# Patient Record
Sex: FEMALE | Race: WHITE | Employment: OTHER | URBAN - METROPOLITAN AREA
[De-identification: names, ages, dates, MRNs, and addresses within clinical notes are randomized per-mention and may not be internally consistent; named-entity substitution may affect disease eponyms.]

---

## 2017-09-25 PROBLEM — Z79.01 CHRONIC ANTICOAGULATION: Status: ACTIVE | Noted: 2017-09-25

## 2017-09-25 PROBLEM — I10 ESSENTIAL HYPERTENSION: Status: ACTIVE | Noted: 2017-09-25

## 2017-09-25 PROBLEM — I50.41 ACUTE COMBINED SYSTOLIC (CONGESTIVE) AND DIASTOLIC (CONGESTIVE) HEART FAILURE (HCC): Status: ACTIVE | Noted: 2017-09-25

## 2017-10-15 PROBLEM — R06.89 DYSPNEA AND RESPIRATORY ABNORMALITIES: Status: ACTIVE | Noted: 2017-10-15

## 2017-10-15 PROBLEM — R06.00 DYSPNEA AND RESPIRATORY ABNORMALITIES: Status: ACTIVE | Noted: 2017-10-15

## 2017-10-15 PROBLEM — J44.9 COPD (CHRONIC OBSTRUCTIVE PULMONARY DISEASE) (HCC): Status: ACTIVE | Noted: 2017-10-15

## 2017-10-28 PROBLEM — Z86.79 HISTORY OF ATRIAL FIBRILLATION: Status: ACTIVE | Noted: 2017-10-28

## 2017-10-30 PROBLEM — I07.1 MODERATE TRICUSPID REGURGITATION: Chronic | Status: ACTIVE | Noted: 2017-10-27

## 2017-10-30 PROBLEM — I42.0 DILATED IDIOPATHIC CARDIOMYOPATHY (HCC): Status: ACTIVE | Noted: 2017-10-27

## 2017-10-30 PROBLEM — R00.1 SEVERE SINUS BRADYCARDIA: Status: ACTIVE | Noted: 2017-10-27

## 2020-01-24 LAB — MAMMOGRAPHY, EXTERNAL: NORMAL

## 2020-07-28 PROBLEM — R09.02 HYPOXIA: Status: ACTIVE | Noted: 2020-07-28

## 2021-04-15 RX ORDER — PREDNISONE 20 MG/1
20 TABLET ORAL DAILY
Status: ON HOLD | COMMUNITY
End: 2021-04-20 | Stop reason: ALTCHOICE

## 2021-04-15 NOTE — PROGRESS NOTES
Have you been tested for COVID  No    Rapid covid test 4/16/21  Have you been told you were positive for COVID No  Have you had any known exposure to someone that is positive for COVID No  Do you have a cough                   No              Do you have shortness of breath No                 Do you have a sore throat            No                Are you having chills                    No                Are you having muscle aches. No                    Please come to the hospital wearing a mask and have your significant other wear a mask as well. Both of you should check your temperature before leaving to come here,  if it is 100 or higher please call [phone] for instruction.

## 2021-04-15 NOTE — PROGRESS NOTES
Dr. Yonatan Peace is pts cardiologist, her last visit she states was 6/2020. She states she was told everything was good. Pt. States she has had not issues with her heart.

## 2021-04-15 NOTE — PROGRESS NOTES
Recalled Dr. Sara Fournier office for cardiac information on patient for her bronchoscopy tmro. . They will forward cardiac notes etc.

## 2021-04-15 NOTE — PROGRESS NOTES
[facility] PRE-ADMISSION TESTING INSTRUCTIONS    The Preadmission Testing patient is instructed accordingly using the following criteria (check applicable):    ARRIVAL INSTRUCTIONS:  [x] Parking the day of Surgery is located in the Main Entrance lot. Upon entering the door, make an immediate right to the surgery reception desk    [x] Bring photo ID and insurance card    [x] Bring in a copy of Living will or Durable Power of  papers. [x] Please be sure to arrange for responsible adult to provide transportation to and from the hospital    [x] Please arrange for responsible adult to be with you for the 24 hour period post procedure due to having anesthesia      GENERAL INSTRUCTIONS:    [x] Nothing by mouth after midnight, including gum, candy, mints or water    [] You may brush your teeth, but do not swallow any water    [x] Take medications as instructed with 1-2 oz of water    [x] Stop herbal supplements and vitamins 5 days prior to procedure    [x] Follow preop dosing of blood thinners per physician instructions    [] Take 1/2 dose of evening insulin, but no insulin after midnight    [] No oral diabetic medications after midnight    [] If diabetic and have low blood sugar or feel symptomatic, take 1-2oz apple juice only    [x] Bring inhalers day of surgery    [] Bring C-PAP/ Bi-Pap day of surgery    [] Bring urine specimen day of surgery    [x] , no lotion, powders or creams     [] Follow bowel prep as instructed per surgeon    [x] No tobacco products within 24 hours of surgery     [x] No alcohol or illegal drug use within 24 hours of surgery.     [x] Jewelry, body piercing's, eyeglasses, contact lenses and dentures are not permitted into surgery (bring cases)      [x] Please do not wear any nail polish, make up or hair products on the day of surgery    [x] You can expect a call the business day prior to procedure to notify you if your arrival time changes    [x] If you receive a survey after surgery we would greatly appreciate your comments    [] Parent/guardian of a minor must accompany their child and remain on the premises  the entire time they are under our care     [] Pediatric patients may bring favorite toy, blanket or comfort item with them    [] A caregiver or family member must remain with the patient during their stay if they are mentally handicapped, have dementia, disoriented or unable to use a call light or would be a safety concern if left unattended    [x] Please notify surgeon if you develop any illness between now and time of surgery (cold, cough, sore throat, fever, nausea, vomiting) or any signs of infections  including skin, wounds, and dental.    [x]  The Outpatient Pharmacy is available to fill your prescription here on your day of surgery, ask your preop nurse for details    [x] Other instructions: wear loose, comfortable clothing    EDUCATIONAL MATERIALS PROVIDED:    [] PAT Preoperative Education Packet/Booklet     [] Medication List    [] Transfusion bracelet applied with instructions    [] Shower with soap, lather and rinse well, and use CHG wipes provided the evening before surgery as instructed    [] Incentive spirometer with instructions

## 2021-04-16 ENCOUNTER — HOSPITAL ENCOUNTER (OUTPATIENT)
Age: 81
Discharge: HOME | End: 2021-04-18
Payer: MEDICARE

## 2021-04-16 DIAGNOSIS — Z01.818 PREOP TESTING: ICD-10-CM

## 2021-04-16 PROCEDURE — U0003 INFECTIOUS AGENT DETECTION BY NUCLEIC ACID (DNA OR RNA); SEVERE ACUTE RESPIRATORY SYNDROME CORONAVIRUS 2 (SARS-COV-2) (CORONAVIRUS DISEASE [COVID-19]), AMPLIFIED PROBE TECHNIQUE, MAKING USE OF HIGH THROUGHPUT TECHNOLOGIES AS DESCRIBED BY CMS-2020-01-R: HCPCS

## 2021-04-16 PROCEDURE — U0005 INFEC AGEN DETEC AMPLI PROBE: HCPCS

## 2021-04-16 NOTE — PROGRESS NOTES
a survey after surgery we would greatly appreciate your comments    [] Parent/guardian of a minor must accompany their child and remain on the premises  the entire time they are under our care     [] Pediatric patients may bring favorite toy, blanket or comfort item with them    [] A caregiver or family member must remain with the patient during their stay if they are mentally handicapped, have dementia, disoriented or unable to use a call light or would be a safety concern if left unattended    [x] Please notify surgeon if you develop any illness between now and time of surgery (cold, cough, sore throat, fever, nausea, vomiting) or any signs of infections  including skin, wounds, and dental.    [x]  The Outpatient Pharmacy is available to fill your prescription here on your day of surgery, ask your preop nurse for details    [x] Other instructions : wear loose, comfortable clothing    EDUCATIONAL MATERIALS PROVIDED:    [] PAT Preoperative Education Packet/Booklet     [] Medication List    [] Transfusion bracelet applied with instructions    [] Shower with soap, lather and rinse well, and use CHG wipes provided the evening before surgery as instructed    [] Incentive spirometer with instructions

## 2021-04-17 LAB
SARS-COV-2: NOT DETECTED
SOURCE: NORMAL

## 2021-04-20 ENCOUNTER — APPOINTMENT (OUTPATIENT)
Dept: GENERAL RADIOLOGY | Age: 81
End: 2021-04-20
Attending: INTERNAL MEDICINE
Payer: MEDICARE

## 2021-04-20 ENCOUNTER — HOSPITAL ENCOUNTER (OUTPATIENT)
Age: 81
Setting detail: OUTPATIENT SURGERY
Discharge: HOME | End: 2021-04-20
Attending: INTERNAL MEDICINE | Admitting: INTERNAL MEDICINE
Payer: MEDICARE

## 2021-04-20 ENCOUNTER — HOSPITAL ENCOUNTER (OUTPATIENT)
Dept: GENERAL RADIOLOGY | Age: 81
Setting detail: OUTPATIENT SURGERY
Discharge: HOME | End: 2021-04-22
Attending: INTERNAL MEDICINE
Payer: MEDICARE

## 2021-04-20 ENCOUNTER — ANESTHESIA EVENT (OUTPATIENT)
Dept: ENDOSCOPY | Age: 81
End: 2021-04-20
Payer: MEDICARE

## 2021-04-20 ENCOUNTER — ANESTHESIA (OUTPATIENT)
Dept: ENDOSCOPY | Age: 81
End: 2021-04-20
Payer: MEDICARE

## 2021-04-20 VITALS
TEMPERATURE: 97.5 F | WEIGHT: 186 LBS | HEART RATE: 61 BPM | RESPIRATION RATE: 16 BRPM | OXYGEN SATURATION: 100 % | SYSTOLIC BLOOD PRESSURE: 145 MMHG | HEIGHT: 69 IN | DIASTOLIC BLOOD PRESSURE: 67 MMHG | BODY MASS INDEX: 27.55 KG/M2

## 2021-04-20 VITALS
OXYGEN SATURATION: 94 % | RESPIRATION RATE: 20 BRPM | SYSTOLIC BLOOD PRESSURE: 105 MMHG | DIASTOLIC BLOOD PRESSURE: 51 MMHG

## 2021-04-20 DIAGNOSIS — R52 PAIN: ICD-10-CM

## 2021-04-20 DIAGNOSIS — Z01.818 PREOP TESTING: Primary | ICD-10-CM

## 2021-04-20 LAB
INR BLD: 1.1
PROTHROMBIN TIME: 12.3 SEC (ref 9.3–12.4)

## 2021-04-20 PROCEDURE — 71045 X-RAY EXAM CHEST 1 VIEW: CPT

## 2021-04-20 PROCEDURE — 7100000011 HC PHASE II RECOVERY - ADDTL 15 MIN: Performed by: INTERNAL MEDICINE

## 2021-04-20 PROCEDURE — 3700000000 HC ANESTHESIA ATTENDED CARE: Performed by: INTERNAL MEDICINE

## 2021-04-20 PROCEDURE — 3609011900 HC BRONCHOSCOPY NEEDLE BX TRACHEA MAIN STEM&/BRON: Performed by: INTERNAL MEDICINE

## 2021-04-20 PROCEDURE — 3700000001 HC ADD 15 MINUTES (ANESTHESIA): Performed by: INTERNAL MEDICINE

## 2021-04-20 PROCEDURE — 87070 CULTURE OTHR SPECIMN AEROBIC: CPT

## 2021-04-20 PROCEDURE — 94664 DEMO&/EVAL PT USE INHALER: CPT

## 2021-04-20 PROCEDURE — 88112 CYTOPATH CELL ENHANCE TECH: CPT

## 2021-04-20 PROCEDURE — 7100000010 HC PHASE II RECOVERY - FIRST 15 MIN: Performed by: INTERNAL MEDICINE

## 2021-04-20 PROCEDURE — 87205 SMEAR GRAM STAIN: CPT

## 2021-04-20 PROCEDURE — 2709999900 HC NON-CHARGEABLE SUPPLY: Performed by: INTERNAL MEDICINE

## 2021-04-20 PROCEDURE — 36415 COLL VENOUS BLD VENIPUNCTURE: CPT

## 2021-04-20 PROCEDURE — 6360000002 HC RX W HCPCS: Performed by: NURSE ANESTHETIST, CERTIFIED REGISTERED

## 2021-04-20 PROCEDURE — 87102 FUNGUS ISOLATION CULTURE: CPT

## 2021-04-20 PROCEDURE — 88173 CYTOPATH EVAL FNA REPORT: CPT

## 2021-04-20 PROCEDURE — 7100000000 HC PACU RECOVERY - FIRST 15 MIN: Performed by: INTERNAL MEDICINE

## 2021-04-20 PROCEDURE — 2580000003 HC RX 258: Performed by: NURSE ANESTHETIST, CERTIFIED REGISTERED

## 2021-04-20 PROCEDURE — 88305 TISSUE EXAM BY PATHOLOGIST: CPT

## 2021-04-20 PROCEDURE — 85610 PROTHROMBIN TIME: CPT

## 2021-04-20 PROCEDURE — 7100000001 HC PACU RECOVERY - ADDTL 15 MIN: Performed by: INTERNAL MEDICINE

## 2021-04-20 PROCEDURE — 6360000002 HC RX W HCPCS: Performed by: INTERNAL MEDICINE

## 2021-04-20 PROCEDURE — 87206 SMEAR FLUORESCENT/ACID STAI: CPT

## 2021-04-20 RX ORDER — SODIUM CHLORIDE 0.9 % (FLUSH) 0.9 %
5-40 SYRINGE (ML) INJECTION PRN
Status: CANCELLED | OUTPATIENT
Start: 2021-04-20

## 2021-04-20 RX ORDER — SODIUM CHLORIDE 0.9 % (FLUSH) 0.9 %
5-40 SYRINGE (ML) INJECTION EVERY 12 HOURS SCHEDULED
Status: CANCELLED | OUTPATIENT
Start: 2021-04-20

## 2021-04-20 RX ORDER — SODIUM CHLORIDE 9 MG/ML
INJECTION, SOLUTION INTRAVENOUS CONTINUOUS PRN
Status: DISCONTINUED | OUTPATIENT
Start: 2021-04-20 | End: 2021-04-20 | Stop reason: SDUPTHER

## 2021-04-20 RX ORDER — PROPOFOL 10 MG/ML
INJECTION, EMULSION INTRAVENOUS PRN
Status: DISCONTINUED | OUTPATIENT
Start: 2021-04-20 | End: 2021-04-20 | Stop reason: SDUPTHER

## 2021-04-20 RX ORDER — ALBUTEROL SULFATE 2.5 MG/3ML
2.5 SOLUTION RESPIRATORY (INHALATION) ONCE
Status: COMPLETED | OUTPATIENT
Start: 2021-04-20 | End: 2021-04-20

## 2021-04-20 RX ORDER — LIDOCAINE HYDROCHLORIDE 20 MG/ML
INJECTION, SOLUTION INTRAVENOUS PRN
Status: DISCONTINUED | OUTPATIENT
Start: 2021-04-20 | End: 2021-04-20 | Stop reason: SDUPTHER

## 2021-04-20 RX ORDER — SODIUM CHLORIDE 9 MG/ML
25 INJECTION, SOLUTION INTRAVENOUS PRN
Status: CANCELLED | OUTPATIENT
Start: 2021-04-20

## 2021-04-20 RX ADMIN — SODIUM CHLORIDE: 9 INJECTION, SOLUTION INTRAVENOUS at 13:28

## 2021-04-20 RX ADMIN — PROPOFOL 50 MG: 10 INJECTION, EMULSION INTRAVENOUS at 13:45

## 2021-04-20 RX ADMIN — PROPOFOL 100 MG: 10 INJECTION, EMULSION INTRAVENOUS at 13:37

## 2021-04-20 RX ADMIN — ALBUTEROL SULFATE 2.5 MG: 2.5 SOLUTION RESPIRATORY (INHALATION) at 14:32

## 2021-04-20 RX ADMIN — LIDOCAINE HYDROCHLORIDE 40 MG: 20 INJECTION, SOLUTION INTRAVENOUS at 13:37

## 2021-04-20 RX ADMIN — PROPOFOL 40 MG: 10 INJECTION, EMULSION INTRAVENOUS at 13:50

## 2021-04-20 RX ADMIN — PROPOFOL 20 MG: 10 INJECTION, EMULSION INTRAVENOUS at 13:54

## 2021-04-20 RX ADMIN — PROPOFOL 50 MG: 10 INJECTION, EMULSION INTRAVENOUS at 13:41

## 2021-04-20 RX ADMIN — LIDOCAINE HYDROCHLORIDE 40 MG: 20 INJECTION, SOLUTION INTRAVENOUS at 13:41

## 2021-04-20 ASSESSMENT — LIFESTYLE VARIABLES: SMOKING_STATUS: 0

## 2021-04-20 NOTE — ANESTHESIA POSTPROCEDURE EVALUATION
Department of Anesthesiology  Postprocedure Note    Patient: Nasrin aMi  MRN: [id number]  YOB: 1940  Date of evaluation: 4/20/2021  Time:  2:41 PM     Procedure Summary     Date: 04/20/21 Room / Location: [facility] 03 / [address] [facility]    Anesthesia Start: 1340 Anesthesia Stop: 2484    Procedures:       BRONCHOSCOPY ALVEOLAR LAVAGE (N/A )      BRONCHOSCOPY/TRANSBRONCHIAL NEEDLE BIOPSY      BRONCHOSCOPY BIOPSY BRONCHUS Diagnosis: (RIGHT MIDDLE LOBE COLLAPSE)    Surgeons: Cira Grullon MD Responsible Provider: Kelsi Calhoun MD    Anesthesia Type: MAC ASA Status: 4          Anesthesia Type: MAC    Diamond Phase I: Diamond Score: 9    Diamond Phase II:      Last vitals: Reviewed and per EMR flowsheets.        Anesthesia Post Evaluation    Patient location during evaluation: PACU  Patient participation: complete - patient participated  Level of consciousness: awake and alert  Pain score: 0  Airway patency: patent  Nausea & Vomiting: no vomiting and no nausea  Complications: no  Cardiovascular status: hemodynamically stable  Respiratory status: spontaneous ventilation  Hydration status: stable

## 2021-04-20 NOTE — OP NOTE
Operative Note      Patient: Camila Schmid  YOB: 1940  MRN: [id number]    Date of Procedure: 4/20/2021    Pre-Op Diagnosis: RIGHT MIDDLE LOBE COLLAPSE    Post-Op Diagnosis: right middle lobe obstruction causing atelectasis       Procedure(s):  BRONCHOSCOPY ALVEOLAR LAVAGE  BRONCHOSCOPY/TRANSBRONCHIAL NEEDLE BIOPSY  BRONCHOSCOPY BIOPSY BRONCHUS    Surgeon(s):  Pal Fernández MD    Assistant:   * No surgical staff found *    Anesthesia: Monitor Anesthesia Care    Estimated Blood Loss (mL): Minimal    Complications: None    Specimens:   ID Type Source Tests Collected by Time Destination   1 : RT MIDDLE LOBE BAL Respiratory BAL- Bronch. Lavage CULTURE, FUNGUS, GRAM STAIN, CULTURE, RESPIRATORY Pal Fernández MD 4/20/2021 1356    A : RT MIDDLE LOBE BAL Respiratory BAL- Bronch. Lavage CYTOLOGY, NON-GYN Pal Fernández MD 4/20/2021 1355    B : RT  MIDDLE LOBE Tissue Bronchial Biopsy SURGICAL PATHOLOGY Pal Fernández MD 4/20/2021 1357    C : RT  MIDDLE LOBE NEEDLE BIOPSY Tissue Bronchial Biopsy CYTOLOGY, NON-GYN Pal Fernández MD 4/20/2021 1358        Implants:  * No implants in log *      Drains: * No LDAs found *    Findings: obstruction of (?) flap or tissue at right middle lobe lateral bronchi    Detailed Description of Procedure:   Bronchoscope was passed through the oral cavity. Vocal cords were visualized and moving symmetrically. Scope was passed through the cords and all airways were visualized. Minimal whitish secretions were seen in all airways and suctioned. An obstruction was noted in the right middle lobe lateral bronchi. TBNA and TBB was done on the obstruction though some difficulty due to location and distance from the edge of the scope. BAL was done on the right middle lobe also. Minimal to no bleeding was noted prior to removal of bronchoscope. Pt will be sent to recovery and CXR to be done to r/o pneumothorax.     Electronically signed by Mimi Berg MD on 4/20/2021 at 1:58 PM

## 2021-04-20 NOTE — ANESTHESIA PRE PROCEDURE
mouth Daily TSH to be done at Dr. Breann Bronson office in 3 weeks 17  Yes Hebert Ramírez MD   pantoprazole (PROTONIX) 40 MG tablet Take 1 tablet by mouth every morning (before breakfast) 17  Yes Hebert Ramírez MD       Current medications:    No current facility-administered medications for this encounter. Allergies:     Allergies   Allergen Reactions    Erythromycin Other (See Comments)     Stomach pain      Cat Hair Extract Itching    Pcn [Penicillins] Itching    Seasonal Itching    Eggs Or Egg-Derived Products Nausea Only       Problem List:    Patient Active Problem List   Diagnosis Code    Atrial fibrillation with RVR ([facility]- Recurrent I48.91    Acute on chronic diastolic congestive heart failure (HCC) I50.33    Cardiomyopathy as manifestation of underlying disease ([facility] I43    Non-rheumatic mitral regurgitation I34.0    Acute combined systolic and diastolic congestive heart failure (HCC) I50.41    Chronic anticoagulation Z79.01    Essential hypertension I10    COPD (chronic obstructive pulmonary disease) (HCC) J44.9    History of atrial fibrillation Z86.79    Dilated idiopathic cardiomyopathy (HCC) I42.0    Severe sinus bradycardia R00.1    Moderate tricuspid regurgitation I07.1    Hypoxia R09.02       Past Medical History:        Diagnosis Date    Atrial fibrillation (HCC)     Cancer ([facility]     skin cancer left ear    [facility]est[facility]) [facility]     Emphysema, unspecified ([address]     Hyperlipidemia     Hypertension     Thyroid disease     Hypothyroid       Past Surgical History:        Procedure Laterality Date    BREAST BIOPSY Right     benign    TUBAL LIGATION         Social History:    Social History     Tobacco Use    Smoking status: Former Smoker     Packs/day: 2.00     Years: 45.00     Pack years: 90.00     Types: Cigarettes     Start date: 1952     Quit date: 1998     Years since quittin.7    Smokeless tobacco: Never Used   Substance Use Topics    Alcohol use: Yes     Alcohol/week: 1.0 standard drinks     Types: 1 Glasses of wine per week     Comment: nightly                                Counseling given: Not Answered      Vital Signs (Current):   Vitals:    04/15/21 0849 04/16/21 1434 04/20/21 1128   BP:   (!) 143/66   Pulse:   62   Resp:   20   SpO2:   100%   Weight: 186 lb (84.4 kg) 186 lb (84.4 kg)    Height: 5' 8.5\" (1.74 m) 5' 8.5\" (1.74 m)                                               BP Readings from Last 3 Encounters:   04/20/21 (!) 143/66   04/08/21 138/70   01/07/21 123/70       NPO Status: Time of last liquid consumption: 2300                        Time of last solid consumption: 2300                        Date of last liquid consumption: 04/19/21                        Date of last solid food consumption: 04/19/21    BMI:   Wt Readings from Last 3 Encounters:   04/16/21 186 lb (84.4 kg)   04/08/21 186 lb (84.4 kg)   01/07/21 184 lb (83.5 kg)     Body mass index is 27.87 kg/m². CBC:   Lab Results   Component Value Date    WBC 5.7 10/26/2017    RBC 4.47 10/26/2017    HGB 14.0 10/26/2017    HCT 42.3 10/26/2017    MCV 94.6 10/26/2017    RDW 13.0 10/26/2017     10/26/2017       CMP:   Lab Results   Component Value Date     10/26/2017    K 4.7 10/26/2017    CL 97 10/26/2017    CO2 34 10/26/2017    BUN 23 10/26/2017    CREATININE 1.2 10/26/2017    GFRAA 52 10/26/2017    LABGLOM 43 10/26/2017    GLUCOSE 105 10/26/2017    GLUCOSE 87 05/04/2012    PROT 7.4 10/15/2017    CALCIUM 9.5 10/26/2017    BILITOT 0.9 10/15/2017    ALKPHOS 79 10/15/2017    AST 40 10/15/2017    ALT 20 10/15/2017       POC Tests: No results for input(s): POCGLU, POCNA, POCK, POCCL, POCBUN, POCHEMO, POCHCT in the last 72 hours.     Coags:   Lab Results   Component Value Date    PROTIME 16.2 11/06/2017    INR 1.5 11/06/2017    APTT 32.6 10/15/2017       HCG (If Applicable): No results found for: PREGTESTUR, PREGSERUM, HCG, HCGQUANT     ABGs: No results found for: PHART, PO2ART, DUD6LPP, UED0NWX, BEART, O9RMWXEN     Type & Screen (If Applicable):  No results found for: LABABO, LABRH    Drug/Infectious Status (If Applicable):  No results found for: HIV, HEPCAB    COVID-19 Screening (If Applicable):   Lab Results   Component Value Date    COVID19 Not Detected 04/16/2021           Anesthesia Evaluation  Patient summary reviewed and Nursing notes reviewed no history of anesthetic complications:   Airway: Mallampati: II  TM distance: >3 FB   Neck ROM: full  Mouth opening: > = 3 FB Dental:    (+) lower dentures and upper dentures      Pulmonary: breath sounds clear to auscultation  (+) COPD:      (-) not a current smoker (former smoker)                          ROS comment: On 5L O2 continuously   Cardiovascular:    (+) hypertension:, valvular problems/murmurs: MR, dysrhythmias: atrial fibrillation, CHF: systolic and diastolic, pulmonary hypertension: mild,       ECG reviewed  Rhythm: regular  Rate: normal                 ROS comment: Dr Alina Sanchez is her cardiologist    Dilated idiopathic cardiomyopathy    Echo: 9/20/2017  Summary   Left ventricle is normal in size .   Borderline concentric left ventricular hypertrophy   No regional wall motion abnormalities seen.   Overall ejection fraction moderate-to-severely decreased . Ejection fraction is visually estimated at 30-35%.  Arriaza Feil left atrium is moderately dilated.   Right ventricle global systolic function is reduced .   Moderately enlarged right atrium size.   Moderate mitral regurgitation is present.   The aortic valve appears mildly sclerotic.   Mild aortic regurgitation is noted.   Moderate to severe tricuspid regurgitation.   Pulmonary hypertension is mild .   There is a trivial circumferential pericardial effusion noted.      Neuro/Psych:   Negative Neuro/Psych ROS              GI/Hepatic/Renal: Neg GI/Hepatic/Renal ROS            Endo/Other: Negative Endo/Other ROS                    Abdominal:           Vascular:

## 2021-04-20 NOTE — PROGRESS NOTES
Chest x-ray resulted. Notified Dr. Jordan Loera. OK for discharge, patient and daughter given discharge instructions and verbalizes understanding.

## 2021-04-20 NOTE — H&P
[facility]  [address], [address]  [address]    Outpatient Procedure H&P      Procedure:  Bronchoscopy with flouroscopy    Indication: atelectasis    Referring  Physician:  Rom Dorman     Brief history: COPD, oxygen dependent, with atelectasis of right middle lobe. No change with repeat radiographs. Dyspnea with minimal exertion, mod cough and not much sputum production, on 5 li NC and tolerating medications.     AllergiesErythromycin, Cat hair extract, Pcn [penicillins], Seasonal, and Eggs or egg-derived products     Allergies noted: Yes     Vital signs reviewed:Yes    HEENT:normal  Cardiac:normal  Chest:decreased breath sound and occ ronchi with cough  Abdomen:normal  Exts: normal  Neuro:normal    ASA:ASA 3 - Patient with moderate systemic disease with functional limitations    Sedation planned:conscious sedation    Patient in acceptable condition for procedure:Yes        Wendy Ortiz

## 2021-04-21 LAB — GRAM STAIN ORDERABLE: NORMAL

## 2021-04-22 LAB
CULTURE, RESPIRATORY: NORMAL
SMEAR, RESPIRATORY: NORMAL

## 2021-05-23 LAB
FUNGUS (MYCOLOGY) CULTURE: ABNORMAL
FUNGUS STAIN: ABNORMAL
ORGANISM: ABNORMAL

## 2021-10-12 ENCOUNTER — APPOINTMENT (OUTPATIENT)
Dept: GENERAL RADIOLOGY | Age: 81
End: 2021-10-12
Payer: MEDICARE

## 2021-10-12 ENCOUNTER — HOSPITAL ENCOUNTER (EMERGENCY)
Age: 81
Discharge: HOME | End: 2021-10-12
Payer: MEDICARE

## 2021-10-12 VITALS
DIASTOLIC BLOOD PRESSURE: 80 MMHG | WEIGHT: 180 LBS | OXYGEN SATURATION: 98 % | HEART RATE: 72 BPM | HEIGHT: 69 IN | BODY MASS INDEX: 26.66 KG/M2 | RESPIRATION RATE: 22 BRPM | SYSTOLIC BLOOD PRESSURE: 186 MMHG

## 2021-10-12 DIAGNOSIS — J44.1 COPD EXACERBATION (HCC): Primary | ICD-10-CM

## 2021-10-12 DIAGNOSIS — R06.02 SHORTNESS OF BREATH: ICD-10-CM

## 2021-10-12 LAB
ALBUMIN SERPL-MCNC: 4.1 G/DL (ref 3.5–5.2)
ALP BLD-CCNC: 80 U/L (ref 35–104)
ALT SERPL-CCNC: 13 U/L (ref 0–32)
ANION GAP SERPL CALCULATED.3IONS-SCNC: 11 MMOL/L (ref 7–16)
AST SERPL-CCNC: 24 U/L (ref 0–31)
BASOPHILS ABSOLUTE: 0.02 E9/L (ref 0–0.2)
BASOPHILS RELATIVE PERCENT: 0.4 % (ref 0–2)
BILIRUB SERPL-MCNC: 0.8 MG/DL (ref 0–1.2)
BUN BLDV-MCNC: 22 MG/DL (ref 6–23)
CALCIUM SERPL-MCNC: 9.6 MG/DL (ref 8.6–10.2)
CHLORIDE BLD-SCNC: 102 MMOL/L (ref 98–107)
CO2: 29 MMOL/L (ref 22–29)
CREAT SERPL-MCNC: 0.9 MG/DL (ref 0.5–1)
EKG ATRIAL RATE: 42 BPM
EKG Q-T INTERVAL: 392 MS
EKG QRS DURATION: 62 MS
EKG QTC CALCULATION (BAZETT): 437 MS
EKG R AXIS: 54 DEGREES
EKG T AXIS: 78 DEGREES
EKG VENTRICULAR RATE: 75 BPM
EOSINOPHILS ABSOLUTE: 0.17 E9/L (ref 0.05–0.5)
EOSINOPHILS RELATIVE PERCENT: 3.4 % (ref 0–6)
GFR AFRICAN AMERICAN: >60
GFR NON-AFRICAN AMERICAN: 60 ML/MIN/1.73
GLUCOSE BLD-MCNC: 110 MG/DL (ref 74–99)
HCT VFR BLD CALC: 33.1 % (ref 34–48)
HEMOGLOBIN: 10.3 G/DL (ref 11.5–15.5)
IMMATURE GRANULOCYTES #: 0.02 E9/L
IMMATURE GRANULOCYTES %: 0.4 % (ref 0–5)
INR BLD: 1.4
LACTIC ACID: 0.6 MMOL/L (ref 0.5–2.2)
LYMPHOCYTES ABSOLUTE: 1.04 E9/L (ref 1.5–4)
LYMPHOCYTES RELATIVE PERCENT: 20.8 % (ref 20–42)
MCH RBC QN AUTO: 30.3 PG (ref 26–35)
MCHC RBC AUTO-ENTMCNC: 31.1 % (ref 32–34.5)
MCV RBC AUTO: 97.4 FL (ref 80–99.9)
MONOCYTES ABSOLUTE: 0.4 E9/L (ref 0.1–0.95)
MONOCYTES RELATIVE PERCENT: 8 % (ref 2–12)
NEUTROPHILS ABSOLUTE: 3.34 E9/L (ref 1.8–7.3)
NEUTROPHILS RELATIVE PERCENT: 67 % (ref 43–80)
PDW BLD-RTO: 14.4 FL (ref 11.5–15)
PLATELET # BLD: 153 E9/L (ref 130–450)
PMV BLD AUTO: 10.3 FL (ref 7–12)
POTASSIUM REFLEX MAGNESIUM: 4 MMOL/L (ref 3.5–5)
PRO-BNP: 648 PG/ML (ref 0–450)
PROTHROMBIN TIME: 15.5 SEC (ref 9.3–12.4)
RBC # BLD: 3.4 E12/L (ref 3.5–5.5)
SODIUM BLD-SCNC: 142 MMOL/L (ref 132–146)
TOTAL PROTEIN: 6.8 G/DL (ref 6.4–8.3)
TROPONIN, HIGH SENSITIVITY: 14 NG/L (ref 0–9)
WBC # BLD: 5 E9/L (ref 4.5–11.5)

## 2021-10-12 PROCEDURE — 99283 EMERGENCY DEPT VISIT LOW MDM: CPT

## 2021-10-12 PROCEDURE — 94640 AIRWAY INHALATION TREATMENT: CPT

## 2021-10-12 PROCEDURE — 83605 ASSAY OF LACTIC ACID: CPT

## 2021-10-12 PROCEDURE — 85025 COMPLETE CBC W/AUTO DIFF WBC: CPT

## 2021-10-12 PROCEDURE — 6370000000 HC RX 637 (ALT 250 FOR IP): Performed by: PHYSICIAN ASSISTANT

## 2021-10-12 PROCEDURE — 85610 PROTHROMBIN TIME: CPT

## 2021-10-12 PROCEDURE — 93005 ELECTROCARDIOGRAM TRACING: CPT | Performed by: PHYSICIAN ASSISTANT

## 2021-10-12 PROCEDURE — 80053 COMPREHEN METABOLIC PANEL: CPT

## 2021-10-12 PROCEDURE — 83880 ASSAY OF NATRIURETIC PEPTIDE: CPT

## 2021-10-12 PROCEDURE — 84484 ASSAY OF TROPONIN QUANT: CPT

## 2021-10-12 PROCEDURE — 94664 DEMO&/EVAL PT USE INHALER: CPT

## 2021-10-12 PROCEDURE — 71046 X-RAY EXAM CHEST 2 VIEWS: CPT

## 2021-10-12 RX ORDER — PREDNISONE 10 MG/1
TABLET ORAL
Qty: 20 TABLET | Refills: 0 | Status: SHIPPED | OUTPATIENT
Start: 2021-10-12 | End: 2021-10-22

## 2021-10-12 RX ORDER — IPRATROPIUM BROMIDE AND ALBUTEROL SULFATE 2.5; .5 MG/3ML; MG/3ML
3 SOLUTION RESPIRATORY (INHALATION) ONCE
Status: COMPLETED | OUTPATIENT
Start: 2021-10-12 | End: 2021-10-12

## 2021-10-12 RX ORDER — PREDNISONE 20 MG/1
60 TABLET ORAL ONCE
Status: COMPLETED | OUTPATIENT
Start: 2021-10-12 | End: 2021-10-12

## 2021-10-12 RX ADMIN — IPRATROPIUM BROMIDE AND ALBUTEROL SULFATE 3 AMPULE: .5; 3 SOLUTION RESPIRATORY (INHALATION) at 18:40

## 2021-10-12 RX ADMIN — PREDNISONE 60 MG: 20 TABLET ORAL at 18:27

## 2021-10-12 NOTE — ED NOTES
FIRST PROVIDER CONTACT ASSESSMENT NOTE       Department of Emergency Medicine   10/12/21  12:06 PM EDT    Chief Complaint: Shortness of Breath (hx lung CA, on 5L NC at all times ) and Cough (productive, green x1 month )    History of Present Illness: Rodolfo Diane is a 78 y.o. female who presents to the ED for shortness of breath. Patient wears 5 L oxygen at home. She does report a history of lung cancer. She reports cough with productive green sputum for the past month. Focused Physical Exam:  VS:  BP (!) 186/80   Pulse 72   Resp 22   Ht 5' 8.5\" (1.74 m)   Wt 180 lb (81.6 kg)   SpO2 98%   BMI 26.97 kg/m²      General: Alert and in no apparent distress     Medical History:  has a past medical history of Atrial fibrillation ([address] Cancer ([address] CHF (congestive heart failure) ([address] Emphysema, unspecified ([address] Hyperlipidemia, Hypertension, and Thyroid disease. Surgical History:  has a past surgical history that includes Breast biopsy (Right); Tubal ligation; and bronchoscopy (N/A, 4/20/2021). Social History:  reports that she quit smoking about 23 years ago. Her smoking use included cigarettes. She started smoking about 69 years ago. She has a 90.00 pack-year smoking history. She has never used smokeless tobacco. She reports current alcohol use of about 1.0 standard drinks of alcohol per week. She reports that she does not use drugs. Family History: family history includes Cancer in her mother; Emphysema in her father.     *ALLERGIES*     Erythromycin, Cat hair extract, Pcn [penicillins], Seasonal, and Eggs or egg-derived products     Initial Plan of Care:  Initiate Treatment-Testing, Proceed toTreatment Area When Bed Available for ED Attending/MLP to Continue Care    -----------------END OF FIRST PROVIDER CONTACT ASSESSMENT NOTE--------------  Electronically signed by Meredith Lester PA-C   DD: 10/12/21         Meredith Lester PA-C  10/12/21 4670

## 2021-10-12 NOTE — ED PROVIDER NOTES
ED Attending shared visit  CC: No                                                                                                                                      Department of Emergency Medicine   ED  Provider Note  Admit Date/RoomTime: 10/12/2021  6:09 PM  ED Room: 32/32        HPI:  10/12/21,   Time: 6:21 PM EDT         Reynolds Media is a 78 y.o. female presenting to the ED for cough and SOB, beginning few days ago. The complaint has been persistent, moderate in severity, and worsened by nothing. The patient has known hx of COPD, lung CA and A. Fib. States she is on Oxygen 5 L chronically and take Coumadin as well. Her son had Covid 3 weeks ago. Pt states she has been battling cough and cold symptoms. Had negative Covid test last week. Pt reports her PCP has her on several antibiotics and she cut here Coumadin dose in half because of this. Comes in today because she didn't sleep well last night. Has had lots of coughing and SOB. Was concerned her A. Fib was out of control. The patient is on inhalers and nebs at home . Denies CP, fever or LE edema. Has had work-up in ED and been waiting all day to be seen.            ROS:     Constitutional: See HPI  HENT: See HPI  Eyes: Negative for pain, discharge and redness  Respiratory: See HPi  Cardiovascular: Negative for CP, edema or palpitations  Gastrointestinal: Negative for nausea, vomiting, diarrhea and abdominal distention  Genitourinary: Negative for dysuria and frequency  Musculoskeletal: Negative for back pain and arthralgia  Skin: Negative for rash and wound  Neurological: Negative for weakness and headaches  Hematological: Negative for adenopathy    All other systems reviewed and are negative      -------------------------------- PAST HISTORY ----------------------------------  Past Medical History:  has a past medical history of Atrial fibrillation ([facility] [address] [facility] CHF (congestive heart failure) [facility] Emphysema, unspecified ([facility] [address] Hyperlipidemia, Hypertension, and Thyroid disease. Past Surgical History:  has a past surgical history that includes Breast biopsy (Right); Tubal ligation; and bronchoscopy (N/A, 4/20/2021). Social History:  reports that she quit smoking about 23 years ago. Her smoking use included cigarettes. She started smoking about 69 years ago. She has a 90.00 pack-year smoking history. She has never used smokeless tobacco. She reports current alcohol use of about 1.0 standard drinks of alcohol per week. She reports that she does not use drugs. Family History: family history includes Cancer in her mother; Emphysema in her father. The patients home medications have been reviewed.     Allergies: Erythromycin, Cat hair extract, Pcn [penicillins], Seasonal, and Eggs or egg-derived products    --------------------------------- RESULTS ------------------------------------------  All laboratory and radiology results have been personally reviewed by myself   LABS:  Results for orders placed or performed during the hospital encounter of 10/12/21   CBC Auto Differential   Result Value Ref Range    WBC 5.0 4.5 - 11.5 E9/L    RBC 3.40 (L) 3.50 - 5.50 E12/L    Hemoglobin 10.3 (L) 11.5 - 15.5 g/dL    Hematocrit 33.1 (L) 34.0 - 48.0 %    MCV 97.4 80.0 - 99.9 fL    MCH 30.3 26.0 - 35.0 pg    MCHC 31.1 (L) 32.0 - 34.5 %    RDW 14.4 11.5 - 15.0 fL    Platelets 852 843 - 451 E9/L    MPV 10.3 7.0 - 12.0 fL    Neutrophils % 67.0 43.0 - 80.0 %    Immature Granulocytes % 0.4 0.0 - 5.0 %    Lymphocytes % 20.8 20.0 - 42.0 %    Monocytes % 8.0 2.0 - 12.0 %    Eosinophils % 3.4 0.0 - 6.0 %    Basophils % 0.4 0.0 - 2.0 %    Neutrophils Absolute 3.34 1.80 - 7.30 E9/L    Immature Granulocytes # 0.02 E9/L    Lymphocytes Absolute 1.04 (L) 1.50 - 4.00 E9/L    Monocytes Absolute 0.40 0.10 - 0.95 E9/L    Eosinophils Absolute 0.17 0.05 - 0.50 E9/L    Basophils Absolute 0.02 0.00 - 0.20 E9/L   Comprehensive Metabolic Panel w/ Reflex to MG Result Value Ref Range    Sodium 142 132 - 146 mmol/L    Potassium reflex Magnesium 4.0 3.5 - 5.0 mmol/L    Chloride 102 98 - 107 mmol/L    CO2 29 22 - 29 mmol/L    Anion Gap 11 7 - 16 mmol/L    Glucose 110 (H) 74 - 99 mg/dL    BUN 22 6 - 23 mg/dL    CREATININE 0.9 0.5 - 1.0 mg/dL    GFR Non-African American 60 >=60 mL/min/1.73    GFR African American >60     Calcium 9.6 8.6 - 10.2 mg/dL    Total Protein 6.8 6.4 - 8.3 g/dL    Albumin 4.1 3.5 - 5.2 g/dL    Total Bilirubin 0.8 0.0 - 1.2 mg/dL    Alkaline Phosphatase 80 35 - 104 U/L    ALT 13 0 - 32 U/L    AST 24 0 - 31 U/L   Troponin   Result Value Ref Range    Troponin, High Sensitivity 14 (H) 0 - 9 ng/L   Protime-INR   Result Value Ref Range    Protime 15.5 (H) 9.3 - 12.4 sec    INR 1.4    Brain Natriuretic Peptide   Result Value Ref Range    Pro- (H) 0 - 450 pg/mL   Lactic Acid, Plasma   Result Value Ref Range    Lactic Acid 0.6 0.5 - 2.2 mmol/L   EKG 12 Lead   Result Value Ref Range    Ventricular Rate 75 BPM    Atrial Rate 42 BPM    QRS Duration 62 ms    Q-T Interval 392 ms    QTc Calculation (Bazett) 437 ms    R Axis 54 degrees    T Axis 78 degrees       RADIOLOGY:  Interpreted by Radiologist.  XR CHEST (2 VW)   Final Result   Bilateral lung scarring, unchanged             ----------------- NURSING NOTES AND VITALS REVIEWED ---------------   The nursing notes within the ED encounter and vital signs as below have been reviewed. BP (!) 186/80   Pulse 72   Resp 22   Ht 5' 8.5\" (1.74 m)   Wt 180 lb (81.6 kg)   SpO2 98%   BMI 26.97 kg/m²   Oxygen Saturation Interpretation: Normal      --------------------------------PHYSICAL EXAM------------------------------------      Constitutional/General: Alert and oriented x3, well appearing, non toxic in NAD.   Pt is comfortable on O2 via NC.   NAD  Head: NC/AT  Eyes: PERRL, EOMI  Mouth: Oropharynx clear, handling secretions, no trismus  Neck: Supple, full ROM, no meningeal signs  Pulmonary: Lungs decreased with bilateral expiratory wheezing noted. Clear to auscultation bilaterally, no wheezes, rales, or rhonchi. Not in respiratory distress  Cardiovascular:  Regular rate and rhythm, no murmurs, gallops, or rubs. 2+ distal pulses  Abdomen: Soft, + BS. No distension. Nontender. No palpable rigidity, rebound or guarding  Extremities: Moves all extremities x 4. Warm and well perfused  Skin: warm and dry without rash  Neurologic: GCS 15,  Intact. No focal deficits  Psych: Normal Affect      ------------------------ ED COURSE/MEDICAL DECISION MAKING----------------------  Medications   ipratropium-albuterol (DUONEB) nebulizer solution 3 ampule (has no administration in time range)   predniSONE (DELTASONE) tablet 60 mg (60 mg Oral Given 10/12/21 1827)         Medical Decision Making:    Labs look ok. CXR with just scarring seen. Pt given Prednisone here. Needs to get back on her regular Coumadin dose. INR is too low. Pt is quite comfortable. She is not in any distress. States her symtoms are better. Given Duonebs here as well. Plan discharge home with steroids x 5 days. Continue with regular inhalers and F/U PCP. Advised to call PCP and discuss INR and Coumadin dosing. Will need repeat labs soon. Return if worse or no better. Counseling: The emergency provider has spoken with the patient and discussed todays results, in addition to providing specific details for the plan of care and counseling regarding the diagnosis and prognosis. Questions are answered at this time and they are agreeable with the plan.      ------------------------ IMPRESSION AND DISPOSITION -------------------------------    IMPRESSION  1. COPD exacerbation ([address]    2.  Shortness of breath        DISPOSITION  Disposition: Discharge to home  Patient condition is stable                   Mayte Mackay PA-C  10/12/21 1828

## 2021-11-22 LAB
ALBUMIN SERPL-MCNC: NORMAL G/DL
ALP BLD-CCNC: NORMAL U/L
ALT SERPL-CCNC: NORMAL U/L
ANION GAP SERPL CALCULATED.3IONS-SCNC: NORMAL MMOL/L
AST SERPL-CCNC: NORMAL U/L
BILIRUB SERPL-MCNC: NORMAL MG/DL
BUN BLDV-MCNC: NORMAL MG/DL
CALCIUM SERPL-MCNC: NORMAL MG/DL
CHLORIDE BLD-SCNC: NORMAL MMOL/L
CHOLESTEROL, TOTAL: NORMAL
CHOLESTEROL/HDL RATIO: NORMAL
CO2: NORMAL
CREAT SERPL-MCNC: NORMAL MG/DL
GFR CALCULATED: NORMAL
GLUCOSE BLD-MCNC: NORMAL MG/DL
HDLC SERPL-MCNC: NORMAL MG/DL
INR BLD: NORMAL
LDL CHOLESTEROL CALCULATED: NORMAL
NONHDLC SERPL-MCNC: NORMAL MG/DL
POTASSIUM SERPL-SCNC: NORMAL MMOL/L
PROTIME: NORMAL
SODIUM BLD-SCNC: NORMAL MMOL/L
TOTAL PROTEIN: NORMAL
TRIGL SERPL-MCNC: NORMAL MG/DL
VLDLC SERPL CALC-MCNC: NORMAL MG/DL

## 2021-12-14 NOTE — PROGRESS NOTES
Have you been tested for COVID  No           Have you been told you were positive for COVID No  Have you had any known exposure to someone that is positive for COVID No  Do you have a cough                   No              Do you have shortness of breath Nio                Do you have a sore throat            No                Are you having chills                    No                Are you having muscle aches. No                    Please come to the hospital wearing a mask and have your significant other wear a mask as well. Both of you should check your temperature before leaving to come here,  if it is 100 or higher please call [phone] for instruction. [place] PRE-ADMISSION TESTING INSTRUCTIONS      ARRIVAL INSTRUCTIONS:  [x] Parking the day of Surgery is located in the Main Entrance lot. Upon entering the main door make an immediate right to the surgery reception desk. [x] Bring photo ID and insurance card    [x] Bring in a copy of Living will or Durable Power of  papers. [x] Please be sure to arrange for responsible adult to provide transportation to and from the hospital    [x] Please arrange for responsible adult to be with you for the 24 hour period post procedure due to having anesthesia      GENERAL INSTRUCTIONS:    [x] Nothing by mouth after midnight, including gum, candy, mints or water    [x] You may brush your teeth, but do not swallow any water    [x] Take medications as instructed with 1-2 oz of water    [x] Follow preop dosing of blood thinners per physician instructions    [x] Bring inhalers day of surgery    [x] Shower or bath with soap, lather and rinse well, AM of Surgery, no lotion, powders or creams to surgical site    [x] No alcohol or illegal drug use within 24 hours of surgery.     [x] Jewelry, body piercing's, eyeglasses, contact lenses and dentures are not permitted into surgery (bring cases)      [x] Please do not wear any nail polish, make up or hair products on the day of surgery    [x] You can expect a call the business day prior to procedure to notify you if your arrival time changes    [x] If you receive a survey after surgery we would greatly appreciate your comments    [x] Please notify surgeon if you develop any illness between now and time of surgery (cold, cough, sore throat, fever, nausea, vomiting) or any signs of infections  including skin, wounds, and dental.

## 2021-12-16 ENCOUNTER — ANESTHESIA EVENT (OUTPATIENT)
Dept: OPERATING ROOM | Age: 81
End: 2021-12-16
Payer: MEDICARE

## 2021-12-16 ENCOUNTER — ANESTHESIA (OUTPATIENT)
Dept: OPERATING ROOM | Age: 81
End: 2021-12-16
Payer: MEDICARE

## 2021-12-16 ENCOUNTER — HOSPITAL ENCOUNTER (OUTPATIENT)
Age: 81
Setting detail: OUTPATIENT SURGERY
Discharge: HOME | End: 2021-12-16
Attending: OPHTHALMOLOGY | Admitting: OPHTHALMOLOGY
Payer: MEDICARE

## 2021-12-16 VITALS
SYSTOLIC BLOOD PRESSURE: 151 MMHG | WEIGHT: 175 LBS | BODY MASS INDEX: 27.47 KG/M2 | TEMPERATURE: 98.2 F | RESPIRATION RATE: 16 BRPM | HEART RATE: 65 BPM | DIASTOLIC BLOOD PRESSURE: 68 MMHG | OXYGEN SATURATION: 96 % | HEIGHT: 67 IN

## 2021-12-16 VITALS — DIASTOLIC BLOOD PRESSURE: 75 MMHG | OXYGEN SATURATION: 100 % | SYSTOLIC BLOOD PRESSURE: 153 MMHG

## 2021-12-16 PROBLEM — H25.813 COMBINED FORMS OF AGE-RELATED CATARACT OF BOTH EYES: Status: ACTIVE | Noted: 2021-12-16

## 2021-12-16 PROCEDURE — 7100000010 HC PHASE II RECOVERY - FIRST 15 MIN: Performed by: OPHTHALMOLOGY

## 2021-12-16 PROCEDURE — 3700000000 HC ANESTHESIA ATTENDED CARE: Performed by: OPHTHALMOLOGY

## 2021-12-16 PROCEDURE — 6360000002 HC RX W HCPCS: Performed by: NURSE ANESTHETIST, CERTIFIED REGISTERED

## 2021-12-16 PROCEDURE — 6360000002 HC RX W HCPCS: Performed by: OPHTHALMOLOGY

## 2021-12-16 PROCEDURE — 3700000001 HC ADD 15 MINUTES (ANESTHESIA): Performed by: OPHTHALMOLOGY

## 2021-12-16 PROCEDURE — 2709999900 HC NON-CHARGEABLE SUPPLY: Performed by: OPHTHALMOLOGY

## 2021-12-16 PROCEDURE — 7100000011 HC PHASE II RECOVERY - ADDTL 15 MIN: Performed by: OPHTHALMOLOGY

## 2021-12-16 PROCEDURE — V2632 POST CHMBR INTRAOCULAR LENS: HCPCS | Performed by: OPHTHALMOLOGY

## 2021-12-16 PROCEDURE — 3600000002 HC SURGERY LEVEL 2 BASE: Performed by: OPHTHALMOLOGY

## 2021-12-16 PROCEDURE — 6370000000 HC RX 637 (ALT 250 FOR IP)

## 2021-12-16 PROCEDURE — 6370000000 HC RX 637 (ALT 250 FOR IP): Performed by: OPHTHALMOLOGY

## 2021-12-16 PROCEDURE — 3600000012 HC SURGERY LEVEL 2 ADDTL 15MIN: Performed by: OPHTHALMOLOGY

## 2021-12-16 PROCEDURE — 2580000003 HC RX 258: Performed by: OPHTHALMOLOGY

## 2021-12-16 PROCEDURE — 2500000003 HC RX 250 WO HCPCS: Performed by: OPHTHALMOLOGY

## 2021-12-16 DEVICE — ACRYSOF(R) IQ ASPHERIC NATURAL IOL, SINGLE-PIECE ACRYLIC FOLDABLE PCL, UV WITH BLUE LIGHTFILTER, 13.0MM LENGTH, 6.0MM ANTERIORASYMMETRIC BICONVEX OPTIC, PLANAR HAPTICS.
Type: IMPLANTABLE DEVICE | Site: EYE | Status: FUNCTIONAL
Brand: ACRYSOF®

## 2021-12-16 RX ORDER — PREDNISOLONE ACETATE 10 MG/ML
SUSPENSION/ DROPS OPHTHALMIC PRN
Status: DISCONTINUED | OUTPATIENT
Start: 2021-12-16 | End: 2021-12-16 | Stop reason: ALTCHOICE

## 2021-12-16 RX ORDER — CIPROFLOXACIN HYDROCHLORIDE 3.5 MG/ML
1 SOLUTION/ DROPS TOPICAL
Status: COMPLETED | OUTPATIENT
Start: 2021-12-16 | End: 2021-12-16

## 2021-12-16 RX ORDER — CYCLOPENTOLATE HYDROCHLORIDE 10 MG/ML
1 SOLUTION/ DROPS OPHTHALMIC
Status: COMPLETED | OUTPATIENT
Start: 2021-12-16 | End: 2021-12-16

## 2021-12-16 RX ORDER — ONDANSETRON 2 MG/ML
4 INJECTION INTRAMUSCULAR; INTRAVENOUS EVERY 6 HOURS PRN
Status: DISCONTINUED | OUTPATIENT
Start: 2021-12-16 | End: 2021-12-16 | Stop reason: HOSPADM

## 2021-12-16 RX ORDER — KETOROLAC TROMETHAMINE 5 MG/ML
1 SOLUTION OPHTHALMIC
Status: COMPLETED | OUTPATIENT
Start: 2021-12-16 | End: 2021-12-16

## 2021-12-16 RX ORDER — PHENYLEPHRINE HCL 2.5 %
1 DROPS OPHTHALMIC (EYE)
Status: COMPLETED | OUTPATIENT
Start: 2021-12-16 | End: 2021-12-16

## 2021-12-16 RX ORDER — TROPICAMIDE 10 MG/ML
1 SOLUTION/ DROPS OPHTHALMIC
Status: COMPLETED | OUTPATIENT
Start: 2021-12-16 | End: 2021-12-16

## 2021-12-16 RX ORDER — ONDANSETRON 4 MG/1
4 TABLET, ORALLY DISINTEGRATING ORAL EVERY 8 HOURS PRN
Status: DISCONTINUED | OUTPATIENT
Start: 2021-12-16 | End: 2021-12-16 | Stop reason: HOSPADM

## 2021-12-16 RX ORDER — CIPROFLOXACIN HYDROCHLORIDE 3.5 MG/ML
SOLUTION/ DROPS TOPICAL PRN
Status: DISCONTINUED | OUTPATIENT
Start: 2021-12-16 | End: 2021-12-16 | Stop reason: ALTCHOICE

## 2021-12-16 RX ORDER — SODIUM CHLORIDE 9 MG/ML
INJECTION, SOLUTION INTRAVENOUS CONTINUOUS
Status: DISCONTINUED | OUTPATIENT
Start: 2021-12-16 | End: 2021-12-16 | Stop reason: HOSPADM

## 2021-12-16 RX ORDER — PROPOFOL 10 MG/ML
INJECTION, EMULSION INTRAVENOUS PRN
Status: DISCONTINUED | OUTPATIENT
Start: 2021-12-16 | End: 2021-12-16 | Stop reason: SDUPTHER

## 2021-12-16 RX ADMIN — SODIUM CHLORIDE: 9 INJECTION, SOLUTION INTRAVENOUS at 08:15

## 2021-12-16 RX ADMIN — PHENYLEPHRINE HYDROCHLORIDE 1 DROP: 25 SOLUTION/ DROPS OPHTHALMIC at 07:45

## 2021-12-16 RX ADMIN — TROPICAMIDE 1 DROP: 10 SOLUTION/ DROPS OPHTHALMIC at 08:00

## 2021-12-16 RX ADMIN — CIPROFLOXACIN 1 DROP: 3 SOLUTION OPHTHALMIC at 07:45

## 2021-12-16 RX ADMIN — Medication 1 DROP: at 08:09

## 2021-12-16 RX ADMIN — KETOROLAC TROMETHAMINE 1 DROP: 5 SOLUTION OPHTHALMIC at 08:15

## 2021-12-16 RX ADMIN — Medication 1 DROP: at 08:15

## 2021-12-16 RX ADMIN — KETOROLAC TROMETHAMINE 1 DROP: 5 SOLUTION OPHTHALMIC at 07:45

## 2021-12-16 RX ADMIN — Medication 1 DROP: at 07:45

## 2021-12-16 RX ADMIN — Medication 1 DROP: at 08:00

## 2021-12-16 RX ADMIN — PHENYLEPHRINE HYDROCHLORIDE 1 DROP: 25 SOLUTION/ DROPS OPHTHALMIC at 08:15

## 2021-12-16 RX ADMIN — CIPROFLOXACIN 1 DROP: 3 SOLUTION OPHTHALMIC at 08:00

## 2021-12-16 RX ADMIN — TROPICAMIDE 1 DROP: 10 SOLUTION/ DROPS OPHTHALMIC at 07:45

## 2021-12-16 RX ADMIN — PHENYLEPHRINE HYDROCHLORIDE 1 DROP: 25 SOLUTION/ DROPS OPHTHALMIC at 08:09

## 2021-12-16 RX ADMIN — PHENYLEPHRINE HYDROCHLORIDE 1 DROP: 25 SOLUTION/ DROPS OPHTHALMIC at 08:01

## 2021-12-16 RX ADMIN — CIPROFLOXACIN 1 DROP: 3 SOLUTION OPHTHALMIC at 08:09

## 2021-12-16 RX ADMIN — KETOROLAC TROMETHAMINE 1 DROP: 5 SOLUTION OPHTHALMIC at 08:30

## 2021-12-16 RX ADMIN — PROPOFOL 40 MG: 10 INJECTION, EMULSION INTRAVENOUS at 09:04

## 2021-12-16 RX ADMIN — KETOROLAC TROMETHAMINE 1 DROP: 5 SOLUTION OPHTHALMIC at 08:00

## 2021-12-16 RX ADMIN — CIPROFLOXACIN 1 DROP: 3 SOLUTION OPHTHALMIC at 08:15

## 2021-12-16 RX ADMIN — TROPICAMIDE 1 DROP: 10 SOLUTION/ DROPS OPHTHALMIC at 08:15

## 2021-12-16 RX ADMIN — TROPICAMIDE 1 DROP: 10 SOLUTION/ DROPS OPHTHALMIC at 08:09

## 2021-12-16 ASSESSMENT — PULMONARY FUNCTION TESTS
PIF_VALUE: 0

## 2021-12-16 ASSESSMENT — COPD QUESTIONNAIRES: CAT_SEVERITY: SEVERE

## 2021-12-16 ASSESSMENT — LIFESTYLE VARIABLES: SMOKING_STATUS: 0

## 2021-12-16 ASSESSMENT — PAIN SCALES - GENERAL
PAINLEVEL_OUTOF10: 0
PAINLEVEL_OUTOF10: 0

## 2021-12-16 NOTE — ANESTHESIA PRE PROCEDURE
medications:    No current facility-administered medications for this visit. No current outpatient medications on file. Facility-Administered Medications Ordered in Other Visits   Medication Dose Route Frequency Provider Last Rate Last Admin    tropicamide (MYDRIACYL) 1 % ophthalmic solution 1 drop  1 drop Ophthalmic Q15 Min PRN Imani Collado MD   1 drop at 12/16/21 0800    phenylephrine (MYDFRIN) 2.5 % ophthalmic solution 1 drop  1 drop Ophthalmic Q15 Min PRN Imani Collado MD   1 drop at 12/16/21 0801    cyclopentolate (CYCLOGYL) 1 % ophthalmic solution 1 drop  1 drop Ophthalmic Q15 Min PRN Imani Collado MD   1 drop at 12/16/21 0800    ciprofloxacin (CILOXAN) 0.3 % ophthalmic solution 1 drop  1 drop Ophthalmic Q15 Min PRHAYDEE Collado MD   1 drop at 12/16/21 0800    ketorolac (ACULAR) 0.5 % ophthalmic solution 1 drop  1 drop Ophthalmic Q15 Min PRN Imani Collado MD   1 drop at 12/16/21 0645    0.9 % sodium chloride infusion   IntraVENous Continuous Imani Collado MD           Allergies:     Allergies   Allergen Reactions    Cat Hair Extract Itching    Erythromycin Other (See Comments)     Stomach pain      Pcn [Penicillins] Itching    Seasonal Itching    Eggs Or Egg-Derived Products Nausea Only       Problem List:    Patient Active Problem List   Diagnosis Code    Atrial fibrillation with RVR ([facility] [address]- Recurrent I48.91    Acute on chronic diastolic congestive heart failure ([facility]) I50.33    Cardiomyopathy as manifestation of underlying disease ([facility] [address] I43    Non-rheumatic mitral regurgitation I34.0    Acute combined systolic and diastolic congestive heart failure ([facility]) I50.41    Chronic anticoagulation Z79.01    Essential hypertension I10    COPD (chronic obstructive pulmonary disease) ([facility]) J44.9    History of atrial fibrillation Z86.79    Dilated idiopathic cardiomyopathy ([facility]) I42.0    Severe sinus bradycardia R00.1    Moderate LABGLOM 60 10/12/2021    GLUCOSE 110 10/12/2021    GLUCOSE 87 05/04/2012    PROT 6.8 10/12/2021    CALCIUM 9.6 10/12/2021    BILITOT 0.8 10/12/2021    ALKPHOS 80 10/12/2021    AST 24 10/12/2021    ALT 13 10/12/2021       POC Tests: No results for input(s): POCGLU, POCNA, POCK, POCCL, POCBUN, POCHEMO, POCHCT in the last 72 hours. Coags:   Lab Results   Component Value Date    PROTIME 15.5 10/12/2021    INR 1.4 10/12/2021    APTT 32.6 10/15/2017       HCG (If Applicable): No results found for: PREGTESTUR, PREGSERUM, HCG, HCGQUANT     ABGs: No results found for: PHART, PO2ART, PND8ATK, WXC9RNC, BEART, I7VDPLNU     Type & Screen (If Applicable):  No results found for: LABABO, LABRH    Drug/Infectious Status (If Applicable):  No results found for: HIV, HEPCAB    COVID-19 Screening (If Applicable):   Lab Results   Component Value Date    COVID19 Not Detected 04/16/2021           Anesthesia Evaluation  Patient summary reviewed and Nursing notes reviewed no history of anesthetic complications:   Airway: Mallampati: II  TM distance: >3 FB   Neck ROM: full  Mouth opening: > = 3 FB Dental:    (+) lower dentures and upper dentures      Pulmonary: breath sounds clear to auscultation  (+) COPD: severe,      (-) not a current smoker (former smoker)                          ROS comment: On 5L O2 continuously   Cardiovascular:    (+) hypertension:, valvular problems/murmurs: MR, dysrhythmias: atrial fibrillation, CHF: systolic and diastolic, pulmonary hypertension: mild,       ECG reviewed  Rhythm: regular  Rate: normal                 ROS comment: Dr Aaron Chauhan is her cardiologist    Dilated idiopathic cardiomyopathy    Echo: 9/20/2017  Summary   Left ventricle is normal in size .   Borderline concentric left ventricular hypertrophy   No regional wall motion abnormalities seen.   Overall ejection fraction moderate-to-severely decreased .    Ejection fraction is visually estimated at 30-35%.   The left atrium is moderately dilated.   Right ventricle global systolic function is reduced .   Moderately enlarged right atrium size.   Moderate mitral regurgitation is present.   The aortic valve appears mildly sclerotic.   Mild aortic regurgitation is noted.   Moderate to severe tricuspid regurgitation.   Pulmonary hypertension is mild .   There is a trivial circumferential pericardial effusion noted. Neuro/Psych:   Negative Neuro/Psych ROS              GI/Hepatic/Renal: Neg GI/Hepatic/Renal ROS            Endo/Other:    (+) malignancy/cancer (Lung cancer). (-) diabetes mellitus               Abdominal:             Vascular: negative vascular ROS. Other Findings:               Anesthesia Plan      MAC     ASA 4       Induction: intravenous. MIPS: Prophylactic antiemetics administered. Anesthetic plan and risks discussed with patient. Plan discussed with CRNA.                   Daisy Motta MD   12/16/2021

## 2021-12-16 NOTE — ANESTHESIA POSTPROCEDURE EVALUATION
Department of Anesthesiology  Postprocedure Note    Patient: Rosemary Marmolejo  MRN: [id number]  YOB: 1940  Date of evaluation: 12/16/2021  Time:  1:23 PM     Procedure Summary     Date: 12/16/21 Room / Location: [facility] OR 03 / [facility]    Anesthesia Start: 2533 Anesthesia Stop: 7009    Procedure: RIGHT EYE CATARACT EXTRACTION IOL IMPLANT (Right Eye) Diagnosis: (RIGHT EYE CATARACT)    Surgeons: Hunter Victoria MD Responsible Provider: Ninfa Schwartz MD    Anesthesia Type: MAC ASA Status: 4          Anesthesia Type: MAC    Diamond Phase I: Diamond Score: 10    Diamond Phase II: Diamond Score: 9    Last vitals: Reviewed and per EMR flowsheets.        Anesthesia Post Evaluation    Patient location during evaluation: PACU  Patient participation: complete - patient participated  Level of consciousness: awake and alert  Airway patency: patent  Nausea & Vomiting: no vomiting and no nausea  Complications: no  Cardiovascular status: hemodynamically stable  Respiratory status: acceptable  Hydration status: stable  Multimodal analgesia pain management approach

## 2021-12-16 NOTE — OP NOTE
Operative Note      Patient: Asa Mixer  YOB: 1940  MRN: [id number]    Date of Procedure: 12/16/2021    Pre-Op Diagnosis: RIGHT EYE CATARACT    Post-Op Diagnosis: Same       Procedure(s):  RIGHT EYE CATARACT EXTRACTION IOL IMPLANT    Surgeon(s):  Shanel Rowland MD    Assistant:   * No surgical staff found *    Anesthesia: Monitor Anesthesia Care    Estimated Blood Loss (mL): Minimal    Complications: None    Specimens:   * No specimens in log *    Implants:  Implant Name Type Inv. Item Serial No.  Lot No. LRB No. Used Action   LENS INTOCU 6.0 TO 30.0 DIOPT 118.7 A CONSTANT 0DEG ANG - [id number]  LENS INTOCU 6.0 TO 30.0 DIOPT 118.7 A CONSTANT 0DEG ANG [id number] [employer]-  Right 1 Implanted         Drains: * No LDAs found *    Findings: same    Detailed Description of Procedure: The eye was anesthetized with a mixture of 2% Lidocaine with Vitrase using a standard peribulbar injection. The globe was noted to be intact. A weight was then placed onto the eye for approximately 5 minutes. After an adequate level of anesthesia was obtained, the patient was prepped and draped in the usual sterile fashion. A lid speculum was then placed into the eye. Parish scissors were then used to create a fornix-based conjunctival flap approximately 3 mm long. Residual Tenons were then cleared. A wet-field  cautery was then used to obtain hemostasis. A 64 degree [place] blade was then used to create a limbal groove of approximately 1.5 mm posterior to the surgical limbus and approximately 2.5 mm in length. A crescent blade was then used to dissect a scleral  flap into clear cornea. The chamber was then entered through the tunnel incision created by the crescent knife using a 2.4-mm keratome blade. Viscoat was then used to reconstitute the chamber. A side port incision was then created for a 2nd instrument.   A bent 27-gauge needle was then used to create

## 2021-12-20 LAB
INR BLD: NORMAL
PROTIME: NORMAL

## 2022-01-22 LAB
INR BLD: NORMAL
PROTIME: NORMAL

## 2022-02-21 LAB
INR BLD: NORMAL
PROTIME: NORMAL

## 2022-02-22 NOTE — PROGRESS NOTES
Have you been tested for COVID  No           Have you been told you were positive for COVID No  Have you had any known exposure to someone that is positive for COVID No  Do you have a cough                   No              Do you have shortness of breath No                 Do you have a sore throat            No                Are you having chills                    No                Are you having muscle aches. No                    Please come to the hospital wearing a mask and have your significant other wear a mask as well. Both of you should check your temperature before leaving to come here,  if it is 100 or higher please call [phone] for instruction. [place] PRE-ADMISSION TESTING INSTRUCTIONS      ARRIVAL INSTRUCTIONS:  [x] Parking the day of Surgery is located in the Main Entrance lot. Upon entering the main door make an immediate right to the surgery reception desk. [x] Bring photo ID and insurance card    [x] Please be sure to arrange for responsible adult to provide transportation to and from the hospital    [x] Please arrange for responsible adult to be with you for the 24 hour period post procedure due to having anesthesia      GENERAL INSTRUCTIONS:    [x] Nothing by mouth after midnight, including gum, candy, mints or water    [x] You may brush your teeth, but do not swallow any water    [x] Take medications as instructed with 1-2 oz of water    [x] Stop herbal supplements and vitamins 5 days prior to procedure    [x] Follow preop dosing of blood thinners per physician instructions    [x] Shower or bath with soap, lather and rinse well, AM of Surgery, no lotion, powders or creams to surgical site    [x] No alcohol or illegal drug use within 24 hours of surgery.     [x] Jewelry, body piercing's, eyeglasses, contact lenses and dentures are not permitted into surgery (bring cases)      [x] Please do not wear any nail polish, make up or hair products on the day of surgery    [x] You can expect a call the business day prior to procedure to notify you if your arrival time changes    [x] If you receive a survey after surgery we would greatly appreciate your comments    [x] Please notify surgeon if you develop any illness between now and time of surgery (cold, cough, sore throat, fever, nausea, vomiting) or any signs of infections  including skin, wounds, and dental.

## 2022-02-23 ENCOUNTER — ANESTHESIA EVENT (OUTPATIENT)
Dept: OPERATING ROOM | Age: 82
End: 2022-02-23
Payer: MEDICARE

## 2022-02-24 ENCOUNTER — ANESTHESIA (OUTPATIENT)
Dept: OPERATING ROOM | Age: 82
End: 2022-02-24
Payer: MEDICARE

## 2022-02-24 ENCOUNTER — HOSPITAL ENCOUNTER (OUTPATIENT)
Age: 82
Setting detail: OUTPATIENT SURGERY
Discharge: HOME | End: 2022-02-24
Attending: OPHTHALMOLOGY | Admitting: OPHTHALMOLOGY
Payer: MEDICARE

## 2022-02-24 VITALS
DIASTOLIC BLOOD PRESSURE: 65 MMHG | OXYGEN SATURATION: 100 % | RESPIRATION RATE: 22 BRPM | SYSTOLIC BLOOD PRESSURE: 145 MMHG

## 2022-02-24 VITALS
BODY MASS INDEX: 26.52 KG/M2 | DIASTOLIC BLOOD PRESSURE: 63 MMHG | HEIGHT: 68 IN | RESPIRATION RATE: 16 BRPM | HEART RATE: 72 BPM | SYSTOLIC BLOOD PRESSURE: 138 MMHG | TEMPERATURE: 97.8 F | WEIGHT: 175 LBS | OXYGEN SATURATION: 94 %

## 2022-02-24 PROCEDURE — 2580000003 HC RX 258: Performed by: NURSE ANESTHETIST, CERTIFIED REGISTERED

## 2022-02-24 PROCEDURE — 2500000003 HC RX 250 WO HCPCS: Performed by: OPHTHALMOLOGY

## 2022-02-24 PROCEDURE — 7100000011 HC PHASE II RECOVERY - ADDTL 15 MIN: Performed by: OPHTHALMOLOGY

## 2022-02-24 PROCEDURE — 2580000003 HC RX 258: Performed by: OPHTHALMOLOGY

## 2022-02-24 PROCEDURE — 7100000010 HC PHASE II RECOVERY - FIRST 15 MIN: Performed by: OPHTHALMOLOGY

## 2022-02-24 PROCEDURE — 6370000000 HC RX 637 (ALT 250 FOR IP): Performed by: OPHTHALMOLOGY

## 2022-02-24 PROCEDURE — 2709999900 HC NON-CHARGEABLE SUPPLY: Performed by: OPHTHALMOLOGY

## 2022-02-24 PROCEDURE — 3600000002 HC SURGERY LEVEL 2 BASE: Performed by: OPHTHALMOLOGY

## 2022-02-24 PROCEDURE — 3700000001 HC ADD 15 MINUTES (ANESTHESIA): Performed by: OPHTHALMOLOGY

## 2022-02-24 PROCEDURE — 3700000000 HC ANESTHESIA ATTENDED CARE: Performed by: OPHTHALMOLOGY

## 2022-02-24 PROCEDURE — V2632 POST CHMBR INTRAOCULAR LENS: HCPCS | Performed by: OPHTHALMOLOGY

## 2022-02-24 PROCEDURE — 3600000012 HC SURGERY LEVEL 2 ADDTL 15MIN: Performed by: OPHTHALMOLOGY

## 2022-02-24 PROCEDURE — 2500000003 HC RX 250 WO HCPCS: Performed by: NURSE ANESTHETIST, CERTIFIED REGISTERED

## 2022-02-24 PROCEDURE — 6360000002 HC RX W HCPCS: Performed by: NURSE ANESTHETIST, CERTIFIED REGISTERED

## 2022-02-24 PROCEDURE — 6360000002 HC RX W HCPCS: Performed by: OPHTHALMOLOGY

## 2022-02-24 PROCEDURE — 6370000000 HC RX 637 (ALT 250 FOR IP)

## 2022-02-24 DEVICE — ACRYSOF(R) IQ ASPHERIC NATURAL IOL, SINGLE-PIECE ACRYLIC FOLDABLE PCL, UV WITH BLUE LIGHTFILTER, 13.0MM LENGTH, 6.0MM ANTERIORASYMMETRIC BICONVEX OPTIC, PLANAR HAPTICS.
Type: IMPLANTABLE DEVICE | Site: EYE | Status: FUNCTIONAL
Brand: ACRYSOF®

## 2022-02-24 RX ORDER — KETOROLAC TROMETHAMINE 5 MG/ML
1 SOLUTION OPHTHALMIC
Status: COMPLETED | OUTPATIENT
Start: 2022-02-24 | End: 2022-02-24

## 2022-02-24 RX ORDER — SODIUM CHLORIDE 9 MG/ML
INJECTION, SOLUTION INTRAVENOUS CONTINUOUS PRN
Status: DISCONTINUED | OUTPATIENT
Start: 2022-02-24 | End: 2022-02-24 | Stop reason: SDUPTHER

## 2022-02-24 RX ORDER — CYCLOPENTOLATE HYDROCHLORIDE 10 MG/ML
1 SOLUTION/ DROPS OPHTHALMIC
Status: COMPLETED | OUTPATIENT
Start: 2022-02-24 | End: 2022-02-24

## 2022-02-24 RX ORDER — TROPICAMIDE 10 MG/ML
1 SOLUTION/ DROPS OPHTHALMIC
Status: COMPLETED | OUTPATIENT
Start: 2022-02-24 | End: 2022-02-24

## 2022-02-24 RX ORDER — CIPROFLOXACIN HYDROCHLORIDE 3.5 MG/ML
SOLUTION/ DROPS TOPICAL PRN
Status: DISCONTINUED | OUTPATIENT
Start: 2022-02-24 | End: 2022-02-24 | Stop reason: ALTCHOICE

## 2022-02-24 RX ORDER — SODIUM CHLORIDE 9 MG/ML
INJECTION, SOLUTION INTRAVENOUS CONTINUOUS
Status: DISCONTINUED | OUTPATIENT
Start: 2022-02-24 | End: 2022-02-24 | Stop reason: HOSPADM

## 2022-02-24 RX ORDER — PROPOFOL 10 MG/ML
INJECTION, EMULSION INTRAVENOUS PRN
Status: DISCONTINUED | OUTPATIENT
Start: 2022-02-24 | End: 2022-02-24 | Stop reason: SDUPTHER

## 2022-02-24 RX ORDER — CIPROFLOXACIN HYDROCHLORIDE 3.5 MG/ML
1 SOLUTION/ DROPS TOPICAL
Status: COMPLETED | OUTPATIENT
Start: 2022-02-24 | End: 2022-02-24

## 2022-02-24 RX ORDER — ONDANSETRON 2 MG/ML
4 INJECTION INTRAMUSCULAR; INTRAVENOUS EVERY 6 HOURS PRN
Status: DISCONTINUED | OUTPATIENT
Start: 2022-02-24 | End: 2022-02-24 | Stop reason: HOSPADM

## 2022-02-24 RX ORDER — PHENYLEPHRINE HCL 2.5 %
1 DROPS OPHTHALMIC (EYE)
Status: COMPLETED | OUTPATIENT
Start: 2022-02-24 | End: 2022-02-24

## 2022-02-24 RX ORDER — PREDNISOLONE ACETATE 10 MG/ML
SUSPENSION/ DROPS OPHTHALMIC PRN
Status: DISCONTINUED | OUTPATIENT
Start: 2022-02-24 | End: 2022-02-24 | Stop reason: ALTCHOICE

## 2022-02-24 RX ORDER — LIDOCAINE HYDROCHLORIDE 10 MG/ML
INJECTION, SOLUTION EPIDURAL; INFILTRATION; INTRACAUDAL; PERINEURAL PRN
Status: DISCONTINUED | OUTPATIENT
Start: 2022-02-24 | End: 2022-02-24 | Stop reason: SDUPTHER

## 2022-02-24 RX ORDER — ONDANSETRON 4 MG/1
4 TABLET, ORALLY DISINTEGRATING ORAL EVERY 8 HOURS PRN
Status: DISCONTINUED | OUTPATIENT
Start: 2022-02-24 | End: 2022-02-24 | Stop reason: HOSPADM

## 2022-02-24 RX ADMIN — CIPROFLOXACIN 1 DROP: 3 SOLUTION OPHTHALMIC at 07:40

## 2022-02-24 RX ADMIN — Medication 1 DROP: at 07:55

## 2022-02-24 RX ADMIN — PHENYLEPHRINE HYDROCHLORIDE 1 DROP: 25 SOLUTION/ DROPS OPHTHALMIC at 08:10

## 2022-02-24 RX ADMIN — PROPOFOL 40 MG: 10 INJECTION, EMULSION INTRAVENOUS at 09:01

## 2022-02-24 RX ADMIN — Medication 1 DROP: at 08:25

## 2022-02-24 RX ADMIN — KETOROLAC TROMETHAMINE 1 DROP: 5 SOLUTION OPHTHALMIC at 08:10

## 2022-02-24 RX ADMIN — KETOROLAC TROMETHAMINE 1 DROP: 5 SOLUTION OPHTHALMIC at 07:55

## 2022-02-24 RX ADMIN — CIPROFLOXACIN 1 DROP: 3 SOLUTION OPHTHALMIC at 08:25

## 2022-02-24 RX ADMIN — LIDOCAINE HYDROCHLORIDE 20 MG: 10 INJECTION, SOLUTION EPIDURAL; INFILTRATION; INTRACAUDAL; PERINEURAL at 09:01

## 2022-02-24 RX ADMIN — Medication 1 DROP: at 07:40

## 2022-02-24 RX ADMIN — PHENYLEPHRINE HYDROCHLORIDE 1 DROP: 25 SOLUTION/ DROPS OPHTHALMIC at 07:55

## 2022-02-24 RX ADMIN — CIPROFLOXACIN 1 DROP: 3 SOLUTION OPHTHALMIC at 07:55

## 2022-02-24 RX ADMIN — SODIUM CHLORIDE: 9 INJECTION, SOLUTION INTRAVENOUS at 07:56

## 2022-02-24 RX ADMIN — KETOROLAC TROMETHAMINE 1 DROP: 5 SOLUTION OPHTHALMIC at 08:25

## 2022-02-24 RX ADMIN — Medication 1 DROP: at 08:10

## 2022-02-24 RX ADMIN — KETOROLAC TROMETHAMINE 1 DROP: 5 SOLUTION OPHTHALMIC at 07:40

## 2022-02-24 RX ADMIN — PHENYLEPHRINE HYDROCHLORIDE 1 DROP: 25 SOLUTION/ DROPS OPHTHALMIC at 08:25

## 2022-02-24 RX ADMIN — PHENYLEPHRINE HYDROCHLORIDE 1 DROP: 25 SOLUTION/ DROPS OPHTHALMIC at 07:40

## 2022-02-24 RX ADMIN — CIPROFLOXACIN 1 DROP: 3 SOLUTION OPHTHALMIC at 08:10

## 2022-02-24 RX ADMIN — SODIUM CHLORIDE: 9 INJECTION, SOLUTION INTRAVENOUS at 08:55

## 2022-02-24 ASSESSMENT — PAIN DESCRIPTION - PAIN TYPE
TYPE: SURGICAL PAIN
TYPE: SURGICAL PAIN

## 2022-02-24 ASSESSMENT — PAIN - FUNCTIONAL ASSESSMENT: PAIN_FUNCTIONAL_ASSESSMENT: 0-10

## 2022-02-24 ASSESSMENT — PAIN SCALES - GENERAL
PAINLEVEL_OUTOF10: 0
PAINLEVEL_OUTOF10: 0

## 2022-02-24 ASSESSMENT — LIFESTYLE VARIABLES: SMOKING_STATUS: 0

## 2022-02-24 ASSESSMENT — COPD QUESTIONNAIRES: CAT_SEVERITY: SEVERE

## 2022-02-24 NOTE — ANESTHESIA PRE PROCEDURE
Department of Anesthesiology  Preprocedure Note       Name:  Oscar Bustamante   Age:  78 y.o.  :  1940                                          MRN:  [id number]         Date:  2022      Surgeon: Sara Morataya):  Johny You MD    Procedure: Procedure(s):  LEFT EYE CATARACT EXTRACTION IOL IMPLANT    Medications prior to admission:   Prior to Admission medications    Medication Sig Start Date End Date Taking?  Authorizing Provider   OXYGEN Inhale 6 L/min into the lungs continuous    Historical Provider, MD   Apoaequorin (PREVAGEN EXTRA STRENGTH) 20 MG CAPS Take 1 tablet by mouth    Historical Provider, MD   rosuvastatin (CRESTOR) 20 MG tablet Take 20 mg by mouth daily    Historical Provider, MD   albuterol sulfate HFA (PROAIR HFA) 108 (90 Base) MCG/ACT inhaler Inhale 2 puffs into the lungs every 6 hours as needed for Wheezing    Historical Provider, MD   fluticasone-umeclidin-vilant (TRELEGY ELLIPTA) 100-62.5-25 MCG/INH AEPB Inhale 1 puff into the lungs daily    Historical Provider, MD   amiodarone (CORDARONE) 200 MG tablet Take 0.5 tablets by mouth every other day 10/30/17   Francesca Proctor, DO   carvedilol (COREG) 3.125 MG tablet Take 1 tablet by mouth 2 times daily (with meals) 10/31/17   Francesca Proctor, DO   warfarin (COUMADIN) 2.5 MG tablet One each evening 10/18/17   Francesca Proctor, DO   spironolactone (ALDACTONE) 25 MG tablet Take 0.5 tablets by mouth three times a week -- only 10/18/17   Francesca Proctor,    furosemide (LASIX) 20 MG tablet Take 1 tablet by mouth daily 17   Haider Reza MD   levothyroxine (SYNTHROID) 25 MCG tablet Take 1 tablet by mouth Daily TSH to be done at Dr. Maria Ines Craft office in 3 weeks  Patient taking differently: Take 75 mcg by mouth Daily TSH to be done at Dr. Maria Ines Craft office in 3 weeks 17   Haider Reza MD   pantoprazole (PROTONIX) 40 MG tablet Take 1 tablet by mouth every morning (before breakfast) 17   Haider Reza MD       Current medications:    No current facility-administered medications for this visit. No current outpatient medications on file. Facility-Administered Medications Ordered in Other Visits   Medication Dose Route Frequency Provider Last Rate Last Admin    tropicamide (MYDRIACYL) 1 % ophthalmic solution 1 drop  1 drop Ophthalmic Q15 Min PRN Marilu Eason MD        phenylephrine (MYDFRIN) 2.5 % ophthalmic solution 1 drop  1 drop Ophthalmic Q15 Min PRN Marilu Eason MD        cyclopentolate (CYCLOGYL) 1 % ophthalmic solution 1 drop  1 drop Ophthalmic Q15 Min PRN Marilu Eason MD        ciprofloxacin (CILOXAN) 0.3 % ophthalmic solution 1 drop  1 drop Ophthalmic Q15 Min Marilu Eason MD        ketorolac (ACULAR) 0.5 % ophthalmic solution 1 drop  1 drop Ophthalmic Q15 Min PRN Marilu Eason MD        0.9 % sodium chloride infusion   IntraVENous Continuous Marilu Eason MD           Allergies:     Allergies   Allergen Reactions    Cat Hair Extract Itching    Erythromycin Other (See Comments)     Stomach pain      Pcn [Penicillins] Itching    Seasonal Itching    Eggs Or Egg-Derived Products Nausea Only       Problem List:    Patient Active Problem List   Diagnosis Code    Atrial fibrillation with RVR ([facility] [facility]- Recurrent I48.91    Acute on chronic diastolic congestive heart failure ([facility]) I50.33    Cardiomyopathy as manifestation of underlying disease ([facility] [facility] I43    Non-rheumatic mitral regurgitation I34.0    Acute combined systolic and diastolic congestive heart failure ([facility]) I50.41    Chronic anticoagulation Z79.01    Essential hypertension I10    COPD (chronic obstructive pulmonary disease) ([facility]) J44.9    History of atrial fibrillation Z86.79    Dilated idiopathic cardiomyopathy ([facility]) I42.0    Severe sinus bradycardia R00.1    Moderate tricuspid regurgitation I07.1    Hypoxia R09.02    Combined forms of age-related cataract of both eyes H25.813 Past Medical History:        Diagnosis Date    Atrial fibrillation ([facility]     CHF (congestive heart failure) (HCC)     Emphysema, unspecified (HCC)     Hyperlipidemia     Hypertension     Lung cancer ([facility] [facility]     Oxygen dependent     Prolonged emergence from general anesthesia     post general anesthesia    Skin cancer     Thyroid disease        Past Surgical History:        Procedure Laterality Date    BREAST BIOPSY Right     benign    BRONCHOSCOPY N/A 2021    BRONCHOSCOPY/TRANSBRONCHIAL NEEDLE [facility]SY [facility]    RIGHT EYE CATARACT EXTRACTION IOL IMPLANT performed by Africa Wilcox MD at [address] [place] [address]         Social History:    Social History     Tobacco Use    Smoking status: Former Smoker     Packs/day: 2.00     Years: 45.00     Pack years: 90.00     Types: Cigarettes     Start date: 1952     Quit date: 1998     Years since quittin.5    Smokeless tobacco: Never Used   Substance Use Topics    Alcohol use: Yes     Alcohol/week: 1.0 standard drink     Types: 1 Glasses of wine per week     Comment: nightly                                Counseling given: Not Answered      Vital Signs (Current): There were no vitals filed for this visit.                                            BP Readings from Last 3 Encounters:   22 (!) 179/78   21 (!) 151/68   21 (!) 153/75       NPO Status:                                                                                 BMI:   Wt Readings from Last 3 Encounters:   22 175 lb (79.4 kg)   21 175 lb (79.4 kg)   10/12/21 180 lb (81.6 kg)     There is no height or weight on file to calculate BMI.    CBC:   Lab Results   Component Value Date    WBC 5.0 10/12/2021    RBC 3.40 10/12/2021    HGB 10.3 10/12/2021    HCT 33.1 10/12/2021    MCV 97.4 10/12/2021    RDW 14.4 10/12/2021     10/12/2021 CMP:   Lab Results   Component Value Date     10/12/2021    K 4.0 10/12/2021     10/12/2021    CO2 29 10/12/2021    BUN 22 10/12/2021    CREATININE 0.9 10/12/2021    GFRAA >60 10/12/2021    LABGLOM 60 10/12/2021    GLUCOSE 110 10/12/2021    GLUCOSE 87 05/04/2012    PROT 6.8 10/12/2021    CALCIUM 9.6 10/12/2021    BILITOT 0.8 10/12/2021    ALKPHOS 80 10/12/2021    AST 24 10/12/2021    ALT 13 10/12/2021       POC Tests: No results for input(s): POCGLU, POCNA, POCK, POCCL, POCBUN, POCHEMO, POCHCT in the last 72 hours. Coags:   Lab Results   Component Value Date    PROTIME 15.5 10/12/2021    INR 1.4 10/12/2021    APTT 32.6 10/15/2017       HCG (If Applicable): No results found for: PREGTESTUR, PREGSERUM, HCG, HCGQUANT     ABGs: No results found for: PHART, PO2ART, UCN4JAS, DEM9RKZ, BEART, O0FDNJPM     Type & Screen (If Applicable):  No results found for: LABABO, LABRH    Drug/Infectious Status (If Applicable):  No results found for: HIV, HEPCAB    COVID-19 Screening (If Applicable):   Lab Results   Component Value Date    COVID19 Not Detected 04/16/2021           Anesthesia Evaluation  Patient summary reviewed and Nursing notes reviewed no history of anesthetic complications:   Airway: Mallampati: II  TM distance: >3 FB   Neck ROM: full  Mouth opening: > = 3 FB Dental:    (+) lower dentures and upper dentures      Pulmonary: breath sounds clear to auscultation  (+) COPD: severe,      (-) not a current smoker (former smoker)                          ROS comment:  On 5L O2 continuously  Chronic hypoxic resp failure  Former smoker   Cardiovascular:    (+) hypertension:, valvular problems/murmurs: MR, dysrhythmias: atrial fibrillation, CHF (Ejection fraction is visually estimated at 24-78%.): systolic and diastolic, pulmonary hypertension: mild, hyperlipidemia      ECG reviewed  Rhythm: regular  Rate: normal           Beta Blocker:  Dose within 24 Hrs      ROS comment: Dr Herman Gar is her

## 2022-02-24 NOTE — OP NOTE
Operative Note      Patient: Guido Cox  YOB: 1940  MRN: [id number]    Date of Procedure: 2/24/2022    Pre-Op Diagnosis: LEFT EYE CATARACT    Post-Op Diagnosis: Same       Procedure(s):  LEFT EYE CATARACT EXTRACTION IOL IMPLANT    Surgeon(s):  Danya Oquendo MD    Assistant:   * No surgical staff found *    Anesthesia: Monitor Anesthesia Care    Estimated Blood Loss (mL): Minimal    Complications: None    Specimens:   * No specimens in log *    Implants:  Implant Name Type Inv. Item Serial No.  Lot No. LRB No. Used Action   LENS INTOCU 6.0 TO 30.0 DIOPT 118.7 A CONSTANT 0DEG ANG - [id number]  LENS INTOCU 6.0 TO 30.0 DIOPT 118.7 A CONSTANT 0DEG ANG [id number] [employer]-  Left 1 Implanted         Drains: * No LDAs found *    Findings: same    Detailed Description of Procedure: The eye was anesthetized with a mixture of 2% Lidocaine with Vitrase using a standard peribulbar injection. The globe was noted to be intact. A weight was then placed onto the eye for approximately 5 minutes. After an adequate level of anesthesia was obtained, the patient was prepped and draped in the usual sterile fashion. A lid speculum was then placed into the eye. Parish scissors were then used to create a fornix-based conjunctival flap approximately 3 mm long. Residual Tenons were then cleared. A wet-field  cautery was then used to obtain hemostasis. A 64 degree [place] blade was then used to create a limbal groove of approximately 1.5 mm posterior to the surgical limbus and approximately 2.5 mm in length. A crescent blade was then used to dissect a scleral  flap into clear cornea. The chamber was then entered through the tunnel incision created by the crescent knife using a 2.4-mm keratome blade. Viscoat was then used to reconstitute the chamber. A side port incision was then created for a 2nd instrument.   A bent 27-gauge needle was then used to create the capsulorhexis. Hydrodissection with hydrodelineation were the performed in the four quadrants with sterile balanced salt solution. The nucleus and epinucleus were then removed with phacoemulsification. The residual cortex was then removed using an aspiration/irrigation unit. The capsular bag was then reconstituted with Provisc. A posterior chamber intraocular lens was then injected into the capsular bag. An SN 60 WF 24.0 D lens was used. The aspiration-irrigation united was then used to remove the residual Provisc. The pupil was then brought down with Miostat. The wound was then tested and found to be watertight. One suture was then placed through the flap, then closed and tied. The conjunctiva was then carefully draped over the surgical wound and then cauterized. The lid speculum  was removed. Pred Forte 1% solution,  Vigamox drops and TobraDex ointment  were then instilled into the eye. The eye was noted to be in good condition. A patch and shield were then placed over the operated eye. The patient was transferred to the recovery room in good condition. Abram Pimentel M.D.       Electronically signed by Luis Paredes MD on 2/24/2022 at 11:54 AM

## 2022-02-24 NOTE — ANESTHESIA POSTPROCEDURE EVALUATION
Department of Anesthesiology  Postprocedure Note    Patient: Kuldeep Peña  MRN: [id number]  YOB: 1940  Date of evaluation: 2/24/2022  Time:  10:08 AM     Procedure Summary     Date: 02/24/22 Room / Location: [facility] / [address] [place] [address]    Anesthesia Start: 3295 Anesthesia Stop: 3253    Procedure: LEFT EYE CATARACT EXTRACTION IOL IMPLANT (Left Eye) Diagnosis: (LEFT EYE CATARACT)    Surgeons: Anthony Jerome MD Responsible Provider: Bonita Gudino MD    Anesthesia Type: MAC ASA Status: 4          Anesthesia Type: MAC    Diamond Phase I: Diamond Score: 9    Diamond Phase II:      Last vitals: Reviewed and per EMR flowsheets.        Anesthesia Post Evaluation    Patient location during evaluation: bedside  Patient participation: complete - patient participated  Level of consciousness: awake and alert  Airway patency: patent  Nausea & Vomiting: no nausea and no vomiting  Complications: no  Cardiovascular status: blood pressure returned to baseline  Respiratory status: acceptable  Hydration status: euvolemic

## 2022-04-13 VITALS
HEIGHT: 68 IN | WEIGHT: 174 LBS | TEMPERATURE: 98.1 F | SYSTOLIC BLOOD PRESSURE: 164 MMHG | OXYGEN SATURATION: 99 % | HEART RATE: 68 BPM | RESPIRATION RATE: 14 BRPM | DIASTOLIC BLOOD PRESSURE: 66 MMHG | BODY MASS INDEX: 26.37 KG/M2

## 2022-04-13 PROCEDURE — 4500000002 HC ER NO CHARGE

## 2022-04-14 ENCOUNTER — HOSPITAL ENCOUNTER (EMERGENCY)
Age: 82
Discharge: LEFT BEFORE DISCHARGE | End: 2022-04-14

## 2022-04-14 DIAGNOSIS — Z53.21 ELOPED FROM EMERGENCY DEPARTMENT: Primary | ICD-10-CM

## 2022-04-14 NOTE — ED NOTES
Department of Emergency Medicine  FIRST PROVIDER ELOPEMENT NOTE                 [facility]          4/14/22  1:59 AM EDT    MRN: [id number]    HPI: Adri Willoughby is a 78 y.o. female who presents to the ED with the following complaint: Shortness of Breath (sob on 6l nasal cannula hx of lung CA and emyphemsema, copd)      (Please refer to [address] [place] [address] for pertinent ROS and PE documentation)  Labs:  No results found for this visit on 04/14/22. Imaging: All Radiology results interpreted by Radiologist unless otherwise noted. No orders to display     ED Course    Medications - No data to display     -------------------------  Disposition    Patient ELOPED from the department prior to completion of evaluation after triage. Results of triage orders that were completed at time of elopement as indicated above were reviewed.     Diagnosis at Time of Elopement: (Based on presenting complaint/available test results):     Electronically signed by Edvin Holguin PA-C   DD: 4/14/22       Edvin Holguin PA-C  04/14/22 0159

## 2022-04-28 ENCOUNTER — APPOINTMENT (OUTPATIENT)
Dept: GENERAL RADIOLOGY | Age: 82
End: 2022-04-28
Payer: MEDICARE

## 2022-04-28 ENCOUNTER — APPOINTMENT (OUTPATIENT)
Dept: CT IMAGING | Age: 82
End: 2022-04-28
Payer: MEDICARE

## 2022-04-28 ENCOUNTER — HOSPITAL ENCOUNTER (EMERGENCY)
Age: 82
Discharge: HOME | End: 2022-04-28
Attending: EMERGENCY MEDICINE
Payer: MEDICARE

## 2022-04-28 VITALS
SYSTOLIC BLOOD PRESSURE: 142 MMHG | BODY MASS INDEX: 26.37 KG/M2 | HEART RATE: 58 BPM | TEMPERATURE: 98.2 F | RESPIRATION RATE: 16 BRPM | DIASTOLIC BLOOD PRESSURE: 68 MMHG | WEIGHT: 174 LBS | OXYGEN SATURATION: 94 % | HEIGHT: 68 IN

## 2022-04-28 DIAGNOSIS — R79.1 SUBTHERAPEUTIC INTERNATIONAL NORMALIZED RATIO (INR): ICD-10-CM

## 2022-04-28 DIAGNOSIS — R93.89 ABNORMAL CT OF THE CHEST: ICD-10-CM

## 2022-04-28 DIAGNOSIS — R06.02 SHORTNESS OF BREATH: Primary | ICD-10-CM

## 2022-04-28 LAB
ANION GAP SERPL CALCULATED.3IONS-SCNC: 8 MMOL/L (ref 7–16)
BASOPHILS ABSOLUTE: 0.01 E9/L (ref 0–0.2)
BASOPHILS RELATIVE PERCENT: 0.2 % (ref 0–2)
BUN BLDV-MCNC: 29 MG/DL (ref 6–23)
CALCIUM SERPL-MCNC: 9.7 MG/DL (ref 8.6–10.2)
CHLORIDE BLD-SCNC: 99 MMOL/L (ref 98–107)
CO2: 32 MMOL/L (ref 22–29)
CREAT SERPL-MCNC: 1.3 MG/DL (ref 0.5–1)
EOSINOPHILS ABSOLUTE: 0.09 E9/L (ref 0.05–0.5)
EOSINOPHILS RELATIVE PERCENT: 2 % (ref 0–6)
GFR AFRICAN AMERICAN: 47
GFR NON-AFRICAN AMERICAN: 39 ML/MIN/1.73
GLUCOSE BLD-MCNC: 111 MG/DL (ref 74–99)
HCT VFR BLD CALC: 34.4 % (ref 34–48)
HEMOGLOBIN: 10.8 G/DL (ref 11.5–15.5)
IMMATURE GRANULOCYTES #: 0.01 E9/L
IMMATURE GRANULOCYTES %: 0.2 % (ref 0–5)
INR BLD: 1.1
LYMPHOCYTES ABSOLUTE: 1.19 E9/L (ref 1.5–4)
LYMPHOCYTES RELATIVE PERCENT: 26.4 % (ref 20–42)
MCH RBC QN AUTO: 31.3 PG (ref 26–35)
MCHC RBC AUTO-ENTMCNC: 31.4 % (ref 32–34.5)
MCV RBC AUTO: 99.7 FL (ref 80–99.9)
MONOCYTES ABSOLUTE: 0.34 E9/L (ref 0.1–0.95)
MONOCYTES RELATIVE PERCENT: 7.6 % (ref 2–12)
NEUTROPHILS ABSOLUTE: 2.86 E9/L (ref 1.8–7.3)
NEUTROPHILS RELATIVE PERCENT: 63.6 % (ref 43–80)
PDW BLD-RTO: 13.2 FL (ref 11.5–15)
PLATELET # BLD: 133 E9/L (ref 130–450)
PMV BLD AUTO: 10.8 FL (ref 7–12)
POTASSIUM SERPL-SCNC: 3.9 MMOL/L (ref 3.5–5)
PRO-BNP: 1641 PG/ML (ref 0–450)
PROTHROMBIN TIME: 12.1 SEC (ref 9.3–12.4)
RBC # BLD: 3.45 E12/L (ref 3.5–5.5)
SODIUM BLD-SCNC: 139 MMOL/L (ref 132–146)
TROPONIN, HIGH SENSITIVITY: 20 NG/L (ref 0–9)
TROPONIN, HIGH SENSITIVITY: 21 NG/L (ref 0–9)
WBC # BLD: 4.5 E9/L (ref 4.5–11.5)

## 2022-04-28 PROCEDURE — 94640 AIRWAY INHALATION TREATMENT: CPT

## 2022-04-28 PROCEDURE — 84484 ASSAY OF TROPONIN QUANT: CPT

## 2022-04-28 PROCEDURE — 99285 EMERGENCY DEPT VISIT HI MDM: CPT

## 2022-04-28 PROCEDURE — 85610 PROTHROMBIN TIME: CPT

## 2022-04-28 PROCEDURE — 71045 X-RAY EXAM CHEST 1 VIEW: CPT

## 2022-04-28 PROCEDURE — 83880 ASSAY OF NATRIURETIC PEPTIDE: CPT

## 2022-04-28 PROCEDURE — 6360000004 HC RX CONTRAST MEDICATION: Performed by: RADIOLOGY

## 2022-04-28 PROCEDURE — 80048 BASIC METABOLIC PNL TOTAL CA: CPT

## 2022-04-28 PROCEDURE — 85025 COMPLETE CBC W/AUTO DIFF WBC: CPT

## 2022-04-28 PROCEDURE — 6370000000 HC RX 637 (ALT 250 FOR IP): Performed by: EMERGENCY MEDICINE

## 2022-04-28 PROCEDURE — 71275 CT ANGIOGRAPHY CHEST: CPT

## 2022-04-28 PROCEDURE — 94664 DEMO&/EVAL PT USE INHALER: CPT

## 2022-04-28 RX ORDER — WARFARIN SODIUM 2 MG/1
1 TABLET ORAL NIGHTLY
Status: ON HOLD | COMMUNITY
End: 2022-05-06 | Stop reason: HOSPADM

## 2022-04-28 RX ORDER — IPRATROPIUM BROMIDE AND ALBUTEROL SULFATE 2.5; .5 MG/3ML; MG/3ML
3 SOLUTION RESPIRATORY (INHALATION) ONCE
Status: COMPLETED | OUTPATIENT
Start: 2022-04-28 | End: 2022-04-28

## 2022-04-28 RX ORDER — DOXYCYCLINE HYCLATE 100 MG/1
100 CAPSULE ORAL 2 TIMES DAILY
Status: ON HOLD | COMMUNITY
Start: 2022-04-27 | End: 2022-05-06 | Stop reason: HOSPADM

## 2022-04-28 RX ORDER — METHYLPREDNISOLONE SODIUM SUCCINATE 125 MG/2ML
60 INJECTION, POWDER, LYOPHILIZED, FOR SOLUTION INTRAMUSCULAR; INTRAVENOUS ONCE
Status: DISCONTINUED | OUTPATIENT
Start: 2022-04-28 | End: 2022-04-28 | Stop reason: HOSPADM

## 2022-04-28 RX ORDER — PANTOPRAZOLE SODIUM 40 MG/1
40 TABLET, DELAYED RELEASE ORAL EVERY MORNING
COMMUNITY
End: 2022-09-12 | Stop reason: SDUPTHER

## 2022-04-28 RX ORDER — LEVOTHYROXINE SODIUM 0.07 MG/1
75 TABLET ORAL
Status: ON HOLD | COMMUNITY
End: 2022-05-06 | Stop reason: HOSPADM

## 2022-04-28 RX ORDER — WARFARIN SODIUM 2 MG/1
2 TABLET ORAL NIGHTLY
Status: ON HOLD | COMMUNITY
End: 2022-05-06 | Stop reason: HOSPADM

## 2022-04-28 RX ORDER — FUROSEMIDE 20 MG/1
20 TABLET ORAL EVERY MORNING
Status: ON HOLD | COMMUNITY
End: 2022-07-15 | Stop reason: HOSPADM

## 2022-04-28 RX ADMIN — IPRATROPIUM BROMIDE AND ALBUTEROL SULFATE 3 AMPULE: .5; 3 SOLUTION RESPIRATORY (INHALATION) at 08:45

## 2022-04-28 RX ADMIN — IOPAMIDOL 75 ML: 755 INJECTION, SOLUTION INTRAVENOUS at 12:14

## 2022-04-28 ASSESSMENT — ENCOUNTER SYMPTOMS
NAUSEA: 0
SHORTNESS OF BREATH: 1
SORE THROAT: 0
EYE REDNESS: 0
ABDOMINAL DISTENTION: 0
DIARRHEA: 0
BACK PAIN: 0
EYE PAIN: 0
EYE DISCHARGE: 0
SPUTUM PRODUCTION: 0
COUGH: 0
WHEEZING: 0
ABDOMINAL PAIN: 0
SINUS PRESSURE: 0
VOMITING: 0

## 2022-04-28 ASSESSMENT — PAIN - FUNCTIONAL ASSESSMENT
PAIN_FUNCTIONAL_ASSESSMENT: NONE - DENIES PAIN
PAIN_FUNCTIONAL_ASSESSMENT: NONE - DENIES PAIN

## 2022-04-28 NOTE — ED PROVIDER NOTES
57-year-old female history of COPD as well as A. fib on Coumadin presents to the emergency department with shortness of breath. The patient states the symptoms started when she woke up this morning she states she had trouble catching her breath. She states mild wheezing no chest pain no nausea vomiting dye abdominal pain urinary symptoms or leg swelling. She states no specific chest pain. She states no headache or vision changes. She has no other complaints at this time. The history is provided by the patient. Shortness of Breath  Severity:  Mild  Onset quality:  Gradual  Duration:  3 hours  Timing:  Intermittent  Progression:  Waxing and waning  Chronicity:  New  Relieved by:  Nothing  Worsened by:  Nothing  Ineffective treatments:  None tried  Associated symptoms: no abdominal pain, no chest pain, no cough, no diaphoresis, no ear pain, no fever, no headaches, no PND, no rash, no sore throat, no sputum production, no vomiting and no wheezing         Review of Systems   Constitutional: Negative for chills, diaphoresis and fever. HENT: Negative for ear pain, sinus pressure and sore throat. Eyes: Negative for pain, discharge and redness. Respiratory: Positive for shortness of breath. Negative for cough, sputum production and wheezing. Cardiovascular: Negative for chest pain and PND. Gastrointestinal: Negative for abdominal distention, abdominal pain, diarrhea, nausea and vomiting. Genitourinary: Negative for dysuria and frequency. Musculoskeletal: Negative for arthralgias and back pain. Skin: Negative for rash and wound. Neurological: Negative for weakness and headaches. Hematological: Negative for adenopathy. All other systems reviewed and are negative. Physical Exam  Constitutional:       Appearance: She is well-developed. She is obese. HENT:      Head: Normocephalic and atraumatic.       Mouth/Throat:      Mouth: Mucous membranes are moist.   Cardiovascular:      Rate and Rhythm: Normal rate and regular rhythm. Pulmonary:      Effort: Pulmonary effort is normal.      Breath sounds: Normal breath sounds. Musculoskeletal:         General: Normal range of motion. Cervical back: Normal range of motion and neck supple. Right lower leg: No tenderness. No edema. Left lower leg: No tenderness. No edema. Skin:     General: Skin is warm. Capillary Refill: Capillary refill takes less than 2 seconds. Neurological:      General: No focal deficit present. Mental Status: She is alert and oriented to person, place, and time. Procedures     MDM  Number of Diagnoses or Management Options  Abnormal CT of the chest  Shortness of breath  Subtherapeutic international normalized ratio (INR)  Diagnosis management comments: Patient seen and examined. Labs and imaging were ordered. Patient apparently is been taking half doses of her Coumadin due to recent antibiotic use. Labs and imaging were ordered. CT scan ultimately required ordering due to a subtherapeutic INR. CT scan revealed no PE there was some abnormal findings in the right middle lobe of patient's lung review of previous CT scans [facility] shows similar findings. Patient is afebrile with no evidence of sepsis so no antibiotics will be started at this time I suspect this is a chronic finding. Patient resting comfortably without any use of additional oxygen other than her 6 L home.   Patient was felt to be safe for discharge and recommendation to follow-up with her primary care physician and pulmonologist.             --------------------------------------------- PAST HISTORY ---------------------------------------------  Past Medical History:  has a past medical history of Atrial fibrillation ([facility] [address] CHF (congestive heart failure) ([facility] [address] Emphysema, unspecified ([facility] [address] Hyperlipidemia, Hypertension, Lung cancer ([facility] [address] Oxygen dependent, Prolonged emergence from general anesthesia, Skin cancer, Value Ref Range    Sodium 139 132 - 146 mmol/L    Potassium 3.9 3.5 - 5.0 mmol/L    Chloride 99 98 - 107 mmol/L    CO2 32 (H) 22 - 29 mmol/L    Anion Gap 8 7 - 16 mmol/L    Glucose 111 (H) 74 - 99 mg/dL    BUN 29 (H) 6 - 23 mg/dL    CREATININE 1.3 (H) 0.5 - 1.0 mg/dL    GFR Non-African American 39 >=60 mL/min/1.73    GFR African American 47     Calcium 9.7 8.6 - 10.2 mg/dL   Troponin   Result Value Ref Range    Troponin, High Sensitivity 21 (H) 0 - 9 ng/L   Brain Natriuretic Peptide   Result Value Ref Range    Pro-BNP 1,641 (H) 0 - 450 pg/mL   Protime-INR   Result Value Ref Range    Protime 12.1 9.3 - 12.4 sec    INR 1.1    Troponin   Result Value Ref Range    Troponin, High Sensitivity 20 (H) 0 - 9 ng/L       Radiology:  CTA PULMONARY W CONTRAST   Final Result   1. There is no pulmonary embolus   2. Complete collapse of the right middle lobe. There is no appreciable   endobronchial lesion or mucous plugging of the right middle lobe bronchi. Please note I reviewed the patient's previous chest x-rays dating back to   04/20/2021 and the appearance of the chest x-ray is unchanged and the   collapsed right middle lobe is not well-defined or well appreciated on the   chest x-ray. 3. 6.5 mm irregular nodule within the right upper lobe on axial image number   41 and sagittal number 44 dedicated follow-up CT scan is recommended in 3   months. 4. Advanced emphysematous changes with interstitial fibrosis   5. Chronic pleuroparenchymal scarring seen throughout the left lung. 6. Cardiomegaly   7. Large hiatal hernia      RECOMMENDATIONS:   Follow-up CT scan is recommended in 3 months. XR CHEST PORTABLE   Final Result   1. Chronic significant pleuroparenchymal scarring seen within the left lung. The findings are stable   2. Emphysematous changes.    3. There are no gross findings of pneumonia within the right lung.             ------------------------- NURSING NOTES AND VITALS REVIEWED ---------------------------  Date / Time Roomed:  4/28/2022  7:25 AM  ED Bed Assignment:  16/16    The nursing notes within the ED encounter and vital signs as below have been reviewed. BP (!) 154/70   Pulse 58   Temp 97 °F (36.1 °C) (Temporal)   Resp 18   Ht 5' 8\" (1.727 m)   Wt 174 lb (78.9 kg)   SpO2 100%   BMI 26.46 kg/m²   Oxygen Saturation Interpretation: Normal      ------------------------------------------ PROGRESS NOTES ------------------------------------------  I have spoken with the patient and discussed todays results, in addition to providing specific details for the plan of care and counseling regarding the diagnosis and prognosis. Their questions are answered at this time and they are agreeable with the plan. I discussed at length with them reasons for immediate return here for re evaluation. They will followup with their primary care physician by calling their office on Monday.      --------------------------------- ADDITIONAL PROVIDER NOTES ---------------------------------  At this time the patient is without objective evidence of an acute process requiring hospitalization or inpatient management. They have remained hemodynamically stable throughout their entire ED visit and are stable for discharge with outpatient follow-up. The plan has been discussed in detail and they are aware of the specific conditions for emergent return, as well as the importance of follow-up. New Prescriptions    No medications on file       Diagnosis:  1. Shortness of breath    2. Abnormal CT of the chest    3. Subtherapeutic international normalized ratio (INR)        Disposition:  Patient's disposition: Discharge to home  Patient's condition is stable.        Johny Gomez DO  04/28/22 5491

## 2022-05-03 ENCOUNTER — HOSPITAL ENCOUNTER (INPATIENT)
Age: 82
LOS: 3 days | Discharge: HOME | DRG: 191 | End: 2022-05-06
Attending: STUDENT IN AN ORGANIZED HEALTH CARE EDUCATION/TRAINING PROGRAM | Admitting: INTERNAL MEDICINE
Payer: MEDICARE

## 2022-05-03 ENCOUNTER — APPOINTMENT (OUTPATIENT)
Dept: GENERAL RADIOLOGY | Age: 82
DRG: 191 | End: 2022-05-03
Payer: MEDICARE

## 2022-05-03 DIAGNOSIS — J44.1 COPD EXACERBATION (HCC): Primary | ICD-10-CM

## 2022-05-03 DIAGNOSIS — R06.02 SHORTNESS OF BREATH: ICD-10-CM

## 2022-05-03 DIAGNOSIS — I43 CARDIOMYOPATHY AS MANIFESTATION OF UNDERLYING DISEASE (HCC): ICD-10-CM

## 2022-05-03 DIAGNOSIS — I50.33 ACUTE ON CHRONIC DIASTOLIC CONGESTIVE HEART FAILURE (HCC): Chronic | ICD-10-CM

## 2022-05-03 DIAGNOSIS — R00.1 BRADYCARDIA: ICD-10-CM

## 2022-05-03 PROBLEM — R06.09 DYSPNEA ON EXERTION: Status: ACTIVE | Noted: 2022-05-03

## 2022-05-03 LAB
ALBUMIN SERPL-MCNC: 4.1 G/DL (ref 3.5–5.2)
ALP BLD-CCNC: 55 U/L (ref 35–104)
ALT SERPL-CCNC: 10 U/L (ref 0–32)
ANION GAP SERPL CALCULATED.3IONS-SCNC: 9 MMOL/L (ref 7–16)
AST SERPL-CCNC: 22 U/L (ref 0–31)
BASOPHILS ABSOLUTE: 0.02 E9/L (ref 0–0.2)
BASOPHILS RELATIVE PERCENT: 0.4 % (ref 0–2)
BILIRUB SERPL-MCNC: 0.5 MG/DL (ref 0–1.2)
BUN BLDV-MCNC: 26 MG/DL (ref 6–23)
CALCIUM SERPL-MCNC: 9.4 MG/DL (ref 8.6–10.2)
CHLORIDE BLD-SCNC: 104 MMOL/L (ref 98–107)
CO2: 28 MMOL/L (ref 22–29)
CREAT SERPL-MCNC: 1.2 MG/DL (ref 0.5–1)
EOSINOPHILS ABSOLUTE: 0.07 E9/L (ref 0.05–0.5)
EOSINOPHILS RELATIVE PERCENT: 1.5 % (ref 0–6)
GFR AFRICAN AMERICAN: 52
GFR NON-AFRICAN AMERICAN: 43 ML/MIN/1.73
GLUCOSE BLD-MCNC: 148 MG/DL (ref 74–99)
HCT VFR BLD CALC: 32.9 % (ref 34–48)
HEMOGLOBIN: 10.2 G/DL (ref 11.5–15.5)
IMMATURE GRANULOCYTES #: 0.01 E9/L
IMMATURE GRANULOCYTES %: 0.2 % (ref 0–5)
INR BLD: 1.4
LACTIC ACID: 0.9 MMOL/L (ref 0.5–2.2)
LACTIC ACID: 1.4 MMOL/L (ref 0.5–2.2)
LYMPHOCYTES ABSOLUTE: 1.13 E9/L (ref 1.5–4)
LYMPHOCYTES RELATIVE PERCENT: 23.7 % (ref 20–42)
MCH RBC QN AUTO: 31.3 PG (ref 26–35)
MCHC RBC AUTO-ENTMCNC: 31 % (ref 32–34.5)
MCV RBC AUTO: 100.9 FL (ref 80–99.9)
MONOCYTES ABSOLUTE: 0.33 E9/L (ref 0.1–0.95)
MONOCYTES RELATIVE PERCENT: 6.9 % (ref 2–12)
NEUTROPHILS ABSOLUTE: 3.2 E9/L (ref 1.8–7.3)
NEUTROPHILS RELATIVE PERCENT: 67.3 % (ref 43–80)
PDW BLD-RTO: 13.2 FL (ref 11.5–15)
PLATELET # BLD: 129 E9/L (ref 130–450)
PMV BLD AUTO: 11.1 FL (ref 7–12)
POTASSIUM SERPL-SCNC: 4.2 MMOL/L (ref 3.5–5)
PRO-BNP: 2194 PG/ML (ref 0–450)
PROTHROMBIN TIME: 15.1 SEC (ref 9.3–12.4)
RBC # BLD: 3.26 E12/L (ref 3.5–5.5)
REASON FOR REJECTION: NORMAL
REJECTED TEST: NORMAL
SARS-COV-2, NAAT: NOT DETECTED
SODIUM BLD-SCNC: 141 MMOL/L (ref 132–146)
TOTAL PROTEIN: 6.7 G/DL (ref 6.4–8.3)
TROPONIN, HIGH SENSITIVITY: 20 NG/L (ref 0–9)
TROPONIN, HIGH SENSITIVITY: 20 NG/L (ref 0–9)
WBC # BLD: 4.8 E9/L (ref 4.5–11.5)

## 2022-05-03 PROCEDURE — 83605 ASSAY OF LACTIC ACID: CPT

## 2022-05-03 PROCEDURE — 87635 SARS-COV-2 COVID-19 AMP PRB: CPT

## 2022-05-03 PROCEDURE — 80053 COMPREHEN METABOLIC PANEL: CPT

## 2022-05-03 PROCEDURE — 83880 ASSAY OF NATRIURETIC PEPTIDE: CPT

## 2022-05-03 PROCEDURE — 2700000000 HC OXYGEN THERAPY PER DAY

## 2022-05-03 PROCEDURE — 6370000000 HC RX 637 (ALT 250 FOR IP): Performed by: STUDENT IN AN ORGANIZED HEALTH CARE EDUCATION/TRAINING PROGRAM

## 2022-05-03 PROCEDURE — 99285 EMERGENCY DEPT VISIT HI MDM: CPT

## 2022-05-03 PROCEDURE — 85610 PROTHROMBIN TIME: CPT

## 2022-05-03 PROCEDURE — 85025 COMPLETE CBC W/AUTO DIFF WBC: CPT

## 2022-05-03 PROCEDURE — 2060000000 HC ICU INTERMEDIATE R&B

## 2022-05-03 PROCEDURE — 71046 X-RAY EXAM CHEST 2 VIEWS: CPT

## 2022-05-03 PROCEDURE — 84484 ASSAY OF TROPONIN QUANT: CPT

## 2022-05-03 PROCEDURE — 6370000000 HC RX 637 (ALT 250 FOR IP): Performed by: INTERNAL MEDICINE

## 2022-05-03 PROCEDURE — 36415 COLL VENOUS BLD VENIPUNCTURE: CPT

## 2022-05-03 RX ORDER — ROSUVASTATIN CALCIUM 10 MG/1
10 TABLET, COATED ORAL NIGHTLY
Status: DISCONTINUED | OUTPATIENT
Start: 2022-05-03 | End: 2022-05-06 | Stop reason: HOSPADM

## 2022-05-03 RX ORDER — CARVEDILOL 6.25 MG/1
3.12 TABLET ORAL 2 TIMES DAILY WITH MEALS
Status: DISCONTINUED | OUTPATIENT
Start: 2022-05-04 | End: 2022-05-03

## 2022-05-03 RX ORDER — FUROSEMIDE 20 MG/1
20 TABLET ORAL EVERY MORNING
Status: DISCONTINUED | OUTPATIENT
Start: 2022-05-04 | End: 2022-05-06 | Stop reason: HOSPADM

## 2022-05-03 RX ORDER — AMIODARONE HYDROCHLORIDE 200 MG/1
100 TABLET ORAL EVERY OTHER DAY
Status: DISCONTINUED | OUTPATIENT
Start: 2022-05-04 | End: 2022-05-06 | Stop reason: HOSPADM

## 2022-05-03 RX ORDER — BUDESONIDE 0.5 MG/2ML
0.5 INHALANT ORAL 2 TIMES DAILY
Status: DISCONTINUED | OUTPATIENT
Start: 2022-05-03 | End: 2022-05-06 | Stop reason: HOSPADM

## 2022-05-03 RX ORDER — CARVEDILOL 6.25 MG/1
3.12 TABLET ORAL 2 TIMES DAILY WITH MEALS
Status: DISCONTINUED | OUTPATIENT
Start: 2022-05-03 | End: 2022-05-06

## 2022-05-03 RX ORDER — PANTOPRAZOLE SODIUM 40 MG/1
40 TABLET, DELAYED RELEASE ORAL EVERY MORNING
Status: DISCONTINUED | OUTPATIENT
Start: 2022-05-04 | End: 2022-05-06 | Stop reason: HOSPADM

## 2022-05-03 RX ORDER — LEVOTHYROXINE SODIUM 0.07 MG/1
75 TABLET ORAL
Status: DISCONTINUED | OUTPATIENT
Start: 2022-05-04 | End: 2022-05-04

## 2022-05-03 RX ORDER — ARFORMOTEROL TARTRATE 15 UG/2ML
15 SOLUTION RESPIRATORY (INHALATION) 2 TIMES DAILY
Status: DISCONTINUED | OUTPATIENT
Start: 2022-05-03 | End: 2022-05-06 | Stop reason: HOSPADM

## 2022-05-03 RX ORDER — IPRATROPIUM BROMIDE AND ALBUTEROL SULFATE 2.5; .5 MG/3ML; MG/3ML
3 SOLUTION RESPIRATORY (INHALATION) ONCE
Status: DISCONTINUED | OUTPATIENT
Start: 2022-05-03 | End: 2022-05-06 | Stop reason: HOSPADM

## 2022-05-03 RX ORDER — SPIRONOLACTONE 25 MG/1
12.5 TABLET ORAL
Status: DISCONTINUED | OUTPATIENT
Start: 2022-05-04 | End: 2022-05-06 | Stop reason: HOSPADM

## 2022-05-03 RX ORDER — PREDNISONE 20 MG/1
60 TABLET ORAL ONCE
Status: COMPLETED | OUTPATIENT
Start: 2022-05-03 | End: 2022-05-03

## 2022-05-03 RX ORDER — WARFARIN SODIUM 2 MG/1
2 TABLET ORAL NIGHTLY
Status: DISCONTINUED | OUTPATIENT
Start: 2022-05-03 | End: 2022-05-04 | Stop reason: DRUGHIGH

## 2022-05-03 RX ADMIN — CARVEDILOL 3.12 MG: 6.25 TABLET, FILM COATED ORAL at 22:56

## 2022-05-03 RX ADMIN — WARFARIN SODIUM 2 MG: 2 TABLET ORAL at 22:57

## 2022-05-03 RX ADMIN — ROSUVASTATIN CALCIUM 10 MG: 10 TABLET, FILM COATED ORAL at 22:57

## 2022-05-03 RX ADMIN — PREDNISONE 60 MG: 20 TABLET ORAL at 15:46

## 2022-05-03 ASSESSMENT — ENCOUNTER SYMPTOMS
WHEEZING: 1
DIARRHEA: 0
SHORTNESS OF BREATH: 1
NAUSEA: 0
SINUS PRESSURE: 0
SORE THROAT: 0
EYE DISCHARGE: 0
COUGH: 1
VOMITING: 0
EYE PAIN: 0
ABDOMINAL PAIN: 0
BACK PAIN: 0

## 2022-05-03 NOTE — ED NOTES
Department of Emergency Medicine  FIRST PROVIDER TRIAGE NOTE             [facility]           5/3/22  2:41 PM EDT    Date of Encounter: 5/3/22   MRN: [id number]      HPI: Bree Solis is a 78 y.o. female who presents to the ED for Shortness of Breath (pt has COPD and lung cancer. On O2 continuously at 6L NC)     ROS: Negative for vomiting or diarrhea. PE: Gen Appearance/Constitutional: alert     Initial Plan of Care: All treatment areas with department are currently occupied. Plan to order/Initiate the following while awaiting opening in ED: labs.   Initiate Treatment-Testing, Proceed toTreatment Area When Bed Available for ED Attending/MLP to Continue Care    Electronically signed by Jerene Sacks, PA-C   DD: 5/3/22         Jerene Sacks, PA-C  05/03/22 1443

## 2022-05-03 NOTE — ED NOTES
Pt placed on cardiac monitor, heart rate fluctuating down to the 30's. Provider aware.       Yury Porter RN  05/03/22 5370

## 2022-05-03 NOTE — ED PROVIDER NOTES
22-year-old female with a history of A. fib, on Coumadin, as well as COPD on 6 L nasal cannula chronically presenting to the emergency department for shortness of breath, ongoing for some time, but is been worse in the last day and a half, worse with ambulation, associate with a dry cough, nothing makes it better, has been constant, moderate in severity. She denies any chest pain or chest tightness, denies any fevers or chills, denies any nausea, vomiting, diarrhea, nothing feels weak, tingly, numb. Review of Systems   Constitutional: Negative for chills and fever. HENT: Negative for ear pain, sinus pressure and sore throat. Eyes: Negative for pain and discharge. Respiratory: Positive for cough, shortness of breath and wheezing. Cardiovascular: Negative for chest pain. Gastrointestinal: Negative for abdominal pain, diarrhea, nausea and vomiting. Genitourinary: Negative for dysuria and frequency. Musculoskeletal: Negative for arthralgias and back pain. Skin: Negative for rash and wound. Neurological: Negative for weakness and headaches. All other systems reviewed and are negative. Physical Exam  Vitals and nursing note reviewed. Constitutional:       Appearance: Normal appearance. She is ill-appearing (Chronically ill-appearing). HENT:      Head: Normocephalic and atraumatic. Right Ear: External ear normal.      Left Ear: External ear normal.      Nose: Nose normal.      Mouth/Throat:      Mouth: Mucous membranes are moist.   Eyes:      Extraocular Movements: Extraocular movements intact. Pupils: Pupils are equal, round, and reactive to light. Cardiovascular:      Rate and Rhythm: Normal rate and regular rhythm. Pulses: Normal pulses. Heart sounds: Normal heart sounds. Pulmonary:      Effort: Pulmonary effort is normal.      Breath sounds: Rhonchi (Rhonchorous, wheezes at the bases, diminished at the bases) present.    Abdominal:      General: Abdomen is flat. Bowel sounds are normal.      Palpations: Abdomen is soft. Musculoskeletal:         General: Normal range of motion. Cervical back: Normal range of motion and neck supple. Skin:     General: Skin is warm and dry. Neurological:      General: No focal deficit present. Mental Status: She is alert and oriented to person, place, and time. Cranial Nerves: No cranial nerve deficit. Procedures     MDM     ED Course as of 05/03/22 1918   Tue May 03, 2022   1546 EKG: This EKG is signed by emergency department physician. Rate: 50  Rhythm: Junctional  Interpretation: non-specific EKG  Comparison: stable as compared to patient's most recent EKG      [JG]   1552 Patient refused breathing treatments, states that the prednisone would be enough [JG]   1711 Consulted Dr. Naun Mckeon to discuss patient [JG]   1821 Dr. Naun Mckeon will see patient [JG]   [phone] Put out consult Dr. Corrine Rizo for admission [JG]   1915 Dr. Corrine Rizo accepted for admission   [JG]   235 78year-old female presented emerged part for shortness of breath, history of COPD and lung cancer, chronically on 6 L nasal cannula, recent CTA for PE which was negative as her Coumadin was subtherapeutic. INR today was 1.4, showed junctional rhythm on EKG, was diminished at the bases consistent with possible COPD observation, she refused treatments, but did take steroids as she states this is helped in the past.  Heart rate when she was on the monitor wears anywhere from the 30s to 60s, spoke to Dr. Naun Mckeon who recommended the patient be admitted to have her medications adjusted. Patient was admitted to hospital for further work-up and management. [JG]      ED Course User Index  [JG] Chantel Monroe MD      70-year-old female presented emerged part for shortness of breath, history of COPD and lung cancer, chronically on 6 L nasal cannula, recent CTA for PE which was negative as her Coumadin was subtherapeutic.   INR today was 1.4, showed junctional rhythm on EKG, was diminished at the bases consistent with possible COPD observation, she refused treatments, but did take steroids as she states this is helped in the past.  Heart rate when she was on the monitor wears anywhere from the 30s to 60s, spoke to Dr. Dominick Deluna who recommended the patient be admitted to have her medications adjusted. Patient was admitted to hospital for further work-up and management. ED Course as of 05/03/22 1918   Tue May 03, 2022   1546 EKG: This EKG is signed by emergency department physician. Rate: 50  Rhythm: Junctional  Interpretation: non-specific EKG  Comparison: stable as compared to patient's most recent EKG      [JG]   1552 Patient refused breathing treatments, states that the prednisone would be enough [JG]   1711 Consulted Dr. Dominick Deluna to discuss patient [JG]   1821 Dr. Dominick Deluna will see patient [JG]   [phone] Put out consult Dr. Dick Paul for admission [JG]   1915 Dr. Dick Paul accepted for admission   [JG]   235 78year-old female presented emerged part for shortness of breath, history of COPD and lung cancer, chronically on 6 L nasal cannula, recent CTA for PE which was negative as her Coumadin was subtherapeutic. INR today was 1.4, showed junctional rhythm on EKG, was diminished at the bases consistent with possible COPD observation, she refused treatments, but did take steroids as she states this is helped in the past.  Heart rate when she was on the monitor wears anywhere from the 30s to 60s, spoke to Dr. Dominick Deluna who recommended the patient be admitted to have her medications adjusted. Patient was admitted to hospital for further work-up and management.    [JG]      ED Course User Index  [JG] Cruzito Balderas MD       --------------------------------------------- PAST HISTORY ---------------------------------------------  Past Medical History:  has a past medical history of Atrial fibrillation ([address] CHF (congestive heart failure) ([facility] Emphysema, unspecified ([facility] Hyperlipidemia, Hypertension, Lung cancer ([facility] Oxygen dependent, Prolonged emergence from general anesthesia, Skin cancer, and Thyroid disease. Past Surgical History:  has a past surgical history that includes Breast biopsy (Right); Tubal ligation; bronchoscopy (N/A, 4/20/2021); Intracapsular cataract extraction (Right, 12/16/2021); and Intracapsular cataract extraction (Left, 2/24/2022). Social History:  reports that she quit smoking about 23 years ago. Her smoking use included cigarettes. She started smoking about 69 years ago. She has a 90.00 pack-year smoking history. She has never used smokeless tobacco. She reports current alcohol use of about 1.0 standard drink of alcohol per week. She reports that she does not use drugs. Family History: family history includes Cancer in her mother; Emphysema in her father. The patients home medications have been reviewed.     Allergies: Cat hair extract, Eggs or egg-derived products, Erythromycin, Pcn [penicillins], and Seasonal    -------------------------------------------------- RESULTS -------------------------------------------------    LABS:  Results for orders placed or performed during the hospital encounter of 05/03/22   COVID-19, Rapid    Specimen: Nasopharyngeal Swab   Result Value Ref Range    SARS-CoV-2, NAAT Not Detected Not Detected   Comprehensive Metabolic Panel   Result Value Ref Range    Sodium 141 132 - 146 mmol/L    Potassium 4.2 3.5 - 5.0 mmol/L    Chloride 104 98 - 107 mmol/L    CO2 28 22 - 29 mmol/L    Anion Gap 9 7 - 16 mmol/L    Glucose 148 (H) 74 - 99 mg/dL    BUN 26 (H) 6 - 23 mg/dL    CREATININE 1.2 (H) 0.5 - 1.0 mg/dL    GFR Non-African American 43 >=60 mL/min/1.73    GFR African American 52     Calcium 9.4 8.6 - 10.2 mg/dL    Total Protein 6.7 6.4 - 8.3 g/dL    Albumin 4.1 3.5 - 5.2 g/dL    Total Bilirubin 0.5 0.0 - 1.2 mg/dL    Alkaline Phosphatase 55 35 - 104 U/L    ALT 10 0 - 32 U/L    AST 22 0 - 31 U/L Troponin   Result Value Ref Range    Troponin, High Sensitivity 20 (H) 0 - 9 ng/L   Brain Natriuretic Peptide   Result Value Ref Range    Pro-BNP 2,194 (H) 0 - 450 pg/mL   Lactic Acid   Result Value Ref Range    Lactic Acid 1.4 0.5 - 2.2 mmol/L   Lactic Acid   Result Value Ref Range    Lactic Acid 0.9 0.5 - 2.2 mmol/L   Protime-INR   Result Value Ref Range    Protime 15.1 (H) 9.3 - 12.4 sec    INR 1.4    SPECIMEN REJECTION   Result Value Ref Range    Rejected Test cbcwd     Reason for Rejection see below    CBC with Auto Differential   Result Value Ref Range    WBC 4.8 4.5 - 11.5 E9/L    RBC 3.26 (L) 3.50 - 5.50 E12/L    Hemoglobin 10.2 (L) 11.5 - 15.5 g/dL    Hematocrit 32.9 (L) 34.0 - 48.0 %    .9 (H) 80.0 - 99.9 fL    MCH 31.3 26.0 - 35.0 pg    MCHC 31.0 (L) 32.0 - 34.5 %    RDW 13.2 11.5 - 15.0 fL    Platelets 601 (L) 520 - 450 E9/L    MPV 11.1 7.0 - 12.0 fL    Neutrophils % 67.3 43.0 - 80.0 %    Immature Granulocytes % 0.2 0.0 - 5.0 %    Lymphocytes % 23.7 20.0 - 42.0 %    Monocytes % 6.9 2.0 - 12.0 %    Eosinophils % 1.5 0.0 - 6.0 %    Basophils % 0.4 0.0 - 2.0 %    Neutrophils Absolute 3.20 1.80 - 7.30 E9/L    Immature Granulocytes # 0.01 E9/L    Lymphocytes Absolute 1.13 (L) 1.50 - 4.00 E9/L    Monocytes Absolute 0.33 0.10 - 0.95 E9/L    Eosinophils Absolute 0.07 0.05 - 0.50 E9/L    Basophils Absolute 0.02 0.00 - 0.20 E9/L   Troponin   Result Value Ref Range    Troponin, High Sensitivity 20 (H) 0 - 9 ng/L   EKG 12 Lead   Result Value Ref Range    Ventricular Rate 50 BPM    Atrial Rate 51 BPM    QRS Duration 82 ms    Q-T Interval 470 ms    QTc Calculation (Bazett) 428 ms    R Axis 42 degrees    T Axis 20 degrees       RADIOLOGY:  XR CHEST (2 VW)   Final Result   Relatively stable findings as described               ------------------------- NURSING NOTES AND VITALS REVIEWED ---------------------------  Date / Time Roomed:  5/3/2022  3:07 PM  ED Bed Assignment:  16/16    The nursing notes within the ED encounter and vital signs as below have been reviewed. Patient Vitals for the past 24 hrs:   BP Temp Pulse Resp SpO2   05/03/22 1917 (!) 181/81 -- 55 -- 100 %   05/03/22 1914 (!) 181/81 -- 57 20 99 %   05/03/22 1806 (!) 176/88 -- 59 20 98 %   05/03/22 1543 130/60 -- (!) 44 20 98 %   05/03/22 1430 (!) 124/58 97.8 °F (36.6 °C) 54 20 98 %       Oxygen Saturation Interpretation: Abnormal - but at baseline    ------------------------------------------ PROGRESS NOTES ------------------------------------------      Counseling:  I have spoken with the patient and discussed todays results, in addition to providing specific details for the plan of care and counseling regarding the diagnosis and prognosis. Their questions are answered at this time and they are agreeable with the plan of admission.    --------------------------------- ADDITIONAL PROVIDER NOTES ---------------------------------  Consultations:  Time: 1915. Spoke with Dr. Winnie Santillan. Discussed case. They will admit the patient. This patient's ED course included: a personal history and physicial examination, re-evaluation prior to disposition, multiple bedside re-evaluations, IV medications, cardiac monitoring and continuous pulse oximetry    This patient has remained hemodynamically stable during their ED course. Diagnosis:  1. COPD exacerbation ([address]    2. Shortness of breath    3. Bradycardia        Disposition:  Patient's disposition: Admit to telemetry  Patient's condition is stable.               Riki Roman MD  Resident  05/03/22 5886

## 2022-05-04 LAB
B.E.: 3.2 MMOL/L (ref -3–3)
COHB: 0.3 % (ref 0–1.5)
CRITICAL: ABNORMAL
DATE ANALYZED: ABNORMAL
DATE OF COLLECTION: ABNORMAL
HCO3: 28 MMOL/L (ref 22–26)
HHB: 1.2 % (ref 0–5)
LAB: ABNORMAL
Lab: ABNORMAL
METHB: 0.3 % (ref 0–1.5)
MODE: ABNORMAL
O2 CONTENT: 14.5 ML/DL
O2 SATURATION: 98.8 % (ref 92–98.5)
O2HB: 98.2 % (ref 94–97)
OPERATOR ID: 1210
PATIENT TEMP: 37 C
PCO2: 44.1 MMHG (ref 35–45)
PH BLOOD GAS: 7.42 (ref 7.35–7.45)
PO2: 143 MMHG (ref 75–100)
SOURCE, BLOOD GAS: ABNORMAL
THB: 10.3 G/DL (ref 11.5–16.5)
TIME ANALYZED: 1652

## 2022-05-04 PROCEDURE — 6360000002 HC RX W HCPCS: Performed by: INTERNAL MEDICINE

## 2022-05-04 PROCEDURE — 82805 BLOOD GASES W/O2 SATURATION: CPT

## 2022-05-04 PROCEDURE — 2060000000 HC ICU INTERMEDIATE R&B

## 2022-05-04 PROCEDURE — 2580000003 HC RX 258: Performed by: INTERNAL MEDICINE

## 2022-05-04 PROCEDURE — 99223 1ST HOSP IP/OBS HIGH 75: CPT | Performed by: INTERNAL MEDICINE

## 2022-05-04 PROCEDURE — 94640 AIRWAY INHALATION TREATMENT: CPT

## 2022-05-04 PROCEDURE — 2700000000 HC OXYGEN THERAPY PER DAY

## 2022-05-04 PROCEDURE — 6370000000 HC RX 637 (ALT 250 FOR IP): Performed by: INTERNAL MEDICINE

## 2022-05-04 RX ORDER — LEVOTHYROXINE SODIUM 0.1 MG/1
100 TABLET ORAL
Status: DISCONTINUED | OUTPATIENT
Start: 2022-05-05 | End: 2022-05-06 | Stop reason: HOSPADM

## 2022-05-04 RX ORDER — METHYLPREDNISOLONE SODIUM SUCCINATE 40 MG/ML
40 INJECTION, POWDER, LYOPHILIZED, FOR SOLUTION INTRAMUSCULAR; INTRAVENOUS EVERY 12 HOURS
Status: COMPLETED | OUTPATIENT
Start: 2022-05-04 | End: 2022-05-05

## 2022-05-04 RX ORDER — WARFARIN SODIUM 5 MG/1
5 TABLET ORAL
Status: COMPLETED | OUTPATIENT
Start: 2022-05-04 | End: 2022-05-04

## 2022-05-04 RX ORDER — LEVALBUTEROL INHALATION SOLUTION 0.63 MG/3ML
0.63 SOLUTION RESPIRATORY (INHALATION) 3 TIMES DAILY
Status: DISCONTINUED | OUTPATIENT
Start: 2022-05-04 | End: 2022-05-06 | Stop reason: HOSPADM

## 2022-05-04 RX ORDER — AMLODIPINE BESYLATE 5 MG/1
5 TABLET ORAL DAILY
Status: DISCONTINUED | OUTPATIENT
Start: 2022-05-04 | End: 2022-05-06 | Stop reason: HOSPADM

## 2022-05-04 RX ADMIN — AMLODIPINE BESYLATE 5 MG: 5 TABLET ORAL at 10:04

## 2022-05-04 RX ADMIN — BUDESONIDE 500 MCG: 0.5 SUSPENSION RESPIRATORY (INHALATION) at 19:24

## 2022-05-04 RX ADMIN — CARVEDILOL 3.12 MG: 6.25 TABLET, FILM COATED ORAL at 10:06

## 2022-05-04 RX ADMIN — ARFORMOTEROL TARTRATE 15 MCG: 15 SOLUTION RESPIRATORY (INHALATION) at 19:24

## 2022-05-04 RX ADMIN — WATER 1000 MG: 1 INJECTION INTRAMUSCULAR; INTRAVENOUS; SUBCUTANEOUS at 17:05

## 2022-05-04 RX ADMIN — FUROSEMIDE 20 MG: 20 TABLET ORAL at 10:05

## 2022-05-04 RX ADMIN — PANTOPRAZOLE SODIUM 40 MG: 40 TABLET, DELAYED RELEASE ORAL at 10:05

## 2022-05-04 RX ADMIN — ARFORMOTEROL TARTRATE 15 MCG: 15 SOLUTION RESPIRATORY (INHALATION) at 08:56

## 2022-05-04 RX ADMIN — METHYLPREDNISOLONE SODIUM SUCCINATE 40 MG: 40 INJECTION, POWDER, LYOPHILIZED, FOR SOLUTION INTRAMUSCULAR; INTRAVENOUS at 17:06

## 2022-05-04 RX ADMIN — LEVALBUTEROL 0.63 MG: 0.63 SOLUTION RESPIRATORY (INHALATION) at 19:24

## 2022-05-04 RX ADMIN — BUDESONIDE 500 MCG: 0.5 SUSPENSION RESPIRATORY (INHALATION) at 08:56

## 2022-05-04 RX ADMIN — IPRATROPIUM BROMIDE 0.5 MG: 0.5 SOLUTION RESPIRATORY (INHALATION) at 08:56

## 2022-05-04 RX ADMIN — ROSUVASTATIN CALCIUM 10 MG: 10 TABLET, FILM COATED ORAL at 20:06

## 2022-05-04 RX ADMIN — SPIRONOLACTONE 12.5 MG: 25 TABLET ORAL at 21:00

## 2022-05-04 RX ADMIN — AMIODARONE HYDROCHLORIDE 100 MG: 200 TABLET ORAL at 10:05

## 2022-05-04 RX ADMIN — WARFARIN SODIUM 5 MG: 5 TABLET ORAL at 18:11

## 2022-05-04 RX ADMIN — LEVOTHYROXINE SODIUM 75 MCG: 75 TABLET ORAL at 06:00

## 2022-05-04 NOTE — H&P
Faith Bell is a 78 y.o. female  presented to ER for shortness of breath and slow heart rate  history of COPD and lung cancer, chronically on 6 L nasal cannula, recent CTA for PE which was negative as her Coumadin was subtherapeutic. INR today was 1.4, EKG   junctional rhythm ,  monitor heart rate  anywhere from the 30s to 60s Dr. Osvaldo Mills  recommended the patient be admitted to have her medications adjusted. Patient was admitted to hospital for further work-up and management. Past Medical History:   Diagnosis Date    Atrial fibrillation ([address]     CHF (congestive heart failure) (HCC)     Emphysema, unspecified (HCC)     Hyperlipidemia     Hypertension     Lung cancer ([address]     Oxygen dependent     Prolonged emergence from general anesthesia     post general anesthesia    Skin cancer     Thyroid disease        Past Surgical History:   Procedure Laterality Date    BREAST BIOPSY Right     benign    BRONCHOSCOPY N/A 4/20/2021    BRONCHOSCOPY/TRANSBRONCHIAL NEEDLE BIOPSY performed by Rashawn Guillermo MD at [facility] Right 12/16/2021    RIGHT EYE CATARACT EXTRACTION IOL IMPLANT performed by Davida Ramachandran MD at [address] [place] [address] Left 2/24/2022    LEFT EYE CATARACT EXTRACTION IOL IMPLANT performed by Davida Ramachandran MD at [facility] OR    TUBAL LIGATION         Family History   Problem Relation Age of Onset    Cancer Mother     Emphysema Father        Prior to Admission medications    Medication Sig Start Date End Date Taking?  Authorizing Provider   furosemide (LASIX) 20 MG tablet Take 20 mg by mouth every morning    Historical Provider, MD   levothyroxine (SYNTHROID) 75 MCG tablet Take 75 mcg by mouth every morning (before breakfast)    Historical Provider, MD   pantoprazole (PROTONIX) 40 MG tablet Take 40 mg by mouth every morning    Historical Provider, MD   warfarin (COUMADIN) 2 MG tablet Take 2 mg by mouth nightly Historical Provider, MD   warfarin (COUMADIN) 2 MG tablet Take 1 mg by mouth nightly  Patient not taking: Reported on 5/3/2022    Historical Provider, MD   doxycycline hyclate (VIBRAMYCIN) 100 MG capsule Take 100 mg by mouth 2 times daily  Patient not taking: Reported on 5/3/2022 4/27/22 5/7/22  Historical Provider, MD   OXYGEN Inhale 6 L/min into the lungs continuous    Historical Provider, MD   Apoaequorin (PREVAGEN EXTRA STRENGTH) 20 MG CAPS Take 20 mg by mouth every morning     Historical Provider, MD   rosuvastatin (CRESTOR) 10 MG tablet Take 10 mg by mouth nightly     Historical Provider, MD   albuterol sulfate HFA (PROAIR HFA) 108 (90 Base) MCG/ACT inhaler Inhale 2 puffs into the lungs every 6 hours as needed for Wheezing    Historical Provider, MD   fluticasone-umeclidin-vilant (Evelene Michael) 100-62.5-25 MCG/INH AEPB Inhale 1 puff into the lungs every morning     Historical Provider, MD   amiodarone (CORDARONE) 200 MG tablet Take 0.5 tablets by mouth every other day 10/30/17   Perez Heft, DO   carvedilol (COREG) 3.125 MG tablet Take 1 tablet by mouth 2 times daily (with meals) 10/31/17   Perez Heft, DO   spironolactone (ALDACTONE) 25 MG tablet Take 0.5 tablets by mouth three times a week M-W-F only 10/18/17   Perez Heft, DO        Allergies: Cat hair extract, Eggs or egg-derived products, Erythromycin, Pcn [penicillins], and Seasonal    Social History     Tobacco Use    Smoking status: Former Smoker     Packs/day: 2.00     Years: 45.00     Pack years: 90.00     Types: Cigarettes     Start date: 1952     Quit date: 1998     Years since quittin.7    Smokeless tobacco: Never Used   Substance Use Topics    Alcohol use: Yes     Alcohol/week: 1.0 standard drink     Types: 1 Glasses of wine per week     Comment: nightly        Review of Systems:  Respiratory: pos.  For SOB cough   Cardiovascular: negative for chest pain   Gastrointestinal: negative for abdominal pain, diarrhea, nausea and vomiting  Genitourinary:negative for dysuria and hematuria  Derm: negative for rash and skin lesion(s)  Neurological: negative for seizures and tremors  Endocrine: negative for diabetic symptoms including polydipsia and polyuria    Physical Exam:  Vitals:    05/04/22 0051   BP: (!) 184/81   Pulse: 61   Resp: 18   Temp: 98.3 °F (36.8 °C)   SpO2: 95%      Skin:  Warm and dry. No rash or bruises  HEENT:  PERRLA, EOMI  Neck:  No JVD, No thyromegaly, No carotid bruit  Cardiac:  IRR, No gallop or murmur  Lungs:  Rales rhonchi wheezes   Abdomen: Normal bowel sounds, no HSM, non-tender  Extremities:  No clubbing, edema or cyanosis  Neurological:  Moves all extremities    Labs:    CBC with Differential:    Lab Results   Component Value Date    WBC 4.8 05/03/2022    RBC 3.26 05/03/2022    HGB 10.2 05/03/2022    HCT 32.9 05/03/2022     05/03/2022    .9 05/03/2022    MCH 31.3 05/03/2022    MCHC 31.0 05/03/2022    RDW 13.2 05/03/2022    SEGSPCT 46 02/14/2014    LYMPHOPCT 23.7 05/03/2022    MONOPCT 6.9 05/03/2022    BASOPCT 0.4 05/03/2022    MONOSABS 0.33 05/03/2022    LYMPHSABS 1.13 05/03/2022    EOSABS 0.07 05/03/2022    BASOSABS 0.02 05/03/2022     CMP:    Lab Results   Component Value Date     05/03/2022    K 4.2 05/03/2022    K 4.0 10/12/2021     05/03/2022    CO2 28 05/03/2022    BUN 26 05/03/2022    CREATININE 1.2 05/03/2022    GFRAA 52 05/03/2022    LABGLOM 43 05/03/2022    GLUCOSE 148 05/03/2022    GLUCOSE 87 05/04/2012    PROT 6.7 05/03/2022    LABALBU 4.1 05/03/2022    LABALBU 4.4 05/04/2012    CALCIUM 9.4 05/03/2022    BILITOT 0.5 05/03/2022    ALKPHOS 55 05/03/2022    AST 22 05/03/2022    ALT 10 05/03/2022      Imaging:CXR; no new change       Assessment and Plan:    Patient Active Problem List   Diagnosis    A. Fib slow ventricular response   COPD   Lung Ca S/P radiation   HTN uncontrolled   Chronic hypoxic respiratory failure cardiomyopathy  Chronic combined CHF   Hypothyroid

## 2022-05-04 NOTE — PROGRESS NOTES
P Quality Flow/Interdisciplinary Rounds Progress Note        Quality Flow Rounds held on May 4, 2022    Disciplines Attending:  Bedside Nurse, ,  and Nursing Unit Leadership    Rayray Blunt was admitted on 5/3/2022  3:07 PM    Anticipated Discharge Date:       Disposition:    Leandro Score:  Leandro Scale Score: 21    Readmission Risk              Risk of Unplanned Readmission:  16           Discussed patient goal for the day, patient clinical progression, and barriers to discharge. The following Goal(s) of the Day/Commitment(s) have been identified:  check cardiology consult's plan.       Radha Swanson RN  May 4, 2022

## 2022-05-04 NOTE — PROGRESS NOTES
Telephoned by Dr. Suzy Cuellar for admission orders and made aware of hypertension. I was told to order home mediations and administer.

## 2022-05-04 NOTE — CONSULTS
PULMONARY CONSULTATION    Reason for Consultation: respiratory failure  Referring Physician: Aurora Estrella DO    Communication with the referring physician will be sent via the electronic medical record. HPI:    The patient is an 78year old female with a complex history consisting of emphysema, interstitial lung disease, squamous cell lung cancer IIB s/p radiation at [facility], chronic collapse of the RML, chronic systolic heart failure with EF 30% on last echocardiogram, atrial fibrillation, moderate mitral regurgitation, pulmonary hypertension WHO Group 2, large hiatal hernia, who presented to the hospital with increased SOB and bradycardic events at home. She states that her heart rate was dropping to 30's at home and fluctuated to 120's. She also was symptomatic with this with increased shortness of breath, leg tingling and weakness. She also was recently taking doxycycline outpatient for a respiratory infection. Symptoms of this consisted of green sputum production which has now turned to yellow. She has no hemoptysis. She chronically is on 6L supplemental oxygen. Takes Trelegy and proair at home. She has completed radiation to the R chest as of Aug 2021 and is undergoing serial surveillance scans.      PFTs:  FEV`1/FVC 44%  FEV1 1.13L, 59%  FVC 1.57L, 55%  TLC 2.37L, 43%  DLCO 54%      Past Medical History:   Diagnosis Date    [facility] fibrillation ([facility]     CHF (congestive heart failure) (HCC)     Emphysema, unspecified (HCC)     Hyperlipidemia     Hypertension     Lung cancer ([facility]     Oxygen dependent     Prolonged emergence from general anesthesia     post general anesthesia    Skin cancer     Thyroid disease        Past Surgical History:   Procedure Laterality Date    BREAST BIOPSY Right     benign    BRONCHOSCOPY N/A 4/20/2021    BRONCHOSCOPY/TRANSBRONCHIAL NEEDLE BIOPSY performed by Leslie Le MD at [facility] Right 12/16/2021    RIGHT EYE CATARACT EXTRACTION IOL IMPLANT performed by Lord Zafar MD at [address] [place] [address] [place] EXTRACTION Left 2022    LEFT EYE CATARACT EXTRACTION IOL IMPLANT performed by [facility]ar MD at [facility] OR    TUBAL LIGATION         Family History   Problem Relation Age of Onset    Cancer Mother     Emphysema Father        Social History     Socioeconomic History    Marital status:      Spouse name: Not on file    Number of children: Not on file    Years of education: Not on file    Highest education level: Not on file   Occupational History    Not on file   Tobacco Use    Smoking status: Former Smoker     Packs/day: 2.00     Years: 45.00     Pack years: 90.00     Types: Cigarettes     Start date: 1952     Quit date: 1998     Years since quittin.7    Smokeless tobacco: Never Used   Vaping Use    Vaping Use: Never used   Substance and Sexual Activity    Alcohol use: Yes     Alcohol/week: 1.0 standard drink     Types: 1 Glasses of wine per week     Comment: nightly    Drug use: No    Sexual activity: Not on file   Other Topics Concern    Not on file   Social History Narrative    Not on file     Social Determinants of Health     Financial Resource Strain:     Difficulty of Paying Living Expenses: Not on file   Food Insecurity:     Worried About Running Out of Food in the Last Year: Not on file    Heike of Food in the Last Year: Not on file   Transportation Needs:     Lack of Transportation (Medical): Not on file    Lack of Transportation (Non-Medical):  Not on file   Physical Activity:     Days of Exercise per Week: Not on file    Minutes of Exercise per Session: Not on file   Stress:     Feeling of Stress : Not on file   Social Connections:     Frequency of Communication with Friends and Family: Not on file    Frequency of Social Gatherings with Friends and Family: Not on file    Attends Buddhism Services: Not on file   [employer] of Clubs or Organizations: Not on file    Attends Club or Organization Meetings: Not on file    Marital Status: Not on file   Intimate Partner Violence:     Fear of Current or Ex-Partner: Not on file    Emotionally Abused: Not on file    Physically Abused: Not on file    Sexually Abused: Not on file   Housing Stability:     Unable to Pay for Housing in the Last Year: Not on file    Number of [place] in the Last Year: Not on file    Unstable Housing in the Last Year: Not on file       Current Facility-Administered Medications   Medication Dose Route Frequency Provider Last Rate Last Admin    [START ON 5/5/2022] levothyroxine (SYNTHROID) tablet 100 mcg  100 mcg Oral QAM AC Cory Roman MD        warfarin (COUMADIN) tablet 5 mg  5 mg Oral Once Cory Roman MD        amLODIPine [facility]) tablet 5 mg  5 mg Oral Daily Cory Roman MD   5 mg at 05/04/22 1004    [START ON 5/5/2022] warfarin placeholder: dosing by provider   Other Christie Green MD        perflutren lipid microspheres (DEFINITY) injection 1.65 mg  1.5 mL IntraVENous ONCE PRN Navi Baldwin DO        ipratropium-albuterol (DUONEB) nebulizer solution 3 ampule  3 ampule Inhalation Once Mikhail Troncoso MD        spironolactone (ALDACTONE) tablet 12.5 mg  12.5 mg Oral Once per day on Mon Wed Fri Cory Roman MD        rosuvastatin (CRESTOR) tablet 10 mg  10 mg Oral Nightly Cory Roman MD   10 mg at 05/03/22 2257    pantoprazole (PROTONIX) tablet 40 mg  40 mg Oral QAM Cory Roman MD   40 mg at 05/04/22 1005    furosemide (LASIX) tablet 20 mg  20 mg Oral QAM Cory Roman MD   20 mg at 05/04/22 1005    amiodarone (CORDARONE) tablet 100 mg  100 mg Oral Every Other Day Cory Roman MD   100 mg at 05/04/22 1005    budesonide (PULMICORT) nebulizer suspension 500 mcg  0.5 mg Nebulization BID Cory Roman MD   500 mcg at 05/04/22 0856    ipratropium (ATROVENT) 0.02 % nebulizer solution 0.5 mg 0.5 mg Nebulization BID Alwin Councilman, MD   0.5 mg at 05/04/22 0856    Arformoterol Tartrate (BROVANA) nebulizer solution 15 mcg  15 mcg Nebulization BID Alwin Councilman, MD   15 mcg at 05/04/22 0856    [Held by provider] carvedilol (COREG) tablet 3.125 mg  3.125 mg Oral BID WC Alwin Councilman, MD   3.125 mg at 05/04/22 1006       Allergies   Allergen Reactions    Cat Hair Extract Itching     Patient states \"My son put this. I hope I'm not allergic, I have 4 Cats and 2 Birds, maybe to their dust.\"    Eggs Or Egg-Derived Products Nausea Only    Erythromycin Diarrhea, Nausea And Vomiting and Other (See Comments)     Stomach Pains      Pcn [Penicillins] Itching, Rash and Other (See Comments)     Patient states \"Red from Head to Toe, with something crawling under my skin. \"    Seasonal Itching, Swelling and Other (See Comments)     Runny/Itchy Nose, Watery/Itchy Eyes        Review of Systems:  14 point ROS obtained and neg aside from outlined in the HPI    Physical Exam:  BP (!) 114/54   Pulse 65   Temp 97.8 °F (36.6 °C) (Oral)   Resp 18   Ht 5' 8\" (1.727 m)   Wt 170 lb (77.1 kg)   SpO2 100%   BMI 25.85 kg/m²    General: sitting in bed comfortably, no distress, breathing is not labored  HEENT: PERRL, EOMI, MMM, no oral lesions  Neck: supple, no adenopathy  CV: RRR without murmur  Lungs: exp wheeze bilaterally, R>L, basilar crackles appreciated   Abd: soft, NT, ND, bowel sounds normal  Ext: warm, no edema, no clubbing  Skin: no rashes  Neuro: CN II-XII grossly intact, no focal deficits    Oximetry: 100% on 6L NC    Imaging personally reviewed:  CTA chest 4/28/22  Impression   1. There is no pulmonary embolus   2. Complete collapse of the right middle lobe.  There is no appreciable   endobronchial lesion or mucous plugging of the right middle lobe bronchi.    Please note I reviewed the patient's previous chest x-rays dating back to   04/20/2021 and the appearance of the chest x-ray is unchanged and the collapsed right middle lobe is not well-defined or well appreciated on the   chest x-ray. 3. 6.5 mm irregular nodule within the right upper lobe on axial image number   41 and sagittal number 44 dedicated follow-up CT scan is recommended in 3   months. 4. Advanced emphysematous changes with interstitial fibrosis   5. Chronic pleuroparenchymal scarring seen throughout the left lung. 6. Cardiomegaly   7. Large hiatal hernia         Echocardiogram:  Summary   Left ventricle is normal in size . Borderline concentric left ventricular hypertrophy   No regional wall motion abnormalities seen. Overall ejection fraction moderate-to-severely decreased . The left atrium is moderately dilated. Right ventricle global systolic function is reduced . Moderately enlarged right atrium size. Moderate mitral regurgitation is present. The aortic valve appears mildly sclerotic. Mild aortic regurgitation is noted. Moderate to severe tricuspid regurgitation. Pulmonary hypertension is mild . There is a trivial circumferential pericardial effusion noted. Labs:  Lab Results   Component Value Date    WBC 4.8 05/03/2022    HGB 10.2 05/03/2022    HCT 32.9 05/03/2022    .9 05/03/2022    MCH 31.3 05/03/2022    MCHC 31.0 05/03/2022    RDW 13.2 05/03/2022     05/03/2022    MPV 11.1 05/03/2022     Lab Results   Component Value Date     05/03/2022    K 4.2 05/03/2022    K 4.0 10/12/2021     05/03/2022    CO2 28 05/03/2022    BUN 26 05/03/2022    CREATININE 1.2 05/03/2022    LABALBU 4.1 05/03/2022    LABALBU 4.4 05/04/2012    CALCIUM 9.4 05/03/2022    GFRAA 52 05/03/2022    LABGLOM 43 05/03/2022     Lab Results   Component Value Date    PROTIME 15.1 05/03/2022    INR 1.4 05/03/2022         Assessment:  1. Chronic hypoxic respiratory failure on 4-6L home oxygen  2. COPD with acute exacerbation  3. Acute bronchitis  4. ILD  5. Squamous cell lung cancer stage IIB s/p radiation Aug 2021  6. Bradycardia, symptomatic  7. Chronic systolic heart failure EF 30%  8. Large hiatal hernia  9. Chronic RML collapse  10. Nicotine dependence in remission    Plan:  1. Supplemental oxygen for saturation 88-92%  2. Check ABG to evaluate for hypercapnia  3. Scheduled bronchodilators - will use Xopenex given significant shaking with albuterol nebs  4. IV steroids  5. Check respiratory culture  6. Empiric antibiotics    Discussed with Dr. Herminio William. If evidence for chronic hypercapnia, will recommend a home NIV. Thank you for allowing me to participate in the care of Andrew Solders.        Eric Steve MD  5/4/2022 2:01 PM

## 2022-05-04 NOTE — CONSULTS
CARDIOLOGY CONSULTATION    Patient Name:  Sarkis García    :  1940    Reason for Consultation:   Bradycardia; and history of left ventricular dysfunction; mitral regurgitation; tricuspid regurgitation and recurrent persistent atrial fibrillation. History of Present Illness: Sarkis García returns to [facility], following a recent history of not feeling right in her chest and shaking in her legs which she associates with the slow heartbeat she states affects her lungs. She does have a longstanding history of recurrent persistent atrial fibrillation in addition to having significant underlying valvular heart disease. This in addition to history of carcinoma of the lung and status post history of radiation therapy and underlying chronic obstructive pulmonary disease. From a cardiac standpoint she has fared well over the past 5 years but recently has noted some deterioration in her respiratory status. While in the emergency room an electrocardiogram was obtained which suggest possible junctional rhythm she denies any palpitations nor chest heaviness. She feels as if her heart is affecting her lungs. She now returns for further evaluation and adjustment of her medical regimen. Past Medical History:   has a past medical history of Atrial fibrillation ([address] CHF (congestive heart failure) ([address] Emphysema, unspecified ([address] Hyperlipidemia, Hypertension, Lung cancer ([address] Oxygen dependent, Prolonged emergence from general anesthesia, Skin cancer, and Thyroid disease. Surgical History:   has a past surgical history that includes Breast biopsy (Right); Tubal ligation; bronchoscopy (N/A, 2021); Intracapsular cataract extraction (Right, 2021); and Intracapsular cataract extraction (Left, 2022). Social History:   reports that she quit smoking about 23 years ago. Her smoking use included cigarettes. She started smoking about 69 years ago.  She has a 90.00 pack-year smoking history. She has never used smokeless tobacco. She reports current alcohol use of about 1.0 standard drink of alcohol per week. She reports that she does not use drugs. Family History:  family history is remarkable for mother  secondary to aplastic anemia and peripheral vascular disease and father  secondary to black lung disease as well as 3 brothers all who have had heart problems including her youngest brother with 2 stents. .     Medications:  Prior to Admission medications    Medication Sig Start Date End Date Taking?  Authorizing Provider   furosemide (LASIX) 20 MG tablet Take 20 mg by mouth every morning    Historical Provider, MD   levothyroxine (SYNTHROID) 75 MCG tablet Take 75 mcg by mouth every morning (before breakfast)    Historical Provider, MD   pantoprazole (PROTONIX) 40 MG tablet Take 40 mg by mouth every morning    Historical Provider, MD   warfarin (COUMADIN) 2 MG tablet Take 2 mg by mouth nightly    Historical Provider, MD   warfarin (COUMADIN) 2 MG tablet Take 1 mg by mouth nightly  Patient not taking: Reported on 5/3/2022    Historical Provider, MD   doxycycline hyclate (VIBRAMYCIN) 100 MG capsule Take 100 mg by mouth 2 times daily  Patient not taking: Reported on 5/3/2022 4/27/22 5/7/22  Historical Provider, MD   OXYGEN Inhale 6 L/min into the lungs continuous    Historical Provider, MD   Apoaequorin (PREVAGEN EXTRA STRENGTH) 20 MG CAPS Take 20 mg by mouth every morning     Historical Provider, MD   rosuvastatin (CRESTOR) 10 MG tablet Take 10 mg by mouth nightly     Historical Provider, MD   albuterol sulfate HFA (PROAIR HFA) 108 (90 Base) MCG/ACT inhaler Inhale 2 puffs into the lungs every 6 hours as needed for Wheezing    Historical Provider, MD   fluticasone-umeclidin-vilant (TRELEGY ELLIPTA) 100-62.5-25 MCG/INH AEPB Inhale 1 puff into the lungs every morning     Historical Provider, MD   amiodarone (CORDARONE) 200 MG tablet Take 0.5 tablets by mouth every other day 10/30/17   Rosalina Henriquez DO   carvedilol (COREG) 3.125 MG tablet Take 1 tablet by mouth 2 times daily (with meals) 10/31/17   Rosalina Henriquez DO   spironolactone (ALDACTONE) 25 MG tablet Take 0.5 tablets by mouth three times a week M-W-F only 10/18/17   Rosalina Henriquez DO       Allergies:  Cat hair extract, Eggs or egg-derived products, Erythromycin, Pcn [penicillins], and Seasonal     Review of Systems:   · Constitutional: there has been no unanticipated weight loss. There's been no significant change in energy level, sleep pattern or activity level. No fever chills or rigors. · Eyes: No visual changes or diplopia. No scleral icterus. · ENT: No Headaches, hearing loss or vertigo. No mouth sores or sore throat. No change in taste or smell. · Cardiovascular: No chest discomfort, dyspnea on exertion, palpitations, loss of consciousness, no phlebitis, no claudication. · Respiratory: No cough or wheezing, no sputum production. No hemoptysis, pleuritic pain. · Gastrointestinal: No abdominal pain, appetite loss, blood in stools. No change in bowel habits. No hematemesis  · Genitourinary: No dysuria, trouble voiding or hematuria. No nocturia or increased frequency. · Musculoskeletal:  No gait disturbance, weakness or joint complaints. · Integumentary: No rash or pruritis. · Neurological: No headache, diplopia, change in muscle strength, numbness or tingling. No change in gait, balance, coordination, mood, affect, memory, mentation, behavior. · Psychiatric: No anxiety or depression. · Endocrine: No temperature intolerance. No excessive thirst, fluid intake, or urination. No tremor. · Hematologic/Lymphatic: No abnormal bruising or bleeding, blood clots or swollen lymph nodes. · Allergic/Immunologic: No nasal congestion or hives.     Physical Examination:    Vital Signs: BP (!) 114/54   Pulse 65   Temp 97.8 °F (36.6 °C) (Oral)   Resp 18   Ht 5' 8\" (1.727 m)   Wt 170 lb (77.1 kg)   SpO2 100%   BMI 25.85 kg/m²   General appearance: Well preserved, mesomorphic body habitus, alert, no distress. Skin: Skin color, texture, turgor normal. No rashes or lesions. No induration or tightening palpated. Head: Normocephalic. No masses, lesions, tenderness or abnormalities  Eyes: Conjunctivae/corneas clear. PERRL, EOMs intact. Sclera non icteric. Ears: External ears normal. Canals clear. TM's clear bilaterally. Hearing normal to finger rub. Nose/Sinuses: Nares normal. Septum midline. Mucosa normal. No drainage or sinus tenderness. Oropharynx: Lips, mucosa, and tongue normal. Oropharynx clear with no exudate seen. Neck: Neck supple and symmetric. No adenopathy. Thyroid symmetric, normal size, without nodules. Trachea is midline. Carotids brisk in upstroke without bruits, no abnormal JVP noted at 45°. Chest: Even excursion  Lungs: Lungs with few expiratory rhonchi to auscultation bilaterally. No retractions or use of accessory muscles. No tactile vocal fremitus. No crackles or rales. Heart:  S1 > S2. Regular, regular rhythm. No gallop grade 5-9/0 systolic left lower sternal border. No rub, palpable thrill or heave noted. PMI 5th intercostal space midclavicular line. Abdomen: Abdomen soft, moderately protuberant, non-tender. BS normal. No masses, organomegaly. No hernia noted. Extremities: Extremities normal. No deformities, edema, or skin discoloration. No cyanosis or clubbing noted to the nails. Peripheral pulses present 2+ upper extremities and present 1+  lower extremities. Musculoskeletal: Spine ROM normal. Muscular strength intact. Neuro: Cranial nerves intact. Motor: Strength 5/5 in all extremities. Reflexes 2+ in all extremities. No focal weakness. Sensory: grossly normal to touch. Coordination intact.     Pertinent Labs:  CBC:   Recent Labs     05/02/22  1040 05/03/22  1543   WBC 4.2* 4.8   HGB 10.5* 10.2*   * 129*     BMP:  Recent Labs     05/02/22  1040 05/02/22  1040 05/03/22  1452     --  141   K 4.7  --  4.2     --  104   CO2 31*  --  28   BUN 25*  --  26*   CREATININE 1.1*  --  1.2*   GLUCOSE 100*  --  148*   LABGLOM 47   < > 43    < > = values in this interval not displayed. ABGs:   Lab Results   Component Value Date    PH 7.427 10/26/2017    PO2 77.3 10/26/2017    PCO2 47.4 10/26/2017     INR:   Recent Labs     05/02/22  1040 05/03/22  1543   INR 1.3 1.4     PRO-BNP:   Lab Results   Component Value Date    PROBNP 2,194 (H) 05/03/2022    PROBNP 1,641 (H) 04/28/2022      Cardiac Injury Profile:   No results for input(s): CKTOTAL, CKMB, CKMBINDEX, TROPONINI in the last 72 hours. Lipid Profile:   Lab Results   Component Value Date    TRIG 84 05/02/2022    HDL 62 05/02/2022    LDLCALC 77 05/02/2022    CHOL 156 05/02/2022      Hemoglobin A1C: No components found for: HGBA1C   ECG:  See report  ECHO: 9/20/2017   Summary   Left ventricle is normal in size .   Borderline concentric left ventricular hypertrophy   No regional wall motion abnormalities seen.   Overall ejection fraction moderate-to-severely decreased 30-35 %.   The left atrium is moderately dilated.   Right ventricle global systolic function is reduced .   Moderately enlarged right atrium size.   Moderate mitral regurgitation is present.   The aortic valve appears mildly sclerotic.   Mild aortic regurgitation is noted.   Moderate to severe tricuspid regurgitation.   Pulmonary hypertension is mild .   There is a trivial circumferential pericardial effusion noted. Radiology:      Assessment:    Principal Problem:    Dyspnea on exertion  Resolved Problems:    * No resolved hospital problems. *      Plan:  Mrs. Marcus Shear with a somewhat difficult diagnostic situation and may actually suffer from hypercarbia further exacerbated by her underlying cardiac medications. Nonetheless if hypercarbia is ruled out then we will need to consider a biventricular pacer and/or ICD device.   Her metoprolol has been held. Per patient request as well as her daughter Dr. Estefanía Guzman will see in consultation. I have spent more than 55 minutes face to face with Amanda Garay reviewing notes and laboratory data with greater than 50% of this time instructing and counseling the patient and her daughter regarding my findings and recommendations and I have answered all questions as posed to me by Ms. Hyatt. Thank you, Gely Siegel MD for allowing me to consult in the care of this patient and her daughter. Frederick Lynn DO DO, FACP, [facility], [facility]    NOTE:  This report was transcribed using voice recognition software. Every effort was made to ensure accuracy; however, inadvertent computerized transcription errors may be present.

## 2022-05-04 NOTE — CARE COORDINATION
Met with patient, along with social work- discussed plan of care and discharge planning. Pt is independent, resides at home alone. Pt does not use any assistive devices. Pt has home oxygen thru [employer]. She stated that she has a very supportive family. Discharge plan is HOME- no needs. Spoke to Aleksander at [place] [id number] confirmed that patient is ordered 5 L continuous oxygen.

## 2022-05-05 LAB
INR BLD: 1.8
PROTHROMBIN TIME: 19.4 SEC (ref 9.3–12.4)

## 2022-05-05 PROCEDURE — 6360000002 HC RX W HCPCS: Performed by: INTERNAL MEDICINE

## 2022-05-05 PROCEDURE — 36415 COLL VENOUS BLD VENIPUNCTURE: CPT

## 2022-05-05 PROCEDURE — 99232 SBSQ HOSP IP/OBS MODERATE 35: CPT | Performed by: INTERNAL MEDICINE

## 2022-05-05 PROCEDURE — 2580000003 HC RX 258: Performed by: INTERNAL MEDICINE

## 2022-05-05 PROCEDURE — 6360000002 HC RX W HCPCS: Performed by: NURSE PRACTITIONER

## 2022-05-05 PROCEDURE — 2060000000 HC ICU INTERMEDIATE R&B

## 2022-05-05 PROCEDURE — 94640 AIRWAY INHALATION TREATMENT: CPT

## 2022-05-05 PROCEDURE — 6370000000 HC RX 637 (ALT 250 FOR IP): Performed by: INTERNAL MEDICINE

## 2022-05-05 PROCEDURE — 85610 PROTHROMBIN TIME: CPT

## 2022-05-05 PROCEDURE — 2700000000 HC OXYGEN THERAPY PER DAY

## 2022-05-05 RX ORDER — PREDNISONE 20 MG/1
40 TABLET ORAL DAILY
Status: DISCONTINUED | OUTPATIENT
Start: 2022-05-06 | End: 2022-05-06 | Stop reason: HOSPADM

## 2022-05-05 RX ORDER — WARFARIN SODIUM 3 MG/1
3 TABLET ORAL
Status: COMPLETED | OUTPATIENT
Start: 2022-05-05 | End: 2022-05-05

## 2022-05-05 RX ADMIN — AMLODIPINE BESYLATE 5 MG: 5 TABLET ORAL at 09:12

## 2022-05-05 RX ADMIN — WARFARIN SODIUM 3 MG: 3 TABLET ORAL at 17:13

## 2022-05-05 RX ADMIN — METHYLPREDNISOLONE SODIUM SUCCINATE 40 MG: 40 INJECTION, POWDER, LYOPHILIZED, FOR SOLUTION INTRAMUSCULAR; INTRAVENOUS at 04:37

## 2022-05-05 RX ADMIN — FUROSEMIDE 20 MG: 20 TABLET ORAL at 09:12

## 2022-05-05 RX ADMIN — BUDESONIDE 500 MCG: 0.5 SUSPENSION RESPIRATORY (INHALATION) at 08:34

## 2022-05-05 RX ADMIN — METHYLPREDNISOLONE SODIUM SUCCINATE 40 MG: 40 INJECTION, POWDER, LYOPHILIZED, FOR SOLUTION INTRAMUSCULAR; INTRAVENOUS at 16:29

## 2022-05-05 RX ADMIN — ROSUVASTATIN CALCIUM 10 MG: 10 TABLET, FILM COATED ORAL at 21:25

## 2022-05-05 RX ADMIN — ARFORMOTEROL TARTRATE 15 MCG: 15 SOLUTION RESPIRATORY (INHALATION) at 20:49

## 2022-05-05 RX ADMIN — PANTOPRAZOLE SODIUM 40 MG: 40 TABLET, DELAYED RELEASE ORAL at 09:12

## 2022-05-05 RX ADMIN — BUDESONIDE 500 MCG: 0.5 SUSPENSION RESPIRATORY (INHALATION) at 20:49

## 2022-05-05 RX ADMIN — LEVOTHYROXINE SODIUM 100 MCG: 100 TABLET ORAL at 06:19

## 2022-05-05 RX ADMIN — WATER 1000 MG: 1 INJECTION INTRAMUSCULAR; INTRAVENOUS; SUBCUTANEOUS at 16:29

## 2022-05-05 RX ADMIN — LEVALBUTEROL 0.63 MG: 0.63 SOLUTION RESPIRATORY (INHALATION) at 08:34

## 2022-05-05 RX ADMIN — LEVALBUTEROL 0.63 MG: 0.63 SOLUTION RESPIRATORY (INHALATION) at 13:27

## 2022-05-05 RX ADMIN — LEVALBUTEROL 0.63 MG: 0.63 SOLUTION RESPIRATORY (INHALATION) at 20:49

## 2022-05-05 RX ADMIN — ARFORMOTEROL TARTRATE 15 MCG: 15 SOLUTION RESPIRATORY (INHALATION) at 08:34

## 2022-05-05 NOTE — PROGRESS NOTES
[facility] Quality Flow/Interdisciplinary Rounds Progress Note        Quality Flow Rounds held on May 5, 2022    Disciplines Attending:  Bedside Nurse, ,  and Nursing Unit Leadership    Екатерина Gregory was admitted on 5/3/2022  3:07 PM    Anticipated Discharge Date:  Expected Discharge Date: 05/07/22    Disposition:    Leandro Score:  Leandro Scale Score: 20    Readmission Risk              Risk of Unplanned Readmission:  18           Discussed patient goal for the day, patient clinical progression, and barriers to discharge. The following Goal(s) of the Day/Commitment(s) have been identified:  wean oxygen.       Staci Jimenez RN  May 5, 2022

## 2022-05-05 NOTE — PROGRESS NOTES
Admit Date: 5/3/2022    Subjective: All event noted awake comfortable on 6 L NC  BP up     Objective:     Patient Vitals for the past 8 hrs:   BP Temp Temp src Pulse Resp SpO2   05/05/22 0912 137/63 -- -- -- -- --   05/05/22 0301 (!) 189/80 98.1 °F (36.7 °C) Oral 63 16 97 %   05/05/22 0145 -- -- -- -- -- 99 %     I/O last 3 completed shifts: In: 720 [P.O.:720]  Out: -   No intake/output data recorded. HEENT: Normal  NECK: Thyroid normal. No carotid bruit. No lymphphadenopathy. CVS: RRR  RS: rhonchi few wheezes   ABD: Soft. Non tender. No mass. Normal BS. EXT: No edema. Non tender. Pulses present.    NEURO:no focal deficit       Scheduled Meds:   levothyroxine  100 mcg Oral QAM AC    amLODIPine  5 mg Oral Daily    warfarin placeholder: dosing by provider   Other RX Placeholder    levalbuterol  0.63 mg Nebulization TID    methylPREDNISolone  40 mg IntraVENous Q12H    cefTRIAXone (ROCEPHIN) IV  1,000 mg IntraVENous Q24H    ipratropium-albuterol  3 ampule Inhalation Once    spironolactone  12.5 mg Oral Once per day on Mon Wed Fri    rosuvastatin  10 mg Oral Nightly    pantoprazole  40 mg Oral QAM    furosemide  20 mg Oral QAM    amiodarone  100 mg Oral Every Other Day    budesonide  0.5 mg Nebulization BID    Arformoterol Tartrate  15 mcg Nebulization BID    [Held by provider] carvedilol  3.125 mg Oral BID WC     Continuous Infusions:    CBC with Differential:    Lab Results   Component Value Date    WBC 4.8 05/03/2022    RBC 3.26 05/03/2022    HGB 10.2 05/03/2022    HCT 32.9 05/03/2022     05/03/2022    .9 05/03/2022    MCH 31.3 05/03/2022    MCHC 31.0 05/03/2022    RDW 13.2 05/03/2022    SEGSPCT 46 02/14/2014    LYMPHOPCT 23.7 05/03/2022    MONOPCT 6.9 05/03/2022    BASOPCT 0.4 05/03/2022    MONOSABS 0.33 05/03/2022    LYMPHSABS 1.13 05/03/2022    EOSABS 0.07 05/03/2022    BASOSABS 0.02 05/03/2022     CMP:    Lab Results   Component Value Date     05/03/2022    K 4.2 05/03/2022    K 4.0 10/12/2021     05/03/2022    CO2 28 05/03/2022    BUN 26 05/03/2022    CREATININE 1.2 05/03/2022    GFRAA 52 05/03/2022    LABGLOM 43 05/03/2022    PROT 6.7 05/03/2022    LABALBU 4.1 05/03/2022    LABALBU 4.4 05/04/2012    CALCIUM 9.4 05/03/2022    BILITOT 0.5 05/03/2022    ALKPHOS 55 05/03/2022    AST 22 05/03/2022    ALT 10 05/03/2022     PT/INR:    Lab Results   Component Value Date    PROTIME 19.4 05/05/2022    INR 1.8 05/05/2022       Assessment:     Principal Problem: A. Fib slow ventricular response   COPD   Lung Ca S/P radiation   HTN uncontrolled   Chronic hypoxic respiratory failure cardiomyopathy  Chronic combined CHF   Hypothyroid       Plan:   Off B blocker ,to start amlodipine ,continue pulmonary hygiene ,monitor

## 2022-05-05 NOTE — PROGRESS NOTES
levothyroxine  100 mcg Oral QAM AC    amLODIPine  5 mg Oral Daily    warfarin placeholder: dosing by provider   Other RX Placeholder    levalbuterol  0.63 mg Nebulization TID    methylPREDNISolone  40 mg IntraVENous Q12H    cefTRIAXone (ROCEPHIN) IV  1,000 mg IntraVENous Q24H    ipratropium-albuterol  3 ampule Inhalation Once    spironolactone  12.5 mg Oral Once per day on Mon Wed Fri    rosuvastatin  10 mg Oral Nightly    pantoprazole  40 mg Oral QAM    furosemide  20 mg Oral QAM    amiodarone  100 mg Oral Every Other Day    budesonide  0.5 mg Nebulization BID    Arformoterol Tartrate  15 mcg Nebulization BID    [Held by provider] carvedilol  3.125 mg Oral BID WC       Physical Exam:  General Appearance: appears comfortable in no acute distress. HEENT: Normocephalic atraumatic without obvious abnormality   Neck: Lips, mucosa, and tongue normal.  Supple, symmetrical, trachea midline, no adenopathy;thyroid:  no enlargement/tenderness/nodules or JVD. Lung: Breath sounds faint expiratory wheeze right lung. Respirations   unlabored. Symmetrical expansion. Heart: RRR, normal S1, S2. No MRG  Abdomen: Soft, NT, ND. BS present x 4 quadrants. No bruit or organomegaly. Extremities: Pedal pulses 2+ symmetric b/l. Extremities normal, no cyanosis, clubbing, trace bilateral lower extremity edema  Musculokeletal: No joint swelling, no muscle tenderness. ROM normal in all joints of extremities. Neurologic: Mental status: Alert and Oriented X3 . Pertinent/ New Labs and Imaging Studies     PFTs:  FEV`1/FVC 44%  FEV1 1.13L, 59%  FVC 1.57L, 55%  TLC 2.37L, 43%  DLCO 54%    Imaging Personally Reviewed:    CTA chest 4/28/22  Impression   1. There is no pulmonary embolus   2. Complete collapse of the right middle lobe.  There is no appreciable   endobronchial lesion or mucous plugging of the right middle lobe bronchi.    Please note I reviewed the patient's previous chest x-rays dating back to   04/20/2021 and the appearance of the chest x-ray is unchanged and the   collapsed right middle lobe is not well-defined or well appreciated on the   chest x-ray. 3. 6.5 mm irregular nodule within the right upper lobe on axial image number   41 and sagittal number 44 dedicated follow-up CT scan is recommended in 3   months. 4. Advanced emphysematous changes with interstitial fibrosis   5. Chronic pleuroparenchymal scarring seen throughout the left lung. 6. Cardiomegaly   7. Large hiatal hernia            Echocardiogram:  Summary   Left ventricle is normal in size .   Borderline concentric left ventricular hypertrophy   No regional wall motion abnormalities seen.   Overall ejection fraction moderate-to-severely decreased .   The left atrium is moderately dilated.   Right ventricle global systolic function is reduced .   Moderately enlarged right atrium size.   Moderate mitral regurgitation is present.   The aortic valve appears mildly sclerotic.   Mild aortic regurgitation is noted.   Moderate to severe tricuspid regurgitation.   Pulmonary hypertension is mild .   There is a trivial circumferential pericardial effusion noted. Labs:  Lab Results   Component Value Date    WBC 4.8 05/03/2022    HGB 10.2 05/03/2022    HCT 32.9 05/03/2022    .9 05/03/2022    MCH 31.3 05/03/2022    MCHC 31.0 05/03/2022    RDW 13.2 05/03/2022     05/03/2022    MPV 11.1 05/03/2022     Lab Results   Component Value Date     05/03/2022    K 4.2 05/03/2022    K 4.0 10/12/2021     05/03/2022    CO2 28 05/03/2022    BUN 26 05/03/2022    CREATININE 1.2 05/03/2022    LABALBU 4.1 05/03/2022    LABALBU 4.4 05/04/2012    CALCIUM 9.4 05/03/2022    GFRAA 52 05/03/2022    LABGLOM 43 05/03/2022     Lab Results   Component Value Date    PROTIME 19.4 05/05/2022    INR 1.8 05/05/2022     Recent Labs     05/03/22  1452   PROBNP 2,194*     No results for input(s): PROCAL in the last 72 hours.   This SmartLink has not been configured with any valid records. Micro:  No results for input(s): CULTRESP in the last 72 hours. No results for input(s): LABGRAM in the last 72 hours. No results for input(s): LEGUR in the last 72 hours. No results for input(s): STREPNEUMAGU in the last 72 hours. No results for input(s): LP1UAG in the last 72 hours. Assessment:    1. Chronic respiratory failure with hypoxia  2. Home O2 dependence 4 to 6 L nasal cannula  3. COPD with acute exacerbation  4. Acute bronchitis  5. Interstitial lung disease  6. Squamous cell lung cancer stage IIb status post radiation August 2021  7. Symptomatic bradycardia  8. Chronic systolic heart failure EF 30%  9. Large hiatal hernia  10. Nicotine dependence in remission  11. Chronic right middle lobe lung collapse      Plan:   1. Supplemental oxygen 4 to 6 L keep saturation greater than 88%  2. Follow ABGs 7.42/44/143/28/3.2/98%  3. Scheduled bronchodilators-Xopenex 3 times daily, Brovana and budesonide twice daily  4. Solu-Medrol 40 mg IV every 12 hours- taper to oral prednisone  5. Respiratory culture pending  6. Lasix 20 mg p.o. daily  7. Continue empiric antibiotic coverage-Rocephin and doxycycline   8. DVT, GI prophylaxis  9. Daily warfarin INR 1.8    This plan of care was reviewed in collaboration with Dr. Sedrick Felty  Electronically signed by OUMOU Jewell CNP on 5/5/2022 at 10:43 AM      I personally saw, examined, and cared for the patient. Labs, medications, radiographs reviewed.  I agree with history exam and plans detailed in NP note with the following additions:    ABG without hypercapnia  Wheezing improving  Change to prednisone  Continue abx  Bronchodilators  Case discussed with Dr. Francis Apple MD

## 2022-05-06 VITALS
OXYGEN SATURATION: 98 % | DIASTOLIC BLOOD PRESSURE: 70 MMHG | HEIGHT: 68 IN | WEIGHT: 170 LBS | RESPIRATION RATE: 18 BRPM | SYSTOLIC BLOOD PRESSURE: 142 MMHG | BODY MASS INDEX: 25.76 KG/M2 | TEMPERATURE: 97.7 F | HEART RATE: 68 BPM

## 2022-05-06 LAB
INR BLD: 2.3
PROTHROMBIN TIME: 25 SEC (ref 9.3–12.4)

## 2022-05-06 PROCEDURE — 6360000002 HC RX W HCPCS: Performed by: INTERNAL MEDICINE

## 2022-05-06 PROCEDURE — 2700000000 HC OXYGEN THERAPY PER DAY

## 2022-05-06 PROCEDURE — 85610 PROTHROMBIN TIME: CPT

## 2022-05-06 PROCEDURE — 87077 CULTURE AEROBIC IDENTIFY: CPT

## 2022-05-06 PROCEDURE — 87070 CULTURE OTHR SPECIMN AEROBIC: CPT

## 2022-05-06 PROCEDURE — 6370000000 HC RX 637 (ALT 250 FOR IP): Performed by: INTERNAL MEDICINE

## 2022-05-06 PROCEDURE — 94640 AIRWAY INHALATION TREATMENT: CPT

## 2022-05-06 PROCEDURE — 99239 HOSP IP/OBS DSCHRG MGMT >30: CPT | Performed by: INTERNAL MEDICINE

## 2022-05-06 PROCEDURE — 87206 SMEAR FLUORESCENT/ACID STAI: CPT

## 2022-05-06 PROCEDURE — 6370000000 HC RX 637 (ALT 250 FOR IP): Performed by: NURSE PRACTITIONER

## 2022-05-06 PROCEDURE — 36415 COLL VENOUS BLD VENIPUNCTURE: CPT

## 2022-05-06 PROCEDURE — 87186 SC STD MICRODIL/AGAR DIL: CPT

## 2022-05-06 RX ORDER — PREDNISONE 10 MG/1
TABLET ORAL
Qty: 30 TABLET | Refills: 0 | Status: SHIPPED | OUTPATIENT
Start: 2022-05-06 | End: 2022-06-09

## 2022-05-06 RX ORDER — LEVOTHYROXINE SODIUM 0.1 MG/1
100 TABLET ORAL
Qty: 30 TABLET | Refills: 3 | Status: SHIPPED | OUTPATIENT
Start: 2022-05-07 | End: 2022-07-15

## 2022-05-06 RX ORDER — CEFDINIR 300 MG/1
300 CAPSULE ORAL 2 TIMES DAILY
Qty: 10 CAPSULE | Refills: 0 | Status: SHIPPED | OUTPATIENT
Start: 2022-05-06 | End: 2022-05-11

## 2022-05-06 RX ORDER — AMLODIPINE BESYLATE 5 MG/1
5 TABLET ORAL DAILY
Qty: 30 TABLET | Refills: 3 | Status: SHIPPED | OUTPATIENT
Start: 2022-05-07 | End: 2022-09-12 | Stop reason: SDUPTHER

## 2022-05-06 RX ORDER — LEVALBUTEROL INHALATION SOLUTION 0.63 MG/3ML
0.63 SOLUTION RESPIRATORY (INHALATION) EVERY 6 HOURS PRN
Qty: 120 EACH | Refills: 3 | Status: SHIPPED | OUTPATIENT
Start: 2022-05-06 | End: 2022-05-06

## 2022-05-06 RX ORDER — LEVALBUTEROL INHALATION SOLUTION 0.63 MG/3ML
0.63 SOLUTION RESPIRATORY (INHALATION) EVERY 8 HOURS PRN
Qty: 120 EACH | Refills: 3 | Status: SHIPPED | OUTPATIENT
Start: 2022-05-06

## 2022-05-06 RX ORDER — WARFARIN SODIUM 3 MG/1
3 TABLET ORAL DAILY
Qty: 30 TABLET | Refills: 0 | Status: SHIPPED | OUTPATIENT
Start: 2022-05-06 | End: 2022-10-17 | Stop reason: SDUPTHER

## 2022-05-06 RX ORDER — WARFARIN SODIUM 3 MG/1
3 TABLET ORAL
Status: DISCONTINUED | OUTPATIENT
Start: 2022-05-06 | End: 2022-05-06 | Stop reason: HOSPADM

## 2022-05-06 RX ADMIN — AMLODIPINE BESYLATE 5 MG: 5 TABLET ORAL at 08:52

## 2022-05-06 RX ADMIN — LEVALBUTEROL 0.63 MG: 0.63 SOLUTION RESPIRATORY (INHALATION) at 12:34

## 2022-05-06 RX ADMIN — LEVOTHYROXINE SODIUM 100 MCG: 100 TABLET ORAL at 05:51

## 2022-05-06 RX ADMIN — PANTOPRAZOLE SODIUM 40 MG: 40 TABLET, DELAYED RELEASE ORAL at 08:52

## 2022-05-06 RX ADMIN — PREDNISONE 40 MG: 20 TABLET ORAL at 08:52

## 2022-05-06 RX ADMIN — FUROSEMIDE 20 MG: 20 TABLET ORAL at 08:52

## 2022-05-06 RX ADMIN — AMIODARONE HYDROCHLORIDE 100 MG: 200 TABLET ORAL at 11:28

## 2022-05-06 NOTE — PROGRESS NOTES
Spoke to echo. Will not be able to perform echo today 5/6/22. Dr. Huy Rosario notified, okay for patient to schedule ECHO OP. Patient okay for discharge.

## 2022-05-06 NOTE — PROGRESS NOTES
PROGRESS NOTE       PATIENT PROBLEM LIST:  Principal Problem:    Dyspnea on exertion  Resolved Problems:    * No resolved hospital problems. *      SUBJECTIVE:  Iona Shelby states she feels significantly better and denies any significant shortness of breath nor wheezing presently. She denies any lightheadedness or chest discomfort. REVIEW OF SYSTEMS:  General ROS: negative for - fatigue, malaise,  weight gain or weight loss  Psychological ROS: negative for - anxiety , depression  Ophthalmic ROS: negative for - decreased vision or visual distortion. ENT ROS: negative  Allergy and Immunology ROS: negative  Hematological and Lymphatic ROS: negative  Endocrine: no heat or cold intolerance and no polyphagia, polydipsia, or polyuria  Respiratory ROS: positive for - cough and shortness of breath-improving  Cardiovascular ROS: positive for - dyspnea on exertion, irregular heartbeat and shortness of breath. Gastrointestinal ROS: no abdominal pain, change in bowel habits, or black or bloody stools  Genito-Urinary ROS: no nocturia, dysuria, trouble voiding, frequency or hematuria  Musculoskeletal ROS: negative for- myalgias, arthralgias, or claudication  Neurological ROS: no TIA or stroke symptoms otherwise no significant change in symptoms or problems since yesterday as documented in previous progress notes.     SCHEDULED MEDICATIONS:   warfarin  3 mg Oral Once    predniSONE  40 mg Oral Daily    levothyroxine  100 mcg Oral QAM AC    amLODIPine  5 mg Oral Daily    warfarin placeholder: dosing by provider   Other RX Placeholder    levalbuterol  0.63 mg Nebulization TID    cefTRIAXone (ROCEPHIN) IV  1,000 mg IntraVENous Q24H    ipratropium-albuterol  3 ampule Inhalation Once    spironolactone  12.5 mg Oral Once per day on Mon Wed Fri    rosuvastatin  10 mg Oral Nightly    pantoprazole  40 mg Oral QAM    furosemide  20 mg Oral QAM    amiodarone  100 mg Oral Every Other Day    budesonide  0.5 mg Nebulization BID    Arformoterol Tartrate  15 mcg Nebulization BID    [Held by provider] carvedilol  3.125 mg Oral BID WC       VITAL SIGNS:                                                                                                                          BP (!) 142/70   Pulse 68   Temp 97.7 °F (36.5 °C) (Oral)   Resp 18   Ht 5' 8\" (1.727 m)   Wt 170 lb (77.1 kg)   SpO2 98%   BMI 25.85 kg/m²   Patient Vitals for the past 96 hrs (Last 3 readings):   Weight   05/03/22 2126 170 lb (77.1 kg)     OBJECTIVE:    HEENT: PERRL, EOM  Intact; sclera non-icteric, conjunctiva pink. Carotids are brisk in upstroke with normal contour. No carotid bruits. Normal jugular venous pulsation at 45°. No palpable cervical nor supraclavicular nodes. Thyroid not palpable. Trachea midline. Chest: Even excursion  Lungs: CTA B, no expiratory wheezes or rhonchi, no decreased tactile fremitus without inspiratory rales. Heart: Regular  rhythm; S1 > S2, no gallop or murmur. No clicks, rub, palpable thrills   or heaves. PMI nondisplaced, 5th intercostal space MCL. Abdomen: Soft, nontender, nondistended,  moderately protuberant, no masses or organomegaly. Bowel sounds active. Extremities: Without clubbing, cyanosis or edema. Pulses present 3+ upper extermities bilaterally; present 1+ DP and present 1+ PT bilaterally.      Data:   Scheduled Meds: Reviewed  Continuous Infusions:   No intake or output data in the 24 hours ending 05/06/22 1248  CBC:   Recent Labs     05/03/22  1543   WBC 4.8   HGB 10.2*   HCT 32.9*   *     BMP:  Recent Labs     05/03/22  1452      K 4.2      CO2 28   BUN 26*   CREATININE 1.2*   LABGLOM 43     ABGs:   Lab Results   Component Value Date    PH 7.421 05/04/2022    PO2 143.0 05/04/2022    PCO2 44.1 05/04/2022     INR:   Recent Labs     05/03/22  1543 05/05/22  0355 05/06/22  0600   INR 1.4 1.8 2.3     PRO-BNP:   Lab Results   Component Value Date    PROBNP 2,194 (H) 05/03/2022    PROBNP 1,641 (H) 04/28/2022      TSH:   Lab Results   Component Value Date    TSH 6.150 (H) 05/02/2022      Cardiac Injury Profile:   Recent Labs     05/03/22  1452 05/03/22  1543   TROPHS 20* 20*      Lipid Profile:   Lab Results   Component Value Date    TRIG 84 05/02/2022    HDL 62 05/02/2022    LDLCALC 77 05/02/2022    CHOL 156 05/02/2022      Hemoglobin A1C: No components found for: HGBA1C      RAD:   XR CHEST (2 VW)    Result Date: 5/3/2022  EXAMINATION: TWO XRAY VIEWS OF THE CHEST 5/3/2022 3:20 pm COMPARISON: 04/28/2022 HISTORY: ORDERING SYSTEM PROVIDED HISTORY: Shortness of breath TECHNOLOGIST PROVIDED HISTORY: Reason for exam:->Shortness of breath FINDINGS: The cardiac silhouette is enlarged. There is a large hiatal hernia. There is parahilar peribronchial thickening. There are chronic findings in both lungs including obstructive disease and interstitial changes. The right middle lobe appears completely collapsed. There is additional atelectasis and/or scarring in the left lung. No pneumothorax or pleural effusion is seen. The overall appearance of the chest is not significantly changed. Relatively stable findings as described     XR CHEST PORTABLE    Result Date: 4/28/2022  EXAMINATION: ONE XRAY VIEW OF THE CHEST 4/28/2022 7:57 am COMPARISON: 10/12/2021 and 04/20/2021 HISTORY: ORDERING SYSTEM PROVIDED HISTORY: shortness of breath TECHNOLOGIST PROVIDED HISTORY: Reason for exam:->shortness of breath FINDINGS: The cardiac silhouette is at upper limits of normal in size. There are emphysematous changes. There is no right lung infiltrate. Significant chronic scarring is seen throughout the left lung. There is no right or left pleural effusion. 1. Chronic significant pleuroparenchymal scarring seen within the left lung. The findings are stable 2. Emphysematous changes. 3. There are no gross findings of pneumonia within the right lung.      CTA PULMONARY W CONTRAST    Result Date: 4/28/2022  EXAMINATION: CTA OF THE CHEST 4/28/2022 12:13 pm TECHNIQUE: CTA of the chest was performed after the administration of intravenous contrast.  Multiplanar reformatted images are provided for review. MIP images are provided for review. Dose modulation, iterative reconstruction, and/or weight based adjustment of the mA/kV was utilized to reduce the radiation dose to as low as reasonably achievable. COMPARISON: None. HISTORY: ORDERING SYSTEM PROVIDED HISTORY: eval for PE TECHNOLOGIST PROVIDED HISTORY: Reason for exam:->eval for PE Decision Support Exception - unselect if not a suspected or confirmed emergency medical condition->Emergency Medical Condition (MA) FINDINGS: Pulmonary Arteries: Pulmonary arteries are adequately opacified for evaluation. No evidence of intraluminal filling defect to suggest pulmonary embolism. Main pulmonary artery is normal in caliber. Mediastinum: The thoracic aorta is normal caliber. There is no pericardial effusion. No significant mediastinal lymphadenopathy is identified. Lungs/pleura: There is dense consolidation seen within the right middle lobe. On the lateral view there appears to be collapse of the right middle lobe. In review with the patient's previous chest x-ray of 04/20/2021 the appearance of the chest x-ray is unchanged and appearance of the chest x-ray is DIS evening for collapse of the right middle lobe. The right middle lobe bronchi are patent. There is no mucous plugging. I do not appreciate an endobronchial lesion. There is a 6 mm nodular focus seen within the right upper lobe best appreciated on axial image number 41 and sagittal image number 44 There is atelectasis seen within the right infrahilar region. There is chronic pleuroparenchymal scarring seen within the left upper lobe and left lower lobe. There is no suspicious mass seen within the left lung Advanced emphysematous changes are noted within the right and left lungs.  There is no pleural effusion or pleural thickening. Upper Abdomen: There is a large hiatal hernia. Soft Tissues/Bones:  Age related degenerative changes of the visualized osseous structures without focal destructive lesion. 1. There is no pulmonary embolus 2. Complete collapse of the right middle lobe. There is no appreciable endobronchial lesion or mucous plugging of the right middle lobe bronchi. Please note I reviewed the patient's previous chest x-rays dating back to 04/20/2021 and the appearance of the chest x-ray is unchanged and the collapsed right middle lobe is not well-defined or well appreciated on the chest x-ray. 3. 6.5 mm irregular nodule within the right upper lobe on axial image number 41 and sagittal number 44 dedicated follow-up CT scan is recommended in 3 months. 4. Advanced emphysematous changes with interstitial fibrosis 5. Chronic pleuroparenchymal scarring seen throughout the left lung. 6. Cardiomegaly 7. Large hiatal hernia RECOMMENDATIONS: Follow-up CT scan is recommended in 3 months. EKG: See Report  Echo: See Report      IMPRESSIONS:  Principal Problem:    Dyspnea on exertion  Resolved Problems:    * No resolved hospital problems. *      RECOMMENDATIONS:  At this point Mrs. Dayanna Guillermo appears quite stable from both a cardiopulmonary status. Recommend discharge and follow-up as an outpatient I will see her again in 3 months or sooner as clinically warranted. Continue to hold carvedilol presently. She is to maintain her LDL cholesterol within updated 2020 ACC/AHA/AACE/ESC/EAS cholesterol guidelines. I have spent more than 25 minutes face to face with Mary Amin and reviewing notes and laboratory data, with greater than 50% of this time instructing and counseling the patient and her son face to face regarding my findings and recommendations and I have answered all questions as posed to me by Ms. Hyatt and her son.     Allie Kiran, DO FACP,FACC,FSCAI      NOTE:  This report was transcribed using voice recognition software.   Every effort was made to ensure accuracy; however, inadvertent computerized transcription errors may be present

## 2022-05-06 NOTE — PROGRESS NOTES
Admit Date: 5/3/2022    Subjective:     Feels better breathing easier down to 5 L NC     Objective:     No data found. I/O last 3 completed shifts: In: 600 [P.O.:600]  Out: -   No intake/output data recorded. HEENT: Normal  NECK: Thyroid normal. No carotid bruit. No lymphphadenopathy. CVS: RRR  RS: minimal wheezes   ABD: Soft. Non tender. No mass. Normal BS. EXT: No edema. Non tender. Pulses present.    NEURO:no focal deficit       Scheduled Meds:   predniSONE  40 mg Oral Daily    levothyroxine  100 mcg Oral QAM AC    amLODIPine  5 mg Oral Daily    warfarin placeholder: dosing by provider   Other RX Placeholder    levalbuterol  0.63 mg Nebulization TID    cefTRIAXone (ROCEPHIN) IV  1,000 mg IntraVENous Q24H    ipratropium-albuterol  3 ampule Inhalation Once    spironolactone  12.5 mg Oral Once per day on Mon Wed Fri    rosuvastatin  10 mg Oral Nightly    pantoprazole  40 mg Oral QAM    furosemide  20 mg Oral QAM    amiodarone  100 mg Oral Every Other Day    budesonide  0.5 mg Nebulization BID    Arformoterol Tartrate  15 mcg Nebulization BID    [Held by provider] carvedilol  3.125 mg Oral BID WC     Continuous Infusions:    CBC with Differential:    Lab Results   Component Value Date    WBC 4.8 05/03/2022    RBC 3.26 05/03/2022    HGB 10.2 05/03/2022    HCT 32.9 05/03/2022     05/03/2022    .9 05/03/2022    MCH 31.3 05/03/2022    MCHC 31.0 05/03/2022    RDW 13.2 05/03/2022    SEGSPCT 46 02/14/2014    LYMPHOPCT 23.7 05/03/2022    MONOPCT 6.9 05/03/2022    BASOPCT 0.4 05/03/2022    MONOSABS 0.33 05/03/2022    LYMPHSABS 1.13 05/03/2022    EOSABS 0.07 05/03/2022    BASOSABS 0.02 05/03/2022     CMP:    Lab Results   Component Value Date     05/03/2022    K 4.2 05/03/2022    K 4.0 10/12/2021     05/03/2022    CO2 28 05/03/2022    BUN 26 05/03/2022    CREATININE 1.2 05/03/2022    GFRAA 52 05/03/2022    LABGLOM 43 05/03/2022    PROT 6.7 05/03/2022    LABALBU 4.1 05/03/2022 LABALBU 4.4 05/04/2012    CALCIUM 9.4 05/03/2022    BILITOT 0.5 05/03/2022    ALKPHOS 55 05/03/2022    AST 22 05/03/2022    ALT 10 05/03/2022     PT/INR:    Lab Results   Component Value Date    PROTIME 25.0 05/06/2022    INR 2.3 05/06/2022       Assessment:     Principal Problem: A. Fib slow ventricular response improved   COPD   Lung Ca S/P radiation   HTN uncontrolled   Chronic hypoxic respiratory failure cardiomyopathy  Chronic combined CHF   Hypothyroid       Plan:   Improvement ,switch to oral steroid ,continue bronchodilator ,home soon

## 2022-05-06 NOTE — CARE COORDINATION
Social Work / Discharge PLanning : SW noted order for nebulizer. Patient active with [facility] . SW left VM referral and request response. [employer] info added to AVS for patient to follow up. SW to follow. Electronically signed by KAREN Pantoja on 5/6/22 at 2:31 PM EDT      Addendum: Gioramya Stephen from [facility] received order. SW to follow.  Electronically signed by KAREN Pantoja on 5/6/22 at 2:33 PM EDT

## 2022-05-06 NOTE — PROGRESS NOTES
Spoke to Dr. Naman Quick. Okay to discharge. Patient to stop taking coreg and start taking amlodipine 5mg daily upon discharge. Patient to be discharged on 3mg Coumadin daily and 100mcg synthroid daily upon discharge.

## 2022-05-06 NOTE — CARE COORDINATION
Discharge plan remains HOME. Pt resides at home alone. Pt is independent. Pt does have home oxygen 5 L continuous via [facility]- confirmed with Reanna Whitney at [facility]. Pt denies any discharge needs.

## 2022-05-06 NOTE — PROGRESS NOTES
PROGRESS NOTE       PATIENT PROBLEM LIST:  Principal Problem:    Dyspnea on exertion  Resolved Problems:    * No resolved hospital problems. *      SUBJECTIVE:  Faith Bell states she feels significantly better and denies any significant shortness of breath nor wheezing presently. She denies any lightheadedness or chest discomfort. REVIEW OF SYSTEMS:  General ROS: negative for - fatigue, malaise,  weight gain or weight loss  Psychological ROS: negative for - anxiety , depression  Ophthalmic ROS: negative for - decreased vision or visual distortion. ENT ROS: negative  Allergy and Immunology ROS: negative  Hematological and Lymphatic ROS: negative  Endocrine: no heat or cold intolerance and no polyphagia, polydipsia, or polyuria  Respiratory ROS: positive for - cough and shortness of breathimproving  Cardiovascular ROS: positive for - dyspnea on exertion, irregular heartbeat and shortness of breath. Gastrointestinal ROS: no abdominal pain, change in bowel habits, or black or bloody stools  Genito-Urinary ROS: no nocturia, dysuria, trouble voiding, frequency or hematuria  Musculoskeletal ROS: negative for- myalgias, arthralgias, or claudication  Neurological ROS: no TIA or stroke symptoms otherwise no significant change in symptoms or problems since yesterday as documented in previous progress notes.     SCHEDULED MEDICATIONS:   warfarin  3 mg Oral Once    predniSONE  40 mg Oral Daily    levothyroxine  100 mcg Oral QAM AC    amLODIPine  5 mg Oral Daily    warfarin placeholder: dosing by provider   Other RX Placeholder    levalbuterol  0.63 mg Nebulization TID    cefTRIAXone (ROCEPHIN) IV  1,000 mg IntraVENous Q24H    ipratropium-albuterol  3 ampule Inhalation Once    spironolactone  12.5 mg Oral Once per day on Mon Wed Fri    rosuvastatin  10 mg Oral Nightly    pantoprazole  40 mg Oral QAM    furosemide  20 mg Oral QAM    amiodarone  100 mg Oral Every Other Day    budesonide  0.5 mg Nebulization BID    Arformoterol Tartrate  15 mcg Nebulization BID    [Held by provider] carvedilol  3.125 mg Oral BID WC       VITAL SIGNS:                                                                                                                          BP (!) 142/70   Pulse 68   Temp 97.7 °F (36.5 °C) (Oral)   Resp 18   Ht 5' 8\" (1.727 m)   Wt 170 lb (77.1 kg)   SpO2 98%   BMI 25.85 kg/m²   Patient Vitals for the past 96 hrs (Last 3 readings):   Weight   05/03/22 2126 170 lb (77.1 kg)     OBJECTIVE:    HEENT: PERRL, EOM  Intact; sclera non-icteric, conjunctiva pink. Carotids are brisk in upstroke with normal contour. No carotid bruits. Normal jugular venous pulsation at 45°. No palpable cervical nor supraclavicular nodes. Thyroid not palpable. Trachea midline. Chest: Even excursion  Lungs: CTA B, no expiratory wheezes or rhonchi, no decreased tactile fremitus without inspiratory rales. Heart: Regular  rhythm; S1 > S2, no gallop or murmur. No clicks, rub, palpable thrills   or heaves. PMI nondisplaced, 5th intercostal space MCL. Abdomen: Soft, nontender, nondistended,  moderately protuberant, no masses or organomegaly. Bowel sounds active. Extremities: Without clubbing, cyanosis or edema. Pulses present 3+ upper extermities bilaterally; present 1+ DP and present 1+ PT bilaterally.      Data:   Scheduled Meds: Reviewed  Continuous Infusions:   No intake or output data in the 24 hours ending 05/06/22 1252  CBC:   Recent Labs     05/03/22  1543   WBC 4.8   HGB 10.2*   HCT 32.9*   *     BMP:  Recent Labs     05/03/22  1452      K 4.2      CO2 28   BUN 26*   CREATININE 1.2*   LABGLOM 43     ABGs:   Lab Results   Component Value Date    PH 7.421 05/04/2022    PO2 143.0 05/04/2022    PCO2 44.1 05/04/2022     INR:   Recent Labs     05/03/22  1543 05/05/22  0355 05/06/22  0600   INR 1.4 1.8 2.3     PRO-BNP:   Lab Results   Component Value Date    PROBNP 2,194 (H) 05/03/2022    PROBNP 1,641 (H) 04/28/2022      TSH:   Lab Results   Component Value Date    TSH 6.150 (H) 05/02/2022      Cardiac Injury Profile:   Recent Labs     05/03/22  1452 05/03/22  1543   TROPHS 20* 20*      Lipid Profile:   Lab Results   Component Value Date    TRIG 84 05/02/2022    HDL 62 05/02/2022    LDLCALC 77 05/02/2022    CHOL 156 05/02/2022      Hemoglobin A1C: No components found for: HGBA1C      RAD:   XR CHEST (2 VW)    Result Date: 5/3/2022  EXAMINATION: TWO XRAY VIEWS OF THE CHEST 5/3/2022 3:20 pm COMPARISON: 04/28/2022 HISTORY: ORDERING SYSTEM PROVIDED HISTORY: Shortness of breath TECHNOLOGIST PROVIDED HISTORY: Reason for exam:->Shortness of breath FINDINGS: The cardiac silhouette is enlarged. There is a large hiatal hernia. There is parahilar peribronchial thickening. There are chronic findings in both lungs including obstructive disease and interstitial changes. The right middle lobe appears completely collapsed. There is additional atelectasis and/or scarring in the left lung. No pneumothorax or pleural effusion is seen. The overall appearance of the chest is not significantly changed. Relatively stable findings as described     XR CHEST PORTABLE    Result Date: 4/28/2022  EXAMINATION: ONE XRAY VIEW OF THE CHEST 4/28/2022 7:57 am COMPARISON: 10/12/2021 and 04/20/2021 HISTORY: ORDERING SYSTEM PROVIDED HISTORY: shortness of breath TECHNOLOGIST PROVIDED HISTORY: Reason for exam:->shortness of breath FINDINGS: The cardiac silhouette is at upper limits of normal in size. There are emphysematous changes. There is no right lung infiltrate. Significant chronic scarring is seen throughout the left lung. There is no right or left pleural effusion. 1. Chronic significant pleuroparenchymal scarring seen within the left lung. The findings are stable 2. Emphysematous changes. 3. There are no gross findings of pneumonia within the right lung.      CTA PULMONARY W CONTRAST    Result Date: 4/28/2022  EXAMINATION: CTA OF THE CHEST 4/28/2022 12:13 pm TECHNIQUE: CTA of the chest was performed after the administration of intravenous contrast.  Multiplanar reformatted images are provided for review. MIP images are provided for review. Dose modulation, iterative reconstruction, and/or weight based adjustment of the mA/kV was utilized to reduce the radiation dose to as low as reasonably achievable. COMPARISON: None. HISTORY: ORDERING SYSTEM PROVIDED HISTORY: eval for PE TECHNOLOGIST PROVIDED HISTORY: Reason for exam:->eval for PE Decision Support Exception - unselect if not a suspected or confirmed emergency medical condition->Emergency Medical Condition (MA) FINDINGS: Pulmonary Arteries: Pulmonary arteries are adequately opacified for evaluation. No evidence of intraluminal filling defect to suggest pulmonary embolism. Main pulmonary artery is normal in caliber. Mediastinum: The thoracic aorta is normal caliber. There is no pericardial effusion. No significant mediastinal lymphadenopathy is identified. Lungs/pleura: There is dense consolidation seen within the right middle lobe. On the lateral view there appears to be collapse of the right middle lobe. In review with the patient's previous chest x-ray of 04/20/2021 the appearance of the chest x-ray is unchanged and appearance of the chest x-ray is DIS evening for collapse of the right middle lobe. The right middle lobe bronchi are patent. There is no mucous plugging. I do not appreciate an endobronchial lesion. There is a 6 mm nodular focus seen within the right upper lobe best appreciated on axial image number 41 and sagittal image number 44 There is atelectasis seen within the right infrahilar region. There is chronic pleuroparenchymal scarring seen within the left upper lobe and left lower lobe. There is no suspicious mass seen within the left lung Advanced emphysematous changes are noted within the right and left lungs.  There is no pleural effusion or pleural thickening. Upper Abdomen: There is a large hiatal hernia. Soft Tissues/Bones:  Age related degenerative changes of the visualized osseous structures without focal destructive lesion. 1. There is no pulmonary embolus 2. Complete collapse of the right middle lobe. There is no appreciable endobronchial lesion or mucous plugging of the right middle lobe bronchi. Please note I reviewed the patient's previous chest x-rays dating back to 04/20/2021 and the appearance of the chest x-ray is unchanged and the collapsed right middle lobe is not well-defined or well appreciated on the chest x-ray. 3. 6.5 mm irregular nodule within the right upper lobe on axial image number 41 and sagittal number 44 dedicated follow-up CT scan is recommended in 3 months. 4. Advanced emphysematous changes with interstitial fibrosis 5. Chronic pleuroparenchymal scarring seen throughout the left lung. 6. Cardiomegaly 7. Large hiatal hernia RECOMMENDATIONS: Follow-up CT scan is recommended in 3 months. EKG: See Report  Echo: See Report      IMPRESSIONS:  Principal Problem:    Dyspnea on exertion  Resolved Problems:    * No resolved hospital problems. *      RECOMMENDATIONS:  At this point Mrs. Andrea Hoffman appears quite stable from both a cardiopulmonary status. Recommend discharge and follow-up as an outpatient I will see her again in 3 months or sooner as clinically warranted. Continue to hold carvedilol presently. She is to maintain her LDL cholesterol within updated 2020 ACC/AHA/AACE/ESC/EAS cholesterol guidelines. I have spent more than 25 minutes face to face with Ned Olivas and reviewing notes and laboratory data, with greater than 50% of this time instructing and counseling the patient and her son face to face regarding my findings and recommendations and I have answered all questions as posed to me by Ms. Hyatt and her son.     Jamison Tony, DO FACP,FACC,FSCAI      NOTE:  This report was transcribed using voice recognition software.   Every effort was made to ensure accuracy; however, inadvertent computerized transcription errors may be present

## 2022-05-06 NOTE — PROGRESS NOTES
Steven Roe M.D.,[facility]  Sebas Green D.O., F.A.C.O.I., Jimmy Ambrosio M.D. Silvia Morris M.D. Trey Michael D.O. Daily Pulmonary Progress Note    Patient: Alis Duarte 78 y.o. female MRN: [id number]     Date of Service: 5/6/2022      Synopsis     We are following patient for respiratory failure with hypoxia    \"CC\" shortness of breath    Code status: Full      Subjective      Patient was seen and examined. Sitting up at the side of the bed in no acute distress. Oxygen on 5 L nasal cannula. Slightly improved dyspnea. Occasional cough. ABGs reviewed 5/5/22 no evidence of hypercapnia. Tolerating diuresis. Review of Systems:  Constitutional: Denies fever, weight loss, night sweats, and fatigue  Skin: Denies pigmentation, dark lesions, and rashes   HEENT: Denies hearing loss, tinnitus, ear drainage, epistaxis, sore throat, and hoarseness. Cardiovascular: Denies palpitations, chest pain, and chest pressure. Respiratory: Denies cough, , hemoptysis, apnea, and choking.   Occasional dyspnea worse with exertion, chest tightness and wheezing improved  Gastrointestinal: Denies nausea, vomiting, poor appetite, diarrhea, heartburn or reflux  Genitourinary: Denies dysuria, frequency, urgency or hematuria  Musculoskeletal: Denies myalgias, muscle weakness, and bone pain  Neurological: Denies dizziness, vertigo, headache, and focal weakness  Psychological: Denies anxiety and depression  Endocrine: Denies heat intolerance and cold intolerance  Hematopoietic/Lymphatic: Denies bleeding problems and blood transfusions    24-hour events:  None    Objective   Vitals: BP (!) 142/70   Pulse 68   Temp 97.7 °F (36.5 °C) (Oral)   Resp 18   Ht 5' 8\" (1.727 m)   Wt 170 lb (77.1 kg)   SpO2 98%   BMI 25.85 kg/m²     I/O:  No intake or output data in the 24 hours ending 05/06/22 1151                     CURRENT MEDS :  Scheduled Meds:   warfarin  3 mg Oral Once    predniSONE  40 mg Oral Daily  levothyroxine  100 mcg Oral QAM AC    amLODIPine  5 mg Oral Daily    warfarin placeholder: dosing by provider   Other RX Placeholder    levalbuterol  0.63 mg Nebulization TID    cefTRIAXone (ROCEPHIN) IV  1,000 mg IntraVENous Q24H    ipratropium-albuterol  3 ampule Inhalation Once    spironolactone  12.5 mg Oral Once per day on Mon Wed Fri    rosuvastatin  10 mg Oral Nightly    pantoprazole  40 mg Oral QAM    furosemide  20 mg Oral QAM    amiodarone  100 mg Oral Every Other Day    budesonide  0.5 mg Nebulization BID    Arformoterol Tartrate  15 mcg Nebulization BID    [Held by provider] carvedilol  3.125 mg Oral BID WC       Physical Exam:  General Appearance: appears comfortable in no acute distress. HEENT: Normocephalic atraumatic without obvious abnormality   Neck: Lips, mucosa, and tongue normal.  Supple, symmetrical, trachea midline, no adenopathy;thyroid:  no enlargement/tenderness/nodules or JVD. Lung: Breath sounds faint expiratory wheeze right lung. Respirations   unlabored. Symmetrical expansion. Heart: RRR, normal S1, S2. No MRG  Abdomen: Soft, NT, ND. BS present x 4 quadrants. No bruit or organomegaly. Extremities: Pedal pulses 2+ symmetric b/l. Extremities normal, no cyanosis, clubbing, trace bilateral lower extremity edema  Musculokeletal: No joint swelling, no muscle tenderness. ROM normal in all joints of extremities. Neurologic: Mental status: Alert and Oriented X3 . Pertinent/ New Labs and Imaging Studies     PFTs:  FEV`1/FVC 44%  FEV1 1.13L, 59%  FVC 1.57L, 55%  TLC 2.37L, 43%  DLCO 54%    Imaging Personally Reviewed:    CTA chest 4/28/22  Impression   1. There is no pulmonary embolus   2. Complete collapse of the right middle lobe.  There is no appreciable   endobronchial lesion or mucous plugging of the right middle lobe bronchi.    Please note I reviewed the patient's previous chest x-rays dating back to   04/20/2021 and the appearance of the chest x-ray is unchanged and the   collapsed right middle lobe is not well-defined or well appreciated on the   chest x-ray. 3. 6.5 mm irregular nodule within the right upper lobe on axial image number   41 and sagittal number 44 dedicated follow-up CT scan is recommended in 3   months. 4. Advanced emphysematous changes with interstitial fibrosis   5. Chronic pleuroparenchymal scarring seen throughout the left lung. 6. Cardiomegaly   7. Large hiatal hernia            Echocardiogram:  Summary   Left ventricle is normal in size .   Borderline concentric left ventricular hypertrophy   No regional wall motion abnormalities seen.   Overall ejection fraction moderate-to-severely decreased .   The left atrium is moderately dilated.   Right ventricle global systolic function is reduced .   Moderately enlarged right atrium size.   Moderate mitral regurgitation is present.   The aortic valve appears mildly sclerotic.   Mild aortic regurgitation is noted.   Moderate to severe tricuspid regurgitation.   Pulmonary hypertension is mild .   There is a trivial circumferential pericardial effusion noted. Labs:  Lab Results   Component Value Date    WBC 4.8 05/03/2022    HGB 10.2 05/03/2022    HCT 32.9 05/03/2022    .9 05/03/2022    MCH 31.3 05/03/2022    MCHC 31.0 05/03/2022    RDW 13.2 05/03/2022     05/03/2022    MPV 11.1 05/03/2022     Lab Results   Component Value Date     05/03/2022    K 4.2 05/03/2022    K 4.0 10/12/2021     05/03/2022    CO2 28 05/03/2022    BUN 26 05/03/2022    CREATININE 1.2 05/03/2022    LABALBU 4.1 05/03/2022    LABALBU 4.4 05/04/2012    CALCIUM 9.4 05/03/2022    GFRAA 52 05/03/2022    LABGLOM 43 05/03/2022     Lab Results   Component Value Date    PROTIME 25.0 05/06/2022    INR 2.3 05/06/2022     Recent Labs     05/03/22  1452   PROBNP 2,194*     No results for input(s): PROCAL in the last 72 hours. This SmartLink has not been configured with any valid records.        Micro:  No results for input(s): CULTRESP in the last 72 hours. No results for input(s): LABGRAM in the last 72 hours. No results for input(s): LEGUR in the last 72 hours. No results for input(s): STREPNEUMAGU in the last 72 hours. No results for input(s): LP1UAG in the last 72 hours. Assessment:    1. Chronic respiratory failure with hypoxia  2. Home O2 dependence 4 to 6 L nasal cannula  3. COPD with acute exacerbation  4. Acute bronchitis  5. Interstitial lung disease  6. Squamous cell lung cancer stage IIb status post radiation August 2021  7. Symptomatic bradycardia  8. Chronic systolic heart failure EF 30%  9. Large hiatal hernia  10. Nicotine dependence in remission  11. Chronic right middle lobe lung collapse      Plan:   1. Supplemental oxygen 4 to 6 L keep saturation greater than 88%  2. Follow ABGs 7.42/44/143/28/3.2/98%  3. Scheduled bronchodilators-Xopenex 3 times daily, Brovana and budesonide twice daily  4. Prednisone 40 mg p.o. daily-taper written  5. Orders placed for home nebulizer with xopenex. Pt did not tolerate albuterol due to shaking and palpitations. 6. Respiratory culture pending 5/6/22  7. Lasix 20 mg p.o. daily  8. Continue empiric antibiotic coverage-Rocephin and doxycycline . For dc-omnicef 300 mg po bid x 5 days  9. DVT, GI prophylaxis  10. Daily warfarin INR 2.3  11. Ok to dc from a pulmonary perspective to follow up in 4 wks with Dr Taty Pandya, needs PFTs    This plan of care was reviewed in collaboration with Dr. Taty Pandya  Electronically signed by OUMOU Nash - CNP on 5/6/2022 at 11:51 AM    I personally saw, examined, and cared for the patient. Labs, medications, radiographs reviewed.  I agree with history exam and plans detailed in NP note with the following additions:    Feeling better today  Lungs with only faint wheeze, much improved  Prednisone taper for discharge  Rx for nebulizer with xopenex nebs  Resume Trelegy  omnicef 300mg BID x 5 days  Schedule with me 4 weeks with PFTs    Willi Sanchez MD

## 2022-05-07 NOTE — DISCHARGE SUMMARY
Discharge Summary    Date: 5/7/2022  Patient Name: Sissy Garza YOB: 1940 Age: 78 y.o. Admit Date: 5/3/2022  Discharge Date: 5/6/2022  Discharge Condition: Stable    Admission Diagnosis  Shortness of breath (R06.02); Bradycardia (R00.1); Dyspnea on exertion (R06.00);COPD exacerbation (HCC) (J44.1)     Discharge Diagnosis  Principal Problem: Dyspnea on exertionResolved Problems: * No resolved hospital problems. [facility] Stay  Narrative of Hospital Course: Sissy Garza is a 78 y.o. female presented to ER for shortness of breath and slow heart rate history of COPD and lung cancer, chronically on 6 L nasal cannula, recent CTA for PE which was negative as her Coumadin was subtherapeutic. INR today was 1.4, EKG  junctional rhythm ,  monitor heart rate  anywhere from the 30s to 60s Dr. Jose M Guerra  recommended the patient be admitted to have her medications adjusted. Patient was admitted to hospital for further work-up and management. her coreg was discontinued started on amlodipine seen by cardiology and pulmonary treated with ATB bronchodilator slowly improved stabilized discharged home     Consultants:  Demian Mata TO PULMONOLOGY    Surgeries/procedures Performed:       Treatments:            Discharge Plan/Disposition:  Home    Hospital/Incidental Findings Requiring Follow Up:    Patient Instructions:    Diet: Regular Diet    Activity:Activity as Tolerated  For number of days (if applicable): Other Instructions:    Provider Follow-Up:   No follow-ups on file.      Significant Diagnostic Studies:    Recent Labs:  Admission on 05/03/2022, Discharged on 05/06/2022Sodium                                        Date: 05/03/2022Value: 141         Ref range: 132 - 146 mmol/L   Status: FinalPotassium                                     Date: 05/03/2022Value: 4.2         Ref range: 3.5 - 5.0 mmol/L   Status: FinalChloride Date: 05/03/2022Value: 104         Ref range: 98 - 107 mmol/L    Status: FinalCO2                                           Date: 05/03/2022Value: 28          Ref range: 22 - 29 mmol/L     Status: FinalAnion Gap                                     Date: 05/03/2022Value: 9           Ref range: 7 - 16 mmol/L      Status: FinalGlucose                                       Date: 05/03/2022Value: 148*        Ref range: 74 - 99 mg/dL      Status: FinalBUN                                           Date: 05/03/2022Value: 26*         Ref range: 6 - 23 mg/dL       Status: FinalCREATININE                                    Date: 05/03/2022Value: 1.2*        Ref range: 0.5 - 1.0 mg/dL    Status: FinalGFR Non-                      Date: 05/03/2022Value: 43          Ref range: >=60 mL/min/1.73   Status: Final              Comment: Chronic Kidney Disease: less than 60 ml/min/1.73 sq.m. Kidney Failure: less than 15 ml/min/1.73 sq. m. Results valid for patients 18 years and older. GFR                           Date: 05/03/2022Value: 52            Status: FinalCalcium                                       Date: 05/03/2022Value: 9.4         Ref range: 8.6 - 10.2 mg/dL   Status: FinalTotal Protein                                 Date: 05/03/2022Value: 6.7         Ref range: 6.4 - 8.3 g/dL     Status: FinalAlbumin                                       Date: 05/03/2022Value: 4.1         Ref range: 3.5 - 5.2 g/dL     Status: FinalTotal Bilirubin                               Date: 05/03/2022Value: 0.5         Ref range: 0.0 - 1.2 mg/dL    Status: FinalAlkaline Phosphatase                          Date: 05/03/2022Value: 55          Ref range: 35 - 104 U/L       Status: FinalALT                                           Date: 05/03/2022Value: 10          Ref range: 0 - 32 U/L         Status: FinalAST                                           Date: 05/03/2022Value: 22          Ref range: 0 - 31 U/L         Status: Final              Comment: Specimen is slightly Hemolyzed. Result may be artificially increased. Ventricular Rate                              Date: 05/03/2022Value: 50          Ref range: BPM                Status: IncompleteAtrial Rate                                   Date: 05/03/2022Value: 51          Ref range: BPM                Status: IncompleteQRS Duration                                  Date: 05/03/2022Value: 82          Ref range: ms                 Status: IncompleteQ-T Interval                                  Date: 05/03/2022Value: 470         Ref range: ms                 Status: IncompleteQTc Calculation (Bazett)                      Date: 05/03/2022Value: 428         Ref range: ms                 Status: IncompleteR Axis                                        Date: 05/03/2022Value: 42          Ref range: degrees            Status: IncompleteT Axis                                        Date: 05/03/2022Value: 20          Ref range: degrees            Status: IncompleteTroponin, High Sensitivity                    Date: 05/03/2022Value: 20*         Ref range: 0 - 9 ng/L         Status: Final              Comment: High Sensitivity Troponin values cannot be compared withother Troponin methodologies. Patients with high levels of Biotin oral intake (i.e. >5 mg/day)may have falsely decreased Troponin levels. Samples collectedwithin 8 hours of biotin intake may require additional informationfor diagnosis. Pro-BNP                                       Date: 05/03/2022Value: 2,194*      Ref range: 0 - 450 pg/mL      Status: FinalLactic Acid                                   Date: 05/03/2022Value: 1.4         Ref range: 0.5 - 2.2 mmol/L   Status: FinalLactic Acid                                   Date: 05/03/2022Value: 0.9         Ref range: 0.5 - 2.2 mmol/L   Status: FinalProtime                                       Date: 05/03/2022Value: 15.1*       Ref range: 9.3 - 12.4 sec     Status: Timblin Rater                                           Date: 05/03/2022Value: 1.4           Status: [id number]-ReU-1, NAAT                              Date: 05/03/2022Value: Not Detected                   Ref range: Not Detected       Status: Final              Comment: Rapid NAAT:   Negative results should be treated as presumptive and,if inconsistent with clinical signs and symptoms or necessary forpatient management, should be tested with an alternative molecularassay. Negative results do not preclude SARS-CoV-2 infection andshould not be used as the sole basis for patient management decisions. This test has been authorized by the FDA under an Emergency UseAuthorization (EUA) for use by authorized laboratories. Fact sheet for [employer] sheet for Patients: [employer]: Isothermal Nucleic Acid AmplificationRejected Test                                 Date: 05/03/2022Value: cbcwd         Status: Kofi Chatman for Rejection                          Date: 05/03/2022Value: see below     Status: Final              Comment: Unable to perform testing; specimen clotted. To perform testing the specimen will need to be recollected.   Mary 1205                                           Date: 05/03/2022Value: 4.8         Ref range: 4.5 - 11.5 E9/L    Status: FinalRBC                                           Date: 05/03/2022Value: 3.26*       Ref range: 3.50 - 5.50 E12/L  Status: FinalHemoglobin                                    Date: 05/03/2022Value: 10.2*       Ref range: 11.5 - 15.5 g/dL   Status: FinalHematocrit                                    Date: 05/03/2022Value: 32.9*       Ref range: 34.0 - 48.0 %      Status: FinalMCV                                           Date: 05/03/2022Value: 100.9*      Ref range: 80.0 - 99.9 fL     Status: [address]                                           Date: 05/03/2022Value: 31.3        Ref range: 26.0 - 35.0 pg     Status: [address]                                          Date: 05/03/2022Value: 31.0*       Ref range: 32.0 - 34.5 %      Status: FinalRDW                                           Date: 05/03/2022Value: 13.2        Ref range: 11.5 - 15.0 fL     Status: FinalPlatelets                                     Date: 05/03/2022Value: 129*        Ref range: 130 - 450 E9/L     Status: FinalMPV                                           Date: 05/03/2022Value: 11.1        Ref range: 7.0 - 12.0 fL      Status: FinalNeutrophils %                                 Date: 05/03/2022Value: 67.3        Ref range: 43.0 - 80.0 %      Status: FinalImmature Granulocytes %                       Date: 05/03/2022Value: 0.2         Ref range: 0.0 - 5.0 %        Status: FinalLymphocytes %                                 Date: 05/03/2022Value: 23.7        Ref range: 20.0 - 42.0 %      Status: FinalMonocytes %                                   Date: 05/03/2022Value: 6.9         Ref range: 2.0 - 12.0 %       Status: FinalEosinophils %                                 Date: 05/03/2022Value: 1.5         Ref range: 0.0 - 6.0 %        Status: FinalBasophils %                                   Date: 05/03/2022Value: 0.4         Ref range: 0.0 - 2.0 %        Status: FinalNeutrophils Absolute                          Date: 05/03/2022Value: 3.20        Ref range: 1.80 - 7.30 E9/L   Status: FinalImmature Granulocytes #                       Date: 05/03/2022Value: 0.01        Ref range: E9/L               Status: FinalLymphocytes Absolute                          Date: 05/03/2022Value: 1.13*       Ref range: 1.50 - 4.00 E9/L   Status: FinalMonocytes Absolute                            Date: 05/03/2022Value: 0.33        Ref range: 0.10 - 0.95 E9/L   Status: FinalEosinophils Absolute                          Date: 05/03/2022Value: 0.07        Ref range: 0.05 - 0.50 E9/L   Status: FinalBasophils Absolute Date: 05/03/2022Value: 0.02        Ref range: 0.00 - 0.20 E9/L   Status: FinalTroponin, High Sensitivity                    Date: 05/03/2022Value: 20*         Ref range: 0 - 9 ng/L         Status: Final              Comment: High Sensitivity Troponin values cannot be compared withother Troponin methodologies. Patients with high levels of Biotin oral intake (i.e. >5 mg/day)may have falsely decreased Troponin levels. Samples collectedwithin 8 hours of biotin intake may require additional informationfor diagnosis. Smear, Respiratory                            Date: 05/06/2022Value:               Status: Final                 Value:Rare Polymorphonuclear leukocytesRare Epithelial cellsModerate Gram positive cocci in clustersRare Gram positive cocci in chainsDate Analyzed                                 Date: 05/04/2022Value: [id number]      Status: FinalTime Analyzed                                 Date: 05/04/2022Value: 1652          Status: FinalSource:                                       Date: 05/04/2022Value: Blood Arterial                     Status: FinalpH, Blood Gas                                 Date: 05/04/2022Value: 7.421       Ref range: 7.350 - 7.450      Status: FinalPCO2                                          Date: 05/04/2022Value: 44.1        Ref range: 35.0 - 45.0 mmHg   Status: FinalPO2                                           Date: 05/04/2022Value: 143.0*      Ref range: 75.0 - 100.0 mmHg  Status: FinalHCO3                                          Date: 05/04/2022Value: 28.0*       Ref range: 22.0 - 26.0 mmol*  Status: FinalB. E.                                          Date: 05/04/2022Value: 3.2*        Ref range: -3.0 - 3.0 mmol/L  Status: FinalO2 Sat                                        Date: 05/04/2022Value: 98.8*       Ref range: 92.0 - 98.5 %      Status: EgauxE3Lb                                          Date: 05/04/2022Value: 98.2*       Ref range: 94.0 - 97.0 %      Status: FinalCOHb Date: 05/04/2022Value: 0.3         Ref range: 0.0 - 1.5 %        Status: FinalMetHb                                         Date: 05/04/2022Value: 0.3         Ref range: 0.0 - 1.5 %        Status: FinalO2 Content                                    Date: 05/04/2022Value: 14.5        Ref range: mL/dL              Status: FinalHHb                                           Date: 05/04/2022Value: 1.2         Ref range: 0.0 - 5.0 %        Status: FinaltHb (est)                                     Date: 05/04/2022Value: 10.3*       Ref range: 11.5 - 16.5 g/dL   Status: FinalMode                                          Date: 05/04/2022Value: NC-  6L       Status: FinalPt Temp                                       Date: 05/04/2022Value: 37.0        Ref range: C                  Status: FinalOperator ID                                   Date: 05/04/2022Value: [id number]          Status: FinalLab                                           Date: 05/04/2022Value: [id number]         Status: FinalCritical(s) Notified                          Date: 05/04/2022Value: .  No Critical Values                     Status: FinalProtime                                       Date: 05/05/2022Value: 19.4*       Ref range: 9.3 - 12.4 sec     Status: FinalINR                                           Date: 05/05/2022Value: 1.8           Status: FinalProtime                                       Date: 05/06/2022Value: 25.0*       Ref range: 9.3 - 12.4 sec     Status: FinalINR                                           Date: 05/06/2022Value: 2.3           Status: Final------------    Radiology last 7 days:  XR CHEST (2 VW)Result Date: 5/3/2022Relatively stable findings as described      Pending Labs   Order Current Status  Arterial Blood Gas, Respiratory Only Collected (05/04/22 3429)  Culture, Respiratory In process      Discharge Medications    Discharge Medication List as of 5/6/2022  3:46 PMSTART taking these medicationspredniSONE (DELTASONE) 10 MG tabletTake 4 tabs daily x 3 days then 3 tabs daily x 3 days then 2 tabs daily x 3 days then 1 tab daily x 3 days then stop, Disp-30 tablet, R-0Normalcefdinir (OMNICEF) 300 MG capsuleTake 1 capsule by mouth 2 times daily for 5 days, Disp-10 capsule, R-0NormalamLODIPine (NORVASC) 5 MG tabletTake 1 tablet by mouth daily, Disp-30 tablet, R-3Normal    Discharge Medication List as of 5/6/2022  3:46 PMCONTINUE these medications which have CHANGEDwarfarin (COUMADIN) 3 MG tabletTake 1 tablet by mouth daily, Disp-30 tablet, R-0Normallevothyroxine (SYNTHROID) 100 MCG tabletTake 1 tablet by mouth every morning (before breakfast), Disp-30 tablet, R-3Normallevalbuterol (XOPENEX) 0.63 MG/3ML nebulizationTake 3 mLs by nebulization every 8 hours as needed for Wheezing, Disp-120 each, R-3Diagnosis J44.9 COPDNormal    Discharge Medication List as of 5/6/2022  3:46 PMCONTINUE these medications which have NOT CHANGEDfurosemide (LASIX) 20 MG tabletTake 20 mg by mouth every morningHistorical Medpantoprazole (PROTONIX) 40 MG tabletTake 40 mg by mouth every morningHistorical MedOXYGENInhale 6 L/min into the lungs continuousHistorical MedApoaequorin (PREVAGEN EXTRA STRENGTH) 20 MG CAPSTake 20 mg by mouth every morning Historical Medrosuvastatin (CRESTOR) 10 MG tabletTake 10 mg by mouth nightly Historical Medalbuterol sulfate HFA (PROAIR HFA) 108 (90 Base) MCG/ACT inhalerInhale 2 puffs into the lungs every 6 hours as needed for WheezingHistorical Smaxwobqxdwlor-zywyevftb-fgkwhc (TRELEGY ELLIPTA) 100-62.5-25 MCG/INH AEPBInhale 1 puff into the lungs every morning Historical Medamiodarone (CORDARONE) 200 MG tabletTake 0.5 tablets by mouth every other day, Disp-30 tablet, R-3Normalspironolactone (ALDACTONE) 25 MG tabletTake 0.5 tablets by mouth three times a week M-W-F only, Disp-30 tablet, R-3Normal    Discharge Medication List as of 5/6/2022  3:46 PMSTOP taking these medicationsdoxycycline hyclate (VIBRAMYCIN) 100 MG capsuleComments:Reason for Stopping:carvedilol (COREG) 3.125 MG tabletComments:Reason for Stopping:    Time Spent on Discharge:3E] minutes were spent in patient examination, evaluation, counseling as well as medication reconciliation, prescriptions for required medications, discharge plan, and follow up.     Electronically signed by Rodney Carias MD on 5/7/22 at 9:52 AM EDT

## 2022-05-08 LAB
CULTURE, RESPIRATORY: ABNORMAL
CULTURE, RESPIRATORY: ABNORMAL
ORGANISM: ABNORMAL
SMEAR, RESPIRATORY: ABNORMAL

## 2022-05-11 LAB
EKG ATRIAL RATE: 51 BPM
EKG Q-T INTERVAL: 470 MS
EKG QRS DURATION: 82 MS
EKG QTC CALCULATION (BAZETT): 428 MS
EKG R AXIS: 42 DEGREES
EKG T AXIS: 20 DEGREES
EKG VENTRICULAR RATE: 50 BPM

## 2022-06-09 PROBLEM — Z99.81 SUPPLEMENTAL OXYGEN DEPENDENT: Status: ACTIVE | Noted: 2021-06-16

## 2022-06-09 PROBLEM — R91.8 MASS OF RIGHT LUNG: Status: ACTIVE | Noted: 2021-06-16

## 2022-06-09 PROBLEM — I48.91 ATRIAL FIBRILLATION WITH RVR (HCC): Status: ACTIVE | Noted: 2021-06-16

## 2022-06-09 PROBLEM — I50.33 ACUTE ON CHRONIC DIASTOLIC CONGESTIVE HEART FAILURE (HCC): Chronic | Status: ACTIVE | Noted: 2021-06-16

## 2022-07-12 ENCOUNTER — APPOINTMENT (OUTPATIENT)
Dept: GENERAL RADIOLOGY | Age: 82
DRG: 190 | End: 2022-07-12
Payer: MEDICARE

## 2022-07-12 ENCOUNTER — HOSPITAL ENCOUNTER (INPATIENT)
Age: 82
LOS: 2 days | Discharge: HOME | DRG: 190 | End: 2022-07-15
Attending: EMERGENCY MEDICINE | Admitting: INTERNAL MEDICINE
Payer: MEDICARE

## 2022-07-12 DIAGNOSIS — R09.02 HYPOXIA: ICD-10-CM

## 2022-07-12 DIAGNOSIS — J44.1 COPD EXACERBATION (HCC): ICD-10-CM

## 2022-07-12 DIAGNOSIS — R06.09 DYSPNEA ON EXERTION: ICD-10-CM

## 2022-07-12 DIAGNOSIS — J18.9 PNEUMONIA OF RIGHT MIDDLE LOBE DUE TO INFECTIOUS ORGANISM: Primary | ICD-10-CM

## 2022-07-12 PROCEDURE — 93005 ELECTROCARDIOGRAM TRACING: CPT | Performed by: EMERGENCY MEDICINE

## 2022-07-12 PROCEDURE — 96374 THER/PROPH/DIAG INJ IV PUSH: CPT

## 2022-07-12 PROCEDURE — 96375 TX/PRO/DX INJ NEW DRUG ADDON: CPT

## 2022-07-12 PROCEDURE — 93005 ELECTROCARDIOGRAM TRACING: CPT | Performed by: PHYSICIAN ASSISTANT

## 2022-07-12 PROCEDURE — 99285 EMERGENCY DEPT VISIT HI MDM: CPT

## 2022-07-12 PROCEDURE — 71046 X-RAY EXAM CHEST 2 VIEWS: CPT

## 2022-07-12 ASSESSMENT — PAIN - FUNCTIONAL ASSESSMENT: PAIN_FUNCTIONAL_ASSESSMENT: NONE - DENIES PAIN

## 2022-07-13 ENCOUNTER — APPOINTMENT (OUTPATIENT)
Dept: CT IMAGING | Age: 82
DRG: 190 | End: 2022-07-13
Payer: MEDICARE

## 2022-07-13 PROBLEM — J18.9 PNEUMONIA: Status: ACTIVE | Noted: 2022-07-13

## 2022-07-13 LAB
ADENOVIRUS BY PCR: NOT DETECTED
ANION GAP SERPL CALCULATED.3IONS-SCNC: 11 MMOL/L (ref 7–16)
BASOPHILS ABSOLUTE: 0.04 E9/L (ref 0–0.2)
BASOPHILS RELATIVE PERCENT: 0.8 % (ref 0–2)
BORDETELLA PARAPERTUSSIS BY PCR: NOT DETECTED
BORDETELLA PERTUSSIS BY PCR: NOT DETECTED
BUN BLDV-MCNC: 20 MG/DL (ref 6–23)
C-REACTIVE PROTEIN: 1 MG/DL (ref 0–0.4)
CALCIUM SERPL-MCNC: 9.7 MG/DL (ref 8.6–10.2)
CHLAMYDOPHILIA PNEUMONIAE BY PCR: NOT DETECTED
CHLORIDE BLD-SCNC: 103 MMOL/L (ref 98–107)
CO2: 25 MMOL/L (ref 22–29)
CORONAVIRUS 229E BY PCR: NOT DETECTED
CORONAVIRUS HKU1 BY PCR: NOT DETECTED
CORONAVIRUS NL63 BY PCR: NOT DETECTED
CORONAVIRUS OC43 BY PCR: NOT DETECTED
CREAT SERPL-MCNC: 1 MG/DL (ref 0.5–1)
EKG ATRIAL RATE: 50 BPM
EKG ATRIAL RATE: 84 BPM
EKG P AXIS: 55 DEGREES
EKG P-R INTERVAL: 146 MS
EKG Q-T INTERVAL: 360 MS
EKG Q-T INTERVAL: 444 MS
EKG QRS DURATION: 74 MS
EKG QRS DURATION: 80 MS
EKG QTC CALCULATION (BAZETT): 409 MS
EKG QTC CALCULATION (BAZETT): 425 MS
EKG R AXIS: 34 DEGREES
EKG R AXIS: 51 DEGREES
EKG T AXIS: 8 DEGREES
EKG T AXIS: 94 DEGREES
EKG VENTRICULAR RATE: 51 BPM
EKG VENTRICULAR RATE: 84 BPM
EOSINOPHILS ABSOLUTE: 0.1 E9/L (ref 0.05–0.5)
EOSINOPHILS RELATIVE PERCENT: 1.9 % (ref 0–6)
GFR AFRICAN AMERICAN: >60
GFR NON-AFRICAN AMERICAN: 53 ML/MIN/1.73
GLUCOSE BLD-MCNC: 115 MG/DL (ref 74–99)
HCT VFR BLD CALC: 38.4 % (ref 34–48)
HEMOGLOBIN: 12 G/DL (ref 11.5–15.5)
HUMAN METAPNEUMOVIRUS BY PCR: NOT DETECTED
HUMAN RHINOVIRUS/ENTEROVIRUS BY PCR: NOT DETECTED
IMMATURE GRANULOCYTES #: 0.02 E9/L
IMMATURE GRANULOCYTES %: 0.4 % (ref 0–5)
INFLUENZA A BY PCR: NOT DETECTED
INFLUENZA B BY PCR: NOT DETECTED
INR BLD: 1.7
LYMPHOCYTES ABSOLUTE: 1.58 E9/L (ref 1.5–4)
LYMPHOCYTES RELATIVE PERCENT: 30.1 % (ref 20–42)
MCH RBC QN AUTO: 30.8 PG (ref 26–35)
MCHC RBC AUTO-ENTMCNC: 31.3 % (ref 32–34.5)
MCV RBC AUTO: 98.5 FL (ref 80–99.9)
MONOCYTES ABSOLUTE: 0.38 E9/L (ref 0.1–0.95)
MONOCYTES RELATIVE PERCENT: 7.2 % (ref 2–12)
MYCOPLASMA PNEUMONIAE BY PCR: NOT DETECTED
NEUTROPHILS ABSOLUTE: 3.13 E9/L (ref 1.8–7.3)
NEUTROPHILS RELATIVE PERCENT: 59.6 % (ref 43–80)
PARAINFLUENZA VIRUS 1 BY PCR: NOT DETECTED
PARAINFLUENZA VIRUS 2 BY PCR: NOT DETECTED
PARAINFLUENZA VIRUS 3 BY PCR: NOT DETECTED
PARAINFLUENZA VIRUS 4 BY PCR: NOT DETECTED
PDW BLD-RTO: 14.5 FL (ref 11.5–15)
PLATELET # BLD: 163 E9/L (ref 130–450)
PMV BLD AUTO: 11.1 FL (ref 7–12)
POTASSIUM REFLEX MAGNESIUM: 4.9 MMOL/L (ref 3.5–5)
PRO-BNP: 3205 PG/ML (ref 0–450)
PROCALCITONIN: 0.02 NG/ML (ref 0–0.08)
PROTHROMBIN TIME: 19.3 SEC (ref 9.3–12.4)
RBC # BLD: 3.9 E12/L (ref 3.5–5.5)
RESPIRATORY SYNCYTIAL VIRUS BY PCR: NOT DETECTED
SARS-COV-2, NAAT: NOT DETECTED
SARS-COV-2, PCR: NOT DETECTED
SEDIMENTATION RATE, ERYTHROCYTE: 26 MM/HR (ref 0–20)
SODIUM BLD-SCNC: 139 MMOL/L (ref 132–146)
TROPONIN, HIGH SENSITIVITY: 19 NG/L (ref 0–9)
TROPONIN, HIGH SENSITIVITY: 22 NG/L (ref 0–9)
WBC # BLD: 5.3 E9/L (ref 4.5–11.5)

## 2022-07-13 PROCEDURE — 84484 ASSAY OF TROPONIN QUANT: CPT

## 2022-07-13 PROCEDURE — 80048 BASIC METABOLIC PNL TOTAL CA: CPT

## 2022-07-13 PROCEDURE — 85651 RBC SED RATE NONAUTOMATED: CPT

## 2022-07-13 PROCEDURE — 84145 PROCALCITONIN (PCT): CPT

## 2022-07-13 PROCEDURE — 86140 C-REACTIVE PROTEIN: CPT

## 2022-07-13 PROCEDURE — 85025 COMPLETE CBC W/AUTO DIFF WBC: CPT

## 2022-07-13 PROCEDURE — 83880 ASSAY OF NATRIURETIC PEPTIDE: CPT

## 2022-07-13 PROCEDURE — 6360000002 HC RX W HCPCS: Performed by: INTERNAL MEDICINE

## 2022-07-13 PROCEDURE — 94640 AIRWAY INHALATION TREATMENT: CPT

## 2022-07-13 PROCEDURE — 71275 CT ANGIOGRAPHY CHEST: CPT

## 2022-07-13 PROCEDURE — 99223 1ST HOSP IP/OBS HIGH 75: CPT | Performed by: INTERNAL MEDICINE

## 2022-07-13 PROCEDURE — 6370000000 HC RX 637 (ALT 250 FOR IP): Performed by: INTERNAL MEDICINE

## 2022-07-13 PROCEDURE — 87449 NOS EACH ORGANISM AG IA: CPT

## 2022-07-13 PROCEDURE — 2500000003 HC RX 250 WO HCPCS: Performed by: EMERGENCY MEDICINE

## 2022-07-13 PROCEDURE — 2580000003 HC RX 258: Performed by: INTERNAL MEDICINE

## 2022-07-13 PROCEDURE — 2060000000 HC ICU INTERMEDIATE R&B

## 2022-07-13 PROCEDURE — 2700000000 HC OXYGEN THERAPY PER DAY

## 2022-07-13 PROCEDURE — 2580000003 HC RX 258: Performed by: EMERGENCY MEDICINE

## 2022-07-13 PROCEDURE — 87635 SARS-COV-2 COVID-19 AMP PRB: CPT

## 2022-07-13 PROCEDURE — 94664 DEMO&/EVAL PT USE INHALER: CPT

## 2022-07-13 PROCEDURE — 6360000004 HC RX CONTRAST MEDICATION: Performed by: RADIOLOGY

## 2022-07-13 PROCEDURE — 6360000002 HC RX W HCPCS: Performed by: EMERGENCY MEDICINE

## 2022-07-13 PROCEDURE — 0202U NFCT DS 22 TRGT SARS-COV-2: CPT

## 2022-07-13 PROCEDURE — 6370000000 HC RX 637 (ALT 250 FOR IP): Performed by: EMERGENCY MEDICINE

## 2022-07-13 PROCEDURE — 85610 PROTHROMBIN TIME: CPT

## 2022-07-13 RX ORDER — FUROSEMIDE 10 MG/ML
40 INJECTION INTRAMUSCULAR; INTRAVENOUS DAILY
Status: DISCONTINUED | OUTPATIENT
Start: 2022-07-13 | End: 2022-07-15 | Stop reason: HOSPADM

## 2022-07-13 RX ORDER — METHYLPREDNISOLONE SODIUM SUCCINATE 40 MG/ML
40 INJECTION, POWDER, LYOPHILIZED, FOR SOLUTION INTRAMUSCULAR; INTRAVENOUS EVERY 12 HOURS
Status: COMPLETED | OUTPATIENT
Start: 2022-07-13 | End: 2022-07-14

## 2022-07-13 RX ORDER — METHYLPREDNISOLONE SODIUM SUCCINATE 125 MG/2ML
125 INJECTION, POWDER, LYOPHILIZED, FOR SOLUTION INTRAMUSCULAR; INTRAVENOUS ONCE
Status: COMPLETED | OUTPATIENT
Start: 2022-07-13 | End: 2022-07-13

## 2022-07-13 RX ORDER — ALBUTEROL SULFATE 2.5 MG/3ML
2.5 SOLUTION RESPIRATORY (INHALATION) EVERY 6 HOURS PRN
Status: DISCONTINUED | OUTPATIENT
Start: 2022-07-13 | End: 2022-07-15 | Stop reason: HOSPADM

## 2022-07-13 RX ORDER — SODIUM CHLORIDE 0.9 % (FLUSH) 0.9 %
10 SYRINGE (ML) INJECTION 2 TIMES DAILY
Status: DISCONTINUED | OUTPATIENT
Start: 2022-07-13 | End: 2022-07-15 | Stop reason: HOSPADM

## 2022-07-13 RX ORDER — SENNA PLUS 8.6 MG/1
1 TABLET ORAL 2 TIMES DAILY
Status: DISCONTINUED | OUTPATIENT
Start: 2022-07-13 | End: 2022-07-15 | Stop reason: HOSPADM

## 2022-07-13 RX ORDER — ROSUVASTATIN CALCIUM 10 MG/1
10 TABLET, COATED ORAL NIGHTLY
Status: DISCONTINUED | OUTPATIENT
Start: 2022-07-13 | End: 2022-07-15 | Stop reason: HOSPADM

## 2022-07-13 RX ORDER — AMLODIPINE BESYLATE 5 MG/1
5 TABLET ORAL DAILY
Status: DISCONTINUED | OUTPATIENT
Start: 2022-07-13 | End: 2022-07-15 | Stop reason: HOSPADM

## 2022-07-13 RX ORDER — LEVALBUTEROL INHALATION SOLUTION 0.63 MG/3ML
0.63 SOLUTION RESPIRATORY (INHALATION) EVERY 8 HOURS PRN
Status: DISCONTINUED | OUTPATIENT
Start: 2022-07-13 | End: 2022-07-15 | Stop reason: HOSPADM

## 2022-07-13 RX ORDER — ARFORMOTEROL TARTRATE 15 UG/2ML
15 SOLUTION RESPIRATORY (INHALATION) 2 TIMES DAILY
Status: DISCONTINUED | OUTPATIENT
Start: 2022-07-13 | End: 2022-07-15 | Stop reason: HOSPADM

## 2022-07-13 RX ORDER — SPIRONOLACTONE 25 MG/1
12.5 TABLET ORAL DAILY
Status: DISCONTINUED | OUTPATIENT
Start: 2022-07-13 | End: 2022-07-15 | Stop reason: HOSPADM

## 2022-07-13 RX ORDER — BUDESONIDE 0.25 MG/2ML
0.25 INHALANT ORAL 2 TIMES DAILY
Status: DISCONTINUED | OUTPATIENT
Start: 2022-07-13 | End: 2022-07-15 | Stop reason: HOSPADM

## 2022-07-13 RX ORDER — FUROSEMIDE 10 MG/ML
20 INJECTION INTRAMUSCULAR; INTRAVENOUS ONCE
Status: COMPLETED | OUTPATIENT
Start: 2022-07-13 | End: 2022-07-13

## 2022-07-13 RX ORDER — SPIRONOLACTONE 25 MG/1
12.5 TABLET ORAL DAILY
COMMUNITY

## 2022-07-13 RX ORDER — WARFARIN SODIUM 3 MG/1
3 TABLET ORAL DAILY
Status: DISCONTINUED | OUTPATIENT
Start: 2022-07-13 | End: 2022-07-15 | Stop reason: HOSPADM

## 2022-07-13 RX ORDER — IPRATROPIUM BROMIDE AND ALBUTEROL SULFATE 2.5; .5 MG/3ML; MG/3ML
3 SOLUTION RESPIRATORY (INHALATION) ONCE
Status: COMPLETED | OUTPATIENT
Start: 2022-07-13 | End: 2022-07-13

## 2022-07-13 RX ORDER — POLYETHYLENE GLYCOL 3350 17 G/17G
17 POWDER, FOR SOLUTION ORAL DAILY
Status: DISCONTINUED | OUTPATIENT
Start: 2022-07-13 | End: 2022-07-15 | Stop reason: HOSPADM

## 2022-07-13 RX ORDER — PANTOPRAZOLE SODIUM 40 MG/1
40 TABLET, DELAYED RELEASE ORAL EVERY MORNING
Status: DISCONTINUED | OUTPATIENT
Start: 2022-07-13 | End: 2022-07-15 | Stop reason: HOSPADM

## 2022-07-13 RX ADMIN — ROSUVASTATIN CALCIUM 10 MG: 10 TABLET, FILM COATED ORAL at 21:00

## 2022-07-13 RX ADMIN — SPIRONOLACTONE 12.5 MG: 25 TABLET ORAL at 10:42

## 2022-07-13 RX ADMIN — IPRATROPIUM BROMIDE 0.5 MG: 0.5 SOLUTION RESPIRATORY (INHALATION) at 13:19

## 2022-07-13 RX ADMIN — POLYETHYLENE GLYCOL 3350 17 G: 17 POWDER, FOR SOLUTION ORAL at 10:42

## 2022-07-13 RX ADMIN — Medication 10 ML: at 12:11

## 2022-07-13 RX ADMIN — IPRATROPIUM BROMIDE AND ALBUTEROL SULFATE 3 AMPULE: 2.5; .5 SOLUTION RESPIRATORY (INHALATION) at 00:14

## 2022-07-13 RX ADMIN — AMLODIPINE BESYLATE 5 MG: 5 TABLET ORAL at 10:42

## 2022-07-13 RX ADMIN — METHYLPREDNISOLONE SODIUM SUCCINATE 125 MG: 125 INJECTION, POWDER, FOR SOLUTION INTRAMUSCULAR; INTRAVENOUS at 00:21

## 2022-07-13 RX ADMIN — FUROSEMIDE 20 MG: 10 INJECTION, SOLUTION INTRAVENOUS at 03:06

## 2022-07-13 RX ADMIN — ARFORMOTEROL TARTRATE 15 MCG: 15 SOLUTION RESPIRATORY (INHALATION) at 09:39

## 2022-07-13 RX ADMIN — BUDESONIDE 250 MCG: 0.25 SUSPENSION RESPIRATORY (INHALATION) at 09:39

## 2022-07-13 RX ADMIN — ARFORMOTEROL TARTRATE 15 MCG: 15 SOLUTION RESPIRATORY (INHALATION) at 21:38

## 2022-07-13 RX ADMIN — PANTOPRAZOLE SODIUM 40 MG: 40 TABLET, DELAYED RELEASE ORAL at 10:42

## 2022-07-13 RX ADMIN — SENNOSIDES 8.6 MG: 8.6 TABLET, FILM COATED ORAL at 10:42

## 2022-07-13 RX ADMIN — IPRATROPIUM BROMIDE 0.5 MG: 0.5 SOLUTION RESPIRATORY (INHALATION) at 21:38

## 2022-07-13 RX ADMIN — Medication 10 ML: at 21:01

## 2022-07-13 RX ADMIN — BUDESONIDE 250 MCG: 0.25 SUSPENSION RESPIRATORY (INHALATION) at 21:38

## 2022-07-13 RX ADMIN — WARFARIN SODIUM 3 MG: 3 TABLET ORAL at 10:42

## 2022-07-13 RX ADMIN — IPRATROPIUM BROMIDE 0.5 MG: 0.5 SOLUTION RESPIRATORY (INHALATION) at 16:10

## 2022-07-13 RX ADMIN — METHYLPREDNISOLONE SODIUM SUCCINATE 40 MG: 40 INJECTION, POWDER, LYOPHILIZED, FOR SOLUTION INTRAMUSCULAR; INTRAVENOUS at 13:35

## 2022-07-13 RX ADMIN — IOPAMIDOL 75 ML: 755 INJECTION, SOLUTION INTRAVENOUS at 01:15

## 2022-07-13 RX ADMIN — SENNOSIDES 8.6 MG: 8.6 TABLET, FILM COATED ORAL at 21:00

## 2022-07-13 RX ADMIN — WATER 1000 MG: 1 INJECTION INTRAMUSCULAR; INTRAVENOUS; SUBCUTANEOUS at 03:44

## 2022-07-13 RX ADMIN — DOXYCYCLINE 100 MG: 100 INJECTION, POWDER, LYOPHILIZED, FOR SOLUTION INTRAVENOUS at 03:47

## 2022-07-13 RX ADMIN — CEFEPIME 2000 MG: 2 INJECTION, POWDER, FOR SOLUTION INTRAMUSCULAR; INTRAVENOUS at 13:46

## 2022-07-13 NOTE — CONSULTS
PULMONARY CONSULTATION    Reason for Consultation: acute hypoxic respiratory failure  Referring Physician: Dewaine Litten, MD    Communication with the referring physician will be sent via the electronic medical record. 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, chronic collapse of the RML, chronic systolic heart failure with EF 30% on last echocardiogram, atrial fibrillation, moderate mitral regurgitation, pulmonary hypertension WHO Group 2, large hiatal hernia. She presented with a 2 day history of increased SOB at home. She noticed that she could only walk about 10 feet without having to stop and rest.  She reports chronic sinus congestion d/t allergies. Otherwise denies cough, sputum, wheezing, hemoptysis, fevers. She states that she has not taken her diuretic medication in the last several days because she was going to therapy and did not want to have to urinate there. She also reports that her heart rate has been dropping into the 30's and 40's at home again. She follows with Dr. Owen Romero for her cardiac conditions. Otherwise she has remained on her baseline 4-5L supplemental oxygen and home bronchodilators. Chest imaging upon arrival without PE or new pneumonia. She has chronic atelectasis of the RML and subtle interstitial lung disease.       Past Medical History:   Diagnosis Date    Atrial fibrillation ([address]     CHF (congestive heart failure) (HCC)     Emphysema, unspecified (HCC)     Hyperlipidemia     Hypertension     Lung cancer ([address]     Oxygen dependent     Prolonged emergence from general anesthesia     post general anesthesia    Skin cancer     Thyroid disease        Past Surgical History:   Procedure Laterality Date    BREAST BIOPSY Right     benign    BRONCHOSCOPY N/A 4/20/2021    BRONCHOSCOPY/TRANSBRONCHIAL NEEDLE BIOPSY performed by Communication with Friends and Family: Not on file    Frequency of Social Gatherings with Friends and Family: Not on file    Attends Latter day Services: Not on file    Active Member of Clubs or Organizations: Not on file    Attends Club or Organization Meetings: Not on file    Marital Status: Not on file   Intimate Partner Violence:     Fear of Current or Ex-Partner: Not on file    Emotionally Abused: Not on file    Physically Abused: Not on file    Sexually Abused: Not on file   Housing Stability:     Unable to Pay for Housing in the Last Year: Not on file    Number of [place] in the Last Year: Not on file    Unstable Housing in the Last Year: Not on file       Current Facility-Administered Medications   Medication Dose Route Frequency Provider Last Rate Last Admin    albuterol (PROVENTIL) nebulizer solution 2.5 mg  2.5 mg Nebulization Q6H PRN Liss Angulo MD        amLODIPine (NORVASC) tablet 5 mg  5 mg Oral Daily Liss Angulo MD   5 mg at 07/13/22 1042    levalbuterol (XOPENEX) nebulization 0.63 mg  0.63 mg Nebulization Q8H PRN Liss Angulo MD        pantoprazole (PROTONIX) tablet 40 mg  40 mg Oral QAM Liss Angulo MD   40 mg at 07/13/22 1042    rosuvastatin (CRESTOR) tablet 10 mg  10 mg Oral Nightly Liss Angulo MD        spironolactone (ALDACTONE) tablet 12.5 mg  12.5 mg Oral Daily Liss Angulo MD   12.5 mg at 07/13/22 1042    warfarin (COUMADIN) tablet 3 mg  3 mg Oral Daily Liss Angulo MD   3 mg at 07/13/22 1042    furosemide (LASIX) injection 40 mg  40 mg IntraVENous Daily Liss Angulo MD        cefepime (MAXIPIME) 2000 mg IVPB minibag  2,000 mg IntraVENous Yolanda Hanson MD        Arformoterol Tartrate [facility]) nebulizer solution 15 mcg  15 mcg Nebulization BID Liss Angulo MD   15 mcg at 07/13/22 0760    And    budesonide (PULMICORT) nebulizer suspension 250 mcg  0.25 mg Nebulization BID Liss Angulo MD   250 mcg at 07/13/22 7211 And    ipratropium (ATROVENT) 0.02 % nebulizer solution 0.5 mg  0.5 mg Nebulization 4x daily Nelia Dick MD        [facility]) tablet 8.6 mg  1 tablet Oral BID Nelia Dick MD   8.6 mg at 07/13/22 1042    polyethylene glycol (GLYCOLAX) packet 17 g  17 g Oral Daily Nelia Dick MD   17 g at 07/13/22 1042    sodium chloride flush 0.9 % injection 10 mL  10 mL IntraVENous BID Nelia Dick MD   10 mL at 07/13/22 1211       Allergies   Allergen Reactions    Cat Hair Extract Itching     Patient states \"My son put this. I hope I'm not allergic, I have 4 Cats and 2 Birds, maybe to their dust.\"    Eggs Or Egg-Derived Products Nausea Only    Erythromycin Diarrhea, Nausea And Vomiting and Other (See Comments)     Stomach Pains      Pcn [Penicillins] Itching, Rash and Other (See Comments)     Patient states \"Red from Head to Toe, with something crawling under my skin. \"    Seasonal Itching, Swelling and Other (See Comments)     Runny/Itchy Nose, Watery/Itchy Eyes        Review of Systems:  14 point ROS obtained and neg aside from outlined in the HPI      Physical Exam:  BP (!) 163/72   Pulse 70   Temp 98.1 °F (36.7 °C)   Resp 20   Ht 5' 8\" (1.727 m)   Wt 173 lb (78.5 kg)   SpO2 98%   BMI 26.30 kg/m²    General: Lying in bed comfortably, no distress, breathing is not labored  HEENT: PERRL, EOMI, MMM, no oral lesions  Neck: supple, no adenopathy  CV: RRR without murmur  Lungs: decreased BS diffusely, end exp wheezing  Abd: soft, NT, ND, bowel sounds normal  Ext: warm, no edema, no clubbing  Skin: no rashes  Neuro: CN II-XII grossly intact, no focal deficits    Oximetry: 98% on 5L    Imaging personally reviewed:  CTA chest     Impression   No evidence of pulmonary embolism.       Complete consolidation of the right middle lobe which could be related to an   underlying airspace disease process.  Interstitial septal thickening with   scattered reticular densities and minimal patchy opacities which are   nonspecific and could indicate an interstitial disease process.       Large retrocardiac hiatal hernia.             Labs:  Lab Results   Component Value Date/Time    WBC 5.3 07/13/2022 12:01 AM    HGB 12.0 07/13/2022 12:01 AM    HCT 38.4 07/13/2022 12:01 AM    MCV 98.5 07/13/2022 12:01 AM    MCH 30.8 07/13/2022 12:01 AM    MCHC 31.3 07/13/2022 12:01 AM    RDW 14.5 07/13/2022 12:01 AM     07/13/2022 12:01 AM    MPV 11.1 07/13/2022 12:01 AM     Lab Results   Component Value Date/Time     07/13/2022 12:01 AM    K 4.9 07/13/2022 12:01 AM     07/13/2022 12:01 AM    CO2 25 07/13/2022 12:01 AM    BUN 20 07/13/2022 12:01 AM    CREATININE 1.0 07/13/2022 12:01 AM    LABALBU 4.1 05/03/2022 02:52 PM    LABALBU 4.4 05/04/2012 11:00 AM    CALCIUM 9.7 07/13/2022 12:01 AM    GFRAA >60 07/13/2022 12:01 AM    LABGLOM 53 07/13/2022 12:01 AM     Lab Results   Component Value Date/Time    PROTIME 19.3 07/13/2022 12:14 AM    INR 1.7 07/13/2022 12:14 AM       Assessment:  1. Chronic hypoxic respiratory failure  2. COPD with acute exacerbation  3. Interstitial lung disease  4. Squamous cell lung cancer stage IIB s/p radiation Aug 2021  5. Bradycardia reported at home  6. Mildly decompensated systolic heart failure  7. Chronic RML collapse    Plan:  1. Continue chronic oxygen therapy 5L  2. IV steroids  3. Scheduled bronchodilators  4. Consult to Dr. Juan Francisco Thornton   5. Negative fluid balance  6. Incentive spirometry    Thank you for allowing me to participate in the care of [facility].        Carmel Guaman MD  7/13/2022 1:09 PM

## 2022-07-13 NOTE — CARE COORDINATION
COVID - 7/13. Met w/ patient. Explained role of  and plan of care. Lives alone in a 2 story house-resides on the 1st floor- 2 steps to entrance. Independent PTA. Has crutches, [employer], cane, rollator, BSC, tub bench, tub chair but does not use. Has nebluizer. Requiring O2 5lNC which is pt's home O2 baseline provided by [employer]. PCP is Dr. Carmine Zelaya and pharmacy is [facility]. Per pt, plan is to return home on discharge- declining [facility]- has strong family support-states daughter or son will provide transportation home. Will follow.  SW updated Isidro Abarca, YIN case manager

## 2022-07-13 NOTE — H&P
Sonal Holland is a 78 y.o. female presenting to the ED for shortness of breath, beginning 3 days ago. history of CHF emphysema lung CA and COPD. Former smoker quit 1998. She states she has a history of lung cancer but believes she is in remission. Patient has some chronic atelectasis and scarring that her daughter states is chronic to both lungs. Patient wears 5 L of oxygen at home at baseline. Patient has a nebulizer that she uses as needed and she is been increasing that the last couple of days. Patient states she has been doing physical therapy and because of the the last couple of days she has not been taking her Lasix as she was not can to be near bathroom as often. She denies any chest pain no pleuritic pain. She is on Coumadin for chronic A. fib. She denies any cough or fever. She is vaccinated for COVID with her boosters. Patient denies any leg pain or leg swelling. No fevers or chills no abdominal pain or nausea or vomiting.   was hypoxic O2 supplement increased and admitte for management     Past Medical History:   Diagnosis Date    Atrial fibrillation ([address]     CHF (congestive heart failure) (HCC)     Emphysema, unspecified (HCC)     Hyperlipidemia     Hypertension     Lung cancer ([address]     Oxygen dependent     Prolonged emergence from general anesthesia     post general anesthesia    Skin cancer     Thyroid disease        Past Surgical History:   Procedure Laterality Date    BREAST BIOPSY Right     benign    BRONCHOSCOPY N/A 4/20/2021    BRONCHOSCOPY/TRANSBRONCHIAL NEEDLE BIOPSY performed by Ailyn Higgins MD at [place] [id number] Right 12/16/2021    RIGHT EYE CATARACT EXTRACTION IOL IMPLANT performed by Patrick Fabian MD at [address] [place] [address] Left 2/24/2022    LEFT EYE CATARACT EXTRACTION IOL IMPLANT performed by Patrick Fabain MD at [facility] OR    TUBAL LIGATION         Family History   Problem Relation Age of Onset    Cancer Mother     Emphysema Father        Prior to Admission medications    Medication Sig Start Date End Date Taking?  Authorizing Provider   spironolactone (ALDACTONE) 25 MG tablet Take 12.5 mg by mouth daily   Yes Historical Provider, MD   albuterol (PROVENTIL) (2.5 MG/3ML) 0.083% nebulizer solution Take 3 mLs by nebulization every 6 hours as needed for Wheezing Please bill under Medicare Part B DX: COPD J44.9 6/9/22   Silvia Mancini MD   amLODIPine (NORVASC) 5 MG tablet Take 1 tablet by mouth daily  Patient not taking: Reported on 7/13/2022 5/7/22   Nelia Dick MD   warfarin (COUMADIN) 3 MG tablet Take 1 tablet by mouth daily  Patient taking differently: Take 2 mg by mouth daily  5/6/22   Nelia Dick MD   levothyroxine (SYNTHROID) 100 MCG tablet Take 1 tablet by mouth every morning (before breakfast)  Patient taking differently: Take 75 mcg by mouth every morning (before breakfast)  5/7/22   Nelia Dick MD   levalbuterol Mary Erica) 0.63 MG/3ML nebulization Take 3 mLs by nebulization every 8 hours as needed for Wheezing  Patient not taking: Reported on 6/9/2022 5/6/22   OUMOU Becerra - CNP   furosemide (LASIX) 20 MG tablet Take 20 mg by mouth every morning    Historical Provider, MD   pantoprazole (PROTONIX) 40 MG tablet Take 40 mg by mouth every morning    Historical Provider, MD   OXYGEN Inhale 3-4 L/min into the lungs continuous     Historical Provider, MD   Apoaequorin (PREVAGEN EXTRA STRENGTH) 20 MG CAPS Take 20 mg by mouth every morning     Historical Provider, MD   rosuvastatin (CRESTOR) 10 MG tablet Take 10 mg by mouth nightly     Historical Provider, MD   albuterol sulfate HFA (PROAIR HFA) 108 (90 Base) MCG/ACT inhaler Inhale 2 puffs into the lungs every 6 hours as needed for Wheezing    Historical Provider, MD   fluticasone-umeclidin-vilant (TRELEGY ELLIPTA) 100-62.5-25 MCG/INH AEPB Inhale 1 puff into the lungs every morning     Historical Provider, MD amiodarone (CORDARONE) 200 MG tablet Take 0.5 tablets by mouth every other day  Patient not taking: Reported on 2022 10/30/17   Nilesh Norwood DO        Allergies: Cat hair extract, Eggs or egg-derived products, Erythromycin, Pcn [penicillins], and Seasonal    Social History     Tobacco Use    Smoking status: Former Smoker     Packs/day: 2.00     Years: 46.00     Pack years: 92.00     Types: Cigarettes     Start date: 1952     Quit date: 1998     Years since quittin.9    Smokeless tobacco: Never Used    Tobacco comment: Patient quit smoking 24 yrs. ago. Substance Use Topics    Alcohol use: Yes     Alcohol/week: 1.0 standard drink     Types: 1 Glasses of wine per week     Comment: nightly        Review of Systems:  Respiratory: as per present illness   Cardiovascular: negative for chest pain and dyspnea  Gastrointestinal: negative for abdominal pain, diarrhea, nausea and vomiting  Genitourinary:negative for dysuria and hematuria  Derm: negative for rash and skin lesion(s)  Neurological: negative for seizures and tremors  Endocrine: negative for diabetic symptoms including polydipsia and polyuria    Physical Exam:  Vitals:    22 0819   BP: (!) 163/72   Pulse: 70   Resp: 20   Temp: 98.1 °F (36.7 °C)   SpO2: 98%      Skin:  Warm and dry. No rash or bruises  HEENT:  PERRLA, EOMI  Neck:  No JVD, No thyromegaly, No carotid bruit  Cardiac:  RRR, No gallop or murmur  Lungs:  Scattered rales rhonchi R>L   Abdomen: Normal bowel sounds, no HSM, non-tender  Extremities:  No clubbing,trace  edema no cyanosis  Neurological:  Moves all extremities.     Labs:    CBC with Differential:    Lab Results   Component Value Date/Time    WBC 5.3 2022 12:01 AM    RBC 3.90 2022 12:01 AM    HGB 12.0 2022 12:01 AM    HCT 38.4 2022 12:01 AM     2022 12:01 AM    MCV 98.5 2022 12:01 AM    MCH 30.8 2022 12:01 AM    MCHC 31.3 2022 12:01 AM    RDW 14.5 07/13/2022 12:01 AM    SEGSPCT 46 02/14/2014 11:27 AM    LYMPHOPCT 30.1 07/13/2022 12:01 AM    MONOPCT 7.2 07/13/2022 12:01 AM    BASOPCT 0.8 07/13/2022 12:01 AM    MONOSABS 0.38 07/13/2022 12:01 AM    LYMPHSABS 1.58 07/13/2022 12:01 AM    EOSABS 0.10 07/13/2022 12:01 AM    BASOSABS 0.04 07/13/2022 12:01 AM     CMP:    Lab Results   Component Value Date/Time     07/13/2022 12:01 AM    K 4.9 07/13/2022 12:01 AM     07/13/2022 12:01 AM    CO2 25 07/13/2022 12:01 AM    BUN 20 07/13/2022 12:01 AM    CREATININE 1.0 07/13/2022 12:01 AM    GFRAA >60 07/13/2022 12:01 AM    LABGLOM 53 07/13/2022 12:01 AM    GLUCOSE 115 07/13/2022 12:01 AM    GLUCOSE 87 05/04/2012 11:00 AM    PROT 6.7 05/03/2022 02:52 PM    LABALBU 4.1 05/03/2022 02:52 PM    LABALBU 4.4 05/04/2012 11:00 AM    CALCIUM 9.7 07/13/2022 12:01 AM    BILITOT 0.5 05/03/2022 02:52 PM    ALKPHOS 55 05/03/2022 02:52 PM    AST 22 05/03/2022 02:52 PM    ALT 10 05/03/2022 02:52 PM      Imaging:  No evidence of pulmonary embolism.       Complete consolidation of the right middle lobe which could be related to an   underlying airspace disease process.  Interstitial septal thickening with   scattered reticular densities and minimal patchy opacities which are   nonspecific and could indicate an interstitial disease process.       Large retrocardiac hiatal hernia. Assessment and Plan:    Patient Active Problem List   Diagnosis    Acute on chronic hypoxic respiratory failure   ?pneumonia  COPD  Right lung CA by Hx   A. Fib  HTN  Cardiomyopathy   Hypothyroid

## 2022-07-13 NOTE — ED PROVIDER NOTES
HPI:  7/13/22,   Time: 12:01 AM EDT       Leslie Mari is a 78 y.o. female presenting to the ED for shortness of breath, beginning 3 days ago. The complaint has been persistent, mild in severity, and worsened by walking. Patient is a pleasant 77-year-old female history of CHF emphysema and COPD. Former smoker quit 1998. She states she has a history of lung cancer but believes she is in remission. Patient has some chronic atelectasis and scarring that her daughter states is chronic to both lungs. Patient wears 5 L of oxygen at home at baseline. Patient has a nebulizer that she uses as needed and she is been increasing that the last couple of days. Patient states she has been doing physical therapy and because of the the last couple of days she has not been taking her Lasix as she was not can to be near bathroom as often. She denies any chest pain no pleuritic pain. She is on Coumadin for chronic A. fib. She denies any cough or fever. She is vaccinated for COVID with her boosters. Patient denies any leg pain or leg swelling. No fevers or chills no abdominal pain or nausea or vomiting. Despite nebulizer treatment at home she felt more short of breath with walking today so she came in for further evaluation. Review of Systems:   Pertinent positives and negatives are stated within HPI, all other systems reviewed and are negative.          --------------------------------------------- PAST HISTORY ---------------------------------------------  Past Medical History:  has a past medical history of Atrial fibrillation [address] CHF (congestive heart failure) ([facility] [facility] Emphysema, unspecified ([facility] [facility] Hyperlipidemia, Hypertension, Lung cancer ([facility] [facility] Oxygen dependent, Prolonged emergence from general anesthesia, Skin cancer, and Thyroid disease. Past Surgical History:  has a past surgical history that includes Breast biopsy (Right); Tubal ligation; bronchoscopy (N/A, 4/20/2021);  Intracapsular cataract extraction (Right, 12/16/2021); and Intracapsular cataract extraction (Left, 2/24/2022). Social History:  reports that she quit smoking about 23 years ago. Her smoking use included cigarettes. She started smoking about 70 years ago. She has a 92.00 pack-year smoking history. She has never used smokeless tobacco. She reports current alcohol use of about 1.0 standard drink of alcohol per week. She reports that she does not use drugs. Family History: family history includes Cancer in her mother; Emphysema in her father. The patients home medications have been reviewed. Allergies: Cat hair extract, Eggs or egg-derived products, Erythromycin, Pcn [penicillins], and Seasonal        ---------------------------------------------------PHYSICAL EXAM--------------------------------------    Constitutional/General: Alert and oriented x3, well appearing, non toxic in NAD  Head: Normocephalic and atraumatic  Eyes: PERRL, EOMI, conjunctive normal, sclera non icteric  Mouth: Oropharynx clear, handling secretions, no trismus, no asymmetry of the posterior oropharynx or uvular edema  Neck: Supple, full ROM, non tender to palpation in the midline, no stridor, no crepitus, no meningeal signs  Respiratory: Lungs with good air movement bilaterally decreased breath sounds at both bases. Mild rales at right base no rhonchi; mild expiratory wheezes at both apices. No tachypnea no accessory muscle use or retractions. Able speak in full sentences. Cardiovascular:  Regular rate. Regular rhythm. No murmurs, gallops, or rubs. 2+ distal pulses  Chest: No chest wall tenderness  GI:  Abdomen Soft, Non tender, Non distended. +BS. No organomegaly, no palpable masses,  No rebound, guarding, or rigidity. Musculoskeletal: Moves all extremities x 4. Warm and well perfused, no clubbing, cyanosis, or edema. Capillary refill <3 seconds  Integument: skin warm and dry. No rashes.    Lymphatic: no lymphadenopathy noted  Neurologic: GCS 15, no focal deficits, symmetric strength 5/5 in the upper and lower extremities bilaterally  Psychiatric: Normal Affect    -------------------------------------------------- RESULTS -------------------------------------------------  I have personally reviewed all laboratory and imaging results for this patient. Results are listed below.      LABS:  Results for orders placed or performed during the hospital encounter of 07/12/22   COVID-19, Rapid    Specimen: Nasopharyngeal Swab   Result Value Ref Range    SARS-CoV-2, NAAT Not Detected Not Detected   CBC with Auto Differential   Result Value Ref Range    WBC 5.3 4.5 - 11.5 E9/L    RBC 3.90 3.50 - 5.50 E12/L    Hemoglobin 12.0 11.5 - 15.5 g/dL    Hematocrit 38.4 34.0 - 48.0 %    MCV 98.5 80.0 - 99.9 fL    MCH 30.8 26.0 - 35.0 pg    MCHC 31.3 (L) 32.0 - 34.5 %    RDW 14.5 11.5 - 15.0 fL    Platelets 739 605 - 923 E9/L    MPV 11.1 7.0 - 12.0 fL    Neutrophils % 59.6 43.0 - 80.0 %    Immature Granulocytes % 0.4 0.0 - 5.0 %    Lymphocytes % 30.1 20.0 - 42.0 %    Monocytes % 7.2 2.0 - 12.0 %    Eosinophils % 1.9 0.0 - 6.0 %    Basophils % 0.8 0.0 - 2.0 %    Neutrophils Absolute 3.13 1.80 - 7.30 E9/L    Immature Granulocytes # 0.02 E9/L    Lymphocytes Absolute 1.58 1.50 - 4.00 E9/L    Monocytes Absolute 0.38 0.10 - 0.95 E9/L    Eosinophils Absolute 0.10 0.05 - 0.50 E9/L    Basophils Absolute 0.04 0.00 - 0.20 D5/V   Basic Metabolic Panel w/ Reflex to MG   Result Value Ref Range    Sodium 139 132 - 146 mmol/L    Potassium reflex Magnesium 4.9 3.5 - 5.0 mmol/L    Chloride 103 98 - 107 mmol/L    CO2 25 22 - 29 mmol/L    Anion Gap 11 7 - 16 mmol/L    Glucose 115 (H) 74 - 99 mg/dL    BUN 20 6 - 23 mg/dL    CREATININE 1.0 0.5 - 1.0 mg/dL    GFR Non-African American 53 >=60 mL/min/1.73    GFR African American >60     Calcium 9.7 8.6 - 10.2 mg/dL   Brain Natriuretic Peptide   Result Value Ref Range    Pro-BNP 3,205 (H) 0 - 450 pg/mL   Troponin   Result Value Ref Range    Troponin, High Sensitivity 22 (H) 0 - 9 ng/L   Protime-INR   Result Value Ref Range    Protime 19.3 (H) 9.3 - 12.4 sec    INR 1.7    Troponin   Result Value Ref Range    Troponin, High Sensitivity 19 (H) 0 - 9 ng/L   EKG 12 Lead   Result Value Ref Range    Ventricular Rate 51 BPM    Atrial Rate 50 BPM    QRS Duration 80 ms    Q-T Interval 444 ms    QTc Calculation (Bazett) 409 ms    R Axis 51 degrees    T Axis 94 degrees       RADIOLOGY:  Interpreted by Radiologist.  CTA PULMONARY W CONTRAST   Final Result   No evidence of pulmonary embolism. Complete consolidation of the right middle lobe which could be related to an   underlying airspace disease process. Interstitial septal thickening with   scattered reticular densities and minimal patchy opacities which are   nonspecific and could indicate an interstitial disease process. Large retrocardiac hiatal hernia. RECOMMENDATIONS:   Unavailable         XR CHEST (2 VW)   Final Result   1. Chronic subsegmental atelectasis or scarring in mid and lower lung fields. 2. No obvious pneumonia or pleural effusion. EKG:  EKG shows junctional rhythm at 51 bpm no signs of any ST changes. QTc 409. Bradycardia at 51. No change from previous EKG. EKG interpreted by myself.      ------------------------- NURSING NOTES AND VITALS REVIEWED ---------------------------   The nursing notes within the ED encounter and vital signs as below have been reviewed by myself. BP (!) 144/65   Pulse 64   Temp 97.8 °F (36.6 °C) (Temporal)   Resp 20   Ht 5' 8\" (1.727 m)   Wt 173 lb (78.5 kg)   SpO2 94%   BMI 26.30 kg/m²   Oxygen Saturation Interpretation: Normal    The patients available past medical records and past encounters were reviewed.         ------------------------------ ED COURSE/MEDICAL DECISION MAKING----------------------  Medications   cefTRIAXone (ROCEPHIN) 1,000 mg in sterile water 10 mL IV syringe (has no administration in time range)     And   doxycycline (VIBRAMYCIN) 100 mg in dextrose 5 % 100 mL IVPB (has no administration in time range)   ipratropium-albuterol (DUONEB) nebulizer solution 3 ampule (3 ampules Inhalation Given 7/13/22 0014)   methylPREDNISolone sodium (SOLU-MEDROL) injection 125 mg (125 mg IntraVENous Given 7/13/22 0021)   iopamidol (ISOVUE-370) 76 % injection 75 mL (75 mLs IntraVENous Given 7/13/22 0115)   furosemide (LASIX) injection 20 mg (20 mg IntraVENous Given 7/13/22 0306)         ED COURSE:  ED Course as of 07/13/22 0324   Wed Jul 13, 2022   0314 Sat dropped to 87% with ambulation on 5 L nasal cannula [KK]   0319 Spoke to patient's PCP Dr. Babs Councilman who accepted the patient for admission to a monitored bed. [facility]      ED Course User Index  [KK] Braxton Edwards MD       Medical Decision Making:    Patient is a pleasant 78-year-old female history of COPD CHF. Patient does not have any leg swelling was had increasing shortness of breath with exertion last 3 days she is on Coumadin for A. fib. No chest pain no pleuritic pain patient with chest x-ray given DuoNeb steroids and reassess. Differential diagnosis CHF COPD pneumonia COVID        This patient has remained hemodynamically stable and been closely monitored during their ED course. EKG shows junctional rhythm at 51 bpm no signs of any ST changes. Patient was given 3 DuoNeb treatments as well as IV Solu-Medrol. First troponin is 22-second was 19 delta was only 3. INR was subtherapeutic at 1.7 CBC was normal white count was 5.3 chemistry was normal BNP was elevated at 3200. Patient was given a dose of Lasix here in the emergency department. COVID swab was negative CTA of the chest showed no PE but does show complete consolidation of the right middle lobe. Patient was started on IV antibiotics. Rocephin and doxycycline were given. With ambulation, patient does desaturate on her baseline oxygen requirement. Therefore patient was admitted.   Spoke to patient's PCP who agreed to admit to monitored bed. Re-Evaluations:             Re-evaluation. Patients symptoms are improving    Re-examination  7/13/22   12:01 AM EDT          Vital Signs:   Vitals:    07/13/22 0017 07/13/22 0043 07/13/22 0115 07/13/22 0253   BP: 87/67 137/67 (!) 144/68 (!) 144/65   Pulse: (!) 46 (!) 46 75 64   Resp:    20   Temp:       TempSrc:       SpO2:  95% 98% 94%   Weight:       Height:           CRITICAL CARE:  36 MINUTES. Please note that the withdrawal or failure to initiate urgent interventions for this patient would likely result in a life threatening deterioration or permanent disability. Accordingly this patient received the above mentioned time, excluding separately billable procedures. Counseling: The emergency provider has spoken with the patient and discussed todays results, in addition to providing specific details for the plan of care and counseling regarding the diagnosis and prognosis. Questions are answered at this time and they are agreeable with the plan.       --------------------------------- IMPRESSION AND DISPOSITION ---------------------------------    IMPRESSION  1. Pneumonia of right middle lobe due to infectious organism    2. Dyspnea on exertion    3. Hypoxia    4. COPD exacerbation ([facility] [address]        DISPOSITION  Disposition: Admit to telemetry  Patient condition is fair    NOTE: This report was transcribed using voice recognition software.  Every effort was made to ensure accuracy; however, inadvertent computerized transcription errors may be present        Jessica Fam MD  07/14/22 0023

## 2022-07-13 NOTE — ED NOTES
Department of Emergency Medicine  FIRST PROVIDER TRIAGE NOTE             Independent MLP           7/12/22  8:10 PM EDT    Date of Encounter: 7/12/22   MRN: [id number]      HPI: Leslie Mari is a 78 y.o. female who presents to the ED for Shortness of Breath (93% on 6L o2. normally on 5L at home. )    Pt usually wears 4L at home but has had to increase it to 5-6L over the last week and still feeling SOB. Denies chest pain or fevers. ROS: Negative for cp or fever. PE: Gen Appearance/Constitutional: alert  Musculoskeletal: moves all extremities x 4     Initial Plan of Care: All treatment areas with department are currently occupied. Plan to order/Initiate the following while awaiting opening in ED: labs, EKG and imaging studies.   Initiate Treatment-Testing, Proceed toTreatment Area When Bed Available for ED Attending/MLP to Continue Care    Electronically signed by Jean-Claude Metz PA-C   DD: 7/12/22         Jean-Claude Metz PA-C  07/12/22 2010

## 2022-07-13 NOTE — ED NOTES
Spoke with Moise Blanco on 4W. Confirmed received SBAR. No further questions at this time.       Bharath Mary RN  07/13/22 3909

## 2022-07-13 NOTE — ED NOTES
Pt ambulated approximately 75 feet. SpO2 87% at lowest. Heart rate 80. Pt tolerated well. Reports minimal SOB.       Bri Smith RN  07/13/22 5890

## 2022-07-14 LAB
INR BLD: 1.9
L. PNEUMOPHILA SEROGP 1 UR AG: NORMAL
PROTHROMBIN TIME: 20.3 SEC (ref 9.3–12.4)
STREP PNEUMONIAE ANTIGEN, URINE: NORMAL

## 2022-07-14 PROCEDURE — 6370000000 HC RX 637 (ALT 250 FOR IP): Performed by: INTERNAL MEDICINE

## 2022-07-14 PROCEDURE — 94640 AIRWAY INHALATION TREATMENT: CPT

## 2022-07-14 PROCEDURE — 36415 COLL VENOUS BLD VENIPUNCTURE: CPT

## 2022-07-14 PROCEDURE — 6360000002 HC RX W HCPCS: Performed by: INTERNAL MEDICINE

## 2022-07-14 PROCEDURE — 2580000003 HC RX 258: Performed by: INTERNAL MEDICINE

## 2022-07-14 PROCEDURE — 2700000000 HC OXYGEN THERAPY PER DAY

## 2022-07-14 PROCEDURE — 2060000000 HC ICU INTERMEDIATE R&B

## 2022-07-14 PROCEDURE — 85610 PROTHROMBIN TIME: CPT

## 2022-07-14 PROCEDURE — 99232 SBSQ HOSP IP/OBS MODERATE 35: CPT | Performed by: INTERNAL MEDICINE

## 2022-07-14 RX ORDER — PREDNISONE 20 MG/1
40 TABLET ORAL DAILY
Status: DISCONTINUED | OUTPATIENT
Start: 2022-07-15 | End: 2022-07-15 | Stop reason: HOSPADM

## 2022-07-14 RX ORDER — CALCIUM CARBONATE 200(500)MG
500 TABLET,CHEWABLE ORAL 3 TIMES DAILY PRN
Status: DISCONTINUED | OUTPATIENT
Start: 2022-07-14 | End: 2022-07-15 | Stop reason: HOSPADM

## 2022-07-14 RX ADMIN — IPRATROPIUM BROMIDE 0.5 MG: 0.5 SOLUTION RESPIRATORY (INHALATION) at 16:05

## 2022-07-14 RX ADMIN — ROSUVASTATIN CALCIUM 10 MG: 10 TABLET, FILM COATED ORAL at 20:17

## 2022-07-14 RX ADMIN — IPRATROPIUM BROMIDE 0.5 MG: 0.5 SOLUTION RESPIRATORY (INHALATION) at 09:11

## 2022-07-14 RX ADMIN — SENNOSIDES 8.6 MG: 8.6 TABLET, FILM COATED ORAL at 20:17

## 2022-07-14 RX ADMIN — BUDESONIDE 250 MCG: 0.25 SUSPENSION RESPIRATORY (INHALATION) at 19:58

## 2022-07-14 RX ADMIN — PANTOPRAZOLE SODIUM 40 MG: 40 TABLET, DELAYED RELEASE ORAL at 09:32

## 2022-07-14 RX ADMIN — Medication 10 ML: at 20:18

## 2022-07-14 RX ADMIN — IPRATROPIUM BROMIDE 0.5 MG: 0.5 SOLUTION RESPIRATORY (INHALATION) at 12:31

## 2022-07-14 RX ADMIN — WARFARIN SODIUM 3 MG: 3 TABLET ORAL at 09:32

## 2022-07-14 RX ADMIN — SPIRONOLACTONE 12.5 MG: 25 TABLET ORAL at 09:32

## 2022-07-14 RX ADMIN — METHYLPREDNISOLONE SODIUM SUCCINATE 40 MG: 40 INJECTION, POWDER, LYOPHILIZED, FOR SOLUTION INTRAMUSCULAR; INTRAVENOUS at 15:24

## 2022-07-14 RX ADMIN — SENNOSIDES 8.6 MG: 8.6 TABLET, FILM COATED ORAL at 09:32

## 2022-07-14 RX ADMIN — IPRATROPIUM BROMIDE 0.5 MG: 0.5 SOLUTION RESPIRATORY (INHALATION) at 19:58

## 2022-07-14 RX ADMIN — FUROSEMIDE 40 MG: 10 INJECTION, SOLUTION INTRAMUSCULAR; INTRAVENOUS at 09:32

## 2022-07-14 RX ADMIN — AMLODIPINE BESYLATE 5 MG: 5 TABLET ORAL at 09:32

## 2022-07-14 RX ADMIN — ARFORMOTEROL TARTRATE 15 MCG: 15 SOLUTION RESPIRATORY (INHALATION) at 09:12

## 2022-07-14 RX ADMIN — ARFORMOTEROL TARTRATE 15 MCG: 15 SOLUTION RESPIRATORY (INHALATION) at 19:58

## 2022-07-14 RX ADMIN — METHYLPREDNISOLONE SODIUM SUCCINATE 40 MG: 40 INJECTION, POWDER, LYOPHILIZED, FOR SOLUTION INTRAMUSCULAR; INTRAVENOUS at 01:31

## 2022-07-14 RX ADMIN — BUDESONIDE 250 MCG: 0.25 SUSPENSION RESPIRATORY (INHALATION) at 09:12

## 2022-07-14 RX ADMIN — Medication 10 ML: at 09:33

## 2022-07-14 RX ADMIN — POLYETHYLENE GLYCOL 3350 17 G: 17 POWDER, FOR SOLUTION ORAL at 09:32

## 2022-07-14 NOTE — PROGRESS NOTES
Maria Luz Rodriguez M.D.,[facility]  Nel Da Silva D.O., F.LEONELAOJOHAN., Rosalind Lee M.D. Jocy Washington M.D. Yadira Larios D.O. Daily Pulmonary Progress Note    Patient: Corrine ePrez 78 y.o. female MRN: [id number]     Date of Service: 7/14/2022      Synopsis     We are following patient for acute hypoxic respiratory failure    \"CC\" shortness of breath    Code status: Full      Subjective      Patient was seen and examined. Ambulating around room in no acute distress. Oxygen on at 4.5 L nasal cannula. She states breathing is much improved today. Less chest tightness and wheeze. Await cardiology evaluation. Review of Systems:  Constitutional: Denies fever, weight loss, night sweats, and fatigue  Skin: Denies pigmentation, dark lesions, and rashes   HEENT: Denies hearing loss, tinnitus, ear drainage, epistaxis, sore throat, and hoarseness. Cardiovascular: Denies palpitations, chest pain, and chest pressure. Respiratory: Dyspnea, chest tightness, wheezing-improved  Gastrointestinal: Denies nausea, vomiting, poor appetite, diarrhea, heartburn or reflux  Genitourinary: Denies dysuria, frequency, urgency or hematuria  Musculoskeletal: Denies myalgias, muscle weakness, and bone pain  Neurological: Denies dizziness, vertigo, headache, and focal weakness  Psychological: Denies anxiety and depression  Endocrine: Denies heat intolerance and cold intolerance  Hematopoietic/Lymphatic: Denies bleeding problems and blood transfusions    Exposures   Cat Hair Extract Itching       Patient states \"My son put this.  I hope I'm not allergic, I have 4 Cats and 2 Birds, maybe to their dust.\"         24-hour events:  None    Objective   Vitals: BP (!) 108/54   Pulse 77   Temp 97.8 °F (36.6 °C) (Oral)   Resp 20   Ht 5' 8\" (1.727 m)   Wt 175 lb 0.7 oz (79.4 kg)   SpO2 97%   BMI 26.62 kg/m²     I/O:    Intake/Output Summary (Last 24 hours) at 7/14/2022 1129  Last data filed at 7/14/2022 1112  Gross per 24 hour   Intake --   Output 5400 ml   Net -5400 ml                        CURRENT MEDS :  Scheduled Meds:   amLODIPine  5 mg Oral Daily    pantoprazole  40 mg Oral QAM    rosuvastatin  10 mg Oral Nightly    spironolactone  12.5 mg Oral Daily    warfarin  3 mg Oral Daily    furosemide  40 mg IntraVENous Daily    Arformoterol Tartrate  15 mcg Nebulization BID    And    budesonide  0.25 mg Nebulization BID    And    ipratropium  0.5 mg Nebulization 4x daily    senna  1 tablet Oral BID    polyethylene glycol  17 g Oral Daily    sodium chloride flush  10 mL IntraVENous BID    methylPREDNISolone  40 mg IntraVENous Q12H       Physical Exam:  General Appearance: appears comfortable in no acute distress. HEENT: Normocephalic atraumatic without obvious abnormality   Neck: Lips, mucosa, and tongue normal.  Supple, symmetrical, trachea midline, no adenopathy;thyroid:  no enlargement/tenderness/nodules or JVD. Lung: Breath sounds improved aeration and expiratory wheeze. Respirations   unlabored. Symmetrical expansion. Heart: RRR, normal S1, S2. No MRG  Abdomen: Soft, NT, ND. BS present x 4 quadrants. No bruit or organomegaly. Extremities: Pedal pulses 2+ symmetric b/l. Extremities normal, no cyanosis, clubbing, or edema. Musculokeletal: No joint swelling, no muscle tenderness. ROM normal in all joints of extremities. Neurologic: Mental status: Alert and Oriented X3 .     Pertinent/ New Labs and Imaging Studies     Imaging Personally Reviewed:  CTA chest      Impression   No evidence of pulmonary embolism.       Complete consolidation of the right middle lobe which could be related to an   underlying airspace disease process.  Interstitial septal thickening with   scattered reticular densities and minimal patchy opacities which are   nonspecific and could indicate an interstitial disease process.       Large retrocardiac hiatal hernia.                  ECHO 2017  Summary   Left ventricle is normal in size .   Borderline concentric left ventricular hypertrophy   No regional wall motion abnormalities seen. Overall ejection fraction moderate-to-severely decreased . The left atrium is moderately dilated. Right ventricle global systolic function is reduced . Moderately enlarged right atrium size. Moderate mitral regurgitation is present. The aortic valve appears mildly sclerotic. Mild aortic regurgitation is noted. Moderate to severe tricuspid regurgitation. Pulmonary hypertension is mild . There is a trivial circumferential pericardial effusion noted. Labs:  Lab Results   Component Value Date/Time    WBC 5.3 07/13/2022 12:01 AM    HGB 12.0 07/13/2022 12:01 AM    HCT 38.4 07/13/2022 12:01 AM    MCV 98.5 07/13/2022 12:01 AM    MCH 30.8 07/13/2022 12:01 AM    MCHC 31.3 07/13/2022 12:01 AM    RDW 14.5 07/13/2022 12:01 AM     07/13/2022 12:01 AM    MPV 11.1 07/13/2022 12:01 AM     Lab Results   Component Value Date/Time     07/13/2022 12:01 AM    K 4.9 07/13/2022 12:01 AM     07/13/2022 12:01 AM    CO2 25 07/13/2022 12:01 AM    BUN 20 07/13/2022 12:01 AM    CREATININE 1.0 07/13/2022 12:01 AM    LABALBU 4.1 05/03/2022 02:52 PM    LABALBU 4.4 05/04/2012 11:00 AM    CALCIUM 9.7 07/13/2022 12:01 AM    GFRAA >60 07/13/2022 12:01 AM    LABGLOM 53 07/13/2022 12:01 AM     Lab Results   Component Value Date/Time    PROTIME 20.3 07/14/2022 06:21 AM    INR 1.9 07/14/2022 06:21 AM     Recent Labs     07/13/22  0001   PROBNP 3,205*     Recent Labs     07/13/22  0001   PROCAL 0.02     This SmartLink has not been configured with any valid records. Micro:  No results for input(s): CULTRESP in the last 72 hours. No results for input(s): LABGRAM in the last 72 hours. No results for input(s): LEGUR in the last 72 hours. Recent Labs     07/13/22  1400   STREPNEUMAGU Presumptive negative- suggests no current or recent  pneumococcal infection.   Infection due to Strep pneumoniae cannot be  ruled out since the antigen present in the sample  may be below the detection limit of the test.  Normal Range:Presumptive Negative       Recent Labs     07/13/22  1400   LP1UAG Presumptive Negative -suggesting no recent or current infections  with Legionella pneumophila serogroup 1. Infection to Legionella cannot be ruled out since other serogroups  and species may cause infection, antigen may not be present in  early infection, or level of antigen may be below the  detection limit. Normal Range: Presumptive Negative            Assessment:    1. Chronic respiratory failure with hypoxia  2. COPD with acute exacerbation  3. Interstitial lung disease  4. Squamous cell lung cancer stage IIb, status post radiation August 2021  5. Episodes of bradycardia reported at home  6. Mildly decompensated systolic heart failure  7. Chronic right middle lobe lung collapse    Plan:   1. Oxygen therapy 4 to 5 L nasal cannula keep SPO2 greater than 92%  2. IV Medrol 40 mg IV twice daily  3. Scheduled bronchodilators-Pulmicort, Brovana, Atrovent. Can resume Trelegy Ellipta upon discharge  4. Incentive spirometer, lung recruitment maneuvers  5. Bleed cardiology evaluation for management of heart failure and episodes of bradycardia reported at home  6. Maintain negative fluid balance. Continue diuresis- Lasix 40 mg IV daily, Aldactone 12.5 mg p.o. daily  7. DVT, GI prophylaxis-warfarin, Protonix. INR 1.9    This plan of care was reviewed in collaboration with Dr. Ángel Boateng  Electronically signed by Earley Blizzard, APRN - CNP on 7/14/2022 at 11:29 AM    I personally saw, examined, and cared for the patient. Labs, medications, radiographs reviewed.  I agree with history exam and plans detailed in NP note with the following additions:    Clinically improved  Less wheezing today  Change to oral prednisone as of tomorrow  Await cardiology input   May need outpatient event monitor    Mac Cool MD

## 2022-07-14 NOTE — CARE COORDINATION
7/14/2022  Social Work Discharge Planning:COVID NEG 7/13. Plan is to return home alone. Independent with all needed DME. Has a nebulizer. Pt is on 6l o2 here and only uses 5l via [facility] DME at home. Wean o2. Pulse ox testing will be needed. Pt is declining HHC. Strong family support. Daughter or son will transport Pt home. Electronically signed by KAREN Santos on 7/14/2022 at 9:55 AM

## 2022-07-15 VITALS
HEIGHT: 68 IN | HEART RATE: 65 BPM | RESPIRATION RATE: 18 BRPM | TEMPERATURE: 97.6 F | WEIGHT: 175.04 LBS | DIASTOLIC BLOOD PRESSURE: 67 MMHG | SYSTOLIC BLOOD PRESSURE: 142 MMHG | BODY MASS INDEX: 26.53 KG/M2 | OXYGEN SATURATION: 98 %

## 2022-07-15 LAB
EKG ATRIAL RATE: 37 BPM
EKG Q-T INTERVAL: 444 MS
EKG QRS DURATION: 74 MS
EKG QTC CALCULATION (BAZETT): 370 MS
EKG R AXIS: 42 DEGREES
EKG T AXIS: 77 DEGREES
EKG VENTRICULAR RATE: 42 BPM
INR BLD: 2.2
PROTHROMBIN TIME: 23.4 SEC (ref 9.3–12.4)

## 2022-07-15 PROCEDURE — 2580000003 HC RX 258: Performed by: INTERNAL MEDICINE

## 2022-07-15 PROCEDURE — 94640 AIRWAY INHALATION TREATMENT: CPT

## 2022-07-15 PROCEDURE — 6370000000 HC RX 637 (ALT 250 FOR IP): Performed by: INTERNAL MEDICINE

## 2022-07-15 PROCEDURE — 2700000000 HC OXYGEN THERAPY PER DAY

## 2022-07-15 PROCEDURE — 99239 HOSP IP/OBS DSCHRG MGMT >30: CPT | Performed by: INTERNAL MEDICINE

## 2022-07-15 PROCEDURE — 6360000002 HC RX W HCPCS: Performed by: INTERNAL MEDICINE

## 2022-07-15 PROCEDURE — 85610 PROTHROMBIN TIME: CPT

## 2022-07-15 PROCEDURE — 36415 COLL VENOUS BLD VENIPUNCTURE: CPT

## 2022-07-15 RX ORDER — PREDNISONE 20 MG/1
40 TABLET ORAL DAILY
Qty: 20 TABLET | Refills: 0 | Status: SHIPPED | OUTPATIENT
Start: 2022-07-15 | End: 2022-07-25

## 2022-07-15 RX ORDER — FUROSEMIDE 40 MG/1
40 TABLET ORAL DAILY
Qty: 60 TABLET | Refills: 3 | Status: SHIPPED | OUTPATIENT
Start: 2022-07-15 | End: 2022-09-12 | Stop reason: SDUPTHER

## 2022-07-15 RX ADMIN — SENNOSIDES 8.6 MG: 8.6 TABLET, FILM COATED ORAL at 10:06

## 2022-07-15 RX ADMIN — PREDNISONE 40 MG: 20 TABLET ORAL at 10:06

## 2022-07-15 RX ADMIN — ARFORMOTEROL TARTRATE 15 MCG: 15 SOLUTION RESPIRATORY (INHALATION) at 08:53

## 2022-07-15 RX ADMIN — BUDESONIDE 250 MCG: 0.25 SUSPENSION RESPIRATORY (INHALATION) at 08:52

## 2022-07-15 RX ADMIN — SPIRONOLACTONE 12.5 MG: 25 TABLET ORAL at 10:06

## 2022-07-15 RX ADMIN — POLYETHYLENE GLYCOL 3350 17 G: 17 POWDER, FOR SOLUTION ORAL at 10:06

## 2022-07-15 RX ADMIN — PANTOPRAZOLE SODIUM 40 MG: 40 TABLET, DELAYED RELEASE ORAL at 10:06

## 2022-07-15 RX ADMIN — IPRATROPIUM BROMIDE 0.5 MG: 0.5 SOLUTION RESPIRATORY (INHALATION) at 08:52

## 2022-07-15 RX ADMIN — FUROSEMIDE 40 MG: 10 INJECTION, SOLUTION INTRAMUSCULAR; INTRAVENOUS at 10:06

## 2022-07-15 RX ADMIN — WARFARIN SODIUM 3 MG: 3 TABLET ORAL at 10:06

## 2022-07-15 RX ADMIN — AMLODIPINE BESYLATE 5 MG: 5 TABLET ORAL at 10:06

## 2022-07-15 RX ADMIN — Medication 10 ML: at 10:06

## 2022-07-15 RX ADMIN — IPRATROPIUM BROMIDE 0.5 MG: 0.5 SOLUTION RESPIRATORY (INHALATION) at 12:33

## 2022-07-15 NOTE — PROGRESS NOTES
PROGRESS NOTE       PATIENT PROBLEM LIST:  Patient Active Problem List   Diagnosis Code    Atrial fibrillation with RVR ([facility] [address]- Recurrent I48.91    Acute on chronic diastolic congestive heart failure ([facility]) I50.33    Cardiomyopathy as manifestation of underlying disease ([facility] [address] I43    Non-rheumatic mitral regurgitation I34.0    Acute combined systolic and diastolic congestive heart failure ([facility]) I50.41    Chronic anticoagulation Z79.01    Essential hypertension I10    COPD (chronic obstructive pulmonary disease) ([facility]) J44.9    History of atrial fibrillation Z86.79    Dilated idiopathic cardiomyopathy ([facility]) I42.0    Severe sinus bradycardia R00.1    Moderate tricuspid regurgitation I07.1    Hypoxia R09.02    Combined forms of age-related cataract of both eyes H25.813    Dyspnea on exertion R06.00    Mass of right lung R91.8    Supplemental oxygen dependent Z99.81    Pneumonia J18.9       SUBJECTIVE:  Patricia Fernandze states she feels improved. No complaints at this time. No chest symptoms or shortness of breath. Wishes to be discharged. REVIEW OF SYSTEMS:  General ROS: negative for - fatigue, malaise,  weight gain or weight loss  Psychological ROS: negative for - anxiety , depression  Ophthalmic ROS: negative for - decreased vision or visual distortion. ENT ROS: negative  Allergy and Immunology ROS: negative  Hematological and Lymphatic ROS: negative  Endocrine: no heat or cold intolerance and no polyphagia, polydipsia, or polyuria  Respiratory ROS: positive for - shortness of breath (improved)  Cardiovascular ROS: negative for - chest pain or palpitations.   Gastrointestinal ROS: no abdominal pain, change in bowel habits, or black or bloody stools  Genito-Urinary ROS: no nocturia, dysuria, trouble voiding, frequency or hematuria  Musculoskeletal ROS: negative for- myalgias, arthralgias, or claudication  Neurological ROS: no TIA or stroke symptoms otherwise no significant change in symptoms or problems since yesterday as documented in previous progress notes. SCHEDULED MEDICATIONS:      VITAL SIGNS:                                                                                                                          BP (!) 142/67   Pulse 65   Temp 97.6 °F (36.4 °C) (Oral)   Resp 18   Ht 5' 8\" (1.727 m)   Wt 175 lb 0.7 oz (79.4 kg)   SpO2 98%   BMI 26.62 kg/m²   Patient Vitals for the past 96 hrs (Last 3 readings):   Weight   07/15/22 0544 175 lb 0.7 oz (79.4 kg)   07/14/22 0513 175 lb 0.7 oz (79.4 kg)   07/12/22 2008 173 lb (78.5 kg)     OBJECTIVE:    HEENT: PERRL, EOM  Intact; sclera non-icteric, conjunctiva pink. Carotids are brisk in upstroke with normal contour. No carotid bruits. Normal jugular venous pulsation at 45°. No palpable cervical nor supraclavicular nodes. Thyroid not palpable. Trachea midline. Chest: Even excursion  Lungs: CTA B, no expiratory wheezes or rhonchi, no decreased tactile fremitus without inspiratory rales. Heart: Regular  rhythm; S1 > S2, no gallop or murmur. No clicks, rub, palpable thrills   or heaves. PMI nondisplaced, 5th intercostal space MCL. Abdomen: Soft, nontender, nondistended,  moderately protuberant, no masses or organomegaly. Bowel sounds active. Extremities: Without clubbing, cyanosis or edema. Pulses present 3+ upper extermities bilaterally; present 1+ DP and present 1+ PT bilaterally.      Data:   Scheduled Meds: Reviewed  Continuous Infusions:     Intake/Output Summary (Last 24 hours) at 7/15/2022 1806  Last data filed at 7/15/2022 1206  Gross per 24 hour   Intake --   Output 2000 ml   Net -2000 ml     CBC:   Recent Labs     07/13/22  0001   WBC 5.3   HGB 12.0   HCT 38.4        BMP:  Recent Labs     07/13/22  0001      K 4.9      CO2 25   BUN 20   CREATININE 1.0   LABGLOM 53     ABGs:   Lab Results   Component Value Date/Time    PH 7.421 05/04/2022 04:52 PM    PO2 143.0 05/04/2022 04:52 PM    PCO2 44.1 05/04/2022 04:52 PM     INR:   Recent Labs     07/13/22  0014 07/14/22  0621 07/15/22  0407   INR 1.7 1.9 2.2     PRO-BNP:   Lab Results   Component Value Date    PROBNP 3,205 (H) 07/13/2022    PROBNP 2,194 (H) 05/03/2022      TSH:   Lab Results   Component Value Date    TSH 6.150 (H) 05/02/2022      Cardiac Injury Profile:   Recent Labs     07/13/22  0001 07/13/22  0127   TROPHS 22* 19*      Lipid Profile:   Lab Results   Component Value Date/Time    TRIG 84 05/02/2022 10:40 AM    HDL 62 05/02/2022 10:40 AM    LDLCALC 77 05/02/2022 10:40 AM    CHOL 156 05/02/2022 10:40 AM      Hemoglobin A1C: No components found for: HGBA1C      RAD:   CTA PULMONARY W CONTRAST   Final Result   No evidence of pulmonary embolism. Complete consolidation of the right middle lobe which could be related to an   underlying airspace disease process. Interstitial septal thickening with   scattered reticular densities and minimal patchy opacities which are   nonspecific and could indicate an interstitial disease process. Large retrocardiac hiatal hernia. RECOMMENDATIONS:   Unavailable         XR CHEST (2 VW)   Final Result   1. Chronic subsegmental atelectasis or scarring in mid and lower lung fields. 2. No obvious pneumonia or pleural effusion.                EKG: See Report  Echo: See Report      IMPRESSIONS:  Patient Active Problem List   Diagnosis Code    Atrial fibrillation with RVR ([facility] [address]- Recurrent I48.91    Acute on chronic diastolic congestive heart failure ([facility]) I50.33    Cardiomyopathy as manifestation of underlying disease ([address] I43    Non-rheumatic mitral regurgitation I34.0    Acute combined systolic and diastolic congestive heart failure ([facility]) I50.41    Chronic anticoagulation Z79.01    Essential hypertension I10    COPD (chronic obstructive pulmonary disease) ([facility]) J44.9    History of atrial fibrillation Z86.79    Dilated idiopathic cardiomyopathy ([facility]) I42.0    Severe sinus bradycardia R00.1    Moderate tricuspid regurgitation I07.1    Hypoxia R09.02    Combined forms of age-related cataract of both eyes H25.813    Dyspnea on exertion R06.00    Mass of right lung R91.8    Supplemental oxygen dependent Z99.81    Pneumonia J18.9       RECOMMENDATIONS:    Again, note is made that Ms. Hyatt cut back her medications on her own due to her at-home bradycardia. Presently, her heart rate has increased and she is more comfortable. We will continue to maintain her medications and observe during this hospitalization and adjust accordingly. She feels better and wishes to be discharged. She is to follow up outpatient with me after discharge. I have spent more than 25 minutes face to face with Anderson Barba and reviewing notes and laboratory data, with greater than 50% of this time instructing and counseling the patient  face to face regarding my findings and recommendations and I have answered all questions as posed to me by Ms. Hyatt. Ángel Diana, DO FACP,FACC,FSCAI      NOTE:  This report was transcribed using voice recognition software.   Every effort was made to ensure accuracy; however, inadvertent computerized transcription errors may be present

## 2022-07-15 NOTE — PROGRESS NOTES
Admit Date: 7/12/2022    Subjective:     Feels much better want to go home     Objective:     Patient Vitals for the past 8 hrs:   BP Temp Temp src Pulse Resp SpO2 Weight   07/15/22 0544 -- -- -- -- -- -- 175 lb 0.7 oz (79.4 kg)   07/15/22 0315 120/65 98.4 °F (36.9 °C) Oral 61 18 97 % --     I/O last 3 completed shifts:  In: -   Out: 3800 [Urine:3800]  No intake/output data recorded. HEENT: Normal  NECK: Thyroid normal. No carotid bruit. No lymphphadenopathy. CVS: RRR  RS: decrease BS few rhonchi no wheezing   ABD: Soft. Non tender. No mass. Normal BS. EXT: No edema. Non tender. Pulses present.    NEURO: no focal deficit       Scheduled Meds:   predniSONE  40 mg Oral Daily    amLODIPine  5 mg Oral Daily    pantoprazole  40 mg Oral QAM    rosuvastatin  10 mg Oral Nightly    spironolactone  12.5 mg Oral Daily    warfarin  3 mg Oral Daily    furosemide  40 mg IntraVENous Daily    Arformoterol Tartrate  15 mcg Nebulization BID    And    budesonide  0.25 mg Nebulization BID    And    ipratropium  0.5 mg Nebulization 4x daily    senna  1 tablet Oral BID    polyethylene glycol  17 g Oral Daily    sodium chloride flush  10 mL IntraVENous BID     Continuous Infusions:    CBC with Differential:    Lab Results   Component Value Date/Time    WBC 5.3 07/13/2022 12:01 AM    RBC 3.90 07/13/2022 12:01 AM    HGB 12.0 07/13/2022 12:01 AM    HCT 38.4 07/13/2022 12:01 AM     07/13/2022 12:01 AM    MCV 98.5 07/13/2022 12:01 AM    MCH 30.8 07/13/2022 12:01 AM    MCHC 31.3 07/13/2022 12:01 AM    RDW 14.5 07/13/2022 12:01 AM    SEGSPCT 46 02/14/2014 11:27 AM    LYMPHOPCT 30.1 07/13/2022 12:01 AM    MONOPCT 7.2 07/13/2022 12:01 AM    BASOPCT 0.8 07/13/2022 12:01 AM    MONOSABS 0.38 07/13/2022 12:01 AM    LYMPHSABS 1.58 07/13/2022 12:01 AM    EOSABS 0.10 07/13/2022 12:01 AM    BASOSABS 0.04 07/13/2022 12:01 AM     CMP:    Lab Results   Component Value Date/Time     07/13/2022 12:01 AM    K 4.9 07/13/2022 12:01 AM     07/13/2022 12:01 AM    CO2 25 07/13/2022 12:01 AM    BUN 20 07/13/2022 12:01 AM    CREATININE 1.0 07/13/2022 12:01 AM    GFRAA >60 07/13/2022 12:01 AM    LABGLOM 53 07/13/2022 12:01 AM    PROT 6.7 05/03/2022 02:52 PM    LABALBU 4.1 05/03/2022 02:52 PM    LABALBU 4.4 05/04/2012 11:00 AM    CALCIUM 9.7 07/13/2022 12:01 AM    BILITOT 0.5 05/03/2022 02:52 PM    ALKPHOS 55 05/03/2022 02:52 PM    AST 22 05/03/2022 02:52 PM    ALT 10 05/03/2022 02:52 PM     PT/INR:    Lab Results   Component Value Date/Time    PROTIME 23.4 07/15/2022 04:07 AM    INR 2.2 07/15/2022 04:07 AM       Assessment:     Principal Problem:   Acute on chronic hypoxic respiratory failure  ?pneumonia  COPD  Right lung CA by Geovanny Donnelly  HTN  Cardiomyopathy  Hypothyroid       Plan:   Improvement stable will discharge on same regimen if cleared by cardiology /pulmonary

## 2022-07-15 NOTE — PROGRESS NOTES
Cammie Geiger M.D.,[facility]  Taniya Dorman D.O., F.A.C.O.I., Berna Kulkarni M.D. Shruthi Adames M.D. Janis Capellan D.O. Daily Pulmonary Progress Note    Patient: Harry Callaway 78 y.o. female MRN: [id number]     Date of Service: 7/15/2022      Synopsis     We are following patient for acute hypoxic respiratory failure    \"CC\" shortness of breath    Code status: Full      Subjective      Patient was seen and examined. Oxygen on at 5 L nasal cannula. She states breathing is much improved today. She states she feels fine and is asking to go home soon. Less chest tightness and wheeze. Review of Systems:  Constitutional: Denies fever, weight loss, night sweats, and fatigue  Skin: Denies pigmentation, dark lesions, and rashes   HEENT: Denies hearing loss, tinnitus, ear drainage, epistaxis, sore throat, and hoarseness. Cardiovascular: Denies palpitations, chest pain, and chest pressure. Respiratory: Dyspnea, chest tightness, wheezing-improved  Gastrointestinal: Denies nausea, vomiting, poor appetite, diarrhea, heartburn or reflux  Genitourinary: Denies dysuria, frequency, urgency or hematuria  Musculoskeletal: Denies myalgias, muscle weakness, and bone pain  Neurological: Denies dizziness, vertigo, headache, and focal weakness  Psychological: Denies anxiety and depression  Endocrine: Denies heat intolerance and cold intolerance  Hematopoietic/Lymphatic: Denies bleeding problems and blood transfusions    Exposures   Cat Hair Extract Itching       Patient states \"My son put this.  I hope I'm not allergic, I have 4 Cats and 2 Birds, maybe to their dust.\"         24-hour events:  None    Objective   Vitals: BP (!) 142/67   Pulse 65   Temp 97.6 °F (36.4 °C) (Oral)   Resp 18   Ht 5' 8\" (1.727 m)   Wt 175 lb 0.7 oz (79.4 kg)   SpO2 98%   BMI 26.62 kg/m²     I/O:    Intake/Output Summary (Last 24 hours) at 7/15/2022 1039  Last data filed at 7/14/2022 1112  Gross per 24 hour   Intake -- Output 1300 ml   Net -1300 ml                          CURRENT MEDS :  Scheduled Meds:   predniSONE  40 mg Oral Daily    amLODIPine  5 mg Oral Daily    pantoprazole  40 mg Oral QAM    rosuvastatin  10 mg Oral Nightly    spironolactone  12.5 mg Oral Daily    warfarin  3 mg Oral Daily    furosemide  40 mg IntraVENous Daily    Arformoterol Tartrate  15 mcg Nebulization BID    And    budesonide  0.25 mg Nebulization BID    And    ipratropium  0.5 mg Nebulization 4x daily    senna  1 tablet Oral BID    polyethylene glycol  17 g Oral Daily    sodium chloride flush  10 mL IntraVENous BID       Physical Exam:  General Appearance: appears comfortable in no acute distress. HEENT: Normocephalic atraumatic without obvious abnormality   Neck: Lips, mucosa, and tongue normal.  Supple, symmetrical, trachea midline, no adenopathy;thyroid:  no enlargement/tenderness/nodules or JVD. Lung: Breath sounds improved aeration and expiratory wheeze. Respirations   unlabored. Symmetrical expansion. Heart: RRR, normal S1, S2. No MRG  Abdomen: Soft, NT, ND. BS present x 4 quadrants. No bruit or organomegaly. Extremities: Pedal pulses 2+ symmetric b/l. Extremities normal, no cyanosis, clubbing, or edema. Musculokeletal: No joint swelling, no muscle tenderness. ROM normal in all joints of extremities. Neurologic: Mental status: Alert and Oriented X3 . Pertinent/ New Labs and Imaging Studies     Imaging Personally Reviewed:  CTA chest      Impression   No evidence of pulmonary embolism. Complete consolidation of the right middle lobe which could be related to an   underlying airspace disease process. Interstitial septal thickening with   scattered reticular densities and minimal patchy opacities which are   nonspecific and could indicate an interstitial disease process. Large retrocardiac hiatal hernia. ECHO 2017  Summary   Left ventricle is normal in size .    Borderline concentric left ventricular sample  may be below the detection limit of the test.  Normal Range:Presumptive Negative         Recent Labs     07/13/22  1400   [id number] Presumptive Negative -suggesting no recent or current infections  with Legionella pneumophila serogroup 1. Infection to Legionella cannot be ruled out since other serogroups  and species may cause infection, antigen may not be present in  early infection, or level of antigen may be below the  detection limit. Normal Range: Presumptive Negative              Assessment:    Chronic respiratory failure with hypoxia  COPD with acute exacerbation  Interstitial lung disease  Squamous cell lung cancer stage IIb, status post radiation August 2021  Episodes of bradycardia reported at home  Mildly decompensated systolic heart failure  Chronic right middle lobe lung collapse    Plan:   Oxygen therapy 5 L nasal cannula keep SPO2 greater than 92%  Prednisone 40 mg p.o. daily with taper  Scheduled bronchodilators-Pulmicort, Brovana, Atrovent. Can resume Trelegy Ellipta upon discharge  Incentive spirometer, lung recruitment maneuvers  cardiology evaluation for management of heart failure and episodes of bradycardia reported at home. May need event monitor as outpatient. Patient was also recently taken off of her Synthroid by PCP. Maintain negative fluid balance. Continue diuresis- Lasix 40 mg IV daily, Aldactone 12.5 mg p.o. daily  DVT, GI prophylaxis-warfarin, Protonix. INR 2.2  Ok to dc from a pulmonary perspective will arrange OP follow up  This plan of care was reviewed in collaboration with Dr. Pancho Rice  Electronically signed by OUMOU Ackerman CNP on 7/15/2022 at 10:39 AM    I personally saw, examined, and cared for the patient. Labs, medications, radiographs reviewed.  I agree with history exam and plans detailed in NP note with the following additions:    Doing well and wants to go home  Prednisone taper  Resume home respiratory medications  Await cardiology plan  Okay for d/c from a pulmonary standpoint    Vannessa Farmer MD

## 2022-07-15 NOTE — CARE COORDINATION
7/15/2022  Social Work Discharge Planning: Discharge today. Pt is on 5l o2 here and uses this via [employer]. Declining HHC. Home alone. Family will transport. Electronically signed by KAREN Balderrama Cea on 7/15/2022 at 10:04 AM

## 2022-07-15 NOTE — CONSULTS
CARDIOLOGY CONSULTATION    Patient Name:  Luis Garber    :  1940    Reason for Consultation:   Reported at-home bradycardia    History of Present Illness: Luis Garber returns to [facility], following a recent history of not feeling right in her chest and shaking in her legs which she associates with the slow heartbeat she states affects her lungs. She does have a longstanding history of recurrent persistent atrial fibrillation in addition to having significant underlying valvular heart disease. This in addition to history of carcinoma of the lung and status post history of radiation therapy and underlying chronic obstructive pulmonary disease. From a cardiac standpoint she has fared well over the past 5 years but recently has noted some deterioration in her respiratory status. While in the emergency room an electrocardiogram was obtained which suggest possible junctional rhythm she denies any palpitations nor chest heaviness. She feels as if her heart is affecting her lungs. She now returns for further evaluation and adjustment of her medical regimen. Past Medical History:   has a past medical history of Atrial fibrillation ([address] CHF (congestive heart failure) ([address] Emphysema, unspecified ([address] Hyperlipidemia, Hypertension, Lung cancer ([address] Oxygen dependent, Prolonged emergence from general anesthesia, Skin cancer, and Thyroid disease. Surgical History:   has a past surgical history that includes Breast biopsy (Right); Tubal ligation; bronchoscopy (N/A, 2021); Intracapsular cataract extraction (Right, 2021); and Intracapsular cataract extraction (Left, 2022). Social History:   reports that she quit smoking about 23 years ago. Her smoking use included cigarettes. She started smoking about 70 years ago. She has a 92.00 pack-year smoking history.  She has never used smokeless tobacco. She reports current alcohol use of about 1.0 standard Well preserved, mesomorphic body habitus, alert, no distress. Skin: Skin color, texture, turgor normal. No rashes or lesions. No induration or tightening palpated. Head: Normocephalic. No masses, lesions, tenderness or abnormalities  Eyes: Conjunctivae/corneas clear. PERRL, EOMs intact. Sclera non icteric. Ears: External ears normal. Canals clear. TM's clear bilaterally. Hearing normal to finger rub. Nose/Sinuses: Nares normal. Septum midline. Mucosa normal. No drainage or sinus tenderness. Oropharynx: Lips, mucosa, and tongue normal. Oropharynx clear with no exudate seen. Neck: Neck supple and symmetric. No adenopathy. Thyroid symmetric, normal size, without nodules. Trachea is midline. Carotids brisk in upstroke without bruits, no abnormal JVP noted at 45°. Chest: Even excursion  Lungs: Lungs clear to auscultation bilaterally. No retractions or use of accessory muscles. No tactile vocal fremitus. No rhonchi, crackles or rales. Heart:  S1 > S2. Regular rhythm. No gallop or murmur. No rub, palpable thrill or heave noted. PMI 5th intercostal space midclavicular line. Abdomen: Abdomen soft, moderately protuberant, non-tender. BS normal. No masses, organomegaly. No hernia noted. Extremities: Extremities normal. No deformities, edema, or skin discoloration. No cyanosis or clubbing noted to the nails. Peripheral pulses present 1+ upper extremities and present 1+  lower extremities. Musculoskeletal: Spine ROM normal. Muscular strength intact. Neuro: Cranial nerves intact. Motor: Strength 5/5 in all extremities. Reflexes 2+ in all extremities. No focal weakness. Sensory: grossly normal to touch. Coordination intact.     Pertinent Labs:  CBC:   Recent Labs     07/13/22  0001   WBC 5.3   HGB 12.0        BMP:  Recent Labs     07/13/22  0001      K 4.9      CO2 25   BUN 20   CREATININE 1.0   GLUCOSE 115*   LABGLOM 53     ABGs:   Lab Results   Component Value Date/Time    PH 7.421 05/04/2022 04:52 PM    PO2 143.0 05/04/2022 04:52 PM    PCO2 44.1 05/04/2022 04:52 PM     INR:   Recent Labs     07/13/22  0014 07/14/22  0621 07/15/22  0407   INR 1.7 1.9 2.2     PRO-BNP:   Lab Results   Component Value Date    PROBNP 3,205 (H) 07/13/2022    PROBNP 2,194 (H) 05/03/2022      Cardiac Injury Profile:   Recent Labs     07/13/22  0001 07/13/22  0127   TROPHS 22* 19*      Lipid Profile:   Lab Results   Component Value Date/Time    TRIG 84 05/02/2022 10:40 AM    HDL 62 05/02/2022 10:40 AM    LDLCALC 77 05/02/2022 10:40 AM    CHOL 156 05/02/2022 10:40 AM      Thyroid:   Lab Results   Component Value Date    TSH 6.150 (H) 05/02/2022      Hemoglobin A1C: No components found for: HGBA1C   ECG:  See report    Radiology:  CTA PULMONARY W CONTRAST   Final Result   No evidence of pulmonary embolism. Complete consolidation of the right middle lobe which could be related to an   underlying airspace disease process. Interstitial septal thickening with   scattered reticular densities and minimal patchy opacities which are   nonspecific and could indicate an interstitial disease process. Large retrocardiac hiatal hernia. RECOMMENDATIONS:   Unavailable         XR CHEST (2 VW)   Final Result   1. Chronic subsegmental atelectasis or scarring in mid and lower lung fields. 2. No obvious pneumonia or pleural effusion.            Assessment:    Patient Active Problem List   Diagnosis Code    Atrial fibrillation with RVR ([facility])- Recurrent I48.91    Acute on chronic diastolic congestive heart failure (H[facility]) I50.33    Cardiomyopathy as manifestation of underlying disease ([facility] I43    Non-rheumatic mitral regurgitation I34.0    Acute combined systolic and diastolic congestive heart failure ([facility]) I50.41    Chronic anticoagulation Z79.01    Essential hypertension I10    COPD (chronic obstructive pulmonary disease) ([facility]) J44.9    History of atrial fibrillation Z86.79    Dilated idiopathic cardiomyopathy ([facility] I42.0    Severe sinus bradycardia R00.1    Moderate tricuspid regurgitation I07.1    Hypoxia R09.02    Combined forms of age-related cataract of both eyes H25.813    Dyspnea on exertion R06.00    Mass of right lung R91.8    Supplemental oxygen dependent Z99.81    Pneumonia J18.9       Plan:    Note is made that Ms. Hyatt cut back her medications on her own due to her at-home bradycardia. Presently, her heart rate has increased and she is more comfortable. We will thus maintain her medications and observe during this hospitalization and adjust accordingly. I have spent more than 55 minutes face to face with Katelyn Davila reviewing notes and laboratory data with greater than 50% of this time instructing and counseling the patient and her family members regarding my findings and recommendations and I have answered all questions as posed to me by Ms. Hyatt. Thank you, Breann Ortega MD for allowing me to consult in the care of this patient. Nayla Sanchez DO , FACP, [facility], [facility]    NOTE:  This report was transcribed using voice recognition software. Every effort was made to ensure accuracy; however, inadvertent computerized transcription errors may be present.

## 2022-07-15 NOTE — PROGRESS NOTES
Discharge paperwork initiated in the computer. Care plans and educations resolved. Follow ups added and med details completed on the AVS for discharge today.

## 2022-07-16 NOTE — DISCHARGE SUMMARY
Discharge Summary    Date: 7/16/2022  Patient Name: Rosemary Marmolejo    YOB: 1940     Age: 78 y.o. Admit Date: 7/12/2022  Discharge Date: 7/15/2022  Discharge Condition: Fair    Admission Diagnosis  Pneumonia [J18.9]; Dyspnea on exertion [R06.00]; Hypoxia [R09.02];COPD exacerbation ([address] [J44.1]; Pneumonia of right middle lobe due to infectious organism [J18.9]      Discharge Diagnosis  Principal Problem:    Pneumonia  Resolved Problems:    * No resolved hospital problems. [facility] Stay  Narrative of Hospital Course:  Known COPD on home O2 admitted from ER with increasing SOB requiring increase O2 supplement seen by pulmonary ,cardiology treated with steroid ,bronchodilator diuretic ATB improved back to her baseline discharged home     Consultants:  Vasu Magallanes TO PULMONOLOGY  IP CONSULT TO IV TEAM  IP CONSULT TO CARDIOLOGY    Surgeries/procedures Performed:      Treatments:            Discharge Plan/Disposition:  Home    Hospital/Incidental Findings Requiring Follow Up:    Patient Instructions:    Diet: Regular Diet    Activity:Activity as Tolerated  For number of days (if applicable): Other Instructions:    Provider Follow-Up:   No follow-ups on file.      Significant Diagnostic Studies:    Recent Labs:  Admission on 07/12/2022, Discharged on 07/15/2022  Ventricular Rate                              Date: 07/12/2022  Value: 51          Ref range: BPM                Status: Final  Atrial Rate                                   Date: 07/12/2022  Value: 50          Ref range: BPM                Status: Final  QRS Duration                                  Date: 07/12/2022  Value: 80          Ref range: ms                 Status: Final  Q-T Interval                                  Date: 07/12/2022  Value: 444         Ref range: ms                 Status: Final  QTc Calculation (Bazett)                      Date: 07/12/2022  Value: 409         Ref range: ms Status: Final  R Axis                                        Date: 07/12/2022  Value: 51          Ref range: degrees            Status: Final  T Axis                                        Date: 07/12/2022  Value: 94          Ref range: degrees            Status: Final  WBC                                           Date: 07/13/2022  Value: 5.3         Ref range: 4.5 - 11.5 E9/L    Status: Final  RBC                                           Date: 07/13/2022  Value: 3.90        Ref range: 3.50 - 5.50 E12/L  Status: Final  Hemoglobin                                    Date: 07/13/2022  Value: 12.0        Ref range: 11.5 - 15.5 g/dL   Status: Final  Hematocrit                                    Date: 07/13/2022  Value: 38.4        Ref range: 34.0 - 48.0 %      Status: Final  MCV                                           Date: 07/13/2022  Value: 98.5        Ref range: 80.0 - 99.9 fL     Status: Final  MCH                                           Date: 07/13/2022  Value: 30.8        Ref range: 26.0 - 35.0 pg     Status: Final  MCHC                                          Date: 07/13/2022  Value: 31.3 (A)    Ref range: 32.0 - 34.5 %      Status: Final  RDW                                           Date: 07/13/2022  Value: 14.5        Ref range: 11.5 - 15.0 fL     Status: Final  Platelets                                     Date: 07/13/2022  Value: 163         Ref range: 130 - 450 E9/L     Status: Final  MPV                                           Date: 07/13/2022  Value: 11.1        Ref range: 7.0 - 12.0 fL      Status: Final  Neutrophils %                                 Date: 07/13/2022  Value: 59.6        Ref range: 43.0 - 80.0 %      Status: Final  Immature Granulocytes %                       Date: 07/13/2022  Value: 0.4         Ref range: 0.0 - 5.0 %        Status: Final  Lymphocytes %                                 Date: 07/13/2022  Value: 30.1        Ref range: 20.0 - 42.0 %      Status: Final  Monocytes %                                   Date: 07/13/2022  Value: 7.2         Ref range: 2.0 - 12.0 %       Status: Final  Eosinophils %                                 Date: 07/13/2022  Value: 1.9         Ref range: 0.0 - 6.0 %        Status: Final  Basophils %                                   Date: 07/13/2022  Value: 0.8         Ref range: 0.0 - 2.0 %        Status: Final  Neutrophils Absolute                          Date: 07/13/2022  Value: 3.13        Ref range: 1.80 - 7.30 E9/L   Status: Final  Immature Granulocytes #                       Date: 07/13/2022  Value: 0.02        Ref range: E9/L               Status: Final  Lymphocytes Absolute                          Date: 07/13/2022  Value: 1.58        Ref range: 1.50 - 4.00 E9/L   Status: Final  Monocytes Absolute                            Date: 07/13/2022  Value: 0.38        Ref range: 0.10 - 0.95 E9/L   Status: Final  Eosinophils Absolute                          Date: 07/13/2022  Value: 0.10        Ref range: 0.05 - 0.50 E9/L   Status: Final  Basophils Absolute                            Date: 07/13/2022  Value: 0.04        Ref range: 0.00 - 0.20 E9/L   Status: Final  Sodium                                        Date: 07/13/2022  Value: 139         Ref range: 132 - 146 mmol/L   Status: Final  Potassium reflex Magnesium                    Date: 07/13/2022  Value: 4.9         Ref range: 3.5 - 5.0 mmol/L   Status: Final  Chloride                                      Date: 07/13/2022  Value: 103         Ref range: 98 - 107 mmol/L    Status: Final  CO2                                           Date: 07/13/2022  Value: 25          Ref range: 22 - 29 mmol/L     Status: Final  Anion Gap                                     Date: 07/13/2022  Value: 11          Ref range: 7 - 16 mmol/L      Status: Final  Glucose                                       Date: 07/13/2022  Value: 115 (A)     Ref range: 74 - 99 mg/dL      Status: Final  BUN Date: 07/13/2022  Value: 20          Ref range: 6 - 23 mg/dL       Status: Final  CREATININE                                    Date: 07/13/2022  Value: 1.0         Ref range: 0.5 - 1.0 mg/dL    Status: Final  GFR Non-                      Date: 07/13/2022  Value: 53          Ref range: >=60 mL/min/1.73   Status: Final                Comment: Chronic Kidney Disease: less than 60 ml/min/1.73 sq.m. Kidney Failure: less than 15 ml/min/1.73 sq.m. Results valid for patients 18 years and older. GFR                           Date: 07/13/2022  Value: >60           Status: Final  Calcium                                       Date: 07/13/2022  Value: 9.7         Ref range: 8.6 - 10.2 mg/dL   Status: Final  Pro-BNP                                       Date: 07/13/2022  Value: 3,205 (A)   Ref range: 0 - 450 pg/mL      Status: Final  Troponin, High Sensitivity                    Date: 07/13/2022  Value: 22 (A)      Ref range: 0 - 9 ng/L         Status: Final                Comment: High Sensitivity Troponin values cannot be compared with  other Troponin methodologies. Patients with high levels of Biotin oral intake (i.e. >5 mg/day)  may have falsely decreased Troponin levels. Samples collected  within 8 hours of biotin intake may require additional information  for diagnosis.     Ventricular Rate                              Date: 07/12/2022  Value: 84          Ref range: BPM                Status: Final  Atrial Rate                                   Date: 07/12/2022  Value: 84          Ref range: BPM                Status: Final  P-R Interval                                  Date: 07/12/2022  Value: 146         Ref range: ms                 Status: Final  QRS Duration                                  Date: 07/12/2022  Value: 74          Ref range: ms                 Status: Final  Q-T Interval                                  Date: 07/12/2022  Value: 360 Ref range: ms                 Status: Final  QTc Calculation (Bazett)                      Date: 07/12/2022  Value: 425         Ref range: ms                 Status: Final  P Axis                                        Date: 07/12/2022  Value: 55          Ref range: degrees            Status: Final  R Axis                                        Date: 07/12/2022  Value: 34          Ref range: degrees            Status: Final  T Axis                                        Date: 07/12/2022  Value: 8           Ref range: degrees            Status: Final  Protime                                       Date: 07/13/2022  Value: 19.3 (A)    Ref range: 9.3 - 12.4 sec     Status: Final  INR                                           Date: 07/13/2022  Value: 1.7           Status: Final  SARS-CoV-2, NAAT                              Date: 07/13/2022  Value: Not Detected                     Ref range: Not Detected       Status: Final                Comment: Rapid NAAT:   Negative results should be treated as presumptive and,  if inconsistent with clinical signs and symptoms or necessary for  patient management, should be tested with an alternative molecular  assay. Negative results do not preclude SARS-CoV-2 infection and  should not be used as the sole basis for patient management decisions. This test has been authorized by the FDA under an Emergency Use  Authorization (EUA) for use by authorized laboratories. Fact sheet for Healthcare Providers:  [employer]  Fact sheet for Patients: [employer]es    METHODOLOGY: Isothermal Nucleic Acid Amplification    Troponin, High Sensitivity                    Date: 07/13/2022  Value: 19 (A)      Ref range: 0 - 9 ng/L         Status: Final                Comment: High Sensitivity Troponin values cannot be compared with  other Troponin methodologies.     Patients with high levels of Biotin oral intake (i.e. >5 mg/day)  may have falsely decreased Troponin levels. Samples collected  within 8 hours of biotin intake may require additional information  for diagnosis.     Adenovirus by PCR                             Date: 07/13/2022  Value: Not Detected                     Ref range: Not Detected       Status: Final  Bordetella parapertussis by PCR               Date: 07/13/2022  Value: Not Detected                     Ref range: Not Detected       Status: Final  Bordetella pertussis by PCR                   Date: 07/13/2022  Value: Not Detected                     Ref range: Not Detected       Status: Final  Chlamydophilia pneumoniae by PCR              Date: 07/13/2022  Value: Not Detected                     Ref range: Not Detected       Status: Final  Coronavirus 229E by PCR                       Date: 07/13/2022  Value: Not Detected                     Ref range: Not Detected       Status: Final  Coronavirus HKU1 by PCR                       Date: 07/13/2022  Value: Not Detected                     Ref range: Not Detected       Status: Final  Coronavirus NL63 by PCR                       Date: 07/13/2022  Value: Not Detected                     Ref range: Not Detected       Status: Final  Coronavirus OC43 by PCR                       Date: 07/13/2022  Value: Not Detected                     Ref range: Not Detected       Status: Final  SARS-CoV-2, PCR                               Date: 07/13/2022  Value: Not Detected                     Ref range: Not Detected       Status: Final  Human Metapneumovirus by PCR                  Date: 07/13/2022  Value: Not Detected                     Ref range: Not Detected       Status: Final  Human Rhinovirus/Enterovirus by PCR           Date: 07/13/2022  Value: Not Detected                     Ref range: Not Detected       Status: Final  Influenza A by PCR                            Date: 07/13/2022  Value: Not Detected                     Ref range: Not Detected       Status: Final  Influenza B by PCR Date: 07/13/2022  Value: Not Detected                     Ref range: Not Detected       Status: Final  Mycoplasma pneumoniae by PCR                  Date: 07/13/2022  Value: Not Detected                     Ref range: Not Detected       Status: Final  Parainfluenza Virus 1 by PCR                  Date: 07/13/2022  Value: Not Detected                     Ref range: Not Detected       Status: Final  Parainfluenza Virus 2 by PCR                  Date: 07/13/2022  Value: Not Detected                     Ref range: Not Detected       Status: Final  Parainfluenza Virus 3 by PCR                  Date: 07/13/2022  Value: Not Detected                     Ref range: Not Detected       Status: Final  Parainfluenza Virus 4 by PCR                  Date: 07/13/2022  Value: Not Detected                     Ref range: Not Detected       Status: Final  Respiratory Syncytial Virus by PCR            Date: 07/13/2022  Value: Not Detected                     Ref range: Not Detected       Status: Final  STREP PNEUMONIAE ANTIGEN, URINE               Date: 07/13/2022  Value:               Status: Final                   Value:Presumptive negative- suggests no current or recent  pneumococcal infection. Infection due to Strep pneumoniae cannot be  ruled out since the antigen present in the sample  may be below the detection limit of the test.  Normal Range:Presumptive Negative    L. pneumophila Serogp 1 Ur Ag                 Date: 07/13/2022  Value:               Status: Final                   Value:Presumptive Negative -suggesting no recent or current infections  with Legionella pneumophila serogroup 1. Infection to Legionella cannot be ruled out since other serogroups  and species may cause infection, antigen may not be present in  early infection, or level of antigen may be below the  detection limit.   Normal Range: Presumptive Negative    CRP                                           Date: 07/13/2022  Value: 1.0 (A)     Ref range: 0.0 - 0.4 mg/dL    Status: Final  Procalcitonin                                 Date: 07/13/2022  Value: 0.02        Ref range: 0.00 - 0.08 ng/mL  Status: Final                Comment: Suspected Sepsis:  Low likelihood of sepsis  <.50 ng/mL    Increased likelihood of sepsis 0.50-2.00 ng/mL  Antibiotics encouraged    High risk of sepsis/shock   >2.00 ng/mL  Antibiotics strongly encouraged    Suspected Lower Respiratory Tract Infections:  Low likelihood of bacterial infection  <0.24 ng/mL    Increased likelihood of bacterial infection >0.24 ng/mL  Antibiotics encouraged    With successful antibiotic therapy, PCT levels should decrease  rapidly. (Half-life of 24 to 36 hours.)    Procalcitonin values from samples collected within the first  6 hours of systemic infection may still be low. Retesting may be indicated. Values from day 1 and day 4 can be entered into the Change in  Procalcitonin Calculator to determine the patient's  Mortality Risk Prognosis  ([email or web address]-pct-[email or web address])    In healthy neonates, plasma Procalcitonin (PCT) concentrations  increase gradually after birth, reaching peak values at about  24 hours of age then decrease to normal values below 0.5                          ng/mL  by 48-72 hours of age.     Sed Rate                                      Date: 07/13/2022  Value: 26 (A)      Ref range: 0 - 20 mm/Hr       Status: Final  Protime                                       Date: 07/14/2022  Value: 20.3 (A)    Ref range: 9.3 - 12.4 sec     Status: Final  INR                                           Date: 07/14/2022  Value: 1.9           Status: Final  Protime                                       Date: 07/15/2022  Value: 23.4 (A)    Ref range: 9.3 - 12.4 sec     Status: Final  INR                                           Date: 07/15/2022  Value: 2.2           Status: Final  Ventricular Rate                              Date: 07/12/2022  Value: 42          Ref range: BPM Status: Final  Atrial Rate                                   Date: 07/12/2022  Value: 37          Ref range: BPM                Status: Final  QRS Duration                                  Date: 07/12/2022  Value: 74          Ref range: ms                 Status: Final  Q-T Interval                                  Date: 07/12/2022  Value: 444         Ref range: ms                 Status: Final  QTc Calculation (Bazett)                      Date: 07/12/2022  Value: 370         Ref range: ms                 Status: Final  R Axis                                        Date: 07/12/2022  Value: 42          Ref range: degrees            Status: Final  T Axis                                        Date: 07/12/2022  Value: 77          Ref range: degrees            Status: Final  ------------    Radiology last 7 days:  XR CHEST (2 VW)    Result Date: 7/12/2022  1. Chronic subsegmental atelectasis or scarring in mid and lower lung fields. 2. No obvious pneumonia or pleural effusion. CTA PULMONARY W CONTRAST    Result Date: 7/13/2022  No evidence of pulmonary embolism. Complete consolidation of the right middle lobe which could be related to an underlying airspace disease process. Interstitial septal thickening with scattered reticular densities and minimal patchy opacities which are nonspecific and could indicate an interstitial disease process. Large retrocardiac hiatal hernia. RECOMMENDATIONS: Unavailable        [unfilled]    Discharge Medications    Discharge Medication List as of 7/15/2022  1:06 PM    START taking these medications    predniSONE (DELTASONE) 20 MG tablet  Take 2 tablets by mouth in the morning for 10 days. , Disp-20 tablet, R-0  Normal          Discharge Medication List as of 7/15/2022  1:06 PM    CONTINUE these medications which have CHANGED    furosemide (LASIX) 40 MG tablet  Take 1 tablet by mouth in the morning., Disp-60 tablet, R-3  Normal          Discharge Medication List as of

## 2022-07-21 NOTE — PROGRESS NOTES
Physician Progress Note      PATIENT:               Linette Palma  CSN #:                  [id number]  :                       1940  ADMIT DATE:       2022 11:50 PM  [address] [place] DATE:        7/15/2022 2:04 PM  RESPONDING  PROVIDER #:        Nila Shabazz MD          QUERY TEXT:    Patient admitted with respiratory failure, CHF. Noted documentation of   pneumonia in H&P and PN's. In order to support the diagnosis of pneumonia,   please include additional clinical indicators in your documentation. Or   please document if the diagnosis of pneumonia has been ruled out after further   study. The medical record reflects the following:  Risk Factors: ILD  Clinical Indicators: per pulmonology \"Chest imaging upon arrival without PE or   new pneumonia. She has chronic atelectasis of the RML and subtle   interstitial lung disease. \"  Treatment: abx discontinued, IV diuresis, cardiology consult pending    Thank you,  Trinity Cervantes RN, CCDS  Clinical Documentation Improvement Specialist  [email or web address]  Options provided:  -- Pneumonia was ruled out  -- Pneumonia present as evidenced by, Please document evidence. -- Other - I will add my own diagnosis  -- Disagree - Not applicable / Not valid  -- Disagree - Clinically unable to determine / Unknown  -- Refer to Clinical Documentation Reviewer    PROVIDER RESPONSE TEXT:    Pneumonia was ruled out after study.     Query created by: Elías Velez on 2022 1:20 PM      Electronically signed by:  Nila Shabazz MD 2022 9:29 AM

## 2022-07-30 ENCOUNTER — HOSPITAL ENCOUNTER (EMERGENCY)
Age: 82
Discharge: HOME | End: 2022-07-30
Attending: EMERGENCY MEDICINE
Payer: MEDICARE

## 2022-07-30 ENCOUNTER — TELEPHONE (OUTPATIENT)
Dept: OTHER | Facility: CLINIC | Age: 82
End: 2022-07-30

## 2022-07-30 ENCOUNTER — APPOINTMENT (OUTPATIENT)
Dept: GENERAL RADIOLOGY | Age: 82
End: 2022-07-30
Payer: MEDICARE

## 2022-07-30 VITALS
WEIGHT: 174 LBS | HEART RATE: 62 BPM | OXYGEN SATURATION: 100 % | SYSTOLIC BLOOD PRESSURE: 117 MMHG | TEMPERATURE: 97.2 F | HEIGHT: 69 IN | RESPIRATION RATE: 13 BRPM | BODY MASS INDEX: 25.77 KG/M2 | DIASTOLIC BLOOD PRESSURE: 60 MMHG

## 2022-07-30 DIAGNOSIS — R00.1 BRADYCARDIA: ICD-10-CM

## 2022-07-30 DIAGNOSIS — R06.02 SHORTNESS OF BREATH: Primary | ICD-10-CM

## 2022-07-30 LAB
ANION GAP SERPL CALCULATED.3IONS-SCNC: 9 MMOL/L (ref 7–16)
BASOPHILS ABSOLUTE: 0.01 E9/L (ref 0–0.2)
BASOPHILS RELATIVE PERCENT: 0.2 % (ref 0–2)
BUN BLDV-MCNC: 22 MG/DL (ref 6–23)
CALCIUM SERPL-MCNC: 8.9 MG/DL (ref 8.6–10.2)
CHLORIDE BLD-SCNC: 99 MMOL/L (ref 98–107)
CO2: 33 MMOL/L (ref 22–29)
CREAT SERPL-MCNC: 1.2 MG/DL (ref 0.5–1)
D DIMER: 247 NG/ML DDU
EKG ATRIAL RATE: 65 BPM
EKG P AXIS: 35 DEGREES
EKG P-R INTERVAL: 168 MS
EKG Q-T INTERVAL: 446 MS
EKG QRS DURATION: 86 MS
EKG QTC CALCULATION (BAZETT): 463 MS
EKG R AXIS: 26 DEGREES
EKG T AXIS: 57 DEGREES
EKG VENTRICULAR RATE: 65 BPM
EOSINOPHILS ABSOLUTE: 0.1 E9/L (ref 0.05–0.5)
EOSINOPHILS RELATIVE PERCENT: 2.1 % (ref 0–6)
GFR AFRICAN AMERICAN: 52
GFR NON-AFRICAN AMERICAN: 43 ML/MIN/1.73
GLUCOSE BLD-MCNC: 111 MG/DL (ref 74–99)
HCT VFR BLD CALC: 37 % (ref 34–48)
HEMOGLOBIN: 11.6 G/DL (ref 11.5–15.5)
IMMATURE GRANULOCYTES #: 0.01 E9/L
IMMATURE GRANULOCYTES %: 0.2 % (ref 0–5)
INR BLD: 2.8
LYMPHOCYTES ABSOLUTE: 0.87 E9/L (ref 1.5–4)
LYMPHOCYTES RELATIVE PERCENT: 18.4 % (ref 20–42)
MCH RBC QN AUTO: 30 PG (ref 26–35)
MCHC RBC AUTO-ENTMCNC: 31.4 % (ref 32–34.5)
MCV RBC AUTO: 95.6 FL (ref 80–99.9)
MONOCYTES ABSOLUTE: 0.37 E9/L (ref 0.1–0.95)
MONOCYTES RELATIVE PERCENT: 7.8 % (ref 2–12)
NEUTROPHILS ABSOLUTE: 3.38 E9/L (ref 1.8–7.3)
NEUTROPHILS RELATIVE PERCENT: 71.3 % (ref 43–80)
PDW BLD-RTO: 14.2 FL (ref 11.5–15)
PLATELET # BLD: 114 E9/L (ref 130–450)
PMV BLD AUTO: 10.4 FL (ref 7–12)
POTASSIUM REFLEX MAGNESIUM: 4 MMOL/L (ref 3.5–5)
PRO-BNP: 997 PG/ML (ref 0–450)
PROTHROMBIN TIME: 30.7 SEC (ref 9.3–12.4)
RBC # BLD: 3.87 E12/L (ref 3.5–5.5)
SODIUM BLD-SCNC: 141 MMOL/L (ref 132–146)
TROPONIN, HIGH SENSITIVITY: 23 NG/L (ref 0–9)
TROPONIN, HIGH SENSITIVITY: 26 NG/L (ref 0–9)
WBC # BLD: 4.7 E9/L (ref 4.5–11.5)

## 2022-07-30 PROCEDURE — 99285 EMERGENCY DEPT VISIT HI MDM: CPT

## 2022-07-30 PROCEDURE — 84484 ASSAY OF TROPONIN QUANT: CPT

## 2022-07-30 PROCEDURE — 85610 PROTHROMBIN TIME: CPT

## 2022-07-30 PROCEDURE — 80048 BASIC METABOLIC PNL TOTAL CA: CPT

## 2022-07-30 PROCEDURE — 71046 X-RAY EXAM CHEST 2 VIEWS: CPT

## 2022-07-30 PROCEDURE — 85025 COMPLETE CBC W/AUTO DIFF WBC: CPT

## 2022-07-30 PROCEDURE — 93005 ELECTROCARDIOGRAM TRACING: CPT

## 2022-07-30 PROCEDURE — 83880 ASSAY OF NATRIURETIC PEPTIDE: CPT

## 2022-07-30 PROCEDURE — 85378 FIBRIN DEGRADE SEMIQUANT: CPT

## 2022-07-30 ASSESSMENT — ENCOUNTER SYMPTOMS
ABDOMINAL PAIN: 0
PHOTOPHOBIA: 0
COUGH: 0
RHINORRHEA: 0
VOMITING: 0
VOICE CHANGE: 0
BACK PAIN: 0
SHORTNESS OF BREATH: 1
DIARRHEA: 0
TROUBLE SWALLOWING: 0
NAUSEA: 0

## 2022-07-30 ASSESSMENT — PAIN - FUNCTIONAL ASSESSMENT: PAIN_FUNCTIONAL_ASSESSMENT: NONE - DENIES PAIN

## 2022-07-30 NOTE — ED NOTES
Discharge instructions given. Patient verbalizes understanding. No other noted or stated problems at this time. Patient will follow up with primary care and cardiology.       Akiko Resendiz RN  07/30/22 [id number]

## 2022-07-30 NOTE — ED PROVIDER NOTES
Appearance: Normal appearance. She is not ill-appearing, toxic-appearing or diaphoretic. HENT:      Head: Normocephalic and atraumatic. Mouth/Throat:      Mouth: Mucous membranes are moist.   Eyes:      Extraocular Movements: Extraocular movements intact. Conjunctiva/sclera: Conjunctivae normal.      Pupils: Pupils are equal, round, and reactive to light. Cardiovascular:      Rate and Rhythm: Normal rate and regular rhythm. Pulses: Normal pulses. Radial pulses are 2+ on the right side and 2+ on the left side. Dorsalis pedis pulses are 2+ on the right side and 2+ on the left side. Heart sounds: Normal heart sounds. Pulmonary:      Effort: Pulmonary effort is normal. No respiratory distress. Breath sounds: Wheezing present. No rales. Abdominal:      General: Abdomen is flat. Tenderness: There is no abdominal tenderness. Musculoskeletal:      Right lower leg: Edema present. Left lower leg: Edema present. Comments: Non pitting edema. Chronic    Skin:     General: Skin is warm. Capillary Refill: Capillary refill takes less than 2 seconds. Neurological:      General: No focal deficit present. Mental Status: She is alert. Mental status is at baseline. Sensory: No sensory deficit. Procedures     EKG: This EKG is signed by emergency department physician. Rate: 65  Rhythm: Sinus  AXIS: Regular   ST Changes:no depressions nor elevations   Interpretation: no acute changes  Comparison: stable as compared to patient's most recent EKG and no previous EKG available     MDM:    79 y/o female presents to the ER for bradycardia and SOB. She is on coumadin for A.fib and 2-3 liters of O2 for COPD. Patient started to take Metropolol 50 on Tuesday, and noticed her heart rate was becoming slow.  Her PCP told her to cut the Metropolol dose in half for bradycardia symptoms which she did, but this morning she read her pulse being in the 40s and having shortness of breath. Upon entering the room the patient is well apperaing in no respiratory distress. Patient was on 3 liters ans stating good spO2 levels. Hear and lung exam were unremakable for murmur and acute wheezing and LE edema. EKG and X-ray were stable showing no acute findings. Patient is feeling well, and can be discharged home. Patient was told to stop taking Metropolol and follow up with her cardiologist.     ED Course as of 07/31/22 1120   Sat Jul 30, 2022   [address] [place] [address]:     I have personally performed and/or participated in the history, exam, medical decision making, and procedures and agree with all pertinent clinical information unless otherwise noted. I have also reviewed and agree with the past medical, family and social history unless otherwise noted. I have discussed this patient in detail with the resident and provided the instruction and education regarding the evidence-based evaluation and treatment of bradycardia. History: Patient reports on Tuesday, she took a dose of metoprolol, 50 mg. Wednesday, she began having shortness of breath and significant fatigue. When she would check her vital signs, her pulse oximeter demonstrates heart rates in the 40s. She followed up with Dr. Luis Roy on Thursday. Dr. Luis Roy noticed the same and spoke with Dr. Suri Blevins. They instructed her to cut down on her metoprolol to 12.5 mg daily. However, patient has not yet restarted her metoprolol secondary to her continued symptoms. My findings: Lakesha Herman is a 78 y.o. female whom is in no distress. Physical exam reveals she is alert and oriented. Heart is regular, lungs are decreased. Abdomen soft nontender. Extremities are intact without edema. Bedside monitor demonstrates fairly regular rhythm although, in atrial fibrillation, rate is controlled. Good distal pulse and cap refill. Skin is warm and dry. My plan: Symptomatic and supportive care.   EKG, labs, x-ray. Electronically signed by Roosevelt Rivera DO on 7/30/22 at 10:09 AM EDT       [TG]   1056 EKG: This EKG is signed and interpreted by me. Rate: 65  Rhythm: Sinus  Interpretation: no acute changes  Comparison: stable as compared to patient's most recent EKG   [TG]   1100 D-Dimer, Quant: 247 [MN]      ED Course User Index  [MN] Stephane DO Teja  [TG] Roosevelt Rivera DO        ED Course as of 07/31/22 1120   Sat Jul 30, 2022   Betty Mota [address] ATTESTATION:     I have personally performed and/or participated in the history, exam, medical decision making, and procedures and agree with all pertinent clinical information unless otherwise noted. I have also reviewed and agree with the past medical, family and social history unless otherwise noted. I have discussed this patient in detail with the resident and provided the instruction and education regarding the evidence-based evaluation and treatment of bradycardia. History: Patient reports on Tuesday, she took a dose of metoprolol, 50 mg. Wednesday, she began having shortness of breath and significant fatigue. When she would check her vital signs, her pulse oximeter demonstrates heart rates in the 40s. She followed up with Dr. Jany Foreman on Thursday. Dr. Jany Foreman noticed the same and spoke with Dr. Nancy Kolb. They instructed her to cut down on her metoprolol to 12.5 mg daily. However, patient has not yet restarted her metoprolol secondary to her continued symptoms. My findings: Anderson Barba is a 78 y.o. female whom is in no distress. Physical exam reveals she is alert and oriented. Heart is regular, lungs are decreased. Abdomen soft nontender. Extremities are intact without edema. Bedside monitor demonstrates fairly regular rhythm although, in atrial fibrillation, rate is controlled. Good distal pulse and cap refill. Skin is warm and dry. My plan: Symptomatic and supportive care. EKG, labs, x-ray.     Electronically signed by Erwin Cunningham DO on 7/30/22 at 10:09 AM EDT       [TG]   1056 EKG: This EKG is signed and interpreted by me. Rate: 65  Rhythm: Sinus  Interpretation: no acute changes  Comparison: stable as compared to patient's most recent EKG   [TG]   1100 D-Dimer, Quant: 247 [MN]      ED Course User Index  [MN] Conchis Olivas DO  [TG] Erwin Cunningham DO       --------------------------------------------- PAST HISTORY ---------------------------------------------  Past Medical History:  has a past medical history of Atrial fibrillation ([facility] CHF (congestive heart failure) ([facility] [address] Emphy[facility]ma, unspecified [facility] Hyperlipidemia, Hypertension, Lung cancer ([facility] Oxygen dependent, Prolonged emergence from general anesthesia, Skin cancer, and Thyroid disease. Past Surgical History:  has a past surgical history that includes Breast biopsy (Right); Tubal ligation; bronchoscopy (N/A, 4/20/2021); Intracapsular cataract extraction (Right, 12/16/2021); and Intracapsular cataract extraction (Left, 2/24/2022). Social History:  reports that she quit smoking about 24 years ago. Her smoking use included cigarettes. She started smoking about 70 years ago. She has a 92.00 pack-year smoking history. She has never used smokeless tobacco. She reports current alcohol use of about 1.0 standard drink per week. She reports that she does not use drugs. Family History: family history includes Cancer in her mother; Emphysema in her father. The patients home medications have been reviewed.     Allergies: Cat hair extract, Eggs or egg-derived products, Erythromycin, Pcn [penicillins], and Seasonal    -------------------------------------------------- RESULTS -------------------------------------------------  Labs:  Results for orders placed or performed during the hospital encounter of 07/30/22   CBC with Auto Differential   Result Value Ref Range    WBC 4.7 4.5 - 11.5 E9/L    RBC 3.87 3.50 - 5.50 E12/L    Hemoglobin 11.6 11.5 - 15.5 g/dL Hematocrit 37.0 34.0 - 48.0 %    MCV 95.6 80.0 - 99.9 fL    MCH 30.0 26.0 - 35.0 pg    MCHC 31.4 (L) 32.0 - 34.5 %    RDW 14.2 11.5 - 15.0 fL    Platelets 053 (L) 263 - 450 E9/L    MPV 10.4 7.0 - 12.0 fL    Neutrophils % 71.3 43.0 - 80.0 %    Immature Granulocytes % 0.2 0.0 - 5.0 %    Lymphocytes % 18.4 (L) 20.0 - 42.0 %    Monocytes % 7.8 2.0 - 12.0 %    Eosinophils % 2.1 0.0 - 6.0 %    Basophils % 0.2 0.0 - 2.0 %    Neutrophils Absolute 3.38 1.80 - 7.30 E9/L    Immature Granulocytes # 0.01 E9/L    Lymphocytes Absolute 0.87 (L) 1.50 - 4.00 E9/L    Monocytes Absolute 0.37 0.10 - 0.95 E9/L    Eosinophils Absolute 0.10 0.05 - 0.50 E9/L    Basophils Absolute 0.01 0.00 - 0.20 L8/E   Basic Metabolic Panel w/ Reflex to MG   Result Value Ref Range    Sodium 141 132 - 146 mmol/L    Potassium reflex Magnesium 4.0 3.5 - 5.0 mmol/L    Chloride 99 98 - 107 mmol/L    CO2 33 (H) 22 - 29 mmol/L    Anion Gap 9 7 - 16 mmol/L    Glucose 111 (H) 74 - 99 mg/dL    BUN 22 6 - 23 mg/dL    Creatinine 1.2 (H) 0.5 - 1.0 mg/dL    GFR Non-African American 43 >=60 mL/min/1.73    GFR African American 52     Calcium 8.9 8.6 - 10.2 mg/dL   Troponin   Result Value Ref Range    Troponin, High Sensitivity 26 (H) 0 - 9 ng/L   D-Dimer, Quantitative   Result Value Ref Range    D-Dimer, Quant 247 ng/mL DDU   Brain Natriuretic Peptide   Result Value Ref Range    Pro- (H) 0 - 450 pg/mL   Protime-INR   Result Value Ref Range    Protime 30.7 (H) 9.3 - 12.4 sec    INR 2.8    Troponin   Result Value Ref Range    Troponin, High Sensitivity 23 (H) 0 - 9 ng/L   EKG 12 Lead   Result Value Ref Range    Ventricular Rate 65 BPM    Atrial Rate 65 BPM    P-R Interval 168 ms    QRS Duration 86 ms    Q-T Interval 446 ms    QTc Calculation (Bazett) 463 ms    P Axis 35 degrees    R Axis 26 degrees    T Axis 57 degrees       Radiology:  XR CHEST (2 VW)   Final Result   No significant change             ------------------------- NURSING NOTES AND VITALS REVIEWED

## 2022-07-30 NOTE — TELEPHONE ENCOUNTER
Writer contacted Dr. Darell Eubanks to inform of 30 day readmission risk. 's attempt to contact ED provider was unsuccessful.     Call Back: If you need to call back to inform of disposition you can contact me at [phone]

## 2022-07-31 ASSESSMENT — ENCOUNTER SYMPTOMS: WHEEZING: 1

## 2022-08-01 ENCOUNTER — TELEPHONE (OUTPATIENT)
Dept: OTHER | Facility: CLINIC | Age: 82
End: 2022-08-01

## 2022-08-01 NOTE — TELEPHONE ENCOUNTER
RN access attempted to contact pt in regards to need for ed follow up appt    Attempt was successful. Patient able to contact PCP to schedule. No further help needed at this time.

## 2022-09-12 ENCOUNTER — OFFICE VISIT (OUTPATIENT)
Dept: PRIMARY CARE CLINIC | Age: 82
End: 2022-09-12
Payer: MEDICARE

## 2022-09-12 VITALS
SYSTOLIC BLOOD PRESSURE: 98 MMHG | HEART RATE: 61 BPM | OXYGEN SATURATION: 97 % | HEIGHT: 68 IN | TEMPERATURE: 97.3 F | BODY MASS INDEX: 25.46 KG/M2 | DIASTOLIC BLOOD PRESSURE: 52 MMHG | WEIGHT: 168 LBS

## 2022-09-12 DIAGNOSIS — I48.0 PAF (PAROXYSMAL ATRIAL FIBRILLATION) (HCC): ICD-10-CM

## 2022-09-12 DIAGNOSIS — I50.22 CHRONIC SYSTOLIC HEART FAILURE (HCC): ICD-10-CM

## 2022-09-12 DIAGNOSIS — I48.0 PAF (PAROXYSMAL ATRIAL FIBRILLATION) (HCC): Primary | ICD-10-CM

## 2022-09-12 DIAGNOSIS — J43.2 CENTRILOBULAR EMPHYSEMA (HCC): ICD-10-CM

## 2022-09-12 PROBLEM — N18.30 CHRONIC RENAL DISEASE, STAGE III (HCC): Status: ACTIVE | Noted: 2022-09-12

## 2022-09-12 LAB
INR BLD: 2.2
PROTHROMBIN TIME: 23.7 SEC (ref 9.3–12.4)

## 2022-09-12 PROCEDURE — 99213 OFFICE O/P EST LOW 20 MIN: CPT | Performed by: INTERNAL MEDICINE

## 2022-09-12 PROCEDURE — 1123F ACP DISCUSS/DSCN MKR DOCD: CPT | Performed by: INTERNAL MEDICINE

## 2022-09-12 RX ORDER — PANTOPRAZOLE SODIUM 40 MG/1
40 TABLET, DELAYED RELEASE ORAL EVERY MORNING
Qty: 30 TABLET | Refills: 2 | Status: SHIPPED
Start: 2022-09-12 | End: 2022-10-17 | Stop reason: SDUPTHER

## 2022-09-12 RX ORDER — ROSUVASTATIN CALCIUM 10 MG/1
10 TABLET, COATED ORAL NIGHTLY
Qty: 30 TABLET | Refills: 2 | Status: SHIPPED | OUTPATIENT
Start: 2022-09-12

## 2022-09-12 RX ORDER — ALBUTEROL SULFATE 90 UG/1
2 AEROSOL, METERED RESPIRATORY (INHALATION) EVERY 6 HOURS PRN
Qty: 18 G | Refills: 2 | Status: SHIPPED | OUTPATIENT
Start: 2022-09-12

## 2022-09-12 RX ORDER — METOPROLOL TARTRATE 50 MG/1
50 TABLET, FILM COATED ORAL 2 TIMES DAILY
Qty: 60 TABLET | Refills: 2 | Status: SHIPPED | OUTPATIENT
Start: 2022-09-12

## 2022-09-12 RX ORDER — FLUTICASONE FUROATE, UMECLIDINIUM BROMIDE AND VILANTEROL TRIFENATATE 100; 62.5; 25 UG/1; UG/1; UG/1
1 POWDER RESPIRATORY (INHALATION) EVERY MORNING
Qty: 1 EACH | Refills: 2 | Status: SHIPPED
Start: 2022-09-12 | End: 2022-10-17 | Stop reason: SDUPTHER

## 2022-09-12 RX ORDER — LEVOTHYROXINE SODIUM 100 MCG
1 TABLET ORAL DAILY
COMMUNITY
Start: 2022-08-29 | End: 2022-10-17 | Stop reason: SDUPTHER

## 2022-09-12 RX ORDER — AMLODIPINE BESYLATE 5 MG/1
5 TABLET ORAL DAILY
Qty: 30 TABLET | Refills: 3 | Status: SHIPPED
Start: 2022-09-12 | End: 2022-10-17 | Stop reason: SDUPTHER

## 2022-09-12 RX ORDER — FUROSEMIDE 40 MG/1
40 TABLET ORAL DAILY
Qty: 60 TABLET | Refills: 3 | Status: SHIPPED
Start: 2022-09-12 | End: 2022-10-17 | Stop reason: SDUPTHER

## 2022-09-12 NOTE — PROGRESS NOTES
Rodolfo Diane is a 78 y.o. female with the following history PAF ,HTN, COPD lung CA on home O2 her for follow up         Past Medical History:   Diagnosis Date    Atrial fibrillation (HCC)     CHF (congestive heart failure) (HCC)     Emphysema, unspecified ([address]     Hyperlipidemia     Hypertension     Lung cancer ([facility]     Mitral valve regurgitation     Oxygen dependent     Prolonged emergence from general anesthesia     post general anesthesia    Skin cancer     Thyroid disease        Past Surgical History:   Procedure Laterality Date    BREAST BIOPSY Right     benign    BRONCHOSCOPY N/A 4/20/2021    BRONCHOSCOPY/TRANSBRONCHIAL NEEDLE BIOPSY performed by Perla Olmstead MD at [place] [address] Right 12/16/2021    RIGHT EYE CATARACT EXTRACTION IOL IMPLANT performed by Venu Simms MD at [facility] OR    INTRACAPSULAR CATARACT EXTRACTION Left 2/24/2022    LEFT EYE CATARACT EXTRACTION IOL IMPLANT performed by Venu Simms MD at [facility] OR    TUBAL LIGATION         Family History   Problem Relation Age of Onset    Cancer Mother     Other Mother         APLASTIC ANEMIA    Peripheral Vascular Disease Mother     Emphysema Father     Lung Disease Father              Current Outpatient Medications:     amLODIPine (NORVASC) 5 MG tablet, Take 1 tablet by mouth daily, Disp: 30 tablet, Rfl: 3    furosemide (LASIX) 40 MG tablet, Take 1 tablet by mouth daily, Disp: 60 tablet, Rfl: 3    albuterol sulfate HFA (PROVENTIL;VENTOLIN;PROAIR) 108 (90 Base) MCG/ACT inhaler, Inhale 2 puffs into the lungs every 6 hours as needed for Wheezing, Disp: 18 g, Rfl: 2    fluticasone-umeclidin-vilant (TRELEGY ELLIPTA) 100-62.5-25 MCG/INH AEPB, Inhale 1 puff into the lungs every morning, Disp: 1 each, Rfl: 2    metoprolol tartrate (LOPRESSOR) 50 MG tablet, Take 1 tablet by mouth in the morning and 1 tablet in the evening., Disp: 60 tablet, Rfl: 2    pantoprazole (PROTONIX) 40 MG tablet, Take 1 tablet by mouth every morning, Disp: 30 tablet, Rfl: 2    rosuvastatin (CRESTOR) 10 MG tablet, Take 1 tablet by mouth nightly, Disp: 30 tablet, Rfl: 2    spironolactone (ALDACTONE) 25 MG tablet, Take 12.5 mg by mouth daily, Disp: , Rfl:     albuterol (PROVENTIL) (2.5 MG/3ML) 0.083% nebulizer solution, Take 3 mLs by nebulization every 6 hours as needed for Wheezing Please bill under Medicare Part B DX: COPD J44.9, Disp: 120 each, Rfl: 3    warfarin (COUMADIN) 3 MG tablet, Take 1 tablet by mouth daily (Patient taking differently: Take 2.5 mg by mouth daily), Disp: 30 tablet, Rfl: 0    OXYGEN, Inhale 3-4 L/min into the lungs continuous , Disp: , Rfl:     SYNTHROID 100 MCG tablet, Take 1 tablet by mouth daily, Disp: , Rfl:     predniSONE (DELTASONE) 10 MG tablet, 40mg x 3 days, 30mg x 3 days, 20mg x 3 days, 10mg x 3 days (Patient not taking: Reported on 2022), Disp: 30 tablet, Rfl: 0    levalbuterol (XOPENEX) 0.63 MG/3ML nebulization, Take 3 mLs by nebulization every 8 hours as needed for Wheezing (Patient not taking: Reported on 2022), Disp: 120 each, Rfl: 3     Allergies: Cat hair extract, Eggs or egg-derived products, Erythromycin, Pcn [penicillins], and Seasonal    Social History     Tobacco Use    Smoking status: Former     Packs/day: 2.00     Years: 46.00     Pack years: 92.00     Types: Cigarettes     Start date: 1952     Quit date: 1998     Years since quittin.1    Smokeless tobacco: Never    Tobacco comments:     Patient quit smoking 24 yrs. ago. Substance Use Topics    Alcohol use:  Yes     Alcohol/week: 1.0 standard drink     Types: 1 Glasses of wine per week     Comment: nightly        Review of Systems:  Respiratory: SOB with activity   Cardiovascular: negative for chest pain and dyspnea  Gastrointestinal: negative for abdominal pain, diarrhea, nausea and vomiting  Genitourinary:negative for dysuria and hematuria  Derm: negative for rash and skin lesion(s)  Neurological: negative for seizures and tremors  Endocrine: negative for diabetic symptoms including polydipsia and polyuria    Physical Exam:  Vitals:    09/12/22 1143   BP: (!) 98/52   Pulse: 61   Temp: 97.3 °F (36.3 °C)   SpO2: 97%      Skin:  Warm and dry. No rash or bruises  HEENT:  PERRLA, EOMI  Neck:  No JVD, No thyromegaly, No carotid bruit  Cardiac:  RRR, No gallop or murmur  Lungs:  CTA, decrease BS   Abdomen: Normal bowel sounds, no HSM, non-tender  Extremities:  No clubbing, edema or cyanosis  Neurological:  Moves all extremities, normal DTR          Assessment and Plan:    Patient Active Problem List   Diagnosis    Atrial fibrillation with RVR (HCC)- Recurrent    Acute on chronic diastolic congestive heart failure (HCC)    Cardiomyopathy as manifestation of underlying disease (HCC)    Non-rheumatic mitral regurgitation    Acute combined systolic and diastolic congestive heart failure (HCC)    Chronic anticoagulation    Essential hypertension    COPD (chronic obstructive pulmonary disease) (HCC)    History of atrial fibrillation    Dilated idiopathic cardiomyopathy (HCC)    Severe sinus bradycardia    Moderate tricuspid regurgitation    Hypoxia    Combined forms of age-related cataract of both eyes    Dyspnea on exertion    Mass of right lung    Supplemental oxygen dependent    Pneumonia    Chronic renal disease, stage III ([address] [[id number]]     1. PAF (paroxysmal atrial fibrillation) ([address]  -     Protime-INR; Standing  2. Centrilobular emphysema ([address]  3. Chronic systolic heart failure (HCC)      Return in about 4 weeks (around 10/10/2022) for PAF, HTN, COPD.

## 2022-09-13 ENCOUNTER — TELEPHONE (OUTPATIENT)
Dept: PRIMARY CARE CLINIC | Age: 82
End: 2022-09-13

## 2022-09-13 NOTE — TELEPHONE ENCOUNTER
Marie Robbins called in wanting to know if she is to stay on the same meds she was taking before she had labs drawn on 9-12-22.

## 2022-10-10 RX ORDER — WARFARIN SODIUM 2.5 MG/1
2.5 TABLET ORAL DAILY
Qty: 90 TABLET | Refills: 0 | Status: CANCELLED | OUTPATIENT
Start: 2022-10-10

## 2022-10-17 ENCOUNTER — NURSE ONLY (OUTPATIENT)
Dept: PRIMARY CARE CLINIC | Age: 82
End: 2022-10-17
Payer: MEDICARE

## 2022-10-17 DIAGNOSIS — J43.2 CENTRILOBULAR EMPHYSEMA (HCC): ICD-10-CM

## 2022-10-17 DIAGNOSIS — I48.91 ATRIAL FIBRILLATION WITH RVR (HCC): Primary | ICD-10-CM

## 2022-10-17 DIAGNOSIS — I48.0 PAF (PAROXYSMAL ATRIAL FIBRILLATION) (HCC): ICD-10-CM

## 2022-10-17 DIAGNOSIS — I43 CARDIOMYOPATHY AS MANIFESTATION OF UNDERLYING DISEASE (HCC): ICD-10-CM

## 2022-10-17 DIAGNOSIS — J41.0 SIMPLE CHRONIC BRONCHITIS (HCC): ICD-10-CM

## 2022-10-17 LAB
INTERNATIONAL NORMALIZATION RATIO, POC: 1.9
PROTHROMBIN TIME, POC: 0

## 2022-10-17 PROCEDURE — 85610 PROTHROMBIN TIME: CPT | Performed by: INTERNAL MEDICINE

## 2022-10-17 RX ORDER — FUROSEMIDE 40 MG/1
40 TABLET ORAL DAILY
Qty: 90 TABLET | Refills: 0 | Status: SHIPPED | OUTPATIENT
Start: 2022-10-17

## 2022-10-17 RX ORDER — LEVOTHYROXINE SODIUM 100 MCG
100 TABLET ORAL DAILY
Qty: 90 TABLET | Refills: 0 | Status: SHIPPED | OUTPATIENT
Start: 2022-10-17

## 2022-10-17 RX ORDER — WARFARIN SODIUM 3 MG/1
2.5 TABLET ORAL DAILY
Qty: 90 TABLET | Refills: 0 | Status: SHIPPED | OUTPATIENT
Start: 2022-10-17

## 2022-10-17 RX ORDER — ALBUTEROL SULFATE 2.5 MG/3ML
2.5 SOLUTION RESPIRATORY (INHALATION) EVERY 6 HOURS PRN
Qty: 120 EACH | Refills: 0 | Status: SHIPPED | OUTPATIENT
Start: 2022-10-17

## 2022-10-17 RX ORDER — PANTOPRAZOLE SODIUM 40 MG/1
40 TABLET, DELAYED RELEASE ORAL EVERY MORNING
Qty: 90 TABLET | Refills: 0 | Status: SHIPPED | OUTPATIENT
Start: 2022-10-17

## 2022-10-17 RX ORDER — FLUTICASONE FUROATE, UMECLIDINIUM BROMIDE AND VILANTEROL TRIFENATATE 100; 62.5; 25 UG/1; UG/1; UG/1
1 POWDER RESPIRATORY (INHALATION) EVERY MORNING
Qty: 3 EACH | Refills: 0 | Status: SHIPPED | OUTPATIENT
Start: 2022-10-17

## 2022-10-17 RX ORDER — AMLODIPINE BESYLATE 5 MG/1
5 TABLET ORAL DAILY
Qty: 90 TABLET | Refills: 0 | Status: SHIPPED | OUTPATIENT
Start: 2022-10-17

## 2022-10-17 NOTE — PROGRESS NOTES
Derrell Lion is a 78 y.o. female with the following history of COPD ,HTN,lung CA ,PAT for follow up      Past Medical History:   Diagnosis Date    Atrial fibrillation (HCC)     CHF (congestive heart failure) ([facility]     Emphysema, unspecified ([facility] [address]     Hyperlipidemia     Hypertension     Lung cancer ([facility] [address] [facility]     Mitral valve regurgitation     Oxygen dependent     Prolonged emergence from general anesthesia     post general anesthesia    Skin cancer     Thyroid disease        Past Surgical History:   Procedure Laterality Date    BREAST BIOPSY Right     benign    BRONCHOSCOPY N/A 4/20/2021    BRONCHOSCOPY/TRANSBRONCHIAL NEEDLE BIOPSY performed by Cameron Dennison MD at [place] [address] Right 12/16/2021    RIGHT EYE CATARACT EXTRACTION IOL IMPLANT performed by Jace Vargas MD at [facility] Left 2/24/2022    LEFT EYE CATARACT EXTRACTION IOL IMPLANT performed by Jace Vargas MD at [facility] OR    TUBAL LIGATION         Family History   Problem Relation Age of Onset    Cancer Mother     Other Mother         APLASTIC ANEMIA    Peripheral Vascular Disease Mother     Emphysema Father     Lung Disease Father              Current Outpatient Medications:     SYNTHROID 100 MCG tablet, Take 1 tablet by mouth daily, Disp: , Rfl:     amLODIPine (NORVASC) 5 MG tablet, Take 1 tablet by mouth daily, Disp: 30 tablet, Rfl: 3    furosemide (LASIX) 40 MG tablet, Take 1 tablet by mouth daily, Disp: 60 tablet, Rfl: 3    albuterol sulfate HFA (PROVENTIL;VENTOLIN;PROAIR) 108 (90 Base) MCG/ACT inhaler, Inhale 2 puffs into the lungs every 6 hours as needed for Wheezing, Disp: 18 g, Rfl: 2    fluticasone-umeclidin-vilant (TRELEGY ELLIPTA) 100-62.5-25 MCG/INH AEPB, Inhale 1 puff into the lungs every morning, Disp: 1 each, Rfl: 2    metoprolol tartrate (LOPRESSOR) 50 MG tablet, Take 1 tablet by mouth in the morning and 1 tablet in the evening., Disp: 60 tablet, Rfl: 2    pantoprazole (PROTONIX) 40 MG tablet, Take 1 tablet by mouth every morning, Disp: 30 tablet, Rfl: 2    rosuvastatin (CRESTOR) 10 MG tablet, Take 1 tablet by mouth nightly, Disp: 30 tablet, Rfl: 2    predniSONE (DELTASONE) 10 MG tablet, 40mg x 3 days, 30mg x 3 days, 20mg x 3 days, 10mg x 3 days (Patient not taking: Reported on 2022), Disp: 30 tablet, Rfl: 0    spironolactone (ALDACTONE) 25 MG tablet, Take 12.5 mg by mouth daily, Disp: , Rfl:     albuterol (PROVENTIL) (2.5 MG/3ML) 0.083% nebulizer solution, Take 3 mLs by nebulization every 6 hours as needed for Wheezing Please bill under Medicare Part B DX: COPD J44.9, Disp: 120 each, Rfl: 3    warfarin (COUMADIN) 3 MG tablet, Take 1 tablet by mouth daily (Patient taking differently: Take 2.5 mg by mouth daily), Disp: 30 tablet, Rfl: 0    levalbuterol (XOPENEX) 0.63 MG/3ML nebulization, Take 3 mLs by nebulization every 8 hours as needed for Wheezing (Patient not taking: Reported on 2022), Disp: 120 each, Rfl: 3    OXYGEN, Inhale 3-4 L/min into the lungs continuous , Disp: , Rfl:      Allergies: Cat hair extract, Eggs or egg-derived products, Erythromycin, Pcn [penicillins], and Seasonal    Social History     Tobacco Use    Smoking status: Former     Packs/day: 2.00     Years: 46.00     Pack years: 92.00     Types: Cigarettes     Start date: 1952     Quit date: 1998     Years since quittin.2    Smokeless tobacco: Never    Tobacco comments:     Patient quit smoking 24 yrs. ago. Substance Use Topics    Alcohol use:  Yes     Alcohol/week: 1.0 standard drink     Types: 1 Glasses of wine per week     Comment: nightly        Review of Systems:  Respiratory: negative for cough and hemoptysis  Cardiovascular: negative for chest pain and dyspnea  Gastrointestinal: negative for abdominal pain, diarrhea, nausea and vomiting  Genitourinary:negative for dysuria and hematuria  Derm: negative for rash and skin lesion(s)  Neurological: negative for seizures and tremors  Endocrine: negative for diabetic symptoms including polydipsia and polyuria    Physical Exam:  There were no vitals filed for this visit. Wt Readings from Last 3 Encounters:   09/12/22 168 lb (76.2 kg)   07/30/22 174 lb (78.9 kg)   07/29/22 172 lb 12.8 oz (78.4 kg)       Skin:  Warm and dry. No rash or bruises  HEENT:  PERRLA, EOMI  Neck:  No JVD, No thyromegaly, No carotid bruit  Cardiac:  IRR, No gallop or murmur  Lungs: rales RLL   Abdomen: Normal bowel sounds, no HSM, non-tender  Extremities:  No clubbing, edema or cyanosis  Neurological:  Moves all extremities.     Lab Results   Component Value Date     08/15/2022    K 4.1 08/15/2022    CL 98 08/15/2022    CO2 30 (H) 08/15/2022    BUN 26 (H) 08/15/2022    CREATININE 1.2 (H) 08/15/2022    GLUCOSE 102 (H) 08/15/2022    CALCIUM 9.7 08/15/2022    PROT 6.7 05/03/2022    LABALBU 4.1 05/03/2022    BILITOT 0.5 05/03/2022    ALKPHOS 55 05/03/2022    AST 22 05/03/2022    ALT 10 05/03/2022    LABGLOM 43 08/15/2022    GFRAA 52 08/15/2022     Lab Results   Component Value Date    WBC 4.7 07/30/2022    HGB 11.6 07/30/2022    HCT 37.0 07/30/2022    MCV 95.6 07/30/2022     (L) 07/30/2022     Lab Results   Component Value Date    CHOL 156 05/02/2022    TRIG 84 05/02/2022    HDL 62 05/02/2022    LDLCALC 77 05/02/2022    LABVLDL 17 05/02/2022     No results found for: PSA, PSADIA   No results found for: LABA1C        Assessment and Plan:    Patient Active Problem List   Diagnosis    Atrial fibrillation with RVR (HCC)- Recurrent    Acute on chronic diastolic congestive heart failure (HCC)    Cardiomyopathy as manifestation of underlying disease (HCC)    Non-rheumatic mitral regurgitation    Acute combined systolic and diastolic congestive heart failure (HCC)    Chronic anticoagulation    Essential hypertension    COPD (chronic obstructive pulmonary disease) ([facility]    History of atrial fibrillation    Dilated idiopathic cardiomyopathy ([facility] Severe sinus bradycardia    Moderate tricuspid regurgitation    Hypoxia    Combined forms of age-related cataract of both eyes    Dyspnea on exertion    Mass of right lung    Supplemental oxygen dependent    Pneumonia    Chronic renal disease, stage III ([address] [[id number]]       Diagnosis/Orders  1. Atrial fibrillation with RVR ([address]- Recurrent  -     POCT INR  2. PAF (paroxysmal atrial fibrillation) ([address]  3. Simple chronic bronchitis ([address]  4. Cardiomyopathy as manifestation of underlying disease ([address]      Return in about 4 weeks (around 11/14/2022).       Imer Perez MD

## 2022-11-14 ENCOUNTER — OFFICE VISIT (OUTPATIENT)
Dept: PRIMARY CARE CLINIC | Age: 82
End: 2022-11-14
Payer: MEDICARE

## 2022-11-14 VITALS
OXYGEN SATURATION: 93 % | DIASTOLIC BLOOD PRESSURE: 78 MMHG | SYSTOLIC BLOOD PRESSURE: 118 MMHG | WEIGHT: 163 LBS | TEMPERATURE: 97.2 F | HEIGHT: 68 IN | BODY MASS INDEX: 24.71 KG/M2 | HEART RATE: 94 BPM

## 2022-11-14 DIAGNOSIS — I48.0 PAF (PAROXYSMAL ATRIAL FIBRILLATION) (HCC): ICD-10-CM

## 2022-11-14 DIAGNOSIS — I43 CARDIOMYOPATHY AS MANIFESTATION OF UNDERLYING DISEASE (HCC): ICD-10-CM

## 2022-11-14 DIAGNOSIS — J43.2 CENTRILOBULAR EMPHYSEMA (HCC): Primary | ICD-10-CM

## 2022-11-14 DIAGNOSIS — C34.91 MALIGNANT NEOPLASM OF RIGHT LUNG, UNSPECIFIED PART OF LUNG (HCC): ICD-10-CM

## 2022-11-14 LAB
INTERNATIONAL NORMALIZATION RATIO, POC: 3.7
PROTHROMBIN TIME, POC: 0

## 2022-11-14 PROCEDURE — G8427 DOCREV CUR MEDS BY ELIG CLIN: HCPCS | Performed by: INTERNAL MEDICINE

## 2022-11-14 PROCEDURE — 1123F ACP DISCUSS/DSCN MKR DOCD: CPT | Performed by: INTERNAL MEDICINE

## 2022-11-14 PROCEDURE — 85610 PROTHROMBIN TIME: CPT | Performed by: INTERNAL MEDICINE

## 2022-11-14 PROCEDURE — 3074F SYST BP LT 130 MM HG: CPT | Performed by: INTERNAL MEDICINE

## 2022-11-14 PROCEDURE — G8400 PT W/DXA NO RESULTS DOC: HCPCS | Performed by: INTERNAL MEDICINE

## 2022-11-14 PROCEDURE — 3078F DIAST BP <80 MM HG: CPT | Performed by: INTERNAL MEDICINE

## 2022-11-14 PROCEDURE — 1090F PRES/ABSN URINE INCON ASSESS: CPT | Performed by: INTERNAL MEDICINE

## 2022-11-14 PROCEDURE — G8420 CALC BMI NORM PARAMETERS: HCPCS | Performed by: INTERNAL MEDICINE

## 2022-11-14 PROCEDURE — 3023F SPIROM DOC REV: CPT | Performed by: INTERNAL MEDICINE

## 2022-11-14 PROCEDURE — 99213 OFFICE O/P EST LOW 20 MIN: CPT | Performed by: INTERNAL MEDICINE

## 2022-11-14 PROCEDURE — 1036F TOBACCO NON-USER: CPT | Performed by: INTERNAL MEDICINE

## 2022-11-14 PROCEDURE — G8484 FLU IMMUNIZE NO ADMIN: HCPCS | Performed by: INTERNAL MEDICINE

## 2022-11-14 RX ORDER — PANTOPRAZOLE SODIUM 40 MG/1
40 TABLET, DELAYED RELEASE ORAL EVERY MORNING
Qty: 90 TABLET | Refills: 0 | Status: SHIPPED | OUTPATIENT
Start: 2022-11-14

## 2022-11-14 SDOH — ECONOMIC STABILITY: FOOD INSECURITY: WITHIN THE PAST 12 MONTHS, YOU WORRIED THAT YOUR FOOD WOULD RUN OUT BEFORE YOU GOT MONEY TO BUY MORE.: NEVER TRUE

## 2022-11-14 SDOH — ECONOMIC STABILITY: FOOD INSECURITY: WITHIN THE PAST 12 MONTHS, THE FOOD YOU BOUGHT JUST DIDN'T LAST AND YOU DIDN'T HAVE MONEY TO GET MORE.: NEVER TRUE

## 2022-11-14 ASSESSMENT — PATIENT HEALTH QUESTIONNAIRE - PHQ9
SUM OF ALL RESPONSES TO PHQ QUESTIONS 1-9: 0
2. FEELING DOWN, DEPRESSED OR HOPELESS: 0
SUM OF ALL RESPONSES TO PHQ QUESTIONS 1-9: 0
SUM OF ALL RESPONSES TO PHQ9 QUESTIONS 1 & 2: 0
SUM OF ALL RESPONSES TO PHQ QUESTIONS 1-9: 0
1. LITTLE INTEREST OR PLEASURE IN DOING THINGS: 0
SUM OF ALL RESPONSES TO PHQ QUESTIONS 1-9: 0

## 2022-11-14 ASSESSMENT — SOCIAL DETERMINANTS OF HEALTH (SDOH): HOW HARD IS IT FOR YOU TO PAY FOR THE VERY BASICS LIKE FOOD, HOUSING, MEDICAL CARE, AND HEATING?: NOT HARD AT ALL

## 2022-11-14 NOTE — PROGRESS NOTES
Alison Russell is a 78 y.o. female with the following history of COPD lung CA, PAF CHF for follow up feels good today     Past Medical History:   Diagnosis Date    Atrial fibrillation (HCC)     CHF (congestive heart failure) (HCC)     Emphysema, unspecified (HCC)     Hyperlipidemia     Hypertension     Lung cancer ([address]     Mitral valve regurgitation     Oxygen dependent     Prolonged emergence from general anesthesia     post general anesthesia    Skin cancer     Thyroid disease        Past Surgical History:   Procedure Laterality Date    BREAST BIOPSY Right     benign    BRONCHOSCOPY N/A 4/20/2021    BRONCHOSCOPY/TRANSBRONCHIAL NEEDLE BIOPSY performed by Estefanía Aleman MD at [facility] 175 Right 12/16/2021    RIGHT EYE CATARACT EXTRACTION IOL IMPLANT performed by Bebe Cloud MD at [facility] 7 Left 2/24/2022    LEFT EYE CATARACT EXTRACTION IOL IMPLANT performed by Bebe Cloud MD at [facility] OR    TUBAL LIGATION         Family History   Problem Relation Age of Onset    Cancer Mother     Other Mother         APLASTIC ANEMIA    Peripheral Vascular Disease Mother     Emphysema Father     Lung Disease Father              Current Outpatient Medications:     pantoprazole (PROTONIX) 40 MG tablet, Take 1 tablet by mouth every morning, Disp: 90 tablet, Rfl: 0    warfarin (COUMADIN) 3 MG tablet, Take 1 tablet by mouth daily, Disp: 90 tablet, Rfl: 0    albuterol (PROVENTIL) (2.5 MG/3ML) 0.083% nebulizer solution, Take 3 mLs by nebulization every 6 hours as needed for Wheezing Please bill under Medicare Part B DX: COPD J44.9, Disp: 120 each, Rfl: 0    amLODIPine (NORVASC) 5 MG tablet, Take 1 tablet by mouth daily, Disp: 90 tablet, Rfl: 0    fluticasone-umeclidin-vilant (TRELEGY ELLIPTA) 100-62.5-25 MCG/INH AEPB, Inhale 1 puff into the lungs every morning, Disp: 3 each, Rfl: 0    furosemide (LASIX) 40 MG tablet, Take 1 tablet by mouth daily, Disp: 90 tablet, Rfl: 0    SYNTHROID 100 MCG tablet, Take 1 tablet by mouth daily, Disp: 90 tablet, Rfl: 0    albuterol sulfate HFA (PROVENTIL;VENTOLIN;PROAIR) 108 (90 Base) MCG/ACT inhaler, Inhale 2 puffs into the lungs every 6 hours as needed for Wheezing, Disp: 18 g, Rfl: 2    metoprolol tartrate (LOPRESSOR) 50 MG tablet, Take 1 tablet by mouth in the morning and 1 tablet in the evening., Disp: 60 tablet, Rfl: 2    rosuvastatin (CRESTOR) 10 MG tablet, Take 1 tablet by mouth nightly, Disp: 30 tablet, Rfl: 2    spironolactone (ALDACTONE) 25 MG tablet, Take 12.5 mg by mouth daily, Disp: , Rfl:     OXYGEN, Inhale 3-4 L/min into the lungs continuous , Disp: , Rfl:     predniSONE (DELTASONE) 10 MG tablet, 40mg x 3 days, 30mg x 3 days, 20mg x 3 days, 10mg x 3 days (Patient not taking: Reported on 2022), Disp: 30 tablet, Rfl: 0    levalbuterol (XOPENEX) 0.63 MG/3ML nebulization, Take 3 mLs by nebulization every 8 hours as needed for Wheezing (Patient not taking: Reported on 2022), Disp: 120 each, Rfl: 3     Allergies: Cat hair extract, Eggs or egg-derived products, Erythromycin, Pcn [penicillins], and Seasonal    Social History     Tobacco Use    Smoking status: Former     Packs/day: 2.00     Years: 46.00     Pack years: 92.00     Types: Cigarettes     Start date: 1952     Quit date: 1998     Years since quittin.3    Smokeless tobacco: Never    Tobacco comments:     Patient quit smoking 24 yrs. ago. Substance Use Topics    Alcohol use:  Yes     Alcohol/week: 1.0 standard drink     Types: 1 Glasses of wine per week     Comment: nightly        Review of Systems:  Respiratory: negative for cough and hemoptysis  Cardiovascular: negative for chest pain and dyspnea  Gastrointestinal: negative for abdominal pain, diarrhea, nausea and vomiting  Genitourinary:negative for dysuria and hematuria  Derm: negative for rash and skin lesion(s)  Neurological: negative for seizures and tremors  Endocrine: negative for diabetic symptoms including polydipsia and polyuria    Physical Exam:  Vitals:    11/14/22 0951   BP: 118/78   Pulse: 94   Temp: 97.2 °F (36.2 °C)   SpO2: 93%      Wt Readings from Last 3 Encounters:   11/14/22 163 lb (73.9 kg)   09/12/22 168 lb (76.2 kg)   07/30/22 174 lb (78.9 kg)       Skin:  Warm and dry. No rash or bruises  HEENT:  PERRLA, EOMI  Neck:  No JVD, No thyromegaly, No carotid bruit  Cardiac:  RRR, No gallop or murmur  Lungs:  CTA, Normal percussion  Abdomen: Normal bowel sounds, no HSM, non-tender  Extremities:  No clubbing, edema or cyanosis  Neurological:  Moves all extremities.     Lab Results   Component Value Date     08/15/2022    K 4.1 08/15/2022    CL 98 08/15/2022    CO2 30 (H) 08/15/2022    BUN 26 (H) 08/15/2022    CREATININE 1.2 (H) 08/15/2022    GLUCOSE 102 (H) 08/15/2022    CALCIUM 9.7 08/15/2022    PROT 6.7 05/03/2022    LABALBU 4.1 05/03/2022    BILITOT 0.5 05/03/2022    ALKPHOS 55 05/03/2022    AST 22 05/03/2022    ALT 10 05/03/2022    LABGLOM 43 08/15/2022    GFRAA 52 08/15/2022     Lab Results   Component Value Date    WBC 4.7 07/30/2022    HGB 11.6 07/30/2022    HCT 37.0 07/30/2022    MCV 95.6 07/30/2022     (L) 07/30/2022     Lab Results   Component Value Date    CHOL 156 05/02/2022    TRIG 84 05/02/2022    HDL 62 05/02/2022    LDLCALC 77 05/02/2022    LABVLDL 17 05/02/2022           Assessment and Plan:    Patient Active Problem List   Diagnosis    Atrial fibrillation with RVR (HCC)- Recurrent    Acute on chronic diastolic congestive heart failure (HCC)    Cardiomyopathy as manifestation of underlying disease ([facility]    Non-rheumatic mitral regurgitation    Acute combined systolic and diastolic congestive heart failure (HCC)    Chronic anticoagulation    Essential hypertension    COPD (chronic obstructive pulmonary disease) (HCC)    History of atrial fibrillation    Dilated idiopathic cardiomyopathy (HCC)    Severe sinus bradycardia    Moderate tricuspid regurgitation    Hypoxia    Combined forms of age-related cataract of both eyes    Dyspnea on exertion    Mass of right lung    Supplemental oxygen dependent    Pneumonia    Chronic renal disease, stage III ([address] [397173]    Lung cancer (HCC)       Diagnosis/Orders  1. Centrilobular emphysema ([address]  2. PAF (paroxysmal atrial fibrillation) (HCC)  -     POCT INR  3. Cardiomyopathy as manifestation of underlying disease ([address]  4. Malignant neoplasm of right lung, unspecified part of lung ([address]    Return in about 4 weeks (around 12/12/2022).       Ramiro May MD

## 2022-11-16 RX ORDER — WARFARIN SODIUM 2.5 MG/1
2.5 TABLET ORAL DAILY
Qty: 30 TABLET | Refills: 0 | Status: SHIPPED | OUTPATIENT
Start: 2022-11-16

## 2022-12-12 ENCOUNTER — OFFICE VISIT (OUTPATIENT)
Dept: PRIMARY CARE CLINIC | Age: 82
End: 2022-12-12
Payer: MEDICARE

## 2022-12-12 VITALS
WEIGHT: 163 LBS | HEIGHT: 68 IN | BODY MASS INDEX: 24.71 KG/M2 | SYSTOLIC BLOOD PRESSURE: 132 MMHG | DIASTOLIC BLOOD PRESSURE: 78 MMHG

## 2022-12-12 DIAGNOSIS — C34.91 MALIGNANT NEOPLASM OF RIGHT LUNG, UNSPECIFIED PART OF LUNG (HCC): ICD-10-CM

## 2022-12-12 DIAGNOSIS — I50.33 ACUTE ON CHRONIC DIASTOLIC CONGESTIVE HEART FAILURE (HCC): ICD-10-CM

## 2022-12-12 DIAGNOSIS — I48.0 PAF (PAROXYSMAL ATRIAL FIBRILLATION) (HCC): ICD-10-CM

## 2022-12-12 DIAGNOSIS — J41.0 SIMPLE CHRONIC BRONCHITIS (HCC): Primary | ICD-10-CM

## 2022-12-12 DIAGNOSIS — I43 CARDIOMYOPATHY AS MANIFESTATION OF UNDERLYING DISEASE (HCC): ICD-10-CM

## 2022-12-12 LAB
INTERNATIONAL NORMALIZATION RATIO, POC: 1.3
PROTHROMBIN TIME, POC: 0

## 2022-12-12 PROCEDURE — G8484 FLU IMMUNIZE NO ADMIN: HCPCS | Performed by: INTERNAL MEDICINE

## 2022-12-12 PROCEDURE — 85610 PROTHROMBIN TIME: CPT | Performed by: INTERNAL MEDICINE

## 2022-12-12 PROCEDURE — 3078F DIAST BP <80 MM HG: CPT | Performed by: INTERNAL MEDICINE

## 2022-12-12 PROCEDURE — 3074F SYST BP LT 130 MM HG: CPT | Performed by: INTERNAL MEDICINE

## 2022-12-12 PROCEDURE — 3023F SPIROM DOC REV: CPT | Performed by: INTERNAL MEDICINE

## 2022-12-12 PROCEDURE — G8420 CALC BMI NORM PARAMETERS: HCPCS | Performed by: INTERNAL MEDICINE

## 2022-12-12 PROCEDURE — G8400 PT W/DXA NO RESULTS DOC: HCPCS | Performed by: INTERNAL MEDICINE

## 2022-12-12 PROCEDURE — G8427 DOCREV CUR MEDS BY ELIG CLIN: HCPCS | Performed by: INTERNAL MEDICINE

## 2022-12-12 PROCEDURE — 1090F PRES/ABSN URINE INCON ASSESS: CPT | Performed by: INTERNAL MEDICINE

## 2022-12-12 PROCEDURE — 1123F ACP DISCUSS/DSCN MKR DOCD: CPT | Performed by: INTERNAL MEDICINE

## 2022-12-12 PROCEDURE — 99213 OFFICE O/P EST LOW 20 MIN: CPT | Performed by: INTERNAL MEDICINE

## 2022-12-12 PROCEDURE — 1036F TOBACCO NON-USER: CPT | Performed by: INTERNAL MEDICINE

## 2022-12-12 RX ORDER — WARFARIN SODIUM 2.5 MG/1
2.5 TABLET ORAL DAILY
Qty: 30 TABLET | Refills: 0 | Status: SHIPPED | OUTPATIENT
Start: 2022-12-12

## 2022-12-12 NOTE — PROGRESS NOTES
Jazz Cruz is a 78 y.o. female with the following history of COPD lung CA PAF HTN for follow up was at [facility] for cardiology has a monitor on feels good     Past Medical History:   Diagnosis Date    Atrial fibrillation (HCC)     CHF (congestive heart failure) (HCC)     Emphysema, unspecified (HCC)     Hyperlipidemia     Hypertension     Lung cancer ([address]     Mitral valve regurgitation     Oxygen dependent     Prolonged emergence from general anesthesia     post general anesthesia    Skin cancer     Thyroid disease        Past Surgical History:   Procedure Laterality Date    BREAST BIOPSY Right     benign    BRONCHOSCOPY N/A 4/20/2021    BRONCHOSCOPY/TRANSBRONCHIAL NEEDLE BIOPSY performed by Brittany Titus MD at [place] [address] Right 12/16/2021    RIGHT EYE CATARACT EXTRACTION IOL IMPLANT performed by Lety Yepez MD at [facility] 7 Left 2/24/2022    LEFT EYE CATARACT EXTRACTION IOL IMPLANT performed by Lety Yepez MD at [facility] OR    TUBAL LIGATION         Family History   Problem Relation Age of Onset    Cancer Mother     Other Mother         APLASTIC ANEMIA    Peripheral Vascular Disease Mother     Emphysema Father     Lung Disease Father              Current Outpatient Medications:     warfarin (COUMADIN) 2.5 MG tablet, Take 1 tablet by mouth daily, Disp: 30 tablet, Rfl: 0    pantoprazole (PROTONIX) 40 MG tablet, Take 1 tablet by mouth every morning, Disp: 90 tablet, Rfl: 0    albuterol (PROVENTIL) (2.5 MG/3ML) 0.083% nebulizer solution, Take 3 mLs by nebulization every 6 hours as needed for Wheezing Please bill under Medicare Part B DX: COPD J44.9, Disp: 120 each, Rfl: 0    amLODIPine (NORVASC) 5 MG tablet, Take 1 tablet by mouth daily, Disp: 90 tablet, Rfl: 0    furosemide (LASIX) 40 MG tablet, Take 1 tablet by mouth daily, Disp: 90 tablet, Rfl: 0    albuterol sulfate HFA (PROVENTIL;VENTOLIN;PROAIR) 108 (90 Base) MCG/ACT inhaler, Inhale 2 puffs into the lungs every 6 hours as needed for Wheezing, Disp: 18 g, Rfl: 2    metoprolol tartrate (LOPRESSOR) 50 MG tablet, Take 1 tablet by mouth in the morning and 1 tablet in the evening., Disp: 60 tablet, Rfl: 2    rosuvastatin (CRESTOR) 10 MG tablet, Take 1 tablet by mouth nightly, Disp: 30 tablet, Rfl: 2    spironolactone (ALDACTONE) 25 MG tablet, Take 12.5 mg by mouth daily, Disp: , Rfl:     levalbuterol (XOPENEX) 0.63 MG/3ML nebulization, Take 3 mLs by nebulization every 8 hours as needed for Wheezing, Disp: 120 each, Rfl: 3    OXYGEN, Inhale 3-4 L/min into the lungs continuous , Disp: , Rfl:     fluticasone-umeclidin-vilant (TRELEGY ELLIPTA) 100-62.5-25 MCG/INH AEPB, Inhale 1 puff into the lungs every morning, Disp: 3 each, Rfl: 0    SYNTHROID 100 MCG tablet, Take 1 tablet by mouth daily, Disp: 90 tablet, Rfl: 0    predniSONE (DELTASONE) 10 MG tablet, 40mg x 3 days, 30mg x 3 days, 20mg x 3 days, 10mg x 3 days (Patient not taking: Reported on 2022), Disp: 30 tablet, Rfl: 0     Allergies: Cat hair extract, Eggs or egg-derived products, Erythromycin, Pcn [penicillins], and Seasonal    Social History     Tobacco Use    Smoking status: Former     Packs/day: 2.00     Years: 46.00     Pack years: 92.00     Types: Cigarettes     Start date: 1952     Quit date: 1998     Years since quittin.3    Smokeless tobacco: Never    Tobacco comments:     Patient quit smoking 24 yrs. ago. Substance Use Topics    Alcohol use:  Yes     Alcohol/week: 1.0 standard drink     Types: 1 Glasses of wine per week     Comment: nightly        Review of Systems:  Respiratory: negative for cough and hemoptysis  Cardiovascular: negative for chest pain and dyspnea  Gastrointestinal: negative for abdominal pain, diarrhea, nausea and vomiting  Genitourinary:negative for dysuria and hematuria  Derm: negative for rash and skin lesion(s)  Neurological: negative for seizures and tremors  Endocrine: negative for diabetic symptoms including polydipsia and polyuria    Physical Exam:  Vitals:    12/12/22 0918   BP: 132/78      Wt Readings from Last 3 Encounters:   12/12/22 163 lb (73.9 kg)   11/14/22 163 lb (73.9 kg)   09/12/22 168 lb (76.2 kg)       Skin:  Warm and dry. No rash or bruises  HEENT:  PERRLA, EOMI  Neck:  No JVD, No thyromegaly, No carotid bruit  Cardiac:  RRR, No gallop or murmur  Lungs: few basal rales   Abdomen: Normal bowel sounds, no HSM, non-tender  Extremities:  No clubbing, edema or cyanosis  Neurological:  Moves all extremities.     Lab Results   Component Value Date     08/15/2022    K 4.1 08/15/2022    CL 98 08/15/2022    CO2 30 (H) 08/15/2022    BUN 26 (H) 08/15/2022    CREATININE 1.2 (H) 08/15/2022    GLUCOSE 102 (H) 08/15/2022    CALCIUM 9.7 08/15/2022    PROT 6.7 05/03/2022    LABALBU 4.1 05/03/2022    BILITOT 0.5 05/03/2022    ALKPHOS 55 05/03/2022    AST 22 05/03/2022    ALT 10 05/03/2022    LABGLOM 43 08/15/2022    GFRAA 52 08/15/2022     Lab Results   Component Value Date    WBC 4.7 07/30/2022    HGB 11.6 07/30/2022    HCT 37.0 07/30/2022    MCV 95.6 07/30/2022     (L) 07/30/2022     Lab Results   Component Value Date    CHOL 156 05/02/2022    TRIG 84 05/02/2022    HDL 62 05/02/2022    LDLCALC 77 05/02/2022    LABVLDL 17 05/02/2022           Assessment and Plan:    Patient Active Problem List   Diagnosis    Atrial fibrillation with RVR (HCC)- Recurrent    Acute on chronic diastolic congestive heart failure (HCC)    Cardiomyopathy as manifestation of underlying disease ([address]    Non-rheumatic mitral regurgitation    Acute combined systolic and diastolic congestive heart failure (HCC)    Chronic anticoagulation    Essential hypertension    COPD (chronic obstructive pulmonary disease) (HCC)    History of atrial fibrillation    Dilated idiopathic cardiomyopathy (HCC)    Severe sinus bradycardia    Moderate tricuspid regurgitation    Hypoxia    Combined forms of age-related cataract of both eyes    Dyspnea on exertion    Mass of right lung    Supplemental oxygen dependent    Pneumonia    Chronic renal disease, stage III ([facility] [012169]    Lung cancer (HCC)       Diagnosis/Orders  1. Simple chronic bronchitis ([facility]  2. Acute on chronic diastolic congestive heart failure ([facility]  3. Malignant neoplasm of right lung, unspecified part of lung ([facility]  4. Cardiomyopathy as manifestation of underlying disease ([facility]  5. PAF (paroxysmal atrial fibrillation) (HCC)  -     POCT INR    Return in about 4 weeks (around 1/9/2023).       Karon Gonzales MD

## 2022-12-21 RX ORDER — DOXYCYCLINE HYCLATE 100 MG
100 TABLET ORAL 2 TIMES DAILY
Qty: 20 TABLET | Refills: 0 | Status: SHIPPED | OUTPATIENT
Start: 2022-12-21 | End: 2022-12-31

## 2023-01-09 ENCOUNTER — OFFICE VISIT (OUTPATIENT)
Dept: PRIMARY CARE CLINIC | Age: 83
End: 2023-01-09
Payer: MEDICARE

## 2023-01-09 VITALS
BODY MASS INDEX: 25.16 KG/M2 | DIASTOLIC BLOOD PRESSURE: 78 MMHG | HEIGHT: 65 IN | WEIGHT: 151 LBS | SYSTOLIC BLOOD PRESSURE: 110 MMHG

## 2023-01-09 DIAGNOSIS — I48.0 PAF (PAROXYSMAL ATRIAL FIBRILLATION) (HCC): ICD-10-CM

## 2023-01-09 DIAGNOSIS — C34.91 MALIGNANT NEOPLASM OF RIGHT LUNG, UNSPECIFIED PART OF LUNG (HCC): Primary | ICD-10-CM

## 2023-01-09 DIAGNOSIS — N18.31 STAGE 3A CHRONIC KIDNEY DISEASE (HCC): ICD-10-CM

## 2023-01-09 DIAGNOSIS — I43 CARDIOMYOPATHY AS MANIFESTATION OF UNDERLYING DISEASE (HCC): ICD-10-CM

## 2023-01-09 DIAGNOSIS — I50.33 ACUTE ON CHRONIC DIASTOLIC CONGESTIVE HEART FAILURE (HCC): Chronic | ICD-10-CM

## 2023-01-09 DIAGNOSIS — J44.1 COPD EXACERBATION (HCC): ICD-10-CM

## 2023-01-09 DIAGNOSIS — J43.2 CENTRILOBULAR EMPHYSEMA (HCC): ICD-10-CM

## 2023-01-09 LAB
INTERNATIONAL NORMALIZATION RATIO, POC: 1.8
PROTHROMBIN TIME, POC: 0

## 2023-01-09 PROCEDURE — 1090F PRES/ABSN URINE INCON ASSESS: CPT | Performed by: INTERNAL MEDICINE

## 2023-01-09 PROCEDURE — 85610 PROTHROMBIN TIME: CPT | Performed by: INTERNAL MEDICINE

## 2023-01-09 PROCEDURE — G8400 PT W/DXA NO RESULTS DOC: HCPCS | Performed by: INTERNAL MEDICINE

## 2023-01-09 PROCEDURE — 3078F DIAST BP <80 MM HG: CPT | Performed by: INTERNAL MEDICINE

## 2023-01-09 PROCEDURE — 99214 OFFICE O/P EST MOD 30 MIN: CPT | Performed by: INTERNAL MEDICINE

## 2023-01-09 PROCEDURE — 3074F SYST BP LT 130 MM HG: CPT | Performed by: INTERNAL MEDICINE

## 2023-01-09 PROCEDURE — 3023F SPIROM DOC REV: CPT | Performed by: INTERNAL MEDICINE

## 2023-01-09 PROCEDURE — 1036F TOBACCO NON-USER: CPT | Performed by: INTERNAL MEDICINE

## 2023-01-09 PROCEDURE — G8484 FLU IMMUNIZE NO ADMIN: HCPCS | Performed by: INTERNAL MEDICINE

## 2023-01-09 PROCEDURE — G8427 DOCREV CUR MEDS BY ELIG CLIN: HCPCS | Performed by: INTERNAL MEDICINE

## 2023-01-09 PROCEDURE — 1123F ACP DISCUSS/DSCN MKR DOCD: CPT | Performed by: INTERNAL MEDICINE

## 2023-01-09 PROCEDURE — G8417 CALC BMI ABV UP PARAM F/U: HCPCS | Performed by: INTERNAL MEDICINE

## 2023-01-09 RX ORDER — DILTIAZEM HYDROCHLORIDE 120 MG/1
120 CAPSULE, COATED, EXTENDED RELEASE ORAL DAILY
Qty: 30 CAPSULE | Refills: 0 | Status: SHIPPED | OUTPATIENT
Start: 2023-01-09

## 2023-01-09 RX ORDER — FUROSEMIDE 40 MG/1
40 TABLET ORAL DAILY
Qty: 90 TABLET | Refills: 0 | Status: CANCELLED | OUTPATIENT
Start: 2023-01-09

## 2023-01-09 RX ORDER — ALBUTEROL SULFATE 2.5 MG/3ML
2.5 SOLUTION RESPIRATORY (INHALATION) EVERY 6 HOURS PRN
Qty: 120 EACH | Refills: 0 | Status: CANCELLED | OUTPATIENT
Start: 2023-01-09

## 2023-01-09 RX ORDER — LEVALBUTEROL INHALATION SOLUTION 0.63 MG/3ML
0.63 SOLUTION RESPIRATORY (INHALATION) EVERY 8 HOURS PRN
Qty: 120 EACH | Refills: 3 | Status: CANCELLED | OUTPATIENT
Start: 2023-01-09

## 2023-01-09 RX ORDER — ALBUTEROL SULFATE 90 UG/1
2 AEROSOL, METERED RESPIRATORY (INHALATION) EVERY 6 HOURS PRN
Qty: 18 G | Refills: 2 | Status: CANCELLED | OUTPATIENT
Start: 2023-01-09

## 2023-01-09 RX ORDER — METOPROLOL TARTRATE 50 MG/1
50 TABLET, FILM COATED ORAL 2 TIMES DAILY
Qty: 60 TABLET | Refills: 2 | Status: CANCELLED | OUTPATIENT
Start: 2023-01-09

## 2023-01-09 RX ORDER — ROSUVASTATIN CALCIUM 10 MG/1
10 TABLET, COATED ORAL NIGHTLY
Qty: 90 TABLET | Refills: 2 | Status: CANCELLED | OUTPATIENT
Start: 2023-01-09

## 2023-01-09 RX ORDER — WARFARIN SODIUM 2.5 MG/1
2.5 TABLET ORAL DAILY
Qty: 90 TABLET | Refills: 0 | Status: CANCELLED | OUTPATIENT
Start: 2023-01-09

## 2023-01-09 RX ORDER — AMLODIPINE BESYLATE 5 MG/1
5 TABLET ORAL DAILY
Qty: 90 TABLET | Refills: 0 | Status: CANCELLED | OUTPATIENT
Start: 2023-01-09

## 2023-01-09 RX ORDER — LEVOTHYROXINE SODIUM 100 MCG
100 TABLET ORAL DAILY
Qty: 90 TABLET | Refills: 0 | Status: CANCELLED | OUTPATIENT
Start: 2023-01-09

## 2023-01-09 RX ORDER — SPIRONOLACTONE 25 MG/1
12.5 TABLET ORAL DAILY
Qty: 90 TABLET | Refills: 0 | Status: CANCELLED | OUTPATIENT
Start: 2023-01-09

## 2023-01-09 RX ORDER — PANTOPRAZOLE SODIUM 40 MG/1
40 TABLET, DELAYED RELEASE ORAL EVERY MORNING
Qty: 90 TABLET | Refills: 0 | Status: CANCELLED | OUTPATIENT
Start: 2023-01-09

## 2023-01-09 ASSESSMENT — PATIENT HEALTH QUESTIONNAIRE - PHQ9
SUM OF ALL RESPONSES TO PHQ QUESTIONS 1-9: 0
SUM OF ALL RESPONSES TO PHQ9 QUESTIONS 1 & 2: 0
SUM OF ALL RESPONSES TO PHQ QUESTIONS 1-9: 0
2. FEELING DOWN, DEPRESSED OR HOPELESS: 0
1. LITTLE INTEREST OR PLEASURE IN DOING THINGS: 0

## 2023-01-09 NOTE — PROGRESS NOTES
Sandrine Hall is a 78 y.o. female with the following history of COPD,lung CA,hypothyroid, chronic CHF for follow up feels palpitation     Past Medical History:   Diagnosis Date    Atrial fibrillation (HCC)     CHF (congestive heart failure) (HCC)     Emphysema, unspecified (HCC)     Hyperlipidemia     Hypertension     Lung cancer ([address]     Mitral valve regurgitation     Oxygen dependent     Prolonged emergence from general anesthesia     post general anesthesia    Skin cancer     Thyroid disease        Past Surgical History:   Procedure Laterality Date    BREAST BIOPSY Right     benign    BRONCHOSCOPY N/A 4/20/2021    BRONCHOSCOPY/TRANSBRONCHIAL NEEDLE BIOPSY performed by Jessica Alcantar MD at [facility] 175 Right 12/16/2021    RIGHT EYE CATARACT EXTRACTION IOL IMPLANT performed by Igor Shanks MD at [facility] 7 Left 2/24/2022    LEFT EYE CATARACT EXTRACTION IOL IMPLANT performed by Igor Shanks MD at [facility] OR    TUBAL LIGATION         Family History   Problem Relation Age of Onset    Cancer Mother     Other Mother         APLASTIC ANEMIA    Peripheral Vascular Disease Mother     Emphysema Father     Lung Disease Father              Current Outpatient Medications:     dilTIAZem (CARDIZEM CD) 120 MG extended release capsule, Take 1 capsule by mouth daily, Disp: 30 capsule, Rfl: 0    warfarin (COUMADIN) 2.5 MG tablet, Take 1 tablet by mouth daily, Disp: 30 tablet, Rfl: 0    pantoprazole (PROTONIX) 40 MG tablet, Take 1 tablet by mouth every morning, Disp: 90 tablet, Rfl: 0    albuterol (PROVENTIL) (2.5 MG/3ML) 0.083% nebulizer solution, Take 3 mLs by nebulization every 6 hours as needed for Wheezing Please bill under Medicare Part B DX: COPD J44.9, Disp: 120 each, Rfl: 0    amLODIPine (NORVASC) 5 MG tablet, Take 1 tablet by mouth daily, Disp: 90 tablet, Rfl: 0    fluticasone-umeclidin-vilant (Judene Ohs) 100-62.5-25 MCG/INH AEPB, Inhale 1 puff into the lungs every morning, Disp: 3 each, Rfl: 0    furosemide (LASIX) 40 MG tablet, Take 1 tablet by mouth daily, Disp: 90 tablet, Rfl: 0    SYNTHROID 100 MCG tablet, Take 1 tablet by mouth daily, Disp: 90 tablet, Rfl: 0    albuterol sulfate HFA (PROVENTIL;VENTOLIN;PROAIR) 108 (90 Base) MCG/ACT inhaler, Inhale 2 puffs into the lungs every 6 hours as needed for Wheezing, Disp: 18 g, Rfl: 2    metoprolol tartrate (LOPRESSOR) 50 MG tablet, Take 1 tablet by mouth in the morning and 1 tablet in the evening., Disp: 60 tablet, Rfl: 2    rosuvastatin (CRESTOR) 10 MG tablet, Take 1 tablet by mouth nightly, Disp: 30 tablet, Rfl: 2    spironolactone (ALDACTONE) 25 MG tablet, Take 12.5 mg by mouth daily, Disp: , Rfl:     levalbuterol (XOPENEX) 0.63 MG/3ML nebulization, Take 3 mLs by nebulization every 8 hours as needed for Wheezing, Disp: 120 each, Rfl: 3    OXYGEN, Inhale 3-4 L/min into the lungs continuous , Disp: , Rfl:     predniSONE (DELTASONE) 10 MG tablet, 40mg x 3 days, 30mg x 3 days, 20mg x 3 days, 10mg x 3 days (Patient not taking: Reported on 2022), Disp: 30 tablet, Rfl: 0     Allergies: Cat hair extract, Eggs or egg-derived products, Erythromycin, Pcn [penicillins], and Seasonal    Social History     Tobacco Use    Smoking status: Former     Packs/day: 2.00     Years: 46.00     Pack years: 92.00     Types: Cigarettes     Start date: 1952     Quit date: 1998     Years since quittin.4    Smokeless tobacco: Never    Tobacco comments:     Patient quit smoking 24 yrs. ago. Substance Use Topics    Alcohol use:  Yes     Alcohol/week: 1.0 standard drink     Types: 1 Glasses of wine per week     Comment: nightly        Review of Systems:  Respiratory: negative for cough and hemoptysis  Cardiovascular: negative for chest pain and dyspnea  Gastrointestinal: negative for abdominal pain, diarrhea, nausea and vomiting  Genitourinary:negative for dysuria and hematuria  Derm: negative for rash and skin lesion(s)  Neurological: negative for seizures and tremors  Endocrine: negative for diabetic symptoms including polydipsia and polyuria    Physical Exam:  Vitals:    01/09/23 1109   BP: 110/78      Wt Readings from Last 3 Encounters:   01/09/23 151 lb (68.5 kg)   12/12/22 163 lb (73.9 kg)   11/14/22 163 lb (73.9 kg)       Skin:  Warm and dry.   No rash or bruises  HEENT:  PERRLA, EOMI  Neck:  No JVD, No thyromegaly, No carotid bruit  Cardiac:  RRR, No gallop or murmur tachycardic   Lungs:  CTA, Normal percussion  Abdomen: Normal bowel sounds, no HSM, non-tender  Extremities:  No clubbing, edema or cyanosis  Neurological:  Moves all extremities, normal DTR    Lab Results   Component Value Date     08/15/2022    K 4.1 08/15/2022    CL 98 08/15/2022    CO2 30 (H) 08/15/2022    BUN 26 (H) 08/15/2022    CREATININE 1.2 (H) 08/15/2022    GLUCOSE 102 (H) 08/15/2022    CALCIUM 9.7 08/15/2022    PROT 6.7 05/03/2022    LABALBU 4.1 05/03/2022    BILITOT 0.5 05/03/2022    ALKPHOS 55 05/03/2022    AST 22 05/03/2022    ALT 10 05/03/2022    LABGLOM 43 08/15/2022    GFRAA 52 08/15/2022     Lab Results   Component Value Date    WBC 4.7 07/30/2022    HGB 11.6 07/30/2022    HCT 37.0 07/30/2022    MCV 95.6 07/30/2022     (L) 07/30/2022     Lab Results   Component Value Date    CHOL 156 05/02/2022    TRIG 84 05/02/2022    HDL 62 05/02/2022    LDLCALC 77 05/02/2022    LABVLDL 17 05/02/2022     No results found for: PSA, PSADIA   No results found for: LABA1C        Assessment and Plan:    Patient Active Problem List   Diagnosis    Atrial fibrillation with RVR (HCC)- Recurrent    Acute on chronic diastolic congestive heart failure (HCC)    Cardiomyopathy as manifestation of underlying disease (HCC)    Non-rheumatic mitral regurgitation    Acute combined systolic and diastolic congestive heart failure (HCC)    Chronic anticoagulation    Essential hypertension    COPD (chronic obstructive pulmonary disease) ([facility]    History of atrial fibrillation    Dilated idiopathic cardiomyopathy (HCC)    Severe sinus bradycardia    Moderate tricuspid regurgitation    Hypoxia    Combined forms of age-related cataract of both eyes    Dyspnea on exertion    Mass of right lung    Supplemental oxygen dependent    Pneumonia    Chronic renal disease, stage III ([facility] [271357]    Lung cancer ([facility]       Diagnosis/Orders  1. Malignant neoplasm of right lung, unspecified part of lung ([facility]  2. COPD exacerbation ([facility]  The following orders have not been finalized:  -     levalbuterol (XOPENEX) 0.63 MG/3ML nebulization  3. Acute on chronic diastolic congestive heart failure ([facility]  The following orders have not been finalized:  -     levalbuterol (XOPENEX) 0.63 MG/3ML nebulization  4. Cardiomyopathy as manifestation of underlying disease ([facility]  The following orders have not been finalized:  -     levalbuterol (XOPENEX) 0.63 MG/3ML nebulization  5. Centrilobular emphysema ([facility]  The following orders have not been finalized:  -     albuterol (PROVENTIL) (2.5 MG/3ML) 0.083% nebulizer solution  6. PAF (paroxysmal atrial fibrillation) (HCC) RVR   7. Stage 3a chronic kidney disease (HCC)    Cardizem cd 240 mg given  after 1/2 hr heart rate controlled     Start cardizem ca 120 mg qd   Stop metoprolol  Check INR    Return in about 4 weeks (around 2/6/2023).       Sandy Fuentes MD

## 2023-01-30 RX ORDER — AMLODIPINE BESYLATE 5 MG/1
5 TABLET ORAL DAILY
Qty: 90 TABLET | Refills: 0 | Status: SHIPPED | OUTPATIENT
Start: 2023-01-30

## 2023-01-30 RX ORDER — FUROSEMIDE 40 MG/1
40 TABLET ORAL DAILY
Qty: 90 TABLET | Refills: 0 | Status: SHIPPED | OUTPATIENT
Start: 2023-01-30

## 2023-01-30 RX ORDER — LEVOTHYROXINE SODIUM 100 MCG
100 TABLET ORAL DAILY
Qty: 90 TABLET | Refills: 0 | Status: SHIPPED | OUTPATIENT
Start: 2023-01-30

## 2023-02-01 DIAGNOSIS — J43.2 CENTRILOBULAR EMPHYSEMA (HCC): ICD-10-CM

## 2023-02-01 RX ORDER — ALBUTEROL SULFATE 2.5 MG/3ML
2.5 SOLUTION RESPIRATORY (INHALATION) EVERY 6 HOURS PRN
Qty: 120 EACH | Refills: 0 | Status: SHIPPED | OUTPATIENT
Start: 2023-02-01

## 2023-02-01 RX ORDER — SPIRONOLACTONE 25 MG/1
12.5 TABLET ORAL DAILY
Qty: 90 TABLET | Refills: 0 | Status: SHIPPED | OUTPATIENT
Start: 2023-02-01

## 2023-02-01 RX ORDER — ROSUVASTATIN CALCIUM 10 MG/1
10 TABLET, COATED ORAL NIGHTLY
Qty: 30 TABLET | Refills: 2 | Status: SHIPPED | OUTPATIENT
Start: 2023-02-01

## 2023-02-06 RX ORDER — WARFARIN SODIUM 2.5 MG/1
2.5 TABLET ORAL DAILY
Qty: 30 TABLET | Refills: 0 | Status: SHIPPED | OUTPATIENT
Start: 2023-02-06

## 2023-02-06 RX ORDER — DILTIAZEM HYDROCHLORIDE 120 MG/1
120 CAPSULE, COATED, EXTENDED RELEASE ORAL DAILY
Qty: 90 CAPSULE | Refills: 0 | Status: SHIPPED | OUTPATIENT
Start: 2023-02-06

## 2023-02-10 ENCOUNTER — OFFICE VISIT (OUTPATIENT)
Dept: PRIMARY CARE CLINIC | Age: 83
End: 2023-02-10

## 2023-02-10 ENCOUNTER — ANTI-COAG VISIT (OUTPATIENT)
Dept: PRIMARY CARE CLINIC | Age: 83
End: 2023-02-10

## 2023-02-10 VITALS
SYSTOLIC BLOOD PRESSURE: 125 MMHG | HEIGHT: 68 IN | WEIGHT: 164 LBS | RESPIRATION RATE: 16 BRPM | BODY MASS INDEX: 24.86 KG/M2 | OXYGEN SATURATION: 97 % | HEART RATE: 86 BPM | DIASTOLIC BLOOD PRESSURE: 60 MMHG | TEMPERATURE: 97.5 F

## 2023-02-10 DIAGNOSIS — I48.0 PAF (PAROXYSMAL ATRIAL FIBRILLATION) (HCC): ICD-10-CM

## 2023-02-10 DIAGNOSIS — I43 CARDIOMYOPATHY AS MANIFESTATION OF UNDERLYING DISEASE (HCC): ICD-10-CM

## 2023-02-10 DIAGNOSIS — C34.91 MALIGNANT NEOPLASM OF RIGHT LUNG, UNSPECIFIED PART OF LUNG (HCC): ICD-10-CM

## 2023-02-10 DIAGNOSIS — J44.1 COPD EXACERBATION (HCC): Primary | ICD-10-CM

## 2023-02-10 LAB
INTERNATIONAL NORMALIZATION RATIO, POC: 2.7
PROTHROMBIN TIME, POC: 1.5

## 2023-02-10 RX ORDER — LEVOTHYROXINE SODIUM 100 MCG
100 TABLET ORAL DAILY
Qty: 90 TABLET | Refills: 0 | Status: SHIPPED | OUTPATIENT
Start: 2023-02-10

## 2023-02-10 SDOH — ECONOMIC STABILITY: FOOD INSECURITY: WITHIN THE PAST 12 MONTHS, THE FOOD YOU BOUGHT JUST DIDN'T LAST AND YOU DIDN'T HAVE MONEY TO GET MORE.: NEVER TRUE

## 2023-02-10 SDOH — ECONOMIC STABILITY: FOOD INSECURITY: WITHIN THE PAST 12 MONTHS, YOU WORRIED THAT YOUR FOOD WOULD RUN OUT BEFORE YOU GOT MONEY TO BUY MORE.: NEVER TRUE

## 2023-02-10 SDOH — ECONOMIC STABILITY: INCOME INSECURITY: HOW HARD IS IT FOR YOU TO PAY FOR THE VERY BASICS LIKE FOOD, HOUSING, MEDICAL CARE, AND HEATING?: NOT VERY HARD

## 2023-02-10 SDOH — ECONOMIC STABILITY: HOUSING INSECURITY
IN THE LAST 12 MONTHS, WAS THERE A TIME WHEN YOU DID NOT HAVE A STEADY PLACE TO SLEEP OR SLEPT IN A SHELTER (INCLUDING NOW)?: NO

## 2023-02-10 ASSESSMENT — PATIENT HEALTH QUESTIONNAIRE - PHQ9
2. FEELING DOWN, DEPRESSED OR HOPELESS: 0
SUM OF ALL RESPONSES TO PHQ9 QUESTIONS 1 & 2: 0
SUM OF ALL RESPONSES TO PHQ QUESTIONS 1-9: 0
1. LITTLE INTEREST OR PLEASURE IN DOING THINGS: 0
SUM OF ALL RESPONSES TO PHQ QUESTIONS 1-9: 0

## 2023-02-10 ASSESSMENT — LIFESTYLE VARIABLES
HOW OFTEN DO YOU HAVE A DRINK CONTAINING ALCOHOL: NEVER
HOW MANY STANDARD DRINKS CONTAINING ALCOHOL DO YOU HAVE ON A TYPICAL DAY: PATIENT DOES NOT DRINK

## 2023-02-10 NOTE — PROGRESS NOTES
Jax Da Silva is a 78 y.o. female with the following history of COPD,lung CA,PAF,hypothyroid for follow up feels fine today     Past Medical History:   Diagnosis Date    Atrial fibrillation (HCC)     CHF (congestive heart failure) (HCC)     Emphysema, unspecified (HCC)     Hyperlipidemia     Hypertension     Lung cancer ([address]     Mitral valve regurgitation     Oxygen dependent     Prolonged emergence from general anesthesia     post general anesthesia    Skin cancer     Thyroid disease        Past Surgical History:   Procedure Laterality Date    BREAST BIOPSY Right     benign    BRONCHOSCOPY N/A 4/20/2021    BRONCHOSCOPY/TRANSBRONCHIAL NEEDLE BIOPSY performed by Lexa Reyes MD at [facility] 175 Right 12/16/2021    RIGHT EYE CATARACT EXTRACTION IOL IMPLANT performed by Samantha Lion MD at [facility] 7 Left 2/24/2022    LEFT EYE CATARACT EXTRACTION IOL IMPLANT performed by Samantha Lion MD at [facility] OR    TUBAL LIGATION         Family History   Problem Relation Age of Onset    Cancer Mother     Other Mother         APLASTIC ANEMIA    Peripheral Vascular Disease Mother     Emphysema Father     Lung Disease Father              Current Outpatient Medications:     SYNTHROID 100 MCG tablet, Take 1 tablet by mouth daily, Disp: 90 tablet, Rfl: 0    Handicap Placard MISC, by Does not apply route Patient cannot walk 200 ft without stopping to rest.   Expiration 5 years, Disp: 1 each, Rfl: 0    dilTIAZem (CARDIZEM CD) 120 MG extended release capsule, Take 1 capsule by mouth daily, Disp: 90 capsule, Rfl: 0    warfarin (COUMADIN) 2.5 MG tablet, Take 1 tablet by mouth daily, Disp: 30 tablet, Rfl: 0    rosuvastatin (CRESTOR) 10 MG tablet, Take 1 tablet by mouth nightly, Disp: 30 tablet, Rfl: 2    spironolactone (ALDACTONE) 25 MG tablet, Take 0.5 tablets by mouth daily, Disp: 90 tablet, Rfl: 0    albuterol (PROVENTIL) (2.5 MG/3ML) 0.083% nebulizer solution, Take 3 mLs by nebulization every 6 hours as needed for Wheezing Please bill under Medicare Part B DX: COPD J44.9, Disp: 120 each, Rfl: 0    furosemide (LASIX) 40 MG tablet, Take 1 tablet by mouth daily, Disp: 90 tablet, Rfl: 0    pantoprazole (PROTONIX) 40 MG tablet, Take 1 tablet by mouth every morning, Disp: 90 tablet, Rfl: 0    fluticasone-umeclidin-vilant (TRELEGY ELLIPTA) 100-62.5-25 MCG/INH AEPB, Inhale 1 puff into the lungs every morning, Disp: 3 each, Rfl: 0    albuterol sulfate HFA (PROVENTIL;VENTOLIN;PROAIR) 108 (90 Base) MCG/ACT inhaler, Inhale 2 puffs into the lungs every 6 hours as needed for Wheezing, Disp: 18 g, Rfl: 2    levalbuterol (XOPENEX) 0.63 MG/3ML nebulization, Take 3 mLs by nebulization every 8 hours as needed for Wheezing, Disp: 120 each, Rfl: 3    OXYGEN, Inhale 3-4 L/min into the lungs continuous , Disp: , Rfl:     amLODIPine (NORVASC) 5 MG tablet, Take 1 tablet by mouth daily (Patient not taking: Reported on 2/10/2023), Disp: 90 tablet, Rfl: 0    metoprolol tartrate (LOPRESSOR) 50 MG tablet, Take 1 tablet by mouth in the morning and 1 tablet in the evening. (Patient not taking: Reported on 2/10/2023), Disp: 60 tablet, Rfl: 2    predniSONE (DELTASONE) 10 MG tablet, 40mg x 3 days, 30mg x 3 days, 20mg x 3 days, 10mg x 3 days (Patient not taking: No sig reported), Disp: 30 tablet, Rfl: 0     Allergies: Cat hair extract, Eggs or egg-derived products, Erythromycin, Pcn [penicillins], and Seasonal    Social History     Tobacco Use    Smoking status: Former     Packs/day: 2.00     Years: 46.00     Pack years: 92.00     Types: Cigarettes     Start date: 1952     Quit date: 1998     Years since quittin.5    Smokeless tobacco: Never    Tobacco comments:     Patient quit smoking 24 yrs. ago. Substance Use Topics    Alcohol use:  Yes     Alcohol/week: 1.0 standard drink     Types: 1 Glasses of wine per week     Comment: nightly        Review of Systems:  Respiratory: negative for cough and hemoptysis  Cardiovascular: negative for chest pain and dyspnea  Gastrointestinal: negative for abdominal pain, diarrhea, nausea and vomiting  Genitourinary:negative for dysuria and hematuria  Derm: negative for rash and skin lesion(s)  Neurological: negative for seizures and tremors  Endocrine: negative for diabetic symptoms including polydipsia and polyuria    Physical Exam:  Vitals:    02/10/23 1047   BP: 125/60   Pulse: 86   Resp: 16   Temp: 97.5 °F (36.4 °C)   SpO2: 97%      Wt Readings from Last 3 Encounters:   02/10/23 164 lb (74.4 kg)   01/09/23 151 lb (68.5 kg)   12/12/22 163 lb (73.9 kg)       Skin:  Warm and dry. No rash or bruises  HEENT:  PERRLA, EOMI  Neck:  No JVD, No thyromegaly, No carotid bruit  Cardiac:  RRR, No gallop or murmur  Lungs:  CTA, Normal percussion  Abdomen: Normal bowel sounds, no HSM, non-tender  Extremities:  No clubbing, edema or cyanosis  Neurological:  Moves all extremities.     Lab Results   Component Value Date     08/15/2022    K 4.1 08/15/2022    CL 98 08/15/2022    CO2 30 (H) 08/15/2022    BUN 26 (H) 08/15/2022    CREATININE 1.2 (H) 08/15/2022    GLUCOSE 102 (H) 08/15/2022    CALCIUM 9.7 08/15/2022    PROT 6.7 05/03/2022    LABALBU 4.1 05/03/2022    BILITOT 0.5 05/03/2022    ALKPHOS 55 05/03/2022    AST 22 05/03/2022    ALT 10 05/03/2022    LABGLOM 43 08/15/2022    GFRAA 52 08/15/2022     Lab Results   Component Value Date    WBC 4.7 07/30/2022    HGB 11.6 07/30/2022    HCT 37.0 07/30/2022    MCV 95.6 07/30/2022     (L) 07/30/2022     Lab Results   Component Value Date    CHOL 156 05/02/2022    TRIG 84 05/02/2022    HDL 62 05/02/2022    LDLCALC 77 05/02/2022    LABVLDL 17 05/02/2022         Assessment and Plan:    Patient Active Problem List   Diagnosis    Atrial fibrillation with RVR (HCC)- Recurrent    Acute on chronic diastolic congestive heart failure (HCC)    Cardiomyopathy as manifestation of underlying disease ([address]    Non-rheumatic mitral regurgitation    Acute combined systolic and diastolic congestive heart failure (HCC)    Chronic anticoagulation    Essential hypertension    COPD (chronic obstructive pulmonary disease) (HCC)    History of atrial fibrillation    Dilated idiopathic cardiomyopathy (HCC)    Severe sinus bradycardia    Moderate tricuspid regurgitation    Hypoxia    Combined forms of age-related cataract of both eyes    Dyspnea on exertion    Mass of right lung    Supplemental oxygen dependent    Pneumonia    Chronic renal disease, stage III ([address] [[id number]]    Lung cancer ([facility]       Diagnosis/Orders  1. COPD exacerbation ([address]  2. Cardiomyopathy as manifestation of underlying disease ([facility]  3. PAF (paroxysmal atrial fibrillation) (HCC)  -     POCT INR  4. Malignant neoplasm of right lung, unspecified part of lung ([address]    Handicap placard    Return in about 4 weeks (around 3/10/2023).       Michelle Alegre MD

## 2023-03-13 ENCOUNTER — OFFICE VISIT (OUTPATIENT)
Dept: PRIMARY CARE CLINIC | Age: 83
End: 2023-03-13

## 2023-03-13 VITALS
BODY MASS INDEX: 25.31 KG/M2 | DIASTOLIC BLOOD PRESSURE: 62 MMHG | SYSTOLIC BLOOD PRESSURE: 112 MMHG | OXYGEN SATURATION: 95 % | WEIGHT: 167 LBS | HEIGHT: 68 IN | HEART RATE: 100 BPM

## 2023-03-13 DIAGNOSIS — I48.0 PAF (PAROXYSMAL ATRIAL FIBRILLATION) (HCC): ICD-10-CM

## 2023-03-13 DIAGNOSIS — Z00.00 INITIAL MEDICARE ANNUAL WELLNESS VISIT: Primary | ICD-10-CM

## 2023-03-13 LAB
INTERNATIONAL NORMALIZATION RATIO, POC: 2.4
PROTHROMBIN TIME, POC: 0

## 2023-03-13 RX ORDER — WARFARIN SODIUM 2.5 MG/1
2.5 TABLET ORAL DAILY
Qty: 30 TABLET | Refills: 0 | Status: SHIPPED | OUTPATIENT
Start: 2023-03-13

## 2023-03-13 ASSESSMENT — PATIENT HEALTH QUESTIONNAIRE - PHQ9
SUM OF ALL RESPONSES TO PHQ QUESTIONS 1-9: 0
1. LITTLE INTEREST OR PLEASURE IN DOING THINGS: 0
SUM OF ALL RESPONSES TO PHQ QUESTIONS 1-9: 0
2. FEELING DOWN, DEPRESSED OR HOPELESS: 0
SUM OF ALL RESPONSES TO PHQ QUESTIONS 1-9: 0
SUM OF ALL RESPONSES TO PHQ QUESTIONS 1-9: 0
SUM OF ALL RESPONSES TO PHQ9 QUESTIONS 1 & 2: 0

## 2023-03-13 ASSESSMENT — LIFESTYLE VARIABLES
HOW OFTEN DO YOU HAVE A DRINK CONTAINING ALCOHOL: 4 OR MORE TIMES A WEEK
HOW OFTEN DURING THE LAST YEAR HAVE YOU FAILED TO DO WHAT WAS NORMALLY EXPECTED FROM YOU BECAUSE OF DRINKING: 0
HOW OFTEN DURING THE LAST YEAR HAVE YOU FOUND THAT YOU WERE NOT ABLE TO STOP DRINKING ONCE YOU HAD STARTED: 0
HOW OFTEN DURING THE LAST YEAR HAVE YOU BEEN UNABLE TO REMEMBER WHAT HAPPENED THE NIGHT BEFORE BECAUSE YOU HAD BEEN DRINKING: 0
HAS A RELATIVE, FRIEND, DOCTOR, OR ANOTHER HEALTH PROFESSIONAL EXPRESSED CONCERN ABOUT YOUR DRINKING OR SUGGESTED YOU CUT DOWN: 0
HOW OFTEN DURING THE LAST YEAR HAVE YOU NEEDED AN ALCOHOLIC DRINK FIRST THING IN THE MORNING TO GET YOURSELF GOING AFTER A NIGHT OF HEAVY DRINKING: 0
HOW OFTEN DURING THE LAST YEAR HAVE YOU HAD A FEELING OF GUILT OR REMORSE AFTER DRINKING: 0
HOW MANY STANDARD DRINKS CONTAINING ALCOHOL DO YOU HAVE ON A TYPICAL DAY: 1 OR 2
HAVE YOU OR SOMEONE ELSE BEEN INJURED AS A RESULT OF YOUR DRINKING: 0

## 2023-03-13 NOTE — PATIENT INSTRUCTIONS
Learning About Dental Care for Older Adults  Dental care for older adults: Overview  Dental care for older people is much the same as for younger adults. But older adults do have concerns that younger adults do not. Older adults may have problems with gum disease and decay on the roots of their teeth. They may need missing teeth replaced or broken fillings fixed. Or they may have dentures that need to be cared for. Some older adults may have trouble holding a toothbrush. You can help remind the person you are caring for to brush and floss their teeth or to clean their dentures. In some cases, you may need to do the brushing and other dental care tasks. People who have trouble using their hands or who have dementia may need this extra help. How can you help with dental care? Normal dental care  To keep the teeth and gums healthy:  Brush the teeth with fluoride toothpaste twice a day--in the morning and at night--and floss at least once a day. Plaque can quickly build up on the teeth of older adults. Watch for the signs of gum disease. These signs include gums that bleed after brushing or after eating hard foods, such as apples. See a dentist regularly. Many experts recommend checkups every 6 months. Keep the dentist up to date on any new medications the person is taking. Encourage a balanced diet that includes whole grains, vegetables, and fruits, and that is low in saturated fat and sodium. Encourage the person you're caring for not to use tobacco products. They can affect dental and general health. Many older adults have a fixed income and feel that they can't afford dental care. But most [facility] and [facility] have programs in which dentists help older adults by lowering fees. Contact your area's public health offices or  for information about dental care in your area.   Using a toothbrush  Older adults with arthritis sometimes have trouble brushing their teeth because they can't easily hold the toothbrush. Their hands and fingers may be stiff, painful, or weak. If this is the case, you can: Offer an electric toothbrush. Enlarge the handle of a non-electric toothbrush by wrapping a sponge, an elastic bandage, or adhesive tape around it. Push the toothbrush handle through a ball made of rubber or soft foam.  Make the handle longer and thicker by taping Popsicle sticks or tongue depressors to it. You may also be able to buy special toothbrushes, toothpaste dispensers, and floss holders. Your doctor may recommend a soft-bristle toothbrush if the person you care for bleeds easily. Bleeding can happen because of a health problem or from certain medicines. A toothpaste for sensitive teeth may help if the person you care for has sensitive teeth. How do you brush and floss someone's teeth? If the person you are caring for has a hard time cleaning their teeth on their own, you may need to brush and floss their teeth for them. It may be easiest to have the person sit and face away from you, and to sit or stand behind them. That way you can steady their head against your arm as you reach around to floss and brush their teeth. Choose a place that has good lighting and is comfortable for both of you. Before you begin, gather your supplies. You will need gloves, floss, a toothbrush, and a container to hold water if you are not near a sink. Wash and dry your hands well and put on gloves. Start by flossing:  Gently work a piece of floss between each of the teeth toward the gums. A plastic flossing tool may make this easier, and they are available at [facility]. Curve the floss around each tooth into a U-shape and gently slide it under the gum line. Move the floss firmly up and down several times to scrape off the plaque. After you've finished flossing, throw away the used floss and begin brushing:  Wet the brush and apply toothpaste. Place the brush at a 45-degree angle where the teeth meet the gums. Press firmly, and move the brush in small circles over the surface of the teeth. Be careful not to brush too hard. Vigorous brushing can make the gums pull away from the teeth and can scratch the tooth enamel. Brush all surfaces of the teeth, on the tongue side and on the cheek side. Pay special attention to the front teeth and all surfaces of the back teeth. Brush chewing surfaces with short back-and-forth strokes. After you've finished, help the person rinse the remaining toothpaste from their mouth. Where can you learn more? Go to http://www.woods.com/ and enter F944 to learn more about \"Learning About Dental Care for Older Adults. \"  Current as of: June 16, 2022               Content Version: 13.5  © 2006-2022 Healthwise, [employer]. Care instructions adapted under license by [facility] ([facility]). If you have questions about a medical condition or this instruction, always ask your healthcare professional. [place] [address] any warranty or liability for your use of this information. Advance Directives: Care Instructions  Overview  An advance directive is a legal way to state your wishes at the end of your life. It tells your family and your doctor what to do if you can't say what you want. There are two main types of advance directives. You can change them any time your wishes change. Living will. This form tells your family and your doctor your wishes about life support and other treatment. The form is also called a declaration. Medical power of . This form lets you name a person to make treatment decisions for you when you can't speak for yourself. This person is called a health care agent (health care proxy, health care surrogate). The form is also called a durable power of  for health care.   If you do not have an advance directive, decisions about your medical care may be made by a family member, or by a doctor or a  who doesn't know you.  It may help to think of an advance directive as a gift to the people who care for you. If you have one, they won't have to make tough decisions by themselves. For more information, including forms for your state, see the [address] website (www.caringinfo.org/planning/advance-directives/). Follow-up care is a key part of your treatment and safety. Be sure to make and go to all appointments, and call your doctor if you are having problems. It's also a good idea to know your test results and keep a list of the medicines you take. What should you include in an advance directive? Many states have a unique advance directive form. (It may ask you to address specific issues.) Or you might use a universal form that's approved by many states. If your form doesn't tell you what to address, it may be hard to know what to include in your advance directive. Use the questions below to help you get started. Who do you want to make decisions about your medical care if you are not able to? What life-support measures do you want if you have a serious illness that gets worse over time or can't be cured? What are you most afraid of that might happen? (Maybe you're afraid of having pain, losing your independence, or being kept alive by machines.)  Where would you prefer to die? (Your home? A hospital? A nursing home?)  Do you want to donate your organs when you die? Do you want certain Rastafari practices performed before you die? When should you call for help? Be sure to contact your doctor if you have any questions. Where can you learn more? Go to http://www.katz.com/ and enter R264 to learn more about \"Advance Directives: Care Instructions. \"  Current as of: June 16, 2022               Content Version: 13.5  © 1627-4404 Healthwise, Incorporated. Care instructions adapted under license by [employer] [facility] ([facility]).  If you have questions about a medical condition or this instruction, always ask your healthcare professional. [address] any warranty or liability for your use of this information. A Healthy Heart: Care Instructions  Your Care Instructions     Coronary artery disease, also called heart disease, occurs when a substance called plaque builds up in the vessels that supply oxygen-rich blood to your heart muscle. This can narrow the blood vessels and reduce blood flow. A heart attack happens when blood flow is completely blocked. A high-fat diet, smoking, and other factors increase the risk of heart disease. Your doctor has found that you have a chance of having heart disease. You can do lots of things to keep your heart healthy. It may not be easy, but you can change your diet, exercise more, and quit smoking. These steps really work to lower your chance of heart disease. Follow-up care is a key part of your treatment and safety. Be sure to make and go to all appointments, and call your doctor if you are having problems. It's also a good idea to know your test results and keep a list of the medicines you take. How can you care for yourself at home? Diet    Use less salt when you cook and eat. This helps lower your blood pressure. Taste food before salting. Add only a little salt when you think you need it. With time, your taste buds will adjust to less salt.     Eat fewer snack items, fast foods, canned soups, and other high-salt, high-fat, processed foods.     Read food labels and try to avoid saturated and trans fats. They increase your risk of heart disease by raising cholesterol levels.     Limit the amount of solid fat-butter, margarine, and shortening-you eat. Use olive, peanut, or canola oil when you cook. Bake, broil, and steam foods instead of frying them.     Eat a variety of fruit and vegetables every day. Dark green, deep orange, red, or yellow fruits and vegetables are especially good for you.  Examples include spinach, carrots, peaches, and berries.     Foods high in fiber can reduce your cholesterol and provide important vitamins and minerals. High-fiber foods include whole-grain cereals and breads, oatmeal, beans, brown rice, citrus fruits, and apples.     Eat lean proteins. Heart-healthy proteins include seafood, lean meats and poultry, eggs, beans, peas, nuts, seeds, and soy products.     Limit drinks and foods with added sugar. These include candy, desserts, and soda pop. Lifestyle changes    If your doctor recommends it, get more exercise. Walking is a good choice. Bit by bit, increase the amount you walk every day. Try for at least 30 minutes on most days of the week. You also may want to swim, bike, or do other activities.     Do not smoke. If you need help quitting, talk to your doctor about stop-smoking programs and medicines. These can increase your chances of quitting for good. Quitting smoking may be the most important step you can take to protect your heart. It is never too late to quit.     Limit alcohol to 2 drinks a day for men and 1 drink a day for women. Too much alcohol can cause health problems.     Manage other health problems such as diabetes, high blood pressure, and high cholesterol. If you think you may have a problem with alcohol or drug use, talk to your doctor. Medicines    Take your medicines exactly as prescribed. Call your doctor if you think you are having a problem with your medicine.     If your doctor recommends aspirin, take the amount directed each day. Make sure you take aspirin and not another kind of pain reliever, such as acetaminophen (Tylenol). When should you call for help? Call 911 if you have symptoms of a heart attack.  These may include:    Chest pain or pressure, or a strange feeling in the chest.     Sweating.     Shortness of breath.     Pain, pressure, or a strange feeling in the back, neck, jaw, or upper belly or in one or both shoulders or arms.     Lightheadedness or sudden weakness.     A fast or irregular heartbeat. After you call 911, the  may tell you to chew 1 adult-strength or 2 to 4 low-dose aspirin. Wait for an ambulance. Do not try to drive yourself. Watch closely for changes in your health, and be sure to contact your doctor if you have any problems. Where can you learn more? Go to http://www.katz.com/ and enter F075 to learn more about \"A Healthy Heart: Care Instructions. \"  Current as of: September 7, 2022               Content Version: 13.5  © 4615-2526 Healthwise, [employer]. Care instructions adapted under license by [facility] ([facility]). If you have questions about a medical condition or this instruction, always ask your healthcare professional. [address] any warranty or liability for your use of this information. Personalized Preventive Plan for Alla Strong - 3/13/2023  Medicare offers a range of preventive health benefits. Some of the tests and screenings are paid in full while other may be subject to a deductible, co-insurance, and/or copay. Some of these benefits include a comprehensive review of your medical history including lifestyle, illnesses that may run in your family, and various assessments and screenings as appropriate. After reviewing your medical record and screening and assessments performed today your provider may have ordered immunizations, labs, imaging, and/or referrals for you. A list of these orders (if applicable) as well as your Preventive Care list are included within your After Visit Summary for your review. Other Preventive Recommendations:    A preventive eye exam performed by an eye specialist is recommended every 1-2 years to screen for glaucoma; cataracts, macular degeneration, and other eye disorders. A preventive dental visit is recommended every 6 months. Try to get at least 150 minutes of exercise per week or 10,000 steps per day on a pedometer .   Order or download the FREE \"Exercise & Physical Activity: Your Everyday Guide\" from [employer]. Call 0-763.944.2264 or search [employer] online. You need 3813-5632 mg of calcium and 2005-3718 IU of vitamin D per day. It is possible to meet your calcium requirement with diet alone, but a vitamin D supplement is usually necessary to meet this goal.  When exposed to the sun, use a sunscreen that protects against both UVA and UVB radiation with an SPF of 30 or greater. Reapply every 2 to 3 hours or after sweating, drying off with a towel, or swimming. Always wear a seat belt when traveling in a car. Always wear a helmet when riding a bicycle or motorcycle.

## 2023-03-13 NOTE — PROGRESS NOTES
Medicare Annual Wellness Visit    Ofelia Davila is here for Medicare AWV    Assessment & Plan   Initial Medicare annual wellness visit  PAF (paroxysmal atrial fibrillation) ([facility] [address]  -     POCT INR      Recommendations for Preventive Services Due: see orders and patient instructions/AVS.  Recommended screening schedule for the next 5-10 years is provided to the patient in written form: see Patient Instructions/AVS.     Return for Medicare Annual Wellness Visit in 1 year. Subjective       Patient's complete Health Risk Assessment and screening values have been reviewed and are found in Flowsheets. The following problems were reviewed today and where indicated follow up appointments were made and/or referrals ordered. Positive Risk Factor Screenings with Interventions:       Cognitive: Words recalled: 2 Words Recalled           Total Score Interpretation: Abnormal Mini-Cog      Interventions:  Patient declines any further evaluation or treatment              Dentist Screen:  Have you seen the dentist within the past year?: (!) No    Intervention:  Advised to schedule with their oral surgeon    Hearing Screen:  Do you or your family notice any trouble with your hearing that hasn't been managed with hearing aids?: (!) Yes    Interventions:  Patient declines any further evaluation or treatment       Advanced Directives:  Do you have a Living Will?: (!) No    Intervention:                         Objective   Vitals:    03/13/23 0950   BP: 112/62   Site: Left Upper Arm   Position: Sitting   Cuff Size: Medium Adult   Pulse: 100   SpO2: 95%   Weight: 167 lb (75.8 kg)   Height: 5' 8\" (1.727 m)      Body mass index is 25.39 kg/m². Allergies   Allergen Reactions    Cat Hair Extract Itching     Patient states \"My son put this.  I hope I'm not allergic, I have 4 Cats and 2 Birds, maybe to their dust.\"    Eggs Or Egg-Derived Products Nausea Only    Erythromycin Diarrhea, Nausea And Vomiting and Other (See Comments)     Stomach Pains      Pcn [Penicillins] Itching, Rash and Other (See Comments)     Patient states \"Red from Head to Toe, with something crawling under my skin. \"    Seasonal Itching, Swelling and Other (See Comments)     Runny/Itchy Nose, Watery/Itchy Eyes      Prior to Visit Medications    Medication Sig Taking?  Authorizing Provider   warfarin (COUMADIN) 2.5 MG tablet Take 1 tablet by mouth daily Yes Jodie Leung MD   SYNTHROID 100 MCG tablet Take 1 tablet by mouth daily Yes Jodie Leung MD   Handicap Placard MISC by Does not apply route Patient cannot walk 200 ft without stopping to rest.    Expiration 5 years Yes Jodie Leung MD   dilTIAZem (CARDIZEM CD) 120 MG extended release capsule Take 1 capsule by mouth daily Yes Jodie Leung MD   rosuvastatin (CRESTOR) 10 MG tablet Take 1 tablet by mouth nightly Yes Jodie Leung MD   spironolactone (ALDACTONE) 25 MG tablet Take 0.5 tablets by mouth daily Yes Jodie Leung MD   albuterol (PROVENTIL) (2.5 MG/3ML) 0.083% nebulizer solution Take 3 mLs by nebulization every 6 hours as needed for Wheezing Please bill under Medicare Part B DX: COPD J44.9 Yes Jodie Leung MD   furosemide (LASIX) 40 MG tablet Take 1 tablet by mouth daily Yes Jodie Leung MD   pantoprazole (PROTONIX) 40 MG tablet Take 1 tablet by mouth every morning Yes Jodie Leung MD   fluticasone-umeclidin-vilant (TRELEGY ELLIPTA) 100-62.5-25 MCG/INH AEPB Inhale 1 puff into the lungs every morning Yes Jodie Leung MD   albuterol sulfate HFA (PROVENTIL;VENTOLIN;PROAIR) 108 (90 Base) MCG/ACT inhaler Inhale 2 puffs into the lungs every 6 hours as needed for Wheezing Yes Jodie Leung MD   levalbuterol (XOPENEX) 0.63 MG/3ML nebulization Take 3 mLs by nebulization every 8 hours as needed for Wheezing Yes OUMOU Macdonald - CNP   OXYGEN Inhale 3-4 L/min into the lungs continuous  Yes Angela Chau MD   amLODIPine (NORVASC) 5 MG tablet Take 1 tablet by mouth daily  Patient not taking: No sig reported  [facility] MD RENATO   metoprolol tartrate (LOPRESSOR) 50 MG tablet Take 1 tablet by mouth in the morning and 1 tablet in the evening.   Patient not taking: No sig reported  [facility] MD RENATO   predniSONE (DELTASONE) 10 MG tablet 40mg x 3 days, 30mg x 3 days, 20mg x 3 days, 10mg x 3 days  Patient not taking: No sig reported  OUMOU Bryant - CNP   Apoaequorin (PREVAGEN EXTRA STRENGTH) 20 MG CAPS Take 20 mg by mouth every morning   Historical Provider, MD   amiodarone (CORDARONE) 200 MG tablet Take 0.5 tablets by mouth every other day  Patient not taking: Reported on 7/13/2022  DO Trini Durham (Including outside providers/suppliers regularly involved in providing care):   Patient Care Team:  [facility] MD RENATO as PCP - Pineda Matthews MD as PCP - [facility] Hospit[facility] Provider  Ramirez Barros MD as Consulting Physician (Pulmonary Disease)     Reviewed and updated this visit:  Tobacco  Allergies  Meds  Med Hx  Surg Hx  Soc Hx  Fam Hx             [facility] MD RENATO

## 2023-04-06 PROBLEM — I48.92 ATRIAL FLUTTER WITH RAPID VENTRICULAR RESPONSE (HCC): Status: ACTIVE | Noted: 2023-04-06

## 2023-04-10 PROBLEM — J44.1 DECOMPENSATED COPD WITH EXACERBATION (CHRONIC OBSTRUCTIVE PULMONARY DISEASE) (HCC): Status: ACTIVE | Noted: 2017-10-15

## 2023-04-17 ENCOUNTER — OFFICE VISIT (OUTPATIENT)
Dept: PRIMARY CARE CLINIC | Age: 83
End: 2023-04-17

## 2023-04-17 ENCOUNTER — ANTI-COAG VISIT (OUTPATIENT)
Dept: PRIMARY CARE CLINIC | Age: 83
End: 2023-04-17

## 2023-04-17 VITALS
SYSTOLIC BLOOD PRESSURE: 124 MMHG | OXYGEN SATURATION: 96 % | TEMPERATURE: 97.7 F | WEIGHT: 159 LBS | HEIGHT: 68 IN | DIASTOLIC BLOOD PRESSURE: 72 MMHG | BODY MASS INDEX: 24.1 KG/M2 | RESPIRATION RATE: 18 BRPM | HEART RATE: 76 BPM

## 2023-04-17 DIAGNOSIS — I48.0 PAF (PAROXYSMAL ATRIAL FIBRILLATION) (HCC): ICD-10-CM

## 2023-04-17 DIAGNOSIS — C34.91 MALIGNANT NEOPLASM OF RIGHT LUNG, UNSPECIFIED PART OF LUNG (HCC): ICD-10-CM

## 2023-04-17 DIAGNOSIS — I10 ESSENTIAL HYPERTENSION: ICD-10-CM

## 2023-04-17 DIAGNOSIS — I48.92 ATRIAL FLUTTER WITH RAPID VENTRICULAR RESPONSE (HCC): Primary | ICD-10-CM

## 2023-04-17 DIAGNOSIS — J44.1 COPD EXACERBATION (HCC): ICD-10-CM

## 2023-04-17 DIAGNOSIS — I42.0 DILATED IDIOPATHIC CARDIOMYOPATHY (HCC): ICD-10-CM

## 2023-04-17 LAB
INR BLD: 1.6
INTERNATIONAL NORMALIZATION RATIO, POC: 0
PROTHROMBIN TIME, POC: 1.6

## 2023-04-17 RX ORDER — WARFARIN SODIUM 1 MG/1
1 TABLET ORAL DAILY
Qty: 30 TABLET | Refills: 3 | Status: SHIPPED | OUTPATIENT
Start: 2023-04-17

## 2023-04-17 RX ORDER — DIGOXIN 125 MCG
TABLET ORAL
COMMUNITY
Start: 2023-04-10

## 2023-04-18 ENCOUNTER — CARE COORDINATION (OUTPATIENT)
Dept: CASE MANAGEMENT | Age: 83
End: 2023-04-18

## 2023-04-18 NOTE — TELEPHONE ENCOUNTER
Last Appointment:  4/17/2023  Future Appointments   Date Time Provider Delonte De La Cruz   4/27/2023  2:30 PM Mariam Im, APRN - CNP [facility]   5/15/2023  9:45 AM MD Devon Miles [facility]   3/18/2024 10:00 AM MD Devon Miles [facility]

## 2023-04-18 NOTE — CARE COORDINATION
and Final Call    Schedule Follow Up Appointment with PCP: Completed  Subsequent and Final Calls  Do you have any ongoing symptoms?: No  Have your medications changed?: Yes  Patient Reports: Per pt, she states that Dr. Svetlana Juan advised her to hold the Cardizem until she f/u with him on 4/19/23  Do you have any questions related to your medications?: No  Do you currently have any active services?: No  Do you have any needs or concerns that I can assist you with?: No  Identified Barriers: Lack of Education, Lack of Support, Lack of Motivation  Care Transitions Interventions     Other Services: Declined (Comment: Declined RPM on 4/11/23)   Other Interventions:             Care Transition Nurse provided contact information for future needs. Plan for follow-up call in 10-14 days based on severity of symptoms and risk factors. Plan for next call: symptom management-Any worrisome s/sx of worsening in CHF/COPD? Any s/sx A-flutter?  self management-Is pt engaging in daily wts? Any drastic wt gain? SaO2 and HR trends WNL? follow-up appointment-Review OV with cardiology and pulm for any changes?   Reinforce CHF/COPD zones    Ashli Thao RN

## 2023-04-19 RX ORDER — LEVOTHYROXINE SODIUM 100 MCG
100 TABLET ORAL DAILY
Qty: 90 TABLET | Refills: 0 | Status: SHIPPED | OUTPATIENT
Start: 2023-04-19

## 2023-04-24 RX ORDER — LEVOTHYROXINE SODIUM 100 MCG
100 TABLET ORAL DAILY
Qty: 90 TABLET | Refills: 0 | Status: SHIPPED
Start: 2023-04-24 | End: 2023-04-27 | Stop reason: ALTCHOICE

## 2023-04-27 DIAGNOSIS — J45.40 MODERATE PERSISTENT ASTHMA WITHOUT COMPLICATION: ICD-10-CM

## 2023-04-27 LAB — EOSINOPHILS ABSOLUTE COUNT: 80 /UL (ref 50–250)

## 2023-05-01 LAB
A ALTERNATA IGE QN: <0.1 KU/L (ref 0–0.34)
A FUMIGATUS IGE QN: <0.1 KU/L (ref 0–0.34)
AMER SYCAMORE IGE QN: <0.1 KU/L (ref 0–0.34)
BERMUDA GRASS IGE QN: <0.1 KU/L (ref 0–0.34)
BOXELDER IGE QN: NORMAL KU/L (ref 0–0.34)
C SPHAEROSPERMUM IGE QN: <0.1 KUL/L (ref 0–0.34)
CALIF WALNUT IGE QN: <0.1 KU/L (ref 0–0.34)
CAT DANDER IGE QN: <0.1 KU/L (ref 0–0.34)
CMN PIGWEED IGE QN: NORMAL KU/L (ref 0–0.34)
COMMON RAGWEED IGE QN: NORMAL KU/L (ref 0–0.34)
COTTONWOOD IGE QN: <0.1 KU/L (ref 0–0.34)
D FARINAE IGE QN: <0.1 KU/L (ref 0–0.34)
D PTERONYSS IGE QN: <0.1 KU/L (ref 0–0.34)
DOG DANDER IGE QN: <0.1 KU/L (ref 0–0.34)
IGE SERPL-ACNC: 6 IU/ML
M RACEMOSUS IGE QN: <0.1 KU/L (ref 0–0.34)
MOUSE EPITH IGE QN: <0.1 KU/L (ref 0–0.34)
P NOTATUM IGE QN: <0.1 KU/L (ref 0–0.34)
PECAN/HICK TREE IGE QN: NORMAL KU/L (ref 0–0.34)
RED CEDAR IGE QN: NORMAL KU/L (ref 0–0.34)
ROACH IGE QN: <0.1 KU/L (ref 0–0.34)
SALTWORT IGE QN: NORMAL KU/L (ref 0–0.34)
SHEEP SORREL IGE QN: NORMAL KU/L (ref 0–0.34)
SILVER BIRCH IGE QN: NORMAL KU/L (ref 0–0.34)
TIMOTHY IGE QN: <0.1 KU/L (ref 0–0.34)
WHITE ASH IGE QN: NORMAL KU/L (ref 0–0.34)
WHITE ELM IGE QN: NORMAL KU/L (ref 0–0.34)
WHITE MULBERRY IGE QN: NORMAL KU/L (ref 0–0.34)
WHITE OAK IGE QN: NORMAL KU/L (ref 0–0.34)

## 2023-05-02 ENCOUNTER — CARE COORDINATION (OUTPATIENT)
Dept: CASE MANAGEMENT | Age: 83
End: 2023-05-02

## 2023-05-02 NOTE — CARE COORDINATION
[facility] Care Transitions Follow Up Call    Patient Current Location:  Home: [address] [place] [address] Care Coordinator contacted the patient by telephone to follow up after admission on 23 . Verified name and  with patient as identifiers. Patient: Naldo Mendez  Patient : 1940   MRN: <[id number]>  Reason for Admission: Atrial Flutter   Discharge Date: 4/10/23 RARS: Readmission Risk Score: 15.9      Needs to be reviewed by the provider   Additional needs identified to be addressed with provider: No  none             Method of communication with provider: none. CTN spoke with To Mohr. To Mohr states \"I feel fine\" To Mohr reports  SOB R/L to COPD  but feels she is at baseline  denies dizziness, heart palpations, light headedness or swelling pulse ox 94@ on 2 liters. To Mohr has not weighed herself today weight was 161 on  at  pulmonary appointment. CTN reminded client to weight self daily as part of CHF protocol states \"I will I am still in my PJ's\"  To Mohr reports she saw Tisha Grimaldo  \"It was a good appointment I wore a Holter monitor for 7 days and have  already mailed it in\" The office will call me with the results. \"It will take about ten days\"  To Mohr reports she went to her Pulmonary  appointment and will return in about 3 months she will call to set up appointment. To Mohr has completed blood work as ordered by Pulmonary  to test for allergies. To Mohr  reports taking her all her medications. To Mohr  uses her rescue inhaler prn and albuterol prn. To Mohr attended her CT appointment yesterday and voiced no concerns. To Mohr reports she's feeling good I am working twice a week , I cook and clean I do everything. Addressed changes since last contact:  Pt. Completed 7 day Holter monitor, Cardiology , Pulmonary Appointment, CT of chest at [facility], Allergy   Testing   e?      Follow Up  Future Appointments   Date Time Provider Delonte De La Cruz   5/15/2023  9:45 AM [facility]   3/18/2024

## 2023-05-03 LAB — IGE SERPL-ACNC: 7 KU/L

## 2023-05-11 ENCOUNTER — CARE COORDINATION (OUTPATIENT)
Dept: CASE MANAGEMENT | Age: 83
End: 2023-05-11

## 2023-05-11 NOTE — CARE COORDINATION
[facility] Final Care Transitions Follow Up Call      Patient: Smiley Candelario  Patient : 1940   MRN: [id number]  Reason for Admission: A-flutter  Discharge Date: 4/10/23 RARS: Readmission Risk Score: 15.9      Attempted to reach the patient for final Care Transition call. Message left with CTN's contact information requesting return phone call. No further outreaches will be attempted. If no return call by the end of today, CTN will  sign off and resolve CT episode, as episode is ending.       Follow Up  Future Appointments   Date Time Provider Delonte De La Cruz   5/15/2023  9:45 AM [facility]   3/18/2024 10:00 AM MD Marta Hernandez3, RN

## 2023-05-15 ENCOUNTER — OFFICE VISIT (OUTPATIENT)
Dept: PRIMARY CARE CLINIC | Age: 83
End: 2023-05-15

## 2023-05-15 VITALS
OXYGEN SATURATION: 95 % | HEIGHT: 68 IN | HEART RATE: 75 BPM | SYSTOLIC BLOOD PRESSURE: 138 MMHG | BODY MASS INDEX: 24.55 KG/M2 | WEIGHT: 162 LBS | TEMPERATURE: 97.8 F | DIASTOLIC BLOOD PRESSURE: 70 MMHG

## 2023-05-15 DIAGNOSIS — I10 ESSENTIAL HYPERTENSION: ICD-10-CM

## 2023-05-15 DIAGNOSIS — J44.1 COPD EXACERBATION (HCC): ICD-10-CM

## 2023-05-15 DIAGNOSIS — I48.0 PAF (PAROXYSMAL ATRIAL FIBRILLATION) (HCC): ICD-10-CM

## 2023-05-15 DIAGNOSIS — C34.91 MALIGNANT NEOPLASM OF RIGHT LUNG, UNSPECIFIED PART OF LUNG (HCC): Primary | ICD-10-CM

## 2023-05-15 LAB
INTERNATIONAL NORMALIZATION RATIO, POC: 2.2
PROTHROMBIN TIME, POC: 0

## 2023-05-15 RX ORDER — PANTOPRAZOLE SODIUM 40 MG/1
40 TABLET, DELAYED RELEASE ORAL EVERY MORNING
Qty: 90 TABLET | Refills: 0 | Status: SHIPPED | OUTPATIENT
Start: 2023-05-15

## 2023-05-15 RX ORDER — WARFARIN SODIUM 2.5 MG/1
2.5 TABLET ORAL DAILY
Qty: 30 TABLET | Refills: 0 | Status: SHIPPED | OUTPATIENT
Start: 2023-05-15

## 2023-05-15 RX ORDER — LEVOTHYROXINE SODIUM 100 MCG
50 TABLET ORAL DAILY
COMMUNITY
Start: 2023-05-08

## 2023-05-15 NOTE — PROGRESS NOTES
Alla Strong is a 78 y.o. female with the following history of PAF ,COPD ,lung CA feels fine today state her heart rate fluctuate from fast to slow   On O2 NC     Past Medical History:   Diagnosis Date    Atrial fibrillation (HCC)     CHF (congestive heart failure) (HCC)     Emphysema, unspecified (HCC)     Hyperlipidemia     Hypertension     Lung cancer (HCC)     Mitral valve regurgitation     Oxygen dependent     Prolonged emergence from general anesthesia     post general anesthesia    Skin cancer     Thyroid disease        Past Surgical History:   Procedure Laterality Date    BREAST BIOPSY Right     benign    BRONCHOSCOPY N/A 4/20/2021    BRONCHOSCOPY/TRANSBRONCHIAL NEEDLE BIOPSY performed by Nelsy Salcedo MD at [facility] Right 12/16/2021    RIGHT EYE CATARACT EXTRACTION IOL IMPLANT performed by Nazario Wong MD at [facility] Left 2/24/2022    LEFT EYE CATARACT EXTRACTION IOL IMPLANT performed by Nazario Wong MD at [facility] OR    TUBAL LIGATION         Family History   Problem Relation Age of Onset    Cancer Mother     Other Mother         APLASTIC ANEMIA    Peripheral Vascular Disease Mother     Emphysema Father     Lung Disease Father              Current Outpatient Medications:     SYNTHROID 100 MCG tablet, Take 0.5 tablets by mouth daily, Disp: , Rfl:     warfarin (COUMADIN) 2.5 MG tablet, Take 1 tablet by mouth daily, Disp: 30 tablet, Rfl: 0    pantoprazole (PROTONIX) 40 MG tablet, Take 1 tablet by mouth every morning, Disp: 90 tablet, Rfl: 0    spironolactone (ALDACTONE) 25 MG tablet, Take 1 tablet by mouth daily Indications: Patient  currently takes 1/2 of 25 mg QD, Disp: , Rfl:     furosemide (LASIX) 40 MG tablet, Take 1 tablet by mouth daily, Disp: 90 tablet, Rfl: 0    digoxin 62.5 MCG TABS, Take 62.5 mcg by mouth three times a week, Disp: 30 tablet, Rfl: 3    Handicap Placard MISC, by Does not apply route

## 2023-06-19 ENCOUNTER — OFFICE VISIT (OUTPATIENT)
Dept: PRIMARY CARE CLINIC | Age: 83
End: 2023-06-19

## 2023-06-19 VITALS
BODY MASS INDEX: 24.4 KG/M2 | DIASTOLIC BLOOD PRESSURE: 68 MMHG | HEIGHT: 68 IN | WEIGHT: 161 LBS | TEMPERATURE: 97.7 F | SYSTOLIC BLOOD PRESSURE: 130 MMHG | HEART RATE: 81 BPM | OXYGEN SATURATION: 97 %

## 2023-06-19 DIAGNOSIS — E07.9 THYROID DISEASE: ICD-10-CM

## 2023-06-19 DIAGNOSIS — N18.31 STAGE 3A CHRONIC KIDNEY DISEASE (HCC): ICD-10-CM

## 2023-06-19 DIAGNOSIS — I42.0 DILATED IDIOPATHIC CARDIOMYOPATHY (HCC): ICD-10-CM

## 2023-06-19 DIAGNOSIS — E03.9 ACQUIRED HYPOTHYROIDISM: ICD-10-CM

## 2023-06-19 DIAGNOSIS — C34.91 MALIGNANT NEOPLASM OF RIGHT LUNG, UNSPECIFIED PART OF LUNG (HCC): Primary | ICD-10-CM

## 2023-06-19 DIAGNOSIS — I48.0 PAF (PAROXYSMAL ATRIAL FIBRILLATION) (HCC): ICD-10-CM

## 2023-06-19 DIAGNOSIS — J43.2 CENTRILOBULAR EMPHYSEMA (HCC): ICD-10-CM

## 2023-06-19 DIAGNOSIS — C34.91 MALIGNANT NEOPLASM OF RIGHT LUNG, UNSPECIFIED PART OF LUNG (HCC): ICD-10-CM

## 2023-06-19 LAB
ALBUMIN SERPL-MCNC: 4.8 G/DL (ref 3.5–5.2)
ALP SERPL-CCNC: 82 U/L (ref 35–104)
ALT SERPL-CCNC: 12 U/L (ref 0–32)
ANION GAP SERPL CALCULATED.3IONS-SCNC: 11 MMOL/L (ref 7–16)
AST SERPL-CCNC: 25 U/L (ref 0–31)
BILIRUB SERPL-MCNC: 0.5 MG/DL (ref 0–1.2)
BUN SERPL-MCNC: 27 MG/DL (ref 6–23)
CALCIUM SERPL-MCNC: 10.4 MG/DL (ref 8.6–10.2)
CHLORIDE SERPL-SCNC: 100 MMOL/L (ref 98–107)
CO2 SERPL-SCNC: 31 MMOL/L (ref 22–29)
CREAT SERPL-MCNC: 1.2 MG/DL (ref 0.5–1)
ERYTHROCYTE [DISTWIDTH] IN BLOOD BY AUTOMATED COUNT: 13.4 FL (ref 11.5–15)
GLUCOSE SERPL-MCNC: 121 MG/DL (ref 74–99)
HCT VFR BLD AUTO: 36.7 % (ref 34–48)
HGB BLD-MCNC: 11.3 G/DL (ref 11.5–15.5)
INTERNATIONAL NORMALIZATION RATIO, POC: 1.7
MCH RBC QN AUTO: 30.5 PG (ref 26–35)
MCHC RBC AUTO-ENTMCNC: 30.8 % (ref 32–34.5)
MCV RBC AUTO: 99.2 FL (ref 80–99.9)
PLATELET # BLD AUTO: 122 E9/L (ref 130–450)
PMV BLD AUTO: 12 FL (ref 7–12)
POTASSIUM SERPL-SCNC: 4.7 MMOL/L (ref 3.5–5)
PROT SERPL-MCNC: 7.2 G/DL (ref 6.4–8.3)
PROTHROMBIN TIME, POC: 0
RBC # BLD AUTO: 3.7 E12/L (ref 3.5–5.5)
SODIUM SERPL-SCNC: 142 MMOL/L (ref 132–146)
TSH SERPL-MCNC: 9.59 UIU/ML (ref 0.27–4.2)
WBC # BLD: 5.8 E9/L (ref 4.5–11.5)

## 2023-06-19 NOTE — PROGRESS NOTES
neoplasm of right lung (HCC)    Atrial flutter with rapid ventricular response (HCC)    PAF (paroxysmal atrial fibrillation) (HCC)    Centrilobular emphysema (HCC)    Thyroid disease    Acquired hypothyroidism       Diagnosis/Orders  1. PAF (paroxysmal atrial fibrillation) (HCC)  -     POCT INR  1.7  take extra coumadin today   2.hypothyroid  3. COPD  4.lung CA  5CKD     Will check CMP,CBC,TSH   Continue same medication ,monitor     Return in about 4 weeks (around 7/17/2023).       Susan Funez MD

## 2023-06-26 ENCOUNTER — TELEPHONE (OUTPATIENT)
Dept: PRIMARY CARE CLINIC | Age: 83
End: 2023-06-26

## 2023-06-27 RX ORDER — WARFARIN SODIUM 2.5 MG/1
2.5 TABLET ORAL DAILY
Qty: 30 TABLET | Refills: 0 | Status: SHIPPED | OUTPATIENT
Start: 2023-06-27

## 2023-07-15 RX ORDER — LEVOTHYROXINE SODIUM 100 MCG
50 TABLET ORAL DAILY
Qty: 30 TABLET | Refills: 3 | Status: SHIPPED | OUTPATIENT
Start: 2023-07-15

## 2023-07-17 ENCOUNTER — OFFICE VISIT (OUTPATIENT)
Dept: PRIMARY CARE CLINIC | Age: 83
End: 2023-07-17

## 2023-07-17 VITALS
HEIGHT: 68 IN | DIASTOLIC BLOOD PRESSURE: 84 MMHG | SYSTOLIC BLOOD PRESSURE: 120 MMHG | OXYGEN SATURATION: 96 % | RESPIRATION RATE: 16 BRPM | TEMPERATURE: 97.8 F | BODY MASS INDEX: 25.16 KG/M2 | HEART RATE: 82 BPM | WEIGHT: 166 LBS

## 2023-07-17 DIAGNOSIS — E03.9 ACQUIRED HYPOTHYROIDISM: ICD-10-CM

## 2023-07-17 DIAGNOSIS — C34.91 MALIGNANT NEOPLASM OF RIGHT LUNG, UNSPECIFIED PART OF LUNG (HCC): ICD-10-CM

## 2023-07-17 DIAGNOSIS — J44.1 COPD EXACERBATION (HCC): ICD-10-CM

## 2023-07-17 DIAGNOSIS — I48.0 PAF (PAROXYSMAL ATRIAL FIBRILLATION) (HCC): ICD-10-CM

## 2023-07-17 DIAGNOSIS — J40 BRONCHITIS: Primary | ICD-10-CM

## 2023-07-17 LAB — INR BLD: 1.3

## 2023-07-17 RX ORDER — FUROSEMIDE 40 MG/1
40 TABLET ORAL DAILY
Qty: 90 TABLET | Refills: 0 | Status: SHIPPED | OUTPATIENT
Start: 2023-07-17

## 2023-07-17 RX ORDER — WARFARIN SODIUM 3 MG/1
3 TABLET ORAL DAILY
Qty: 30 TABLET | Refills: 3 | Status: SHIPPED | OUTPATIENT
Start: 2023-07-17

## 2023-07-17 RX ORDER — DOXYCYCLINE HYCLATE 100 MG
100 TABLET ORAL 2 TIMES DAILY WITH MEALS
Qty: 20 TABLET | Refills: 0 | Status: SHIPPED | OUTPATIENT
Start: 2023-07-17 | End: 2023-07-27

## 2023-07-17 RX ORDER — PANTOPRAZOLE SODIUM 40 MG/1
40 TABLET, DELAYED RELEASE ORAL EVERY MORNING
Qty: 90 TABLET | Refills: 0 | Status: SHIPPED | OUTPATIENT
Start: 2023-07-17

## 2023-07-17 RX ORDER — ROSUVASTATIN CALCIUM 10 MG/1
10 TABLET, COATED ORAL NIGHTLY
Qty: 30 TABLET | Refills: 2 | Status: SHIPPED | OUTPATIENT
Start: 2023-07-17

## 2023-07-17 RX ORDER — WARFARIN SODIUM 2.5 MG/1
2.5 TABLET ORAL DAILY
Qty: 30 TABLET | Refills: 0 | Status: CANCELLED | OUTPATIENT
Start: 2023-07-17

## 2023-07-17 NOTE — PROGRESS NOTES
Rula Norton is a 78 y.o. female with the following history of lung ca ,COPD on O2 NC for follow up been having more yellowish cough for last week no fever     Past Medical History:   Diagnosis Date    Atrial fibrillation (HCC)     CHF (congestive heart failure) (HCC)     Emphysema, unspecified ([address])     Hyperlipidemia     Hypertension     Lung cancer ([address])     Mitral valve regurgitation     Oxygen dependent     Prolonged emergence from general anesthesia     post general anesthesia    Skin cancer     Thyroid disease        Past Surgical History:   Procedure Laterality Date    BREAST BIOPSY Right     benign    BRONCHOSCOPY N/A 4/20/2021    BRONCHOSCOPY/TRANSBRONCHIAL NEEDLE BIOPSY performed by Facundo Natarajan MD at [facility] Right 12/16/2021    RIGHT EYE CATARACT EXTRACTION IOL IMPLANT performed by Sarah Valencia MD at [facility] OR    INTRACAPSULAR CATARACT EXTRACTION Left 2/24/2022    LEFT EYE CATARACT EXTRACTION IOL IMPLANT performed by Sarah Valencia MD at [facility] OR    TUBAL LIGATION         Family History   Problem Relation Age of Onset    Cancer Mother     Other Mother         APLASTIC ANEMIA    Peripheral Vascular Disease Mother     Emphysema Father     Lung Disease Father              Current Outpatient Medications:     rosuvastatin (CRESTOR) 10 MG tablet, Take 1 tablet by mouth nightly, Disp: 30 tablet, Rfl: 2    pantoprazole (PROTONIX) 40 MG tablet, Take 1 tablet by mouth every morning, Disp: 90 tablet, Rfl: 0    furosemide (LASIX) 40 MG tablet, Take 1 tablet by mouth daily, Disp: 90 tablet, Rfl: 0    warfarin (COUMADIN) 3 MG tablet, Take 1 tablet by mouth daily, Disp: 30 tablet, Rfl: 3    doxycycline hyclate (VIBRA-TABS) 100 MG tablet, Take 1 tablet by mouth 2 times daily (with meals) for 10 days, Disp: 20 tablet, Rfl: 0    SYNTHROID 100 MCG tablet, Take 0.5 tablets by mouth daily, Disp: 30 tablet, Rfl: 3    warfarin (COUMADIN) 2.5 MG tablet, Take 1

## 2023-08-21 ENCOUNTER — OFFICE VISIT (OUTPATIENT)
Dept: PRIMARY CARE CLINIC | Age: 83
End: 2023-08-21
Payer: MEDICARE

## 2023-08-21 VITALS
OXYGEN SATURATION: 97 % | HEART RATE: 87 BPM | HEIGHT: 68 IN | BODY MASS INDEX: 25.61 KG/M2 | SYSTOLIC BLOOD PRESSURE: 110 MMHG | DIASTOLIC BLOOD PRESSURE: 64 MMHG | WEIGHT: 169 LBS | TEMPERATURE: 97 F | RESPIRATION RATE: 18 BRPM

## 2023-08-21 DIAGNOSIS — J43.2 CENTRILOBULAR EMPHYSEMA (HCC): ICD-10-CM

## 2023-08-21 DIAGNOSIS — C34.91 MALIGNANT NEOPLASM OF RIGHT LUNG, UNSPECIFIED PART OF LUNG (HCC): ICD-10-CM

## 2023-08-21 DIAGNOSIS — N18.31 STAGE 3A CHRONIC KIDNEY DISEASE (HCC): ICD-10-CM

## 2023-08-21 DIAGNOSIS — I10 ESSENTIAL HYPERTENSION: ICD-10-CM

## 2023-08-21 DIAGNOSIS — J96.11 CHRONIC RESPIRATORY FAILURE WITH HYPOXIA (HCC): ICD-10-CM

## 2023-08-21 DIAGNOSIS — I48.0 PAF (PAROXYSMAL ATRIAL FIBRILLATION) (HCC): Primary | ICD-10-CM

## 2023-08-21 DIAGNOSIS — E07.9 THYROID DISEASE: ICD-10-CM

## 2023-08-21 LAB
ALBUMIN SERPL-MCNC: 4.5 G/DL (ref 3.5–5.2)
ALBUMIN SERPL-MCNC: 4.5 G/DL (ref 3.5–5.2)
ALP BLD-CCNC: 77 U/L (ref 35–104)
ALP SERPL-CCNC: 77 U/L (ref 35–104)
ALT SERPL-CCNC: 18 U/L (ref 0–32)
ALT SERPL-CCNC: 18 U/L (ref 0–32)
ANION GAP SERPL CALCULATED.3IONS-SCNC: 14 MMOL/L (ref 7–16)
ANION GAP SERPL CALCULATED.3IONS-SCNC: 14 MMOL/L (ref 7–16)
AST SERPL-CCNC: 36 U/L (ref 0–31)
AST SERPL-CCNC: 36 U/L (ref 0–31)
BILIRUB SERPL-MCNC: 0.3 MG/DL (ref 0–1.2)
BILIRUB SERPL-MCNC: 0.3 MG/DL (ref 0–1.2)
BUN BLDV-MCNC: 24 MG/DL (ref 6–23)
BUN SERPL-MCNC: 24 MG/DL (ref 6–23)
CALCIUM SERPL-MCNC: 9.3 MG/DL (ref 8.6–10.2)
CALCIUM SERPL-MCNC: 9.3 MG/DL (ref 8.6–10.2)
CHLORIDE BLD-SCNC: 102 MMOL/L (ref 98–107)
CHLORIDE SERPL-SCNC: 102 MMOL/L (ref 98–107)
CO2 SERPL-SCNC: 27 MMOL/L (ref 22–29)
CO2: 27 MMOL/L (ref 22–29)
CREAT SERPL-MCNC: 1.1 MG/DL (ref 0.5–1)
CREAT SERPL-MCNC: 1.1 MG/DL (ref 0.5–1)
GFR SERPL CREATININE-BSD FRML MDRD: 51 ML/MIN/1.73M2
GFR SERPL CREATININE-BSD FRML MDRD: 51 ML/MIN/1.73M2
GLUCOSE BLD-MCNC: 90 MG/DL (ref 74–99)
GLUCOSE SERPL-MCNC: 90 MG/DL (ref 74–99)
INR BLD: 1.9
POTASSIUM SERPL-SCNC: 4.4 MMOL/L (ref 3.5–5)
POTASSIUM SERPL-SCNC: 4.4 MMOL/L (ref 3.5–5)
PROT SERPL-MCNC: 7 G/DL (ref 6.4–8.3)
SODIUM BLD-SCNC: 143 MMOL/L (ref 132–146)
SODIUM SERPL-SCNC: 143 MMOL/L (ref 132–146)
TOTAL PROTEIN: 7 G/DL (ref 6.4–8.3)
TSH SERPL DL<=0.05 MIU/L-ACNC: 11.31 UIU/ML (ref 0.27–4.2)
TSH SERPL DL<=0.05 MIU/L-ACNC: 11.31 UIU/ML (ref 0.27–4.2)

## 2023-08-21 PROCEDURE — G8400 PT W/DXA NO RESULTS DOC: HCPCS | Performed by: INTERNAL MEDICINE

## 2023-08-21 PROCEDURE — 1123F ACP DISCUSS/DSCN MKR DOCD: CPT | Performed by: INTERNAL MEDICINE

## 2023-08-21 PROCEDURE — G8417 CALC BMI ABV UP PARAM F/U: HCPCS | Performed by: INTERNAL MEDICINE

## 2023-08-21 PROCEDURE — 1090F PRES/ABSN URINE INCON ASSESS: CPT | Performed by: INTERNAL MEDICINE

## 2023-08-21 PROCEDURE — 1036F TOBACCO NON-USER: CPT | Performed by: INTERNAL MEDICINE

## 2023-08-21 PROCEDURE — 99214 OFFICE O/P EST MOD 30 MIN: CPT | Performed by: INTERNAL MEDICINE

## 2023-08-21 PROCEDURE — 3023F SPIROM DOC REV: CPT | Performed by: INTERNAL MEDICINE

## 2023-08-21 PROCEDURE — 3074F SYST BP LT 130 MM HG: CPT | Performed by: INTERNAL MEDICINE

## 2023-08-21 PROCEDURE — G8427 DOCREV CUR MEDS BY ELIG CLIN: HCPCS | Performed by: INTERNAL MEDICINE

## 2023-08-21 PROCEDURE — 3078F DIAST BP <80 MM HG: CPT | Performed by: INTERNAL MEDICINE

## 2023-08-21 RX ORDER — WARFARIN SODIUM 3 MG/1
3 TABLET ORAL DAILY
Qty: 30 TABLET | Refills: 3 | Status: SHIPPED | OUTPATIENT
Start: 2023-08-21

## 2023-08-21 NOTE — PROGRESS NOTES
Brianna George is a 78 y.o. female with history of COPD,lung CA,chronic hypoxic respiratory failure ,PAF for follow up.  Using O2 NC ,feels tired ,minimal cough ,no fever, no chest discomfort     Past Medical History:   Diagnosis Date    Atrial fibrillation (HCC)     CHF (congestive heart failure) (HCC)     Emphysema, unspecified (HCC)     Hyperlipidemia     Hypertension     Lung cancer (HCC)     Mitral valve regurgitation     Oxygen dependent     Prolonged emergence from general anesthesia     post general anesthesia    Skin cancer     Thyroid disease        Past Surgical History:   Procedure Laterality Date    BREAST BIOPSY Right     benign    BRONCHOSCOPY N/A 4/20/2021    BRONCHOSCOPY/TRANSBRONCHIAL NEEDLE BIOPSY performed by Jose Daniel Pandya MD at [facility] Right 12/16/2021    RIGHT EYE CATARACT EXTRACTION IOL IMPLANT performed by Qasim Kern MD at [facility] OR    INTRACAPSULAR CATARACT EXTRACTION Left 2/24/2022    LEFT EYE CATARACT EXTRACTION IOL IMPLANT performed by Qasim Kern MD at [facility] OR    TUBAL LIGATION         Family History   Problem Relation Age of Onset    Cancer Mother     Other Mother         APLASTIC ANEMIA    Peripheral Vascular Disease Mother     Emphysema Father     Lung Disease Father              Current Outpatient Medications:     warfarin (COUMADIN) 3 MG tablet, Take 1 tablet by mouth daily, Disp: 30 tablet, Rfl: 3    rosuvastatin (CRESTOR) 10 MG tablet, Take 1 tablet by mouth nightly, Disp: 30 tablet, Rfl: 2    pantoprazole (PROTONIX) 40 MG tablet, Take 1 tablet by mouth every morning, Disp: 90 tablet, Rfl: 0    furosemide (LASIX) 40 MG tablet, Take 1 tablet by mouth daily, Disp: 90 tablet, Rfl: 0    SYNTHROID 100 MCG tablet, Take 0.5 tablets by mouth daily, Disp: 30 tablet, Rfl: 3    spironolactone (ALDACTONE) 25 MG tablet, Take 1 tablet by mouth daily Indications: Patient  currently takes 1/2 of 25 mg QD, Disp: , Rfl:

## 2023-09-18 ENCOUNTER — OFFICE VISIT (OUTPATIENT)
Dept: PRIMARY CARE CLINIC | Age: 83
End: 2023-09-18

## 2023-09-18 ENCOUNTER — TELEPHONE (OUTPATIENT)
Dept: PRIMARY CARE CLINIC | Age: 83
End: 2023-09-18

## 2023-09-18 VITALS
HEIGHT: 68 IN | BODY MASS INDEX: 25.46 KG/M2 | RESPIRATION RATE: 17 BRPM | DIASTOLIC BLOOD PRESSURE: 72 MMHG | SYSTOLIC BLOOD PRESSURE: 126 MMHG | OXYGEN SATURATION: 97 % | HEART RATE: 116 BPM | WEIGHT: 168 LBS | TEMPERATURE: 97 F

## 2023-09-18 DIAGNOSIS — J43.2 CENTRILOBULAR EMPHYSEMA (HCC): ICD-10-CM

## 2023-09-18 DIAGNOSIS — C34.91 MALIGNANT NEOPLASM OF RIGHT LUNG, UNSPECIFIED PART OF LUNG (HCC): ICD-10-CM

## 2023-09-18 DIAGNOSIS — E78.2 MIXED HYPERLIPIDEMIA: ICD-10-CM

## 2023-09-18 DIAGNOSIS — E07.9 THYROID DISEASE: ICD-10-CM

## 2023-09-18 DIAGNOSIS — N18.31 STAGE 3A CHRONIC KIDNEY DISEASE (HCC): ICD-10-CM

## 2023-09-18 DIAGNOSIS — J96.11 CHRONIC RESPIRATORY FAILURE WITH HYPOXIA (HCC): ICD-10-CM

## 2023-09-18 DIAGNOSIS — I48.0 PAF (PAROXYSMAL ATRIAL FIBRILLATION) (HCC): Primary | ICD-10-CM

## 2023-09-18 LAB
ALBUMIN SERPL-MCNC: 4.6 G/DL (ref 3.5–5.2)
ALBUMIN SERPL-MCNC: 4.6 G/DL (ref 3.5–5.2)
ALP BLD-CCNC: 83 U/L (ref 35–104)
ALP SERPL-CCNC: 83 U/L (ref 35–104)
ALT SERPL-CCNC: 14 U/L (ref 0–32)
ALT SERPL-CCNC: 14 U/L (ref 0–32)
ANION GAP SERPL CALCULATED.3IONS-SCNC: 14 MMOL/L (ref 7–16)
ANION GAP SERPL CALCULATED.3IONS-SCNC: 14 MMOL/L (ref 7–16)
AST SERPL-CCNC: 30 U/L (ref 0–31)
AST SERPL-CCNC: 30 U/L (ref 0–31)
BILIRUB SERPL-MCNC: 0.5 MG/DL (ref 0–1.2)
BILIRUB SERPL-MCNC: 0.5 MG/DL (ref 0–1.2)
BUN BLDV-MCNC: 21 MG/DL (ref 6–23)
BUN SERPL-MCNC: 21 MG/DL (ref 6–23)
CALCIUM SERPL-MCNC: 9.9 MG/DL (ref 8.6–10.2)
CALCIUM SERPL-MCNC: 9.9 MG/DL (ref 8.6–10.2)
CHLORIDE BLD-SCNC: 99 MMOL/L (ref 98–107)
CHLORIDE SERPL-SCNC: 99 MMOL/L (ref 98–107)
CHOLEST SERPL-MCNC: 165 MG/DL
CHOLESTEROL: 165 MG/DL
CO2 SERPL-SCNC: 28 MMOL/L (ref 22–29)
CO2: 28 MMOL/L (ref 22–29)
CREAT SERPL-MCNC: 1.1 MG/DL (ref 0.5–1)
CREAT SERPL-MCNC: 1.1 MG/DL (ref 0.5–1)
GFR SERPL CREATININE-BSD FRML MDRD: 51 ML/MIN/1.73M2
GFR SERPL CREATININE-BSD FRML MDRD: 51 ML/MIN/1.73M2
GLUCOSE BLD-MCNC: 103 MG/DL (ref 74–99)
GLUCOSE SERPL-MCNC: 103 MG/DL (ref 74–99)
HDLC SERPL-MCNC: 75 MG/DL
HDLC SERPL-MCNC: 75 MG/DL
INR BLD: 1.7
INTERNATIONAL NORMALIZATION RATIO, POC: 1.7
LDL CHOLESTEROL: 73 MG/DL
LDLC SERPL CALC-MCNC: 73 MG/DL
POTASSIUM SERPL-SCNC: 4.8 MMOL/L (ref 3.5–5)
POTASSIUM SERPL-SCNC: 4.8 MMOL/L (ref 3.5–5)
PROT SERPL-MCNC: 7.2 G/DL (ref 6.4–8.3)
PROTHROMBIN TIME, POC: 0
SODIUM BLD-SCNC: 141 MMOL/L (ref 132–146)
SODIUM SERPL-SCNC: 141 MMOL/L (ref 132–146)
TOTAL PROTEIN: 7.2 G/DL (ref 6.4–8.3)
TRIGL SERPL-MCNC: 87 MG/DL
TRIGL SERPL-MCNC: 87 MG/DL
TSH SERPL DL<=0.05 MIU/L-ACNC: 0.47 UIU/ML (ref 0.27–4.2)
TSH SERPL DL<=0.05 MIU/L-ACNC: 0.47 UIU/ML (ref 0.27–4.2)
VLDLC SERPL CALC-MCNC: 17 MG/DL
VLDLC SERPL CALC-MCNC: 17 MG/DL

## 2023-09-18 RX ORDER — ROSUVASTATIN CALCIUM 10 MG/1
10 TABLET, COATED ORAL NIGHTLY
Qty: 30 TABLET | Refills: 2 | Status: SHIPPED | OUTPATIENT
Start: 2023-09-18

## 2023-09-18 RX ORDER — LEVOTHYROXINE SODIUM 100 MCG
50 TABLET ORAL DAILY
Qty: 30 TABLET | Refills: 3 | Status: SHIPPED | OUTPATIENT
Start: 2023-09-18

## 2023-09-18 RX ORDER — WARFARIN SODIUM 1 MG/1
1 TABLET ORAL DAILY
Qty: 30 TABLET | Refills: 3 | Status: SHIPPED | OUTPATIENT
Start: 2023-09-18

## 2023-09-18 RX ORDER — FUROSEMIDE 40 MG/1
40 TABLET ORAL DAILY
Qty: 90 TABLET | Refills: 0 | Status: SHIPPED | OUTPATIENT
Start: 2023-09-18

## 2023-09-18 NOTE — PROGRESS NOTES
chronic diastolic congestive heart failure (HCC)    Cardiomyopathy as manifestation of underlying disease ([address])    Non-rheumatic mitral regurgitation    Acute combined systolic and diastolic congestive heart failure (HCC)    Chronic anticoagulation    Essential hypertension    COPD exacerbation (HCC)    History of atrial fibrillation    Dilated idiopathic cardiomyopathy (HCC)    Severe sinus bradycardia    Moderate tricuspid regurgitation    Hypoxia    Combined forms of age-related cataract of both eyes    Dyspnea on exertion    Mass of right lung    Supplemental oxygen dependent    Pneumonia    Stage 3a chronic kidney disease ([address])    Malignant neoplasm of right lung (HCC)    Atrial flutter with rapid ventricular response (HCC)    PAF (paroxysmal atrial fibrillation) (HCC)    Centrilobular emphysema (HCC)    Thyroid disease    Acquired hypothyroidism    Bronchitis    Chronic respiratory failure with hypoxia (HCC)    Mixed hyperlipidemia       Diagnosis/Orders  1. PAF (paroxysmal atrial fibrillation) ([address]), sinus today,continue current dose ,Digitalis and coumadin 3 mg qd pending repeat INR   -     POCT INR  2. Chronic respiratory failure with hypoxia (HCC) , O2 sat 97 % continue O2 supplement 2 L NC   3. Malignant neoplasm of right lung, unspecified part of lung ([address]) in remission ,to monitor, follow with pulmonary   4. Centrilobular emphysema ([address]), mildly symptomatic, continue Proventil, Trellegy  and O2.   5. Stage 3a chronic kidney disease ([address]), unchanged , will recheck renal function avoid nephrotoxic drugs   -     Comprehensive Metabolic Panel; Future  -     CBC; Future  6. Mixed hyperlipidemia, controlled , continue Crestor pending repeat level   -     Lipid Panel; Future  7. Thyroid disease, continue Synthroid , recheck TSH   -     TSH; Future      Return in about 4 weeks (around 10/16/2023).       Benjamin Stringer MD

## 2023-09-19 ENCOUNTER — APPOINTMENT (OUTPATIENT)
Dept: CT IMAGING | Age: 83
DRG: 309 | End: 2023-09-19
Payer: MEDICARE

## 2023-09-19 ENCOUNTER — APPOINTMENT (OUTPATIENT)
Dept: GENERAL RADIOLOGY | Age: 83
DRG: 309 | End: 2023-09-19
Payer: MEDICARE

## 2023-09-19 ENCOUNTER — HOSPITAL ENCOUNTER (INPATIENT)
Age: 83
LOS: 3 days | Discharge: HOME | DRG: 309 | End: 2023-09-23
Attending: EMERGENCY MEDICINE | Admitting: INTERNAL MEDICINE
Payer: MEDICARE

## 2023-09-19 DIAGNOSIS — I48.92 ATRIAL FLUTTER WITH RAPID VENTRICULAR RESPONSE (HCC): Primary | ICD-10-CM

## 2023-09-19 DIAGNOSIS — R00.2 PALPITATIONS: ICD-10-CM

## 2023-09-19 LAB
ALBUMIN SERPL-MCNC: 4.4 G/DL (ref 3.5–5.2)
ALP SERPL-CCNC: 78 U/L (ref 35–104)
ALT SERPL-CCNC: 12 U/L (ref 0–32)
ANION GAP SERPL CALCULATED.3IONS-SCNC: 10 MMOL/L (ref 7–16)
AST SERPL-CCNC: 28 U/L (ref 0–31)
BASOPHILS # BLD: 0.02 K/UL (ref 0–0.2)
BASOPHILS NFR BLD: 1 % (ref 0–2)
BILIRUB SERPL-MCNC: 0.6 MG/DL (ref 0–1.2)
BNP SERPL-MCNC: 3166 PG/ML (ref 0–450)
BUN SERPL-MCNC: 23 MG/DL (ref 6–23)
CALCIUM SERPL-MCNC: 9.7 MG/DL (ref 8.6–10.2)
CHLORIDE SERPL-SCNC: 103 MMOL/L (ref 98–107)
CO2 SERPL-SCNC: 30 MMOL/L (ref 22–29)
CREAT SERPL-MCNC: 1.2 MG/DL (ref 0.5–1)
DIGOXIN SERPL-MCNC: 0.9 NG/ML (ref 0.8–2)
EKG ATRIAL RATE: 258 BPM
EKG P AXIS: -28 DEGREES
EKG Q-T INTERVAL: 302 MS
EKG QRS DURATION: 70 MS
EKG QTC CALCULATION (BAZETT): 442 MS
EKG R AXIS: 120 DEGREES
EKG T AXIS: -24 DEGREES
EKG VENTRICULAR RATE: 129 BPM
EOSINOPHIL # BLD: 0.04 K/UL (ref 0.05–0.5)
EOSINOPHILS RELATIVE PERCENT: 1 % (ref 0–6)
ERYTHROCYTE [DISTWIDTH] IN BLOOD BY AUTOMATED COUNT: 13.1 % (ref 11.5–15)
GFR SERPL CREATININE-BSD FRML MDRD: 43 ML/MIN/1.73M2
GLUCOSE SERPL-MCNC: 104 MG/DL (ref 74–99)
HCT VFR BLD AUTO: 36.9 % (ref 34–48)
HGB BLD-MCNC: 12 G/DL (ref 11.5–15.5)
IMM GRANULOCYTES # BLD AUTO: <0.03 K/UL (ref 0–0.58)
IMM GRANULOCYTES NFR BLD: 0 % (ref 0–5)
INR PPP: 1.9
LYMPHOCYTES NFR BLD: 1.15 K/UL (ref 1.5–4)
LYMPHOCYTES RELATIVE PERCENT: 29 % (ref 20–42)
MCH RBC QN AUTO: 30.6 PG (ref 26–35)
MCHC RBC AUTO-ENTMCNC: 32.5 G/DL (ref 32–34.5)
MCV RBC AUTO: 94.1 FL (ref 80–99.9)
MONOCYTES NFR BLD: 0.33 K/UL (ref 0.1–0.95)
MONOCYTES NFR BLD: 8 % (ref 2–12)
NEUTROPHILS NFR BLD: 60 % (ref 43–80)
NEUTS SEG NFR BLD: 2.36 K/UL (ref 1.8–7.3)
PLATELET # BLD AUTO: 146 K/UL (ref 130–450)
PMV BLD AUTO: 10.6 FL (ref 7–12)
POTASSIUM SERPL-SCNC: 4.3 MMOL/L (ref 3.5–5)
PROT SERPL-MCNC: 7.1 G/DL (ref 6.4–8.3)
PROTHROMBIN TIME: 20.5 SEC (ref 9.3–12.4)
RBC # BLD AUTO: 3.92 M/UL (ref 3.5–5.5)
SODIUM SERPL-SCNC: 143 MMOL/L (ref 132–146)
TROPONIN I SERPL HS-MCNC: 20 NG/L (ref 0–9)
TROPONIN I SERPL HS-MCNC: 26 NG/L (ref 0–9)
TSH SERPL DL<=0.05 MIU/L-ACNC: 0.45 UIU/ML (ref 0.27–4.2)
WBC OTHER # BLD: 3.9 K/UL (ref 4.5–11.5)

## 2023-09-19 PROCEDURE — 84484 ASSAY OF TROPONIN QUANT: CPT

## 2023-09-19 PROCEDURE — 83880 ASSAY OF NATRIURETIC PEPTIDE: CPT

## 2023-09-19 PROCEDURE — 2500000003 HC RX 250 WO HCPCS

## 2023-09-19 PROCEDURE — 80162 ASSAY OF DIGOXIN TOTAL: CPT

## 2023-09-19 PROCEDURE — 96375 TX/PRO/DX INJ NEW DRUG ADDON: CPT

## 2023-09-19 PROCEDURE — 84443 ASSAY THYROID STIM HORMONE: CPT

## 2023-09-19 PROCEDURE — 93005 ELECTROCARDIOGRAM TRACING: CPT

## 2023-09-19 PROCEDURE — 2580000003 HC RX 258

## 2023-09-19 PROCEDURE — G0378 HOSPITAL OBSERVATION PER HR: HCPCS

## 2023-09-19 PROCEDURE — 85610 PROTHROMBIN TIME: CPT

## 2023-09-19 PROCEDURE — 71045 X-RAY EXAM CHEST 1 VIEW: CPT

## 2023-09-19 PROCEDURE — 96374 THER/PROPH/DIAG INJ IV PUSH: CPT

## 2023-09-19 PROCEDURE — 99223 1ST HOSP IP/OBS HIGH 75: CPT | Performed by: STUDENT IN AN ORGANIZED HEALTH CARE EDUCATION/TRAINING PROGRAM

## 2023-09-19 PROCEDURE — 85025 COMPLETE CBC W/AUTO DIFF WBC: CPT

## 2023-09-19 PROCEDURE — 6360000002 HC RX W HCPCS

## 2023-09-19 PROCEDURE — 6370000000 HC RX 637 (ALT 250 FOR IP): Performed by: STUDENT IN AN ORGANIZED HEALTH CARE EDUCATION/TRAINING PROGRAM

## 2023-09-19 PROCEDURE — 71275 CT ANGIOGRAPHY CHEST: CPT

## 2023-09-19 PROCEDURE — 96365 THER/PROPH/DIAG IV INF INIT: CPT

## 2023-09-19 PROCEDURE — 96366 THER/PROPH/DIAG IV INF ADDON: CPT

## 2023-09-19 PROCEDURE — 93010 ELECTROCARDIOGRAM REPORT: CPT | Performed by: INTERNAL MEDICINE

## 2023-09-19 PROCEDURE — 99285 EMERGENCY DEPT VISIT HI MDM: CPT

## 2023-09-19 PROCEDURE — 6360000004 HC RX CONTRAST MEDICATION: Performed by: RADIOLOGY

## 2023-09-19 PROCEDURE — 80053 COMPREHEN METABOLIC PANEL: CPT

## 2023-09-19 RX ORDER — ALBUTEROL SULFATE 2.5 MG/3ML
2.5 SOLUTION RESPIRATORY (INHALATION) EVERY 6 HOURS PRN
Status: DISCONTINUED | OUTPATIENT
Start: 2023-09-19 | End: 2023-09-23 | Stop reason: HOSPADM

## 2023-09-19 RX ORDER — PANTOPRAZOLE SODIUM 40 MG/1
40 TABLET, DELAYED RELEASE ORAL EVERY MORNING
Status: DISCONTINUED | OUTPATIENT
Start: 2023-09-20 | End: 2023-09-23 | Stop reason: HOSPADM

## 2023-09-19 RX ORDER — DIGOXIN 125 MCG
TABLET ORAL
COMMUNITY
Start: 2023-08-01 | End: 2023-09-19

## 2023-09-19 RX ORDER — SPIRONOLACTONE 25 MG/1
12.5 TABLET ORAL DAILY
Status: DISCONTINUED | OUTPATIENT
Start: 2023-09-19 | End: 2023-09-23 | Stop reason: HOSPADM

## 2023-09-19 RX ORDER — DILTIAZEM HYDROCHLORIDE 5 MG/ML
10 INJECTION INTRAVENOUS ONCE
Status: COMPLETED | OUTPATIENT
Start: 2023-09-19 | End: 2023-09-19

## 2023-09-19 RX ORDER — WARFARIN SODIUM 4 MG/1
4 TABLET ORAL DAILY
Status: DISCONTINUED | OUTPATIENT
Start: 2023-09-19 | End: 2023-09-19

## 2023-09-19 RX ORDER — ROSUVASTATIN CALCIUM 10 MG/1
10 TABLET, COATED ORAL NIGHTLY
Status: DISCONTINUED | OUTPATIENT
Start: 2023-09-19 | End: 2023-09-23 | Stop reason: HOSPADM

## 2023-09-19 RX ORDER — ARFORMOTEROL TARTRATE 15 UG/2ML
15 SOLUTION RESPIRATORY (INHALATION)
Status: DISCONTINUED | OUTPATIENT
Start: 2023-09-19 | End: 2023-09-23 | Stop reason: HOSPADM

## 2023-09-19 RX ORDER — BUDESONIDE 0.25 MG/2ML
0.25 INHALANT ORAL
Status: DISCONTINUED | OUTPATIENT
Start: 2023-09-19 | End: 2023-09-23 | Stop reason: HOSPADM

## 2023-09-19 RX ORDER — WARFARIN SODIUM 4 MG/1
4 TABLET ORAL ONCE
Status: COMPLETED | OUTPATIENT
Start: 2023-09-19 | End: 2023-09-19

## 2023-09-19 RX ORDER — FUROSEMIDE 40 MG/1
40 TABLET ORAL DAILY
Status: DISCONTINUED | OUTPATIENT
Start: 2023-09-19 | End: 2023-09-23 | Stop reason: HOSPADM

## 2023-09-19 RX ORDER — METOPROLOL TARTRATE 5 MG/5ML
5 INJECTION INTRAVENOUS ONCE
Status: DISCONTINUED | OUTPATIENT
Start: 2023-09-19 | End: 2023-09-19

## 2023-09-19 RX ORDER — AMIODARONE HYDROCHLORIDE 50 MG/ML
150 INJECTION, SOLUTION INTRAVENOUS ONCE
Status: COMPLETED | OUTPATIENT
Start: 2023-09-19 | End: 2023-09-19

## 2023-09-19 RX ORDER — LEVOTHYROXINE SODIUM 0.05 MG/1
50 TABLET ORAL DAILY
Status: DISCONTINUED | OUTPATIENT
Start: 2023-09-19 | End: 2023-09-23 | Stop reason: HOSPADM

## 2023-09-19 RX ORDER — DIGOXIN 125 MCG
62.5 TABLET ORAL
Status: DISCONTINUED | OUTPATIENT
Start: 2023-09-20 | End: 2023-09-23 | Stop reason: HOSPADM

## 2023-09-19 RX ADMIN — LEVOTHYROXINE SODIUM 50 MCG: 0.05 TABLET ORAL at 20:30

## 2023-09-19 RX ADMIN — SPIRONOLACTONE 12.5 MG: 25 TABLET ORAL at 20:30

## 2023-09-19 RX ADMIN — AMIODARONE HYDROCHLORIDE 150 MG: 50 INJECTION, SOLUTION INTRAVENOUS at 18:59

## 2023-09-19 RX ADMIN — DILTIAZEM HYDROCHLORIDE 10 MG: 5 INJECTION INTRAVENOUS at 13:45

## 2023-09-19 RX ADMIN — IOPAMIDOL 75 ML: 755 INJECTION, SOLUTION INTRAVENOUS at 15:25

## 2023-09-19 RX ADMIN — FUROSEMIDE 40 MG: 40 TABLET ORAL at 20:30

## 2023-09-19 RX ADMIN — WARFARIN SODIUM 4 MG: 4 TABLET ORAL at 20:30

## 2023-09-19 RX ADMIN — SODIUM CHLORIDE 2.5 MG/HR: 900 INJECTION, SOLUTION INTRAVENOUS at 13:48

## 2023-09-19 RX ADMIN — ROSUVASTATIN CALCIUM 10 MG: 10 TABLET, FILM COATED ORAL at 20:30

## 2023-09-19 RX ADMIN — SODIUM CHLORIDE 10 MG/HR: 900 INJECTION, SOLUTION INTRAVENOUS at 21:22

## 2023-09-19 ASSESSMENT — PAIN - FUNCTIONAL ASSESSMENT: PAIN_FUNCTIONAL_ASSESSMENT: NONE - DENIES PAIN

## 2023-09-19 NOTE — PROGRESS NOTES
Database initiated pharmacy and medications verified with the patient. She is A&O independent from home alone. She wears 3 liters oxygen at baseline.

## 2023-09-19 NOTE — H&P
every 4 as needed. 7.  CHF-continue Lasix and spironolactone. Continue Cardizem. Continue digoxin. Decision to admit to inpatient for further work-up and management. Code Status: Full code  DVT prophylaxis: coumadin      NOTE: This report was transcribed using voice recognition software. Every effort was made to ensure accuracy; however, inadvertent computerized transcription errors may be present.   Electronically signed by Delroy Rebolledo MD on 9/19/2023 at 7:35 PM

## 2023-09-20 LAB
INR PPP: 1.8
PROTHROMBIN TIME: 20.9 SEC (ref 9.3–12.4)

## 2023-09-20 PROCEDURE — 6360000002 HC RX W HCPCS: Performed by: STUDENT IN AN ORGANIZED HEALTH CARE EDUCATION/TRAINING PROGRAM

## 2023-09-20 PROCEDURE — 96366 THER/PROPH/DIAG IV INF ADDON: CPT

## 2023-09-20 PROCEDURE — 99232 SBSQ HOSP IP/OBS MODERATE 35: CPT | Performed by: STUDENT IN AN ORGANIZED HEALTH CARE EDUCATION/TRAINING PROGRAM

## 2023-09-20 PROCEDURE — 85610 PROTHROMBIN TIME: CPT

## 2023-09-20 PROCEDURE — 94640 AIRWAY INHALATION TREATMENT: CPT

## 2023-09-20 PROCEDURE — 2060000000 HC ICU INTERMEDIATE R&B

## 2023-09-20 PROCEDURE — 2700000000 HC OXYGEN THERAPY PER DAY

## 2023-09-20 PROCEDURE — 6370000000 HC RX 637 (ALT 250 FOR IP): Performed by: STUDENT IN AN ORGANIZED HEALTH CARE EDUCATION/TRAINING PROGRAM

## 2023-09-20 PROCEDURE — 2500000003 HC RX 250 WO HCPCS

## 2023-09-20 PROCEDURE — 2580000003 HC RX 258

## 2023-09-20 RX ORDER — WARFARIN SODIUM 4 MG/1
4 TABLET ORAL ONCE
Status: COMPLETED | OUTPATIENT
Start: 2023-09-20 | End: 2023-09-20

## 2023-09-20 RX ADMIN — IPRATROPIUM BROMIDE 0.5 MG: 0.5 SOLUTION RESPIRATORY (INHALATION) at 12:21

## 2023-09-20 RX ADMIN — PANTOPRAZOLE SODIUM 40 MG: 40 TABLET, DELAYED RELEASE ORAL at 09:21

## 2023-09-20 RX ADMIN — WARFARIN SODIUM 4 MG: 4 TABLET ORAL at 14:44

## 2023-09-20 RX ADMIN — IPRATROPIUM BROMIDE 0.5 MG: 0.5 SOLUTION RESPIRATORY (INHALATION) at 16:29

## 2023-09-20 RX ADMIN — ROSUVASTATIN CALCIUM 10 MG: 10 TABLET, FILM COATED ORAL at 20:41

## 2023-09-20 RX ADMIN — FUROSEMIDE 40 MG: 40 TABLET ORAL at 09:20

## 2023-09-20 RX ADMIN — SPIRONOLACTONE 12.5 MG: 25 TABLET ORAL at 09:20

## 2023-09-20 RX ADMIN — BUDESONIDE 250 MCG: 0.25 SUSPENSION RESPIRATORY (INHALATION) at 08:41

## 2023-09-20 RX ADMIN — SODIUM CHLORIDE 5 MG/HR: 900 INJECTION, SOLUTION INTRAVENOUS at 14:51

## 2023-09-20 RX ADMIN — IPRATROPIUM BROMIDE 0.5 MG: 0.5 SOLUTION RESPIRATORY (INHALATION) at 08:41

## 2023-09-20 RX ADMIN — DIGOXIN 62.5 MCG: 0.12 TABLET ORAL at 20:41

## 2023-09-20 RX ADMIN — ARFORMOTEROL TARTRATE 15 MCG: 15 SOLUTION RESPIRATORY (INHALATION) at 08:41

## 2023-09-20 RX ADMIN — LEVOTHYROXINE SODIUM 50 MCG: 0.05 TABLET ORAL at 09:21

## 2023-09-20 NOTE — PROGRESS NOTES
Pharmacy Consultation Note  (Warfarin Dosing and Monitoring)    Initial consult date: 9/19/2023  Consulting Provider: Dr. Tuyet Dunbar is a 78 y.o. female for whom pharmacy has been asked to manage warfarin therapy. Weight:   Wt Readings from Last 1 Encounters:   09/20/23 162 lb 9.6 oz (73.8 kg)       TSH:    Lab Results   Component Value Date/Time    TSH 0.45 09/19/2023 01:16 PM       Hepatic Function Panel:                          Lab Results   Component Value Date/Time    ALKPHOS 78 09/19/2023 01:16 PM    ALT 12 09/19/2023 01:16 PM    AST 28 09/19/2023 01:16 PM    PROT 7.1 09/19/2023 01:16 PM    BILITOT 0.6 09/19/2023 01:16 PM    BILIDIR 0.2 10/15/2017 02:40 AM    IBILI 0.7 10/15/2017 02:40 AM    LABALBU 4.4 09/19/2023 01:16 PM    LABALBU 4.4 05/04/2012 11:00 AM       Current warfarin drug-drug interactions include: No significant interactions    Recent Labs     09/19/23  1316   HGB 12.0        Date Warfarin Dose INR Heparin or LMWH Comment   9/19 4 mg 1.9 ---    9/20  1.8 ---                           Assessment:  Patient is a 78 y.o. female on warfarin for Atrial Fibrillation. Patient's home warfarin dosing regimen is documented as warfarin 4 mg daily. Goal INR 2 - 3  INR 1.8 today    Plan:   Will give warfarin 4 mg tonight  Daily PT/INR until the INR is stable within the therapeutic range  Pharmacist will follow and monitor/adjust dosing as necessary    Ladan Wang PharmD, [facility]  9/20/2023  2:27 PM

## 2023-09-20 NOTE — PROGRESS NOTES
Occupational Therapy  Date:9/20/2023  Patient Name: Nuno Root  Room: 3814/5954-G     Occupational Therapy (OT) order received, patient's medical record reviewed, and OT evaluation attempted this afternoon; patient was observed to be independent with functional transfers/mobility (without device) within her room and reported being independent with ADLs. Patient declined provision of OT services during this hospitalization, and noted that family members can assist with IADLs, as needed, upon discharge. Patient indicated that she owns a number of DME/AD items, but does not need/use them. No OT evaluation completed, per patient preference. Daniela Jane, OTR/L  License Number: [id number]

## 2023-09-20 NOTE — PROGRESS NOTES
Spoke to Dr. Renetta Downs regarding cardizem gtt and recent vitals, ok to increase gtt and monitor HR.

## 2023-09-20 NOTE — ACP (ADVANCE CARE PLANNING)
Advance Care Planning   Healthcare Decision Maker:    Primary Decision Maker: Jose Miguel Regalado - Child - [phone]    Secondary Decision Maker: Noel Reed - Child - [phone]

## 2023-09-20 NOTE — PROGRESS NOTES
4 Eyes Skin Assessment     NAME:  Candice Antunez  YOB: 1940  MEDICAL RECORD NUMBER:  [id number]    The patient is being assessed for  Admission    I agree that at least one RN has performed a thorough Head to Toe Skin Assessment on the patient. ALL assessment sites listed below have been assessed. Areas assessed by both nurses:    Head, Face, Ears, Shoulders, Back, Chest, Arms, Elbows, Hands, Sacrum. Buttock, Coccyx, Ischium, Legs. Feet and Heels, and Under Medical Devices         Does the Patient have a Wound?  No noted wound(s)       Leandro Prevention initiated by RN: No  Wound Care Orders initiated by RN: No    Pressure Injury (Stage 3,4, Unstageable, DTI, NWPT, and Complex wounds) if present, place Wound referral order by RN under : No    New Ostomies, if present place, Ostomy referral order under : No     Nurse 1 eSignature: Electronically signed by Ramesh Finney RN on 9/20/23 at 6:36 AM EDT    **SHARE this note so that the co-signing nurse can place an eSignature**    Nurse 2 eSignature: Electronically signed by Ronna Ramires on 9/20/23 at 6:39 AM EDT

## 2023-09-20 NOTE — PROGRESS NOTES
Paged Dr. Rodolfo Dickinson re: Bp and HR. He's in house and will be up shortly.   Severa Polite, RN NM  11:58 AM

## 2023-09-20 NOTE — CARE COORDINATION
CASE MANAGEMENT. ... Met with patient at the bedside. Ms Aubrie Penaloza voices being independent from home alone. 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 Has all necessary dme, ie. .. wheelchair, shower seats, walker etc, but doesn't use. Has nebulizer and wears home o2 2lnc through [employer]. Has history with outpt pulm rehab. No jose/hhc history. She is being followed by cardio for afib rvr. On iv cardizem gtt and meds are being adjusted accordingly. Continues on coumadin-on prior to arrival. Discharge plan is home. No needs anticipated. Will follow.

## 2023-09-21 PROBLEM — E03.9 HYPOTHYROIDISM: Status: ACTIVE | Noted: 2023-09-21

## 2023-09-21 PROBLEM — I48.91 ATRIAL FIBRILLATION (HCC): Status: ACTIVE | Noted: 2023-09-21

## 2023-09-21 PROBLEM — E78.5 HYPERLIPIDEMIA: Status: ACTIVE | Noted: 2023-09-21

## 2023-09-21 PROBLEM — K21.9 GASTROESOPHAGEAL REFLUX DISEASE: Status: ACTIVE | Noted: 2023-09-21

## 2023-09-21 LAB
ANION GAP SERPL CALCULATED.3IONS-SCNC: 9 MMOL/L (ref 7–16)
BUN SERPL-MCNC: 27 MG/DL (ref 6–23)
CALCIUM SERPL-MCNC: 9.5 MG/DL (ref 8.6–10.2)
CHLORIDE SERPL-SCNC: 102 MMOL/L (ref 98–107)
CO2 SERPL-SCNC: 29 MMOL/L (ref 22–29)
CREAT SERPL-MCNC: 1.2 MG/DL (ref 0.5–1)
ERYTHROCYTE [DISTWIDTH] IN BLOOD BY AUTOMATED COUNT: 13.1 % (ref 11.5–15)
GFR SERPL CREATININE-BSD FRML MDRD: 44 ML/MIN/1.73M2
GLUCOSE SERPL-MCNC: 108 MG/DL (ref 74–99)
HCT VFR BLD AUTO: 33.8 % (ref 34–48)
HGB BLD-MCNC: 10.9 G/DL (ref 11.5–15.5)
INR PPP: 2.4
MCH RBC QN AUTO: 30.2 PG (ref 26–35)
MCHC RBC AUTO-ENTMCNC: 32.2 G/DL (ref 32–34.5)
MCV RBC AUTO: 93.6 FL (ref 80–99.9)
PLATELET # BLD AUTO: 145 K/UL (ref 130–450)
PMV BLD AUTO: 10.3 FL (ref 7–12)
POTASSIUM SERPL-SCNC: 3.8 MMOL/L (ref 3.5–5)
PROTHROMBIN TIME: 26.6 SEC (ref 9.3–12.4)
RBC # BLD AUTO: 3.61 M/UL (ref 3.5–5.5)
SODIUM SERPL-SCNC: 140 MMOL/L (ref 132–146)
WBC OTHER # BLD: 4.8 K/UL (ref 4.5–11.5)

## 2023-09-21 PROCEDURE — 94640 AIRWAY INHALATION TREATMENT: CPT

## 2023-09-21 PROCEDURE — 2500000003 HC RX 250 WO HCPCS

## 2023-09-21 PROCEDURE — 2060000000 HC ICU INTERMEDIATE R&B

## 2023-09-21 PROCEDURE — 6360000002 HC RX W HCPCS: Performed by: STUDENT IN AN ORGANIZED HEALTH CARE EDUCATION/TRAINING PROGRAM

## 2023-09-21 PROCEDURE — 6370000000 HC RX 637 (ALT 250 FOR IP): Performed by: INTERNAL MEDICINE

## 2023-09-21 PROCEDURE — 6370000000 HC RX 637 (ALT 250 FOR IP): Performed by: STUDENT IN AN ORGANIZED HEALTH CARE EDUCATION/TRAINING PROGRAM

## 2023-09-21 PROCEDURE — 85610 PROTHROMBIN TIME: CPT

## 2023-09-21 PROCEDURE — 99232 SBSQ HOSP IP/OBS MODERATE 35: CPT | Performed by: INTERNAL MEDICINE

## 2023-09-21 PROCEDURE — 85027 COMPLETE CBC AUTOMATED: CPT

## 2023-09-21 PROCEDURE — 2580000003 HC RX 258

## 2023-09-21 PROCEDURE — 80048 BASIC METABOLIC PNL TOTAL CA: CPT

## 2023-09-21 RX ORDER — FLUTICASONE PROPIONATE 50 MCG
1 SPRAY, SUSPENSION (ML) NASAL DAILY
Status: DISCONTINUED | OUTPATIENT
Start: 2023-09-21 | End: 2023-09-23 | Stop reason: HOSPADM

## 2023-09-21 RX ORDER — AMIODARONE HYDROCHLORIDE 200 MG/1
200 TABLET ORAL 2 TIMES DAILY
Status: DISCONTINUED | OUTPATIENT
Start: 2023-09-21 | End: 2023-09-23

## 2023-09-21 RX ORDER — WARFARIN SODIUM 4 MG/1
4 TABLET ORAL ONCE
Status: COMPLETED | OUTPATIENT
Start: 2023-09-21 | End: 2023-09-21

## 2023-09-21 RX ORDER — AMIODARONE HYDROCHLORIDE 200 MG/1
100 TABLET ORAL 2 TIMES DAILY
Status: DISCONTINUED | OUTPATIENT
Start: 2023-09-21 | End: 2023-09-21

## 2023-09-21 RX ADMIN — BUDESONIDE 250 MCG: 0.25 SUSPENSION RESPIRATORY (INHALATION) at 20:07

## 2023-09-21 RX ADMIN — LEVOTHYROXINE SODIUM 50 MCG: 0.05 TABLET ORAL at 08:04

## 2023-09-21 RX ADMIN — AMIODARONE HYDROCHLORIDE 200 MG: 200 TABLET ORAL at 20:47

## 2023-09-21 RX ADMIN — SPIRONOLACTONE 12.5 MG: 25 TABLET ORAL at 08:04

## 2023-09-21 RX ADMIN — WARFARIN SODIUM 4 MG: 4 TABLET ORAL at 17:04

## 2023-09-21 RX ADMIN — BUDESONIDE 250 MCG: 0.25 SUSPENSION RESPIRATORY (INHALATION) at 08:13

## 2023-09-21 RX ADMIN — IPRATROPIUM BROMIDE 0.5 MG: 0.5 SOLUTION RESPIRATORY (INHALATION) at 12:23

## 2023-09-21 RX ADMIN — FLUTICASONE PROPIONATE 1 SPRAY: 50 SPRAY, METERED NASAL at 12:48

## 2023-09-21 RX ADMIN — ARFORMOTEROL TARTRATE 15 MCG: 15 SOLUTION RESPIRATORY (INHALATION) at 08:13

## 2023-09-21 RX ADMIN — AMIODARONE HYDROCHLORIDE 100 MG: 200 TABLET ORAL at 08:04

## 2023-09-21 RX ADMIN — PANTOPRAZOLE SODIUM 40 MG: 40 TABLET, DELAYED RELEASE ORAL at 08:04

## 2023-09-21 RX ADMIN — IPRATROPIUM BROMIDE 0.5 MG: 0.5 SOLUTION RESPIRATORY (INHALATION) at 20:07

## 2023-09-21 RX ADMIN — SODIUM CHLORIDE 12.5 MG/HR: 900 INJECTION, SOLUTION INTRAVENOUS at 21:01

## 2023-09-21 RX ADMIN — IPRATROPIUM BROMIDE 0.5 MG: 0.5 SOLUTION RESPIRATORY (INHALATION) at 08:13

## 2023-09-21 RX ADMIN — FUROSEMIDE 40 MG: 40 TABLET ORAL at 08:04

## 2023-09-21 RX ADMIN — IPRATROPIUM BROMIDE 0.5 MG: 0.5 SOLUTION RESPIRATORY (INHALATION) at 15:46

## 2023-09-21 RX ADMIN — ARFORMOTEROL TARTRATE 15 MCG: 15 SOLUTION RESPIRATORY (INHALATION) at 20:06

## 2023-09-21 RX ADMIN — SODIUM CHLORIDE 7.5 MG/HR: 900 INJECTION, SOLUTION INTRAVENOUS at 11:12

## 2023-09-21 RX ADMIN — ROSUVASTATIN CALCIUM 10 MG: 10 TABLET, FILM COATED ORAL at 20:47

## 2023-09-21 NOTE — PROGRESS NOTES
Pharmacy Consultation Note  (Warfarin Dosing and Monitoring)    Initial consult date: 9/19/2023  Consulting Provider: Dr. Clifton Hackett is a 78 y.o. female for whom pharmacy has been asked to manage warfarin therapy. Weight:   Wt Readings from Last 1 Encounters:   09/21/23 166 lb 3.2 oz (75.4 kg)       TSH:    Lab Results   Component Value Date/Time    TSH 0.45 09/19/2023 01:16 PM       Hepatic Function Panel:                          Lab Results   Component Value Date/Time    ALKPHOS 78 09/19/2023 01:16 PM    ALT 12 09/19/2023 01:16 PM    AST 28 09/19/2023 01:16 PM    PROT 7.1 09/19/2023 01:16 PM    BILITOT 0.6 09/19/2023 01:16 PM    BILIDIR 0.2 10/15/2017 02:40 AM    IBILI 0.7 10/15/2017 02:40 AM    LABALBU 4.4 09/19/2023 01:16 PM    LABALBU 4.4 05/04/2012 11:00 AM       Current warfarin drug-drug interactions include: No significant interactions    Recent Labs     09/19/23  1316 09/21/23  0405   HGB 12.0 10.9*    145       Date Warfarin Dose INR Heparin or LMWH Comment   9/19 4 mg 1.9 ---    9/20 4 mg 1.8 ---    9/21 4 mg 2.4 ---                    Assessment:  Patient is a 78 y.o. female on warfarin for Atrial Fibrillation. Patient's home warfarin dosing regimen is documented as warfarin 4 mg daily. Goal INR 2 - 3  INR 2.4 today    Plan:   Will give warfarin 4 mg tonight  Daily PT/INR until the INR is stable within the therapeutic range  Pharmacist will follow and monitor/adjust dosing as necessary    Anjel Cooper, PharmD, BCCCP  9/21/2023  3:21 PM

## 2023-09-21 NOTE — PLAN OF CARE
Problem: Discharge Planning  Goal: Discharge to home or other facility with appropriate resources  9/20/2023 2128 by Doc Quinonez RN  Outcome: Progressing     Problem: Safety - Adult  Goal: Free from fall injury  9/20/2023 2128 by Doc Quinonez RN  Outcome: Progressing     Problem: Chronic Conditions and Co-morbidities  Goal: Patient's chronic conditions and co-morbidity symptoms are monitored and maintained or improved  Outcome: Progressing

## 2023-09-21 NOTE — PROGRESS NOTES
Physical Therapy    PT order received and medical chart reviewed 9/21. Pt states she is up and ambulating around room independently. Pt states she is at her baseline and does not need skilled PT services at this time. Please re-consult if necessary. Thank you.     Wendy Melendrez, PT, DPT  [id number]

## 2023-09-21 NOTE — CONSULTS
Family History:  family history includes Cancer in her mother; Emphysema in her father; Lung Disease in her father; Other in her mother; Peripheral Vascular Disease in her mother. Medications:  Prior to Admission medications    Medication Sig Start Date End Date Taking?  Authorizing Provider   rosuvastatin (CRESTOR) 10 MG tablet Take 1 tablet by mouth nightly 9/18/23   Shari Priest MD   SYNTHROID 100 MCG tablet Take 0.5 tablets by mouth daily 9/18/23   Shari Priest MD   furosemide (LASIX) 40 MG tablet Take 1 tablet by mouth daily 9/18/23   Shari Priest MD   warfarin (COUMADIN) 1 MG tablet Take 1 tablet by mouth daily 9/18/23   Shari Priest MD   warfarin (COUMADIN) 3 MG tablet Take 1 tablet by mouth daily  Patient taking differently: Take 4 mg by mouth daily 8/21/23   Shari Priest MD   pantoprazole (PROTONIX) 40 MG tablet Take 1 tablet by mouth every morning 7/17/23   Shari Priest MD   spironolactone (ALDACTONE) 25 MG tablet Take 0.5 tablets by mouth daily Indications: Patient  currently takes 1/2 of 25 mg QD    Historical Provider, MD   digoxin 62.5 MCG TABS Take 62.5 mcg by mouth three times a week  Patient taking differently: Take 62.5 mcg by mouth three times a week Mon wed fri 4/10/23   Eriberto Hernandez,    Handicap Placard MISC by Does not apply route Patient cannot walk 200 ft without stopping to rest.    Expiration 5 years 2/10/23   Shari Priest MD   albuterol (PROVENTIL) (2.5 MG/3ML) 0.083% nebulizer solution Take 3 mLs by nebulization every 6 hours as needed for Wheezing Please bill under Medicare Part B DX: COPD J44.9 2/1/23   Shari Priest MD   fluticasone-umeclidin-vilant (TRELEGY ELLIPTA) 100-62.5-25 MCG/INH AEPB Inhale 1 puff into the lungs every morning 10/17/22   Shari Priest MD   albuterol sulfate HFA (PROVENTIL;VENTOLIN;PROAIR) 108 (90 Base) MCG/ACT inhaler Inhale 2 puffs into the lungs every 6 hours as needed for Wheezing 9/12/22   Shari Priest MD Reason for exam:->shortness of breath FINDINGS: The lungs are without acute focal process. There is no effusion or pneumothorax. The cardiomediastinal silhouette is without acute process. The osseous structures are without acute process. No acute process. Assessment:    Patient Active Problem List   Diagnosis Code    Atrial fibrillation with RVR ([facility])- Recurrent I48.91    Acute on chronic diastolic congestive heart failure ([facility]) I50.33    Cardiomyopathy as manifestation of underlying disease ([address]) I43    Non-rheumatic mitral regurgitation I34.0    Acute combined systolic and diastolic congestive heart failure ([facility]) I50.41    Chronic anticoagulation Z79.01    Essential hypertension I10    COPD exacerbation ([facility]) J44.1    History of atrial fibrillation Z86.79    Dilated idiopathic cardiomyopathy ([facility]) I42.0    Severe sinus bradycardia R00.1    Moderate tricuspid regurgitation I07.1    Hypoxia R09.02    Combined forms of age-related cataract of both eyes H25.813    Dyspnea on exertion R06.09    Mass of right lung R91.8    Supplemental oxygen dependent Z99.81    Pneumonia J18.9    Stage 3a chronic kidney disease ([facility]) N18.31    Malignant neoplasm of right lung ([facility]) C34.91    Atrial flutter with rapid ventricular response ([facility]) I48.92    PAF (paroxysmal atrial fibrillation) ([facility]) I48.0    Centrilobular emphysema ([facility]) J43.2    Thyroid disease E07.9    Acquired hypothyroidism E03.9    Bronchitis J40    Chronic respiratory failure with hypoxia ([facility]) J96.11    Mixed hyperlipidemia E78.2       Plan:    Ms. Susan Berry after much discussion has agreed to allow me to place her back on low-dose amiodarone and should she develop profound bradycardia we will place a permanent pacemaker as she does not tolerate atrial fibrillation whatsoever. She may require cardioversion as well.monitor renal function, blood pressure electrolytes and heart rhythm.     I have spent more than 55 minutes face to face with Bear Gandhi

## 2023-09-21 NOTE — PLAN OF CARE
Problem: Discharge Planning  Goal: Discharge to home or other facility with appropriate resources  9/21/2023 0846 by JoséM anuel Su RN  Outcome: Progressing     Problem: Safety - Adult  Goal: Free from fall injury  9/21/2023 0846 by José Manuel Su RN  Outcome: Progressing     Problem: Chronic Conditions and Co-morbidities  Goal: Patient's chronic conditions and co-morbidity symptoms are monitored and maintained or improved  9/21/2023 0846 by José Manuel Su RN  Outcome: Progressing

## 2023-09-21 NOTE — PROGRESS NOTES
PROGRESS NOTE       PATIENT PROBLEM LIST:  Patient Active Problem List   Diagnosis Code    Atrial fibrillation with RVR ([address])- Recurrent I48.91    Acute on chronic diastolic congestive heart failure ([facility]) I50.33    Cardiomyopathy as manifestation of underlying disease ([address]) I43    Non-rheumatic mitral regurgitation I34.0    Acute combined systolic and diastolic congestive heart failure ([facility]) I50.41    Chronic anticoagulation Z79.01    Essential hypertension I10    COPD exacerbation ([facility]) J44.1    History of atrial fibrillation Z86.79    Dilated idiopathic cardiomyopathy ([facility]) I42.0    Severe sinus bradycardia R00.1    Moderate tricuspid regurgitation I07.1    Hypoxia R09.02    Combined forms of age-related cataract of both eyes H25.813    Dyspnea on exertion R06.09    Mass of right lung R91.8    Supplemental oxygen dependent Z99.81    Pneumonia J18.9    Stage 3a chronic kidney disease ([facility]) N18.31    Malignant neoplasm of right lung ([facility]) C34.91    Atrial flutter with rapid ventricular response ([facility]) I48.92    PAF (paroxysmal atrial fibrillation) ([facility]) I48.0    Centrilobular emphysema ([facility]) J43.2    Thyroid disease E07.9    Acquired hypothyroidism E03.9    Bronchitis J40    Chronic respiratory failure with hypoxia ([facility]) J96.11    Mixed hyperlipidemia E78.2       SUBJECTIVE:  Dorethgladis Carrillo states she feels well and wishes to be discharged. She denies any chest discomfort, lightheadedness nor shortness of breath. REVIEW OF SYSTEMS:  General ROS: negative for - fatigue, malaise,  weight gain or weight loss  Psychological ROS: negative for - anxiety , depression. positive for-frustration  Ophthalmic ROS: negative for - decreased vision and utilizes corrective lenses for visual acuity. .  ENT ROS: negative  Allergy and Immunology ROS: negative  Hematological and Lymphatic ROS: negative  Endocrine: no heat or cold intolerance and no polyphagia, polydipsia, or polyuria  Respiratory ROS: negative for - hemoptysis, Sidney Brooks and reviewing notes and laboratory data, with greater than 50% of this time instructing and counseling the patient face to face regarding my findings and recommendations and I have answered all questions as posed to me by Ms. Hyatt. Kimberley Garvin, DO FACP,FACC,[facility]      NOTE:  This report was transcribed using voice recognition software.   Every effort was made to ensure accuracy; however, inadvertent computerized transcription errors may be present

## 2023-09-22 LAB
EKG ATRIAL RATE: 240 BPM
EKG P AXIS: -99 DEGREES
EKG Q-T INTERVAL: 316 MS
EKG QRS DURATION: 82 MS
EKG QTC CALCULATION (BAZETT): 446 MS
EKG R AXIS: 52 DEGREES
EKG T AXIS: 76 DEGREES
EKG VENTRICULAR RATE: 120 BPM
INR PPP: 2.5
PROTHROMBIN TIME: 27.9 SEC (ref 9.3–12.4)
T4 FREE SERPL-MCNC: 1.8 NG/DL (ref 0.9–1.7)

## 2023-09-22 PROCEDURE — 99232 SBSQ HOSP IP/OBS MODERATE 35: CPT | Performed by: INTERNAL MEDICINE

## 2023-09-22 PROCEDURE — 6360000002 HC RX W HCPCS: Performed by: STUDENT IN AN ORGANIZED HEALTH CARE EDUCATION/TRAINING PROGRAM

## 2023-09-22 PROCEDURE — 94640 AIRWAY INHALATION TREATMENT: CPT

## 2023-09-22 PROCEDURE — 2580000003 HC RX 258

## 2023-09-22 PROCEDURE — 2060000000 HC ICU INTERMEDIATE R&B

## 2023-09-22 PROCEDURE — 6370000000 HC RX 637 (ALT 250 FOR IP): Performed by: STUDENT IN AN ORGANIZED HEALTH CARE EDUCATION/TRAINING PROGRAM

## 2023-09-22 PROCEDURE — 6370000000 HC RX 637 (ALT 250 FOR IP): Performed by: INTERNAL MEDICINE

## 2023-09-22 PROCEDURE — 85610 PROTHROMBIN TIME: CPT

## 2023-09-22 PROCEDURE — 84439 ASSAY OF FREE THYROXINE: CPT

## 2023-09-22 PROCEDURE — 2500000003 HC RX 250 WO HCPCS

## 2023-09-22 RX ORDER — WARFARIN SODIUM 4 MG/1
4 TABLET ORAL ONCE
Status: COMPLETED | OUTPATIENT
Start: 2023-09-22 | End: 2023-09-22

## 2023-09-22 RX ADMIN — SODIUM CHLORIDE 7.5 MG/HR: 900 INJECTION, SOLUTION INTRAVENOUS at 00:32

## 2023-09-22 RX ADMIN — ROSUVASTATIN CALCIUM 10 MG: 10 TABLET, FILM COATED ORAL at 20:56

## 2023-09-22 RX ADMIN — BUDESONIDE 250 MCG: 0.25 SUSPENSION RESPIRATORY (INHALATION) at 08:33

## 2023-09-22 RX ADMIN — AMIODARONE HYDROCHLORIDE 200 MG: 200 TABLET ORAL at 20:56

## 2023-09-22 RX ADMIN — DIGOXIN 62.5 MCG: 0.12 TABLET ORAL at 20:56

## 2023-09-22 RX ADMIN — LEVOTHYROXINE SODIUM 50 MCG: 0.05 TABLET ORAL at 08:01

## 2023-09-22 RX ADMIN — AMIODARONE HYDROCHLORIDE 200 MG: 200 TABLET ORAL at 08:01

## 2023-09-22 RX ADMIN — BUDESONIDE 250 MCG: 0.25 SUSPENSION RESPIRATORY (INHALATION) at 20:27

## 2023-09-22 RX ADMIN — FUROSEMIDE 40 MG: 40 TABLET ORAL at 08:01

## 2023-09-22 RX ADMIN — WARFARIN SODIUM 4 MG: 4 TABLET ORAL at 18:54

## 2023-09-22 RX ADMIN — PANTOPRAZOLE SODIUM 40 MG: 40 TABLET, DELAYED RELEASE ORAL at 08:01

## 2023-09-22 RX ADMIN — IPRATROPIUM BROMIDE 0.5 MG: 0.5 SOLUTION RESPIRATORY (INHALATION) at 08:33

## 2023-09-22 RX ADMIN — ARFORMOTEROL TARTRATE 15 MCG: 15 SOLUTION RESPIRATORY (INHALATION) at 08:33

## 2023-09-22 RX ADMIN — ARFORMOTEROL TARTRATE 15 MCG: 15 SOLUTION RESPIRATORY (INHALATION) at 20:28

## 2023-09-22 RX ADMIN — FLUTICASONE PROPIONATE 1 SPRAY: 50 SPRAY, METERED NASAL at 08:03

## 2023-09-22 RX ADMIN — SODIUM CHLORIDE 2.5 MG/HR: 900 INJECTION, SOLUTION INTRAVENOUS at 04:52

## 2023-09-22 RX ADMIN — IPRATROPIUM BROMIDE 0.5 MG: 0.5 SOLUTION RESPIRATORY (INHALATION) at 13:06

## 2023-09-22 RX ADMIN — IPRATROPIUM BROMIDE 0.5 MG: 0.5 SOLUTION RESPIRATORY (INHALATION) at 20:27

## 2023-09-22 RX ADMIN — SPIRONOLACTONE 12.5 MG: 25 TABLET ORAL at 08:01

## 2023-09-22 NOTE — PROGRESS NOTES
Pharmacy Consultation Note  (Warfarin Dosing and Monitoring)    Initial consult date: 9/19/2023  Consulting Provider: Dr. Zamzam Sandoval is a 78 y.o. female for whom pharmacy has been asked to manage warfarin therapy. Weight:   Wt Readings from Last 1 Encounters:   09/22/23 165 lb (74.8 kg)       TSH:    Lab Results   Component Value Date/Time    TSH 0.45 09/19/2023 01:16 PM       Hepatic Function Panel:                          Lab Results   Component Value Date/Time    ALKPHOS 78 09/19/2023 01:16 PM    ALT 12 09/19/2023 01:16 PM    AST 28 09/19/2023 01:16 PM    PROT 7.1 09/19/2023 01:16 PM    BILITOT 0.6 09/19/2023 01:16 PM    BILIDIR 0.2 10/15/2017 02:40 AM    IBILI 0.7 10/15/2017 02:40 AM    LABALBU 4.4 09/19/2023 01:16 PM    LABALBU 4.4 05/04/2012 11:00 AM       Current warfarin drug-drug interactions include: No significant interactions    Recent Labs     09/21/23  0405   HGB 10.9*          Date Warfarin Dose INR Heparin or LMWH Comment   9/19 4 mg 1.9 ---    9/20 4 mg 1.8 ---    9/21 4 mg 2.4 ---    9/22 4 mg 2.5 ---             Assessment:  Patient is a 78 y.o. female on warfarin for Atrial Fibrillation. Patient's home warfarin dosing regimen is documented as warfarin 4 mg daily. Goal INR 2 - 3  INR 2.5 today    Plan:   Will give warfarin 4 mg tonight  Daily PT/INR until the INR is stable within the therapeutic range  Pharmacist will follow and monitor/adjust dosing as necessary    Justa Acosta, PharmD, BCCCP  9/22/2023  1:48 PM

## 2023-09-22 NOTE — CARE COORDINATION
CASE MANAGEMENT. ..iv cardizem gtt and cardiac meds per cardio. On coumadin pta. Patient is independent and will return home at dc. No needs anticipated. Will follow.

## 2023-09-23 VITALS
WEIGHT: 164.7 LBS | RESPIRATION RATE: 16 BRPM | HEIGHT: 68 IN | OXYGEN SATURATION: 98 % | HEART RATE: 124 BPM | BODY MASS INDEX: 24.96 KG/M2 | SYSTOLIC BLOOD PRESSURE: 127 MMHG | DIASTOLIC BLOOD PRESSURE: 75 MMHG | TEMPERATURE: 98.2 F

## 2023-09-23 PROBLEM — I48.92 ATRIAL FLUTTER (HCC): Status: ACTIVE | Noted: 2023-09-23

## 2023-09-23 LAB
INR PPP: 2.3
PROTHROMBIN TIME: 26.4 SEC (ref 9.3–12.4)

## 2023-09-23 PROCEDURE — 94640 AIRWAY INHALATION TREATMENT: CPT

## 2023-09-23 PROCEDURE — 6360000002 HC RX W HCPCS: Performed by: STUDENT IN AN ORGANIZED HEALTH CARE EDUCATION/TRAINING PROGRAM

## 2023-09-23 PROCEDURE — 6370000000 HC RX 637 (ALT 250 FOR IP): Performed by: STUDENT IN AN ORGANIZED HEALTH CARE EDUCATION/TRAINING PROGRAM

## 2023-09-23 PROCEDURE — 99239 HOSP IP/OBS DSCHRG MGMT >30: CPT | Performed by: INTERNAL MEDICINE

## 2023-09-23 PROCEDURE — 6370000000 HC RX 637 (ALT 250 FOR IP): Performed by: INTERNAL MEDICINE

## 2023-09-23 PROCEDURE — 85610 PROTHROMBIN TIME: CPT

## 2023-09-23 RX ORDER — AMIODARONE HYDROCHLORIDE 200 MG/1
200 TABLET ORAL DAILY
Qty: 30 TABLET | Refills: 0 | Status: SHIPPED | OUTPATIENT
Start: 2023-09-24 | End: 2023-10-24

## 2023-09-23 RX ORDER — WARFARIN SODIUM 4 MG/1
4 TABLET ORAL ONCE
Status: DISCONTINUED | OUTPATIENT
Start: 2023-09-23 | End: 2023-09-23 | Stop reason: HOSPADM

## 2023-09-23 RX ORDER — AMIODARONE HYDROCHLORIDE 200 MG/1
200 TABLET ORAL DAILY
Status: DISCONTINUED | OUTPATIENT
Start: 2023-09-24 | End: 2023-09-23 | Stop reason: HOSPADM

## 2023-09-23 RX ADMIN — PANTOPRAZOLE SODIUM 40 MG: 40 TABLET, DELAYED RELEASE ORAL at 08:20

## 2023-09-23 RX ADMIN — SPIRONOLACTONE 12.5 MG: 25 TABLET ORAL at 08:20

## 2023-09-23 RX ADMIN — LEVOTHYROXINE SODIUM 50 MCG: 0.05 TABLET ORAL at 08:20

## 2023-09-23 RX ADMIN — AMIODARONE HYDROCHLORIDE 200 MG: 200 TABLET ORAL at 08:20

## 2023-09-23 RX ADMIN — FLUTICASONE PROPIONATE 1 SPRAY: 50 SPRAY, METERED NASAL at 08:21

## 2023-09-23 RX ADMIN — FUROSEMIDE 40 MG: 40 TABLET ORAL at 08:20

## 2023-09-23 RX ADMIN — ARFORMOTEROL TARTRATE 15 MCG: 15 SOLUTION RESPIRATORY (INHALATION) at 07:47

## 2023-09-23 RX ADMIN — IPRATROPIUM BROMIDE 0.5 MG: 0.5 SOLUTION RESPIRATORY (INHALATION) at 07:47

## 2023-09-23 RX ADMIN — BUDESONIDE 250 MCG: 0.25 SUSPENSION RESPIRATORY (INHALATION) at 07:47

## 2023-09-23 NOTE — DISCHARGE SUMMARY
[facility] EMERGENCY SERVICE Physician Discharge Summary       Mariya Alanis, [address] [address]  [phone]    Schedule an appointment as soon as possible for a visit        Activity level: as pietro    Diet: ADULT DIET; Regular; Low Fat/Low Chol/High Fiber/2 gm Na    Dispo:home    Condition at discharge: fair        Patient ID:  Elian Leon  [id number]  78 y.o.  1940    Admit date: 9/19/2023    Discharge date and time:  9/23/2023  3:41 PM    Admission Diagnoses: Principal Problem:    Atrial flutter with rapid ventricular response (HCC)  Active Problems:    Gastroesophageal reflux disease    Hypothyroidism    Hyperlipidemia    Atrial fibrillation ([address])  Resolved Problems:    * No resolved hospital problems. *      Discharge Diagnoses: Principal Problem:    Atrial flutter with rapid ventricular response (HCC)  Active Problems:    Gastroesophageal reflux disease    Hypothyroidism    Hyperlipidemia    Atrial fibrillation ([address])  Resolved Problems:    * No resolved hospital problems. *    Atrial flutter  Hyperlipidemia   Hypothyroidism  Gerd  copd        Consults:  IP CONSULT TO CARDIOLOGY  IP CONSULT TO PHARMACY    Procedures: none    Hospital Course: Patient was admitted with Palpitations [R00.2]  Atrial flutter with rapid ventricular response ([address]) [I48.92]. Patient is an 47-year-old female with a past medical history of A-fib, CHF, hyperlipidemia, hypertension, and lung cancer presents to the emergency department for chest palpitations and shortness of breath since today. Patient's cardiologist Dr. Shailesh Whalen. Patient states that she wears 2 L of oxygen at home due to her lung cancer, CHF, and emphysema. In the ED, patient was found to be in A-fib with RVR requiring diltiazem drip. She is anticoagulated with Coumadin. Patient denies nausea, vomiting, diarrhea, headache, numbness, or tingling. Pt seen and examined by cardiology.  Pt had been placed on medications were sent to [address] [place] [address] [phone]  [facility] [address], [address] [address]      Phone: [phone]   amiodarone 200 MG tablet           Total time for discharge is 37 min    Signed:  Electronically signed by Miky Howard DO on 9/23/2023 at 3:41 PM

## 2023-09-23 NOTE — PROGRESS NOTES
PROGRESS NOTE       PATIENT PROBLEM LIST:  Patient Active Problem List   Diagnosis Code    Atrial fibrillation with RVR ([address])- Recurrent I48.91    Acute on chronic diastolic congestive heart failure ([facility]) I50.33    Cardiomyopathy as manifestation of underlying disease ([address]) I43    Non-rheumatic mitral regurgitation I34.0    Acute combined systolic and diastolic congestive heart failure ([facility]) I50.41    Chronic anticoagulation Z79.01    Essential hypertension I10    COPD exacerbation ([facility]) J44.1    History of atrial fibrillation Z86.79    Dilated idiopathic cardiomyopathy ([facility]CC) I42.0    Severe sinus bradycardia R00.1    Moderate tricuspid regurgitation I07.1    Hypoxia R09.02    Combined forms of age-related cataract of both eyes H25.813    Dyspnea on exertion R06.09    Mass of right lung R91.8    Supplemental oxygen dependent Z99.81    Pneumonia J18.9    Stage 3a chronic kidney disease ([facility]) N18.31    Malignant neoplasm of right lung ([facility]) C34.91    Atrial flutter with rapid ventricular response ([facility]) I48.92    PAF (paroxysmal atrial fibrillation) ([facility]) I48.0    Centrilobular emphysema ([facility]) J43.2    Thyroid disease E07.9    Acquired hypothyroidism E03.9    Bronchitis J40    Chronic respiratory failure with hypoxia ([facility]) J96.11    Mixed hyperlipidemia E78.2    Gastroesophageal reflux disease K21.9    Hypothyroidism E03.9    Hyperlipidemia E78.5    Atrial fibrillation ([facility]) I48.91       SUBJECTIVE:  Danelle Mueller is sitting up at side of her bed eating and has no complaints of shortness of breath palpitations or lightheadedness and wishes to return to work tomorrow. REVIEW OF SYSTEMS:  General ROS: negative for - fatigue, malaise,  weight gain or weight loss  Psychological ROS: negative for - anxiety , depression  Ophthalmic ROS: negative for - decreased vision or visual distortion.   ENT ROS: negative  Allergy and Immunology ROS: negative  Hematological and Lymphatic ROS: negative  Endocrine: no heat or cold palpable. Trachea midline. Chest: Even excursion  Lungs: Grossly clear to auscultation bilaterally, no expiratory wheezes or rhonchi, no decreased tactile fremitus without inspiratory rales. Heart: Regular  rhythm; S1 > S2, no gallop grade 2/6 systolic murmur left midsternal border no clicks, rub, palpable thrills   or heaves. PMI nondisplaced, 5th intercostal space MCL. Abdomen: Soft, nontender, nondistended,  mildly protuberant, no masses or organomegaly. Bowel sounds active. Extremities: Without clubbing, cyanosis or edema. Pulses present 3+ upper extermities bilaterally; present 1+ DP  bilaterally and present 1+ PT bilaterally. Data:   Scheduled Meds: Reviewed  Continuous Infusions:         Intake/Output Summary (Last 24 hours) at 9/23/2023 1103  Last data filed at 9/23/2023 0842  Gross per 24 hour   Intake 420 ml   Output --   Net 420 ml     CBC:   Recent Labs     09/21/23  0405   WBC 4.8   HGB 10.9*   HCT 33.8*        BMP:  Recent Labs     09/21/23  0405      K 3.8      CO2 29   BUN 27*   CREATININE 1.2*   LABGLOM 44*     ABGs:   Lab Results   Component Value Date/Time    PH 7.421 05/04/2022 04:52 PM    PO2 143.0 05/04/2022 04:52 PM    PCO2 44.1 05/04/2022 04:52 PM     INR:   Recent Labs     09/21/23  0405 09/22/23  0145 09/23/23  0320   INR 2.4 2.5 2.3     PRO-BNP:   Lab Results   Component Value Date    PROBNP 3,166 (H) 09/19/2023    PROBNP 2,858 (H) 04/05/2023      TSH:   Lab Results   Component Value Date    TSH 0.45 09/19/2023      Cardiac Injury Profile:   No results for input(s): \"TROPHS\" in the last 72 hours.      Lipid Profile:   Lab Results   Component Value Date/Time    TRIG 87 09/18/2023 09:41 AM    TRIG 87 09/18/2023 09:41 AM    HDL 75 09/18/2023 09:41 AM    HDL 75 09/18/2023 09:41 AM    LDLCALC 77 05/02/2022 10:40 AM    CHOL 165 09/18/2023 09:41 AM    CHOL 165 09/18/2023 09:41 AM    CHOL 156 05/02/2022 10:40 AM      Hemoglobin A1C: No components found for: \"HGBA1C\"

## 2023-09-23 NOTE — PROGRESS NOTES
Pharmacy Consultation Note  (Warfarin Dosing and Monitoring)    Initial consult date: 9/19/2023  Consulting Provider: Dr. Hal Tolbert is a 78 y.o. female for whom pharmacy has been asked to manage warfarin therapy. Weight:   Wt Readings from Last 1 Encounters:   09/23/23 164 lb 11.2 oz (74.7 kg)       TSH:    Lab Results   Component Value Date/Time    TSH 0.45 09/19/2023 01:16 PM       Hepatic Function Panel:                          Lab Results   Component Value Date/Time    ALKPHOS 78 09/19/2023 01:16 PM    ALT 12 09/19/2023 01:16 PM    AST 28 09/19/2023 01:16 PM    PROT 7.1 09/19/2023 01:16 PM    BILITOT 0.6 09/19/2023 01:16 PM    BILIDIR 0.2 10/15/2017 02:40 AM    IBILI 0.7 10/15/2017 02:40 AM    LABALBU 4.4 09/19/2023 01:16 PM    LABALBU 4.4 05/04/2012 11:00 AM       Current warfarin drug-drug interactions include: No significant interactions    Recent Labs     09/21/23  0405   HGB 10.9*          Date Warfarin Dose INR Heparin or LMWH Comment   9/19 4 mg 1.9 ---    9/20 4 mg 1.8 ---    9/21 4 mg 2.4 ---    9/22 4 mg 2.5 ---    9/23 4 mg 2.3 ---      Assessment:  Patient is a 78 y.o. female on warfarin for Atrial Fibrillation. Patient's home warfarin dosing regimen is documented as warfarin 4 mg daily. Goal INR 2 - 3  INR 2.3 today    Plan:   Will give warfarin 4 mg tonight  Daily PT/INR until the INR is stable within the therapeutic range  Pharmacist will follow and monitor/adjust dosing as necessary    Alina Palomo, PharmD, BCPS, BCCCP 9/23/2023 10:00 AM

## 2023-09-25 ENCOUNTER — TELEPHONE (OUTPATIENT)
Dept: PRIMARY CARE CLINIC | Age: 83
End: 2023-09-25

## 2023-09-25 ENCOUNTER — CARE COORDINATION (OUTPATIENT)
Dept: CASE MANAGEMENT | Age: 83
End: 2023-09-25

## 2023-09-25 DIAGNOSIS — I48.91 ATRIAL FIBRILLATION WITH RVR (HCC): Primary | ICD-10-CM

## 2023-09-25 PROCEDURE — 1111F DSCHRG MED/CURRENT MED MERGE: CPT | Performed by: INTERNAL MEDICINE

## 2023-09-25 NOTE — TELEPHONE ENCOUNTER
Patient requesting a call back to let her know which strength of her Coumadin she needs to be taking. She states she was in the hospital all weekend due to her PT/INR levels. She states she has 2.5 mg 3 mg and 4 mg doses at home. Please give her a call back as soon as possible. Her home phone number is [phone] and her cell phone number is . Thank you.

## 2023-09-25 NOTE — CARE COORDINATION
this morning and was able to climb up 14 steps at work with no issues. Patient denies any LE swelling or sudden weight gain. Patient states she is scheduled to follow up with Dr. Jairo Quintero (cardiology) this week on 9/27/23 at 11 am and plans to keep next ov with Dr. Gege Blum (PCP) on 10/16/23. Provided a complete review of home meds with patient who confirms she obtained newly prescribed Amiodarone that was ordered on discharge. Discussed bleeding precautions associated with blood thinner therapy (Coumadin) and knowing when to call doctor or seek immediate medical attention. Noted INR on discharge was 2.3. Confirmed Coumadin dose with patient as she is taking 4 mg alternating with 1 mg. Patient states she has a call out to PCP to see if she is to continue with current Coumadin dose. Patient denies any needs or concerns at this time. Patient is receptive to subsequent follow up. CTN will continue to follow for Care Transition. ,     Care Transition Nurse reviewed discharge instructions, medical action plan, and red flags with patient who verbalized understanding. The patient was given an opportunity to ask questions and does not have any further questions or concerns at this time. Were discharge instructions available to patient? Yes. Reviewed appropriate site of care based on symptoms and resources available to patient including: PCP  Specialist  When to call 911  Condition related references. The patient agrees to contact the PCP office for questions related to their healthcare. Advance Care Planning:   Does patient have an Advance Directive: reviewed and current. Medication reconciliation was performed with patient, who verbalizes understanding of administration of home medications.  Medications reviewed, 1111F entered: yes    Was patient discharged with a pulse oximeter? no    Non-face-to-face services provided:  Scheduled appointment with PCP-CTN confirmed with patient her wishes to keep next

## 2023-09-28 RX ORDER — PANTOPRAZOLE SODIUM 40 MG/1
40 TABLET, DELAYED RELEASE ORAL EVERY MORNING
Qty: 90 TABLET | Refills: 0 | Status: SHIPPED | OUTPATIENT
Start: 2023-09-28

## 2023-09-28 RX ORDER — ROSUVASTATIN CALCIUM 10 MG/1
10 TABLET, COATED ORAL NIGHTLY
Qty: 30 TABLET | Refills: 2 | Status: SHIPPED | OUTPATIENT
Start: 2023-09-28

## 2023-09-28 RX ORDER — FUROSEMIDE 40 MG/1
40 TABLET ORAL DAILY
Qty: 90 TABLET | Refills: 0 | Status: SHIPPED | OUTPATIENT
Start: 2023-09-28

## 2023-09-29 LAB
EKG ATRIAL RATE: 240 BPM
EKG P AXIS: -99 DEGREES
EKG Q-T INTERVAL: 316 MS
EKG QRS DURATION: 82 MS
EKG QTC CALCULATION (BAZETT): 446 MS
EKG R AXIS: 52 DEGREES
EKG T AXIS: 76 DEGREES
EKG VENTRICULAR RATE: 120 BPM

## 2023-09-29 NOTE — TELEPHONE ENCOUNTER
Patient requesting a prescription for her Coumadin 2.5 mg be sent into [facility] in [place]. Thank you.

## 2023-09-30 RX ORDER — WARFARIN SODIUM 1 MG/1
1 TABLET ORAL DAILY
Qty: 30 TABLET | Refills: 3 | Status: SHIPPED | OUTPATIENT
Start: 2023-09-30

## 2023-09-30 RX ORDER — WARFARIN SODIUM 2 MG/1
2 TABLET ORAL DAILY
Qty: 30 TABLET | Refills: 3 | Status: SHIPPED | OUTPATIENT
Start: 2023-09-30

## 2023-10-03 ENCOUNTER — CARE COORDINATION (OUTPATIENT)
Dept: CASE MANAGEMENT | Age: 83
End: 2023-10-03

## 2023-10-05 ENCOUNTER — TELEPHONE (OUTPATIENT)
Dept: CARDIAC CATH/INVASIVE PROCEDURES | Age: 83
End: 2023-10-05

## 2023-10-06 ENCOUNTER — HOSPITAL ENCOUNTER (OUTPATIENT)
Dept: CARDIAC CATH/INVASIVE PROCEDURES | Age: 83
Discharge: HOME | End: 2023-10-06
Payer: MEDICARE

## 2023-10-06 ENCOUNTER — ANESTHESIA EVENT (OUTPATIENT)
Dept: CARDIAC CATH/INVASIVE PROCEDURES | Age: 83
End: 2023-10-06
Payer: MEDICARE

## 2023-10-06 ENCOUNTER — ANESTHESIA (OUTPATIENT)
Dept: CARDIAC CATH/INVASIVE PROCEDURES | Age: 83
End: 2023-10-06
Payer: MEDICARE

## 2023-10-06 VITALS
DIASTOLIC BLOOD PRESSURE: 69 MMHG | OXYGEN SATURATION: 95 % | RESPIRATION RATE: 10 BRPM | SYSTOLIC BLOOD PRESSURE: 111 MMHG | HEART RATE: 68 BPM | TEMPERATURE: 98 F

## 2023-10-06 LAB
ANION GAP SERPL CALCULATED.3IONS-SCNC: 9 MMOL/L (ref 7–16)
BASOPHILS # BLD: 0.01 K/UL (ref 0–0.2)
BASOPHILS NFR BLD: 0 % (ref 0–2)
BUN SERPL-MCNC: 21 MG/DL (ref 6–23)
CALCIUM SERPL-MCNC: 10 MG/DL (ref 8.6–10.2)
CHLORIDE SERPL-SCNC: 103 MMOL/L (ref 98–107)
CO2 SERPL-SCNC: 31 MMOL/L (ref 22–29)
CREAT SERPL-MCNC: 1.4 MG/DL (ref 0.5–1)
DATE LAST DOSE: ABNORMAL
DIGOXIN DOSE TIME: ABNORMAL
DIGOXIN DOSE: ABNORMAL MG
DIGOXIN SERPL-MCNC: 0.6 NG/ML (ref 0.8–2)
EOSINOPHIL # BLD: 0.06 K/UL (ref 0.05–0.5)
EOSINOPHILS RELATIVE PERCENT: 2 % (ref 0–6)
ERYTHROCYTE [DISTWIDTH] IN BLOOD BY AUTOMATED COUNT: 13.3 % (ref 11.5–15)
GFR SERPL CREATININE-BSD FRML MDRD: 39 ML/MIN/1.73M2
GLUCOSE SERPL-MCNC: 103 MG/DL (ref 74–99)
HCT VFR BLD AUTO: 36 % (ref 34–48)
HGB BLD-MCNC: 11.5 G/DL (ref 11.5–15.5)
IMM GRANULOCYTES # BLD AUTO: <0.03 K/UL (ref 0–0.58)
IMM GRANULOCYTES NFR BLD: 0 % (ref 0–5)
INR PPP: 2.1
LYMPHOCYTES NFR BLD: 1.19 K/UL (ref 1.5–4)
LYMPHOCYTES RELATIVE PERCENT: 32 % (ref 20–42)
MCH RBC QN AUTO: 30.2 PG (ref 26–35)
MCHC RBC AUTO-ENTMCNC: 31.9 G/DL (ref 32–34.5)
MCV RBC AUTO: 94.5 FL (ref 80–99.9)
MONOCYTES NFR BLD: 0.32 K/UL (ref 0.1–0.95)
MONOCYTES NFR BLD: 9 % (ref 2–12)
NEUTROPHILS NFR BLD: 58 % (ref 43–80)
NEUTS SEG NFR BLD: 2.16 K/UL (ref 1.8–7.3)
PLATELET # BLD AUTO: 130 K/UL (ref 130–450)
PMV BLD AUTO: 11.6 FL (ref 7–12)
POTASSIUM SERPL-SCNC: 4.7 MMOL/L (ref 3.5–5)
PROTHROMBIN TIME: 23.3 SEC (ref 9.3–12.4)
RBC # BLD AUTO: 3.81 M/UL (ref 3.5–5.5)
SODIUM SERPL-SCNC: 143 MMOL/L (ref 132–146)
WBC OTHER # BLD: 3.7 K/UL (ref 4.5–11.5)

## 2023-10-06 PROCEDURE — 92960 CARDIOVERSION ELECTRIC EXT: CPT

## 2023-10-06 PROCEDURE — 6360000002 HC RX W HCPCS: Performed by: NURSE ANESTHETIST, CERTIFIED REGISTERED

## 2023-10-06 PROCEDURE — 80048 BASIC METABOLIC PNL TOTAL CA: CPT

## 2023-10-06 PROCEDURE — 80162 ASSAY OF DIGOXIN TOTAL: CPT

## 2023-10-06 PROCEDURE — 2580000003 HC RX 258: Performed by: INTERNAL MEDICINE

## 2023-10-06 PROCEDURE — 85025 COMPLETE CBC W/AUTO DIFF WBC: CPT

## 2023-10-06 PROCEDURE — 85610 PROTHROMBIN TIME: CPT

## 2023-10-06 PROCEDURE — 2709999900 HC NON-CHARGEABLE SUPPLY

## 2023-10-06 RX ORDER — SODIUM CHLORIDE 9 MG/ML
INJECTION, SOLUTION INTRAVENOUS ONCE
Status: COMPLETED | OUTPATIENT
Start: 2023-10-06 | End: 2023-10-06

## 2023-10-06 RX ORDER — PROPOFOL 10 MG/ML
INJECTION, EMULSION INTRAVENOUS PRN
Status: DISCONTINUED | OUTPATIENT
Start: 2023-10-06 | End: 2023-10-06 | Stop reason: SDUPTHER

## 2023-10-06 RX ADMIN — SODIUM CHLORIDE: 9 INJECTION, SOLUTION INTRAVENOUS at 13:04

## 2023-10-06 RX ADMIN — PROPOFOL 50 MG: 10 INJECTION, EMULSION INTRAVENOUS at 13:08

## 2023-10-06 NOTE — PROCEDURES
Patient Name: Candice Antunez  Medical Record Number: [id number]  Date: 10/6/2023   Time: 1:04 PM  Room/Bed: Room/bed info not found    Cardioversion Procedure Note    Indication:Persistent {INDICATION:56268}    Consent: Candice Antunez counseled regarding the procedure, its indications, risks, potential complications and alternatives, and any questions were answered. Consent was obtained to proceed. Pre-Medication: {SEDATION YCFY:20143}    Procedure: The patient was placed in the supine position and the chest area was exposed. The cardioversion {PADS/PADDLES:31456} were applied in the standard manner and configuration. Attempt #1: The defibrillator was set on the {DEFIB MODE:02226} mode and charged to {ENERGY LEVEL:14543} joules. A biphasic discharge was then delivered resulting in {POST RHYTHM:97031}. Attempt #2: {RESULTS:83115}    Attempt #3: {RESULTS:29365}    The patient tolerated the procedure {TOLERATED:20124}. Complications: {OONEIXPDPEBM:37332}      Abrma Zhang.  Arthur Alvarado DO FACP [address]    Electronically Signed by: Elbert Grullon DO FACP [address]

## 2023-10-06 NOTE — ANESTHESIA POSTPROCEDURE EVALUATION
Department of Anesthesiology  Postprocedure Note    Patient: Jony Clemens  MRN: [id number]  YOB: 1940  Date of evaluation: 10/6/2023      Procedure Summary     Date: 10/06/23 Room / Location: [facility] CATH LAB    Anesthesia Start: 3643 Anesthesia Stop: 9734    Procedure: CARDIOVERSION WITH ANESTHESIA Diagnosis: Unspecified atrial flutter    Scheduled Providers:  Responsible Provider: Farrah Gipson MD    Anesthesia Type: MAC ASA Status: 4          Anesthesia Type: No value filed.     Diamond Phase I:      Diamond Phase II:        Anesthesia Post Evaluation    Patient location during evaluation: PACU  Patient participation: complete - patient participated  Level of consciousness: awake and alert  Airway patency: patent  Nausea & Vomiting: no nausea and no vomiting  Complications: no  Cardiovascular status: blood pressure returned to baseline and hemodynamically stable  Respiratory status: acceptable and spontaneous ventilation  Hydration status: euvolemic  Multimodal analgesia pain management approach  Pain management: adequate

## 2023-10-11 ENCOUNTER — CARE COORDINATION (OUTPATIENT)
Dept: CASE MANAGEMENT | Age: 83
End: 2023-10-11

## 2023-10-11 NOTE — CARE COORDINATION
Care Transitions Follow Up Call    Patient Current Location: [address] [place] [address] Transition Nurse contacted the patient by telephone to follow up after admission on 23. Verified name and  with patient as identifiers. Patient: Esther Portillo  Patient : 1940   MRN: [id number]  Reason for Admission: Aflutter with RVR  Discharge Date: 23 RARS: Readmission Risk Score: 12.3      Needs to be reviewed by the provider   Additional needs identified to be addressed with provider: No  none             Method of communication with provider: none. Spoke briefly with patient today as she reports she is currently at work. Confirmed with patient she recently underwent a cardioversion on 10/6/23 and scheduled to f/u with Dr. Jacky Velasquez (cardiology) tomorrow. Patient denies any complaints at this time. Confirmed with patient she is scheduled to follow up with Dr. Alicia Maravilla (PCP) on 10/16/23. CTN will continue to follow for Care Transition. Addressed changes since last contact:   S/p cardioversion on 10/6/23 and scheduled for f/u with Dr. Jacky Velasquez (cardiology) on 10/12/23. Discussed follow-up appointments. If no appointment was previously scheduled, appointment scheduling offered: Yes. Is follow up appointment scheduled within 7 days of discharge? Yes. Follow Up  Future Appointments   Date Time Provider [facility] [address]   10/16/2023  9:45 AM NAINA GREY MD [facility]   3/18/2024 10:00 AM [facility]     External follow up appointment(s): Dr. Adelina Aguillon (Cardiology) 23 at 11 am.     Care Transition Nurse reviewed discharge instructions, medical action plan, and red flags with patient and discussed any barriers to care and/or understanding of plan of care after discharge. Discussed appropriate site of care based on symptoms and resources available to patient including: PCP  Specialist  When to call [phone]  Condition related references.  The patient agrees to contact the

## 2023-10-16 ENCOUNTER — OFFICE VISIT (OUTPATIENT)
Dept: PRIMARY CARE CLINIC | Age: 83
End: 2023-10-16

## 2023-10-16 VITALS
OXYGEN SATURATION: 95 % | BODY MASS INDEX: 26.06 KG/M2 | WEIGHT: 166 LBS | TEMPERATURE: 98.7 F | SYSTOLIC BLOOD PRESSURE: 115 MMHG | HEART RATE: 67 BPM | HEIGHT: 67 IN | DIASTOLIC BLOOD PRESSURE: 64 MMHG | RESPIRATION RATE: 16 BRPM

## 2023-10-16 DIAGNOSIS — J96.11 CHRONIC RESPIRATORY FAILURE WITH HYPOXIA (HCC): ICD-10-CM

## 2023-10-16 DIAGNOSIS — E78.2 MIXED HYPERLIPIDEMIA: ICD-10-CM

## 2023-10-16 DIAGNOSIS — C34.91 MALIGNANT NEOPLASM OF RIGHT LUNG, UNSPECIFIED PART OF LUNG (HCC): ICD-10-CM

## 2023-10-16 DIAGNOSIS — I48.0 PAF (PAROXYSMAL ATRIAL FIBRILLATION) (HCC): Primary | ICD-10-CM

## 2023-10-16 LAB — INR BLD: 2.8

## 2023-10-16 RX ORDER — WARFARIN SODIUM 2.5 MG/1
2.5 TABLET ORAL DAILY
Qty: 30 TABLET | Refills: 3 | Status: SHIPPED | OUTPATIENT
Start: 2023-10-16

## 2023-10-16 RX ORDER — WARFARIN SODIUM 2 MG/1
2 TABLET ORAL DAILY
Qty: 30 TABLET | Refills: 3 | Status: CANCELLED | OUTPATIENT
Start: 2023-10-16

## 2023-10-16 ASSESSMENT — ENCOUNTER SYMPTOMS
COUGH: 1
VOMITING: 0
NAUSEA: 0
ABDOMINAL PAIN: 0
DIARRHEA: 0
SHORTNESS OF BREATH: 0

## 2023-10-16 NOTE — PROGRESS NOTES
Trish Salcido is a 78 y.o. female established was seen In the office  for evaluation of PAF, COPD     HPI/Chief C/O:  Chief Complaint   Patient presents with    Coagulation Disorder     Pt is here for a INR check     Had cardioversion last week ,feels fine ,no palpitation or chest pain ,on 2 L NC O2 supplement ,minimal cough   Allergies   Allergen Reactions    Cat Hair Extract Itching     Patient states \"My son put this. I hope I'm not allergic, I have 4 Cats and 2 Birds, maybe to their dust.\"    Eggs Or Egg-Derived Products Nausea Only    Erythromycin Diarrhea, Nausea And Vomiting and Other (See Comments)     Stomach Pains      Pcn [Penicillins] Itching, Rash and Other (See Comments)     Patient states \"Red from Head to Toe, with something crawling under my skin. \"    Seasonal Itching, Swelling and Other (See Comments)     Runny/Itchy Nose, Watery/Itchy Eyes        ROS:  Review of Systems   Respiratory:  Positive for cough. Negative for shortness of breath. Negative for Hemoptysis   Cardiovascular:  Negative for chest pain. Gastrointestinal:  Negative for abdominal pain, diarrhea, nausea and vomiting. Endocrine: Negative for polydipsia, polyphagia and polyuria. Genitourinary:  Negative for dysuria and hematuria. Skin:  Negative for rash. Neurological:  Negative for tremors and seizures.         Past Medical/Surgical Hx;  Reviewed with patient      Diagnosis Date    Atrial fibrillation ([address])     CHF (congestive heart failure) ([address])     Emphysema, unspecified (HCC)     Hyperlipidemia     Hypertension     Lung cancer ([address])     Mitral valve regurgitation     Oxygen dependent     Prolonged emergence from general anesthesia     post general anesthesia    Skin cancer     Thyroid disease      Past Surgical History:   Procedure Laterality Date    BREAST BIOPSY Right     benign    BRONCHOSCOPY N/A 04/20/2021    BRONCHOSCOPY/TRANSBRONCHIAL NEEDLE BIOPSY performed by Alana Mccormick MD at [facility]

## 2023-10-19 ENCOUNTER — CARE COORDINATION (OUTPATIENT)
Dept: CASE MANAGEMENT | Age: 83
End: 2023-10-19

## 2023-10-19 NOTE — CARE COORDINATION
Care Transitions Follow Up Call    Patient Current Location:  Home: [address] [place] [address] [address] [facility] [address] [facility]    Care Transition Nurse contacted the patient by telephone to follow up after admission on 23. Verified name and  with patient as identifiers. Patient: Elian Leon  Patient : 1940   MRN: [id number]  Reason for Admission: Aflutter with RVR  Discharge Date: 23 RARS: Readmission Risk Score: 12.3      Needs to be reviewed by the provider   Additional needs identified to be addressed with provider: No  none             Method of communication with provider: none. Spoke with patient today 10/19/23 for ISABELL/hospital discharge (low risk) final follow up call. Patient states she continues doing well and offers no complaints. Confirmed with patient she had a cardioversion done on 10/6/23 and followed up with Dr. Shailesh Whalen (Cardio) on 10/12/23. States Amiodarone was stopped d/t causing low HR. Denies any shortness of breath, chest pain, chest discomfort or palpitations. Confirmed with patient she completed HFu appt with Dr. Bridget Dey (PCP) earlier this week on 10/16/23. Denies any needs or concerns and in agreement to final call. CTN signing off for Care Transition. Addressed changes since last contact:   Completed HFU appt with Dr. Bridget Dey (PCP) on 10/16/23. Discussed follow-up appointments. If no appointment was previously scheduled, appointment scheduling offered: Yes. Is follow up appointment scheduled within 7 days of discharge? Yes. Follow Up  Future Appointments   Date Time Provider [address]   2023 10:00 AM NAINA GREY  [address]   3/18/2024 10:00 AM NAINA GREY MD [facility] AND WOM[facility]     External follow up appointment(s): Dr. Bisi Cabrera (cardiology) 23 at 11 am per patient.      Care Transition Nurse reviewed discharge instructions, medical action plan, and red flags with patient and discussed any barriers to care and/or

## 2023-11-13 ENCOUNTER — OFFICE VISIT (OUTPATIENT)
Dept: PRIMARY CARE CLINIC | Age: 83
End: 2023-11-13
Payer: MEDICARE

## 2023-11-13 VITALS
RESPIRATION RATE: 16 BRPM | BODY MASS INDEX: 25.9 KG/M2 | SYSTOLIC BLOOD PRESSURE: 120 MMHG | WEIGHT: 165 LBS | HEIGHT: 67 IN | DIASTOLIC BLOOD PRESSURE: 72 MMHG | TEMPERATURE: 98.7 F | OXYGEN SATURATION: 94 % | HEART RATE: 86 BPM

## 2023-11-13 DIAGNOSIS — I48.0 PAF (PAROXYSMAL ATRIAL FIBRILLATION) (HCC): Primary | ICD-10-CM

## 2023-11-13 DIAGNOSIS — I10 ESSENTIAL HYPERTENSION: ICD-10-CM

## 2023-11-13 DIAGNOSIS — E78.2 MIXED HYPERLIPIDEMIA: ICD-10-CM

## 2023-11-13 DIAGNOSIS — J96.11 CHRONIC RESPIRATORY FAILURE WITH HYPOXIA (HCC): ICD-10-CM

## 2023-11-13 DIAGNOSIS — E07.9 THYROID DISEASE: ICD-10-CM

## 2023-11-13 LAB — INR BLD: 2.1

## 2023-11-13 PROCEDURE — 99213 OFFICE O/P EST LOW 20 MIN: CPT | Performed by: INTERNAL MEDICINE

## 2023-11-13 PROCEDURE — 1123F ACP DISCUSS/DSCN MKR DOCD: CPT | Performed by: INTERNAL MEDICINE

## 2023-11-13 PROCEDURE — 3074F SYST BP LT 130 MM HG: CPT | Performed by: INTERNAL MEDICINE

## 2023-11-13 PROCEDURE — 3078F DIAST BP <80 MM HG: CPT | Performed by: INTERNAL MEDICINE

## 2023-11-13 PROCEDURE — 1090F PRES/ABSN URINE INCON ASSESS: CPT | Performed by: INTERNAL MEDICINE

## 2023-11-13 PROCEDURE — G8400 PT W/DXA NO RESULTS DOC: HCPCS | Performed by: INTERNAL MEDICINE

## 2023-11-13 PROCEDURE — G8417 CALC BMI ABV UP PARAM F/U: HCPCS | Performed by: INTERNAL MEDICINE

## 2023-11-13 PROCEDURE — G8427 DOCREV CUR MEDS BY ELIG CLIN: HCPCS | Performed by: INTERNAL MEDICINE

## 2023-11-13 PROCEDURE — 1036F TOBACCO NON-USER: CPT | Performed by: INTERNAL MEDICINE

## 2023-11-13 PROCEDURE — G8484 FLU IMMUNIZE NO ADMIN: HCPCS | Performed by: INTERNAL MEDICINE

## 2023-11-13 RX ORDER — WARFARIN SODIUM 2.5 MG/1
2.5 TABLET ORAL DAILY
Qty: 30 TABLET | Refills: 3 | Status: SHIPPED | OUTPATIENT
Start: 2023-11-13

## 2023-11-13 RX ORDER — PANTOPRAZOLE SODIUM 40 MG/1
40 TABLET, DELAYED RELEASE ORAL EVERY MORNING
Qty: 90 TABLET | Refills: 0 | Status: SHIPPED | OUTPATIENT
Start: 2023-11-13

## 2023-11-13 ASSESSMENT — ENCOUNTER SYMPTOMS
ABDOMINAL PAIN: 0
SHORTNESS OF BREATH: 0
NAUSEA: 0
COUGH: 0
DIARRHEA: 0
VOMITING: 0

## 2023-11-13 NOTE — PROGRESS NOTES
normal.       Extremities: Pedal pulses No Edema     ASSESSMENT/PLAN  Javed Segal was seen today for coagulation disorder. Diagnoses and all orders for this visit:    PAF (paroxysmal atrial fibrillation) (HCC),sinus today ,INR 2.1, continue same dose coumadin     Chronic respiratory failure with hypoxia (HCC),stable ,continue O2 supplement     Thyroid disease,euthyroid,continue Synthroid     Essential hypertension,controlled off medication     Mixed hyperlipidemia,at goal,continue Crestor     Other orders  -     pantoprazole (PROTONIX) 40 MG tablet; Take 1 tablet by mouth every morning  -     warfarin (COUMADIN) 2.5 MG tablet;  Take 1 tablet by mouth daily        Outpatient Encounter Medications as of 11/13/2023   Medication Sig Dispense Refill    pantoprazole (PROTONIX) 40 MG tablet Take 1 tablet by mouth every morning 90 tablet 0    warfarin (COUMADIN) 2.5 MG tablet Take 1 tablet by mouth daily 30 tablet 3    fluticasone-umeclidin-vilant (TRELEGY ELLIPTA) 100-62.5-25 MCG/ACT AEPB inhaler Inhale 1 puff into the lungs daily 1 each 3    rosuvastatin (CRESTOR) 10 MG tablet Take 1 tablet by mouth nightly 30 tablet 2    furosemide (LASIX) 40 MG tablet Take 1 tablet by mouth daily 90 tablet 0    SYNTHROID 100 MCG tablet Take 0.5 tablets by mouth daily 30 tablet 3    spironolactone (ALDACTONE) 25 MG tablet Take 0.5 tablets by mouth daily Indications: Patient  currently takes 1/2 of 25 mg QD      digoxin 62.5 MCG TABS Take 62.5 mcg by mouth three times a week (Patient taking differently: Take 62.5 mcg by mouth three times a week Mon wed fri) 30 tablet 3    Handicap Placard MISC by Does not apply route Patient cannot walk 200 ft without stopping to rest.    Expiration 5 years 1 each 0    albuterol (PROVENTIL) (2.5 MG/3ML) 0.083% nebulizer solution Take 3 mLs by nebulization every 6 hours as needed for Wheezing Please bill under Medicare Part B DX: COPD J44.9 120 each 0    fluticasone-umeclidin-vilant (TRELEGY ELLIPTA)

## 2023-12-11 ENCOUNTER — OFFICE VISIT (OUTPATIENT)
Dept: PRIMARY CARE CLINIC | Age: 83
End: 2023-12-11
Payer: MEDICARE

## 2023-12-11 VITALS
WEIGHT: 164 LBS | BODY MASS INDEX: 25.74 KG/M2 | RESPIRATION RATE: 16 BRPM | OXYGEN SATURATION: 95 % | TEMPERATURE: 98.6 F | HEIGHT: 67 IN | HEART RATE: 101 BPM | SYSTOLIC BLOOD PRESSURE: 118 MMHG | DIASTOLIC BLOOD PRESSURE: 72 MMHG

## 2023-12-11 DIAGNOSIS — I48.0 PAF (PAROXYSMAL ATRIAL FIBRILLATION) (HCC): Primary | ICD-10-CM

## 2023-12-11 DIAGNOSIS — I10 ESSENTIAL HYPERTENSION: ICD-10-CM

## 2023-12-11 DIAGNOSIS — E03.9 ACQUIRED HYPOTHYROIDISM: ICD-10-CM

## 2023-12-11 DIAGNOSIS — J96.11 CHRONIC RESPIRATORY FAILURE WITH HYPOXIA (HCC): ICD-10-CM

## 2023-12-11 LAB — INR BLD: 2.4

## 2023-12-11 PROCEDURE — 1036F TOBACCO NON-USER: CPT | Performed by: INTERNAL MEDICINE

## 2023-12-11 PROCEDURE — G8427 DOCREV CUR MEDS BY ELIG CLIN: HCPCS | Performed by: INTERNAL MEDICINE

## 2023-12-11 PROCEDURE — 1123F ACP DISCUSS/DSCN MKR DOCD: CPT | Performed by: INTERNAL MEDICINE

## 2023-12-11 PROCEDURE — G8484 FLU IMMUNIZE NO ADMIN: HCPCS | Performed by: INTERNAL MEDICINE

## 2023-12-11 PROCEDURE — 3074F SYST BP LT 130 MM HG: CPT | Performed by: INTERNAL MEDICINE

## 2023-12-11 PROCEDURE — 99213 OFFICE O/P EST LOW 20 MIN: CPT | Performed by: INTERNAL MEDICINE

## 2023-12-11 PROCEDURE — 3078F DIAST BP <80 MM HG: CPT | Performed by: INTERNAL MEDICINE

## 2023-12-11 PROCEDURE — G8417 CALC BMI ABV UP PARAM F/U: HCPCS | Performed by: INTERNAL MEDICINE

## 2023-12-11 PROCEDURE — 1090F PRES/ABSN URINE INCON ASSESS: CPT | Performed by: INTERNAL MEDICINE

## 2023-12-11 PROCEDURE — G8400 PT W/DXA NO RESULTS DOC: HCPCS | Performed by: INTERNAL MEDICINE

## 2023-12-11 RX ORDER — WARFARIN SODIUM 2.5 MG/1
2.5 TABLET ORAL DAILY
Qty: 30 TABLET | Refills: 3 | Status: SHIPPED | OUTPATIENT
Start: 2023-12-11

## 2023-12-11 ASSESSMENT — ENCOUNTER SYMPTOMS
SHORTNESS OF BREATH: 1
NAUSEA: 0
VOMITING: 0
ABDOMINAL PAIN: 0
DIARRHEA: 0
COUGH: 0

## 2023-12-11 NOTE — PROGRESS NOTES
Shweta Hook is a 78 y.o. female established was seen In the office  for evaluation. HPI/Chief C/O:  Chief Complaint   Patient presents with    Coagulation Disorder     Patient is here for her INR check, also has been having a hard time breathing      Allergies   Allergen Reactions    Cat Hair Extract Itching     Patient states \"My son put this. I hope I'm not allergic, I have 4 Cats and 2 Birds, maybe to their dust.\"    Eggs Or Egg-Derived Products Nausea Only    Erythromycin Diarrhea, Nausea And Vomiting and Other (See Comments)     Stomach Pains      Pcn [Penicillins] Itching, Rash and Other (See Comments)     Patient states \"Red from Head to Toe, with something crawling under my skin. \"    Seasonal Itching, Swelling and Other (See Comments)     Runny/Itchy Nose, Watery/Itchy Eyes        ROS:  Review of Systems   Respiratory:  Positive for shortness of breath. Negative for cough. Negative for Hemoptysis   Cardiovascular:  Negative for chest pain. Gastrointestinal:  Negative for abdominal pain, diarrhea, nausea and vomiting. Endocrine: Negative for polydipsia, polyphagia and polyuria. Genitourinary:  Negative for dysuria and hematuria. Skin:  Negative for rash. Neurological:  Negative for tremors and seizures.         Past Medical/Surgical Hx;  Reviewed with patient      Diagnosis Date    Atrial fibrillation (HCC)     CHF (congestive heart failure) (HCC)     Emphysema, unspecified (HCC)     Hyperlipidemia     Hypertension     Lung cancer ([address])     Mitral valve regurgitation     Oxygen dependent     Prolonged emergence from general anesthesia     post general anesthesia    Skin cancer     Thyroid disease      Past Surgical History:   Procedure Laterality Date    BREAST BIOPSY Right     benign    BRONCHOSCOPY N/A 04/20/2021    BRONCHOSCOPY/TRANSBRONCHIAL NEEDLE BIOPSY performed by Blas Kirk MD at [address] [place] [address]  10/06/2023    Dr Gurpreet Arellano

## 2023-12-21 ENCOUNTER — HOSPITAL ENCOUNTER (INPATIENT)
Age: 83
LOS: 2 days | Discharge: HOME | DRG: 178 | End: 2023-12-24
Attending: EMERGENCY MEDICINE | Admitting: FAMILY MEDICINE
Payer: MEDICARE

## 2023-12-21 ENCOUNTER — APPOINTMENT (OUTPATIENT)
Dept: GENERAL RADIOLOGY | Age: 83
DRG: 178 | End: 2023-12-21
Payer: MEDICARE

## 2023-12-21 DIAGNOSIS — I48.92 ATRIAL FLUTTER WITH RAPID VENTRICULAR RESPONSE (HCC): ICD-10-CM

## 2023-12-21 DIAGNOSIS — E83.42 HYPOMAGNESEMIA: ICD-10-CM

## 2023-12-21 DIAGNOSIS — U07.1 COVID-19 VIRUS INFECTION: ICD-10-CM

## 2023-12-21 DIAGNOSIS — I48.91 ATRIAL FIBRILLATION WITH RAPID VENTRICULAR RESPONSE (HCC): ICD-10-CM

## 2023-12-21 DIAGNOSIS — N17.9 AKI (ACUTE KIDNEY INJURY) (HCC): ICD-10-CM

## 2023-12-21 DIAGNOSIS — I48.3 TYPICAL ATRIAL FLUTTER (HCC): Primary | ICD-10-CM

## 2023-12-21 LAB
ALBUMIN SERPL-MCNC: 4.4 G/DL (ref 3.5–5.2)
ALP SERPL-CCNC: 96 U/L (ref 35–104)
ALT SERPL-CCNC: 14 U/L (ref 0–32)
ANION GAP SERPL CALCULATED.3IONS-SCNC: 18 MMOL/L (ref 7–16)
AST SERPL-CCNC: 34 U/L (ref 0–31)
BASOPHILS # BLD: 0.03 K/UL (ref 0–0.2)
BASOPHILS NFR BLD: 1 % (ref 0–2)
BILIRUB SERPL-MCNC: 0.7 MG/DL (ref 0–1.2)
BNP SERPL-MCNC: 3252 PG/ML (ref 0–450)
BUN SERPL-MCNC: 40 MG/DL (ref 6–23)
CALCIUM SERPL-MCNC: 9.9 MG/DL (ref 8.6–10.2)
CHLORIDE SERPL-SCNC: 94 MMOL/L (ref 98–107)
CO2 SERPL-SCNC: 24 MMOL/L (ref 22–29)
CREAT SERPL-MCNC: 1.7 MG/DL (ref 0.5–1)
EKG ATRIAL RATE: 254 BPM
EKG P AXIS: -125 DEGREES
EKG Q-T INTERVAL: 318 MS
EKG QRS DURATION: 74 MS
EKG QTC CALCULATION (BAZETT): 462 MS
EKG R AXIS: 49 DEGREES
EKG T AXIS: -84 DEGREES
EKG VENTRICULAR RATE: 127 BPM
EOSINOPHIL # BLD: 0.04 K/UL (ref 0.05–0.5)
EOSINOPHILS RELATIVE PERCENT: 1 % (ref 0–6)
ERYTHROCYTE [DISTWIDTH] IN BLOOD BY AUTOMATED COUNT: 13.3 % (ref 11.5–15)
GFR SERPL CREATININE-BSD FRML MDRD: 28 ML/MIN/1.73M2
GLUCOSE SERPL-MCNC: 118 MG/DL (ref 74–99)
HCT VFR BLD AUTO: 34.8 % (ref 34–48)
HGB BLD-MCNC: 11.6 G/DL (ref 11.5–15.5)
IMM GRANULOCYTES # BLD AUTO: <0.03 K/UL (ref 0–0.58)
IMM GRANULOCYTES NFR BLD: 0 % (ref 0–5)
INFLUENZA A BY PCR: NOT DETECTED
INFLUENZA B BY PCR: NOT DETECTED
INR PPP: 2.3
LYMPHOCYTES NFR BLD: 1.17 K/UL (ref 1.5–4)
LYMPHOCYTES RELATIVE PERCENT: 23 % (ref 20–42)
MAGNESIUM SERPL-MCNC: 1.5 MG/DL (ref 1.6–2.6)
MCH RBC QN AUTO: 30.1 PG (ref 26–35)
MCHC RBC AUTO-ENTMCNC: 33.3 G/DL (ref 32–34.5)
MCV RBC AUTO: 90.4 FL (ref 80–99.9)
MONOCYTES NFR BLD: 0.43 K/UL (ref 0.1–0.95)
MONOCYTES NFR BLD: 8 % (ref 2–12)
NEUTROPHILS NFR BLD: 67 % (ref 43–80)
NEUTS SEG NFR BLD: 3.46 K/UL (ref 1.8–7.3)
PLATELET # BLD AUTO: 143 K/UL (ref 130–450)
PMV BLD AUTO: 10.8 FL (ref 7–12)
POTASSIUM SERPL-SCNC: 4.1 MMOL/L (ref 3.5–5)
PROT SERPL-MCNC: 7.4 G/DL (ref 6.4–8.3)
PROTHROMBIN TIME: 25.4 SEC (ref 9.3–12.4)
RBC # BLD AUTO: 3.85 M/UL (ref 3.5–5.5)
SARS-COV-2 RDRP RESP QL NAA+PROBE: DETECTED
SODIUM SERPL-SCNC: 136 MMOL/L (ref 132–146)
SPECIMEN DESCRIPTION: ABNORMAL
TROPONIN I SERPL HS-MCNC: 26 NG/L (ref 0–9)
TROPONIN I SERPL HS-MCNC: 26 NG/L (ref 0–9)
TSH SERPL DL<=0.05 MIU/L-ACNC: 0.09 UIU/ML (ref 0.27–4.2)
WBC OTHER # BLD: 5.1 K/UL (ref 4.5–11.5)

## 2023-12-21 PROCEDURE — 83880 ASSAY OF NATRIURETIC PEPTIDE: CPT

## 2023-12-21 PROCEDURE — 96375 TX/PRO/DX INJ NEW DRUG ADDON: CPT

## 2023-12-21 PROCEDURE — 85610 PROTHROMBIN TIME: CPT

## 2023-12-21 PROCEDURE — 2580000003 HC RX 258

## 2023-12-21 PROCEDURE — 2500000003 HC RX 250 WO HCPCS: Performed by: EMERGENCY MEDICINE

## 2023-12-21 PROCEDURE — 83735 ASSAY OF MAGNESIUM: CPT

## 2023-12-21 PROCEDURE — G0378 HOSPITAL OBSERVATION PER HR: HCPCS

## 2023-12-21 PROCEDURE — 84443 ASSAY THYROID STIM HORMONE: CPT

## 2023-12-21 PROCEDURE — 80053 COMPREHEN METABOLIC PANEL: CPT

## 2023-12-21 PROCEDURE — 84484 ASSAY OF TROPONIN QUANT: CPT

## 2023-12-21 PROCEDURE — 96366 THER/PROPH/DIAG IV INF ADDON: CPT

## 2023-12-21 PROCEDURE — 6360000002 HC RX W HCPCS

## 2023-12-21 PROCEDURE — 93010 ELECTROCARDIOGRAM REPORT: CPT | Performed by: INTERNAL MEDICINE

## 2023-12-21 PROCEDURE — 2580000003 HC RX 258: Performed by: INTERNAL MEDICINE

## 2023-12-21 PROCEDURE — 6360000002 HC RX W HCPCS: Performed by: EMERGENCY MEDICINE

## 2023-12-21 PROCEDURE — 99285 EMERGENCY DEPT VISIT HI MDM: CPT

## 2023-12-21 PROCEDURE — 6370000000 HC RX 637 (ALT 250 FOR IP)

## 2023-12-21 PROCEDURE — 71045 X-RAY EXAM CHEST 1 VIEW: CPT

## 2023-12-21 PROCEDURE — 6360000002 HC RX W HCPCS: Performed by: INTERNAL MEDICINE

## 2023-12-21 PROCEDURE — 87502 INFLUENZA DNA AMP PROBE: CPT

## 2023-12-21 PROCEDURE — 85025 COMPLETE CBC W/AUTO DIFF WBC: CPT

## 2023-12-21 PROCEDURE — 99222 1ST HOSP IP/OBS MODERATE 55: CPT | Performed by: INTERNAL MEDICINE

## 2023-12-21 PROCEDURE — 87635 SARS-COV-2 COVID-19 AMP PRB: CPT

## 2023-12-21 PROCEDURE — 6370000000 HC RX 637 (ALT 250 FOR IP): Performed by: INTERNAL MEDICINE

## 2023-12-21 PROCEDURE — 96365 THER/PROPH/DIAG IV INF INIT: CPT

## 2023-12-21 PROCEDURE — 93005 ELECTROCARDIOGRAM TRACING: CPT

## 2023-12-21 PROCEDURE — 94640 AIRWAY INHALATION TREATMENT: CPT

## 2023-12-21 RX ORDER — SODIUM CHLORIDE 0.9 % (FLUSH) 0.9 %
5-40 SYRINGE (ML) INJECTION EVERY 12 HOURS SCHEDULED
Status: DISCONTINUED | OUTPATIENT
Start: 2023-12-21 | End: 2023-12-24 | Stop reason: HOSPADM

## 2023-12-21 RX ORDER — ARFORMOTEROL TARTRATE 15 UG/2ML
15 SOLUTION RESPIRATORY (INHALATION)
Status: DISCONTINUED | OUTPATIENT
Start: 2023-12-21 | End: 2023-12-24 | Stop reason: HOSPADM

## 2023-12-21 RX ORDER — ONDANSETRON 2 MG/ML
4 INJECTION INTRAMUSCULAR; INTRAVENOUS EVERY 6 HOURS PRN
Status: DISCONTINUED | OUTPATIENT
Start: 2023-12-21 | End: 2023-12-24 | Stop reason: HOSPADM

## 2023-12-21 RX ORDER — SPIRONOLACTONE 25 MG/1
12.5 TABLET ORAL DAILY
Status: DISCONTINUED | OUTPATIENT
Start: 2023-12-22 | End: 2023-12-24 | Stop reason: HOSPADM

## 2023-12-21 RX ORDER — ALBUTEROL SULFATE 2.5 MG/3ML
2.5 SOLUTION RESPIRATORY (INHALATION) EVERY 4 HOURS PRN
Status: DISCONTINUED | OUTPATIENT
Start: 2023-12-21 | End: 2023-12-21 | Stop reason: SDUPTHER

## 2023-12-21 RX ORDER — SODIUM CHLORIDE 0.9 % (FLUSH) 0.9 %
5-40 SYRINGE (ML) INJECTION PRN
Status: DISCONTINUED | OUTPATIENT
Start: 2023-12-21 | End: 2023-12-24 | Stop reason: HOSPADM

## 2023-12-21 RX ORDER — PANTOPRAZOLE SODIUM 40 MG/1
40 TABLET, DELAYED RELEASE ORAL EVERY MORNING
Status: DISCONTINUED | OUTPATIENT
Start: 2023-12-22 | End: 2023-12-24 | Stop reason: HOSPADM

## 2023-12-21 RX ORDER — DIGOXIN 125 MCG
62.5 TABLET ORAL
Status: DISCONTINUED | OUTPATIENT
Start: 2023-12-22 | End: 2023-12-22

## 2023-12-21 RX ORDER — ALBUTEROL SULFATE 2.5 MG/3ML
2.5 SOLUTION RESPIRATORY (INHALATION) EVERY 6 HOURS PRN
Status: DISCONTINUED | OUTPATIENT
Start: 2023-12-21 | End: 2023-12-24 | Stop reason: HOSPADM

## 2023-12-21 RX ORDER — LEVOTHYROXINE SODIUM 0.05 MG/1
50 TABLET ORAL DAILY
Status: DISCONTINUED | OUTPATIENT
Start: 2023-12-22 | End: 2023-12-22

## 2023-12-21 RX ORDER — DIGOXIN 0.25 MG/ML
125 INJECTION INTRAMUSCULAR; INTRAVENOUS ONCE
Status: COMPLETED | OUTPATIENT
Start: 2023-12-21 | End: 2023-12-21

## 2023-12-21 RX ORDER — MAGNESIUM SULFATE IN WATER 40 MG/ML
2000 INJECTION, SOLUTION INTRAVENOUS ONCE
Status: COMPLETED | OUTPATIENT
Start: 2023-12-21 | End: 2023-12-21

## 2023-12-21 RX ORDER — MAGNESIUM SULFATE IN WATER 40 MG/ML
2000 INJECTION, SOLUTION INTRAVENOUS PRN
Status: DISCONTINUED | OUTPATIENT
Start: 2023-12-21 | End: 2023-12-24 | Stop reason: HOSPADM

## 2023-12-21 RX ORDER — METHYLPREDNISOLONE SODIUM SUCCINATE 125 MG/2ML
125 INJECTION, POWDER, LYOPHILIZED, FOR SOLUTION INTRAMUSCULAR; INTRAVENOUS ONCE
Status: COMPLETED | OUTPATIENT
Start: 2023-12-21 | End: 2023-12-21

## 2023-12-21 RX ORDER — AMIODARONE HYDROCHLORIDE 50 MG/ML
150 INJECTION, SOLUTION INTRAVENOUS ONCE
Status: DISCONTINUED | OUTPATIENT
Start: 2023-12-21 | End: 2023-12-21

## 2023-12-21 RX ORDER — POTASSIUM CHLORIDE 20 MEQ/1
40 TABLET, EXTENDED RELEASE ORAL PRN
Status: DISCONTINUED | OUTPATIENT
Start: 2023-12-21 | End: 2023-12-24 | Stop reason: HOSPADM

## 2023-12-21 RX ORDER — IPRATROPIUM BROMIDE AND ALBUTEROL SULFATE 2.5; .5 MG/3ML; MG/3ML
1 SOLUTION RESPIRATORY (INHALATION)
Status: ACTIVE | OUTPATIENT
Start: 2023-12-21 | End: 2023-12-21

## 2023-12-21 RX ORDER — POTASSIUM CHLORIDE 7.45 MG/ML
10 INJECTION INTRAVENOUS PRN
Status: DISCONTINUED | OUTPATIENT
Start: 2023-12-21 | End: 2023-12-24 | Stop reason: HOSPADM

## 2023-12-21 RX ORDER — SODIUM CHLORIDE 9 MG/ML
INJECTION, SOLUTION INTRAVENOUS PRN
Status: DISCONTINUED | OUTPATIENT
Start: 2023-12-21 | End: 2023-12-24 | Stop reason: HOSPADM

## 2023-12-21 RX ORDER — SPIRONOLACTONE AND HYDROCHLOROTHIAZIDE 25; 25 MG/1; MG/1
1 TABLET ORAL EVERY MORNING
Status: ON HOLD | COMMUNITY

## 2023-12-21 RX ORDER — AMIODARONE HYDROCHLORIDE 200 MG/1
200 TABLET ORAL DAILY
Status: DISCONTINUED | OUTPATIENT
Start: 2023-12-21 | End: 2023-12-21

## 2023-12-21 RX ORDER — METOPROLOL TARTRATE 1 MG/ML
10 INJECTION, SOLUTION INTRAVENOUS ONCE
Status: COMPLETED | OUTPATIENT
Start: 2023-12-21 | End: 2023-12-21

## 2023-12-21 RX ORDER — WARFARIN SODIUM 2 MG/1
2 TABLET ORAL DAILY
Status: DISCONTINUED | OUTPATIENT
Start: 2023-12-21 | End: 2023-12-24 | Stop reason: HOSPADM

## 2023-12-21 RX ORDER — ONDANSETRON 4 MG/1
4 TABLET, ORALLY DISINTEGRATING ORAL EVERY 8 HOURS PRN
Status: DISCONTINUED | OUTPATIENT
Start: 2023-12-21 | End: 2023-12-24 | Stop reason: HOSPADM

## 2023-12-21 RX ORDER — ACETAMINOPHEN 325 MG/1
650 TABLET ORAL EVERY 6 HOURS PRN
Status: DISCONTINUED | OUTPATIENT
Start: 2023-12-21 | End: 2023-12-24 | Stop reason: HOSPADM

## 2023-12-21 RX ORDER — ACETAMINOPHEN 650 MG/1
650 SUPPOSITORY RECTAL EVERY 6 HOURS PRN
Status: DISCONTINUED | OUTPATIENT
Start: 2023-12-21 | End: 2023-12-24 | Stop reason: HOSPADM

## 2023-12-21 RX ORDER — BUDESONIDE 0.25 MG/2ML
0.25 INHALANT ORAL
Status: DISCONTINUED | OUTPATIENT
Start: 2023-12-21 | End: 2023-12-24 | Stop reason: HOSPADM

## 2023-12-21 RX ORDER — POLYETHYLENE GLYCOL 3350 17 G/17G
17 POWDER, FOR SOLUTION ORAL DAILY PRN
Status: DISCONTINUED | OUTPATIENT
Start: 2023-12-21 | End: 2023-12-24 | Stop reason: HOSPADM

## 2023-12-21 RX ORDER — ROSUVASTATIN CALCIUM 10 MG/1
10 TABLET, COATED ORAL NIGHTLY
Status: DISCONTINUED | OUTPATIENT
Start: 2023-12-21 | End: 2023-12-24 | Stop reason: HOSPADM

## 2023-12-21 RX ORDER — FUROSEMIDE 40 MG/1
40 TABLET ORAL DAILY
Status: DISCONTINUED | OUTPATIENT
Start: 2023-12-21 | End: 2023-12-24 | Stop reason: HOSPADM

## 2023-12-21 RX ADMIN — METHYLPREDNISOLONE SODIUM SUCCINATE 125 MG: 125 INJECTION INTRAMUSCULAR; INTRAVENOUS at 12:32

## 2023-12-21 RX ADMIN — FUROSEMIDE 40 MG: 40 TABLET ORAL at 21:40

## 2023-12-21 RX ADMIN — METOPROLOL TARTRATE 10 MG: 5 INJECTION INTRAVENOUS at 13:15

## 2023-12-21 RX ADMIN — WARFARIN SODIUM 2 MG: 2 TABLET ORAL at 21:40

## 2023-12-21 RX ADMIN — IPRATROPIUM BROMIDE AND ALBUTEROL SULFATE 1 DOSE: .5; 3 SOLUTION RESPIRATORY (INHALATION) at 12:47

## 2023-12-21 RX ADMIN — ROSUVASTATIN CALCIUM 10 MG: 10 TABLET, FILM COATED ORAL at 21:40

## 2023-12-21 RX ADMIN — Medication 10 ML: at 22:37

## 2023-12-21 RX ADMIN — DIGOXIN 125 MCG: 0.25 INJECTION INTRAMUSCULAR; INTRAVENOUS at 23:05

## 2023-12-21 RX ADMIN — MAGNESIUM SULFATE HEPTAHYDRATE 2000 MG: 40 INJECTION, SOLUTION INTRAVENOUS at 16:20

## 2023-12-21 NOTE — ED TRIAGE NOTES
FIRST PROVIDER CONTACT ASSESSMENT NOTE       Department of Emergency Medicine                 First Provider Note            23  11:39 AM EST    Date of Encounter: No admission date for patient encounter. Patient Name: Danelle Mueller  : 1940  MRN: [id number]    Chief Complaint: Shortness of Breath (Pt O2 dependent at home with COPD feeling more short of breath)      History of Present Illness: Danelle Mueller is a 78 y.o. female who presents to the ED for SOB. Hx COPD. Typically on 2 L of oxygen. States she had to raise the oxygen to 4 L. Intermittent CP, yesterday. Exposed to URI     Focused Physical Exam:  VS:    ED Triage Vitals   BP Temp Temp src Pulse Resp SpO2 Height Weight   -- -- -- -- -- -- -- --        Physical Ex: Constitutional: Alert and non-toxic. Medical History:  has a past medical history of Atrial fibrillation ([address]), CHF (congestive heart failure) ([address]), Emphysema, unspecified ([address]), Hyperlipidemia, Hypertension, Lung cancer ([address]), Mitral valve regurgitation, Oxygen dependent, Prolonged emergence from general anesthesia, Skin cancer, and Thyroid disease. Surgical History:  has a past surgical history that includes Breast biopsy (Right); Tubal ligation; bronchoscopy (N/A, 2021); Intracapsular cataract extraction (Right, 2021); Intracapsular cataract extraction (Left, 2022); and Cardioversion (10/06/2023). Social History:  reports that she quit smoking about 25 years ago. Her smoking use included cigarettes. She started smoking about 71 years ago. She has a 92.0 pack-year smoking history. She has been exposed to tobacco smoke. She has never used smokeless tobacco. She reports current alcohol use of about 1.0 standard drink of alcohol per week. She reports that she does not use drugs. Family History: family history includes Cancer in her mother; Emphysema in her father; Lung Disease in her father;  Other in her mother; Peripheral Vascular Disease

## 2023-12-22 LAB
ALBUMIN SERPL-MCNC: 4.1 G/DL (ref 3.5–5.2)
ALP SERPL-CCNC: 85 U/L (ref 35–104)
ALT SERPL-CCNC: 12 U/L (ref 0–32)
ANION GAP SERPL CALCULATED.3IONS-SCNC: 14 MMOL/L (ref 7–16)
AST SERPL-CCNC: 23 U/L (ref 0–31)
BASOPHILS # BLD: 0 K/UL (ref 0–0.2)
BASOPHILS NFR BLD: 0 % (ref 0–2)
BILIRUB SERPL-MCNC: 0.5 MG/DL (ref 0–1.2)
BUN SERPL-MCNC: 48 MG/DL (ref 6–23)
CALCIUM SERPL-MCNC: 9.7 MG/DL (ref 8.6–10.2)
CHLORIDE SERPL-SCNC: 98 MMOL/L (ref 98–107)
CO2 SERPL-SCNC: 25 MMOL/L (ref 22–29)
CREAT SERPL-MCNC: 1.7 MG/DL (ref 0.5–1)
CRP SERPL HS-MCNC: 6 MG/L (ref 0–5)
EOSINOPHIL # BLD: 0 K/UL (ref 0.05–0.5)
EOSINOPHILS RELATIVE PERCENT: 0 % (ref 0–6)
ERYTHROCYTE [DISTWIDTH] IN BLOOD BY AUTOMATED COUNT: 13.2 % (ref 11.5–15)
GFR SERPL CREATININE-BSD FRML MDRD: 30 ML/MIN/1.73M2
GLUCOSE BLD-MCNC: 152 MG/DL (ref 74–99)
GLUCOSE BLD-MCNC: 177 MG/DL (ref 74–99)
GLUCOSE BLD-MCNC: 242 MG/DL (ref 74–99)
GLUCOSE SERPL-MCNC: 201 MG/DL (ref 74–99)
HBA1C MFR BLD: 6 % (ref 4–5.6)
HCT VFR BLD AUTO: 34.4 % (ref 34–48)
HGB BLD-MCNC: 11.4 G/DL (ref 11.5–15.5)
IMM GRANULOCYTES # BLD AUTO: <0.03 K/UL (ref 0–0.58)
IMM GRANULOCYTES NFR BLD: 0 % (ref 0–5)
INR PPP: 2.3
LYMPHOCYTES NFR BLD: 0.56 K/UL (ref 1.5–4)
LYMPHOCYTES RELATIVE PERCENT: 14 % (ref 20–42)
MAGNESIUM SERPL-MCNC: 2.1 MG/DL (ref 1.6–2.6)
MCH RBC QN AUTO: 29.6 PG (ref 26–35)
MCHC RBC AUTO-ENTMCNC: 33.1 G/DL (ref 32–34.5)
MCV RBC AUTO: 89.4 FL (ref 80–99.9)
MONOCYTES NFR BLD: 0.09 K/UL (ref 0.1–0.95)
MONOCYTES NFR BLD: 2 % (ref 2–12)
NEUTROPHILS NFR BLD: 84 % (ref 43–80)
NEUTS SEG NFR BLD: 3.48 K/UL (ref 1.8–7.3)
PLATELET # BLD AUTO: 149 K/UL (ref 130–450)
PMV BLD AUTO: 11.4 FL (ref 7–12)
POTASSIUM SERPL-SCNC: 3.5 MMOL/L (ref 3.5–5)
PROT SERPL-MCNC: 6.7 G/DL (ref 6.4–8.3)
PROTHROMBIN TIME: 25.9 SEC (ref 9.3–12.4)
RBC # BLD AUTO: 3.85 M/UL (ref 3.5–5.5)
RBC # BLD: ABNORMAL 10*6/UL
SODIUM SERPL-SCNC: 137 MMOL/L (ref 132–146)
WBC OTHER # BLD: 4.1 K/UL (ref 4.5–11.5)

## 2023-12-22 PROCEDURE — 1200000000 HC SEMI PRIVATE

## 2023-12-22 PROCEDURE — 6370000000 HC RX 637 (ALT 250 FOR IP): Performed by: INTERNAL MEDICINE

## 2023-12-22 PROCEDURE — 2580000003 HC RX 258: Performed by: FAMILY MEDICINE

## 2023-12-22 PROCEDURE — 6360000002 HC RX W HCPCS: Performed by: INTERNAL MEDICINE

## 2023-12-22 PROCEDURE — 86140 C-REACTIVE PROTEIN: CPT

## 2023-12-22 PROCEDURE — G0378 HOSPITAL OBSERVATION PER HR: HCPCS

## 2023-12-22 PROCEDURE — 85610 PROTHROMBIN TIME: CPT

## 2023-12-22 PROCEDURE — 94640 AIRWAY INHALATION TREATMENT: CPT

## 2023-12-22 PROCEDURE — 80053 COMPREHEN METABOLIC PANEL: CPT

## 2023-12-22 PROCEDURE — 96376 TX/PRO/DX INJ SAME DRUG ADON: CPT

## 2023-12-22 PROCEDURE — 6370000000 HC RX 637 (ALT 250 FOR IP): Performed by: FAMILY MEDICINE

## 2023-12-22 PROCEDURE — 96375 TX/PRO/DX INJ NEW DRUG ADDON: CPT

## 2023-12-22 PROCEDURE — 99232 SBSQ HOSP IP/OBS MODERATE 35: CPT | Performed by: FAMILY MEDICINE

## 2023-12-22 PROCEDURE — 83735 ASSAY OF MAGNESIUM: CPT

## 2023-12-22 PROCEDURE — 82962 GLUCOSE BLOOD TEST: CPT

## 2023-12-22 PROCEDURE — 85025 COMPLETE CBC W/AUTO DIFF WBC: CPT

## 2023-12-22 PROCEDURE — 83036 HEMOGLOBIN GLYCOSYLATED A1C: CPT

## 2023-12-22 PROCEDURE — 2580000003 HC RX 258: Performed by: INTERNAL MEDICINE

## 2023-12-22 PROCEDURE — 2700000000 HC OXYGEN THERAPY PER DAY

## 2023-12-22 RX ORDER — DEXTROSE MONOHYDRATE 100 MG/ML
INJECTION, SOLUTION INTRAVENOUS CONTINUOUS PRN
Status: DISCONTINUED | OUTPATIENT
Start: 2023-12-22 | End: 2023-12-24 | Stop reason: HOSPADM

## 2023-12-22 RX ORDER — DIGOXIN 125 MCG
62.5 TABLET ORAL DAILY
Status: DISCONTINUED | OUTPATIENT
Start: 2023-12-22 | End: 2023-12-24

## 2023-12-22 RX ORDER — INSULIN LISPRO 100 [IU]/ML
0-4 INJECTION, SOLUTION INTRAVENOUS; SUBCUTANEOUS NIGHTLY
Status: DISCONTINUED | OUTPATIENT
Start: 2023-12-22 | End: 2023-12-24 | Stop reason: HOSPADM

## 2023-12-22 RX ORDER — LEVOTHYROXINE SODIUM 0.03 MG/1
25 TABLET ORAL DAILY
Status: DISCONTINUED | OUTPATIENT
Start: 2023-12-23 | End: 2023-12-24 | Stop reason: HOSPADM

## 2023-12-22 RX ORDER — 0.9 % SODIUM CHLORIDE 0.9 %
500 INTRAVENOUS SOLUTION INTRAVENOUS ONCE
Status: COMPLETED | OUTPATIENT
Start: 2023-12-22 | End: 2023-12-22

## 2023-12-22 RX ORDER — DEXAMETHASONE SODIUM PHOSPHATE 4 MG/ML
4 INJECTION, SOLUTION INTRA-ARTICULAR; INTRALESIONAL; INTRAMUSCULAR; INTRAVENOUS; SOFT TISSUE EVERY 8 HOURS
Status: DISCONTINUED | OUTPATIENT
Start: 2023-12-22 | End: 2023-12-24 | Stop reason: HOSPADM

## 2023-12-22 RX ORDER — INSULIN LISPRO 100 [IU]/ML
0-4 INJECTION, SOLUTION INTRAVENOUS; SUBCUTANEOUS
Status: DISCONTINUED | OUTPATIENT
Start: 2023-12-22 | End: 2023-12-24 | Stop reason: HOSPADM

## 2023-12-22 RX ORDER — METOPROLOL SUCCINATE 25 MG/1
12.5 TABLET, EXTENDED RELEASE ORAL DAILY
Status: DISCONTINUED | OUTPATIENT
Start: 2023-12-22 | End: 2023-12-24

## 2023-12-22 RX ADMIN — SODIUM CHLORIDE, PRESERVATIVE FREE 10 ML: 5 INJECTION INTRAVENOUS at 14:54

## 2023-12-22 RX ADMIN — ROSUVASTATIN CALCIUM 10 MG: 10 TABLET, FILM COATED ORAL at 20:55

## 2023-12-22 RX ADMIN — FUROSEMIDE 40 MG: 40 TABLET ORAL at 08:06

## 2023-12-22 RX ADMIN — BUDESONIDE 250 MCG: 0.25 SUSPENSION RESPIRATORY (INHALATION) at 21:17

## 2023-12-22 RX ADMIN — ARFORMOTEROL TARTRATE 15 MCG: 15 SOLUTION RESPIRATORY (INHALATION) at 21:17

## 2023-12-22 RX ADMIN — SODIUM CHLORIDE, PRESERVATIVE FREE 10 ML: 5 INJECTION INTRAVENOUS at 08:13

## 2023-12-22 RX ADMIN — SPIRONOLACTONE 12.5 MG: 25 TABLET ORAL at 08:06

## 2023-12-22 RX ADMIN — METOPROLOL SUCCINATE 12.5 MG: 25 TABLET, EXTENDED RELEASE ORAL at 14:54

## 2023-12-22 RX ADMIN — DEXAMETHASONE SODIUM PHOSPHATE 4 MG: 4 INJECTION, SOLUTION INTRAMUSCULAR; INTRAVENOUS at 14:54

## 2023-12-22 RX ADMIN — ARFORMOTEROL TARTRATE 15 MCG: 15 SOLUTION RESPIRATORY (INHALATION) at 09:20

## 2023-12-22 RX ADMIN — SODIUM CHLORIDE 500 ML: 9 INJECTION, SOLUTION INTRAVENOUS at 12:23

## 2023-12-22 RX ADMIN — WARFARIN SODIUM 2 MG: 2 TABLET ORAL at 17:54

## 2023-12-22 RX ADMIN — PANTOPRAZOLE SODIUM 40 MG: 40 TABLET, DELAYED RELEASE ORAL at 08:06

## 2023-12-22 RX ADMIN — LEVOTHYROXINE SODIUM 50 MCG: 0.05 TABLET ORAL at 08:06

## 2023-12-22 RX ADMIN — Medication 10 ML: at 20:56

## 2023-12-22 RX ADMIN — DEXAMETHASONE SODIUM PHOSPHATE 4 MG: 4 INJECTION, SOLUTION INTRAMUSCULAR; INTRAVENOUS at 08:13

## 2023-12-22 RX ADMIN — IPRATROPIUM BROMIDE 0.5 MG: 0.5 SOLUTION RESPIRATORY (INHALATION) at 13:07

## 2023-12-22 RX ADMIN — BUDESONIDE 250 MCG: 0.25 SUSPENSION RESPIRATORY (INHALATION) at 09:20

## 2023-12-22 RX ADMIN — IPRATROPIUM BROMIDE 0.5 MG: 0.5 SOLUTION RESPIRATORY (INHALATION) at 09:20

## 2023-12-22 RX ADMIN — DIGOXIN 62.5 MCG: 0.12 TABLET ORAL at 14:54

## 2023-12-22 RX ADMIN — Medication 10 ML: at 08:05

## 2023-12-22 RX ADMIN — IPRATROPIUM BROMIDE 0.5 MG: 0.5 SOLUTION RESPIRATORY (INHALATION) at 21:17

## 2023-12-22 RX ADMIN — IPRATROPIUM BROMIDE 0.5 MG: 0.5 SOLUTION RESPIRATORY (INHALATION) at 16:47

## 2023-12-22 NOTE — ACP (ADVANCE CARE PLANNING)
Advance Care Planning   Healthcare Decision Maker:    Primary Decision Maker: Demetrius Holcomb - Child - [phone]    Secondary Decision Maker: Sharyn Rodriguez - Child - [phone]      Today we documented Decision Maker(s) consistent with Legal Next of Kin hierarchy.

## 2023-12-22 NOTE — CARE COORDINATION
Pt admitted w/SOB, hx of COPD on IV decadron. SW consult noted, CM called and spoke w/pt d/t Covid isolation. Pt states he resides alone and is independent w/o the use of any DME, but has a walker, WC and rollator if needed. Home O2 via [employer] 2-4l baseline w/neb, no cpap. No hx of HHC or SNF stay. Pt states she still works 2/per wk and denies any needs for CM. Pt has a PCP and no issues obtaining medications. Will follow.   Eri Cheng, HILLN, RN  [facility] Case Management  [phone]

## 2023-12-22 NOTE — CONSULTS
CARDIOLOGY CONSULTATION    Patient Name:  Nuno Root    :  1940    Reason for Consultation:   Recurrent symptomatic atrial fibrillation    History of Present Illness: Nuno Root returns to [address] [facility], following a recent history of recurrent palpitations and shortness of breath and fatigue following the recurrent onset of atrial fibrillation with a rapid ventricular response. She does have a longstanding history of recurrent persistent atrial fibrillation in addition to having significant underlying valvular heart disease. This in addition to history of carcinoma of the lung and status post history of radiation therapy and underlying chronic obstructive pulmonary disease. From a cardiac standpoint she has fared well over the past 5 years but recently has noted some deterioration in her respiratory and cardiac status. She now returns for adjustment of her medical regimen with the hope of her maintaining sinus rhythm. .    Past Medical History:   has a past medical history of Atrial fibrillation ([address]), CHF (congestive heart failure) ([address]), Emphysema, unspecified ([address]), Hyperlipidemia, Hypertension, Lung cancer ([address]), Mitral valve regurgitation, Oxygen dependent, Prolonged emergence from general anesthesia, Skin cancer, and Thyroid disease. Surgical History:   has a past surgical history that includes Breast biopsy (Right); Tubal ligation; bronchoscopy (N/A, 2021); Intracapsular cataract extraction (Right, 2021); Intracapsular cataract extraction (Left, 2022); and Cardioversion (10/06/2023). Social History:   reports that she quit smoking about 25 years ago. Her smoking use included cigarettes. She started smoking about 71 years ago. She has a 92.0 pack-year smoking history. She has been exposed to tobacco smoke. She has never used smokeless tobacco. She reports current alcohol use of about 1.0 standard drink of alcohol per week.  She reports that she

## 2023-12-22 NOTE — H&P
[facility] History and Physical        Chief Complaint:  sob  History of Present Illness   The patient is a 78 y.o. female   80 pack year tobacco  Known copd on chronic  2-4LNC o2 and lung resection for lung CA  Noted baseline sob, but worsening STEPHENSON and sob and new productive cough over past 4 days, she denies fever/chills, sore throat, GI distress  But in ER noted to have covid+ test  She came in bc she feels like she is ?back in afib, this has recurred several times and she has been compliant with her meds- noted that spironlactone/aldactone- combo with hctz if new  She has bee taking her blood thinner  She doesn't like to take amiodarone bc of intolerance (vague about what this is?) and claims she hasn't taken it and wont  She sees dr Howie Joseph and she knows he will be upset about this -- last cardioversion by him on 10./6./23    In ER afib w - no cardizem drip, noted IV amiodarone had been ordered by dr Rikki Fuller ago    - hx taken from the   REVIEW OF SYSTEMS:  no fevers, chills, cp, sob    Past Medical History:      Diagnosis Date    Atrial fibrillation ([address])     CHF (congestive heart failure) ([address])     Emphysema, unspecified ([address])     Hyperlipidemia     Hypertension     Lung cancer ([address])     Mitral valve regurgitation     Oxygen dependent     Prolonged emergence from general anesthesia     post general anesthesia    Skin cancer     Thyroid disease        Past Surgical History:        Procedure Laterality Date    BREAST BIOPSY Right     benign    BRONCHOSCOPY N/A 04/20/2021    BRONCHOSCOPY/TRANSBRONCHIAL NEEDLE BIOPSY performed by Evelyn Randhawa MD at [address] [place] [address]  10/06/2023    Dr Elena Guerrero Right 12/16/2021    RIGHT EYE CATARACT EXTRACTION IOL IMPLANT performed by Jaki Zhu MD at [address] [place] [address] Drive EXTRACTION Left 02/24/2022    LEFT EYE CATARACT EXTRACTION IOL IMPLANT performed by Gerri Gonzalez

## 2023-12-22 NOTE — PLAN OF CARE
Problem: ABCDS Injury Assessment  Goal: Absence of physical injury  12/22/2023 1115 by My Bustamante RN  Outcome: Progressing  12/21/2023 2349 by Darshan Burciaga RN  Outcome: Progressing     Problem: Discharge Planning  Goal: Discharge to home or other facility with appropriate resources  12/22/2023 1115 by My Bustamante RN  Outcome: Progressing  12/21/2023 2349 by Darshan Burciaga RN  Outcome: Progressing     Problem: Chronic Conditions and Co-morbidities  Goal: Patient's chronic conditions and co-morbidity symptoms are monitored and maintained or improved  Outcome: Progressing

## 2023-12-23 LAB
ANION GAP SERPL CALCULATED.3IONS-SCNC: 16 MMOL/L (ref 7–16)
BASOPHILS # BLD: 0 K/UL (ref 0–0.2)
BASOPHILS NFR BLD: 0 % (ref 0–2)
BUN SERPL-MCNC: 49 MG/DL (ref 6–23)
CALCIUM SERPL-MCNC: 9.8 MG/DL (ref 8.6–10.2)
CHLORIDE SERPL-SCNC: 97 MMOL/L (ref 98–107)
CO2 SERPL-SCNC: 27 MMOL/L (ref 22–29)
CREAT SERPL-MCNC: 1.5 MG/DL (ref 0.5–1)
EOSINOPHIL # BLD: 0 K/UL (ref 0.05–0.5)
EOSINOPHILS RELATIVE PERCENT: 0 % (ref 0–6)
ERYTHROCYTE [DISTWIDTH] IN BLOOD BY AUTOMATED COUNT: 13.3 % (ref 11.5–15)
GFR SERPL CREATININE-BSD FRML MDRD: 35 ML/MIN/1.73M2
GLUCOSE BLD-MCNC: 137 MG/DL (ref 74–99)
GLUCOSE BLD-MCNC: 147 MG/DL (ref 74–99)
GLUCOSE BLD-MCNC: 158 MG/DL (ref 74–99)
GLUCOSE BLD-MCNC: 174 MG/DL (ref 74–99)
GLUCOSE SERPL-MCNC: 142 MG/DL (ref 74–99)
HCT VFR BLD AUTO: 36.3 % (ref 34–48)
HGB BLD-MCNC: 11.9 G/DL (ref 11.5–15.5)
IMM GRANULOCYTES # BLD AUTO: <0.03 K/UL (ref 0–0.58)
IMM GRANULOCYTES NFR BLD: 0 % (ref 0–5)
INR PPP: 2.5
LYMPHOCYTES NFR BLD: 0.5 K/UL (ref 1.5–4)
LYMPHOCYTES RELATIVE PERCENT: 6 % (ref 20–42)
MCH RBC QN AUTO: 30.1 PG (ref 26–35)
MCHC RBC AUTO-ENTMCNC: 32.8 G/DL (ref 32–34.5)
MCV RBC AUTO: 91.7 FL (ref 80–99.9)
MONOCYTES NFR BLD: 0.31 K/UL (ref 0.1–0.95)
MONOCYTES NFR BLD: 4 % (ref 2–12)
NEUTROPHILS NFR BLD: 90 % (ref 43–80)
NEUTS SEG NFR BLD: 7.26 K/UL (ref 1.8–7.3)
PLATELET # BLD AUTO: 177 K/UL (ref 130–450)
PMV BLD AUTO: 11.3 FL (ref 7–12)
POTASSIUM SERPL-SCNC: 4.6 MMOL/L (ref 3.5–5)
PROTHROMBIN TIME: 28 SEC (ref 9.3–12.4)
RBC # BLD AUTO: 3.96 M/UL (ref 3.5–5.5)
RBC # BLD: ABNORMAL 10*6/UL
RBC # BLD: ABNORMAL 10*6/UL
SODIUM SERPL-SCNC: 140 MMOL/L (ref 132–146)
T3FREE SERPL-MCNC: 1.65 PG/ML (ref 2–4.4)
WBC OTHER # BLD: 8.1 K/UL (ref 4.5–11.5)

## 2023-12-23 PROCEDURE — 94640 AIRWAY INHALATION TREATMENT: CPT

## 2023-12-23 PROCEDURE — 99232 SBSQ HOSP IP/OBS MODERATE 35: CPT | Performed by: FAMILY MEDICINE

## 2023-12-23 PROCEDURE — 1200000000 HC SEMI PRIVATE

## 2023-12-23 PROCEDURE — 6370000000 HC RX 637 (ALT 250 FOR IP): Performed by: INTERNAL MEDICINE

## 2023-12-23 PROCEDURE — 6360000002 HC RX W HCPCS: Performed by: INTERNAL MEDICINE

## 2023-12-23 PROCEDURE — 80048 BASIC METABOLIC PNL TOTAL CA: CPT

## 2023-12-23 PROCEDURE — 82962 GLUCOSE BLOOD TEST: CPT

## 2023-12-23 PROCEDURE — 84481 FREE ASSAY (FT-3): CPT

## 2023-12-23 PROCEDURE — 85025 COMPLETE CBC W/AUTO DIFF WBC: CPT

## 2023-12-23 PROCEDURE — 85610 PROTHROMBIN TIME: CPT

## 2023-12-23 PROCEDURE — 2580000003 HC RX 258: Performed by: INTERNAL MEDICINE

## 2023-12-23 PROCEDURE — 36415 COLL VENOUS BLD VENIPUNCTURE: CPT

## 2023-12-23 PROCEDURE — 2700000000 HC OXYGEN THERAPY PER DAY

## 2023-12-23 RX ORDER — NADOLOL 20 MG/1
10 TABLET ORAL ONCE
Status: COMPLETED | OUTPATIENT
Start: 2023-12-23 | End: 2023-12-23

## 2023-12-23 RX ADMIN — LEVOTHYROXINE SODIUM 25 MCG: 25 TABLET ORAL at 05:39

## 2023-12-23 RX ADMIN — DEXAMETHASONE SODIUM PHOSPHATE 4 MG: 4 INJECTION, SOLUTION INTRAMUSCULAR; INTRAVENOUS at 15:43

## 2023-12-23 RX ADMIN — BUDESONIDE 250 MCG: 0.25 SUSPENSION RESPIRATORY (INHALATION) at 09:14

## 2023-12-23 RX ADMIN — FUROSEMIDE 40 MG: 40 TABLET ORAL at 08:59

## 2023-12-23 RX ADMIN — NADOLOL 10 MG: 20 TABLET ORAL at 11:49

## 2023-12-23 RX ADMIN — IPRATROPIUM BROMIDE 0.5 MG: 0.5 SOLUTION RESPIRATORY (INHALATION) at 13:12

## 2023-12-23 RX ADMIN — DEXAMETHASONE SODIUM PHOSPHATE 4 MG: 4 INJECTION, SOLUTION INTRAMUSCULAR; INTRAVENOUS at 21:31

## 2023-12-23 RX ADMIN — BUDESONIDE 250 MCG: 0.25 SUSPENSION RESPIRATORY (INHALATION) at 20:08

## 2023-12-23 RX ADMIN — IPRATROPIUM BROMIDE 0.5 MG: 0.5 SOLUTION RESPIRATORY (INHALATION) at 09:14

## 2023-12-23 RX ADMIN — ARFORMOTEROL TARTRATE 15 MCG: 15 SOLUTION RESPIRATORY (INHALATION) at 09:14

## 2023-12-23 RX ADMIN — WARFARIN SODIUM 2 MG: 2 TABLET ORAL at 17:13

## 2023-12-23 RX ADMIN — PANTOPRAZOLE SODIUM 40 MG: 40 TABLET, DELAYED RELEASE ORAL at 08:58

## 2023-12-23 RX ADMIN — Medication 10 ML: at 20:43

## 2023-12-23 RX ADMIN — DEXAMETHASONE SODIUM PHOSPHATE 4 MG: 4 INJECTION, SOLUTION INTRAMUSCULAR; INTRAVENOUS at 05:39

## 2023-12-23 RX ADMIN — IPRATROPIUM BROMIDE 0.5 MG: 0.5 SOLUTION RESPIRATORY (INHALATION) at 16:15

## 2023-12-23 RX ADMIN — ARFORMOTEROL TARTRATE 15 MCG: 15 SOLUTION RESPIRATORY (INHALATION) at 20:08

## 2023-12-23 RX ADMIN — METOPROLOL SUCCINATE 12.5 MG: 25 TABLET, EXTENDED RELEASE ORAL at 08:59

## 2023-12-23 RX ADMIN — Medication 10 ML: at 09:00

## 2023-12-23 RX ADMIN — DIGOXIN 62.5 MCG: 0.12 TABLET ORAL at 08:58

## 2023-12-23 RX ADMIN — DEXAMETHASONE SODIUM PHOSPHATE 4 MG: 4 INJECTION, SOLUTION INTRAMUSCULAR; INTRAVENOUS at 00:18

## 2023-12-23 RX ADMIN — SPIRONOLACTONE 12.5 MG: 25 TABLET ORAL at 08:58

## 2023-12-23 RX ADMIN — ROSUVASTATIN CALCIUM 10 MG: 10 TABLET, FILM COATED ORAL at 20:37

## 2023-12-23 RX ADMIN — IPRATROPIUM BROMIDE 0.5 MG: 0.5 SOLUTION RESPIRATORY (INHALATION) at 20:08

## 2023-12-23 NOTE — PLAN OF CARE
Problem: ABCDS Injury Assessment  Goal: Absence of physical injury  12/22/2023 2346 by Tom Freed RN  Outcome: Progressing  12/22/2023 1115 by Marie Plummer RN  Outcome: Progressing     Problem: Discharge Planning  Goal: Discharge to home or other facility with appropriate resources  12/22/2023 2346 by Tom Freed RN  Outcome: Progressing  12/22/2023 1115 by Marie Plummer RN  Outcome: Progressing     Problem: Chronic Conditions and Co-morbidities  Goal: Patient's chronic conditions and co-morbidity symptoms are monitored and maintained or improved  12/22/2023 2346 by Tom Freed RN  Outcome: Progressing  12/22/2023 1115 by Marie Plummer RN  Outcome: Progressing

## 2023-12-24 VITALS
DIASTOLIC BLOOD PRESSURE: 58 MMHG | TEMPERATURE: 97.6 F | HEART RATE: 128 BPM | SYSTOLIC BLOOD PRESSURE: 105 MMHG | OXYGEN SATURATION: 98 % | BODY MASS INDEX: 25.41 KG/M2 | RESPIRATION RATE: 16 BRPM | WEIGHT: 162.26 LBS

## 2023-12-24 PROBLEM — E05.80 IATROGENIC HYPERTHYROIDISM: Status: ACTIVE | Noted: 2023-12-24

## 2023-12-24 LAB
ANION GAP SERPL CALCULATED.3IONS-SCNC: 13 MMOL/L (ref 7–16)
ANION GAP SERPL CALCULATED.3IONS-SCNC: 7 MMOL/L (ref 7–16)
BASOPHILS # BLD: 0 K/UL (ref 0–0.2)
BASOPHILS NFR BLD: 0 % (ref 0–2)
BUN SERPL-MCNC: 57 MG/DL (ref 6–23)
BUN SERPL-MCNC: 60 MG/DL (ref 6–23)
CALCIUM SERPL-MCNC: 9.4 MG/DL (ref 8.6–10.2)
CALCIUM SERPL-MCNC: 9.5 MG/DL (ref 8.6–10.2)
CHLORIDE SERPL-SCNC: 96 MMOL/L (ref 98–107)
CHLORIDE SERPL-SCNC: 97 MMOL/L (ref 98–107)
CO2 SERPL-SCNC: 28 MMOL/L (ref 22–29)
CO2 SERPL-SCNC: 33 MMOL/L (ref 22–29)
CREAT SERPL-MCNC: 1.5 MG/DL (ref 0.5–1)
CREAT SERPL-MCNC: 1.7 MG/DL (ref 0.5–1)
EOSINOPHIL # BLD: 0 K/UL (ref 0.05–0.5)
EOSINOPHILS RELATIVE PERCENT: 0 % (ref 0–6)
ERYTHROCYTE [DISTWIDTH] IN BLOOD BY AUTOMATED COUNT: 13.4 % (ref 11.5–15)
GFR SERPL CREATININE-BSD FRML MDRD: 30 ML/MIN/1.73M2
GFR SERPL CREATININE-BSD FRML MDRD: 35 ML/MIN/1.73M2
GLUCOSE BLD-MCNC: 126 MG/DL (ref 74–99)
GLUCOSE BLD-MCNC: 138 MG/DL (ref 74–99)
GLUCOSE SERPL-MCNC: 125 MG/DL (ref 74–99)
GLUCOSE SERPL-MCNC: 146 MG/DL (ref 74–99)
HCT VFR BLD AUTO: 36 % (ref 34–48)
HGB BLD-MCNC: 11.5 G/DL (ref 11.5–15.5)
IMM GRANULOCYTES # BLD AUTO: 0.03 K/UL (ref 0–0.58)
IMM GRANULOCYTES NFR BLD: 0 % (ref 0–5)
INR PPP: 2.7
LYMPHOCYTES NFR BLD: 0.72 K/UL (ref 1.5–4)
LYMPHOCYTES RELATIVE PERCENT: 10 % (ref 20–42)
MCH RBC QN AUTO: 29.5 PG (ref 26–35)
MCHC RBC AUTO-ENTMCNC: 31.9 G/DL (ref 32–34.5)
MCV RBC AUTO: 92.3 FL (ref 80–99.9)
MONOCYTES NFR BLD: 0.23 K/UL (ref 0.1–0.95)
MONOCYTES NFR BLD: 3 % (ref 2–12)
NEUTROPHILS NFR BLD: 86 % (ref 43–80)
NEUTS SEG NFR BLD: 5.98 K/UL (ref 1.8–7.3)
PLATELET # BLD AUTO: 158 K/UL (ref 130–450)
PMV BLD AUTO: 11.1 FL (ref 7–12)
POTASSIUM SERPL-SCNC: 4.5 MMOL/L (ref 3.5–5)
POTASSIUM SERPL-SCNC: 4.7 MMOL/L (ref 3.5–5)
PROTHROMBIN TIME: 29.5 SEC (ref 9.3–12.4)
RBC # BLD AUTO: 3.9 M/UL (ref 3.5–5.5)
SODIUM SERPL-SCNC: 136 MMOL/L (ref 132–146)
SODIUM SERPL-SCNC: 138 MMOL/L (ref 132–146)
WBC OTHER # BLD: 7 K/UL (ref 4.5–11.5)

## 2023-12-24 PROCEDURE — 6360000002 HC RX W HCPCS: Performed by: INTERNAL MEDICINE

## 2023-12-24 PROCEDURE — 2700000000 HC OXYGEN THERAPY PER DAY

## 2023-12-24 PROCEDURE — 6370000000 HC RX 637 (ALT 250 FOR IP): Performed by: INTERNAL MEDICINE

## 2023-12-24 PROCEDURE — 94640 AIRWAY INHALATION TREATMENT: CPT

## 2023-12-24 PROCEDURE — 80048 BASIC METABOLIC PNL TOTAL CA: CPT

## 2023-12-24 PROCEDURE — 99239 HOSP IP/OBS DSCHRG MGMT >30: CPT | Performed by: FAMILY MEDICINE

## 2023-12-24 PROCEDURE — 82962 GLUCOSE BLOOD TEST: CPT

## 2023-12-24 PROCEDURE — 2580000003 HC RX 258: Performed by: FAMILY MEDICINE

## 2023-12-24 PROCEDURE — 85025 COMPLETE CBC W/AUTO DIFF WBC: CPT

## 2023-12-24 PROCEDURE — 85610 PROTHROMBIN TIME: CPT

## 2023-12-24 RX ORDER — DEXAMETHASONE 1.5 MG/1
TABLET ORAL
Qty: 21 EACH | Refills: 0 | Status: SHIPPED | OUTPATIENT
Start: 2023-12-24 | End: 2023-12-24 | Stop reason: HOSPADM

## 2023-12-24 RX ORDER — NADOLOL 20 MG/1
10 TABLET ORAL ONCE
Status: COMPLETED | OUTPATIENT
Start: 2023-12-24 | End: 2023-12-24

## 2023-12-24 RX ORDER — METHYLPREDNISOLONE 4 MG/1
TABLET ORAL
Qty: 1 KIT | Refills: 0 | Status: SHIPPED | OUTPATIENT
Start: 2023-12-24 | End: 2023-12-29 | Stop reason: ALTCHOICE

## 2023-12-24 RX ORDER — 0.9 % SODIUM CHLORIDE 0.9 %
250 INTRAVENOUS SOLUTION INTRAVENOUS ONCE
Status: COMPLETED | OUTPATIENT
Start: 2023-12-24 | End: 2023-12-24

## 2023-12-24 RX ORDER — METOPROLOL SUCCINATE 25 MG/1
25 TABLET, EXTENDED RELEASE ORAL DAILY
Status: DISCONTINUED | OUTPATIENT
Start: 2023-12-25 | End: 2023-12-24 | Stop reason: HOSPADM

## 2023-12-24 RX ORDER — WARFARIN SODIUM 2 MG/1
2 TABLET ORAL DAILY
Qty: 30 TABLET | Refills: 3 | Status: SHIPPED | OUTPATIENT
Start: 2023-12-24 | End: 2023-12-24 | Stop reason: HOSPADM

## 2023-12-24 RX ORDER — METOPROLOL SUCCINATE 25 MG/1
25 TABLET, EXTENDED RELEASE ORAL DAILY
Qty: 30 TABLET | Refills: 3 | Status: SHIPPED | OUTPATIENT
Start: 2023-12-25 | End: 2023-12-29 | Stop reason: ALTCHOICE

## 2023-12-24 RX ORDER — DIGOXIN 125 MCG
62.5 TABLET ORAL
Status: DISCONTINUED | OUTPATIENT
Start: 2023-12-25 | End: 2023-12-24 | Stop reason: HOSPADM

## 2023-12-24 RX ORDER — DIGOXIN 125 MCG
62.5 TABLET ORAL
Qty: 90 TABLET | Refills: 3 | Status: ON HOLD | OUTPATIENT
Start: 2023-12-25

## 2023-12-24 RX ORDER — METOPROLOL SUCCINATE 25 MG/1
12.5 TABLET, EXTENDED RELEASE ORAL DAILY
Qty: 30 TABLET | Refills: 3 | Status: SHIPPED | OUTPATIENT
Start: 2023-12-25 | End: 2023-12-24 | Stop reason: HOSPADM

## 2023-12-24 RX ADMIN — IPRATROPIUM BROMIDE 0.5 MG: 0.5 SOLUTION RESPIRATORY (INHALATION) at 12:41

## 2023-12-24 RX ADMIN — IPRATROPIUM BROMIDE 0.5 MG: 0.5 SOLUTION RESPIRATORY (INHALATION) at 09:10

## 2023-12-24 RX ADMIN — DEXAMETHASONE SODIUM PHOSPHATE 4 MG: 4 INJECTION, SOLUTION INTRAMUSCULAR; INTRAVENOUS at 06:02

## 2023-12-24 RX ADMIN — ARFORMOTEROL TARTRATE 15 MCG: 15 SOLUTION RESPIRATORY (INHALATION) at 09:10

## 2023-12-24 RX ADMIN — BUDESONIDE 250 MCG: 0.25 SUSPENSION RESPIRATORY (INHALATION) at 09:10

## 2023-12-24 RX ADMIN — METOPROLOL SUCCINATE 12.5 MG: 25 TABLET, EXTENDED RELEASE ORAL at 08:26

## 2023-12-24 RX ADMIN — PANTOPRAZOLE SODIUM 40 MG: 40 TABLET, DELAYED RELEASE ORAL at 08:26

## 2023-12-24 RX ADMIN — IPRATROPIUM BROMIDE 0.5 MG: 0.5 SOLUTION RESPIRATORY (INHALATION) at 16:00

## 2023-12-24 RX ADMIN — DIGOXIN 62.5 MCG: 0.12 TABLET ORAL at 08:25

## 2023-12-24 RX ADMIN — SPIRONOLACTONE 12.5 MG: 25 TABLET ORAL at 08:25

## 2023-12-24 RX ADMIN — SODIUM CHLORIDE 250 ML: 9 INJECTION, SOLUTION INTRAVENOUS at 08:26

## 2023-12-24 RX ADMIN — NADOLOL 10 MG: 20 TABLET ORAL at 13:12

## 2023-12-24 RX ADMIN — DEXAMETHASONE SODIUM PHOSPHATE 4 MG: 4 INJECTION, SOLUTION INTRAMUSCULAR; INTRAVENOUS at 14:28

## 2023-12-24 RX ADMIN — WARFARIN SODIUM 2 MG: 2 TABLET ORAL at 18:01

## 2023-12-24 RX ADMIN — LEVOTHYROXINE SODIUM 25 MCG: 25 TABLET ORAL at 06:02

## 2023-12-24 RX ADMIN — FUROSEMIDE 40 MG: 40 TABLET ORAL at 08:26

## 2023-12-24 NOTE — DISCHARGE SUMMARY
[facility] Physician Discharge Summary       No follow-up provider specified. Activity level: As tolerated     Dispo: Home      Condition on discharge: Stable     Patient ID:  Annie Aguilar  [id number]  78 y.o.  1940    Admit date: 12/21/2023    Discharge date and time:  12/24/2023  11:21 AM    Admission Diagnoses: Principal Problem:    COVID-19 virus infection  Active Problems:    Supplemental oxygen dependent    Stage 3a chronic kidney disease ([address])    Malignant neoplasm of right lung (HCC)    Acute combined systolic and diastolic congestive heart failure (HCC)    Moderate tricuspid regurgitation    Atrial fibrillation with rapid ventricular response ([address])  Resolved Problems:    * No resolved hospital problems. *      Discharge Diagnoses: Principal Problem:    COVID-19 virus infection  Active Problems:    Supplemental oxygen dependent    Stage 3a chronic kidney disease (HCC)    Malignant neoplasm of right lung (HCC)    Acute combined systolic and diastolic congestive heart failure (HCC)    Moderate tricuspid regurgitation    Atrial fibrillation with rapid ventricular response ([address])  Resolved Problems:    * No resolved hospital problems. *      Consults:  IP CONSULT TO CARDIOLOGY  IP CONSULT TO CARDIOLOGY  IP CONSULT TO SOCIAL WORK  IP CONSULT TO IV TEAM  IP CONSULT TO Kailash Course:   Patient Annie Aguilar is a 78 y.o. presented with Hypomagnesemia [E83.42]  TRISTEN (acute kidney injury) ([address]) [N17.9]  Atrial fibrillation with rapid ventricular response ([address]) [I48.91]  Atrial flutter with rapid ventricular response ([address]) [I48.92]  Typical atrial flutter ([address]) [I48.3]  COVID-19 virus infection [U07.1]  Patient presented to the emergency department initially because she felt like she was back in atrial fibrillation. Has not been able to tolerate amiodarone secondary to side effects. She states that she has been taking all of her medications.   At that time of presentation

## 2023-12-26 ENCOUNTER — CLINICAL DOCUMENTATION ONLY (OUTPATIENT)
Facility: CLINIC | Age: 83
End: 2023-12-26

## 2023-12-28 ENCOUNTER — HOSPITAL ENCOUNTER (INPATIENT)
Age: 83
LOS: 6 days | Discharge: HOME | DRG: 177 | End: 2024-01-04
Attending: EMERGENCY MEDICINE | Admitting: STUDENT IN AN ORGANIZED HEALTH CARE EDUCATION/TRAINING PROGRAM
Payer: MEDICARE

## 2023-12-28 ENCOUNTER — APPOINTMENT (OUTPATIENT)
Dept: GENERAL RADIOLOGY | Age: 83
DRG: 177 | End: 2023-12-28
Payer: MEDICARE

## 2023-12-28 ENCOUNTER — APPOINTMENT (OUTPATIENT)
Dept: CT IMAGING | Age: 83
DRG: 177 | End: 2023-12-28
Payer: MEDICARE

## 2023-12-28 DIAGNOSIS — E86.0 DEHYDRATION: ICD-10-CM

## 2023-12-28 DIAGNOSIS — U07.1 COVID-19: Primary | ICD-10-CM

## 2023-12-28 LAB
ALBUMIN SERPL-MCNC: 4.5 G/DL (ref 3.5–5.2)
ALP SERPL-CCNC: 77 U/L (ref 35–104)
ALT SERPL-CCNC: 20 U/L (ref 0–32)
ANION GAP SERPL CALCULATED.3IONS-SCNC: 18 MMOL/L (ref 7–16)
AST SERPL-CCNC: 20 U/L (ref 0–31)
BILIRUB SERPL-MCNC: 1 MG/DL (ref 0–1.2)
BNP SERPL-MCNC: 2262 PG/ML (ref 0–450)
BUN SERPL-MCNC: 54 MG/DL (ref 6–23)
CALCIUM SERPL-MCNC: 9.7 MG/DL (ref 8.6–10.2)
CHLORIDE SERPL-SCNC: 86 MMOL/L (ref 98–107)
CO2 SERPL-SCNC: 30 MMOL/L (ref 22–29)
CREAT SERPL-MCNC: 1.5 MG/DL (ref 0.5–1)
D DIMER: 310 NG/ML DDU (ref 0–232)
GFR SERPL CREATININE-BSD FRML MDRD: 33 ML/MIN/1.73M2
GLUCOSE SERPL-MCNC: 131 MG/DL (ref 74–99)
INR PPP: 2.2
LACTATE BLDV-SCNC: 1.3 MMOL/L (ref 0.5–1.9)
POTASSIUM SERPL-SCNC: 3.6 MMOL/L (ref 3.5–5)
PROT SERPL-MCNC: 7.4 G/DL (ref 6.4–8.3)
PROTHROMBIN TIME: 24.1 SEC (ref 9.3–12.4)
SODIUM SERPL-SCNC: 134 MMOL/L (ref 132–146)
TROPONIN I SERPL HS-MCNC: 32 NG/L (ref 0–9)

## 2023-12-28 PROCEDURE — 96360 HYDRATION IV INFUSION INIT: CPT

## 2023-12-28 PROCEDURE — 96361 HYDRATE IV INFUSION ADD-ON: CPT

## 2023-12-28 PROCEDURE — 2580000003 HC RX 258: Performed by: EMERGENCY MEDICINE

## 2023-12-28 PROCEDURE — 2580000003 HC RX 258

## 2023-12-28 PROCEDURE — 71046 X-RAY EXAM CHEST 2 VIEWS: CPT

## 2023-12-28 PROCEDURE — 99285 EMERGENCY DEPT VISIT HI MDM: CPT

## 2023-12-28 PROCEDURE — 80053 COMPREHEN METABOLIC PANEL: CPT

## 2023-12-28 PROCEDURE — 83880 ASSAY OF NATRIURETIC PEPTIDE: CPT

## 2023-12-28 PROCEDURE — 83605 ASSAY OF LACTIC ACID: CPT

## 2023-12-28 PROCEDURE — 85025 COMPLETE CBC W/AUTO DIFF WBC: CPT

## 2023-12-28 PROCEDURE — 85379 FIBRIN DEGRADATION QUANT: CPT

## 2023-12-28 PROCEDURE — 85610 PROTHROMBIN TIME: CPT

## 2023-12-28 PROCEDURE — 87040 BLOOD CULTURE FOR BACTERIA: CPT

## 2023-12-28 PROCEDURE — 84484 ASSAY OF TROPONIN QUANT: CPT

## 2023-12-28 RX ORDER — SODIUM CHLORIDE 9 MG/ML
INJECTION, SOLUTION INTRAVENOUS PRN
Status: DISCONTINUED | OUTPATIENT
Start: 2023-12-28 | End: 2024-01-02 | Stop reason: SDUPTHER

## 2023-12-28 RX ORDER — 0.9 % SODIUM CHLORIDE 0.9 %
1000 INTRAVENOUS SOLUTION INTRAVENOUS ONCE
Status: COMPLETED | OUTPATIENT
Start: 2023-12-28 | End: 2023-12-29

## 2023-12-28 RX ORDER — SODIUM CHLORIDE 0.9 % (FLUSH) 0.9 %
5-40 SYRINGE (ML) INJECTION EVERY 12 HOURS SCHEDULED
Status: DISCONTINUED | OUTPATIENT
Start: 2023-12-28 | End: 2024-01-02 | Stop reason: SDUPTHER

## 2023-12-28 RX ORDER — SODIUM CHLORIDE 0.9 % (FLUSH) 0.9 %
5-40 SYRINGE (ML) INJECTION PRN
Status: DISCONTINUED | OUTPATIENT
Start: 2023-12-28 | End: 2024-01-02 | Stop reason: SDUPTHER

## 2023-12-28 RX ADMIN — Medication 10 ML: at 22:54

## 2023-12-28 RX ADMIN — SODIUM CHLORIDE 1000 ML: 9 INJECTION, SOLUTION INTRAVENOUS at 22:47

## 2023-12-28 ASSESSMENT — PAIN - FUNCTIONAL ASSESSMENT: PAIN_FUNCTIONAL_ASSESSMENT: NONE - DENIES PAIN

## 2023-12-29 ENCOUNTER — TELEPHONE (OUTPATIENT)
Dept: PRIMARY CARE CLINIC | Age: 83
End: 2023-12-29

## 2023-12-29 ENCOUNTER — APPOINTMENT (OUTPATIENT)
Dept: CT IMAGING | Age: 83
DRG: 177 | End: 2023-12-29
Payer: MEDICARE

## 2023-12-29 PROBLEM — U07.1 COVID-19: Status: ACTIVE | Noted: 2023-12-29

## 2023-12-29 LAB
ALBUMIN SERPL-MCNC: 3.6 G/DL (ref 3.5–5.2)
ALP SERPL-CCNC: 63 U/L (ref 35–104)
ALT SERPL-CCNC: 16 U/L (ref 0–32)
ANION GAP SERPL CALCULATED.3IONS-SCNC: 12 MMOL/L (ref 7–16)
AST SERPL-CCNC: 19 U/L (ref 0–31)
BASOPHILS # BLD: 0 K/UL (ref 0–0.2)
BASOPHILS # BLD: 0.01 K/UL (ref 0–0.2)
BASOPHILS NFR BLD: 0 % (ref 0–2)
BASOPHILS NFR BLD: 0 % (ref 0–2)
BILIRUB SERPL-MCNC: 0.8 MG/DL (ref 0–1.2)
BUN SERPL-MCNC: 49 MG/DL (ref 6–23)
CALCIUM SERPL-MCNC: 8.6 MG/DL (ref 8.6–10.2)
CHLORIDE SERPL-SCNC: 96 MMOL/L (ref 98–107)
CO2 SERPL-SCNC: 28 MMOL/L (ref 22–29)
CREAT SERPL-MCNC: 1.3 MG/DL (ref 0.5–1)
CRP SERPL HS-MCNC: 44 MG/L (ref 0–5)
EOSINOPHIL # BLD: 0.01 K/UL (ref 0.05–0.5)
EOSINOPHIL # BLD: 0.01 K/UL (ref 0.05–0.5)
EOSINOPHILS RELATIVE PERCENT: 0 % (ref 0–6)
EOSINOPHILS RELATIVE PERCENT: 0 % (ref 0–6)
ERYTHROCYTE [DISTWIDTH] IN BLOOD BY AUTOMATED COUNT: 13.4 % (ref 11.5–15)
ERYTHROCYTE [DISTWIDTH] IN BLOOD BY AUTOMATED COUNT: 13.5 % (ref 11.5–15)
GFR SERPL CREATININE-BSD FRML MDRD: 42 ML/MIN/1.73M2
GLUCOSE SERPL-MCNC: 104 MG/DL (ref 74–99)
HCT VFR BLD AUTO: 37.1 % (ref 34–48)
HCT VFR BLD AUTO: 43.5 % (ref 34–48)
HGB BLD-MCNC: 12.3 G/DL (ref 11.5–15.5)
HGB BLD-MCNC: 14.2 G/DL (ref 11.5–15.5)
IMM GRANULOCYTES # BLD AUTO: 0.08 K/UL (ref 0–0.58)
IMM GRANULOCYTES # BLD AUTO: 0.13 K/UL (ref 0–0.58)
IMM GRANULOCYTES NFR BLD: 1 % (ref 0–5)
IMM GRANULOCYTES NFR BLD: 1 % (ref 0–5)
LYMPHOCYTES NFR BLD: 0.69 K/UL (ref 1.5–4)
LYMPHOCYTES NFR BLD: 0.7 K/UL (ref 1.5–4)
LYMPHOCYTES RELATIVE PERCENT: 10 % (ref 20–42)
LYMPHOCYTES RELATIVE PERCENT: 7 % (ref 20–42)
MCH RBC QN AUTO: 29.3 PG (ref 26–35)
MCH RBC QN AUTO: 30.2 PG (ref 26–35)
MCHC RBC AUTO-ENTMCNC: 32.6 G/DL (ref 32–34.5)
MCHC RBC AUTO-ENTMCNC: 33.2 G/DL (ref 32–34.5)
MCV RBC AUTO: 89.9 FL (ref 80–99.9)
MCV RBC AUTO: 91.2 FL (ref 80–99.9)
MONOCYTES NFR BLD: 0.42 K/UL (ref 0.1–0.95)
MONOCYTES NFR BLD: 0.98 K/UL (ref 0.1–0.95)
MONOCYTES NFR BLD: 11 % (ref 2–12)
MONOCYTES NFR BLD: 6 % (ref 2–12)
NEUTROPHILS NFR BLD: 81 % (ref 43–80)
NEUTROPHILS NFR BLD: 83 % (ref 43–80)
NEUTS SEG NFR BLD: 5.71 K/UL (ref 1.8–7.3)
NEUTS SEG NFR BLD: 7.52 K/UL (ref 1.8–7.3)
PLATELET # BLD AUTO: 143 K/UL (ref 130–450)
PLATELET, FLUORESCENCE: 116 K/UL (ref 130–450)
PMV BLD AUTO: 11.2 FL (ref 7–12)
PMV BLD AUTO: 11.3 FL (ref 7–12)
POTASSIUM SERPL-SCNC: 3.6 MMOL/L (ref 3.5–5)
PROT SERPL-MCNC: 6.1 G/DL (ref 6.4–8.3)
RBC # BLD AUTO: 4.07 M/UL (ref 3.5–5.5)
RBC # BLD AUTO: 4.84 M/UL (ref 3.5–5.5)
SODIUM SERPL-SCNC: 136 MMOL/L (ref 132–146)
TROPONIN I SERPL HS-MCNC: 22 NG/L (ref 0–9)
WBC OTHER # BLD: 6.9 K/UL (ref 4.5–11.5)
WBC OTHER # BLD: 9.3 K/UL (ref 4.5–11.5)

## 2023-12-29 PROCEDURE — 6360000002 HC RX W HCPCS: Performed by: STUDENT IN AN ORGANIZED HEALTH CARE EDUCATION/TRAINING PROGRAM

## 2023-12-29 PROCEDURE — 2700000000 HC OXYGEN THERAPY PER DAY

## 2023-12-29 PROCEDURE — 6370000000 HC RX 637 (ALT 250 FOR IP): Performed by: STUDENT IN AN ORGANIZED HEALTH CARE EDUCATION/TRAINING PROGRAM

## 2023-12-29 PROCEDURE — 86140 C-REACTIVE PROTEIN: CPT

## 2023-12-29 PROCEDURE — 2580000003 HC RX 258: Performed by: STUDENT IN AN ORGANIZED HEALTH CARE EDUCATION/TRAINING PROGRAM

## 2023-12-29 PROCEDURE — 99223 1ST HOSP IP/OBS HIGH 75: CPT | Performed by: STUDENT IN AN ORGANIZED HEALTH CARE EDUCATION/TRAINING PROGRAM

## 2023-12-29 PROCEDURE — 71275 CT ANGIOGRAPHY CHEST: CPT

## 2023-12-29 PROCEDURE — 2060000000 HC ICU INTERMEDIATE R&B

## 2023-12-29 PROCEDURE — 80053 COMPREHEN METABOLIC PANEL: CPT

## 2023-12-29 PROCEDURE — 94640 AIRWAY INHALATION TREATMENT: CPT

## 2023-12-29 PROCEDURE — 6360000004 HC RX CONTRAST MEDICATION: Performed by: RADIOLOGY

## 2023-12-29 PROCEDURE — 85025 COMPLETE CBC W/AUTO DIFF WBC: CPT

## 2023-12-29 PROCEDURE — 94664 DEMO&/EVAL PT USE INHALER: CPT

## 2023-12-29 RX ORDER — SODIUM CHLORIDE 9 MG/ML
INJECTION, SOLUTION INTRAVENOUS PRN
Status: DISCONTINUED | OUTPATIENT
Start: 2023-12-29 | End: 2024-01-04 | Stop reason: HOSPADM

## 2023-12-29 RX ORDER — POLYETHYLENE GLYCOL 3350 17 G/17G
17 POWDER, FOR SOLUTION ORAL DAILY PRN
Status: DISCONTINUED | OUTPATIENT
Start: 2023-12-29 | End: 2024-01-04 | Stop reason: HOSPADM

## 2023-12-29 RX ORDER — METOPROLOL SUCCINATE 25 MG/1
12.5 TABLET, EXTENDED RELEASE ORAL EVERY MORNING
COMMUNITY

## 2023-12-29 RX ORDER — FUROSEMIDE 40 MG/1
40 TABLET ORAL EVERY MORNING
Status: ON HOLD | COMMUNITY
End: 2024-01-04 | Stop reason: HOSPADM

## 2023-12-29 RX ORDER — SODIUM CHLORIDE 0.9 % (FLUSH) 0.9 %
5-40 SYRINGE (ML) INJECTION PRN
Status: DISCONTINUED | OUTPATIENT
Start: 2023-12-29 | End: 2024-01-04 | Stop reason: HOSPADM

## 2023-12-29 RX ORDER — ACETAMINOPHEN 325 MG/1
650 TABLET ORAL EVERY 6 HOURS PRN
Status: DISCONTINUED | OUTPATIENT
Start: 2023-12-29 | End: 2024-01-04 | Stop reason: HOSPADM

## 2023-12-29 RX ORDER — WARFARIN SODIUM 2.5 MG/1
2.5 TABLET ORAL DAILY
Status: DISCONTINUED | OUTPATIENT
Start: 2023-12-29 | End: 2024-01-04 | Stop reason: HOSPADM

## 2023-12-29 RX ORDER — BUDESONIDE 0.25 MG/2ML
250 INHALANT ORAL 2 TIMES DAILY
Status: DISCONTINUED | OUTPATIENT
Start: 2023-12-29 | End: 2024-01-04 | Stop reason: HOSPADM

## 2023-12-29 RX ORDER — WARFARIN SODIUM 2.5 MG/1
2.5 TABLET ORAL NIGHTLY
COMMUNITY

## 2023-12-29 RX ORDER — ACETAMINOPHEN 650 MG/1
650 SUPPOSITORY RECTAL EVERY 6 HOURS PRN
Status: DISCONTINUED | OUTPATIENT
Start: 2023-12-29 | End: 2024-01-04 | Stop reason: HOSPADM

## 2023-12-29 RX ORDER — ALBUTEROL SULFATE 90 UG/1
2 AEROSOL, METERED RESPIRATORY (INHALATION) EVERY 6 HOURS PRN
Status: DISCONTINUED | OUTPATIENT
Start: 2023-12-29 | End: 2023-12-29 | Stop reason: CLARIF

## 2023-12-29 RX ORDER — ONDANSETRON 4 MG/1
4 TABLET, ORALLY DISINTEGRATING ORAL EVERY 8 HOURS PRN
Status: DISCONTINUED | OUTPATIENT
Start: 2023-12-29 | End: 2024-01-04 | Stop reason: HOSPADM

## 2023-12-29 RX ORDER — IPRATROPIUM BROMIDE AND ALBUTEROL SULFATE 2.5; .5 MG/3ML; MG/3ML
1 SOLUTION RESPIRATORY (INHALATION) EVERY 4 HOURS PRN
Status: DISCONTINUED | OUTPATIENT
Start: 2023-12-29 | End: 2024-01-02

## 2023-12-29 RX ORDER — 0.9 % SODIUM CHLORIDE 0.9 %
500 INTRAVENOUS SOLUTION INTRAVENOUS ONCE
Status: COMPLETED | OUTPATIENT
Start: 2023-12-29 | End: 2023-12-29

## 2023-12-29 RX ORDER — LEVOTHYROXINE SODIUM 0.05 MG/1
50 TABLET ORAL DAILY
Status: DISCONTINUED | OUTPATIENT
Start: 2023-12-29 | End: 2024-01-02

## 2023-12-29 RX ORDER — SODIUM CHLORIDE 0.9 % (FLUSH) 0.9 %
5-40 SYRINGE (ML) INJECTION EVERY 12 HOURS SCHEDULED
Status: DISCONTINUED | OUTPATIENT
Start: 2023-12-29 | End: 2024-01-04 | Stop reason: HOSPADM

## 2023-12-29 RX ORDER — DEXAMETHASONE SODIUM PHOSPHATE 4 MG/ML
4 INJECTION, SOLUTION INTRA-ARTICULAR; INTRALESIONAL; INTRAMUSCULAR; INTRAVENOUS; SOFT TISSUE EVERY 6 HOURS
Status: DISCONTINUED | OUTPATIENT
Start: 2023-12-29 | End: 2023-12-31

## 2023-12-29 RX ORDER — ROSUVASTATIN CALCIUM 10 MG/1
10 TABLET, COATED ORAL NIGHTLY
Status: DISCONTINUED | OUTPATIENT
Start: 2023-12-29 | End: 2024-01-04 | Stop reason: HOSPADM

## 2023-12-29 RX ORDER — ARFORMOTEROL TARTRATE 15 UG/2ML
15 SOLUTION RESPIRATORY (INHALATION)
Status: DISCONTINUED | OUTPATIENT
Start: 2023-12-29 | End: 2024-01-04 | Stop reason: HOSPADM

## 2023-12-29 RX ORDER — ALBUTEROL SULFATE 0.63 MG/3ML
0.63 SOLUTION RESPIRATORY (INHALATION) EVERY 6 HOURS PRN
Status: DISCONTINUED | OUTPATIENT
Start: 2023-12-29 | End: 2024-01-04 | Stop reason: HOSPADM

## 2023-12-29 RX ORDER — FAMOTIDINE 20 MG/1
20 TABLET, FILM COATED ORAL DAILY
Status: DISCONTINUED | OUTPATIENT
Start: 2023-12-29 | End: 2024-01-03

## 2023-12-29 RX ORDER — ONDANSETRON 2 MG/ML
4 INJECTION INTRAMUSCULAR; INTRAVENOUS EVERY 6 HOURS PRN
Status: DISCONTINUED | OUTPATIENT
Start: 2023-12-29 | End: 2024-01-04 | Stop reason: HOSPADM

## 2023-12-29 RX ORDER — DIGOXIN 125 MCG
62.5 TABLET ORAL
Status: DISCONTINUED | OUTPATIENT
Start: 2023-12-29 | End: 2023-12-30

## 2023-12-29 RX ORDER — LEVOTHYROXINE SODIUM 100 MCG
50 TABLET ORAL
COMMUNITY

## 2023-12-29 RX ORDER — SODIUM CHLORIDE 9 MG/ML
INJECTION, SOLUTION INTRAVENOUS CONTINUOUS
Status: DISCONTINUED | OUTPATIENT
Start: 2023-12-29 | End: 2023-12-30

## 2023-12-29 RX ADMIN — WARFARIN SODIUM 2.5 MG: 2.5 TABLET ORAL at 17:01

## 2023-12-29 RX ADMIN — DEXAMETHASONE SODIUM PHOSPHATE 4 MG: 4 INJECTION, SOLUTION INTRAMUSCULAR; INTRAVENOUS at 09:08

## 2023-12-29 RX ADMIN — ALBUTEROL SULFATE 0.63 MG: 0.63 SOLUTION RESPIRATORY (INHALATION) at 10:57

## 2023-12-29 RX ADMIN — Medication 10 ML: at 21:30

## 2023-12-29 RX ADMIN — ARFORMOTEROL TARTRATE 15 MCG: 15 SOLUTION RESPIRATORY (INHALATION) at 20:04

## 2023-12-29 RX ADMIN — SODIUM CHLORIDE: 9 INJECTION, SOLUTION INTRAVENOUS at 17:10

## 2023-12-29 RX ADMIN — BUDESONIDE 250 MCG: 0.25 SUSPENSION RESPIRATORY (INHALATION) at 20:04

## 2023-12-29 RX ADMIN — IPRATROPIUM BROMIDE 0.5 MG: 0.5 SOLUTION RESPIRATORY (INHALATION) at 20:04

## 2023-12-29 RX ADMIN — DEXAMETHASONE SODIUM PHOSPHATE 4 MG: 4 INJECTION, SOLUTION INTRAMUSCULAR; INTRAVENOUS at 21:30

## 2023-12-29 RX ADMIN — IPRATROPIUM BROMIDE 0.5 MG: 0.5 SOLUTION RESPIRATORY (INHALATION) at 16:44

## 2023-12-29 RX ADMIN — IPRATROPIUM BROMIDE 0.5 MG: 0.5 SOLUTION RESPIRATORY (INHALATION) at 08:04

## 2023-12-29 RX ADMIN — SODIUM CHLORIDE: 9 INJECTION, SOLUTION INTRAVENOUS at 04:24

## 2023-12-29 RX ADMIN — LEVOTHYROXINE SODIUM 50 MCG: 0.05 TABLET ORAL at 09:08

## 2023-12-29 RX ADMIN — ROSUVASTATIN CALCIUM 10 MG: 10 TABLET, FILM COATED ORAL at 21:30

## 2023-12-29 RX ADMIN — IPRATROPIUM BROMIDE 0.5 MG: 0.5 SOLUTION RESPIRATORY (INHALATION) at 10:57

## 2023-12-29 RX ADMIN — ARFORMOTEROL TARTRATE 15 MCG: 15 SOLUTION RESPIRATORY (INHALATION) at 08:04

## 2023-12-29 RX ADMIN — SODIUM CHLORIDE 500 ML: 9 INJECTION, SOLUTION INTRAVENOUS at 04:17

## 2023-12-29 RX ADMIN — DEXAMETHASONE SODIUM PHOSPHATE 4 MG: 4 INJECTION, SOLUTION INTRAMUSCULAR; INTRAVENOUS at 04:18

## 2023-12-29 RX ADMIN — FAMOTIDINE 20 MG: 20 TABLET ORAL at 09:08

## 2023-12-29 RX ADMIN — IOPAMIDOL 75 ML: 755 INJECTION, SOLUTION INTRAVENOUS at 00:07

## 2023-12-29 RX ADMIN — DEXAMETHASONE SODIUM PHOSPHATE 4 MG: 4 INJECTION, SOLUTION INTRAMUSCULAR; INTRAVENOUS at 17:01

## 2023-12-29 RX ADMIN — BUDESONIDE 250 MCG: 0.25 SUSPENSION RESPIRATORY (INHALATION) at 08:05

## 2023-12-29 RX ADMIN — DIGOXIN 62.5 MCG: 0.12 TABLET ORAL at 17:00

## 2023-12-29 ASSESSMENT — PAIN SCALES - GENERAL
PAINLEVEL_OUTOF10: 0

## 2023-12-29 NOTE — ED NOTES
Patient requests to have coumadin time changed to 2100, this is the time she take medication at home. This RN sent pharmacy a request to change times.

## 2023-12-29 NOTE — TELEPHONE ENCOUNTER
Patient's daughter phoned just wanting Dr Elmira Hernandez to know that she is at [facility] in [place] since last night. Was very weak and still coughing up green mucous; saying that it is most likely still the covid. She is still in ER because there are no beds.

## 2023-12-29 NOTE — ED NOTES
Patient resting in bed, provided breakfast tray. Family at bedside. No resp distress noted. Call light in reach.

## 2023-12-29 NOTE — ED NOTES
Radiology Procedure Waiver   Name: Krystal Hyatt  : 1940  MRN: [id number]    Date:  23    Time: 11:49 PM EST    Benefits of immediately proceeding with Radiology exam(s) without pre-testing outweigh the risks or are not indicated as specified below and therefore the following is/are being waived:    [] Pregnancy test   [] Patients LMP on-time and regular.   [] Patient had Tubal Ligation or has other Contraception Device.   [] Patient  is Menopausal or Premenarcheal.    [] Patient had Full or Partial Hysterectomy.    [] Protocol for Iodine allergy    [] MRI Questionnaire     [] BUN/Creatinine   [x] Patient age w/no hx of renal dysfunction.   [] Patient on Dialysis.   [x] Recent Normal Labs.  Electronically signed by Zhou Cid MD on 23 at 11:49 PM Zhou Webb MD  23 [phone]

## 2023-12-29 NOTE — H&P
[facility] History and Physical      CHIEF COMPLAINT: Generalized weakness    History of Present Illness: An 83-year-old lady with past medical history significant for thyroid disease lung cancer, hypertension, hyperlipidemia, COPD, congestive heart failure presented to ED with shortness of breath and generalized weakness.  She was diagnosed with COVID 1 week ago and was struggling with improvement, when her overall health started going downhill and she felt overall weak and was not able to do her daily activities.  Along with that she also complained of increasing shortness of breath.  She uses oxygen at 2 L at home for emphysema.  Her oxygen demand increased at home because of her worsening symptoms.  She was not able to eat or drink well.  In ED she was found hypotensive and dehydrated, 1 L of fluid was given and still her blood pressure was running low in 90s over 60s.  Denies any chest pain, belly pain, nausea, nausea vomiting diarrhea, cough, no fever or chills.    Informant(s) for H&P: Patient and her family    REVIEW OF SYSTEMS:  A comprehensive review of systems was negative except for: what is in the HPI      PMH:  Past Medical History:   Diagnosis Date    Atrial fibrillation (HCC)     CHF (congestive heart failure) (HCC)     Emphysema, unspecified (HCC)     Hyperlipidemia     Hypertension     Lung cancer (HCC)     Mitral valve regurgitation     Oxygen dependent     Prolonged emergence from general anesthesia     post general anesthesia    Skin cancer     Thyroid disease        Surgical History:  Past Surgical History:   Procedure Laterality Date    BREAST BIOPSY Right     benign    BRONCHOSCOPY N/A 04/20/2021    BRONCHOSCOPY/TRANSBRONCHIAL NEEDLE BIOPSY performed by Antony Rosario MD at [facility] ENDOSCOPY    CARDIOVERSION  10/06/2023    Dr Jones    INTRACAPSULAR CATARACT EXTRACTION Right 12/16/2021    RIGHT EYE CATARACT EXTRACTION IOL IMPLANT performed by Kay Pimentel MD

## 2023-12-29 NOTE — ED PROVIDER NOTES
Department of Emergency Medicine  FIRST PROVIDER TRIAGE NOTE             Independent MLP           12/28/23  8:14 PM EST    Date of Encounter: 12/28/23   MRN: [id number]      HPI: Krystal Hyatt is a 83 y.o. female who presents to the ED for Shortness of Breath (Pt was here a few days ago   She is COVID +    96% on 3 L/min NC), Cough (Productive), and Fatigue  COVID-19 positive and complaining of increased shortness of breath.  Is oxygen dependent at 3 to 4 L nasal cannula has not had increased.  She does have a productive cough with green phlegm. Feels very fatigued.     ROS: Negative for cp, abd pain, back pain, fever, vomiting, diarrhea, or dizziness.    PE: Gen Appearance/Constitutional: alert, fatigued  CV: irregular tachycardic rate  Pulm: abnormal breath sounds auscultated, diminished, wheezes, rales     Initial Plan of Care: All treatment areas with department are currently occupied. Plan to order/Initiate the following while awaiting opening in ED: labs, EKG, and imaging studies.  Initiate Treatment-Testing, Proceed toTreatment Area When Bed Available for ED Attending/MLP to Continue Care    Electronically signed by OUMOU Keller CNP   DD: 12/28/23       Lucia Durham APRN - CNP  12/28/23 2016    
answered at this time and they are agreeable with the plan.       --------------------------------- IMPRESSION AND DISPOSITION ---------------------------------    IMPRESSION  1. COVID-19    2. Dehydration        DISPOSITION  Disposition: Admit to telemetry  Patient condition is stable    NOTE: This report was transcribed using voice recognition software. Every effort was made to ensure accuracy; however, inadvertent computerized transcription errors may be present        Zhou Cid MD  12/30/23 0634

## 2023-12-29 NOTE — ED NOTES
ED to Inpatient Handoff Report    Notified Sloane that electronic handoff available and patient ready for transport to room 443.    Safety Risks: Risk of falls    Patient in Restraints: no    Constant Observer or Patient : no    Telemetry Monitoring Ordered: Yes          Order to transfer to unit without monitor: YES    Last MEWS: 1 Time completed: 1600    Vitals:    12/29/23 1056 12/29/23 1057 12/29/23 1515 12/29/23 1559   BP:   93/61 (!) 107/58   Pulse: (!) 104 92 (!) 123 83   Resp: 30 16 24 17   Temp:    98.2 °F (36.8 °C)   TempSrc:       SpO2: 93% 94% 92% 94%   Weight:       Height:           Opportunity for questions and clarification was provided.

## 2023-12-30 PROBLEM — E86.0 DEHYDRATION: Status: ACTIVE | Noted: 2023-12-30

## 2023-12-30 PROBLEM — D69.6 THROMBOCYTOPENIA (HCC): Status: ACTIVE | Noted: 2023-12-30

## 2023-12-30 PROBLEM — N17.9 AKI (ACUTE KIDNEY INJURY) (HCC): Status: ACTIVE | Noted: 2023-12-30

## 2023-12-30 LAB
ALBUMIN SERPL-MCNC: 3.4 G/DL (ref 3.5–5.2)
ALP SERPL-CCNC: 57 U/L (ref 35–104)
ALT SERPL-CCNC: 20 U/L (ref 0–32)
ANION GAP SERPL CALCULATED.3IONS-SCNC: 14 MMOL/L (ref 7–16)
AST SERPL-CCNC: 24 U/L (ref 0–31)
BASOPHILS # BLD: 0 K/UL (ref 0–0.2)
BASOPHILS NFR BLD: 0 % (ref 0–2)
BILIRUB SERPL-MCNC: 0.5 MG/DL (ref 0–1.2)
BUN SERPL-MCNC: 40 MG/DL (ref 6–23)
CALCIUM SERPL-MCNC: 8.9 MG/DL (ref 8.6–10.2)
CHLORIDE SERPL-SCNC: 98 MMOL/L (ref 98–107)
CO2 SERPL-SCNC: 25 MMOL/L (ref 22–29)
CREAT SERPL-MCNC: 1.1 MG/DL (ref 0.5–1)
EOSINOPHIL # BLD: 0 K/UL (ref 0.05–0.5)
EOSINOPHILS RELATIVE PERCENT: 0 % (ref 0–6)
ERYTHROCYTE [DISTWIDTH] IN BLOOD BY AUTOMATED COUNT: 13.5 % (ref 11.5–15)
GFR SERPL CREATININE-BSD FRML MDRD: 51 ML/MIN/1.73M2
GLUCOSE SERPL-MCNC: 259 MG/DL (ref 74–99)
HCT VFR BLD AUTO: 33.9 % (ref 34–48)
HGB BLD-MCNC: 11 G/DL (ref 11.5–15.5)
IMM GRANULOCYTES # BLD AUTO: 0.05 K/UL (ref 0–0.58)
IMM GRANULOCYTES NFR BLD: 1 % (ref 0–5)
LYMPHOCYTES NFR BLD: 0.35 K/UL (ref 1.5–4)
LYMPHOCYTES RELATIVE PERCENT: 6 % (ref 20–42)
MAGNESIUM SERPL-MCNC: 1.6 MG/DL (ref 1.6–2.6)
MCH RBC QN AUTO: 29.7 PG (ref 26–35)
MCHC RBC AUTO-ENTMCNC: 32.4 G/DL (ref 32–34.5)
MCV RBC AUTO: 91.6 FL (ref 80–99.9)
MONOCYTES NFR BLD: 0.16 K/UL (ref 0.1–0.95)
MONOCYTES NFR BLD: 3 % (ref 2–12)
NEUTROPHILS NFR BLD: 91 % (ref 43–80)
NEUTS SEG NFR BLD: 5.67 K/UL (ref 1.8–7.3)
PLATELET # BLD AUTO: 116 K/UL (ref 130–450)
PMV BLD AUTO: 11.5 FL (ref 7–12)
POTASSIUM SERPL-SCNC: 3.5 MMOL/L (ref 3.5–5)
PROT SERPL-MCNC: 5.7 G/DL (ref 6.4–8.3)
RBC # BLD AUTO: 3.7 M/UL (ref 3.5–5.5)
RBC # BLD: ABNORMAL 10*6/UL
RBC # BLD: ABNORMAL 10*6/UL
SODIUM SERPL-SCNC: 137 MMOL/L (ref 132–146)
WBC OTHER # BLD: 6.2 K/UL (ref 4.5–11.5)

## 2023-12-30 PROCEDURE — 2060000000 HC ICU INTERMEDIATE R&B

## 2023-12-30 PROCEDURE — 99232 SBSQ HOSP IP/OBS MODERATE 35: CPT | Performed by: INTERNAL MEDICINE

## 2023-12-30 PROCEDURE — 2580000003 HC RX 258: Performed by: STUDENT IN AN ORGANIZED HEALTH CARE EDUCATION/TRAINING PROGRAM

## 2023-12-30 PROCEDURE — 6370000000 HC RX 637 (ALT 250 FOR IP): Performed by: INTERNAL MEDICINE

## 2023-12-30 PROCEDURE — 6360000002 HC RX W HCPCS: Performed by: STUDENT IN AN ORGANIZED HEALTH CARE EDUCATION/TRAINING PROGRAM

## 2023-12-30 PROCEDURE — 80053 COMPREHEN METABOLIC PANEL: CPT

## 2023-12-30 PROCEDURE — 6370000000 HC RX 637 (ALT 250 FOR IP): Performed by: STUDENT IN AN ORGANIZED HEALTH CARE EDUCATION/TRAINING PROGRAM

## 2023-12-30 PROCEDURE — 85025 COMPLETE CBC W/AUTO DIFF WBC: CPT

## 2023-12-30 PROCEDURE — 83735 ASSAY OF MAGNESIUM: CPT

## 2023-12-30 PROCEDURE — 2580000003 HC RX 258

## 2023-12-30 PROCEDURE — 36415 COLL VENOUS BLD VENIPUNCTURE: CPT

## 2023-12-30 PROCEDURE — 2700000000 HC OXYGEN THERAPY PER DAY

## 2023-12-30 PROCEDURE — 94640 AIRWAY INHALATION TREATMENT: CPT

## 2023-12-30 PROCEDURE — 2580000003 HC RX 258: Performed by: INTERNAL MEDICINE

## 2023-12-30 RX ORDER — METOPROLOL SUCCINATE 25 MG/1
12.5 TABLET, EXTENDED RELEASE ORAL DAILY
Status: DISCONTINUED | OUTPATIENT
Start: 2023-12-30 | End: 2024-01-04 | Stop reason: HOSPADM

## 2023-12-30 RX ORDER — DILTIAZEM HYDROCHLORIDE 120 MG/1
120 CAPSULE, COATED, EXTENDED RELEASE ORAL DAILY
Status: DISCONTINUED | OUTPATIENT
Start: 2023-12-30 | End: 2024-01-04 | Stop reason: HOSPADM

## 2023-12-30 RX ORDER — DIGOXIN 125 MCG
62.5 TABLET ORAL DAILY
Status: DISCONTINUED | OUTPATIENT
Start: 2023-12-31 | End: 2024-01-04 | Stop reason: HOSPADM

## 2023-12-30 RX ORDER — CALCIUM CARBONATE 500 MG/1
1000 TABLET, CHEWABLE ORAL ONCE
Status: COMPLETED | OUTPATIENT
Start: 2023-12-30 | End: 2023-12-30

## 2023-12-30 RX ORDER — PANTOPRAZOLE SODIUM 40 MG/1
40 TABLET, DELAYED RELEASE ORAL EVERY 24 HOURS
Status: DISCONTINUED | OUTPATIENT
Start: 2023-12-30 | End: 2024-01-04 | Stop reason: HOSPADM

## 2023-12-30 RX ORDER — SODIUM CHLORIDE 9 MG/ML
INJECTION, SOLUTION INTRAVENOUS CONTINUOUS
Status: DISCONTINUED | OUTPATIENT
Start: 2023-12-30 | End: 2023-12-31

## 2023-12-30 RX ADMIN — DEXAMETHASONE SODIUM PHOSPHATE 4 MG: 4 INJECTION, SOLUTION INTRAMUSCULAR; INTRAVENOUS at 03:02

## 2023-12-30 RX ADMIN — DEXAMETHASONE SODIUM PHOSPHATE 4 MG: 4 INJECTION, SOLUTION INTRAMUSCULAR; INTRAVENOUS at 20:13

## 2023-12-30 RX ADMIN — Medication 10 ML: at 09:42

## 2023-12-30 RX ADMIN — IPRATROPIUM BROMIDE 0.5 MG: 0.5 SOLUTION RESPIRATORY (INHALATION) at 15:50

## 2023-12-30 RX ADMIN — IPRATROPIUM BROMIDE 0.5 MG: 0.5 SOLUTION RESPIRATORY (INHALATION) at 09:47

## 2023-12-30 RX ADMIN — BUDESONIDE 250 MCG: 0.25 SUSPENSION RESPIRATORY (INHALATION) at 09:46

## 2023-12-30 RX ADMIN — ARFORMOTEROL TARTRATE 15 MCG: 15 SOLUTION RESPIRATORY (INHALATION) at 09:46

## 2023-12-30 RX ADMIN — BUDESONIDE 250 MCG: 0.25 SUSPENSION RESPIRATORY (INHALATION) at 20:26

## 2023-12-30 RX ADMIN — METOPROLOL SUCCINATE 12.5 MG: 25 TABLET, EXTENDED RELEASE ORAL at 15:00

## 2023-12-30 RX ADMIN — SODIUM CHLORIDE: 9 INJECTION, SOLUTION INTRAVENOUS at 06:42

## 2023-12-30 RX ADMIN — SODIUM CHLORIDE: 9 INJECTION, SOLUTION INTRAVENOUS at 18:15

## 2023-12-30 RX ADMIN — PANTOPRAZOLE SODIUM 40 MG: 40 TABLET, DELAYED RELEASE ORAL at 15:00

## 2023-12-30 RX ADMIN — ARFORMOTEROL TARTRATE 15 MCG: 15 SOLUTION RESPIRATORY (INHALATION) at 20:26

## 2023-12-30 RX ADMIN — DEXAMETHASONE SODIUM PHOSPHATE 4 MG: 4 INJECTION, SOLUTION INTRAMUSCULAR; INTRAVENOUS at 09:42

## 2023-12-30 RX ADMIN — DILTIAZEM HYDROCHLORIDE 120 MG: 120 CAPSULE, COATED, EXTENDED RELEASE ORAL at 15:00

## 2023-12-30 RX ADMIN — SODIUM CHLORIDE: 9 INJECTION, SOLUTION INTRAVENOUS at 20:22

## 2023-12-30 RX ADMIN — FAMOTIDINE 20 MG: 20 TABLET ORAL at 09:42

## 2023-12-30 RX ADMIN — Medication 10 ML: at 20:13

## 2023-12-30 RX ADMIN — DEXAMETHASONE SODIUM PHOSPHATE 4 MG: 4 INJECTION, SOLUTION INTRAMUSCULAR; INTRAVENOUS at 15:00

## 2023-12-30 RX ADMIN — IPRATROPIUM BROMIDE 0.5 MG: 0.5 SOLUTION RESPIRATORY (INHALATION) at 20:26

## 2023-12-30 RX ADMIN — CALCIUM CARBONATE 1000 MG: 500 TABLET, CHEWABLE ORAL at 15:00

## 2023-12-30 RX ADMIN — WARFARIN SODIUM 2.5 MG: 2.5 TABLET ORAL at 18:13

## 2023-12-30 RX ADMIN — ROSUVASTATIN CALCIUM 10 MG: 10 TABLET, FILM COATED ORAL at 20:13

## 2023-12-30 RX ADMIN — LEVOTHYROXINE SODIUM 50 MCG: 0.05 TABLET ORAL at 09:42

## 2023-12-30 ASSESSMENT — PAIN SCALES - GENERAL
PAINLEVEL_OUTOF10: 0

## 2023-12-30 NOTE — PLAN OF CARE
Problem: ABCDS Injury Assessment  Goal: Absence of physical injury  Outcome: Progressing     Problem: Discharge Planning  Goal: Discharge to home or other facility with appropriate resources  Outcome: Progressing     Problem: Pain  Goal: Verbalizes/displays adequate comfort level or baseline comfort level  Outcome: Progressing     Problem: Safety - Adult  Goal: Free from fall injury  Outcome: Progressing

## 2023-12-30 NOTE — PLAN OF CARE
Problem: ABCDS Injury Assessment  Goal: Absence of physical injury  12/30/2023 1002 by Mariah Brown RN  Outcome: Progressing  12/30/2023 0127 by Mei Penn RN  Outcome: Progressing     Problem: Discharge Planning  Goal: Discharge to home or other facility with appropriate resources  12/30/2023 1002 by Mariah Brown RN  Outcome: Progressing  12/30/2023 0127 by Mei Penn RN  Outcome: Progressing     Problem: Pain  Goal: Verbalizes/displays adequate comfort level or baseline comfort level  12/30/2023 1002 by Mariah Brown RN  Outcome: Progressing  12/30/2023 0127 by Mei Penn RN  Outcome: Progressing     Problem: Safety - Adult  Goal: Free from fall injury  12/30/2023 1002 by Mariah Brown RN  Outcome: Progressing  12/30/2023 0127 by Mei Penn RN  Outcome: Progressing

## 2023-12-31 LAB
ALBUMIN SERPL-MCNC: 3.6 G/DL (ref 3.5–5.2)
ALP SERPL-CCNC: 64 U/L (ref 35–104)
ALT SERPL-CCNC: 26 U/L (ref 0–32)
ANION GAP SERPL CALCULATED.3IONS-SCNC: 13 MMOL/L (ref 7–16)
AST SERPL-CCNC: 30 U/L (ref 0–31)
BASOPHILS # BLD: 0.01 K/UL (ref 0–0.2)
BASOPHILS NFR BLD: 0 % (ref 0–2)
BILIRUB SERPL-MCNC: 0.5 MG/DL (ref 0–1.2)
BUN SERPL-MCNC: 31 MG/DL (ref 6–23)
CALCIUM SERPL-MCNC: 9.1 MG/DL (ref 8.6–10.2)
CHLORIDE SERPL-SCNC: 102 MMOL/L (ref 98–107)
CO2 SERPL-SCNC: 22 MMOL/L (ref 22–29)
CREAT SERPL-MCNC: 0.9 MG/DL (ref 0.5–1)
EOSINOPHIL # BLD: 0 K/UL (ref 0.05–0.5)
EOSINOPHILS RELATIVE PERCENT: 0 % (ref 0–6)
ERYTHROCYTE [DISTWIDTH] IN BLOOD BY AUTOMATED COUNT: 13.5 % (ref 11.5–15)
GFR SERPL CREATININE-BSD FRML MDRD: 59 ML/MIN/1.73M2
GLUCOSE SERPL-MCNC: 157 MG/DL (ref 74–99)
HCT VFR BLD AUTO: 37.9 % (ref 34–48)
HGB BLD-MCNC: 11.9 G/DL (ref 11.5–15.5)
IMM GRANULOCYTES # BLD AUTO: 0.06 K/UL (ref 0–0.58)
IMM GRANULOCYTES NFR BLD: 1 % (ref 0–5)
LYMPHOCYTES NFR BLD: 0.39 K/UL (ref 1.5–4)
LYMPHOCYTES RELATIVE PERCENT: 4 % (ref 20–42)
MCH RBC QN AUTO: 29.6 PG (ref 26–35)
MCHC RBC AUTO-ENTMCNC: 31.4 G/DL (ref 32–34.5)
MCV RBC AUTO: 94.3 FL (ref 80–99.9)
MONOCYTES NFR BLD: 0.19 K/UL (ref 0.1–0.95)
MONOCYTES NFR BLD: 2 % (ref 2–12)
NEUTROPHILS NFR BLD: 94 % (ref 43–80)
NEUTS SEG NFR BLD: 9.38 K/UL (ref 1.8–7.3)
PLATELET # BLD AUTO: 115 K/UL (ref 130–450)
PMV BLD AUTO: 11.2 FL (ref 7–12)
POTASSIUM SERPL-SCNC: 4.2 MMOL/L (ref 3.5–5)
PROT SERPL-MCNC: 6.1 G/DL (ref 6.4–8.3)
RBC # BLD AUTO: 4.02 M/UL (ref 3.5–5.5)
RBC # BLD: ABNORMAL 10*6/UL
SODIUM SERPL-SCNC: 137 MMOL/L (ref 132–146)
TSH SERPL DL<=0.05 MIU/L-ACNC: 0.13 UIU/ML (ref 0.27–4.2)
WBC OTHER # BLD: 10 K/UL (ref 4.5–11.5)

## 2023-12-31 PROCEDURE — 84443 ASSAY THYROID STIM HORMONE: CPT

## 2023-12-31 PROCEDURE — 6370000000 HC RX 637 (ALT 250 FOR IP): Performed by: STUDENT IN AN ORGANIZED HEALTH CARE EDUCATION/TRAINING PROGRAM

## 2023-12-31 PROCEDURE — 85025 COMPLETE CBC W/AUTO DIFF WBC: CPT

## 2023-12-31 PROCEDURE — 2580000003 HC RX 258: Performed by: STUDENT IN AN ORGANIZED HEALTH CARE EDUCATION/TRAINING PROGRAM

## 2023-12-31 PROCEDURE — 2060000000 HC ICU INTERMEDIATE R&B

## 2023-12-31 PROCEDURE — 6360000002 HC RX W HCPCS: Performed by: STUDENT IN AN ORGANIZED HEALTH CARE EDUCATION/TRAINING PROGRAM

## 2023-12-31 PROCEDURE — 6370000000 HC RX 637 (ALT 250 FOR IP): Performed by: INTERNAL MEDICINE

## 2023-12-31 PROCEDURE — 2580000003 HC RX 258

## 2023-12-31 PROCEDURE — 99232 SBSQ HOSP IP/OBS MODERATE 35: CPT | Performed by: INTERNAL MEDICINE

## 2023-12-31 PROCEDURE — 94640 AIRWAY INHALATION TREATMENT: CPT

## 2023-12-31 PROCEDURE — 2700000000 HC OXYGEN THERAPY PER DAY

## 2023-12-31 PROCEDURE — 80053 COMPREHEN METABOLIC PANEL: CPT

## 2023-12-31 RX ORDER — DEXAMETHASONE SODIUM PHOSPHATE 4 MG/ML
4 INJECTION, SOLUTION INTRA-ARTICULAR; INTRALESIONAL; INTRAMUSCULAR; INTRAVENOUS; SOFT TISSUE EVERY 12 HOURS
Status: DISCONTINUED | OUTPATIENT
Start: 2024-01-01 | End: 2024-01-04

## 2023-12-31 RX ADMIN — IPRATROPIUM BROMIDE 0.5 MG: 0.5 SOLUTION RESPIRATORY (INHALATION) at 16:39

## 2023-12-31 RX ADMIN — ARFORMOTEROL TARTRATE 15 MCG: 15 SOLUTION RESPIRATORY (INHALATION) at 21:32

## 2023-12-31 RX ADMIN — POLYETHYLENE GLYCOL 3350 17 G: 17 POWDER, FOR SOLUTION ORAL at 17:02

## 2023-12-31 RX ADMIN — METOPROLOL SUCCINATE 12.5 MG: 25 TABLET, EXTENDED RELEASE ORAL at 09:46

## 2023-12-31 RX ADMIN — DILTIAZEM HYDROCHLORIDE 120 MG: 120 CAPSULE, COATED, EXTENDED RELEASE ORAL at 09:45

## 2023-12-31 RX ADMIN — DEXAMETHASONE SODIUM PHOSPHATE 4 MG: 4 INJECTION, SOLUTION INTRAMUSCULAR; INTRAVENOUS at 03:07

## 2023-12-31 RX ADMIN — IPRATROPIUM BROMIDE 0.5 MG: 0.5 SOLUTION RESPIRATORY (INHALATION) at 10:12

## 2023-12-31 RX ADMIN — Medication 10 ML: at 09:48

## 2023-12-31 RX ADMIN — DIGOXIN 62.5 MCG: 0.12 TABLET ORAL at 09:46

## 2023-12-31 RX ADMIN — BUDESONIDE 250 MCG: 0.25 SUSPENSION RESPIRATORY (INHALATION) at 21:32

## 2023-12-31 RX ADMIN — Medication 10 ML: at 09:46

## 2023-12-31 RX ADMIN — ARFORMOTEROL TARTRATE 15 MCG: 15 SOLUTION RESPIRATORY (INHALATION) at 10:12

## 2023-12-31 RX ADMIN — IPRATROPIUM BROMIDE 0.5 MG: 0.5 SOLUTION RESPIRATORY (INHALATION) at 14:13

## 2023-12-31 RX ADMIN — DEXAMETHASONE SODIUM PHOSPHATE 4 MG: 4 INJECTION, SOLUTION INTRAMUSCULAR; INTRAVENOUS at 09:46

## 2023-12-31 RX ADMIN — PANTOPRAZOLE SODIUM 40 MG: 40 TABLET, DELAYED RELEASE ORAL at 17:02

## 2023-12-31 RX ADMIN — FAMOTIDINE 20 MG: 20 TABLET ORAL at 09:45

## 2023-12-31 RX ADMIN — IPRATROPIUM BROMIDE 0.5 MG: 0.5 SOLUTION RESPIRATORY (INHALATION) at 21:32

## 2023-12-31 RX ADMIN — DEXAMETHASONE SODIUM PHOSPHATE 4 MG: 4 INJECTION, SOLUTION INTRAMUSCULAR; INTRAVENOUS at 17:03

## 2023-12-31 RX ADMIN — BUDESONIDE 250 MCG: 0.25 SUSPENSION RESPIRATORY (INHALATION) at 10:12

## 2023-12-31 RX ADMIN — WARFARIN SODIUM 2.5 MG: 2.5 TABLET ORAL at 17:03

## 2023-12-31 RX ADMIN — ROSUVASTATIN CALCIUM 10 MG: 10 TABLET, FILM COATED ORAL at 20:15

## 2023-12-31 RX ADMIN — SODIUM CHLORIDE, PRESERVATIVE FREE 10 ML: 5 INJECTION INTRAVENOUS at 11:50

## 2023-12-31 RX ADMIN — Medication 10 ML: at 20:15

## 2023-12-31 RX ADMIN — LEVOTHYROXINE SODIUM 50 MCG: 0.05 TABLET ORAL at 09:46

## 2023-12-31 ASSESSMENT — PAIN SCALES - GENERAL
PAINLEVEL_OUTOF10: 0

## 2023-12-31 NOTE — PLAN OF CARE
Problem: ABCDS Injury Assessment  Goal: Absence of physical injury  12/30/2023 2250 by Mei Penn RN  Outcome: Progressing  12/30/2023 1002 by Mariah Brown RN  Outcome: Progressing     Problem: Discharge Planning  Goal: Discharge to home or other facility with appropriate resources  12/30/2023 2250 by Mei Penn RN  Outcome: Progressing  12/30/2023 1002 by Mariah Brown RN  Outcome: Progressing     Problem: Pain  Goal: Verbalizes/displays adequate comfort level or baseline comfort level  12/30/2023 2250 by Mei Penn RN  Outcome: Progressing  12/30/2023 1002 by Mariah Brown RN  Outcome: Progressing     Problem: Safety - Adult  Goal: Free from fall injury  12/30/2023 2250 by Mei Penn RN  Outcome: Progressing  12/30/2023 1002 by Mariah Brown RN  Outcome: Progressing

## 2024-01-01 LAB — BNP SERPL-MCNC: 3170 PG/ML (ref 0–450)

## 2024-01-01 PROCEDURE — 6360000002 HC RX W HCPCS: Performed by: STUDENT IN AN ORGANIZED HEALTH CARE EDUCATION/TRAINING PROGRAM

## 2024-01-01 PROCEDURE — 6360000002 HC RX W HCPCS: Performed by: INTERNAL MEDICINE

## 2024-01-01 PROCEDURE — 97165 OT EVAL LOW COMPLEX 30 MIN: CPT

## 2024-01-01 PROCEDURE — 2580000003 HC RX 258: Performed by: STUDENT IN AN ORGANIZED HEALTH CARE EDUCATION/TRAINING PROGRAM

## 2024-01-01 PROCEDURE — 99232 SBSQ HOSP IP/OBS MODERATE 35: CPT | Performed by: INTERNAL MEDICINE

## 2024-01-01 PROCEDURE — 2060000000 HC ICU INTERMEDIATE R&B

## 2024-01-01 PROCEDURE — 6370000000 HC RX 637 (ALT 250 FOR IP): Performed by: INTERNAL MEDICINE

## 2024-01-01 PROCEDURE — 2700000000 HC OXYGEN THERAPY PER DAY

## 2024-01-01 PROCEDURE — 6370000000 HC RX 637 (ALT 250 FOR IP): Performed by: STUDENT IN AN ORGANIZED HEALTH CARE EDUCATION/TRAINING PROGRAM

## 2024-01-01 PROCEDURE — 83880 ASSAY OF NATRIURETIC PEPTIDE: CPT

## 2024-01-01 PROCEDURE — 94640 AIRWAY INHALATION TREATMENT: CPT

## 2024-01-01 RX ADMIN — IPRATROPIUM BROMIDE 0.5 MG: 0.5 SOLUTION RESPIRATORY (INHALATION) at 17:01

## 2024-01-01 RX ADMIN — IPRATROPIUM BROMIDE 0.5 MG: 0.5 SOLUTION RESPIRATORY (INHALATION) at 21:07

## 2024-01-01 RX ADMIN — FAMOTIDINE 20 MG: 20 TABLET ORAL at 09:47

## 2024-01-01 RX ADMIN — WARFARIN SODIUM 2.5 MG: 2.5 TABLET ORAL at 18:55

## 2024-01-01 RX ADMIN — Medication 10 ML: at 20:30

## 2024-01-01 RX ADMIN — ROSUVASTATIN CALCIUM 10 MG: 10 TABLET, FILM COATED ORAL at 20:30

## 2024-01-01 RX ADMIN — BUDESONIDE 250 MCG: 0.25 SUSPENSION RESPIRATORY (INHALATION) at 21:08

## 2024-01-01 RX ADMIN — DIGOXIN 62.5 MCG: 0.12 TABLET ORAL at 09:47

## 2024-01-01 RX ADMIN — LEVOTHYROXINE SODIUM 50 MCG: 0.05 TABLET ORAL at 09:47

## 2024-01-01 RX ADMIN — PANTOPRAZOLE SODIUM 40 MG: 40 TABLET, DELAYED RELEASE ORAL at 14:20

## 2024-01-01 RX ADMIN — ARFORMOTEROL TARTRATE 15 MCG: 15 SOLUTION RESPIRATORY (INHALATION) at 21:07

## 2024-01-01 RX ADMIN — DEXAMETHASONE SODIUM PHOSPHATE 4 MG: 4 INJECTION, SOLUTION INTRAMUSCULAR; INTRAVENOUS at 03:45

## 2024-01-01 RX ADMIN — IPRATROPIUM BROMIDE 0.5 MG: 0.5 SOLUTION RESPIRATORY (INHALATION) at 12:20

## 2024-01-01 RX ADMIN — Medication 10 ML: at 09:48

## 2024-01-01 RX ADMIN — DEXAMETHASONE SODIUM PHOSPHATE 4 MG: 4 INJECTION, SOLUTION INTRAMUSCULAR; INTRAVENOUS at 14:20

## 2024-01-01 RX ADMIN — IPRATROPIUM BROMIDE 0.5 MG: 0.5 SOLUTION RESPIRATORY (INHALATION) at 08:20

## 2024-01-01 RX ADMIN — ARFORMOTEROL TARTRATE 15 MCG: 15 SOLUTION RESPIRATORY (INHALATION) at 08:20

## 2024-01-01 RX ADMIN — DILTIAZEM HYDROCHLORIDE 120 MG: 120 CAPSULE, COATED, EXTENDED RELEASE ORAL at 09:47

## 2024-01-01 RX ADMIN — METOPROLOL SUCCINATE 12.5 MG: 25 TABLET, EXTENDED RELEASE ORAL at 09:47

## 2024-01-01 RX ADMIN — BUDESONIDE 250 MCG: 0.25 SUSPENSION RESPIRATORY (INHALATION) at 08:20

## 2024-01-01 RX ADMIN — SODIUM CHLORIDE, PRESERVATIVE FREE 10 ML: 5 INJECTION INTRAVENOUS at 03:45

## 2024-01-01 ASSESSMENT — PAIN SCALES - GENERAL
PAINLEVEL_OUTOF10: 0

## 2024-01-01 NOTE — PLAN OF CARE
Problem: ABCDS Injury Assessment  Goal: Absence of physical injury  Outcome: Progressing     Problem: Discharge Planning  Goal: Discharge to home or other facility with appropriate resources  Outcome: Progressing     Problem: Pain  Goal: Verbalizes/displays adequate comfort level or baseline comfort level  Outcome: Progressing

## 2024-01-02 PROBLEM — R79.89 HIGH THYROID HORMONE LEVEL: Status: ACTIVE | Noted: 2024-01-02

## 2024-01-02 LAB
T4 FREE SERPL-MCNC: 1.1 NG/DL (ref 0.9–1.7)
TSH SERPL DL<=0.05 MIU/L-ACNC: 0.11 UIU/ML (ref 0.27–4.2)

## 2024-01-02 PROCEDURE — 6370000000 HC RX 637 (ALT 250 FOR IP): Performed by: STUDENT IN AN ORGANIZED HEALTH CARE EDUCATION/TRAINING PROGRAM

## 2024-01-02 PROCEDURE — 84443 ASSAY THYROID STIM HORMONE: CPT

## 2024-01-02 PROCEDURE — 2580000003 HC RX 258: Performed by: STUDENT IN AN ORGANIZED HEALTH CARE EDUCATION/TRAINING PROGRAM

## 2024-01-02 PROCEDURE — 99232 SBSQ HOSP IP/OBS MODERATE 35: CPT | Performed by: INTERNAL MEDICINE

## 2024-01-02 PROCEDURE — 6360000002 HC RX W HCPCS: Performed by: INTERNAL MEDICINE

## 2024-01-02 PROCEDURE — 6370000000 HC RX 637 (ALT 250 FOR IP)

## 2024-01-02 PROCEDURE — 94640 AIRWAY INHALATION TREATMENT: CPT

## 2024-01-02 PROCEDURE — 6370000000 HC RX 637 (ALT 250 FOR IP): Performed by: INTERNAL MEDICINE

## 2024-01-02 PROCEDURE — 84439 ASSAY OF FREE THYROXINE: CPT

## 2024-01-02 PROCEDURE — 99222 1ST HOSP IP/OBS MODERATE 55: CPT | Performed by: INTERNAL MEDICINE

## 2024-01-02 PROCEDURE — 2700000000 HC OXYGEN THERAPY PER DAY

## 2024-01-02 PROCEDURE — 6360000002 HC RX W HCPCS: Performed by: STUDENT IN AN ORGANIZED HEALTH CARE EDUCATION/TRAINING PROGRAM

## 2024-01-02 PROCEDURE — 6370000000 HC RX 637 (ALT 250 FOR IP): Performed by: NURSE PRACTITIONER

## 2024-01-02 PROCEDURE — 2060000000 HC ICU INTERMEDIATE R&B

## 2024-01-02 RX ORDER — LEVOTHYROXINE SODIUM 0.03 MG/1
25 TABLET ORAL DAILY
Status: DISCONTINUED | OUTPATIENT
Start: 2024-01-03 | End: 2024-01-02

## 2024-01-02 RX ORDER — ASCORBIC ACID 500 MG
500 TABLET ORAL DAILY
Status: DISCONTINUED | OUTPATIENT
Start: 2024-01-02 | End: 2024-01-04 | Stop reason: HOSPADM

## 2024-01-02 RX ORDER — BUMETANIDE 0.25 MG/ML
0.5 INJECTION INTRAMUSCULAR; INTRAVENOUS ONCE
Status: COMPLETED | OUTPATIENT
Start: 2024-01-02 | End: 2024-01-02

## 2024-01-02 RX ORDER — ZINC SULFATE 50(220)MG
50 CAPSULE ORAL DAILY
Status: DISCONTINUED | OUTPATIENT
Start: 2024-01-02 | End: 2024-01-04 | Stop reason: HOSPADM

## 2024-01-02 RX ORDER — IPRATROPIUM BROMIDE AND ALBUTEROL SULFATE 2.5; .5 MG/3ML; MG/3ML
1 SOLUTION RESPIRATORY (INHALATION)
Status: DISCONTINUED | OUTPATIENT
Start: 2024-01-02 | End: 2024-01-04 | Stop reason: HOSPADM

## 2024-01-02 RX ORDER — DOCUSATE SODIUM 100 MG/1
100 CAPSULE, LIQUID FILLED ORAL DAILY
Status: DISCONTINUED | OUTPATIENT
Start: 2024-01-02 | End: 2024-01-04 | Stop reason: HOSPADM

## 2024-01-02 RX ORDER — VITAMIN B COMPLEX
1000 TABLET ORAL DAILY
Status: DISCONTINUED | OUTPATIENT
Start: 2024-01-02 | End: 2024-01-04 | Stop reason: HOSPADM

## 2024-01-02 RX ADMIN — WARFARIN SODIUM 2.5 MG: 2.5 TABLET ORAL at 18:29

## 2024-01-02 RX ADMIN — ZINC SULFATE 220 MG (50 MG) CAPSULE 50 MG: CAPSULE at 14:38

## 2024-01-02 RX ADMIN — ROSUVASTATIN CALCIUM 10 MG: 10 TABLET, FILM COATED ORAL at 20:50

## 2024-01-02 RX ADMIN — DEXAMETHASONE SODIUM PHOSPHATE 4 MG: 4 INJECTION, SOLUTION INTRAMUSCULAR; INTRAVENOUS at 03:00

## 2024-01-02 RX ADMIN — Medication 1000 UNITS: at 14:38

## 2024-01-02 RX ADMIN — LEVOTHYROXINE SODIUM 50 MCG: 0.05 TABLET ORAL at 10:10

## 2024-01-02 RX ADMIN — METOPROLOL SUCCINATE 12.5 MG: 25 TABLET, EXTENDED RELEASE ORAL at 10:10

## 2024-01-02 RX ADMIN — DILTIAZEM HYDROCHLORIDE 120 MG: 120 CAPSULE, COATED, EXTENDED RELEASE ORAL at 10:10

## 2024-01-02 RX ADMIN — IPRATROPIUM BROMIDE AND ALBUTEROL SULFATE 1 DOSE: .5; 2.5 SOLUTION RESPIRATORY (INHALATION) at 16:33

## 2024-01-02 RX ADMIN — PANTOPRAZOLE SODIUM 40 MG: 40 TABLET, DELAYED RELEASE ORAL at 16:35

## 2024-01-02 RX ADMIN — DIGOXIN 62.5 MCG: 0.12 TABLET ORAL at 10:10

## 2024-01-02 RX ADMIN — ARFORMOTEROL TARTRATE 15 MCG: 15 SOLUTION RESPIRATORY (INHALATION) at 07:55

## 2024-01-02 RX ADMIN — Medication 10 ML: at 10:11

## 2024-01-02 RX ADMIN — FAMOTIDINE 20 MG: 20 TABLET ORAL at 10:10

## 2024-01-02 RX ADMIN — BUDESONIDE 250 MCG: 0.25 SUSPENSION RESPIRATORY (INHALATION) at 07:55

## 2024-01-02 RX ADMIN — BUDESONIDE 250 MCG: 0.25 SUSPENSION RESPIRATORY (INHALATION) at 21:05

## 2024-01-02 RX ADMIN — SODIUM CHLORIDE, PRESERVATIVE FREE 10 ML: 5 INJECTION INTRAVENOUS at 03:00

## 2024-01-02 RX ADMIN — DOCUSATE SODIUM 100 MG: 100 CAPSULE, LIQUID FILLED ORAL at 20:50

## 2024-01-02 RX ADMIN — IPRATROPIUM BROMIDE AND ALBUTEROL SULFATE 1 DOSE: .5; 2.5 SOLUTION RESPIRATORY (INHALATION) at 21:05

## 2024-01-02 RX ADMIN — IPRATROPIUM BROMIDE 0.5 MG: 0.5 SOLUTION RESPIRATORY (INHALATION) at 07:55

## 2024-01-02 RX ADMIN — SACUBITRIL AND VALSARTAN 0.5 TABLET: 24; 26 TABLET, FILM COATED ORAL at 20:50

## 2024-01-02 RX ADMIN — ARFORMOTEROL TARTRATE 15 MCG: 15 SOLUTION RESPIRATORY (INHALATION) at 21:05

## 2024-01-02 RX ADMIN — Medication 10 ML: at 20:51

## 2024-01-02 RX ADMIN — OXYCODONE HYDROCHLORIDE AND ACETAMINOPHEN 500 MG: 500 TABLET ORAL at 14:38

## 2024-01-02 RX ADMIN — DEXAMETHASONE SODIUM PHOSPHATE 4 MG: 4 INJECTION, SOLUTION INTRAMUSCULAR; INTRAVENOUS at 16:35

## 2024-01-02 RX ADMIN — BUMETANIDE 0.5 MG: 0.25 INJECTION INTRAMUSCULAR; INTRAVENOUS at 13:12

## 2024-01-02 ASSESSMENT — PAIN SCALES - GENERAL
PAINLEVEL_OUTOF10: 0
PAINLEVEL_OUTOF10: 0

## 2024-01-02 NOTE — CONSULTS
CARDIOLOGY CONSULTATION    Patient Name:  Krystal Hyatt    :  1940    Reason for Consultation:   Recurrent symptomatic atrial fibrillation rapid ventricular response; shortness of breath    History of Present Illness:   Krystal Hyatt returns to [facility], following a recent hospitalization for recurrent atrial fibrillation and upper respiratory infection.  She has a longstanding history of recurrent palpitations and shortness of breath and fatigue following the recurrent onset of atrial fibrillation with a rapid ventricular response.as well as having significant underlying valvular heart disease.  This in addition to history of carcinoma of the lung and status post history of radiation therapy and underlying chronic obstructive pulmonary disease.  From a cardiac standpoint she has fared well over the past 5 years but recently has sustained multiple readmissions for her upper respiratory status as well as recurrence of atrial fibrillation and intolerance of multiple cardiac medications specifically that of amiodarone which had maintained her in sinus rhythm following cardioversion.  She now returns once again for adjustment of her medical regimen but on this occasion with the purpose of regularizing her ventricular response.  Past Medical History:   has a past medical history of Atrial fibrillation (HCC), CHF (congestive heart failure) (HCC), Emphysema, unspecified (HCC), Hyperlipidemia, Hypertension, Lung cancer (HCC), Mitral valve regurgitation, Oxygen dependent, Prolonged emergence from general anesthesia, Skin cancer, and Thyroid disease.    Surgical History:   has a past surgical history that includes Breast biopsy (Right); Tubal ligation; bronchoscopy (N/A, 2021); Intracapsular cataract extraction (Right, 2021); Intracapsular cataract extraction (Left, 2022); and Cardioversion (10/06/2023).     Social History:   reports that she quit smoking about 25 years 
good equal air entry no added sounds   CVS: S1 + S2   Abd: soft lax, no tenderness,   Skin: warm, no lesions, no rash. No palmar erythema, no onycholysis, no pretibial Myxoedema, no acropachy   Musculoskeletal: No back tenderness, no kyphosis/scoliosis    Neuro: CN intact, sensation notmal , muscle power normal. No tremors   Psych: normal mood, and affect    Review of Laboratory Data:  I personally reviewed the following labs:  Recent Labs     12/30/23 0925 12/31/23  0802   WBC 6.2 10.0   RBC 3.70 4.02   HGB 11.0* 11.9   HCT 33.9* 37.9   MCV 91.6 94.3   MCH 29.7 29.6   MCHC 32.4 31.4*   RDW 13.5 13.5   * 115*   MPV 11.5 11.2     Recent Labs     12/30/23 0925 12/31/23  0802    137   K 3.5 4.2   CL 98 102   CO2 25 22   BUN 40* 31*   CREATININE 1.1* 0.9   GLUCOSE 259* 157*   CALCIUM 8.9 9.1   PROT 5.7* 6.1*   LABALBU 3.4* 3.6   BILITOT 0.5 0.5   ALKPHOS 57 64   AST 24 30   ALT 20 26     No results found for: \"BHYDRXBUT\"  Lab Results   Component Value Date/Time    LABA1C 6.0 12/22/2023 12:35 PM     Lab Results   Component Value Date/Time    TSH 0.13 (L) 12/31/2023 08:02 AM    T4FREE 1.8 (H) 09/22/2023 01:45 AM    R8BOBJW 8.3 09/23/2017 05:30 PM    FT3 1.65 (L) 12/23/2023 11:26 AM     Lab Results   Component Value Date/Time    LABA1C 6.0 12/22/2023 12:35 PM    GLUCOSE 157 12/31/2023 08:02 AM    GLUCOSE 87 05/04/2012 11:00 AM     Lab Results   Component Value Date/Time    TRIG 87 09/18/2023 09:41 AM    TRIG 87 09/18/2023 09:41 AM    HDL 75 09/18/2023 09:41 AM    HDL 75 09/18/2023 09:41 AM    LDLCALC 77 05/02/2022 10:40 AM    CHOL 165 09/18/2023 09:41 AM    CHOL 165 09/18/2023 09:41 AM    CHOL 156 05/02/2022 10:40 AM       Blood culture   No results found for: \"BC\"    Radiology:  CTA PULMONARY W CONTRAST   Final Result   1. No evidence of acute pulmonary artery embolism.   2. 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  
are no oral lesions and no post-nasal drip   Neck: supple without adenopathy  Cardiovascular: regular rate and rhythm without murmur or gallop  Respiratory: Right rhonchi, bilateral crackles  Abdomen: soft, non-tender, non-distended, normal bowel sounds  Extremities: warm, no edema, no clubbing  Skin: no rash or lesion  Neurologic: Alert and oriented    Pulmonary Function Testing 8/3/2023  INTERPRETATION  This is a good patient effort. FEV1/FVC ratio is 78%.  The FEV 1 is 0.85L, 42 % predicted. Post bronchodilator FEV1 is 0.94L, 47% predicted, 4% change. The FVC is  1.09L, 41 % predicted. Post bronchodilator FVC is 1.15L, 43% predicted, 2% change. Mid-flows are 45%.  Forced expiratory time is 7.46s.  The total lung capacity is  3.75L, 69 % predicted.  The RV/TLC ratio is 131% predicted.  The DLCOc is 9.37ml/min/mmHg, 49% predicted.  This does correct for alveolar volume.  The MVV is 40%.  The MIP is 117%, and the MEP is 121%.  The flow volume loop demonstrates restriction.      IMPRESSION  1. Spirometry reveals no obstruction  2. There is no bronchodilator response.  3. Lung volumes indicate restriction  4. The diffusing capacity is moderate reduced     Imaging personally reviewed:  Pulmonary CTA 12/29/2023:  IMPRESSION:  1. No evidence of acute pulmonary artery embolism.  2. 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  No definitive  mass or other cause.  3. Emphysema.  4. Moderate hiatal hernia.  5. Atherosclerosis.    Echo 4/7/2023:  Summary   Left ventricle grossly normal in size.   Global hypokinesis is noted to be mild to moderate.   Estimated left ventricular ejection fraction is 38 ±5%.   Mild - moderate left ventricular concentric hypertrophy noted.   Diastolic function cannot be accurately assessed due to significant mitral   regurgitation.   Borderline dilated right ventricle.   TAPSE is decreased .   Mild mitral

## 2024-01-03 PROBLEM — E03.9 HYPOTHYROIDISM: Status: ACTIVE | Noted: 2024-01-03

## 2024-01-03 LAB
ALBUMIN SERPL-MCNC: 3.2 G/DL (ref 3.5–5.2)
ALP SERPL-CCNC: 56 U/L (ref 35–104)
ALT SERPL-CCNC: 58 U/L (ref 0–32)
ANION GAP SERPL CALCULATED.3IONS-SCNC: 11 MMOL/L (ref 7–16)
AST SERPL-CCNC: 41 U/L (ref 0–31)
BASOPHILS # BLD: 0.01 K/UL (ref 0–0.2)
BASOPHILS NFR BLD: 0 % (ref 0–2)
BILIRUB SERPL-MCNC: 0.5 MG/DL (ref 0–1.2)
BUN SERPL-MCNC: 23 MG/DL (ref 6–23)
CALCIUM SERPL-MCNC: 8.7 MG/DL (ref 8.6–10.2)
CHLORIDE SERPL-SCNC: 99 MMOL/L (ref 98–107)
CO2 SERPL-SCNC: 27 MMOL/L (ref 22–29)
CREAT SERPL-MCNC: 0.8 MG/DL (ref 0.5–1)
EOSINOPHIL # BLD: 0 K/UL (ref 0.05–0.5)
EOSINOPHILS RELATIVE PERCENT: 0 % (ref 0–6)
ERYTHROCYTE [DISTWIDTH] IN BLOOD BY AUTOMATED COUNT: 13.8 % (ref 11.5–15)
GFR SERPL CREATININE-BSD FRML MDRD: >60 ML/MIN/1.73M2
GLUCOSE SERPL-MCNC: 132 MG/DL (ref 74–99)
HCT VFR BLD AUTO: 31.7 % (ref 34–48)
HGB BLD-MCNC: 10.3 G/DL (ref 11.5–15.5)
IMM GRANULOCYTES # BLD AUTO: 0.08 K/UL (ref 0–0.58)
IMM GRANULOCYTES NFR BLD: 1 % (ref 0–5)
LYMPHOCYTES NFR BLD: 0.62 K/UL (ref 1.5–4)
LYMPHOCYTES RELATIVE PERCENT: 7 % (ref 20–42)
MAGNESIUM SERPL-MCNC: 1.6 MG/DL (ref 1.6–2.6)
MCH RBC QN AUTO: 29.8 PG (ref 26–35)
MCHC RBC AUTO-ENTMCNC: 32.5 G/DL (ref 32–34.5)
MCV RBC AUTO: 91.6 FL (ref 80–99.9)
MICROORGANISM SPEC CULT: NORMAL
MICROORGANISM SPEC CULT: NORMAL
MONOCYTES NFR BLD: 0.25 K/UL (ref 0.1–0.95)
MONOCYTES NFR BLD: 3 % (ref 2–12)
NEUTROPHILS NFR BLD: 89 % (ref 43–80)
NEUTS SEG NFR BLD: 8 K/UL (ref 1.8–7.3)
PHOSPHATE SERPL-MCNC: 2.6 MG/DL (ref 2.5–4.5)
PLATELET CONFIRMATION: NORMAL
PLATELET, FLUORESCENCE: 97 K/UL (ref 130–450)
PMV BLD AUTO: 11.4 FL (ref 7–12)
POTASSIUM SERPL-SCNC: 4.5 MMOL/L (ref 3.5–5)
PROT SERPL-MCNC: 5.9 G/DL (ref 6.4–8.3)
RBC # BLD AUTO: 3.46 M/UL (ref 3.5–5.5)
RBC # BLD: ABNORMAL 10*6/UL
RBC # BLD: ABNORMAL 10*6/UL
SERVICE CMNT-IMP: NORMAL
SERVICE CMNT-IMP: NORMAL
SODIUM SERPL-SCNC: 137 MMOL/L (ref 132–146)
SPECIMEN DESCRIPTION: NORMAL
SPECIMEN DESCRIPTION: NORMAL
WBC OTHER # BLD: 9 K/UL (ref 4.5–11.5)

## 2024-01-03 PROCEDURE — 6370000000 HC RX 637 (ALT 250 FOR IP): Performed by: NURSE PRACTITIONER

## 2024-01-03 PROCEDURE — 6370000000 HC RX 637 (ALT 250 FOR IP)

## 2024-01-03 PROCEDURE — 99232 SBSQ HOSP IP/OBS MODERATE 35: CPT | Performed by: INTERNAL MEDICINE

## 2024-01-03 PROCEDURE — 83735 ASSAY OF MAGNESIUM: CPT

## 2024-01-03 PROCEDURE — 2580000003 HC RX 258: Performed by: STUDENT IN AN ORGANIZED HEALTH CARE EDUCATION/TRAINING PROGRAM

## 2024-01-03 PROCEDURE — 6360000002 HC RX W HCPCS: Performed by: INTERNAL MEDICINE

## 2024-01-03 PROCEDURE — 85025 COMPLETE CBC W/AUTO DIFF WBC: CPT

## 2024-01-03 PROCEDURE — 94669 MECHANICAL CHEST WALL OSCILL: CPT

## 2024-01-03 PROCEDURE — 36415 COLL VENOUS BLD VENIPUNCTURE: CPT

## 2024-01-03 PROCEDURE — 6360000002 HC RX W HCPCS: Performed by: STUDENT IN AN ORGANIZED HEALTH CARE EDUCATION/TRAINING PROGRAM

## 2024-01-03 PROCEDURE — 6370000000 HC RX 637 (ALT 250 FOR IP): Performed by: INTERNAL MEDICINE

## 2024-01-03 PROCEDURE — 84100 ASSAY OF PHOSPHORUS: CPT

## 2024-01-03 PROCEDURE — 2700000000 HC OXYGEN THERAPY PER DAY

## 2024-01-03 PROCEDURE — 80053 COMPREHEN METABOLIC PANEL: CPT

## 2024-01-03 PROCEDURE — 94640 AIRWAY INHALATION TREATMENT: CPT

## 2024-01-03 PROCEDURE — 6370000000 HC RX 637 (ALT 250 FOR IP): Performed by: STUDENT IN AN ORGANIZED HEALTH CARE EDUCATION/TRAINING PROGRAM

## 2024-01-03 PROCEDURE — 2060000000 HC ICU INTERMEDIATE R&B

## 2024-01-03 RX ORDER — BUMETANIDE 1 MG/1
1 TABLET ORAL DAILY
Status: DISCONTINUED | OUTPATIENT
Start: 2024-01-03 | End: 2024-01-04 | Stop reason: HOSPADM

## 2024-01-03 RX ORDER — LANOLIN ALCOHOL/MO/W.PET/CERES
400 CREAM (GRAM) TOPICAL DAILY
Status: DISCONTINUED | OUTPATIENT
Start: 2024-01-03 | End: 2024-01-04 | Stop reason: HOSPADM

## 2024-01-03 RX ADMIN — FAMOTIDINE 20 MG: 20 TABLET ORAL at 09:42

## 2024-01-03 RX ADMIN — BUDESONIDE 250 MCG: 0.25 SUSPENSION RESPIRATORY (INHALATION) at 21:44

## 2024-01-03 RX ADMIN — ZINC SULFATE 220 MG (50 MG) CAPSULE 50 MG: CAPSULE at 09:42

## 2024-01-03 RX ADMIN — Medication 10 ML: at 09:43

## 2024-01-03 RX ADMIN — IPRATROPIUM BROMIDE AND ALBUTEROL SULFATE 1 DOSE: .5; 2.5 SOLUTION RESPIRATORY (INHALATION) at 12:35

## 2024-01-03 RX ADMIN — DOCUSATE SODIUM 100 MG: 100 CAPSULE, LIQUID FILLED ORAL at 09:42

## 2024-01-03 RX ADMIN — PANTOPRAZOLE SODIUM 40 MG: 40 TABLET, DELAYED RELEASE ORAL at 14:31

## 2024-01-03 RX ADMIN — SACUBITRIL AND VALSARTAN 0.5 TABLET: 24; 26 TABLET, FILM COATED ORAL at 20:56

## 2024-01-03 RX ADMIN — DEXAMETHASONE SODIUM PHOSPHATE 4 MG: 4 INJECTION, SOLUTION INTRAMUSCULAR; INTRAVENOUS at 03:04

## 2024-01-03 RX ADMIN — DILTIAZEM HYDROCHLORIDE 120 MG: 120 CAPSULE, COATED, EXTENDED RELEASE ORAL at 09:42

## 2024-01-03 RX ADMIN — BUMETANIDE 1 MG: 1 TABLET ORAL at 14:30

## 2024-01-03 RX ADMIN — Medication 10 ML: at 20:55

## 2024-01-03 RX ADMIN — OXYCODONE HYDROCHLORIDE AND ACETAMINOPHEN 500 MG: 500 TABLET ORAL at 09:42

## 2024-01-03 RX ADMIN — DEXAMETHASONE SODIUM PHOSPHATE 4 MG: 4 INJECTION, SOLUTION INTRAMUSCULAR; INTRAVENOUS at 14:31

## 2024-01-03 RX ADMIN — ARFORMOTEROL TARTRATE 15 MCG: 15 SOLUTION RESPIRATORY (INHALATION) at 21:44

## 2024-01-03 RX ADMIN — IPRATROPIUM BROMIDE AND ALBUTEROL SULFATE 1 DOSE: .5; 2.5 SOLUTION RESPIRATORY (INHALATION) at 21:44

## 2024-01-03 RX ADMIN — WARFARIN SODIUM 2.5 MG: 2.5 TABLET ORAL at 18:06

## 2024-01-03 RX ADMIN — Medication 400 MG: at 14:30

## 2024-01-03 RX ADMIN — DIGOXIN 62.5 MCG: 0.12 TABLET ORAL at 09:42

## 2024-01-03 RX ADMIN — IPRATROPIUM BROMIDE AND ALBUTEROL SULFATE 1 DOSE: .5; 2.5 SOLUTION RESPIRATORY (INHALATION) at 16:51

## 2024-01-03 RX ADMIN — Medication 1000 UNITS: at 09:42

## 2024-01-03 RX ADMIN — METOPROLOL SUCCINATE 12.5 MG: 25 TABLET, EXTENDED RELEASE ORAL at 11:40

## 2024-01-03 RX ADMIN — ROSUVASTATIN CALCIUM 10 MG: 10 TABLET, FILM COATED ORAL at 20:56

## 2024-01-03 NOTE — ACP (ADVANCE CARE PLANNING)
Advance Care Planning   Healthcare Decision Maker:    Primary Decision Maker: Krystal Rice - Child - [phone]    Secondary Decision Maker: Dieter Hyatt - [place] - [phone]

## 2024-01-03 NOTE — CARE COORDINATION
CASE MANAGEMENT.... Patient readmitted with same. Afib/Covid. Pulm saw and signed off. Endocrine on for hypothyroidism. Cardio following and adjusting meds accordingly. Started on Entresto and patient provided with a free 30 day discount card. CHF nurse consulted. Ms Hyatt is currently wearing o2 2lnc. She wears home o2 through [employer]. Need to confirm baseline o2 orders. Messaged Guilherme. Await input. Patient states she is independent from home alone. Still drives. Has strong family support. Has all necessary dme if needed, including nebulizer. Anticipating dc soon. Will cont to follow along and assist with needs accordingly. Not interested in [facility].

## 2024-01-04 VITALS
BODY MASS INDEX: 25.31 KG/M2 | DIASTOLIC BLOOD PRESSURE: 55 MMHG | RESPIRATION RATE: 20 BRPM | HEART RATE: 64 BPM | SYSTOLIC BLOOD PRESSURE: 103 MMHG | WEIGHT: 167 LBS | HEIGHT: 68 IN | OXYGEN SATURATION: 95 % | TEMPERATURE: 97.4 F

## 2024-01-04 LAB — T4 FREE SERPL-MCNC: 1 NG/DL (ref 0.9–1.7)

## 2024-01-04 PROCEDURE — 6360000002 HC RX W HCPCS: Performed by: STUDENT IN AN ORGANIZED HEALTH CARE EDUCATION/TRAINING PROGRAM

## 2024-01-04 PROCEDURE — 94640 AIRWAY INHALATION TREATMENT: CPT

## 2024-01-04 PROCEDURE — 6360000002 HC RX W HCPCS: Performed by: INTERNAL MEDICINE

## 2024-01-04 PROCEDURE — 2580000003 HC RX 258: Performed by: STUDENT IN AN ORGANIZED HEALTH CARE EDUCATION/TRAINING PROGRAM

## 2024-01-04 PROCEDURE — 2700000000 HC OXYGEN THERAPY PER DAY

## 2024-01-04 PROCEDURE — 6370000000 HC RX 637 (ALT 250 FOR IP): Performed by: INTERNAL MEDICINE

## 2024-01-04 PROCEDURE — 6370000000 HC RX 637 (ALT 250 FOR IP): Performed by: NURSE PRACTITIONER

## 2024-01-04 PROCEDURE — 6370000000 HC RX 637 (ALT 250 FOR IP)

## 2024-01-04 PROCEDURE — 84439 ASSAY OF FREE THYROXINE: CPT

## 2024-01-04 PROCEDURE — 36415 COLL VENOUS BLD VENIPUNCTURE: CPT

## 2024-01-04 PROCEDURE — 94669 MECHANICAL CHEST WALL OSCILL: CPT

## 2024-01-04 RX ORDER — DOCUSATE SODIUM 100 MG/1
100 CAPSULE, LIQUID FILLED ORAL ONCE
Status: COMPLETED | OUTPATIENT
Start: 2024-01-04 | End: 2024-01-04

## 2024-01-04 RX ORDER — PREDNISONE 10 MG/1
TABLET ORAL
Qty: 14 TABLET | Refills: 0 | Status: SHIPPED | OUTPATIENT
Start: 2024-01-04 | End: 2024-01-10

## 2024-01-04 RX ORDER — LANOLIN ALCOHOL/MO/W.PET/CERES
400 CREAM (GRAM) TOPICAL DAILY
Qty: 30 TABLET | Refills: 0 | Status: SHIPPED | OUTPATIENT
Start: 2024-01-05

## 2024-01-04 RX ORDER — DIGOXIN 0.06 MG/1
62.5 TABLET ORAL DAILY
Qty: 30 TABLET | Refills: 3 | Status: SHIPPED | OUTPATIENT
Start: 2024-01-05

## 2024-01-04 RX ORDER — ASCORBIC ACID 500 MG
500 TABLET ORAL DAILY
Qty: 30 TABLET | Refills: 3 | Status: SHIPPED | OUTPATIENT
Start: 2024-01-05

## 2024-01-04 RX ORDER — DILTIAZEM HYDROCHLORIDE 120 MG/1
120 CAPSULE, COATED, EXTENDED RELEASE ORAL DAILY
Qty: 30 CAPSULE | Refills: 3 | Status: SHIPPED | OUTPATIENT
Start: 2024-01-05

## 2024-01-04 RX ORDER — BUMETANIDE 1 MG/1
1 TABLET ORAL DAILY
Qty: 30 TABLET | Refills: 3 | Status: SHIPPED | OUTPATIENT
Start: 2024-01-05

## 2024-01-04 RX ORDER — CHOLECALCIFEROL (VITAMIN D3) 25 MCG
1000 TABLET ORAL DAILY
Qty: 60 TABLET | Refills: 0 | Status: SHIPPED | OUTPATIENT
Start: 2024-01-05

## 2024-01-04 RX ORDER — METHYLPREDNISOLONE SODIUM SUCCINATE 40 MG/ML
40 INJECTION, POWDER, LYOPHILIZED, FOR SOLUTION INTRAMUSCULAR; INTRAVENOUS EVERY 12 HOURS
Status: DISCONTINUED | OUTPATIENT
Start: 2024-01-04 | End: 2024-01-04 | Stop reason: HOSPADM

## 2024-01-04 RX ORDER — ZINC SULFATE 50(220)MG
50 CAPSULE ORAL DAILY
Qty: 30 CAPSULE | Refills: 3 | COMMUNITY
Start: 2024-01-05

## 2024-01-04 RX ADMIN — ARFORMOTEROL TARTRATE 15 MCG: 15 SOLUTION RESPIRATORY (INHALATION) at 12:48

## 2024-01-04 RX ADMIN — DILTIAZEM HYDROCHLORIDE 120 MG: 120 CAPSULE, COATED, EXTENDED RELEASE ORAL at 08:32

## 2024-01-04 RX ADMIN — DEXAMETHASONE SODIUM PHOSPHATE 4 MG: 4 INJECTION, SOLUTION INTRAMUSCULAR; INTRAVENOUS at 03:06

## 2024-01-04 RX ADMIN — SACUBITRIL AND VALSARTAN 0.5 TABLET: 24; 26 TABLET, FILM COATED ORAL at 08:32

## 2024-01-04 RX ADMIN — OXYCODONE HYDROCHLORIDE AND ACETAMINOPHEN 500 MG: 500 TABLET ORAL at 08:33

## 2024-01-04 RX ADMIN — BUDESONIDE 250 MCG: 0.25 SUSPENSION RESPIRATORY (INHALATION) at 12:48

## 2024-01-04 RX ADMIN — DIGOXIN 62.5 MCG: 0.12 TABLET ORAL at 08:32

## 2024-01-04 RX ADMIN — BUMETANIDE 1 MG: 1 TABLET ORAL at 08:33

## 2024-01-04 RX ADMIN — METOPROLOL SUCCINATE 12.5 MG: 25 TABLET, EXTENDED RELEASE ORAL at 08:33

## 2024-01-04 RX ADMIN — PANTOPRAZOLE SODIUM 40 MG: 40 TABLET, DELAYED RELEASE ORAL at 13:16

## 2024-01-04 RX ADMIN — IPRATROPIUM BROMIDE AND ALBUTEROL SULFATE 1 DOSE: .5; 2.5 SOLUTION RESPIRATORY (INHALATION) at 12:48

## 2024-01-04 RX ADMIN — Medication 1000 UNITS: at 08:33

## 2024-01-04 RX ADMIN — DOCUSATE SODIUM 100 MG: 100 CAPSULE, LIQUID FILLED ORAL at 05:27

## 2024-01-04 RX ADMIN — Medication 10 ML: at 08:33

## 2024-01-04 RX ADMIN — Medication 400 MG: at 08:33

## 2024-01-04 RX ADMIN — DOCUSATE SODIUM 100 MG: 100 CAPSULE, LIQUID FILLED ORAL at 08:33

## 2024-01-04 RX ADMIN — METHYLPREDNISOLONE SODIUM SUCCINATE 40 MG: 40 INJECTION INTRAMUSCULAR; INTRAVENOUS at 13:16

## 2024-01-04 RX ADMIN — ZINC SULFATE 220 MG (50 MG) CAPSULE 50 MG: CAPSULE at 08:32

## 2024-01-04 NOTE — PLAN OF CARE
Problem: ABCDS Injury Assessment  Goal: Absence of physical injury  1/4/2024 1003 by Mirna Vincent RN  Outcome: Progressing     Problem: Discharge Planning  Goal: Discharge to home or other facility with appropriate resources  1/4/2024 1003 by Mirna Vincent RN  Outcome: Progressing     Problem: Pain  Goal: Verbalizes/displays adequate comfort level or baseline comfort level  1/4/2024 1003 by Mirna Vincent RN  Outcome: Progressing     Problem: Safety - Adult  Goal: Free from fall injury  1/4/2024 1003 by Mirna Vincent RN  Outcome: Progressing     Problem: Chronic Conditions and Co-morbidities  Goal: Patient's chronic conditions and co-morbidity symptoms are monitored and maintained or improved  Outcome: Progressing

## 2024-01-04 NOTE — DISCHARGE INSTRUCTIONS
doctor whether you need another dose.       My Goal for Self-management of Heart Failure Includes 5 steps :    1. Notice a change in symptoms ( weight gain, short of breath, leg swelling, decreased activity level, bloating....)    2. Evaluate the change: (use the Heart Failure Zones )     3. Decide to take action: decide what your options are, such as: (call your doctor for an extra visit, take a prescribed medication, such as your water pill if your doctor has given you directions to do so, CALL YOUR DOCTOR)    4. Come up with a strategy:  (now you call the doctor for advice / appointment. This is where you take action!!!  Do not wait, catch the symptom early and treat it before it worsens.    5. Evaluate the response: The next day, check your Heart Failure Zones: are you in the GREEN ZONE (safe zone)?  Worsening symptoms of YELLOW ZONE? Or have you moved to the RED ZONE and need to call 911 or go to the Emergency Room for evaluation? Call your doctor's office to update them on your symptoms of heart failure.

## 2024-01-04 NOTE — CARE COORDINATION
CASE MANAGEMENT....Confirmed with Guilherme from [facility] that patients home o2 orders read 5lnc continuous. Nursing updated. Anticipate discharge today pending cardio input. Was started on Entresto. Discount card already given. Per pulm/endocrine, f/u outpt. Plan is home. No needs. Will follow.

## 2024-01-04 NOTE — PLAN OF CARE
Patient's chart updated to reflect:      .    - HF care plan, HF education points and HF discharge instructions.  -Orders: 2 gram sodium diet, daily weights, I/O.  -PCP and cardiology follow up appointments to be scheduled within 7 days of hospital discharge.  -CHF education session will be provided to the patient prior to hospital discharge.    Kinsey Guzman RN   Heart Failure Navigator

## 2024-01-04 NOTE — PLAN OF CARE
Problem: ABCDS Injury Assessment  Goal: Absence of physical injury  1/4/2024 1426 by Mirna Vincent RN  Outcome: Completed     Problem: Discharge Planning  Goal: Discharge to home or other facility with appropriate resources  1/4/2024 1426 by Mirna Vincent RN  Outcome: Completed     Problem: Pain  Goal: Verbalizes/displays adequate comfort level or baseline comfort level  1/4/2024 1426 by Mirna Vincent RN  Outcome: Completed     Problem: Safety - Adult  Goal: Free from fall injury  1/4/2024 1426 by Mirna Vincent RN  Outcome: Completed     Problem: Chronic Conditions and Co-morbidities  Goal: Patient's chronic conditions and co-morbidity symptoms are monitored and maintained or improved  1/4/2024 1426 by Mirna Vincent RN  Outcome: Completed

## 2024-01-05 ENCOUNTER — CLINICAL DOCUMENTATION ONLY (OUTPATIENT)
Facility: CLINIC | Age: 84
End: 2024-01-05

## 2024-01-05 NOTE — PROGRESS NOTES
Yusuf Castro M.D.,[facility]  Carlin Barrera D.O., FCLAIRE., [facility]  Kristin Bruner M.D.  Monica Aponte M.D.   [facility].        Daily Pulmonary Progress Note    Patient:  Krystal Hyatt 83 y.o. female MRN: [id number]     Date of Service: 1/3/2024      Synopsis     We are following patient for respiratory failure, COPD, COVID, ILD    \"CC\" generalized weakness    Code status: Full code      Subjective      Patient was seen and examined sitting up on the couch.  She is looking a little bit better today.  She tells me she feels ready to go home if it is okay with her cardiologist.      Review of Systems:  Constitutional: positive for weakness and fatigue   Skin: Denies pigmentation, dark lesions, and rashes   HEENT: Denies hearing loss, tinnitus, ear drainage, epistaxis, sore throat, and hoarseness.  Cardiovascular: Denies palpitations, chest pain, and chest pressure.  Respiratory: Dyspnea at rest worsening with exertion   Gastrointestinal: Denies nausea, vomiting, poor appetite, diarrhea, heartburn or reflux  Genitourinary: Denies dysuria, frequency, urgency or hematuria  Musculoskeletal: Denies myalgias, muscle weakness, and bone pain  Neurological: Denies dizziness, vertigo, headache, and focal weakness  Psychological: Denies anxiety and depression  Endocrine: Denies heat intolerance and cold intolerance  Hematopoietic/Lymphatic: Denies bleeding problems and blood transfusions    24-hour events:  No new events    Objective   OBJECTIVE:   BP (!) 117/59   Pulse 63   Temp 97.8 °F (36.6 °C) (Oral)   Resp 19   Ht 1.727 m (5' 8\")   Wt 74.6 kg (164 lb 8 oz)   SpO2 99%   BMI 25.01 kg/m²   SpO2 Readings from Last 1 Encounters:   01/03/24 99%        I/O:    Intake/Output Summary (Last 24 hours) at 1/3/2024 1202  Last data filed at 1/3/2024 0300  Gross per 24 hour   Intake --   Output 700 ml   Net -700 ml            CURRENT MEDS :  Scheduled Meds:   sacubitril-valsartan  0.5 tablet Oral Daily    
           Yusuf Castro M.D.,[facility]  Carlin Barrera D.O., FCLAIRE., [facility]  Kristin Bruner M.D.  Monica Aponte M.D.   [facility]        Daily Pulmonary Progress Note    Patient:  Krystal Hyatt 83 y.o. female MRN: [id number]     Date of Service: 1/4/2024      Synopsis     We are following patient for respiratory failure, COPD, COVID, ILD    \"CC\" generalized weakness    Code status: Full code      Subjective      Patient was seen and examined sitting up in bed.  She is looking well and is doing better.    Review of Systems:  Constitutional: positive for weakness and fatigue   Skin: Denies pigmentation, dark lesions, and rashes   HEENT: Denies hearing loss, tinnitus, ear drainage, epistaxis, sore throat, and hoarseness.  Cardiovascular: Denies palpitations, chest pain, and chest pressure.  Respiratory: Dyspnea at rest worsening with exertion   Gastrointestinal: Denies nausea, vomiting, poor appetite, diarrhea, heartburn or reflux  Genitourinary: Denies dysuria, frequency, urgency or hematuria  Musculoskeletal: Denies myalgias, muscle weakness, and bone pain  Neurological: Denies dizziness, vertigo, headache, and focal weakness  Psychological: Denies anxiety and depression  Endocrine: Denies heat intolerance and cold intolerance  Hematopoietic/Lymphatic: Denies bleeding problems and blood transfusions    24-hour events:  No new events    Objective   OBJECTIVE:   BP (!) 103/55   Pulse 64   Temp 97.4 °F (36.3 °C) (Oral)   Resp 20   Ht 1.727 m (5' 8\")   Wt 75.8 kg (167 lb)   SpO2 95%   BMI 25.39 kg/m²   SpO2 Readings from Last 1 Encounters:   01/04/24 95%        I/O:    Intake/Output Summary (Last 24 hours) at 1/4/2024 1252  Last data filed at 1/4/2024 0938  Gross per 24 hour   Intake 180 ml   Output 1100 ml   Net -920 ml              CURRENT MEDS :  Scheduled Meds:   sacubitril-valsartan  0.5 tablet Oral BID    magnesium oxide  400 mg Oral Daily    bumetanide  1 mg Oral Daily    ipratropium 0.5 
     [facility] Hospitalist   Progress Note    Admitting Date and Time: 12/28/2023  8:32 PM  Admit Dx: Dehydration [E86.0]  COVID-19 [U07.1]    Subjective:    Patient was admitted with Dehydration [E86.0]  COVID-19 [U07.1]. Patient denies fever, chills, cp, sob, n/v.     [START ON 1/1/2024] dexAMETHasone  4 mg IntraVENous Q12H    pantoprazole  40 mg Oral Q24H    dilTIAZem  120 mg Oral Daily    digoxin  62.5 mcg Oral Daily    metoprolol succinate  12.5 mg Oral Daily    sodium chloride flush  5-40 mL IntraVENous 2 times per day    famotidine  20 mg Oral Daily    rosuvastatin  10 mg Oral Nightly    levothyroxine  50 mcg Oral Daily    warfarin  2.5 mg Oral Daily    budesonide  250 mcg Nebulization BID    ipratropium  0.5 mg Nebulization 4x Daily RT    arformoterol tartrate  15 mcg Nebulization BID RT    warfarin placeholder: dosing by provider   Other RX Placeholder    sodium chloride flush  5-40 mL IntraVENous 2 times per day     sodium chloride flush, 5-40 mL, PRN  sodium chloride, , PRN  ondansetron, 4 mg, Q8H PRN   Or  ondansetron, 4 mg, Q6H PRN  polyethylene glycol, 17 g, Daily PRN  acetaminophen, 650 mg, Q6H PRN   Or  acetaminophen, 650 mg, Q6H PRN  ipratropium 0.5 mg-albuterol 2.5 mg, 1 Dose, Q4H PRN  albuterol, 0.63 mg, Q6H PRN  sodium chloride flush, 5-40 mL, PRN  sodium chloride, , PRN         Objective:    /77   Pulse 100   Temp 98 °F (36.7 °C) (Oral)   Resp 22   Ht 1.727 m (5' 8\")   Wt 75 kg (165 lb 5.5 oz)   SpO2 96%   BMI 25.14 kg/m²   Skin: warm and dry, no rash or erythema  Pulmonary/Chest: clear to auscultation bilaterally- no wheezes, rales or rhonchi, normal air movement, no respiratory distress  Cardiovascular: rhythm reg at rate of 98  Abdomen: soft, non-tender, non-distended, normal bowel sounds, no masses or organomegaly  Extremities: no cyanosis, no clubbing, and no edema      Recent Labs     12/29/23  0616 12/30/23  0925 12/31/23  0802    137 137   K 3.6 3.5 4.2 
     [facility] Hospitalist   Progress Note    Admitting Date and Time: 12/28/2023  8:32 PM  Admit Dx: Dehydration [E86.0]  COVID-19 [U07.1]    Subjective:    Patient was admitted with Dehydration [E86.0]  COVID-19 [U07.1]. Patient denies fever, chills, cp, sob, n/v.     dexAMETHasone  4 mg IntraVENous Q12H    pantoprazole  40 mg Oral Q24H    dilTIAZem  120 mg Oral Daily    digoxin  62.5 mcg Oral Daily    metoprolol succinate  12.5 mg Oral Daily    sodium chloride flush  5-40 mL IntraVENous 2 times per day    famotidine  20 mg Oral Daily    rosuvastatin  10 mg Oral Nightly    levothyroxine  50 mcg Oral Daily    warfarin  2.5 mg Oral Daily    budesonide  250 mcg Nebulization BID    ipratropium  0.5 mg Nebulization 4x Daily RT    arformoterol tartrate  15 mcg Nebulization BID RT    warfarin placeholder: dosing by provider   Other RX Placeholder    sodium chloride flush  5-40 mL IntraVENous 2 times per day     sodium chloride flush, 5-40 mL, PRN  sodium chloride, , PRN  ondansetron, 4 mg, Q8H PRN   Or  ondansetron, 4 mg, Q6H PRN  polyethylene glycol, 17 g, Daily PRN  acetaminophen, 650 mg, Q6H PRN   Or  acetaminophen, 650 mg, Q6H PRN  ipratropium 0.5 mg-albuterol 2.5 mg, 1 Dose, Q4H PRN  albuterol, 0.63 mg, Q6H PRN  sodium chloride flush, 5-40 mL, PRN  sodium chloride, , PRN         Objective:    /68   Pulse 73   Temp 98.2 °F (36.8 °C) (Oral)   Resp 22   Ht 1.727 m (5' 8\")   Wt 76.8 kg (169 lb 6.4 oz)   SpO2 93%   BMI 25.76 kg/m²   Skin: warm and dry, no rash or erythema  Pulmonary/Chest: clear to auscultation bilaterally- no wheezes, rales or rhonchi, normal air movement, no respiratory distress  Cardiovascular: rhythm reg at rate of 76  Abdomen: soft, non-tender, non-distended, normal bowel sounds, no masses or organomegaly  Extremities: no cyanosis, no clubbing, and no edema      Recent Labs     12/30/23  0925 12/31/23  0802    137   K 3.5 4.2   CL 98 102   CO2 25 22   BUN 40* 31* 
     [facility] Hospitalist   Progress Note    Admitting Date and Time: 12/28/2023  8:32 PM  Admit Dx: Dehydration [E86.0]  COVID-19 [U07.1]    Subjective:    Patient was admitted with Dehydration [E86.0]  COVID-19 [U07.1]. Patient denies fever, chills, cp, n/v. Pt with some sob     sacubitril-valsartan  0.5 tablet Oral BID    magnesium oxide  400 mg Oral Daily    bumetanide  1 mg Oral Daily    ipratropium 0.5 mg-albuterol 2.5 mg  1 Dose Inhalation Q4H WA RT    Vitamin D  1,000 Units Oral Daily    ascorbic acid  500 mg Oral Daily    zinc sulfate  50 mg Oral Daily    docusate sodium  100 mg Oral Daily    dexAMETHasone  4 mg IntraVENous Q12H    pantoprazole  40 mg Oral Q24H    dilTIAZem  120 mg Oral Daily    digoxin  62.5 mcg Oral Daily    metoprolol succinate  12.5 mg Oral Daily    sodium chloride flush  5-40 mL IntraVENous 2 times per day    rosuvastatin  10 mg Oral Nightly    warfarin  2.5 mg Oral Daily    budesonide  250 mcg Nebulization BID    arformoterol tartrate  15 mcg Nebulization BID RT    warfarin placeholder: dosing by provider   Other RX Placeholder     sodium chloride flush, 5-40 mL, PRN  sodium chloride, , PRN  ondansetron, 4 mg, Q8H PRN   Or  ondansetron, 4 mg, Q6H PRN  polyethylene glycol, 17 g, Daily PRN  acetaminophen, 650 mg, Q6H PRN   Or  acetaminophen, 650 mg, Q6H PRN  albuterol, 0.63 mg, Q6H PRN         Objective:    BP (!) 115/59   Pulse 79   Temp 97.7 °F (36.5 °C) (Oral)   Resp 20   Ht 1.727 m (5' 8\")   Wt 74.6 kg (164 lb 8 oz)   SpO2 91%   BMI 25.01 kg/m²   Skin: warm and dry, no rash or erythema  Pulmonary/Chest: clear to auscultation bilaterally- no wheezes, rales or rhonchi, normal air movement, no respiratory distress  Cardiovascular: rhythm reg at rate of 80  Abdomen: soft, non-tender, non-distended, normal bowel sounds, no masses or organomegaly  Extremities: no cyanosis, no clubbing, and no edema      Recent Labs     01/03/24  0457      K 4.5   CL 99   CO2 27 
24 hr chart check complete.  
4 Eyes Skin Assessment     NAME:  Krystal Hyatt  YOB: 1940  MEDICAL RECORD NUMBER:  [id number]    The patient is being assessed for  Admission    I agree that at least one RN has performed a thorough Head to Toe Skin Assessment on the patient. ALL assessment sites listed below have been assessed.      Areas assessed by both nurses:    Head, Face, Ears, Shoulders, Back, Chest, Arms, Elbows, Hands, Sacrum. Buttock, Coccyx, Ischium, Legs. Feet and Heels, and Under Medical Devices         Does the Patient have a Wound? No noted wound(s)       Leandro Prevention initiated by RN: No  Wound Care Orders initiated by RN: No    Pressure Injury (Stage 3,4, Unstageable, DTI, NWPT, and Complex wounds) if present, place Wound referral order by RN under : No    New Ostomies, if present place, Ostomy referral order under : No     Nurse 1 eSignature: Electronically signed by Nargis Welsh RN on 12/29/23 at 11:06 PM EST    **SHARE this note so that the co-signing nurse can place an eSignature**    Nurse 2 eSignature: Electronically signed by Kaylin Grubbs RN on 12/29/23 at 11:07 PM EST  
Called Dr. Metz, patient states she is having acid reflux and Pepcid given this am was ineffective. Orders received.    
Consult sent to Dr. Dinh.   
Dr. Jones called regarding new consult.  
ENDOCRINOLOGY PROGRESS NOTE    Date of Admission: 12/28/2023  Date of Service: 1/3/2024  Admitting Physician: Getachew Campos MD   Primary Care Physician: Rom Mckeon MD  Consultant physician: Vernon Dinh MD     Reason for the consultation:  Hypothyroidism     History of Present Illness:  The history is provided by the patient. Accuracy of the patient data is excellent.    Krystal Hyatt is a very pleasant 83 y.o. old female with PMH of hypertension, hyperlipidemia, A-fib and other listed below admitted to [facility] on 12/28/2023 because of recurrent palpitations and shortness of breath and found to have A-fib with RVR and also tested positive for COVID-19 infection, endocrine service was consulted for management of hypothyroidism     Subjective  The patient was seen and examined at bedside this afternoon.  No acute events overnight  Scheduled Meds:   sacubitril-valsartan  0.5 tablet Oral BID    magnesium oxide  400 mg Oral Daily    bumetanide  1 mg Oral Daily    ipratropium 0.5 mg-albuterol 2.5 mg  1 Dose Inhalation Q4H WA RT    Vitamin D  1,000 Units Oral Daily    ascorbic acid  500 mg Oral Daily    zinc sulfate  50 mg Oral Daily    docusate sodium  100 mg Oral Daily    dexAMETHasone  4 mg IntraVENous Q12H    pantoprazole  40 mg Oral Q24H    dilTIAZem  120 mg Oral Daily    digoxin  62.5 mcg Oral Daily    metoprolol succinate  12.5 mg Oral Daily    sodium chloride flush  5-40 mL IntraVENous 2 times per day    rosuvastatin  10 mg Oral Nightly    warfarin  2.5 mg Oral Daily    budesonide  250 mcg Nebulization BID    arformoterol tartrate  15 mcg Nebulization BID RT    warfarin placeholder: dosing by provider   Other RX Placeholder     PRN Meds:   sodium chloride flush, 5-40 mL, PRN  sodium chloride, , PRN  ondansetron, 4 mg, Q8H PRN   Or  ondansetron, 4 mg, Q6H PRN  polyethylene glycol, 17 g, Daily PRN  acetaminophen, 650 mg, Q6H PRN   Or  acetaminophen, 650 mg, Q6H PRN  albuterol, 0.63 mg, Q6H PRN      Continuous 
OCCUPATIONAL THERAPY INITIAL EVALUATION    [facility]   [address]        Date:2024                                                  Patient Name: Krystal Hyatt    MRN: [id number]    : 1940    Room: [address] [place] [address]     Evaluating OT: Mariah Lester OTR/L #[id number]    Referring Provider:  Getachew Campos MD     Specific Provider Orders/Date:  OT Eval and Treat, 23     Diagnosis:   1. COVID-19    2. Dehydration         Surgery: None      Pertinent Medical History: A-fib, thyroid disease, lung CA, CHF, skin CA, O2 dependent      Precautions:  Fall Risk, droplet plus, COVID+     Assessment of current deficits    [x] Functional mobility  [x]ADLs  [x] Strength               []Cognition    [x] Functional transfers   [x] IADLs         [x] Safety Awareness   [x]Endurance    [] Fine Coordination              [x] Balance      [] Vision/perception   []Sensation     []Gross Motor Coordination  [] ROM  [] Delirium                   [] Motor Control     OT PLAN OF CARE   OT POC based on physician orders, patient diagnosis and results of clinical assessment    Frequency/Duration 1-3 days/wk for 2 weeks PRN     Specific OT Treatment Interventions to include:   * Instruction/training on adapted ADL techniques and AE recommendations to increase functional independence within precautions       * Training on energy conservation strategies, correct breathing pattern and techniques to improve independence/tolerance for self-care routine  * Functional transfer/mobility training/DME recommendations for increased independence, safety, and fall prevention  * Patient/Family education to increase follow through with safety techniques and functional independence  * Recommendation of environmental modifications for increased safety with functional transfers/mobility and ADLs  * Therapeutic exercise to improve motor endurance, ROM, and functional strength 
Occupational Therapy  Pt sitting on the side of the bed.  She reports she is independent in the room.  Has her shoes on and states she is able to complete her self care however is limited due to decreased O2 saturation when standing/mobility.  She has DME at home.  She reports she has used O2 at home and good understanding of O2 line management/safety.  She declined any other OT needs at this time and states she is able to manage in her room without assistance.  Declined any other intervention at this time.   Patrick HARVEY/SILVINA [id number]  
PROGRESS NOTE       PATIENT PROBLEM LIST:  Patient Active Problem List   Diagnosis Code    Atrial fibrillation with RVR ([facility])- Recurrent I48.91    Acute on chronic diastolic congestive heart failure (HCC) I50.33    Cardiomyopathy as manifestation of underlying disease (HCC) I43    Non-rheumatic mitral regurgitation I34.0    Acute combined systolic and diastolic congestive heart failure (HCC) I50.41    Chronic anticoagulation Z79.01    Essential hypertension I10    COPD exacerbation (HCC) J44.1    History of atrial fibrillation Z86.79    Dilated idiopathic cardiomyopathy (HCC) I42.0    Severe sinus bradycardia R00.1    Moderate tricuspid regurgitation I07.1    Hypoxia R09.02    Combined forms of age-related cataract of both eyes H25.813    Dyspnea on exertion R06.09    Mass of right lung R91.8    Supplemental oxygen dependent Z99.81    Pneumonia J18.9    Stage 3a chronic kidney disease (HCC) N18.31    Malignant neoplasm of right lung (HCC) C34.91    Atrial flutter with rapid ventricular response (HCC) I48.92    PAF (paroxysmal atrial fibrillation) ([facility]) I48.0    Centrilobular emphysema (HCC) J43.2    Thyroid disease E07.9    Acquired hypothyroidism E03.9    Bronchitis J40    Chronic respiratory failure with hypoxia (HCC) J96.11    Mixed hyperlipidemia E78.2    Gastroesophageal reflux disease K21.9    Hypothyroidism E03.9    Hyperlipidemia E78.5    Atrial fibrillation (HCC) I48.91    Atrial flutter (HCC) I48.92    Atrial fibrillation with rapid ventricular response (HCC) I48.91    COVID-19 virus infection U07.1    Iatrogenic hyperthyroidism E05.80    COVID-19 U07.1    TRISTEN (acute kidney injury) (HCC) N17.9    Dehydration E86.0    Thrombocytopenia (HCC) D69.6       SUBJECTIVE:  Krystal Hyatt states she still is significantly short of breath with any walking and notes that she desaturates rather rapidly.  She denies any significant palpitations nor hemoptysis presently.    REVIEW OF SYSTEMS:  General ROS: negative for 
PROGRESS NOTE       PATIENT PROBLEM LIST:  Patient Active Problem List   Diagnosis Code    Atrial fibrillation with RVR ([facility])- Recurrent I48.91    Acute on chronic diastolic congestive heart failure (HCC) I50.33    Cardiomyopathy as manifestation of underlying disease (HCC) I43    Non-rheumatic mitral regurgitation I34.0    Acute combined systolic and diastolic congestive heart failure (HCC) I50.41    Chronic anticoagulation Z79.01    Essential hypertension I10    COPD exacerbation (HCC) J44.1    History of atrial fibrillation Z86.79    Dilated idiopathic cardiomyopathy (HCC) I42.0    Severe sinus bradycardia R00.1    Moderate tricuspid regurgitation I07.1    Hypoxia R09.02    Combined forms of age-related cataract of both eyes H25.813    Dyspnea on exertion R06.09    Mass of right lung R91.8    Supplemental oxygen dependent Z99.81    Pneumonia J18.9    Stage 3a chronic kidney disease (HCC) N18.31    Malignant neoplasm of right lung (HCC) C34.91    Atrial flutter with rapid ventricular response (HCC) I48.92    PAF (paroxysmal atrial fibrillation) ([facility]) I48.0    Centrilobular emphysema (HCC) J43.2    Thyroid disease E07.9    Acquired hypothyroidism E03.9    Bronchitis J40    Chronic respiratory failure with hypoxia (HCC) J96.11    Mixed hyperlipidemia E78.2    Gastroesophageal reflux disease K21.9    Primary hypothyroidism E03.9    Hyperlipidemia E78.5    Atrial fibrillation (HCC) I48.91    Atrial flutter (HCC) I48.92    Atrial fibrillation with rapid ventricular response (HCC) I48.91    COVID-19 virus infection U07.1    Iatrogenic hyperthyroidism E05.80    COVID-19 U07.1    TRISTEN (acute kidney injury) (HCC) N17.9    Dehydration E86.0    Thrombocytopenia (HCC) D69.6    High thyroid hormone level R79.89    Hypothyroidism E03.9       SUBJECTIVE:  Mrs. Hyatt is sitting up on her couch looking out her window and does not appear to be in any respiratory distress presently.  She clearly notes however that she is 
PROGRESS NOTE       PATIENT PROBLEM LIST:  Patient Active Problem List   Diagnosis Code    Atrial fibrillation with RVR ([facility])- Recurrent I48.91    Acute on chronic diastolic congestive heart failure (HCC) I50.33    Cardiomyopathy as manifestation of underlying disease (HCC) I43    Non-rheumatic mitral regurgitation I34.0    Acute combined systolic and diastolic congestive heart failure (HCC) I50.41    Chronic anticoagulation Z79.01    Essential hypertension I10    COPD exacerbation (HCC) J44.1    History of atrial fibrillation Z86.79    Dilated idiopathic cardiomyopathy (HCC) I42.0    Severe sinus bradycardia R00.1    Moderate tricuspid regurgitation I07.1    Hypoxia R09.02    Combined forms of age-related cataract of both eyes H25.813    Dyspnea on exertion R06.09    Mass of right lung R91.8    Supplemental oxygen dependent Z99.81    Pneumonia J18.9    Stage 3a chronic kidney disease (HCC) N18.31    Malignant neoplasm of right lung (HCC) C34.91    Atrial flutter with rapid ventricular response ([facility]) I48.92    PAF (paroxysmal atrial fibrillation) ([facility]) I48.0    Centrilobular emphysema (HCC) J43.2    Thyroid disease E07.9    Acquired hypothyroidism E03.9    Bronchitis J40    Chronic respiratory failure with hypoxia (HCC) J96.11    Mixed hyperlipidemia E78.2    Gastroesophageal reflux disease K21.9    Hypothyroidism E03.9    Hyperlipidemia E78.5    Atrial fibrillation (HCC) I48.91    Atrial flutter (HCC) I48.92    Atrial fibrillation with rapid ventricular response (HCC) I48.91    COVID-19 virus infection U07.1    Iatrogenic hyperthyroidism E05.80    COVID-19 U07.1    TRISTEN (acute kidney injury) (HCC) N17.9    Dehydration E86.0    Thrombocytopenia (HCC) D69.6       SUBJECTIVE:  Krytsal Hyatt states she still is significantly short of breath with any exertion and now even short of breath at rest.  Her oxygen saturation however is 92% as we speak.  She denies any significant palpitations nor hemoptysis presently.  She 
PROGRESS NOTE       PATIENT PROBLEM LIST:  Patient Active Problem List   Diagnosis Code    Atrial fibrillation with RVR ([facility])- Recurrent I48.91    Acute on chronic diastolic congestive heart failure (HCC) I50.33    Cardiomyopathy as manifestation of underlying disease (HCC) I43    Non-rheumatic mitral regurgitation I34.0    Acute combined systolic and diastolic congestive heart failure (HCC) I50.41    Chronic anticoagulation Z79.01    Essential hypertension I10    COPD exacerbation (HCC) J44.1    History of atrial fibrillation Z86.79    Dilated idiopathic cardiomyopathy (HCC) I42.0    Severe sinus bradycardia R00.1    Moderate tricuspid regurgitation I07.1    Hypoxia R09.02    Combined forms of age-related cataract of both eyes H25.813    Dyspnea on exertion R06.09    Mass of right lung R91.8    Supplemental oxygen dependent Z99.81    Pneumonia J18.9    Stage 3a chronic kidney disease (HCC) N18.31    Malignant neoplasm of right lung ([facility]) C34.91    Atrial flutter with rapid ventricular response ([facility]) I48.92    PAF (paroxysmal atrial fibrillation) ([facility]) I48.0    Centrilobular emphysema ([facility]) J43.2    Thyroid disease E07.9    Acquired hypothyroidism E03.9    Bronchitis J40    Chronic respiratory failure with hypoxia ([facility]) J96.11    Mixed hyperlipidemia E78.2    Gastroesophageal reflux disease K21.9    Hypothyroidism E03.9    Hyperlipidemia E78.5    Atrial fibrillation (HCC) I48.91    Atrial flutter (HCC) I48.92    Atrial fibrillation with rapid ventricular response (HCC) I48.91    COVID-19 virus infection U07.1    Iatrogenic hyperthyroidism E05.80    COVID-19 U07.1    TRISTEN (acute kidney injury) ([facility]) N17.9    Dehydration E86.0    Thrombocytopenia ([facility]) D69.6       SUBJECTIVE:  Krystal Hyatt notes persistent shortness of breath but no chest discomfort presently.  She denies any palpitations and remains in sinus rhythm with intermittent supraventricular ectopy.  REVIEW OF SYSTEMS:  General ROS: negative for - fatigue, 
Patient and daughter requesting pulmonary consult, as patient visits Dr. Aponte outpatient. Dr. Metz notified to advise.   
Patient is complaining of constipation and requests another dose of colace with AM meds. Per hallie thao NP ok to order colace x 1 and a fleets enema.     0529: patient denies fleet enema at this time. She states she will maybe try it later if no relief from colace.   
Patient is requesting oral colace for constipation instead of glycolax. Verbal order received from NP Carmen Garsia.   
Physical Therapy  Facility/Department: [facility]  INTERMEDIATE 1    Name: Krystal Hyatt  : 1940  MRN: [id number]  Date of Service: 2024      Order received for PT evaluation. Pt up independently in room. No PT needs at this time.   Nickie Garcia PT [id number]  
Pt seen and examined by night physician who admitted pt earlier today    Chart reviewed and updated by nursing      
Pt's daughter upset regarding discharge and wanting a pulm consult.  Spoke with Dr Metz in regards to having him speak with the daughter as to why a pulmonology consult is not warranted.  Dr. Metz unable to speak with the daughter   
Pulmonology consult sent to Dr. Goss   
  CL 86* 96* 98   CO2 30* 28 25   BUN 54* 49* 40*   CREATININE 1.5* 1.3* 1.1*   GLUCOSE 131* 104* 259*   CALCIUM 9.7 8.6 8.9       Recent Labs     12/28/23  2157 12/29/23  0616 12/30/23  0925   WBC 9.3 6.9 6.2   RBC 4.84 4.07 3.70   HGB 14.2 12.3 11.0*   HCT 43.5 37.1 33.9*   MCV 89.9 91.2 91.6   MCH 29.3 30.2 29.7   MCHC 32.6 33.2 32.4   RDW 13.4 13.5 13.5     --  116*   MPV 11.2 11.3 11.5       CBC with Differential:    Lab Results   Component Value Date/Time    WBC 6.2 12/30/2023 09:25 AM    RBC 3.70 12/30/2023 09:25 AM    HGB 11.0 12/30/2023 09:25 AM    HCT 33.9 12/30/2023 09:25 AM     12/30/2023 09:25 AM    MCV 91.6 12/30/2023 09:25 AM    MCH 29.7 12/30/2023 09:25 AM    MCHC 32.4 12/30/2023 09:25 AM    RDW 13.5 12/30/2023 09:25 AM    NRBC 0.0 04/09/2023 05:02 AM    SEGSPCT 46 02/14/2014 11:27 AM    LYMPHOPCT 6 12/30/2023 09:25 AM    MONOPCT 3 12/30/2023 09:25 AM    BASOPCT 0 12/30/2023 09:25 AM    MONOSABS 0.16 12/30/2023 09:25 AM    LYMPHSABS 0.35 12/30/2023 09:25 AM    EOSABS 0.00 12/30/2023 09:25 AM    BASOSABS 0.00 12/30/2023 09:25 AM     CMP:    Lab Results   Component Value Date/Time     12/30/2023 09:25 AM    K 3.5 12/30/2023 09:25 AM    K 4.3 04/05/2023 10:48 PM    CL 98 12/30/2023 09:25 AM    CO2 25 12/30/2023 09:25 AM    BUN 40 12/30/2023 09:25 AM    CREATININE 1.1 12/30/2023 09:25 AM    GFRAA 52 08/15/2022 10:30 AM    LABGLOM 51 12/30/2023 09:25 AM    GLUCOSE 259 12/30/2023 09:25 AM    GLUCOSE 87 05/04/2012 11:00 AM    PROT 5.7 12/30/2023 09:25 AM    LABALBU 3.4 12/30/2023 09:25 AM    LABALBU 4.4 05/04/2012 11:00 AM    CALCIUM 8.9 12/30/2023 09:25 AM    BILITOT 0.5 12/30/2023 09:25 AM    ALKPHOS 57 12/30/2023 09:25 AM    AST 24 12/30/2023 09:25 AM    ALT 20 12/30/2023 09:25 AM     Magnesium:    Lab Results   Component Value Date/Time    MG 1.6 12/30/2023 09:25 AM        Radiology:   CTA PULMONARY W CONTRAST   Final Result   1. No evidence of acute pulmonary artery embolism.   2. 
  CREATININE 0.9   GLUCOSE 157*   CALCIUM 9.1       Recent Labs     12/31/23  0802   WBC 10.0   RBC 4.02   HGB 11.9   HCT 37.9   MCV 94.3   MCH 29.6   MCHC 31.4*   RDW 13.5   *   MPV 11.2       TSH:    Lab Results   Component Value Date/Time    TSH 0.11 01/02/2024 09:42 AM        Radiology:   CTA PULMONARY W CONTRAST   Final Result   1. No evidence of acute pulmonary artery embolism.   2. 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  No definitive   mass or other cause.   3. Emphysema.   4. Moderate hiatal hernia.   5. Atherosclerosis.         XR CHEST (2 VW)   Final Result   COPD with chronic lung changes.      No acute disease.             Assessment:    Principal Problem:    COVID-19  Active Problems:    IVÁN (acute kidney injury) (HCC)    Dehydration    Thrombocytopenia (HCC)  Resolved Problems:    * No resolved hospital problems. *      Plan:  Covid 19 infection monitor on decadron. Incentive spirometry  Copd exac nebs and steroids  Dehydration monitor off  iv fluids  Iván continue to monitor off iv fluids  Hyperglycemia monitor  Thrombocytopenia monitor  Hypothyroidism continue med  Atrial fib continue med    Encourage pt to continue to be active and use IS.     Continuing to investigate the shortness of breath. Did discuss with pulm. Endocrine to see pt. Cardiology has given pt diuretic for probable overload    Recheck labs    Electronically signed by Luis E Metz DO on 1/2/2024 at 3:52 PM    
to be discharged today.  REVIEW OF SYSTEMS:  General ROS: negative for - fatigue, malaise,  weight gain or weight loss  Psychological ROS: negative for - anxiety , depression  Ophthalmic ROS: negative for - decreased vision or visual distortion.  ENT ROS: negative  Allergy and Immunology ROS: negative  Hematological and Lymphatic ROS: negative  Endocrine: no heat or cold intolerance and no polyphagia, polydipsia, or polyuria  Respiratory ROS: positive for - cough and shortness of breath  Cardiovascular ROS: positive for - dyspnea on exertion, irregular heartbeat, rapid heart rate, and shortness of breath.  Gastrointestinal ROS: no abdominal pain, change in bowel habits, or black or bloody stools  Genito-Urinary ROS: no nocturia, dysuria, trouble voiding, frequency or hematuria  Musculoskeletal ROS: negative for- myalgias, arthralgias, or claudication  Neurological ROS: no TIA or stroke symptoms otherwise no significant change in symptoms or problems since yesterday as documented in previous progress notes.    SCHEDULED MEDICATIONS:   sacubitril-valsartan  0.5 tablet Oral Daily    ipratropium 0.5 mg-albuterol 2.5 mg  1 Dose Inhalation Q4H WA RT    Vitamin D  1,000 Units Oral Daily    ascorbic acid  500 mg Oral Daily    zinc sulfate  50 mg Oral Daily    docusate sodium  100 mg Oral Daily    dexAMETHasone  4 mg IntraVENous Q12H    pantoprazole  40 mg Oral Q24H    dilTIAZem  120 mg Oral Daily    digoxin  62.5 mcg Oral Daily    metoprolol succinate  12.5 mg Oral Daily    sodium chloride flush  5-40 mL IntraVENous 2 times per day    famotidine  20 mg Oral Daily    rosuvastatin  10 mg Oral Nightly    warfarin  2.5 mg Oral Daily    budesonide  250 mcg Nebulization BID    arformoterol tartrate  15 mcg Nebulization BID RT    warfarin placeholder: dosing by provider   Other RX Placeholder       VITAL SIGNS:

## 2024-01-09 ENCOUNTER — TELEPHONE (OUTPATIENT)
Dept: PRIMARY CARE CLINIC | Age: 84
End: 2024-01-09

## 2024-01-09 NOTE — TELEPHONE ENCOUNTER
----- Message from Lety Warner Galindoitz sent at 1/9/2024 10:46 AM EST -----  Subject: Hospital Follow Up    QUESTIONS  What hospital was the Patient Discharged from? [facility]  Date of Discharge? 2024-01-05  Discharge Location? Home  Reason for hospitalization as patient stated? Covid  What question does the patient have, if applicable? She is on warfarin 2.5   mg daily. She is wondering if it should be changed to 2 mg?  ---------------------------------------------------------------------------  --------------  CALL BACK INFO  What is the best way for the office to contact you? OK to leave message on   voicemail  Preferred Call Back Phone Number? [phone]  ---------------------------------------------------------------------------  --------------  SCRIPT ANSWERS  Relationship to Patient? Self

## 2024-01-13 ENCOUNTER — HOSPITAL ENCOUNTER (INPATIENT)
Age: 84
LOS: 3 days | Discharge: HOME | DRG: 193 | End: 2024-01-16
Attending: EMERGENCY MEDICINE | Admitting: STUDENT IN AN ORGANIZED HEALTH CARE EDUCATION/TRAINING PROGRAM
Payer: MEDICARE

## 2024-01-13 ENCOUNTER — APPOINTMENT (OUTPATIENT)
Dept: GENERAL RADIOLOGY | Age: 84
DRG: 193 | End: 2024-01-13
Payer: MEDICARE

## 2024-01-13 DIAGNOSIS — N17.9 AKI (ACUTE KIDNEY INJURY) (HCC): ICD-10-CM

## 2024-01-13 DIAGNOSIS — J44.1 COPD EXACERBATION (HCC): ICD-10-CM

## 2024-01-13 DIAGNOSIS — J10.1 INFLUENZA A: Primary | ICD-10-CM

## 2024-01-13 LAB
ALBUMIN SERPL-MCNC: 3.5 G/DL (ref 3.5–5.2)
ALP SERPL-CCNC: 67 U/L (ref 35–104)
ALT SERPL-CCNC: 37 U/L (ref 0–32)
ANION GAP SERPL CALCULATED.3IONS-SCNC: 12 MMOL/L (ref 7–16)
AST SERPL-CCNC: 23 U/L (ref 0–31)
B PARAP IS1001 DNA NPH QL NAA+NON-PROBE: NOT DETECTED
B PERT DNA SPEC QL NAA+PROBE: NOT DETECTED
BASOPHILS # BLD: 0.01 K/UL (ref 0–0.2)
BASOPHILS NFR BLD: 0 % (ref 0–2)
BILIRUB SERPL-MCNC: 0.6 MG/DL (ref 0–1.2)
BNP SERPL-MCNC: 1213 PG/ML (ref 0–450)
BUN SERPL-MCNC: 21 MG/DL (ref 6–23)
C PNEUM DNA NPH QL NAA+NON-PROBE: NOT DETECTED
CALCIUM SERPL-MCNC: 9.3 MG/DL (ref 8.6–10.2)
CHLORIDE SERPL-SCNC: 96 MMOL/L (ref 98–107)
CO2 SERPL-SCNC: 32 MMOL/L (ref 22–29)
CREAT SERPL-MCNC: 1.3 MG/DL (ref 0.5–1)
EOSINOPHIL # BLD: 0.09 K/UL (ref 0.05–0.5)
EOSINOPHILS RELATIVE PERCENT: 2 % (ref 0–6)
ERYTHROCYTE [DISTWIDTH] IN BLOOD BY AUTOMATED COUNT: 14.2 % (ref 11.5–15)
FLUAV H1 2009 PAN RNA NPH NAA+NON-PROBE: DETECTED
FLUAV RNA NPH QL NAA+NON-PROBE: DETECTED
FLUBV RNA NPH QL NAA+NON-PROBE: NOT DETECTED
GFR SERPL CREATININE-BSD FRML MDRD: 39 ML/MIN/1.73M2
GLUCOSE SERPL-MCNC: 146 MG/DL (ref 74–99)
HADV DNA NPH QL NAA+NON-PROBE: NOT DETECTED
HCOV 229E RNA NPH QL NAA+NON-PROBE: NOT DETECTED
HCOV HKU1 RNA NPH QL NAA+NON-PROBE: NOT DETECTED
HCOV NL63 RNA NPH QL NAA+NON-PROBE: NOT DETECTED
HCOV OC43 RNA NPH QL NAA+NON-PROBE: NOT DETECTED
HCT VFR BLD AUTO: 38.1 % (ref 34–48)
HGB BLD-MCNC: 12.2 G/DL (ref 11.5–15.5)
HMPV RNA NPH QL NAA+NON-PROBE: NOT DETECTED
HPIV1 RNA NPH QL NAA+NON-PROBE: NOT DETECTED
HPIV2 RNA NPH QL NAA+NON-PROBE: NOT DETECTED
HPIV3 RNA NPH QL NAA+NON-PROBE: NOT DETECTED
HPIV4 RNA NPH QL NAA+NON-PROBE: NOT DETECTED
IMM GRANULOCYTES # BLD AUTO: 0.03 K/UL (ref 0–0.58)
IMM GRANULOCYTES NFR BLD: 1 % (ref 0–5)
INR PPP: 1.6
LYMPHOCYTES NFR BLD: 1.11 K/UL (ref 1.5–4)
LYMPHOCYTES RELATIVE PERCENT: 19 % (ref 20–42)
M PNEUMO DNA NPH QL NAA+NON-PROBE: NOT DETECTED
MCH RBC QN AUTO: 30 PG (ref 26–35)
MCHC RBC AUTO-ENTMCNC: 32 G/DL (ref 32–34.5)
MCV RBC AUTO: 93.6 FL (ref 80–99.9)
MONOCYTES NFR BLD: 0.25 K/UL (ref 0.1–0.95)
MONOCYTES NFR BLD: 4 % (ref 2–12)
NEUTROPHILS NFR BLD: 74 % (ref 43–80)
NEUTS SEG NFR BLD: 4.31 K/UL (ref 1.8–7.3)
PLATELET # BLD AUTO: 184 K/UL (ref 130–450)
PMV BLD AUTO: 9.9 FL (ref 7–12)
POTASSIUM SERPL-SCNC: 4.7 MMOL/L (ref 3.5–5)
PROT SERPL-MCNC: 6.5 G/DL (ref 6.4–8.3)
PROTHROMBIN TIME: 18.6 SEC (ref 9.3–12.4)
RBC # BLD AUTO: 4.07 M/UL (ref 3.5–5.5)
RSV RNA NPH QL NAA+NON-PROBE: NOT DETECTED
RV+EV RNA NPH QL NAA+NON-PROBE: NOT DETECTED
SARS-COV-2 RNA NPH QL NAA+NON-PROBE: DETECTED
SODIUM SERPL-SCNC: 140 MMOL/L (ref 132–146)
SPECIMEN DESCRIPTION: ABNORMAL
TROPONIN I SERPL HS-MCNC: 36 NG/L (ref 0–9)
TROPONIN I SERPL HS-MCNC: 39 NG/L (ref 0–9)
WBC OTHER # BLD: 5.8 K/UL (ref 4.5–11.5)

## 2024-01-13 PROCEDURE — 94640 AIRWAY INHALATION TREATMENT: CPT

## 2024-01-13 PROCEDURE — 6360000002 HC RX W HCPCS: Performed by: STUDENT IN AN ORGANIZED HEALTH CARE EDUCATION/TRAINING PROGRAM

## 2024-01-13 PROCEDURE — 71045 X-RAY EXAM CHEST 1 VIEW: CPT

## 2024-01-13 PROCEDURE — 6370000000 HC RX 637 (ALT 250 FOR IP): Performed by: STUDENT IN AN ORGANIZED HEALTH CARE EDUCATION/TRAINING PROGRAM

## 2024-01-13 PROCEDURE — 2060000000 HC ICU INTERMEDIATE R&B

## 2024-01-13 PROCEDURE — 94664 DEMO&/EVAL PT USE INHALER: CPT

## 2024-01-13 PROCEDURE — 80053 COMPREHEN METABOLIC PANEL: CPT

## 2024-01-13 PROCEDURE — 93005 ELECTROCARDIOGRAM TRACING: CPT

## 2024-01-13 PROCEDURE — 85025 COMPLETE CBC W/AUTO DIFF WBC: CPT

## 2024-01-13 PROCEDURE — 2500000003 HC RX 250 WO HCPCS: Performed by: STUDENT IN AN ORGANIZED HEALTH CARE EDUCATION/TRAINING PROGRAM

## 2024-01-13 PROCEDURE — 84484 ASSAY OF TROPONIN QUANT: CPT

## 2024-01-13 PROCEDURE — 2580000003 HC RX 258: Performed by: STUDENT IN AN ORGANIZED HEALTH CARE EDUCATION/TRAINING PROGRAM

## 2024-01-13 PROCEDURE — 99222 1ST HOSP IP/OBS MODERATE 55: CPT | Performed by: STUDENT IN AN ORGANIZED HEALTH CARE EDUCATION/TRAINING PROGRAM

## 2024-01-13 PROCEDURE — 99285 EMERGENCY DEPT VISIT HI MDM: CPT

## 2024-01-13 PROCEDURE — 96374 THER/PROPH/DIAG INJ IV PUSH: CPT

## 2024-01-13 PROCEDURE — 85610 PROTHROMBIN TIME: CPT

## 2024-01-13 PROCEDURE — 83880 ASSAY OF NATRIURETIC PEPTIDE: CPT

## 2024-01-13 PROCEDURE — 96375 TX/PRO/DX INJ NEW DRUG ADDON: CPT

## 2024-01-13 PROCEDURE — 0202U NFCT DS 22 TRGT SARS-COV-2: CPT

## 2024-01-13 RX ORDER — IPRATROPIUM BROMIDE AND ALBUTEROL SULFATE 2.5; .5 MG/3ML; MG/3ML
3 SOLUTION RESPIRATORY (INHALATION) ONCE
Status: COMPLETED | OUTPATIENT
Start: 2024-01-13 | End: 2024-01-13

## 2024-01-13 RX ORDER — 0.9 % SODIUM CHLORIDE 0.9 %
1000 INTRAVENOUS SOLUTION INTRAVENOUS ONCE
Status: COMPLETED | OUTPATIENT
Start: 2024-01-13 | End: 2024-01-13

## 2024-01-13 RX ORDER — METHYLPREDNISOLONE SODIUM SUCCINATE 125 MG/2ML
125 INJECTION, POWDER, LYOPHILIZED, FOR SOLUTION INTRAMUSCULAR; INTRAVENOUS ONCE
Status: COMPLETED | OUTPATIENT
Start: 2024-01-13 | End: 2024-01-13

## 2024-01-13 RX ADMIN — METHYLPREDNISOLONE SODIUM SUCCINATE 125 MG: 125 INJECTION INTRAMUSCULAR; INTRAVENOUS at 19:34

## 2024-01-13 RX ADMIN — DOXYCYCLINE 100 MG: 100 INJECTION, POWDER, LYOPHILIZED, FOR SOLUTION INTRAVENOUS at 22:43

## 2024-01-13 RX ADMIN — IPRATROPIUM BROMIDE AND ALBUTEROL SULFATE 3 DOSE: 2.5; .5 SOLUTION RESPIRATORY (INHALATION) at 19:05

## 2024-01-13 RX ADMIN — SODIUM CHLORIDE 1000 ML: 9 INJECTION, SOLUTION INTRAVENOUS at 19:20

## 2024-01-13 RX ADMIN — WATER 2000 MG: 1 INJECTION INTRAMUSCULAR; INTRAVENOUS; SUBCUTANEOUS at 22:37

## 2024-01-13 ASSESSMENT — PAIN - FUNCTIONAL ASSESSMENT: PAIN_FUNCTIONAL_ASSESSMENT: NONE - DENIES PAIN

## 2024-01-13 NOTE — ED PROVIDER NOTES
(VIBRAMYCIN) 100 mg in sodium chloride 0.9 % 100 mL IVPB (100 mg IntraVENous New Bag 1/13/24 2243)   cefTRIAXone (ROCEPHIN) 2,000 mg in sterile water 20 mL IV syringe (2,000 mg IntraVENous Given 1/13/24 2237)         Is this patient to be included in the SEP-1 core measure due to severe sepsis or septic shock? No Exclusion criteria - the patient is NOT to be included for SEP-1 Core Measure due to: 2+ SIRS criteria are not met        Medical Decision Making/Differential Diagnosis:    CC/HPI Summary, Social Determinants of health, Records Reviewed, DDx, testing done/not done, ED Course, Reassessment, disposition considerations/shared decision making with patient, consults, disposition:      ED Course as of 01/14/24 0017   Sat Jan 13, 2024   1939 EKG shows junctional rhythm at 67 beats minute no signs of ST changes no ST elevation QTc 473.  Significant motion artifact.  Prior EKG showed a flutter at increased rate EKG interpreted by myself [KK]      ED Course User Index  [KK] Verona Sen MD        Chronic Conditions:   Past Medical History:   Diagnosis Date    Atrial fibrillation (HCC)     CHF (congestive heart failure) (HCC)     Emphysema, unspecified (HCC)     Hyperlipidemia     Hypertension     Lung cancer (HCC)     Mitral valve regurgitation     Oxygen dependent     Prolonged emergence from general anesthesia     post general anesthesia    Skin cancer     Thyroid disease        CONSULTS: (Who and What was discussed)  None    Discussion with Other Profesionals : Admitting Team Dr. Campos    Social Determinants : None    Records Reviewed : Inpatient Notes admission notes from 12/28/2020    CC/HPI Summary, DDx, ED Course, and Reassessment: Patient is placed on cardiac monitor and continuous pulse ox for monitoring. EKG is ordered to evaluate patient's current cardiac rhythm, and to interpret QT interval prior to administering medications. CBC is ordered to evaluate for any signs of infection or inflammation by      STOP taking these medications       Apoaequorin (PREVAGEN EXTRA STRENGTH) 20 MG CAPS Comments:   Reason for Stopping:                    (Please note that portions of this note were completed with a voice recognition program.  Efforts were made to edit the dictations but occasionally words are mis-transcribed.)    Eron Santizo DO (electronically signed)

## 2024-01-14 PROBLEM — J96.21 ACUTE ON CHRONIC HYPOXIC RESPIRATORY FAILURE (HCC): Status: ACTIVE | Noted: 2024-01-14

## 2024-01-14 PROBLEM — I50.22 CHRONIC SYSTOLIC CHF (CONGESTIVE HEART FAILURE) (HCC): Status: ACTIVE | Noted: 2024-01-14

## 2024-01-14 PROBLEM — J96.01 ACUTE HYPOXIC RESPIRATORY FAILURE (HCC): Status: ACTIVE | Noted: 2024-01-14

## 2024-01-14 LAB
ALBUMIN SERPL-MCNC: 3.2 G/DL (ref 3.5–5.2)
ALP SERPL-CCNC: 67 U/L (ref 35–104)
ALT SERPL-CCNC: 33 U/L (ref 0–32)
ANION GAP SERPL CALCULATED.3IONS-SCNC: 12 MMOL/L (ref 7–16)
AST SERPL-CCNC: 20 U/L (ref 0–31)
BASOPHILS # BLD: 0.01 K/UL (ref 0–0.2)
BASOPHILS NFR BLD: 0 % (ref 0–2)
BILIRUB SERPL-MCNC: 0.4 MG/DL (ref 0–1.2)
BUN SERPL-MCNC: 23 MG/DL (ref 6–23)
CALCIUM SERPL-MCNC: 8.6 MG/DL (ref 8.6–10.2)
CHLORIDE SERPL-SCNC: 98 MMOL/L (ref 98–107)
CO2 SERPL-SCNC: 26 MMOL/L (ref 22–29)
CREAT SERPL-MCNC: 1.2 MG/DL (ref 0.5–1)
EOSINOPHIL # BLD: 0.01 K/UL (ref 0.05–0.5)
EOSINOPHILS RELATIVE PERCENT: 0 % (ref 0–6)
ERYTHROCYTE [DISTWIDTH] IN BLOOD BY AUTOMATED COUNT: 14.2 % (ref 11.5–15)
GFR SERPL CREATININE-BSD FRML MDRD: 43 ML/MIN/1.73M2
GLUCOSE SERPL-MCNC: 193 MG/DL (ref 74–99)
HCT VFR BLD AUTO: 33.4 % (ref 34–48)
HGB BLD-MCNC: 10.6 G/DL (ref 11.5–15.5)
IMM GRANULOCYTES # BLD AUTO: <0.03 K/UL (ref 0–0.58)
IMM GRANULOCYTES NFR BLD: 1 % (ref 0–5)
LYMPHOCYTES NFR BLD: 0.39 K/UL (ref 1.5–4)
LYMPHOCYTES RELATIVE PERCENT: 9 % (ref 20–42)
MCH RBC QN AUTO: 29.4 PG (ref 26–35)
MCHC RBC AUTO-ENTMCNC: 31.7 G/DL (ref 32–34.5)
MCV RBC AUTO: 92.8 FL (ref 80–99.9)
MONOCYTES NFR BLD: 0.07 K/UL (ref 0.1–0.95)
MONOCYTES NFR BLD: 2 % (ref 2–12)
NEUTROPHILS NFR BLD: 89 % (ref 43–80)
NEUTS SEG NFR BLD: 3.86 K/UL (ref 1.8–7.3)
PLATELET # BLD AUTO: 141 K/UL (ref 130–450)
PMV BLD AUTO: 10.2 FL (ref 7–12)
POTASSIUM SERPL-SCNC: 4.7 MMOL/L (ref 3.5–5)
PROCALCITONIN SERPL-MCNC: 0.07 NG/ML (ref 0–0.08)
PROCALCITONIN SERPL-MCNC: 0.07 NG/ML (ref 0–0.08)
PROT SERPL-MCNC: 6 G/DL (ref 6.4–8.3)
RBC # BLD AUTO: 3.6 M/UL (ref 3.5–5.5)
RBC # BLD: ABNORMAL 10*6/UL
RBC # BLD: ABNORMAL 10*6/UL
SODIUM SERPL-SCNC: 136 MMOL/L (ref 132–146)
T4 FREE SERPL-MCNC: 0.7 NG/DL (ref 0.9–1.7)
TSH SERPL DL<=0.05 MIU/L-ACNC: 7.44 UIU/ML (ref 0.27–4.2)
WBC OTHER # BLD: 4.4 K/UL (ref 4.5–11.5)

## 2024-01-14 PROCEDURE — 2500000003 HC RX 250 WO HCPCS: Performed by: STUDENT IN AN ORGANIZED HEALTH CARE EDUCATION/TRAINING PROGRAM

## 2024-01-14 PROCEDURE — 84443 ASSAY THYROID STIM HORMONE: CPT

## 2024-01-14 PROCEDURE — 2700000000 HC OXYGEN THERAPY PER DAY

## 2024-01-14 PROCEDURE — 6370000000 HC RX 637 (ALT 250 FOR IP): Performed by: STUDENT IN AN ORGANIZED HEALTH CARE EDUCATION/TRAINING PROGRAM

## 2024-01-14 PROCEDURE — 84145 PROCALCITONIN (PCT): CPT

## 2024-01-14 PROCEDURE — 94640 AIRWAY INHALATION TREATMENT: CPT

## 2024-01-14 PROCEDURE — 2580000003 HC RX 258: Performed by: INTERNAL MEDICINE

## 2024-01-14 PROCEDURE — 87449 NOS EACH ORGANISM AG IA: CPT

## 2024-01-14 PROCEDURE — 2060000000 HC ICU INTERMEDIATE R&B

## 2024-01-14 PROCEDURE — 2580000003 HC RX 258: Performed by: STUDENT IN AN ORGANIZED HEALTH CARE EDUCATION/TRAINING PROGRAM

## 2024-01-14 PROCEDURE — 87899 AGENT NOS ASSAY W/OPTIC: CPT

## 2024-01-14 PROCEDURE — 6360000002 HC RX W HCPCS: Performed by: STUDENT IN AN ORGANIZED HEALTH CARE EDUCATION/TRAINING PROGRAM

## 2024-01-14 PROCEDURE — 85025 COMPLETE CBC W/AUTO DIFF WBC: CPT

## 2024-01-14 PROCEDURE — 80053 COMPREHEN METABOLIC PANEL: CPT

## 2024-01-14 PROCEDURE — 6360000002 HC RX W HCPCS: Performed by: INTERNAL MEDICINE

## 2024-01-14 PROCEDURE — 99233 SBSQ HOSP IP/OBS HIGH 50: CPT | Performed by: INTERNAL MEDICINE

## 2024-01-14 PROCEDURE — 84439 ASSAY OF FREE THYROXINE: CPT

## 2024-01-14 RX ORDER — METOPROLOL SUCCINATE 25 MG/1
12.5 TABLET, EXTENDED RELEASE ORAL EVERY MORNING
Status: DISCONTINUED | OUTPATIENT
Start: 2024-01-14 | End: 2024-01-16 | Stop reason: HOSPADM

## 2024-01-14 RX ORDER — SODIUM CHLORIDE 0.9 % (FLUSH) 0.9 %
5-40 SYRINGE (ML) INJECTION EVERY 12 HOURS SCHEDULED
Status: DISCONTINUED | OUTPATIENT
Start: 2024-01-14 | End: 2024-01-16 | Stop reason: HOSPADM

## 2024-01-14 RX ORDER — POTASSIUM CHLORIDE 20 MEQ/1
40 TABLET, EXTENDED RELEASE ORAL PRN
Status: DISCONTINUED | OUTPATIENT
Start: 2024-01-14 | End: 2024-01-16 | Stop reason: HOSPADM

## 2024-01-14 RX ORDER — OSELTAMIVIR PHOSPHATE 75 MG/1
75 CAPSULE ORAL 2 TIMES DAILY
Status: DISCONTINUED | OUTPATIENT
Start: 2024-01-14 | End: 2024-01-14 | Stop reason: DRUGHIGH

## 2024-01-14 RX ORDER — METHYLPREDNISOLONE SODIUM SUCCINATE 40 MG/ML
40 INJECTION, POWDER, LYOPHILIZED, FOR SOLUTION INTRAMUSCULAR; INTRAVENOUS DAILY
Status: DISCONTINUED | OUTPATIENT
Start: 2024-01-15 | End: 2024-01-15

## 2024-01-14 RX ORDER — OSELTAMIVIR PHOSPHATE 75 MG/1
75 CAPSULE ORAL ONCE
Status: COMPLETED | OUTPATIENT
Start: 2024-01-14 | End: 2024-01-14

## 2024-01-14 RX ORDER — ROSUVASTATIN CALCIUM 10 MG/1
10 TABLET, COATED ORAL NIGHTLY
Status: DISCONTINUED | OUTPATIENT
Start: 2024-01-14 | End: 2024-01-16 | Stop reason: HOSPADM

## 2024-01-14 RX ORDER — FAMOTIDINE 20 MG/1
20 TABLET, FILM COATED ORAL DAILY
Status: DISCONTINUED | OUTPATIENT
Start: 2024-01-14 | End: 2024-01-16 | Stop reason: HOSPADM

## 2024-01-14 RX ORDER — ONDANSETRON 2 MG/ML
4 INJECTION INTRAMUSCULAR; INTRAVENOUS EVERY 6 HOURS PRN
Status: DISCONTINUED | OUTPATIENT
Start: 2024-01-14 | End: 2024-01-16 | Stop reason: HOSPADM

## 2024-01-14 RX ORDER — WARFARIN SODIUM 2.5 MG/1
2.5 TABLET ORAL NIGHTLY
Status: DISCONTINUED | OUTPATIENT
Start: 2024-01-14 | End: 2024-01-16 | Stop reason: HOSPADM

## 2024-01-14 RX ORDER — POTASSIUM CHLORIDE 7.45 MG/ML
10 INJECTION INTRAVENOUS PRN
Status: DISCONTINUED | OUTPATIENT
Start: 2024-01-14 | End: 2024-01-16 | Stop reason: HOSPADM

## 2024-01-14 RX ORDER — OSELTAMIVIR PHOSPHATE 30 MG/1
30 CAPSULE ORAL 2 TIMES DAILY
Status: DISCONTINUED | OUTPATIENT
Start: 2024-01-14 | End: 2024-01-16 | Stop reason: HOSPADM

## 2024-01-14 RX ORDER — MAGNESIUM SULFATE IN WATER 40 MG/ML
2000 INJECTION, SOLUTION INTRAVENOUS PRN
Status: DISCONTINUED | OUTPATIENT
Start: 2024-01-14 | End: 2024-01-16 | Stop reason: HOSPADM

## 2024-01-14 RX ORDER — DIGOXIN 125 MCG
62.5 TABLET ORAL DAILY
Status: DISCONTINUED | OUTPATIENT
Start: 2024-01-14 | End: 2024-01-16 | Stop reason: HOSPADM

## 2024-01-14 RX ORDER — ACETAMINOPHEN 650 MG/1
650 SUPPOSITORY RECTAL EVERY 6 HOURS PRN
Status: DISCONTINUED | OUTPATIENT
Start: 2024-01-14 | End: 2024-01-16 | Stop reason: HOSPADM

## 2024-01-14 RX ORDER — ARFORMOTEROL TARTRATE 15 UG/2ML
15 SOLUTION RESPIRATORY (INHALATION)
Status: DISCONTINUED | OUTPATIENT
Start: 2024-01-14 | End: 2024-01-16 | Stop reason: HOSPADM

## 2024-01-14 RX ORDER — DILTIAZEM HYDROCHLORIDE 120 MG/1
120 CAPSULE, COATED, EXTENDED RELEASE ORAL DAILY
Status: DISCONTINUED | OUTPATIENT
Start: 2024-01-14 | End: 2024-01-16 | Stop reason: HOSPADM

## 2024-01-14 RX ORDER — ONDANSETRON 4 MG/1
4 TABLET, ORALLY DISINTEGRATING ORAL EVERY 8 HOURS PRN
Status: DISCONTINUED | OUTPATIENT
Start: 2024-01-14 | End: 2024-01-16 | Stop reason: HOSPADM

## 2024-01-14 RX ORDER — SODIUM CHLORIDE 9 MG/ML
INJECTION, SOLUTION INTRAVENOUS PRN
Status: DISCONTINUED | OUTPATIENT
Start: 2024-01-14 | End: 2024-01-16 | Stop reason: HOSPADM

## 2024-01-14 RX ORDER — OSELTAMIVIR PHOSPHATE 6 MG/ML
75 FOR SUSPENSION ORAL 2 TIMES DAILY
Status: DISCONTINUED | OUTPATIENT
Start: 2024-01-14 | End: 2024-01-14

## 2024-01-14 RX ORDER — BUDESONIDE 0.5 MG/2ML
500 INHALANT ORAL
Status: DISCONTINUED | OUTPATIENT
Start: 2024-01-14 | End: 2024-01-16 | Stop reason: HOSPADM

## 2024-01-14 RX ORDER — SODIUM CHLORIDE 0.9 % (FLUSH) 0.9 %
5-40 SYRINGE (ML) INJECTION PRN
Status: DISCONTINUED | OUTPATIENT
Start: 2024-01-14 | End: 2024-01-16 | Stop reason: HOSPADM

## 2024-01-14 RX ORDER — LEVOTHYROXINE SODIUM 0.05 MG/1
50 TABLET ORAL
Status: DISCONTINUED | OUTPATIENT
Start: 2024-01-14 | End: 2024-01-16 | Stop reason: HOSPADM

## 2024-01-14 RX ORDER — POLYETHYLENE GLYCOL 3350 17 G/17G
17 POWDER, FOR SOLUTION ORAL DAILY PRN
Status: DISCONTINUED | OUTPATIENT
Start: 2024-01-14 | End: 2024-01-16 | Stop reason: HOSPADM

## 2024-01-14 RX ORDER — ACETAMINOPHEN 325 MG/1
650 TABLET ORAL EVERY 6 HOURS PRN
Status: DISCONTINUED | OUTPATIENT
Start: 2024-01-14 | End: 2024-01-16 | Stop reason: HOSPADM

## 2024-01-14 RX ORDER — IPRATROPIUM BROMIDE AND ALBUTEROL SULFATE 2.5; .5 MG/3ML; MG/3ML
1 SOLUTION RESPIRATORY (INHALATION)
Status: DISCONTINUED | OUTPATIENT
Start: 2024-01-14 | End: 2024-01-16 | Stop reason: HOSPADM

## 2024-01-14 RX ORDER — METHYLPREDNISOLONE SODIUM SUCCINATE 125 MG/2ML
60 INJECTION, POWDER, LYOPHILIZED, FOR SOLUTION INTRAMUSCULAR; INTRAVENOUS EVERY 12 HOURS
Status: DISCONTINUED | OUTPATIENT
Start: 2024-01-14 | End: 2024-01-14

## 2024-01-14 RX ORDER — SODIUM CHLORIDE 9 MG/ML
INJECTION, SOLUTION INTRAVENOUS CONTINUOUS
Status: ACTIVE | OUTPATIENT
Start: 2024-01-14 | End: 2024-01-16

## 2024-01-14 RX ADMIN — WARFARIN SODIUM 2.5 MG: 2.5 TABLET ORAL at 01:04

## 2024-01-14 RX ADMIN — ARFORMOTEROL TARTRATE 15 MCG: 15 SOLUTION RESPIRATORY (INHALATION) at 20:39

## 2024-01-14 RX ADMIN — SODIUM CHLORIDE: 9 INJECTION, SOLUTION INTRAVENOUS at 00:40

## 2024-01-14 RX ADMIN — DOXYCYCLINE 100 MG: 100 INJECTION, POWDER, LYOPHILIZED, FOR SOLUTION INTRAVENOUS at 09:12

## 2024-01-14 RX ADMIN — WATER 1000 MG: 1 INJECTION INTRAMUSCULAR; INTRAVENOUS; SUBCUTANEOUS at 21:14

## 2024-01-14 RX ADMIN — IPRATROPIUM BROMIDE AND ALBUTEROL SULFATE 1 DOSE: .5; 2.5 SOLUTION RESPIRATORY (INHALATION) at 08:50

## 2024-01-14 RX ADMIN — ARFORMOTEROL TARTRATE 15 MCG: 15 SOLUTION RESPIRATORY (INHALATION) at 13:02

## 2024-01-14 RX ADMIN — WARFARIN SODIUM 2.5 MG: 2.5 TABLET ORAL at 21:51

## 2024-01-14 RX ADMIN — ROSUVASTATIN CALCIUM 10 MG: 10 TABLET, FILM COATED ORAL at 21:14

## 2024-01-14 RX ADMIN — SODIUM CHLORIDE, PRESERVATIVE FREE 10 ML: 5 INJECTION INTRAVENOUS at 09:16

## 2024-01-14 RX ADMIN — FAMOTIDINE 20 MG: 20 TABLET ORAL at 09:11

## 2024-01-14 RX ADMIN — IPRATROPIUM BROMIDE AND ALBUTEROL SULFATE 1 DOSE: .5; 2.5 SOLUTION RESPIRATORY (INHALATION) at 17:51

## 2024-01-14 RX ADMIN — DOXYCYCLINE 100 MG: 100 INJECTION, POWDER, LYOPHILIZED, FOR SOLUTION INTRAVENOUS at 21:18

## 2024-01-14 RX ADMIN — SODIUM CHLORIDE: 9 INJECTION, SOLUTION INTRAVENOUS at 15:46

## 2024-01-14 RX ADMIN — DIGOXIN 62.5 MCG: 0.12 TABLET ORAL at 09:11

## 2024-01-14 RX ADMIN — BUDESONIDE 500 MCG: 0.5 SUSPENSION RESPIRATORY (INHALATION) at 13:02

## 2024-01-14 RX ADMIN — LEVOTHYROXINE SODIUM 50 MCG: 0.05 TABLET ORAL at 05:46

## 2024-01-14 RX ADMIN — BUDESONIDE 500 MCG: 0.5 SUSPENSION RESPIRATORY (INHALATION) at 20:39

## 2024-01-14 RX ADMIN — IPRATROPIUM BROMIDE AND ALBUTEROL SULFATE 1 DOSE: .5; 2.5 SOLUTION RESPIRATORY (INHALATION) at 13:02

## 2024-01-14 RX ADMIN — ROSUVASTATIN CALCIUM 10 MG: 10 TABLET, FILM COATED ORAL at 01:04

## 2024-01-14 RX ADMIN — OSELTAMIVIR PHOSPHATE 30 MG: 30 CAPSULE ORAL at 09:11

## 2024-01-14 RX ADMIN — OSELTAMIVIR PHOSPHATE 75 MG: 75 CAPSULE ORAL at 01:04

## 2024-01-14 RX ADMIN — SODIUM CHLORIDE, PRESERVATIVE FREE 10 ML: 5 INJECTION INTRAVENOUS at 21:15

## 2024-01-14 RX ADMIN — IPRATROPIUM BROMIDE AND ALBUTEROL SULFATE 1 DOSE: .5; 2.5 SOLUTION RESPIRATORY (INHALATION) at 20:39

## 2024-01-14 RX ADMIN — OSELTAMIVIR PHOSPHATE 30 MG: 30 CAPSULE ORAL at 21:51

## 2024-01-14 RX ADMIN — METHYLPREDNISOLONE SODIUM SUCCINATE 60 MG: 125 INJECTION INTRAMUSCULAR; INTRAVENOUS at 09:12

## 2024-01-14 NOTE — PROGRESS NOTES
Hospitalist Progress Note      Synopsis: Patient admitted on 1/13/2024  83-year-old lady with past medical history significant for lung cancer s/p radiation, hypertension hyperlipidemia congestive heart failure atrial fibrillation mitral valve regurgitation and recent COVID presented to ED with worsening shortness of breath.  She was recently diagnosed with COVID around [place] last year was admitted in the hospital for 3 times since then.  She generally wears oxygen at 4 L at baseline but had to up her oxygen to 5 L because of worsening shortness of breath.  Mainly exertional, productive cough, clear, no fever or chills.  Recently admitted from 12/28 through 1/4 for COPD with respiratory failure.  She denies any chest pain, belly pain, nausea vomiting diarrhea, fever or chills, no lightheadedness or dizziness, and no focal neurological signs.  In ED she was found positive for flu as well as COVID.  Chest x-ray also showed some bilateral opacities.  Plan was made to admit her for further treatment.       ASSESSMENT:     Principal Problem:    Influenza A  Active Problems:    Pneumonia    COPD exacerbation (HCC)    Acute on chronic hypoxic respiratory failure (HCC)    Chronic systolic CHF (congestive heart failure) (HCC)  Resolved Problems:    * No resolved hospital problems. *         PLAN:       Influenza A-will start Tamiflu, oxygen by nasal cannula to keep saturation more than 92%, now saturating well at 4 L which is her baseline.  Breathing treatments, steroids, labs in morning, telemetry monitoring, will follow.    COVID-19-symptomatic treatment, prolonged COVID, rest as per 1.    Acute on chronic hypoxic respiratory failure-secondary to 1 and 2, treatment plan as per 1    Questionable pneumonia-Rocephin and Doxy started in ED, will continue, along with breathing treatments steroids, will get procalcitonin level, labs in morning and will follow.  Continue antibiotics for now, check procalcitonin, check

## 2024-01-14 NOTE — ED NOTES
ED to Inpatient Handoff Report    Notified floor rn that electronic handoff available and patient ready for transport to room 446   .    Safety Risks: Risk of falls    Patient in Restraints: no    Constant Observer or Patient : no    Telemetry Monitoring Ordered :Yes           Order to transfer to unit without monitor:YES    Last MEWS: 1 Time completed: 2334    Deterioration Index Score:   Predictive Model Details          33 (Caution)  Factor Value    Calculated 1/13/2024 23:34 40% Age 83 years old    Deterioration Index Model 34% Supplemental oxygen Nasal cannula     11% Potassium 4.7 mmol/L     7% Respiratory rate 18     5% Systolic 103     2% Pulse oximetry 99 %     1% Pulse 61     0% Sodium 140 mmol/L     0% Temperature 97.9 °F (36.6 °C)     0% WBC count 5.8 k/uL     0% Hematocrit 38.1 %        Vitals:    01/13/24 1907 01/13/24 1915 01/13/24 1920 01/13/24 2332   BP:  (!) 78/56 (!) 92/51 (!) 103/59   Pulse:    61   Resp:    18   Temp:    97.9 °F (36.6 °C)   TempSrc:       SpO2: 100% 100% 100% 99%   Weight:       Height:             Opportunity for questions and clarification was provided.

## 2024-01-14 NOTE — H&P
[facility] Hospitalist Group History and Physical      CHIEF COMPLAINT: Shortness of breath    History of Present Illness: An 83-year-old lady with past medical history significant for lung cancer s/p radiation, hypertension hyperlipidemia congestive heart failure atrial fibrillation mitral valve regurgitation and recent COVID presented to ED with worsening shortness of breath.  She was recently diagnosed with COVID around [place] last year was admitted in the hospital for 3 times since then.  She generally wears oxygen at 4 L at baseline but had to up her oxygen to 5 L because of worsening shortness of breath.  Mainly exertional, productive cough, clear, no fever or chills.  She denies any chest pain, belly pain, nausea vomiting diarrhea, fever or chills, no lightheadedness or dizziness, and no focal neurological signs.  In ED she was found positive for flu as well as COVID.  Chest x-ray also showed some bilateral opacities.  Plan was made to admit her for further treatment.    Informant(s) for H&P: Patient and her family    REVIEW OF SYSTEMS:  A comprehensive review of systems was negative except for: what is in the HPI      PMH:  Past Medical History:   Diagnosis Date    Atrial fibrillation (HCC)     CHF (congestive heart failure) (HCC)     Emphysema, unspecified (HCC)     Hyperlipidemia     Hypertension     Lung cancer (HCC)     Mitral valve regurgitation     Oxygen dependent     Prolonged emergence from general anesthesia     post general anesthesia    Skin cancer     Thyroid disease        Surgical History:  Past Surgical History:   Procedure Laterality Date    BREAST BIOPSY Right     benign    BRONCHOSCOPY N/A 04/20/2021    BRONCHOSCOPY/TRANSBRONCHIAL NEEDLE BIOPSY performed by Antony Rosario MD at [facility] ENDOSCOPY    CARDIOVERSION  10/06/2023    Dr Jones    INTRACAPSULAR CATARACT EXTRACTION Right 12/16/2021    RIGHT EYE CATARACT EXTRACTION IOL IMPLANT performed by Kay Pimentel MD at [facility]    Final Result   Increased interstitial and hazy opacities which could indicate interstitial   pulmonary edema or bilateral pneumonia.             EKG:     ASSESSMENT:      Principal Problem:    Influenza A  Resolved Problems:    * No resolved hospital problems. *      PLAN:    1.  Influenza A-will start Tamiflu, oxygen by nasal cannula to keep saturation more than 92%, now saturating well at 4 L which is her baseline.  Breathing treatments, steroids, labs in morning, telemetry monitoring, will follow.  2.  COVID-19-symptomatic treatment, prolonged COVID, rest as per 1.  3.  Acute hypoxic respiratory failure-secondary to 1 and 2, treatment plan as per 1  4.  Questionable pneumonia-Rocephin and Doxy started in ED, will continue, along with breathing treatments steroids, will get procalcitonin level, labs in morning and will follow.  5.  TRISTEN-creatinine 1.3 from baseline around 0.8 last time, gentle hydration and will follow.  Avoid nephrotoxins.  6.  PAF-rate controlled, anticoagulated with Coumadin, continue same, no acute issue, will follow.  7.  Hypotension-blood pressure running low, gentle hydration, telemetry monitoring and will follow.  8.  Hypothyroidism-stable continue same.    Code Status: Full code  DVT prophylaxis: Coumadin  PUD prophylaxis-Pepcid      45 minutes or more was spent in assessing patient, reviewing charts, discussing plan of care with patient and family and documentation.      NOTE: This report was transcribed using voice recognition software. Every effort was made to ensure accuracy; however, inadvertent computerized transcription errors may be present.  Electronically signed by Getachew Campos MD on 1/13/2024 at 11:52 PM

## 2024-01-15 PROBLEM — J96.11 CHRONIC HYPOXIC RESPIRATORY FAILURE (HCC): Status: ACTIVE | Noted: 2024-01-14

## 2024-01-15 LAB
ALBUMIN SERPL-MCNC: 3.3 G/DL (ref 3.5–5.2)
ALP SERPL-CCNC: 60 U/L (ref 35–104)
ALT SERPL-CCNC: 32 U/L (ref 0–32)
ANION GAP SERPL CALCULATED.3IONS-SCNC: 12 MMOL/L (ref 7–16)
AST SERPL-CCNC: 22 U/L (ref 0–31)
BASOPHILS # BLD: 0 K/UL (ref 0–0.2)
BASOPHILS NFR BLD: 0 % (ref 0–2)
BILIRUB SERPL-MCNC: 0.3 MG/DL (ref 0–1.2)
BUN SERPL-MCNC: 30 MG/DL (ref 6–23)
CALCIUM SERPL-MCNC: 8.9 MG/DL (ref 8.6–10.2)
CHLORIDE SERPL-SCNC: 104 MMOL/L (ref 98–107)
CO2 SERPL-SCNC: 24 MMOL/L (ref 22–29)
CREAT SERPL-MCNC: 1.1 MG/DL (ref 0.5–1)
EOSINOPHIL # BLD: 0 K/UL (ref 0.05–0.5)
EOSINOPHILS RELATIVE PERCENT: 0 % (ref 0–6)
ERYTHROCYTE [DISTWIDTH] IN BLOOD BY AUTOMATED COUNT: 14.3 % (ref 11.5–15)
GFR SERPL CREATININE-BSD FRML MDRD: 52 ML/MIN/1.73M2
GLUCOSE SERPL-MCNC: 126 MG/DL (ref 74–99)
HCT VFR BLD AUTO: 28.6 % (ref 34–48)
HGB BLD-MCNC: 9 G/DL (ref 11.5–15.5)
IMM GRANULOCYTES # BLD AUTO: 0.05 K/UL (ref 0–0.58)
IMM GRANULOCYTES NFR BLD: 1 % (ref 0–5)
INR PPP: 2
L PNEUMO1 AG UR QL IA.RAPID: NEGATIVE
LYMPHOCYTES NFR BLD: 0.39 K/UL (ref 1.5–4)
LYMPHOCYTES RELATIVE PERCENT: 8 % (ref 20–42)
MCH RBC QN AUTO: 30.4 PG (ref 26–35)
MCHC RBC AUTO-ENTMCNC: 31.5 G/DL (ref 32–34.5)
MCV RBC AUTO: 96.6 FL (ref 80–99.9)
MONOCYTES NFR BLD: 0.14 K/UL (ref 0.1–0.95)
MONOCYTES NFR BLD: 3 % (ref 2–12)
NEUTROPHILS NFR BLD: 89 % (ref 43–80)
NEUTS SEG NFR BLD: 4.57 K/UL (ref 1.8–7.3)
PLATELET # BLD AUTO: 118 K/UL (ref 130–450)
PMV BLD AUTO: 9.8 FL (ref 7–12)
POTASSIUM SERPL-SCNC: 4.8 MMOL/L (ref 3.5–5)
PROCALCITONIN SERPL-MCNC: 0.05 NG/ML (ref 0–0.08)
PROT SERPL-MCNC: 6.2 G/DL (ref 6.4–8.3)
PROTHROMBIN TIME: 21.7 SEC (ref 9.3–12.4)
RBC # BLD AUTO: 2.96 M/UL (ref 3.5–5.5)
RBC # BLD: ABNORMAL 10*6/UL
S PNEUM AG SPEC QL: NEGATIVE
SODIUM SERPL-SCNC: 140 MMOL/L (ref 132–146)
SPECIMEN SOURCE: NORMAL
WBC OTHER # BLD: 5.2 K/UL (ref 4.5–11.5)

## 2024-01-15 PROCEDURE — 85610 PROTHROMBIN TIME: CPT

## 2024-01-15 PROCEDURE — 80053 COMPREHEN METABOLIC PANEL: CPT

## 2024-01-15 PROCEDURE — 6360000002 HC RX W HCPCS: Performed by: INTERNAL MEDICINE

## 2024-01-15 PROCEDURE — 2580000003 HC RX 258: Performed by: STUDENT IN AN ORGANIZED HEALTH CARE EDUCATION/TRAINING PROGRAM

## 2024-01-15 PROCEDURE — 6370000000 HC RX 637 (ALT 250 FOR IP): Performed by: INTERNAL MEDICINE

## 2024-01-15 PROCEDURE — 97161 PT EVAL LOW COMPLEX 20 MIN: CPT

## 2024-01-15 PROCEDURE — 2580000003 HC RX 258: Performed by: INTERNAL MEDICINE

## 2024-01-15 PROCEDURE — 6370000000 HC RX 637 (ALT 250 FOR IP): Performed by: STUDENT IN AN ORGANIZED HEALTH CARE EDUCATION/TRAINING PROGRAM

## 2024-01-15 PROCEDURE — 36415 COLL VENOUS BLD VENIPUNCTURE: CPT

## 2024-01-15 PROCEDURE — 84145 PROCALCITONIN (PCT): CPT

## 2024-01-15 PROCEDURE — 99232 SBSQ HOSP IP/OBS MODERATE 35: CPT | Performed by: INTERNAL MEDICINE

## 2024-01-15 PROCEDURE — 2700000000 HC OXYGEN THERAPY PER DAY

## 2024-01-15 PROCEDURE — 2500000003 HC RX 250 WO HCPCS: Performed by: STUDENT IN AN ORGANIZED HEALTH CARE EDUCATION/TRAINING PROGRAM

## 2024-01-15 PROCEDURE — 2060000000 HC ICU INTERMEDIATE R&B

## 2024-01-15 PROCEDURE — 85025 COMPLETE CBC W/AUTO DIFF WBC: CPT

## 2024-01-15 PROCEDURE — 2500000003 HC RX 250 WO HCPCS: Performed by: INTERNAL MEDICINE

## 2024-01-15 PROCEDURE — 94640 AIRWAY INHALATION TREATMENT: CPT

## 2024-01-15 RX ORDER — DOXYCYCLINE HYCLATE 100 MG/1
100 CAPSULE ORAL EVERY 12 HOURS SCHEDULED
Status: DISCONTINUED | OUTPATIENT
Start: 2024-01-16 | End: 2024-01-16 | Stop reason: HOSPADM

## 2024-01-15 RX ORDER — PREDNISONE 20 MG/1
20 TABLET ORAL 2 TIMES DAILY
Status: DISCONTINUED | OUTPATIENT
Start: 2024-01-15 | End: 2024-01-16 | Stop reason: HOSPADM

## 2024-01-15 RX ADMIN — IPRATROPIUM BROMIDE AND ALBUTEROL SULFATE 1 DOSE: .5; 2.5 SOLUTION RESPIRATORY (INHALATION) at 10:07

## 2024-01-15 RX ADMIN — IPRATROPIUM BROMIDE AND ALBUTEROL SULFATE 1 DOSE: .5; 2.5 SOLUTION RESPIRATORY (INHALATION) at 13:42

## 2024-01-15 RX ADMIN — METOPROLOL SUCCINATE 12.5 MG: 25 TABLET, EXTENDED RELEASE ORAL at 10:35

## 2024-01-15 RX ADMIN — DIGOXIN 62.5 MCG: 0.12 TABLET ORAL at 10:35

## 2024-01-15 RX ADMIN — DOXYCYCLINE 100 MG: 100 INJECTION, POWDER, LYOPHILIZED, FOR SOLUTION INTRAVENOUS at 10:30

## 2024-01-15 RX ADMIN — SODIUM CHLORIDE, PRESERVATIVE FREE 10 ML: 5 INJECTION INTRAVENOUS at 10:35

## 2024-01-15 RX ADMIN — PREDNISONE 20 MG: 20 TABLET ORAL at 20:01

## 2024-01-15 RX ADMIN — FAMOTIDINE 20 MG: 20 TABLET ORAL at 10:35

## 2024-01-15 RX ADMIN — ROSUVASTATIN CALCIUM 10 MG: 10 TABLET, FILM COATED ORAL at 20:01

## 2024-01-15 RX ADMIN — BUDESONIDE 500 MCG: 0.5 SUSPENSION RESPIRATORY (INHALATION) at 10:07

## 2024-01-15 RX ADMIN — DILTIAZEM HYDROCHLORIDE 120 MG: 120 CAPSULE, COATED, EXTENDED RELEASE ORAL at 10:35

## 2024-01-15 RX ADMIN — ARFORMOTEROL TARTRATE 15 MCG: 15 SOLUTION RESPIRATORY (INHALATION) at 19:53

## 2024-01-15 RX ADMIN — METHYLPREDNISOLONE SODIUM SUCCINATE 40 MG: 40 INJECTION INTRAMUSCULAR; INTRAVENOUS at 10:35

## 2024-01-15 RX ADMIN — IPRATROPIUM BROMIDE AND ALBUTEROL SULFATE 1 DOSE: .5; 2.5 SOLUTION RESPIRATORY (INHALATION) at 19:53

## 2024-01-15 RX ADMIN — WARFARIN SODIUM 2.5 MG: 2.5 TABLET ORAL at 20:01

## 2024-01-15 RX ADMIN — ARFORMOTEROL TARTRATE 15 MCG: 15 SOLUTION RESPIRATORY (INHALATION) at 10:07

## 2024-01-15 RX ADMIN — OSELTAMIVIR PHOSPHATE 30 MG: 30 CAPSULE ORAL at 20:01

## 2024-01-15 RX ADMIN — OSELTAMIVIR PHOSPHATE 30 MG: 30 CAPSULE ORAL at 10:35

## 2024-01-15 RX ADMIN — BUDESONIDE 500 MCG: 0.5 SUSPENSION RESPIRATORY (INHALATION) at 19:53

## 2024-01-15 RX ADMIN — DOXYCYCLINE 100 MG: 100 INJECTION, POWDER, LYOPHILIZED, FOR SOLUTION INTRAVENOUS at 20:06

## 2024-01-15 NOTE — PROGRESS NOTES
Hospitalist Progress Note      Synopsis: Patient admitted on 1/13/2024  83-year-old lady with past medical history significant for lung cancer s/p radiation, hypertension hyperlipidemia congestive heart failure atrial fibrillation mitral valve regurgitation and recent COVID presented to ED with worsening shortness of breath.  She was recently diagnosed with COVID around [place] last year was admitted in the hospital for 3 times since then.  She generally wears oxygen at 4 L at baseline but had to up her oxygen to 5 L because of worsening shortness of breath.  Mainly exertional, productive cough, clear, no fever or chills.  Recently admitted from 12/28 through 1/4 for COPD with respiratory failure.  She denies any chest pain, belly pain, nausea vomiting diarrhea, fever or chills, no lightheadedness or dizziness, and no focal neurological signs.  In ED she was found positive for flu as well as COVID.  Chest x-ray also showed some bilateral opacities.  Plan was made to admit her for further treatment.       ASSESSMENT:     Principal Problem:    Influenza A  Active Problems:    Pneumonia    COPD exacerbation (HCC)    Chronic hypoxic respiratory failure (HCC)    Chronic systolic CHF (congestive heart failure) ([facility])  Resolved Problems:    * No resolved hospital problems. *         PLAN:       Influenza A-will start Tamiflu, oxygen by nasal cannula to keep saturation more than 92%, now saturating well at 4 L which is her baseline.  Breathing treatments, steroids, labs in morning, telemetry monitoring, will follow.    COVID-19-symptomatic treatment, prolonged COVID, rest as per 1.    chronic hypoxic respiratory failure-4 L home O2 chronically.  Currently on 4 L O2 satting 92 to 100%    Influenza pneumonia-Rocephin and Doxy started in ED, low procalcitonin level x 2, de-escalate doxycycline for COPD exacerbation.  Continue oseltamivir for influenza    COPD with exacerbation Solu-Medrol, nebulizers adjusted, incentive

## 2024-01-15 NOTE — PROGRESS NOTES
Physical Therapy  Facility/Department: [address] INTERMEDIATE 1  Physical Therapy Initial Assessment    Name: Krystal Hyatt  : 1940  MRN: [id number]  Date of Service: 1/15/2024      Patient Diagnosis(es): The primary encounter diagnosis was Influenza A. A diagnosis of TRISTEN (acute kidney injury) (HCC) was also pertinent to this visit.  Past Medical History:  has a past medical history of Atrial fibrillation (HCC), CHF (congestive heart failure) (HCC), Emphysema, unspecified (HCC), Hyperlipidemia, Hypertension, Lung cancer (HCC), Mitral valve regurgitation, Oxygen dependent, Prolonged emergence from general anesthesia, Skin cancer, and Thyroid disease.  Past Surgical History:  has a past surgical history that includes Breast biopsy (Right); Tubal ligation; bronchoscopy (N/A, 2021); Intracapsular cataract extraction (Right, 2021); Intracapsular cataract extraction (Left, 2022); and Cardioversion (10/06/2023).      Evaluating Therapist: Nickie Garcia PT    Room #:  0446/0446-A  Diagnosis:  Influenza A [J10.1]  TRISTEN (acute kidney injury) (HCC) [N17.9]  Acute hypoxic respiratory failure (HCC) [J96.01]  PMHx/PSHx:  Lung CA, HTN, CHF  Precautions:  falls, O2, droplet plus isolation      Social:  Pt lives with alone in a 1 floor plan independent without device. Uses home O2. States her daughter has been staying with her.  Recently started using ww when she was not feeling well.      Initial Evaluation  Date: 1/15/24 Treatment      Short Term/ Long Term   Goals   Was pt agreeable to Eval/treatment? yes     Does pt have pain? No c/o pain     Bed Mobility  Rolling: independent  Supine to sit: independent  Sit to supine: nt  Scooting: independent  independent   Transfers Sit to stand: supervision  Stand to sit: supervision  Stand pivot: supervision  independent   Ambulation    20 feet x2  with ww with supervision  100 feet with ww as needed independent   Stair Negotiation  Ascended and descended  NT   4-12

## 2024-01-16 VITALS
HEIGHT: 68 IN | OXYGEN SATURATION: 96 % | BODY MASS INDEX: 23.52 KG/M2 | DIASTOLIC BLOOD PRESSURE: 58 MMHG | SYSTOLIC BLOOD PRESSURE: 126 MMHG | RESPIRATION RATE: 18 BRPM | TEMPERATURE: 97.6 F | HEART RATE: 67 BPM | WEIGHT: 155.2 LBS

## 2024-01-16 LAB
ALBUMIN SERPL-MCNC: 3.4 G/DL (ref 3.5–5.2)
ALP SERPL-CCNC: 59 U/L (ref 35–104)
ALT SERPL-CCNC: 35 U/L (ref 0–32)
ANION GAP SERPL CALCULATED.3IONS-SCNC: 9 MMOL/L (ref 7–16)
AST SERPL-CCNC: 25 U/L (ref 0–31)
BASOPHILS # BLD: 0 K/UL (ref 0–0.2)
BASOPHILS NFR BLD: 0 % (ref 0–2)
BILIRUB SERPL-MCNC: 0.3 MG/DL (ref 0–1.2)
BUN SERPL-MCNC: 30 MG/DL (ref 6–23)
CALCIUM SERPL-MCNC: 8.5 MG/DL (ref 8.6–10.2)
CHLORIDE SERPL-SCNC: 105 MMOL/L (ref 98–107)
CO2 SERPL-SCNC: 27 MMOL/L (ref 22–29)
CREAT SERPL-MCNC: 1.1 MG/DL (ref 0.5–1)
EOSINOPHIL # BLD: 0 K/UL (ref 0.05–0.5)
EOSINOPHILS RELATIVE PERCENT: 0 % (ref 0–6)
ERYTHROCYTE [DISTWIDTH] IN BLOOD BY AUTOMATED COUNT: 14.6 % (ref 11.5–15)
GFR SERPL CREATININE-BSD FRML MDRD: 48 ML/MIN/1.73M2
GLUCOSE SERPL-MCNC: 123 MG/DL (ref 74–99)
HCT VFR BLD AUTO: 30.7 % (ref 34–48)
HGB BLD-MCNC: 9.5 G/DL (ref 11.5–15.5)
IMM GRANULOCYTES # BLD AUTO: 0.04 K/UL (ref 0–0.58)
IMM GRANULOCYTES NFR BLD: 1 % (ref 0–5)
LYMPHOCYTES NFR BLD: 0.66 K/UL (ref 1.5–4)
LYMPHOCYTES RELATIVE PERCENT: 12 % (ref 20–42)
MCH RBC QN AUTO: 30.1 PG (ref 26–35)
MCHC RBC AUTO-ENTMCNC: 30.9 G/DL (ref 32–34.5)
MCV RBC AUTO: 97.2 FL (ref 80–99.9)
MONOCYTES NFR BLD: 0.29 K/UL (ref 0.1–0.95)
MONOCYTES NFR BLD: 5 % (ref 2–12)
NEUTROPHILS NFR BLD: 83 % (ref 43–80)
NEUTS SEG NFR BLD: 4.76 K/UL (ref 1.8–7.3)
PLATELET # BLD AUTO: 130 K/UL (ref 130–450)
PMV BLD AUTO: 9.9 FL (ref 7–12)
POTASSIUM SERPL-SCNC: 4.3 MMOL/L (ref 3.5–5)
PROT SERPL-MCNC: 5.9 G/DL (ref 6.4–8.3)
RBC # BLD AUTO: 3.16 M/UL (ref 3.5–5.5)
SODIUM SERPL-SCNC: 141 MMOL/L (ref 132–146)
WBC OTHER # BLD: 5.8 K/UL (ref 4.5–11.5)

## 2024-01-16 PROCEDURE — 97530 THERAPEUTIC ACTIVITIES: CPT

## 2024-01-16 PROCEDURE — 2700000000 HC OXYGEN THERAPY PER DAY

## 2024-01-16 PROCEDURE — 85025 COMPLETE CBC W/AUTO DIFF WBC: CPT

## 2024-01-16 PROCEDURE — 80053 COMPREHEN METABOLIC PANEL: CPT

## 2024-01-16 PROCEDURE — 6370000000 HC RX 637 (ALT 250 FOR IP): Performed by: STUDENT IN AN ORGANIZED HEALTH CARE EDUCATION/TRAINING PROGRAM

## 2024-01-16 PROCEDURE — 97165 OT EVAL LOW COMPLEX 30 MIN: CPT

## 2024-01-16 PROCEDURE — 94640 AIRWAY INHALATION TREATMENT: CPT

## 2024-01-16 PROCEDURE — 99239 HOSP IP/OBS DSCHRG MGMT >30: CPT | Performed by: INTERNAL MEDICINE

## 2024-01-16 PROCEDURE — 2580000003 HC RX 258: Performed by: STUDENT IN AN ORGANIZED HEALTH CARE EDUCATION/TRAINING PROGRAM

## 2024-01-16 PROCEDURE — 6370000000 HC RX 637 (ALT 250 FOR IP): Performed by: INTERNAL MEDICINE

## 2024-01-16 PROCEDURE — 6360000002 HC RX W HCPCS: Performed by: INTERNAL MEDICINE

## 2024-01-16 RX ORDER — PREDNISONE 10 MG/1
TABLET ORAL
Qty: 30 TABLET | Refills: 0 | Status: SHIPPED | OUTPATIENT
Start: 2024-01-16

## 2024-01-16 RX ORDER — DOXYCYCLINE HYCLATE 100 MG/1
100 CAPSULE ORAL EVERY 12 HOURS SCHEDULED
Qty: 9 CAPSULE | Refills: 0 | Status: SHIPPED | OUTPATIENT
Start: 2024-01-16 | End: 2024-01-21

## 2024-01-16 RX ORDER — OSELTAMIVIR PHOSPHATE 30 MG/1
30 CAPSULE ORAL 2 TIMES DAILY
Qty: 4 CAPSULE | Refills: 0 | Status: SHIPPED | OUTPATIENT
Start: 2024-01-16 | End: 2024-01-18

## 2024-01-16 RX ORDER — ALBUTEROL SULFATE 2.5 MG/3ML
2.5 SOLUTION RESPIRATORY (INHALATION) 4 TIMES DAILY PRN
Qty: 120 EACH | Refills: 1
Start: 2024-01-16 | End: 2024-01-29 | Stop reason: SDUPTHER

## 2024-01-16 RX ADMIN — ARFORMOTEROL TARTRATE 15 MCG: 15 SOLUTION RESPIRATORY (INHALATION) at 09:38

## 2024-01-16 RX ADMIN — IPRATROPIUM BROMIDE AND ALBUTEROL SULFATE 1 DOSE: .5; 2.5 SOLUTION RESPIRATORY (INHALATION) at 09:38

## 2024-01-16 RX ADMIN — OSELTAMIVIR PHOSPHATE 30 MG: 30 CAPSULE ORAL at 09:47

## 2024-01-16 RX ADMIN — METOPROLOL SUCCINATE 12.5 MG: 25 TABLET, EXTENDED RELEASE ORAL at 09:47

## 2024-01-16 RX ADMIN — IPRATROPIUM BROMIDE AND ALBUTEROL SULFATE 1 DOSE: .5; 2.5 SOLUTION RESPIRATORY (INHALATION) at 16:42

## 2024-01-16 RX ADMIN — LEVOTHYROXINE SODIUM 50 MCG: 0.05 TABLET ORAL at 05:51

## 2024-01-16 RX ADMIN — DIGOXIN 62.5 MCG: 0.12 TABLET ORAL at 09:47

## 2024-01-16 RX ADMIN — DOXYCYCLINE HYCLATE 100 MG: 100 CAPSULE ORAL at 09:47

## 2024-01-16 RX ADMIN — SODIUM CHLORIDE, PRESERVATIVE FREE 10 ML: 5 INJECTION INTRAVENOUS at 09:48

## 2024-01-16 RX ADMIN — DILTIAZEM HYDROCHLORIDE 120 MG: 120 CAPSULE, COATED, EXTENDED RELEASE ORAL at 09:47

## 2024-01-16 RX ADMIN — PREDNISONE 20 MG: 20 TABLET ORAL at 09:47

## 2024-01-16 RX ADMIN — IPRATROPIUM BROMIDE AND ALBUTEROL SULFATE 1 DOSE: .5; 2.5 SOLUTION RESPIRATORY (INHALATION) at 12:47

## 2024-01-16 RX ADMIN — FAMOTIDINE 20 MG: 20 TABLET ORAL at 09:47

## 2024-01-16 RX ADMIN — BUDESONIDE 500 MCG: 0.5 SUSPENSION RESPIRATORY (INHALATION) at 09:38

## 2024-01-16 NOTE — PROGRESS NOTES
OCCUPATIONAL THERAPY INITIAL EVALUATION    [facility]   [address] [facility]        Date:2024                                                  Patient Name: Krystal Hyatt    MRN: [id number]    : 1940    Room: [address] [place] [address]     Evaluating OT: Mariah Lester OTR/L #[id number]    Referring Provider:  Moi Timmons MD     Specific Provider Orders/Date:  OT Eval and Treat, 24     Diagnosis:   1. Influenza A    2. TRISTEN (acute kidney injury) (HCC)         Surgery: None       Pertinent Medical History: A-fib, HTN, thyroid disease, lung CA, skin CA, O2 dependent      Precautions:  Fall Risk, alarm, droplet plus isolation, COVID+, 2.5L O2 (increased to 4L with activity)     Assessment of current deficits    [x] Functional mobility  [x]ADLs  [x] Strength               []Cognition    [x] Functional transfers   [x] IADLs         [x] Safety Awareness   [x]Endurance    [] Fine Coordination              [x] Balance      [] Vision/perception   []Sensation     []Gross Motor Coordination  [] ROM  [] Delirium                   [] Motor Control     OT PLAN OF CARE   OT POC based on physician orders, patient diagnosis and results of clinical assessment    Frequency/Duration 1-3 days/wk for 2 weeks PRN     Specific OT Treatment Interventions to include:   * Instruction/training on adapted ADL techniques and AE recommendations to increase functional independence within precautions       * Training on energy conservation strategies, correct breathing pattern and techniques to improve independence/tolerance for self-care routine  * Functional transfer/mobility training/DME recommendations for increased independence, safety, and fall prevention  * Patient/Family education to increase follow through with safety techniques and functional independence  * Recommendation of environmental modifications for increased safety with functional transfers/mobility and  mechanics and sequencing to prepare for ADL completion.  Instruction/training on safe functional mobility/transfer techniques including hand and feet placement   Instruction/training on energy conservation/work simplification for completion of ADLs  Facilitated proper positioning/alignment to improve interaction with environment, breathing, overall functioning   Skilled monitoring of O2 sats - see section above     Rehab Potential: Good for established goals.      Patient / Family Goal: N/A      Patient and/or family were instructed on functional diagnosis, prognosis/goals and OT plan of care. Demonstrated fair plus understanding.     Eval Complexity: Low    Time In: 8:40 AM   Time Out: 9:05 AM    Total Treatment Time: 10      Min Units   OT Eval Low 97165  X  1    OT Eval Medium 59954      OT Eval High 91585      OT Re-Eval 47235            ADL/Self Care 43788     Therapeutic Activities 29856  10 1   Therapeutic Ex 61412       Orthotic Management 07186       Manual 33189     Neuro Re-Ed 70764       Non-Billable Time        Evaluation Time additionally includes thorough review of current medical information, gathering information on past medical history/social history and prior level of function, interpretation of standardized testing/informal observation of tasks, assessment of data and development of plan of care and goals.        Evaluating OT: Mariah Lester OTR/L #[id number]

## 2024-01-16 NOTE — ACP (ADVANCE CARE PLANNING)
Advance Care Planning   Healthcare Decision Maker:    Primary Decision Maker: Krystal Rice - Child - [phone]    Secondary Decision Maker: Dieter Hyatt - [place] - [phone]

## 2024-01-16 NOTE — CARE COORDINATION
CASE MANAGEMENT.... Patient recently admitted with Covid/Afib. Still with COVID and now with Flu A. Met with Ms Hyatt at the bedside. She confirmed that she is independent and lives alone, but since becoming ill her daughter, Ramona, has been staying with her. She still drives. Currently on and tolerating 5lnc. Per previous admit, home o2 order from [employer] reads 5lnc cont. Patient admits to decreasing it on her own when she starts to feel better. Instructed her to continue wearing 5lnc unless otherwise ordered by her physician. She is agreeable. Has nebulizer and all other necessary dme if needed. Ms Hyatt has no h/o hhc/jose. PT 20 and she is not interested in hhc. Informed her of the benefits hhc can provide. She is still declining. Stating she will return home with assistance from Ramona. Besides her aerosols, all meds are po. Noted patient was on entresto/bumex pta-has cardio/pulm issues-not ordered-nursing notified to address. Will follow.

## 2024-01-16 NOTE — DISCHARGE SUMMARY
[facility] Hospitalist       Hospitalist Physician Discharge Summary       Rom Mckeon MD  [address] [facility] [address]  [phone]          Dieter Jones DO  [address] [id number]  [phone]    Follow up      Monica Aponte MD  [address] [facility] [address]  [phone]    Follow up        Activity level: As tolerated    Diet: Cardiac    Dispo: Home    On home O2 5 L NC - currently on discharge at baseline.    Patient ID:  Krystal Hyatt  [id number]  83 y.o.  1940    Admit date: 1/13/2024    Discharge date and time:  1/16/2024  3:10 PM    Admission Diagnoses: Principal Problem:    Influenza A  Active Problems:    Pneumonia    COPD exacerbation (HCC)    Chronic hypoxic respiratory failure (HCC)    Chronic systolic CHF (congestive heart failure) (HCC)  Resolved Problems:    * No resolved hospital problems. *      Discharge Diagnoses: Principal Problem:    Influenza A  Active Problems:    Pneumonia    COPD exacerbation (HCC)    Chronic hypoxic respiratory failure (HCC)    Chronic systolic CHF (congestive heart failure) (HCC)  Resolved Problems:    * No resolved hospital problems. *      Consults:  IP CONSULT TO IV TEAM  IP CONSULT TO IV TEAM  IP CONSULT TO IV TEAM      Hospital Course:   She is an 83-year-old female with past medical history of lung cancer s/p radiation, hypertension, chronic systolic CHF, hyperlipidemia, atrial fibrillation, mitral valve regurgitation, s/p recent COVID following which admitted in the hospital x3, on home O2 5 L NC, came to ER with increasing short of breath.  She denied any fever or chills, cough, nausea vomiting or abdominal pain.  Workup in ER with influenza positive, COVID-positive, chest x-ray with bilateral hazy opacities could be interstitial pulmonary edema or bilateral pneumonia.  Pro-Manuel normal 0.07.  She was admitted with influenza A positive/COVID-19 positive/viral pneumonia, COPD exacerbation,  1.1* 1.1*   GLUCOSE 193* 126* 123*   CALCIUM 8.6 8.9 8.5*       Recent Labs     01/14/24  0115 01/15/24  0920 01/16/24  1040   WBC 4.4* 5.2 5.8   RBC 3.60 2.96* 3.16*   HGB 10.6* 9.0* 9.5*   HCT 33.4* 28.6* 30.7*   MCV 92.8 96.6 97.2   MCH 29.4 30.4 30.1   MCHC 31.7* 31.5* 30.9*   RDW 14.2 14.3 14.6    118* 130   MPV 10.2 9.8 9.9       No results for input(s): \"POCGLU\" in the last 72 hours.      Imaging:   XR CHEST PORTABLE   Final Result   Increased interstitial and hazy opacities which could indicate interstitial   pulmonary edema or bilateral pneumonia.             Patient Instructions:      Medication List        START taking these medications      albuterol (2.5 MG/3ML) 0.083% nebulizer solution  Commonly known as: PROVENTIL  Take 3 mLs by nebulization 4 times daily as needed for Wheezing     doxycycline hyclate 100 MG capsule  Commonly known as: VIBRAMYCIN  Take 1 capsule by mouth every 12 hours for 9 doses     oseltamivir 30 MG capsule  Commonly known as: TAMIFLU  Take 1 capsule by mouth 2 times daily for 4 doses     predniSONE 10 MG tablet  Commonly known as: DELTASONE  4 tabs x 3 days, then 3 tabs x 3 days, then 2 tabs x 3 days, then 1 tab x 3 days.            CONTINUE taking these medications      ascorbic acid 500 MG tablet  Commonly known as: VITAMIN C  Take 1 tablet by mouth daily     bumetanide 1 MG tablet  Commonly known as: BUMEX  Take 1 tablet by mouth daily     Digoxin 62.5 MCG Tabs  Commonly known as: Lanoxin  Take 62.5 mcg by mouth daily     dilTIAZem 120 MG extended release capsule  Commonly known as: CARDIZEM CD  Take 1 capsule by mouth daily     fluticasone-umeclidin-vilant 100-62.5-25 MCG/ACT Aepb inhaler  Commonly known as: TRELEGY ELLIPTA  Inhale 1 puff into the lungs daily     magnesium oxide 400 (240 Mg) MG tablet  Commonly known as: MAG-OX  Take 1 tablet by mouth daily     metoprolol succinate 25 MG extended release tablet  Commonly known as: TOPROL XL     pantoprazole 40 MG

## 2024-01-16 NOTE — CARE COORDINATION
CASE MANAGEMENT... Informed by nursing that patients home is without heat. Met with patient and son at the bedside. States she has propane heat, the pipes burst today and [employer] was unable to fix today-plan for tomorrow. Ms Hyatt wears home o2 continuous and is therefore unable to spend the night at a family/friends house. Anticipate dc tomorrow after propane heat returns. Will follow.     6:50pm... Informed by nursing that patient's heat has returned. Confirmed with Krystal and she will arrange for transport home tonite.

## 2024-01-17 ENCOUNTER — NURSE TRIAGE (OUTPATIENT)
Dept: OTHER | Facility: CLINIC | Age: 84
End: 2024-01-17

## 2024-01-17 ENCOUNTER — TELEPHONE (OUTPATIENT)
Dept: PRIMARY CARE CLINIC | Age: 84
End: 2024-01-17

## 2024-01-17 LAB
EKG ATRIAL RATE: 107 BPM
EKG Q-T INTERVAL: 448 MS
EKG QRS DURATION: 64 MS
EKG QTC CALCULATION (BAZETT): 473 MS
EKG R AXIS: 62 DEGREES
EKG T AXIS: 36 DEGREES
EKG VENTRICULAR RATE: 67 BPM

## 2024-01-17 NOTE — TELEPHONE ENCOUNTER
Received call from Destinee at [facility], caller not on line.     Complaint: new face swelling. Recently in hospital for Flu    Practice Name: [facility]    Caller's telephone number verified as [phone]    Connected with caller via phone, please see below triage    Current Symptoms: Patient does not want to speak with nurse.  Wants to speak with her doctor.  Was sent home from hospital last night and has swelling all over.  She was getting lots of saline and feels like this is the cause.  Is on antibiotics and Coumadin.  She is wanting his advice.  Wants appt next week.    Writer provided warm transfer to Cindy aranda [facility] for      Attention Provider:  Thank you for allowing me to participate in the care of your patient.  The patient was connected to triage in response to information provided to the [facility].  Please do not respond through this encounter as the response is not directed to a shared pool.    Reason for Disposition   Requesting regular office appointment    Protocols used: Information Only Call - No Triage-ADULT-

## 2024-01-17 NOTE — TELEPHONE ENCOUNTER
----- Message from Keyon Lira sent at 1/17/2024 10:53 AM EST -----  Subject: Message to Provider    QUESTIONS  Information for Provider? held for a NT placed on hold by two people in   the practice. patient discharged last night. Flu, covid & pneumonia.   states that she has swelling in face and body. attempted to make triage   transfer via email & escalation. patient was tired and did not want to   stay on call.  ---------------------------------------------------------------------------  --------------  CALL BACK INFO  [phone]; OK to leave message on voicemail  ---------------------------------------------------------------------------  --------------  SCRIPT ANSWERS  Relationship to Patient? Self

## 2024-01-18 ENCOUNTER — CLINICAL DOCUMENTATION ONLY (OUTPATIENT)
Facility: CLINIC | Age: 84
End: 2024-01-18

## 2024-01-19 ENCOUNTER — OFFICE VISIT (OUTPATIENT)
Dept: PRIMARY CARE CLINIC | Age: 84
End: 2024-01-19

## 2024-01-19 VITALS
HEART RATE: 78 BPM | RESPIRATION RATE: 17 BRPM | DIASTOLIC BLOOD PRESSURE: 82 MMHG | TEMPERATURE: 97.8 F | HEIGHT: 68 IN | WEIGHT: 155 LBS | OXYGEN SATURATION: 97 % | SYSTOLIC BLOOD PRESSURE: 126 MMHG | BODY MASS INDEX: 23.49 KG/M2

## 2024-01-19 DIAGNOSIS — I50.33 ACUTE ON CHRONIC DIASTOLIC CONGESTIVE HEART FAILURE (HCC): ICD-10-CM

## 2024-01-19 DIAGNOSIS — J96.11 CHRONIC RESPIRATORY FAILURE WITH HYPOXIA (HCC): Primary | ICD-10-CM

## 2024-01-19 DIAGNOSIS — C34.31 MALIGNANT NEOPLASM OF LOWER LOBE OF RIGHT LUNG (HCC): ICD-10-CM

## 2024-01-19 DIAGNOSIS — N18.31 CHRONIC KIDNEY DISEASE, STAGE 3A (HCC): ICD-10-CM

## 2024-01-19 DIAGNOSIS — E03.9 ACQUIRED HYPOTHYROIDISM: ICD-10-CM

## 2024-01-19 DIAGNOSIS — I48.11 LONGSTANDING PERSISTENT ATRIAL FIBRILLATION (HCC): ICD-10-CM

## 2024-01-19 RX ORDER — ROSUVASTATIN CALCIUM 10 MG/1
10 TABLET, COATED ORAL NIGHTLY
Qty: 90 TABLET | Refills: 2 | Status: SHIPPED | OUTPATIENT
Start: 2024-01-19

## 2024-01-19 ASSESSMENT — ENCOUNTER SYMPTOMS
ABDOMINAL PAIN: 0
NAUSEA: 0
SHORTNESS OF BREATH: 0
COUGH: 0
DIARRHEA: 0
VOMITING: 0

## 2024-01-19 ASSESSMENT — PATIENT HEALTH QUESTIONNAIRE - PHQ9
SUM OF ALL RESPONSES TO PHQ QUESTIONS 1-9: 0
SUM OF ALL RESPONSES TO PHQ9 QUESTIONS 1 & 2: 0
SUM OF ALL RESPONSES TO PHQ QUESTIONS 1-9: 0
1. LITTLE INTEREST OR PLEASURE IN DOING THINGS: 0
SUM OF ALL RESPONSES TO PHQ QUESTIONS 1-9: 0
2. FEELING DOWN, DEPRESSED OR HOPELESS: 0
SUM OF ALL RESPONSES TO PHQ QUESTIONS 1-9: 0

## 2024-01-19 NOTE — PROGRESS NOTES
SUBJECTIVE  Krystal Hyatt is a 83 y.o. female established was seen In the office  for evaluation.    HPI/Chief C/O:  Chief Complaint   Patient presents with    Follow-Up from Hospital     From the flu, covid   Was in hospital with influenza ,pneumonia ,respiratory failure   On chronic O2 NC , feels better   Allergies   Allergen Reactions    Pacerone [Amiodarone] Shortness Of Breath    Cat Hair Extract Itching and Other (See Comments)     Patient states \"My son put this. I hope I'm not allergic, I have 4 Cats and 2 Birds, maybe to their dust.\"    Eggs Or Egg-Derived Products Nausea Only    Erythromycin Diarrhea, Nausea And Vomiting and Other (See Comments)     \"STOMACH PAINS\"      Pcn [Penicillins] Itching and Rash     PT STATES \"RASH FROM HEAD TO TOE, W/ SOMETHING CRAWLING UNDER MY SKIN\"    Seasonal Itching, Swelling and Other (See Comments)     RUNNY/ITCHY NOSE, WATERY/ITCHY EYES       ROS:  Review of Systems   Respiratory:  Negative for cough and shortness of breath.         Negative for Hemoptysis   Cardiovascular:  Negative for chest pain.   Gastrointestinal:  Negative for abdominal pain, diarrhea, nausea and vomiting.   Endocrine: Negative for polydipsia, polyphagia and polyuria.   Genitourinary:  Negative for dysuria and hematuria.   Skin:  Negative for rash.   Neurological:  Negative for tremors and seizures.        Past Medical/Surgical Hx;  Reviewed with patient      Diagnosis Date    Atrial fibrillation (HCC)     CHF (congestive heart failure) (HCC)     Emphysema, unspecified (HCC)     Hyperlipidemia     Hypertension     Lung cancer (HCC)     Mitral valve regurgitation     Oxygen dependent     Prolonged emergence from general anesthesia     post general anesthesia    Skin cancer     Thyroid disease      Past Surgical History:   Procedure Laterality Date    BREAST BIOPSY Right     benign    BRONCHOSCOPY N/A 04/20/2021    BRONCHOSCOPY/TRANSBRONCHIAL NEEDLE BIOPSY performed by Antony Rosario MD at [facility]

## 2024-01-19 NOTE — PROGRESS NOTES
Physician Progress Note      PATIENT:               SRINIVAS DYE  CSN #:                  [id number]  :                       1940  ADMIT DATE:       2024 6:34 PM  DISCH DATE:        2024 8:02 PM  RESPONDING  PROVIDER #:        Marisela Montilla MD          QUERY TEXT:    Patient admitted with Influenza A.  Patient with recent Covid infection and   noted positive Covid test on .  If possible, please document in progress   notes and discharge summary if patient has an active Covid-19 infection or if   patient is testing positive for Covid-19 without a current active infection:    The medical record reflects the following:  Risk Factors: recent Covid 23  Clinical Indicators: + for Influenza A and COVID-19 . Per ED, H&P and PN's   \" Patient states that she was diagnosed with COVID around [place] last   year.  She states that she has been seen in the hospital 3 times since then.\"  H&P and PN's \"COVID-19-symptomatic treatment, prolonged COVID, rest as per 1.\"  DC summary \"She was admitted with influenza A positive/COVID-19 positive/viral   pneumonia, COPD exacerbation, chronic hypoxic respiratory failure, mild TRISTEN   and hypotension.\"  Treatment: oxygen, Rocephin, Doxycycline, duonebs, Solu-Medrol, Tamiflu    Thank you,  Mandi Szymanski RN, CCDS  Clinical Documentation   [email or web address]  [phone]  (M-F 6am-2:30 pm)  Options provided:  -- Active Covid-19 infection is being treated and/or evaluated on current   encounter  -- Positive Covid test result without an active current Covid-19 infection  -- Other - I will add my own diagnosis  -- Disagree - Not applicable / Not valid  -- Disagree - Clinically unable to determine / Unknown  -- Refer to Clinical Documentation Reviewer    PROVIDER RESPONSE TEXT:    Patient is testing positive for Covid without an active Covid-19 infection.    Query created by: Mandi Szymanski on 2024 11:31 AM      Electronically  signed by:  Marisela Montilla MD 1/19/2024 5:12 PM

## 2024-01-26 ENCOUNTER — CLINICAL DOCUMENTATION ONLY (OUTPATIENT)
Facility: CLINIC | Age: 84
End: 2024-01-26

## 2024-01-26 RX ORDER — DOCUSATE SODIUM 100 MG/1
100 CAPSULE, LIQUID FILLED ORAL DAILY
Qty: 90 CAPSULE | Refills: 0 | COMMUNITY
Start: 2024-01-26

## 2024-01-26 NOTE — TELEPHONE ENCOUNTER
Patient requesting something to help her go to the bathroom. She has tried align and also a couple of doses of dulcolax. Please send something into      [facility] [address] [place] [address] [address] [facility] - P [phone] - F [phone]  [address] [place] [id number]     Thank you.

## 2024-01-29 DIAGNOSIS — J96.11 CHRONIC RESPIRATORY FAILURE WITH HYPOXIA (HCC): Primary | ICD-10-CM

## 2024-01-29 DIAGNOSIS — J40 BRONCHITIS: ICD-10-CM

## 2024-01-29 PROBLEM — E86.0 DEHYDRATION: Status: RESOLVED | Noted: 2023-12-30 | Resolved: 2024-01-29

## 2024-01-29 RX ORDER — ALBUTEROL SULFATE 2.5 MG/3ML
2.5 SOLUTION RESPIRATORY (INHALATION) 4 TIMES DAILY PRN
Qty: 120 EACH | Refills: 1 | Status: SHIPPED
Start: 2024-01-29 | End: 2024-01-30 | Stop reason: SDUPTHER

## 2024-01-29 RX ORDER — ALBUTEROL SULFATE 2.5 MG/3ML
2.5 SOLUTION RESPIRATORY (INHALATION) 4 TIMES DAILY PRN
Qty: 120 EACH | Refills: 1 | Status: SHIPPED
Start: 2024-01-29 | End: 2024-01-29 | Stop reason: SDUPTHER

## 2024-01-29 NOTE — TELEPHONE ENCOUNTER
Last Appointment:  1/19/2024  Future Appointments   Date Time Provider Department Center   2/13/2024  2:30 PM Krystal Hernandez APRN - CNP [facility]   2/16/2024 11:45 AM Rom Mckeon MD [facility]   3/18/2024 10:00 AM Rom Mckeon MD [facility]

## 2024-01-30 DIAGNOSIS — J44.9 COPD, VERY SEVERE (HCC): Primary | ICD-10-CM

## 2024-01-30 DIAGNOSIS — J96.11 CHRONIC RESPIRATORY FAILURE WITH HYPOXIA (HCC): ICD-10-CM

## 2024-01-30 DIAGNOSIS — J40 BRONCHITIS: ICD-10-CM

## 2024-01-30 RX ORDER — ALBUTEROL SULFATE 2.5 MG/3ML
2.5 SOLUTION RESPIRATORY (INHALATION) 4 TIMES DAILY PRN
Qty: 120 EACH | Refills: 1 | Status: SHIPPED | OUTPATIENT
Start: 2024-01-30

## 2024-02-12 ENCOUNTER — TELEPHONE (OUTPATIENT)
Dept: PRIMARY CARE CLINIC | Age: 84
End: 2024-02-12

## 2024-02-12 PROBLEM — J10.1 INFLUENZA A: Status: RESOLVED | Noted: 2024-01-13 | Resolved: 2024-02-12

## 2024-02-12 NOTE — TELEPHONE ENCOUNTER
Pt ti called in very worried about her mom, she has been experiencing a lot of confusion at night, very forgetful and has lost of appetite, she just wants to know what can be done

## 2024-02-16 ENCOUNTER — OFFICE VISIT (OUTPATIENT)
Dept: PRIMARY CARE CLINIC | Age: 84
End: 2024-02-16

## 2024-02-16 ENCOUNTER — APPOINTMENT (OUTPATIENT)
Dept: GENERAL RADIOLOGY | Age: 84
DRG: 193 | End: 2024-02-16
Payer: MEDICARE

## 2024-02-16 ENCOUNTER — HOSPITAL ENCOUNTER (INPATIENT)
Age: 84
LOS: 6 days | Discharge: HOME | DRG: 193 | End: 2024-02-22
Attending: STUDENT IN AN ORGANIZED HEALTH CARE EDUCATION/TRAINING PROGRAM | Admitting: STUDENT IN AN ORGANIZED HEALTH CARE EDUCATION/TRAINING PROGRAM
Payer: MEDICARE

## 2024-02-16 VITALS
TEMPERATURE: 97.1 F | HEIGHT: 68 IN | RESPIRATION RATE: 18 BRPM | BODY MASS INDEX: 23.95 KG/M2 | SYSTOLIC BLOOD PRESSURE: 106 MMHG | OXYGEN SATURATION: 98 % | WEIGHT: 158 LBS | HEART RATE: 84 BPM | DIASTOLIC BLOOD PRESSURE: 50 MMHG

## 2024-02-16 DIAGNOSIS — J96.11 CHRONIC RESPIRATORY FAILURE WITH HYPOXIA (HCC): Primary | ICD-10-CM

## 2024-02-16 DIAGNOSIS — E03.9 ACQUIRED HYPOTHYROIDISM: ICD-10-CM

## 2024-02-16 DIAGNOSIS — J18.9 PNEUMONIA DUE TO INFECTIOUS ORGANISM, UNSPECIFIED LATERALITY, UNSPECIFIED PART OF LUNG: ICD-10-CM

## 2024-02-16 DIAGNOSIS — C34.31 MALIGNANT NEOPLASM OF LOWER LOBE OF RIGHT LUNG (HCC): ICD-10-CM

## 2024-02-16 DIAGNOSIS — I48.0 PAF (PAROXYSMAL ATRIAL FIBRILLATION) (HCC): ICD-10-CM

## 2024-02-16 DIAGNOSIS — J44.1 COPD EXACERBATION (HCC): Primary | ICD-10-CM

## 2024-02-16 DIAGNOSIS — N17.9 AKI (ACUTE KIDNEY INJURY) (HCC): ICD-10-CM

## 2024-02-16 DIAGNOSIS — J90 PLEURAL EFFUSION: ICD-10-CM

## 2024-02-16 DIAGNOSIS — I10 ESSENTIAL HYPERTENSION: ICD-10-CM

## 2024-02-16 DIAGNOSIS — E86.0 DEHYDRATION: ICD-10-CM

## 2024-02-16 PROBLEM — J96.01 ACUTE RESPIRATORY FAILURE WITH HYPOXIA (HCC): Status: ACTIVE | Noted: 2024-02-16

## 2024-02-16 LAB
ALBUMIN SERPL-MCNC: 3.8 G/DL (ref 3.5–5.2)
ALP SERPL-CCNC: 69 U/L (ref 35–104)
ALT SERPL-CCNC: 16 U/L (ref 0–32)
ANION GAP SERPL CALCULATED.3IONS-SCNC: 11 MMOL/L (ref 7–16)
AST SERPL-CCNC: 27 U/L (ref 0–31)
B.E.: 3.4 MMOL/L (ref -3–3)
BASOPHILS # BLD: 0.02 K/UL (ref 0–0.2)
BASOPHILS NFR BLD: 0 % (ref 0–2)
BILIRUB SERPL-MCNC: 0.8 MG/DL (ref 0–1.2)
BNP SERPL-MCNC: 1441 PG/ML (ref 0–450)
BUN SERPL-MCNC: 22 MG/DL (ref 6–23)
CALCIUM SERPL-MCNC: 9.6 MG/DL (ref 8.6–10.2)
CHLORIDE SERPL-SCNC: 95 MMOL/L (ref 98–107)
CO2 SERPL-SCNC: 32 MMOL/L (ref 22–29)
COHB: 0.8 % (ref 0–1.5)
COMMENT: ABNORMAL
CREAT SERPL-MCNC: 1.8 MG/DL (ref 0.5–1)
CRITICAL: ABNORMAL
DATE ANALYZED: ABNORMAL
DATE OF COLLECTION: ABNORMAL
DIGOXIN SERPL-MCNC: 1.2 NG/ML (ref 0.8–2)
EOSINOPHIL # BLD: 0.02 K/UL (ref 0.05–0.5)
EOSINOPHILS RELATIVE PERCENT: 0 % (ref 0–6)
ERYTHROCYTE [DISTWIDTH] IN BLOOD BY AUTOMATED COUNT: 18.6 % (ref 11.5–15)
GFR SERPL CREATININE-BSD FRML MDRD: 28 ML/MIN/1.73M2
GLUCOSE SERPL-MCNC: 108 MG/DL (ref 74–99)
HCO3: 27 MMOL/L (ref 22–26)
HCT VFR BLD AUTO: 31 % (ref 34–48)
HGB BLD-MCNC: 9.5 G/DL (ref 11.5–15.5)
HHB: 12.9 % (ref 0–5)
IMM GRANULOCYTES # BLD AUTO: 0.08 K/UL (ref 0–0.58)
IMM GRANULOCYTES NFR BLD: 1 % (ref 0–5)
INR PPP: 1.6
INTERNATIONAL NORMALIZATION RATIO, POC: 1.4
LAB: ABNORMAL
LYMPHOCYTES NFR BLD: 1.4 K/UL (ref 1.5–4)
LYMPHOCYTES RELATIVE PERCENT: 21 % (ref 20–42)
Lab: 2128
MCH RBC QN AUTO: 30.3 PG (ref 26–35)
MCHC RBC AUTO-ENTMCNC: 30.6 G/DL (ref 32–34.5)
MCV RBC AUTO: 98.7 FL (ref 80–99.9)
METHB: 0.3 % (ref 0–1.5)
MODE: ABNORMAL
MONOCYTES NFR BLD: 0.5 K/UL (ref 0.1–0.95)
MONOCYTES NFR BLD: 8 % (ref 2–12)
NEUTROPHILS NFR BLD: 69 % (ref 43–80)
NEUTS SEG NFR BLD: 4.52 K/UL (ref 1.8–7.3)
O2 CONTENT: 12.6 ML/DL
O2 SATURATION: 87 % (ref 92–98.5)
O2HB: 86 % (ref 94–97)
OPERATOR ID: ABNORMAL
PATIENT TEMP: 37 C
PCO2: 37 MMHG (ref 35–45)
PH BLOOD GAS: 7.48 (ref 7.35–7.45)
PLATELET # BLD AUTO: 213 K/UL (ref 130–450)
PMV BLD AUTO: 9.6 FL (ref 7–12)
PO2: 53 MMHG (ref 75–100)
POTASSIUM SERPL-SCNC: 3.9 MMOL/L (ref 3.5–5)
PROT SERPL-MCNC: 6.8 G/DL (ref 6.4–8.3)
PROTHROMBIN TIME, POC: 0
PROTHROMBIN TIME: 17.8 SEC (ref 9.3–12.4)
RBC # BLD AUTO: 3.14 M/UL (ref 3.5–5.5)
SODIUM SERPL-SCNC: 138 MMOL/L (ref 132–146)
SOURCE, BLOOD GAS: ABNORMAL
THB: 10.4 G/DL (ref 11.5–16.5)
TIME ANALYZED: 2135
TROPONIN I SERPL HS-MCNC: 50 NG/L (ref 0–9)
WBC OTHER # BLD: 6.5 K/UL (ref 4.5–11.5)

## 2024-02-16 PROCEDURE — 96365 THER/PROPH/DIAG IV INF INIT: CPT

## 2024-02-16 PROCEDURE — 99285 EMERGENCY DEPT VISIT HI MDM: CPT

## 2024-02-16 PROCEDURE — 2060000000 HC ICU INTERMEDIATE R&B

## 2024-02-16 PROCEDURE — 82805 BLOOD GASES W/O2 SATURATION: CPT

## 2024-02-16 PROCEDURE — 84484 ASSAY OF TROPONIN QUANT: CPT

## 2024-02-16 PROCEDURE — 80053 COMPREHEN METABOLIC PANEL: CPT

## 2024-02-16 PROCEDURE — 85025 COMPLETE CBC W/AUTO DIFF WBC: CPT

## 2024-02-16 PROCEDURE — 85610 PROTHROMBIN TIME: CPT

## 2024-02-16 PROCEDURE — 94640 AIRWAY INHALATION TREATMENT: CPT

## 2024-02-16 PROCEDURE — 83880 ASSAY OF NATRIURETIC PEPTIDE: CPT

## 2024-02-16 PROCEDURE — 2580000003 HC RX 258: Performed by: STUDENT IN AN ORGANIZED HEALTH CARE EDUCATION/TRAINING PROGRAM

## 2024-02-16 PROCEDURE — 96375 TX/PRO/DX INJ NEW DRUG ADDON: CPT

## 2024-02-16 PROCEDURE — 6370000000 HC RX 637 (ALT 250 FOR IP): Performed by: STUDENT IN AN ORGANIZED HEALTH CARE EDUCATION/TRAINING PROGRAM

## 2024-02-16 PROCEDURE — 71045 X-RAY EXAM CHEST 1 VIEW: CPT

## 2024-02-16 PROCEDURE — 80162 ASSAY OF DIGOXIN TOTAL: CPT

## 2024-02-16 PROCEDURE — 93005 ELECTROCARDIOGRAM TRACING: CPT | Performed by: STUDENT IN AN ORGANIZED HEALTH CARE EDUCATION/TRAINING PROGRAM

## 2024-02-16 PROCEDURE — 6360000002 HC RX W HCPCS: Performed by: STUDENT IN AN ORGANIZED HEALTH CARE EDUCATION/TRAINING PROGRAM

## 2024-02-16 RX ORDER — DIGOXIN 0.06 MG/1
62.5 TABLET ORAL DAILY
Qty: 30 TABLET | Refills: 3 | Status: ON HOLD | OUTPATIENT
Start: 2024-02-16

## 2024-02-16 RX ORDER — LEVOTHYROXINE SODIUM 100 MCG
50 TABLET ORAL
Qty: 90 TABLET | Refills: 0 | Status: ON HOLD | OUTPATIENT
Start: 2024-02-16

## 2024-02-16 RX ORDER — METHYLPREDNISOLONE SODIUM SUCCINATE 125 MG/2ML
125 INJECTION, POWDER, LYOPHILIZED, FOR SOLUTION INTRAMUSCULAR; INTRAVENOUS ONCE
Status: COMPLETED | OUTPATIENT
Start: 2024-02-16 | End: 2024-02-16

## 2024-02-16 RX ORDER — METOPROLOL SUCCINATE 25 MG/1
12.5 TABLET, EXTENDED RELEASE ORAL EVERY MORNING
Qty: 90 TABLET | Refills: 0 | Status: ON HOLD | OUTPATIENT
Start: 2024-02-16

## 2024-02-16 RX ORDER — WARFARIN SODIUM 5 MG/1
5 TABLET ORAL NIGHTLY
Qty: 30 TABLET | Refills: 0 | Status: ON HOLD | OUTPATIENT
Start: 2024-02-16

## 2024-02-16 RX ORDER — ROSUVASTATIN CALCIUM 10 MG/1
10 TABLET, COATED ORAL NIGHTLY
Qty: 90 TABLET | Refills: 2 | Status: ON HOLD | OUTPATIENT
Start: 2024-02-16

## 2024-02-16 RX ORDER — 0.9 % SODIUM CHLORIDE 0.9 %
1000 INTRAVENOUS SOLUTION INTRAVENOUS ONCE
Status: COMPLETED | OUTPATIENT
Start: 2024-02-16 | End: 2024-02-17

## 2024-02-16 RX ORDER — MAGNESIUM SULFATE IN WATER 40 MG/ML
2000 INJECTION, SOLUTION INTRAVENOUS ONCE
Status: COMPLETED | OUTPATIENT
Start: 2024-02-16 | End: 2024-02-16

## 2024-02-16 RX ORDER — IPRATROPIUM BROMIDE AND ALBUTEROL SULFATE 2.5; .5 MG/3ML; MG/3ML
1 SOLUTION RESPIRATORY (INHALATION)
Status: COMPLETED | OUTPATIENT
Start: 2024-02-16 | End: 2024-02-16

## 2024-02-16 RX ORDER — BUMETANIDE 1 MG/1
1 TABLET ORAL DAILY
Qty: 30 TABLET | Refills: 3 | Status: ON HOLD | OUTPATIENT
Start: 2024-02-16

## 2024-02-16 RX ORDER — WARFARIN SODIUM 2.5 MG/1
2.5 TABLET ORAL NIGHTLY
Qty: 30 TABLET | Refills: 0 | Status: SHIPPED
Start: 2024-02-16 | End: 2024-02-16 | Stop reason: SDUPTHER

## 2024-02-16 RX ADMIN — IPRATROPIUM BROMIDE AND ALBUTEROL SULFATE 1 DOSE: .5; 2.5 SOLUTION RESPIRATORY (INHALATION) at 21:22

## 2024-02-16 RX ADMIN — METHYLPREDNISOLONE SODIUM SUCCINATE 125 MG: 125 INJECTION INTRAMUSCULAR; INTRAVENOUS at 22:01

## 2024-02-16 RX ADMIN — SODIUM CHLORIDE 1000 ML: 9 INJECTION, SOLUTION INTRAVENOUS at 23:54

## 2024-02-16 RX ADMIN — IPRATROPIUM BROMIDE AND ALBUTEROL SULFATE 1 DOSE: .5; 2.5 SOLUTION RESPIRATORY (INHALATION) at 21:24

## 2024-02-16 RX ADMIN — IPRATROPIUM BROMIDE AND ALBUTEROL SULFATE 1 DOSE: .5; 2.5 SOLUTION RESPIRATORY (INHALATION) at 21:23

## 2024-02-16 RX ADMIN — MAGNESIUM SULFATE HEPTAHYDRATE 2000 MG: 40 INJECTION, SOLUTION INTRAVENOUS at 22:05

## 2024-02-16 ASSESSMENT — PAIN - FUNCTIONAL ASSESSMENT: PAIN_FUNCTIONAL_ASSESSMENT: NONE - DENIES PAIN

## 2024-02-16 ASSESSMENT — LIFESTYLE VARIABLES
HOW MANY STANDARD DRINKS CONTAINING ALCOHOL DO YOU HAVE ON A TYPICAL DAY: PATIENT DOES NOT DRINK
HOW OFTEN DO YOU HAVE A DRINK CONTAINING ALCOHOL: NEVER

## 2024-02-16 NOTE — PROGRESS NOTES
INTRACAPSULAR CATARACT EXTRACTION Right 2021    RIGHT EYE CATARACT EXTRACTION IOL IMPLANT performed by Kay Pimentel MD at [facility] OR    INTRACAPSULAR CATARACT EXTRACTION Left 2022    LEFT EYE CATARACT EXTRACTION IOL IMPLANT performed by Kay Pimentel MD at [facility] OR    TUBAL LIGATION         Past Family Hx:  Reviewed with patient      Problem Relation Age of Onset    Cancer Mother     Other Mother         APLASTIC ANEMIA    Peripheral Vascular Disease Mother     Emphysema Father     Lung Disease Father        Social Hx:  Reviewed with patient  Social History     Tobacco Use    Smoking status: Former     Current packs/day: 0.00     Average packs/day: 2.0 packs/day for 46.0 years (92.0 ttl pk-yrs)     Types: Cigarettes     Start date: 1952     Quit date: 1998     Years since quittin.5     Passive exposure: Past    Smokeless tobacco: Never    Tobacco comments:     Patient quit smoking 24 yrs. ago.   Substance Use Topics    Alcohol use: Yes     Alcohol/week: 1.0 standard drink of alcohol     Types: 1 Glasses of wine per week     Comment: nightly       OBJECTIVE  BP (!) 106/50   Pulse 84   Temp 97.1 °F (36.2 °C)   Resp 18   Ht 1.727 m (5' 8\")   Wt 71.7 kg (158 lb)   SpO2 98%   BMI 24.02 kg/m²     Problem List:  Krystal does not have any pertinent problems on file.    PHYS EX:  Physical Exam  Eyes:      Extraocular Movements: Extraocular movements intact.      Pupils: Pupils are equal, round, and reactive to light.   Neck:      Thyroid: No thyromegaly.      Vascular: No carotid bruit or JVD.   Cardiovascular:      Heart sounds: No murmur heard.     No gallop.   Pulmonary:      Effort: Pulmonary effort is normal.      Breath sounds: Normal breath sounds.      Comments: Scattered rhonchi  Abdominal:      Palpations: There is no hepatomegaly or splenomegaly.      Tenderness: There is no abdominal tenderness.   Skin:     Coloration: Skin is not cyanotic.      Findings: No

## 2024-02-17 ENCOUNTER — APPOINTMENT (OUTPATIENT)
Dept: ULTRASOUND IMAGING | Age: 84
DRG: 193 | End: 2024-02-17
Payer: MEDICARE

## 2024-02-17 LAB
ANION GAP SERPL CALCULATED.3IONS-SCNC: 17 MMOL/L (ref 7–16)
B PARAP IS1001 DNA NPH QL NAA+NON-PROBE: NOT DETECTED
B PERT DNA SPEC QL NAA+PROBE: NOT DETECTED
BASOPHILS # BLD: 0.02 K/UL (ref 0–0.2)
BASOPHILS NFR BLD: 0 % (ref 0–2)
BUN SERPL-MCNC: 22 MG/DL (ref 6–23)
C PNEUM DNA NPH QL NAA+NON-PROBE: NOT DETECTED
CALCIUM SERPL-MCNC: 8.7 MG/DL (ref 8.6–10.2)
CHLORIDE SERPL-SCNC: 98 MMOL/L (ref 98–107)
CO2 SERPL-SCNC: 27 MMOL/L (ref 22–29)
CREAT SERPL-MCNC: 1.9 MG/DL (ref 0.5–1)
CREAT UR-MCNC: 348.9 MG/DL (ref 29–226)
EOSINOPHIL # BLD: 0 K/UL (ref 0.05–0.5)
EOSINOPHILS RELATIVE PERCENT: 0 % (ref 0–6)
ERYTHROCYTE [DISTWIDTH] IN BLOOD BY AUTOMATED COUNT: 18.6 % (ref 11.5–15)
FLUAV RNA NPH QL NAA+NON-PROBE: NOT DETECTED
FLUBV RNA NPH QL NAA+NON-PROBE: NOT DETECTED
GFR SERPL CREATININE-BSD FRML MDRD: 25 ML/MIN/1.73M2
GLUCOSE SERPL-MCNC: 172 MG/DL (ref 74–99)
HADV DNA NPH QL NAA+NON-PROBE: NOT DETECTED
HCOV 229E RNA NPH QL NAA+NON-PROBE: NOT DETECTED
HCOV HKU1 RNA NPH QL NAA+NON-PROBE: NOT DETECTED
HCOV NL63 RNA NPH QL NAA+NON-PROBE: NOT DETECTED
HCOV OC43 RNA NPH QL NAA+NON-PROBE: NOT DETECTED
HCT VFR BLD AUTO: 29.8 % (ref 34–48)
HGB BLD-MCNC: 9.1 G/DL (ref 11.5–15.5)
HMPV RNA NPH QL NAA+NON-PROBE: NOT DETECTED
HPIV1 RNA NPH QL NAA+NON-PROBE: NOT DETECTED
HPIV2 RNA NPH QL NAA+NON-PROBE: NOT DETECTED
HPIV3 RNA NPH QL NAA+NON-PROBE: NOT DETECTED
HPIV4 RNA NPH QL NAA+NON-PROBE: NOT DETECTED
IMM GRANULOCYTES # BLD AUTO: 0.05 K/UL (ref 0–0.58)
IMM GRANULOCYTES NFR BLD: 1 % (ref 0–5)
INFLUENZA A BY PCR: NOT DETECTED
INFLUENZA B BY PCR: NOT DETECTED
INR PPP: 1.5
L PNEUMO1 AG UR QL IA.RAPID: NEGATIVE
LYMPHOCYTES NFR BLD: 0.51 K/UL (ref 1.5–4)
LYMPHOCYTES RELATIVE PERCENT: 11 % (ref 20–42)
M PNEUMO DNA NPH QL NAA+NON-PROBE: NOT DETECTED
MCH RBC QN AUTO: 30 PG (ref 26–35)
MCHC RBC AUTO-ENTMCNC: 30.5 G/DL (ref 32–34.5)
MCV RBC AUTO: 98.3 FL (ref 80–99.9)
MICROORGANISM/AGENT SPEC: ABNORMAL
MONOCYTES NFR BLD: 0.07 K/UL (ref 0.1–0.95)
MONOCYTES NFR BLD: 2 % (ref 2–12)
NEUTROPHILS NFR BLD: 86 % (ref 43–80)
NEUTS SEG NFR BLD: 4.13 K/UL (ref 1.8–7.3)
PARTIAL THROMBOPLASTIN TIME: 31.6 SEC (ref 24.5–35.1)
PARTIAL THROMBOPLASTIN TIME: 32.4 SEC (ref 24.5–35.1)
PLATELET # BLD AUTO: 216 K/UL (ref 130–450)
PMV BLD AUTO: 11.2 FL (ref 7–12)
POTASSIUM SERPL-SCNC: 4.3 MMOL/L (ref 3.5–5)
PROTHROMBIN TIME: 17 SEC (ref 9.3–12.4)
RBC # BLD AUTO: 3.03 M/UL (ref 3.5–5.5)
RBC # BLD: ABNORMAL 10*6/UL
RBC # BLD: ABNORMAL 10*6/UL
RSV BY PCR: NOT DETECTED
RSV RNA NPH QL NAA+NON-PROBE: NOT DETECTED
RV+EV RNA NPH QL NAA+NON-PROBE: NOT DETECTED
S PNEUM AG SPEC QL: NEGATIVE
SARS-COV-2 RNA NPH QL NAA+NON-PROBE: NOT DETECTED
SODIUM SERPL-SCNC: 142 MMOL/L (ref 132–146)
SODIUM UR-SCNC: <20 MMOL/L
SPECIMEN DESCRIPTION: ABNORMAL
SPECIMEN DESCRIPTION: NORMAL
SPECIMEN SOURCE: NORMAL
SPECIMEN SOURCE: NORMAL
TROPONIN I SERPL HS-MCNC: 46 NG/L (ref 0–9)
TROPONIN I SERPL HS-MCNC: 54 NG/L (ref 0–9)
UUN UR-MCNC: 200 MG/DL (ref 800–1666)
WBC OTHER # BLD: 4.8 K/UL (ref 4.5–11.5)

## 2024-02-17 PROCEDURE — 6360000002 HC RX W HCPCS: Performed by: STUDENT IN AN ORGANIZED HEALTH CARE EDUCATION/TRAINING PROGRAM

## 2024-02-17 PROCEDURE — 87899 AGENT NOS ASSAY W/OPTIC: CPT

## 2024-02-17 PROCEDURE — 84484 ASSAY OF TROPONIN QUANT: CPT

## 2024-02-17 PROCEDURE — 85025 COMPLETE CBC W/AUTO DIFF WBC: CPT

## 2024-02-17 PROCEDURE — 2700000000 HC OXYGEN THERAPY PER DAY

## 2024-02-17 PROCEDURE — 94664 DEMO&/EVAL PT USE INHALER: CPT

## 2024-02-17 PROCEDURE — 2580000003 HC RX 258: Performed by: STUDENT IN AN ORGANIZED HEALTH CARE EDUCATION/TRAINING PROGRAM

## 2024-02-17 PROCEDURE — 2060000000 HC ICU INTERMEDIATE R&B

## 2024-02-17 PROCEDURE — 6370000000 HC RX 637 (ALT 250 FOR IP): Performed by: STUDENT IN AN ORGANIZED HEALTH CARE EDUCATION/TRAINING PROGRAM

## 2024-02-17 PROCEDURE — 84540 ASSAY OF URINE/UREA-N: CPT

## 2024-02-17 PROCEDURE — 82570 ASSAY OF URINE CREATININE: CPT

## 2024-02-17 PROCEDURE — 85730 THROMBOPLASTIN TIME PARTIAL: CPT

## 2024-02-17 PROCEDURE — 85610 PROTHROMBIN TIME: CPT

## 2024-02-17 PROCEDURE — 0202U NFCT DS 22 TRGT SARS-COV-2: CPT

## 2024-02-17 PROCEDURE — 2500000003 HC RX 250 WO HCPCS: Performed by: STUDENT IN AN ORGANIZED HEALTH CARE EDUCATION/TRAINING PROGRAM

## 2024-02-17 PROCEDURE — 87081 CULTURE SCREEN ONLY: CPT

## 2024-02-17 PROCEDURE — 87449 NOS EACH ORGANISM AG IA: CPT

## 2024-02-17 PROCEDURE — 94640 AIRWAY INHALATION TREATMENT: CPT

## 2024-02-17 PROCEDURE — 76770 US EXAM ABDO BACK WALL COMP: CPT

## 2024-02-17 PROCEDURE — 87502 INFLUENZA DNA AMP PROBE: CPT

## 2024-02-17 PROCEDURE — 87070 CULTURE OTHR SPECIMN AEROBIC: CPT

## 2024-02-17 PROCEDURE — 87634 RSV DNA/RNA AMP PROBE: CPT

## 2024-02-17 PROCEDURE — 99222 1ST HOSP IP/OBS MODERATE 55: CPT | Performed by: STUDENT IN AN ORGANIZED HEALTH CARE EDUCATION/TRAINING PROGRAM

## 2024-02-17 PROCEDURE — 84300 ASSAY OF URINE SODIUM: CPT

## 2024-02-17 PROCEDURE — 87205 SMEAR GRAM STAIN: CPT

## 2024-02-17 PROCEDURE — 80048 BASIC METABOLIC PNL TOTAL CA: CPT

## 2024-02-17 RX ORDER — METOPROLOL SUCCINATE 25 MG/1
12.5 TABLET, EXTENDED RELEASE ORAL EVERY MORNING
Status: DISCONTINUED | OUTPATIENT
Start: 2024-02-17 | End: 2024-02-22 | Stop reason: HOSPADM

## 2024-02-17 RX ORDER — HEPARIN SODIUM 1000 [USP'U]/ML
2000 INJECTION, SOLUTION INTRAVENOUS; SUBCUTANEOUS PRN
Status: DISCONTINUED | OUTPATIENT
Start: 2024-02-17 | End: 2024-02-18

## 2024-02-17 RX ORDER — SODIUM CHLORIDE 0.9 % (FLUSH) 0.9 %
5-40 SYRINGE (ML) INJECTION PRN
Status: DISCONTINUED | OUTPATIENT
Start: 2024-02-17 | End: 2024-02-22 | Stop reason: HOSPADM

## 2024-02-17 RX ORDER — DIGOXIN 125 MCG
62.5 TABLET ORAL DAILY
Status: DISCONTINUED | OUTPATIENT
Start: 2024-02-17 | End: 2024-02-22 | Stop reason: HOSPADM

## 2024-02-17 RX ORDER — SODIUM CHLORIDE 9 MG/ML
INJECTION, SOLUTION INTRAVENOUS CONTINUOUS
Status: DISCONTINUED | OUTPATIENT
Start: 2024-02-17 | End: 2024-02-19

## 2024-02-17 RX ORDER — FUROSEMIDE 20 MG/1
20 TABLET ORAL DAILY
Status: ON HOLD | COMMUNITY
End: 2024-02-22 | Stop reason: HOSPADM

## 2024-02-17 RX ORDER — HEPARIN SODIUM 10000 [USP'U]/100ML
5-30 INJECTION, SOLUTION INTRAVENOUS CONTINUOUS
Status: DISCONTINUED | OUTPATIENT
Start: 2024-02-17 | End: 2024-02-18

## 2024-02-17 RX ORDER — HEPARIN SODIUM 1000 [USP'U]/ML
4000 INJECTION, SOLUTION INTRAVENOUS; SUBCUTANEOUS PRN
Status: DISCONTINUED | OUTPATIENT
Start: 2024-02-17 | End: 2024-02-18

## 2024-02-17 RX ORDER — HEPARIN SODIUM 1000 [USP'U]/ML
4000 INJECTION, SOLUTION INTRAVENOUS; SUBCUTANEOUS ONCE
Status: COMPLETED | OUTPATIENT
Start: 2024-02-17 | End: 2024-02-17

## 2024-02-17 RX ORDER — SODIUM CHLORIDE 9 MG/ML
INJECTION, SOLUTION INTRAVENOUS PRN
Status: DISCONTINUED | OUTPATIENT
Start: 2024-02-17 | End: 2024-02-22 | Stop reason: HOSPADM

## 2024-02-17 RX ORDER — BUDESONIDE 0.25 MG/2ML
0.25 INHALANT ORAL
Status: DISCONTINUED | OUTPATIENT
Start: 2024-02-17 | End: 2024-02-22 | Stop reason: HOSPADM

## 2024-02-17 RX ORDER — SODIUM CHLORIDE 0.9 % (FLUSH) 0.9 %
5-40 SYRINGE (ML) INJECTION EVERY 12 HOURS SCHEDULED
Status: DISCONTINUED | OUTPATIENT
Start: 2024-02-17 | End: 2024-02-22 | Stop reason: HOSPADM

## 2024-02-17 RX ORDER — LEVOTHYROXINE SODIUM 0.05 MG/1
50 TABLET ORAL
Status: DISCONTINUED | OUTPATIENT
Start: 2024-02-17 | End: 2024-02-22 | Stop reason: HOSPADM

## 2024-02-17 RX ORDER — ARFORMOTEROL TARTRATE 15 UG/2ML
15 SOLUTION RESPIRATORY (INHALATION)
Status: DISCONTINUED | OUTPATIENT
Start: 2024-02-17 | End: 2024-02-22 | Stop reason: HOSPADM

## 2024-02-17 RX ORDER — ACETAMINOPHEN 650 MG/1
650 SUPPOSITORY RECTAL EVERY 6 HOURS PRN
Status: DISCONTINUED | OUTPATIENT
Start: 2024-02-17 | End: 2024-02-22 | Stop reason: HOSPADM

## 2024-02-17 RX ORDER — WARFARIN SODIUM 5 MG/1
5 TABLET ORAL
Status: COMPLETED | OUTPATIENT
Start: 2024-02-17 | End: 2024-02-17

## 2024-02-17 RX ORDER — ACETAMINOPHEN 325 MG/1
650 TABLET ORAL EVERY 6 HOURS PRN
Status: DISCONTINUED | OUTPATIENT
Start: 2024-02-17 | End: 2024-02-22 | Stop reason: HOSPADM

## 2024-02-17 RX ORDER — POLYETHYLENE GLYCOL 3350 17 G/17G
17 POWDER, FOR SOLUTION ORAL DAILY PRN
Status: DISCONTINUED | OUTPATIENT
Start: 2024-02-17 | End: 2024-02-22 | Stop reason: HOSPADM

## 2024-02-17 RX ORDER — IPRATROPIUM BROMIDE AND ALBUTEROL SULFATE 2.5; .5 MG/3ML; MG/3ML
1 SOLUTION RESPIRATORY (INHALATION) EVERY 4 HOURS PRN
Status: DISCONTINUED | OUTPATIENT
Start: 2024-02-17 | End: 2024-02-22 | Stop reason: HOSPADM

## 2024-02-17 RX ORDER — ROSUVASTATIN CALCIUM 10 MG/1
10 TABLET, COATED ORAL NIGHTLY
Status: DISCONTINUED | OUTPATIENT
Start: 2024-02-17 | End: 2024-02-22 | Stop reason: HOSPADM

## 2024-02-17 RX ADMIN — DOXYCYCLINE 100 MG: 100 INJECTION, POWDER, LYOPHILIZED, FOR SOLUTION INTRAVENOUS at 12:05

## 2024-02-17 RX ADMIN — MICONAZOLE NITRATE: 2 OINTMENT TOPICAL at 23:59

## 2024-02-17 RX ADMIN — WARFARIN SODIUM 5 MG: 5 TABLET ORAL at 18:34

## 2024-02-17 RX ADMIN — HEPARIN SODIUM 4000 UNITS: 1000 INJECTION INTRAVENOUS; SUBCUTANEOUS at 22:12

## 2024-02-17 RX ADMIN — IPRATROPIUM BROMIDE 0.5 MG: 0.5 SOLUTION RESPIRATORY (INHALATION) at 14:19

## 2024-02-17 RX ADMIN — IPRATROPIUM BROMIDE 0.5 MG: 0.5 SOLUTION RESPIRATORY (INHALATION) at 07:47

## 2024-02-17 RX ADMIN — HEPARIN SODIUM 4000 UNITS: 1000 INJECTION INTRAVENOUS; SUBCUTANEOUS at 12:34

## 2024-02-17 RX ADMIN — ARFORMOTEROL TARTRATE 15 MCG: 15 SOLUTION RESPIRATORY (INHALATION) at 21:32

## 2024-02-17 RX ADMIN — IPRATROPIUM BROMIDE 0.5 MG: 0.5 SOLUTION RESPIRATORY (INHALATION) at 21:32

## 2024-02-17 RX ADMIN — BUDESONIDE 250 MCG: 0.25 SUSPENSION RESPIRATORY (INHALATION) at 21:32

## 2024-02-17 RX ADMIN — IPRATROPIUM BROMIDE 0.5 MG: 0.5 SOLUTION RESPIRATORY (INHALATION) at 11:09

## 2024-02-17 RX ADMIN — ROSUVASTATIN CALCIUM 10 MG: 10 TABLET, FILM COATED ORAL at 20:40

## 2024-02-17 RX ADMIN — BUDESONIDE 250 MCG: 0.25 SUSPENSION RESPIRATORY (INHALATION) at 07:48

## 2024-02-17 RX ADMIN — HEPARIN SODIUM AND DEXTROSE 12 UNITS/KG/HR: 10000; 5 INJECTION INTRAVENOUS at 12:34

## 2024-02-17 RX ADMIN — WATER 2000 MG: 1 INJECTION INTRAMUSCULAR; INTRAVENOUS; SUBCUTANEOUS at 00:40

## 2024-02-17 RX ADMIN — Medication 10 ML: at 20:41

## 2024-02-17 RX ADMIN — ARFORMOTEROL TARTRATE 15 MCG: 15 SOLUTION RESPIRATORY (INHALATION) at 07:48

## 2024-02-17 RX ADMIN — DOXYCYCLINE 100 MG: 100 INJECTION, POWDER, LYOPHILIZED, FOR SOLUTION INTRAVENOUS at 00:43

## 2024-02-17 RX ADMIN — DOXYCYCLINE 100 MG: 100 INJECTION, POWDER, LYOPHILIZED, FOR SOLUTION INTRAVENOUS at 23:56

## 2024-02-17 RX ADMIN — SODIUM CHLORIDE: 9 INJECTION, SOLUTION INTRAVENOUS at 12:02

## 2024-02-17 RX ADMIN — WATER 1000 MG: 1 INJECTION INTRAMUSCULAR; INTRAVENOUS; SUBCUTANEOUS at 23:56

## 2024-02-17 NOTE — PROGRESS NOTES
Pharmacy Consultation Note  (Warfarin Dosing and Monitoring)    Initial consult date: 2/17  Consulting Provider: TANA Davies    Krystal Hyatt is a 83 y.o. female for whom pharmacy has been asked to manage warfarin therapy.     Weight:   Wt Readings from Last 1 Encounters:   02/16/24 71.7 kg (158 lb)       TSH:    Lab Results   Component Value Date/Time    TSH 7.44 01/14/2024 06:00 AM       Hepatic Function Panel:                            Lab Results   Component Value Date/Time    ALKPHOS 69 02/16/2024 09:59 PM    ALT 16 02/16/2024 09:59 PM    AST 27 02/16/2024 09:59 PM    PROT 6.8 02/16/2024 09:59 PM    BILITOT 0.8 02/16/2024 09:59 PM    BILIDIR 0.2 10/15/2017 02:40 AM    IBILI 0.7 10/15/2017 02:40 AM    LABALBU 3.8 02/16/2024 09:59 PM    LABALBU 4.4 05/04/2012 11:00 AM       Current significant warfarin drug-drug interactions include:   -Levothyroxine, doxycyline, rosuvastatin: may enhance the anticoagulatory effect of warfarin.     Recent Labs     02/16/24  2159 02/17/24  0526   HGB 9.5* 9.1*    216     Date Warfarin Dose INR Heparin or LMWH Comment   2/17 5 mg 1.5 x                                  Assessment:  Patient is a 83 y.o. female on warfarin for Atrial Fibrillation.  Patient's home warfarin dosing regimen is 5 mg once daily per med rec.   Goal INR 2 - 3  INR 1.5 today  Spoke to Dr. Shah to recommend consideration of supplemental anticoagulation.    Plan:  Warfarin 5 mg tonight  Daily PT/INR until the INR is stable within the therapeutic range  Pharmacist will follow and monitor/adjust dosing as necessary      Brett Junior RPH 2/17/2024 9:53 AM    RADHA: [phone]  SEY: [phone]  SJW: [phone]

## 2024-02-17 NOTE — ED NOTES
Spoke with  about blood pressure. Ordered this RN to monitor blood pressure and let him know if it drops more.

## 2024-02-17 NOTE — H&P
MD Rmo   bumetanide (BUMEX) 1 MG tablet Take 1 tablet by mouth daily 2/16/24   Rom Mckeon MD   Digoxin (LANOXIN) 62.5 MCG TABS Take 62.5 mcg by mouth daily 2/16/24   Rom Mckeon MD   warfarin (COUMADIN) 5 MG tablet Take 1 tablet by mouth nightly 2/16/24   Rom Mckeon MD   OXYGEN Inhale into the lungs continuous Indications: 5 Lpm    ProviderAngela MD   fluticasone-umeclidin-vilant (TRELEGY ELLIPTA) 100-62.5-25 MCG/ACT AEPB inhaler Inhale 1 puff into the lungs daily 2/12/24   Rom Mckeon MD   albuterol (PROVENTIL) (2.5 MG/3ML) 0.083% nebulizer solution Take 3 mLs by nebulization 4 times daily as needed for Wheezing 1/30/24   Rom Mckeon MD   Apoaequorin (PREVAGEN EXTRA STRENGTH) 20 MG CAPS Take 20 mg by mouth every morning   7/15/22  ProviderAngela MD       Allergies:    Pacerone [amiodarone], Cat hair extract, Eggs or egg-derived products, Erythromycin, Pcn [penicillins], and Seasonal    Social History:    reports that she quit smoking about 25 years ago. Her smoking use included cigarettes. She started smoking about 71 years ago. She has a 92.0 pack-year smoking history. She has been exposed to tobacco smoke. She has never used smokeless tobacco. She reports current alcohol use of about 1.0 standard drink of alcohol per week. She reports that she does not use drugs.    Family History:   family history includes Cancer in her mother; Emphysema in her father; Lung Disease in her father; Other in her mother; Peripheral Vascular Disease in her mother.       PHYSICAL EXAM:  Vitals:  BP (!) 116/55   Pulse 52   Temp 98.2 °F (36.8 °C) (Oral)   Resp 22   Ht 1.727 m (5' 8\")   Wt 71.7 kg (158 lb)   SpO2 97%   BMI 24.02 kg/m²       General: Well developed, well nourished. No acute distress.  HEENT: NCAT. Non-injected conjunctiva, non-icteric sclera. External eyelids without pus/discharge. No lesions on external ears and nose anteriorly.   Neck: Trachea midline.  ALKPHOS 69 02/16/2024 09:59 PM    AST 27 02/16/2024 09:59 PM    ALT 16 02/16/2024 09:59 PM       Radiology:   XR CHEST PORTABLE   Final Result   1.  Lung hyperinflation with coarsened interstitial markings.   Atherosclerotic disease and cardiomegaly.      2.  Worsened opacities in the left greater than right mid and lower lungs   with pleural effusions.  Probably represents superimposed infection.  Edema   could potentially have a similar appearance.  No pneumothorax.      RECOMMENDATION:   (Recommend upright PA and lateral chest radiographs 10-12 weeks after   resolution of patient's symptoms to ensure complete resolution of   radiographic findings.).         US RETROPERITONEAL LIMITED    (Results Pending)       EKG (Independent Interpretation): Junctional rhythm 64, normal axis, no significant ST elevations noted     ASSESSMENT:      Principal Problem:    Pneumonia of both lower lobes due to infectious organism  Active Problems:    TRISTEN (acute kidney injury) (HCC)    Acute respiratory failure with hypoxia (HCC)  Resolved Problems:    * No resolved hospital problems. *      PLAN:    Admitted for bilateral PNA and TRISTEN. Will start Rocephin/Doxy. Check SpCx, urine strep/legionella, MRSA nasal swab, and viral studies.     Otherwise, for TRISTEN on CKD, pt appears to have 2+ pitting LE edema, but is borderline hypotensive. Will urine studies and renal US. Consult nephrology for further recommendations on fluid management.    Lastly, give borderline hypotension, will hold Metoprolol. Hold Digoxin in the setting of TRISTEN for now. Consult pharmacy for Warfarin dosing.      Code Status: Full  DVT prophylaxis: Warfarin       NOTE: This report was transcribed using voice recognition software. Every effort was made to ensure accuracy; however, inadvertent computerized transcription errors may be present and meaning should be derived from surrounding context.   Electronically signed by Kumar Davies MD on 2/17/2024 at 4:23 AM

## 2024-02-17 NOTE — ED PROVIDER NOTES
conversational dyspnea.  No nasal flaring, grunting or cyanosis.    Cardiovascular:  Regular rate. Regular rhythm.   Chest: no chest wall tenderness  Abdomen: Soft.  Non tender. Non distended.   No rebound, guarding, or rigidity.   Musculoskeletal: Moves all extremities x 4. Warm and well perfused, no clubbing, cyanosis, or edema.   Skin: warm and dry. No rashes.   Neurologic: no gross focal neurologic deficits  Psych: Normal Affect    -------------------------------------------------- RESULTS -------------------------------------------------  I have personally reviewed all laboratory and imaging results for this patient. Results are listed below.     LABS:  Results for orders placed or performed during the hospital encounter of 02/16/24   CBC with Auto Differential   Result Value Ref Range    WBC 6.5 4.5 - 11.5 k/uL    RBC 3.14 (L) 3.50 - 5.50 m/uL    Hemoglobin 9.5 (L) 11.5 - 15.5 g/dL    Hematocrit 31.0 (L) 34.0 - 48.0 %    MCV 98.7 80.0 - 99.9 fL    MCH 30.3 26.0 - 35.0 pg    MCHC 30.6 (L) 32.0 - 34.5 g/dL    RDW 18.6 (H) 11.5 - 15.0 %    Platelets 213 130 - 450 k/uL    MPV 9.6 7.0 - 12.0 fL    Neutrophils % 69 43.0 - 80.0 %    Lymphocytes % 21 20.0 - 42.0 %    Monocytes % 8 2.0 - 12.0 %    Eosinophils % 0 0 - 6 %    Basophils % 0 0.0 - 2.0 %    Immature Granulocytes 1 0.0 - 5.0 %    Neutrophils Absolute 4.52 1.80 - 7.30 k/uL    Lymphocytes Absolute 1.40 (L) 1.50 - 4.00 k/uL    Monocytes Absolute 0.50 0.10 - 0.95 k/uL    Eosinophils Absolute 0.02 (L) 0.05 - 0.50 k/uL    Basophils Absolute 0.02 0.00 - 0.20 k/uL    Absolute Immature Granulocyte 0.08 0.00 - 0.58 k/uL   Comprehensive Metabolic Panel w/ Reflex to MG   Result Value Ref Range    Sodium 138 132 - 146 mmol/L    Potassium 3.9 3.5 - 5.0 mmol/L    Chloride 95 (L) 98 - 107 mmol/L    CO2 32 (H) 22 - 29 mmol/L    Anion Gap 11 7 - 16 mmol/L    Glucose 108 (H) 74 - 99 mg/dL    BUN 22 6 - 23 mg/dL    Creatinine 1.8 (H) 0.50 - 1.00 mg/dL    Est, Glom Filt Rate 28    O2 Sat 87.0(!)   O2Hb 86.0(!)   Carboxy-Hgb 0.8   METHB,METHB 0.3   O2 Content 12.6   HHb 12.9(!)   THB,THB 10.4(!)   Mode AFM   COMMENT, [id number] 10L AFM   Date Of Collection [id number]   Time Collected 2128   Pt Temp 37.0    ID [id number]  00     Lab 10260   Critical(s) Notified . No Critical Values  Acute on chronic respiratory failure with hypoxia.  No hypercapnia.  No acidosis. [PP]   2251 CBC with Auto Differential(!):    WBC 6.5   RBC 3.14(!)   Hemoglobin Quant 9.5(!)   Hematocrit 31.0(!)   MCV 98.7   MCH 30.3   MCHC 30.6(!)   RDW 18.6(!)   Platelet Count 213   MPV 9.6   Neutrophils % 69   Lymphocyte % 21   Monocytes % 8   Eosinophils % 0   Basophils % 0   Immature Granulocytes 1   Neutrophils Absolute 4.52   Lymphocytes Absolute 1.40(!)   Monocytes Absolute 0.50   Eosinophils Absolute 0.02(!)   Basophils Absolute 0.02   Absolute Immature Granulocyte 0.08 [PP]   2251 Comprehensive Metabolic Panel w/ Reflex to MG(!):    Sodium 138   Potassium 3.9   Chloride 95(!)   CO2 32(!)   Anion Gap 11   Glucose, Random 108(!)   BUN,BUNPL 22   Creatinine 1.8(!)   Est, Glom Filt Rate 28(!)   CALCIUM, SERUM, 718818 9.6   Total Protein 6.8   Albumin 3.8   BILIRUBIN TOTAL 0.8   Alk Phos 69   ALT 16   AST 27 [PP]   2251 Protime-INR(!):    Prothrombin Time 17.8(!)   INR 1.6 [PP]   2251 Troponin(!):    Troponin, High Sensitivity 50(!) [PP]   2251 Digoxin Lvl: 1.2 [PP]   2319 Patient was accepted for admission by Dr. Davies. [PP]      ED Course User Index  [KG] Letty Narvaez DO  [PP] Sabrina Philip DO         This patient's ED course included: History, physical examination, reevaluation prior to disposition    This patient has remained hemodynamically stable during their ED course.    Counseling:   The emergency provider has spoken with the patient and discussed today’s results, in addition to providing specific details for the plan of care and counseling regarding the diagnosis and prognosis.  Questions are answered at

## 2024-02-17 NOTE — ED NOTES
ED to Inpatient Handoff Report    Notified TARYN that electronic handoff available and patient ready for transport to room 403.    Safety Risks: Risk of falls    Patient in Restraints: no    Constant Observer or Patient : no    Telemetry Monitoring Ordered :Yes           Order to transfer to unit without monitor:YES    Last MEWS: 1 Time completed: 1715    Deterioration Index Score:   Predictive Model Details          44 (Caution)  Factor Value    Calculated 2/17/2024 17:47 29% Age 83 years old    Deterioration Index Model 25% Supplemental oxygen High flow nasal cannula     20% Respiratory rate 23     12% Systolic 95     4% Potassium 4.3 mmol/L     3% Sodium 142 mmol/L     3% Blood pH abnormal (7.481)     2% Pulse 94     1% Hematocrit abnormal (29.8 %)     0% WBC count 4.8 k/uL     0% Pulse oximetry 98 %     0% Temperature 98.2 °F (36.8 °C)        Vitals:    02/17/24 1419 02/17/24 1543 02/17/24 1703 02/17/24 1715   BP:  (!) 95/58     Pulse: 79 72 86 94   Resp:       Temp:       TempSrc:       SpO2: 99% 99%  98%   Weight:       Height:             Opportunity for questions and clarification was provided.

## 2024-02-18 LAB
ANION GAP SERPL CALCULATED.3IONS-SCNC: 11 MMOL/L (ref 7–16)
BASOPHILS # BLD: 0 K/UL (ref 0–0.2)
BASOPHILS NFR BLD: 0 % (ref 0–2)
BUN SERPL-MCNC: 29 MG/DL (ref 6–23)
CALCIUM SERPL-MCNC: 8.6 MG/DL (ref 8.6–10.2)
CHLORIDE SERPL-SCNC: 101 MMOL/L (ref 98–107)
CO2 SERPL-SCNC: 29 MMOL/L (ref 22–29)
CREAT SERPL-MCNC: 1.7 MG/DL (ref 0.5–1)
EOSINOPHIL # BLD: 0 K/UL (ref 0.05–0.5)
EOSINOPHILS RELATIVE PERCENT: 0 % (ref 0–6)
ERYTHROCYTE [DISTWIDTH] IN BLOOD BY AUTOMATED COUNT: 18.7 % (ref 11.5–15)
GFR SERPL CREATININE-BSD FRML MDRD: 30 ML/MIN/1.73M2
GLUCOSE SERPL-MCNC: 155 MG/DL (ref 74–99)
HCT VFR BLD AUTO: 25.1 % (ref 34–48)
HGB BLD-MCNC: 7.8 G/DL (ref 11.5–15.5)
IMM GRANULOCYTES # BLD AUTO: 0.07 K/UL (ref 0–0.58)
IMM GRANULOCYTES NFR BLD: 1 % (ref 0–5)
INR PPP: 3.1
LYMPHOCYTES NFR BLD: 0.78 K/UL (ref 1.5–4)
LYMPHOCYTES RELATIVE PERCENT: 13 % (ref 20–42)
MCH RBC QN AUTO: 30.5 PG (ref 26–35)
MCHC RBC AUTO-ENTMCNC: 31.1 G/DL (ref 32–34.5)
MCV RBC AUTO: 98 FL (ref 80–99.9)
MICROORGANISM SPEC CULT: NORMAL
MONOCYTES NFR BLD: 0.36 K/UL (ref 0.1–0.95)
MONOCYTES NFR BLD: 6 % (ref 2–12)
NEUTROPHILS NFR BLD: 81 % (ref 43–80)
NEUTS SEG NFR BLD: 4.97 K/UL (ref 1.8–7.3)
PARTIAL THROMBOPLASTIN TIME: >240 SEC (ref 24.5–35.1)
PLATELET # BLD AUTO: 173 K/UL (ref 130–450)
PMV BLD AUTO: 9.9 FL (ref 7–12)
POTASSIUM SERPL-SCNC: 3.9 MMOL/L (ref 3.5–5)
PROTHROMBIN TIME: 34.5 SEC (ref 9.3–12.4)
RBC # BLD AUTO: 2.56 M/UL (ref 3.5–5.5)
SODIUM SERPL-SCNC: 141 MMOL/L (ref 132–146)
SPECIMEN DESCRIPTION: NORMAL
WBC OTHER # BLD: 6.2 K/UL (ref 4.5–11.5)

## 2024-02-18 PROCEDURE — 80048 BASIC METABOLIC PNL TOTAL CA: CPT

## 2024-02-18 PROCEDURE — 6370000000 HC RX 637 (ALT 250 FOR IP): Performed by: STUDENT IN AN ORGANIZED HEALTH CARE EDUCATION/TRAINING PROGRAM

## 2024-02-18 PROCEDURE — 2060000000 HC ICU INTERMEDIATE R&B

## 2024-02-18 PROCEDURE — 6360000002 HC RX W HCPCS: Performed by: STUDENT IN AN ORGANIZED HEALTH CARE EDUCATION/TRAINING PROGRAM

## 2024-02-18 PROCEDURE — C9113 INJ PANTOPRAZOLE SODIUM, VIA: HCPCS | Performed by: STUDENT IN AN ORGANIZED HEALTH CARE EDUCATION/TRAINING PROGRAM

## 2024-02-18 PROCEDURE — 2500000003 HC RX 250 WO HCPCS: Performed by: STUDENT IN AN ORGANIZED HEALTH CARE EDUCATION/TRAINING PROGRAM

## 2024-02-18 PROCEDURE — 2580000003 HC RX 258: Performed by: STUDENT IN AN ORGANIZED HEALTH CARE EDUCATION/TRAINING PROGRAM

## 2024-02-18 PROCEDURE — 99232 SBSQ HOSP IP/OBS MODERATE 35: CPT | Performed by: STUDENT IN AN ORGANIZED HEALTH CARE EDUCATION/TRAINING PROGRAM

## 2024-02-18 PROCEDURE — A4216 STERILE WATER/SALINE, 10 ML: HCPCS | Performed by: STUDENT IN AN ORGANIZED HEALTH CARE EDUCATION/TRAINING PROGRAM

## 2024-02-18 PROCEDURE — 85730 THROMBOPLASTIN TIME PARTIAL: CPT

## 2024-02-18 PROCEDURE — 85025 COMPLETE CBC W/AUTO DIFF WBC: CPT

## 2024-02-18 PROCEDURE — 94640 AIRWAY INHALATION TREATMENT: CPT

## 2024-02-18 PROCEDURE — 2700000000 HC OXYGEN THERAPY PER DAY

## 2024-02-18 PROCEDURE — 85610 PROTHROMBIN TIME: CPT

## 2024-02-18 RX ADMIN — DOXYCYCLINE 100 MG: 100 INJECTION, POWDER, LYOPHILIZED, FOR SOLUTION INTRAVENOUS at 23:53

## 2024-02-18 RX ADMIN — MICONAZOLE NITRATE: 2 OINTMENT TOPICAL at 09:36

## 2024-02-18 RX ADMIN — ROSUVASTATIN CALCIUM 10 MG: 10 TABLET, FILM COATED ORAL at 20:41

## 2024-02-18 RX ADMIN — WATER 1000 MG: 1 INJECTION INTRAMUSCULAR; INTRAVENOUS; SUBCUTANEOUS at 23:47

## 2024-02-18 RX ADMIN — MICONAZOLE NITRATE: 2 OINTMENT TOPICAL at 20:42

## 2024-02-18 RX ADMIN — BUDESONIDE 250 MCG: 0.25 SUSPENSION RESPIRATORY (INHALATION) at 08:17

## 2024-02-18 RX ADMIN — SODIUM CHLORIDE: 9 INJECTION, SOLUTION INTRAVENOUS at 07:06

## 2024-02-18 RX ADMIN — LEVOTHYROXINE SODIUM 50 MCG: 0.05 TABLET ORAL at 06:18

## 2024-02-18 RX ADMIN — ARFORMOTEROL TARTRATE 15 MCG: 15 SOLUTION RESPIRATORY (INHALATION) at 19:51

## 2024-02-18 RX ADMIN — IPRATROPIUM BROMIDE 0.5 MG: 0.5 SOLUTION RESPIRATORY (INHALATION) at 16:20

## 2024-02-18 RX ADMIN — PANTOPRAZOLE SODIUM 40 MG: 40 INJECTION, POWDER, FOR SOLUTION INTRAVENOUS at 09:35

## 2024-02-18 RX ADMIN — ARFORMOTEROL TARTRATE 15 MCG: 15 SOLUTION RESPIRATORY (INHALATION) at 08:17

## 2024-02-18 RX ADMIN — DOXYCYCLINE 100 MG: 100 INJECTION, POWDER, LYOPHILIZED, FOR SOLUTION INTRAVENOUS at 11:39

## 2024-02-18 RX ADMIN — IPRATROPIUM BROMIDE 0.5 MG: 0.5 SOLUTION RESPIRATORY (INHALATION) at 08:17

## 2024-02-18 RX ADMIN — BUDESONIDE 250 MCG: 0.25 SUSPENSION RESPIRATORY (INHALATION) at 19:52

## 2024-02-18 RX ADMIN — IPRATROPIUM BROMIDE 0.5 MG: 0.5 SOLUTION RESPIRATORY (INHALATION) at 19:51

## 2024-02-18 NOTE — ACP (ADVANCE CARE PLANNING)
Advance Care Planning   Healthcare Decision Maker:    Primary Decision Maker: Olena Krystal Muniz - Juancarlos - [phone]    Secondary Decision Maker: OlenaDieter - Juancarlos - [phone]    Click here to complete Healthcare Decision Makers including selection of the Healthcare Decision Maker Relationship (ie \"Primary\").

## 2024-02-18 NOTE — PROGRESS NOTES
Pharmacy Consultation Note  (Warfarin Dosing and Monitoring)    Initial consult date: 2/17  Consulting Provider: TANA Davies    Krystal Hyatt is a 83 y.o. female for whom pharmacy has been asked to manage warfarin therapy.     Weight:   Wt Readings from Last 1 Encounters:   02/16/24 71.7 kg (158 lb)       TSH:    Lab Results   Component Value Date/Time    TSH 7.44 01/14/2024 06:00 AM       Hepatic Function Panel:                            Lab Results   Component Value Date/Time    ALKPHOS 69 02/16/2024 09:59 PM    ALT 16 02/16/2024 09:59 PM    AST 27 02/16/2024 09:59 PM    PROT 6.8 02/16/2024 09:59 PM    BILITOT 0.8 02/16/2024 09:59 PM    BILIDIR 0.2 10/15/2017 02:40 AM    IBILI 0.7 10/15/2017 02:40 AM    LABALBU 3.8 02/16/2024 09:59 PM    LABALBU 4.4 05/04/2012 11:00 AM       Current significant warfarin drug-drug interactions include:   -Levothyroxine, ceftriaxone, doxycyline, rosuvastatin: may enhance the anticoagulatory effect of warfarin.     Recent Labs     02/16/24  2159 02/17/24  0526 02/18/24  0418   HGB 9.5* 9.1* 7.8*    216 173       Date Warfarin Dose INR Heparin or LMWH Comment   2/17 5 mg 1.5 x    2/18 NO DOSE 3.1 x                           Assessment:  Patient is a 83 y.o. female on warfarin for Atrial Fibrillation.  Patient's home warfarin dosing regimen is 5 mg once daily per med rec.   Goal INR 2 - 3  INR 3.1  Heparin bolus plus drip was started and now discontinued between yesterday and today.    Plan:  No warfarin tonight, given sharp increase in INR.  Daily PT/INR until the INR is stable within the therapeutic range  Pharmacist will follow and monitor/adjust dosing as necessary      Brett Junior RPH 2/18/2024 10:54 AM    RADHA: [phone]  SEY: [phone]  SJW: [phone]

## 2024-02-18 NOTE — CARE COORDINATION
Introduced my self and provided explanation of CM role to patient.  Patient is awake, alert, and aware of current diagnosis and treatment plan including IV atb, nephrology consult, heparin drip (stopped)  She voices she resides at home and completes her adl's with independence. She does have and uses a walker, w/c and or cane.  Her daughters have been staying with her for 1 week intervals.  She has O2 5-6 L at home ([employer]) and does have nebulizer pump as well.  Patient/daughter at bedside would be agreeable to [facility] at discharge if indicated.  Plan would be a transport home via private car.  Patient is established with a pcp and denies any issue with retail pharmaceutical coverage.  Patient will need followed and assisted with discharge planning as necessary.   Rich Hall, MSN RN  [facility] Case Management  [phone]

## 2024-02-18 NOTE — PROGRESS NOTES
[facility] Hospitalist Progress Note    Admitting Date and Time: 2/16/2024  9:14 PM  Admit Dx: Dehydration [E86.0]  Pleural effusion [J90]  COPD exacerbation (HCC) [J44.1]  TRISTEN (acute kidney injury) (HCC) [N17.9]  Acute respiratory failure with hypoxia (HCC) [J96.01]  Pneumonia due to infectious organism, unspecified laterality, unspecified part of lung [J18.9]    Subjective:  Patient is being followed for Dehydration [E86.0]  Pleural effusion [J90]  COPD exacerbation (HCC) [J44.1]  TRISTEN (acute kidney injury) (HCC) [N17.9]  Acute respiratory failure with hypoxia (HCC) [J96.01]  Pneumonia due to infectious organism, unspecified laterality, unspecified part of lung [J18.9]   Pt was seen and examined today. Denies any new issues    ROS: denies fever, chills, cp, sob, n/v, HA unless stated above.     pantoprazole (PROTONIX) 40 mg in sodium chloride (PF) 0.9 % 10 mL injection  40 mg IntraVENous Daily    cefTRIAXone (ROCEPHIN) IV  1,000 mg IntraVENous Q24H    doxycycline (VIBRAMYCIN) IV  100 mg IntraVENous Q12H    [Held by provider] digoxin  62.5 mcg Oral Daily    [Held by provider] metoprolol succinate  12.5 mg Oral QAM    rosuvastatin  10 mg Oral Nightly    levothyroxine  50 mcg Oral QAM AC    budesonide  0.25 mg Nebulization BID RT    And    arformoterol tartrate  15 mcg Nebulization BID RT    And    ipratropium  0.5 mg Nebulization 4x Daily RT    sodium chloride flush  5-40 mL IntraVENous 2 times per day    warfarin placeholder: dosing by pharmacy   Other RX Placeholder    miconazole nitrate   Topical BID     ipratropium 0.5 mg-albuterol 2.5 mg, 1 Dose, Q4H PRN  sodium chloride flush, 5-40 mL, PRN  sodium chloride, , PRN  polyethylene glycol, 17 g, Daily PRN  acetaminophen, 650 mg, Q6H PRN   Or  acetaminophen, 650 mg, Q6H PRN         Objective:    BP (!) 98/56   Pulse 83   Temp 97.5 °F (36.4 °C) (Oral)   Resp 24   Ht 1.727 m (5' 8\")   Wt 71.7 kg (158 lb)   SpO2 93%   BMI 24.02 kg/m²     General

## 2024-02-18 NOTE — CONSULTS
[facility]  MD Haris Skelton MD ALI HASSAN MD .  Consultation  Patient's Name: Krystal Hyatt  8:41 AM  2/18/2024    Nephrologist: Owen Prescott MD    Reason for Consult:  Acute kidney injury   Requesting Physician:  Rom Mckeon MD    Chief Complaint:  shortness of breat     History Obtained From:  patient records staff     History of Present Ilness:    82 yo female w/ hx of CKD3, COPD (5L O2), CHF, Afib on Coumadin, Lung CA s/p radiation, who p/w dyspnea that is worse with exertion that started 1 day ago .   Nephrology asked to see for TRISTEN/CKD   Pt has CKD at baseline with cr at b/l 1.3-1.7   Pt seen in ED she is on o2/nc   Pt appear clinically euvolemic to mildly dry   Pt reports no n/v/d/cp     Past Medical History:   Diagnosis Date    Atrial fibrillation (HCC)     CHF (congestive heart failure) (HCC)     Emphysema, unspecified (HCC)     Hyperlipidemia     Hypertension     Lung cancer (HCC)     Mitral valve regurgitation     Oxygen dependent     Prolonged emergence from general anesthesia     post general anesthesia    Skin cancer     Thyroid disease        Past Surgical History:   Procedure Laterality Date    BREAST BIOPSY Right     benign    BRONCHOSCOPY N/A 04/20/2021    BRONCHOSCOPY/TRANSBRONCHIAL NEEDLE BIOPSY performed by Antony Rosario MD at [facility] ENDOSCOPY    CARDIOVERSION  10/06/2023    Dr Jones    INTRACAPSULAR CATARACT EXTRACTION Right 12/16/2021    RIGHT EYE CATARACT EXTRACTION IOL IMPLANT performed by Kay Pimentel MD at [facility] OR    INTRACAPSULAR CATARACT EXTRACTION Left 02/24/2022    LEFT EYE CATARACT EXTRACTION IOL IMPLANT performed by Kay Pimentel MD at [facility] OR    TUBAL LIGATION         Family History   Problem Relation Age of Onset    Cancer Mother     Other Mother         APLASTIC ANEMIA    Peripheral Vascular Disease Mother     Emphysema Father     Lung Disease Father         reports that she quit smoking about 25 years

## 2024-02-18 NOTE — PROGRESS NOTES
4 Eyes Skin Assessment     NAME:  Krystal Hyatt  YOB: 1940  MEDICAL RECORD NUMBER:  [id number]    The patient is being assessed for  Admission    I agree that at least one RN has performed a thorough Head to Toe Skin Assessment on the patient. ALL assessment sites listed below have been assessed.      Areas assessed by both nurses:    Head, Face, Ears, Shoulders, Back, Chest, Arms, Elbows, Hands, Sacrum. Buttock, Coccyx, Ischium, Legs. Feet and Heels, and Under Medical Devices         Does the Patient have a Wound? No noted wound(s)  Patient has rash in buttocks extending into perianal area.       Leandro Prevention initiated by RN: Yes  Wound Care Orders initiated by RN: No    Pressure Injury (Stage 3,4, Unstageable, DTI, NWPT, and Complex wounds) if present, place Wound referral order by RN under : No    New Ostomies, if present place, Ostomy referral order under : No     Nurse 1 eSignature: Electronically signed by Yaneth Cary RN on 2/17/24 at 10:50 PM EST    **SHARE this note so that the co-signing nurse can place an eSignature**    Nurse 2 eSignature: Electronically signed by Ewa Pena RN on 2/17/24 at 10:51 PM EST

## 2024-02-19 ENCOUNTER — APPOINTMENT (OUTPATIENT)
Dept: CT IMAGING | Age: 84
DRG: 193 | End: 2024-02-19
Payer: MEDICARE

## 2024-02-19 ENCOUNTER — APPOINTMENT (OUTPATIENT)
Dept: GENERAL RADIOLOGY | Age: 84
DRG: 193 | End: 2024-02-19
Payer: MEDICARE

## 2024-02-19 LAB
ANION GAP SERPL CALCULATED.3IONS-SCNC: 9 MMOL/L (ref 7–16)
BASOPHILS # BLD: 0.01 K/UL (ref 0–0.2)
BASOPHILS NFR BLD: 0 % (ref 0–2)
BUN SERPL-MCNC: 24 MG/DL (ref 6–23)
CALCIUM SERPL-MCNC: 8.9 MG/DL (ref 8.6–10.2)
CHLORIDE SERPL-SCNC: 105 MMOL/L (ref 98–107)
CO2 SERPL-SCNC: 28 MMOL/L (ref 22–29)
CREAT SERPL-MCNC: 1.2 MG/DL (ref 0.5–1)
EKG ATRIAL RATE: 60 BPM
EKG Q-T INTERVAL: 446 MS
EKG QRS DURATION: 78 MS
EKG QTC CALCULATION (BAZETT): 460 MS
EKG R AXIS: 47 DEGREES
EKG T AXIS: 24 DEGREES
EKG VENTRICULAR RATE: 64 BPM
EOSINOPHIL # BLD: 0.02 K/UL (ref 0.05–0.5)
EOSINOPHILS RELATIVE PERCENT: 1 % (ref 0–6)
ERYTHROCYTE [DISTWIDTH] IN BLOOD BY AUTOMATED COUNT: 19.2 % (ref 11.5–15)
GFR SERPL CREATININE-BSD FRML MDRD: 45 ML/MIN/1.73M2
GLUCOSE SERPL-MCNC: 90 MG/DL (ref 74–99)
HCT VFR BLD AUTO: 25.6 % (ref 34–48)
HGB BLD-MCNC: 7.6 G/DL (ref 11.5–15.5)
IMM GRANULOCYTES # BLD AUTO: 0.04 K/UL (ref 0–0.58)
IMM GRANULOCYTES NFR BLD: 1 % (ref 0–5)
INR PPP: 3.8
LYMPHOCYTES NFR BLD: 1.3 K/UL (ref 1.5–4)
LYMPHOCYTES RELATIVE PERCENT: 32 % (ref 20–42)
MCH RBC QN AUTO: 29.8 PG (ref 26–35)
MCHC RBC AUTO-ENTMCNC: 29.7 G/DL (ref 32–34.5)
MCV RBC AUTO: 100.4 FL (ref 80–99.9)
MONOCYTES NFR BLD: 0.29 K/UL (ref 0.1–0.95)
MONOCYTES NFR BLD: 7 % (ref 2–12)
NEUTROPHILS NFR BLD: 60 % (ref 43–80)
NEUTS SEG NFR BLD: 2.46 K/UL (ref 1.8–7.3)
PLATELET # BLD AUTO: 157 K/UL (ref 130–450)
PMV BLD AUTO: 9.8 FL (ref 7–12)
POTASSIUM SERPL-SCNC: 3.6 MMOL/L (ref 3.5–5)
PROTHROMBIN TIME: 42.5 SEC (ref 9.3–12.4)
RBC # BLD AUTO: 2.55 M/UL (ref 3.5–5.5)
SODIUM SERPL-SCNC: 142 MMOL/L (ref 132–146)
WBC OTHER # BLD: 4.1 K/UL (ref 4.5–11.5)

## 2024-02-19 PROCEDURE — 6370000000 HC RX 637 (ALT 250 FOR IP): Performed by: STUDENT IN AN ORGANIZED HEALTH CARE EDUCATION/TRAINING PROGRAM

## 2024-02-19 PROCEDURE — 93010 ELECTROCARDIOGRAM REPORT: CPT | Performed by: INTERNAL MEDICINE

## 2024-02-19 PROCEDURE — 2060000000 HC ICU INTERMEDIATE R&B

## 2024-02-19 PROCEDURE — 80048 BASIC METABOLIC PNL TOTAL CA: CPT

## 2024-02-19 PROCEDURE — 85610 PROTHROMBIN TIME: CPT

## 2024-02-19 PROCEDURE — 6360000002 HC RX W HCPCS: Performed by: STUDENT IN AN ORGANIZED HEALTH CARE EDUCATION/TRAINING PROGRAM

## 2024-02-19 PROCEDURE — A4216 STERILE WATER/SALINE, 10 ML: HCPCS | Performed by: STUDENT IN AN ORGANIZED HEALTH CARE EDUCATION/TRAINING PROGRAM

## 2024-02-19 PROCEDURE — 2580000003 HC RX 258: Performed by: STUDENT IN AN ORGANIZED HEALTH CARE EDUCATION/TRAINING PROGRAM

## 2024-02-19 PROCEDURE — 99232 SBSQ HOSP IP/OBS MODERATE 35: CPT | Performed by: STUDENT IN AN ORGANIZED HEALTH CARE EDUCATION/TRAINING PROGRAM

## 2024-02-19 PROCEDURE — 36415 COLL VENOUS BLD VENIPUNCTURE: CPT

## 2024-02-19 PROCEDURE — 94640 AIRWAY INHALATION TREATMENT: CPT

## 2024-02-19 PROCEDURE — C9113 INJ PANTOPRAZOLE SODIUM, VIA: HCPCS | Performed by: STUDENT IN AN ORGANIZED HEALTH CARE EDUCATION/TRAINING PROGRAM

## 2024-02-19 PROCEDURE — 6370000000 HC RX 637 (ALT 250 FOR IP)

## 2024-02-19 PROCEDURE — 2700000000 HC OXYGEN THERAPY PER DAY

## 2024-02-19 PROCEDURE — 85025 COMPLETE CBC W/AUTO DIFF WBC: CPT

## 2024-02-19 PROCEDURE — 71045 X-RAY EXAM CHEST 1 VIEW: CPT

## 2024-02-19 PROCEDURE — 71250 CT THORAX DX C-: CPT

## 2024-02-19 RX ORDER — PANTOPRAZOLE SODIUM 40 MG/1
40 TABLET, DELAYED RELEASE ORAL
Status: DISCONTINUED | OUTPATIENT
Start: 2024-02-20 | End: 2024-02-22 | Stop reason: HOSPADM

## 2024-02-19 RX ORDER — DOXYCYCLINE HYCLATE 100 MG/1
100 CAPSULE ORAL EVERY 12 HOURS SCHEDULED
Status: DISCONTINUED | OUTPATIENT
Start: 2024-02-19 | End: 2024-02-20

## 2024-02-19 RX ADMIN — METOPROLOL SUCCINATE 12.5 MG: 25 TABLET, EXTENDED RELEASE ORAL at 09:23

## 2024-02-19 RX ADMIN — BUDESONIDE 250 MCG: 0.25 SUSPENSION RESPIRATORY (INHALATION) at 21:45

## 2024-02-19 RX ADMIN — SODIUM CHLORIDE: 9 INJECTION, SOLUTION INTRAVENOUS at 06:01

## 2024-02-19 RX ADMIN — MICONAZOLE NITRATE: 2 OINTMENT TOPICAL at 09:25

## 2024-02-19 RX ADMIN — IPRATROPIUM BROMIDE 0.5 MG: 0.5 SOLUTION RESPIRATORY (INHALATION) at 07:51

## 2024-02-19 RX ADMIN — Medication 10 ML: at 19:49

## 2024-02-19 RX ADMIN — LEVOTHYROXINE SODIUM 50 MCG: 0.05 TABLET ORAL at 05:58

## 2024-02-19 RX ADMIN — MICONAZOLE NITRATE: 2 OINTMENT TOPICAL at 19:50

## 2024-02-19 RX ADMIN — IPRATROPIUM BROMIDE 0.5 MG: 0.5 SOLUTION RESPIRATORY (INHALATION) at 21:45

## 2024-02-19 RX ADMIN — SALINE NASAL SPRAY 1 SPRAY: 1.5 SOLUTION NASAL at 22:12

## 2024-02-19 RX ADMIN — Medication 10 ML: at 09:25

## 2024-02-19 RX ADMIN — PANTOPRAZOLE SODIUM 40 MG: 40 INJECTION, POWDER, FOR SOLUTION INTRAVENOUS at 09:22

## 2024-02-19 RX ADMIN — BUDESONIDE 250 MCG: 0.25 SUSPENSION RESPIRATORY (INHALATION) at 07:50

## 2024-02-19 RX ADMIN — ARFORMOTEROL TARTRATE 15 MCG: 15 SOLUTION RESPIRATORY (INHALATION) at 07:49

## 2024-02-19 RX ADMIN — DOXYCYCLINE HYCLATE 100 MG: 100 CAPSULE ORAL at 12:27

## 2024-02-19 RX ADMIN — IPRATROPIUM BROMIDE 0.5 MG: 0.5 SOLUTION RESPIRATORY (INHALATION) at 13:30

## 2024-02-19 RX ADMIN — IPRATROPIUM BROMIDE 0.5 MG: 0.5 SOLUTION RESPIRATORY (INHALATION) at 16:36

## 2024-02-19 RX ADMIN — ARFORMOTEROL TARTRATE 15 MCG: 15 SOLUTION RESPIRATORY (INHALATION) at 21:45

## 2024-02-19 RX ADMIN — ROSUVASTATIN CALCIUM 10 MG: 10 TABLET, FILM COATED ORAL at 19:49

## 2024-02-19 RX ADMIN — DIGOXIN 62.5 MCG: 0.12 TABLET ORAL at 09:24

## 2024-02-19 NOTE — PROGRESS NOTES
IV to PO Conversion Policy     Notification of IV to PO conversion:    This patient's order for Protonix IV has been changed to Protonix PO as approved by the [facility] ([facility]) INTRAVENOUS TO ORAL Policy.    If the patient should become strict NPO while on this therapy, contact the prescriber for further orders.    Yareli White [facility]  2/19/2024  9:54 AM

## 2024-02-19 NOTE — PROGRESS NOTES
[facility] Hospitalist Progress Note    Admitting Date and Time: 2/16/2024  9:14 PM  Admit Dx: Dehydration [E86.0]  Pleural effusion [J90]  COPD exacerbation (HCC) [J44.1]  TRISTEN (acute kidney injury) (HCC) [N17.9]  Acute respiratory failure with hypoxia (HCC) [J96.01]  Pneumonia due to infectious organism, unspecified laterality, unspecified part of lung [J18.9]    Subjective:  Patient is being followed for Dehydration [E86.0]  Pleural effusion [J90]  COPD exacerbation (HCC) [J44.1]  TRISTEN (acute kidney injury) (HCC) [N17.9]  Acute respiratory failure with hypoxia (HCC) [J96.01]  Pneumonia due to infectious organism, unspecified laterality, unspecified part of lung [J18.9]   Pt was seen and examined today. States she is having more difficulty breathing today.    ROS: denies fever, chills, cp, sob, n/v, HA unless stated above.     [START ON 2/20/2024] pantoprazole  40 mg Oral QAM AC    doxycycline  100 mg Oral 2 times per day    cefTRIAXone (ROCEPHIN) IV  1,000 mg IntraVENous Q24H    digoxin  62.5 mcg Oral Daily    metoprolol succinate  12.5 mg Oral QAM    rosuvastatin  10 mg Oral Nightly    levothyroxine  50 mcg Oral QAM AC    budesonide  0.25 mg Nebulization BID RT    And    arformoterol tartrate  15 mcg Nebulization BID RT    And    ipratropium  0.5 mg Nebulization 4x Daily RT    sodium chloride flush  5-40 mL IntraVENous 2 times per day    warfarin placeholder: dosing by pharmacy   Other RX Placeholder    miconazole nitrate   Topical BID     ipratropium 0.5 mg-albuterol 2.5 mg, 1 Dose, Q4H PRN  sodium chloride flush, 5-40 mL, PRN  sodium chloride, , PRN  polyethylene glycol, 17 g, Daily PRN  acetaminophen, 650 mg, Q6H PRN   Or  acetaminophen, 650 mg, Q6H PRN         Objective:    /68   Pulse 75   Temp 97.7 °F (36.5 °C) (Oral)   Resp 20   Ht 1.727 m (5' 8\")   Wt 75.3 kg (165 lb 14.4 oz)   SpO2 96%   BMI 25.23 kg/m²     General Appearance: alert and oriented to person, place and time and in no

## 2024-02-19 NOTE — PLAN OF CARE
Problem: Discharge Planning  Goal: Discharge to home or other facility with appropriate resources  2/19/2024 1034 by Idris Landaverde RN  Outcome: Progressing  2/18/2024 2318 by Monserrat Recinos, RN  Outcome: Progressing     Problem: Safety - Adult  Goal: Free from fall injury  2/19/2024 1034 by Idris Landaverde RN  Outcome: Progressing  2/18/2024 2318 by Monserrat Recinos, RN  Outcome: Progressing     Problem: ABCDS Injury Assessment  Goal: Absence of physical injury  2/19/2024 1034 by Idris Landaverde RN  Outcome: Progressing  2/18/2024 2318 by Monserrat Recinos RN  Outcome: Progressing     Problem: Skin/Tissue Integrity  Goal: Absence of new skin breakdown  Description: 1.  Monitor for areas of redness and/or skin breakdown  2.  Assess vascular access sites hourly  3.  Every 4-6 hours minimum:  Change oxygen saturation probe site  4.  Every 4-6 hours:  If on nasal continuous positive airway pressure, respiratory therapy assess nares and determine need for appliance change or resting period.  2/19/2024 1034 by Idris Landaverde RN  Outcome: Progressing  2/18/2024 2318 by Monserrat Recinos, RN  Outcome: Progressing     Problem: Chronic Conditions and Co-morbidities  Goal: Patient's chronic conditions and co-morbidity symptoms are monitored and maintained or improved  Outcome: Progressing

## 2024-02-19 NOTE — PROGRESS NOTES
Pharmacy Consultation Note  (Warfarin Dosing and Monitoring)    Initial consult date: 2/17  Consulting Provider: TANA Davies    Krystal Hyatt is a 83 y.o. female for whom pharmacy has been asked to manage warfarin therapy.     Weight:   Wt Readings from Last 1 Encounters:   02/19/24 75.3 kg (165 lb 14.4 oz)       TSH:    Lab Results   Component Value Date/Time    TSH 7.44 01/14/2024 06:00 AM       Hepatic Function Panel:                            Lab Results   Component Value Date/Time    ALKPHOS 69 02/16/2024 09:59 PM    ALT 16 02/16/2024 09:59 PM    AST 27 02/16/2024 09:59 PM    PROT 6.8 02/16/2024 09:59 PM    BILITOT 0.8 02/16/2024 09:59 PM    BILIDIR 0.2 10/15/2017 02:40 AM    IBILI 0.7 10/15/2017 02:40 AM    LABALBU 3.8 02/16/2024 09:59 PM    LABALBU 4.4 05/04/2012 11:00 AM       Current significant warfarin drug-drug interactions include:   -Levothyroxine, ceftriaxone, doxycyline, rosuvastatin: may enhance the anticoagulatory effect of warfarin.     Recent Labs     02/17/24  0526 02/18/24  0418 02/19/24  0543   HGB 9.1* 7.8* 7.6*    173 157       Date Warfarin Dose INR Heparin or LMWH Comment   2/17 5 mg 1.5 x    2/18 NO DOSE 3.1 x    2/19 NO DOSE 3.8 x                    Assessment:  Patient is a 83 y.o. female on warfarin for Atrial Fibrillation.  Patient's home warfarin dosing regimen is 5 mg once daily per med rec.   Goal INR 2 - 3  INR 3.8    Plan:  No warfarin tonight, given continued increase in INR.  Daily PT/INR until the INR is stable within the therapeutic range  Pharmacist will follow and monitor/adjust dosing as necessary      Brett Junior RPH 2/19/2024 9:45 AM    RADHA: [phone]  SEY: [phone]  SJW: [phone]

## 2024-02-19 NOTE — PROGRESS NOTES
IV to PO Conversion Policy     Notification of IV to PO conversion:    This patient's order for Doxycycline IV has been changed to Doxycycline PO as approved by the [facility] ([facility]) INTRAVENOUS TO ORAL Policy.    If the patient should become strict NPO while on this therapy, contact the prescriber for further orders.    Yareli Whiet [facility]  2/19/2024  9:58 AM

## 2024-02-19 NOTE — PROGRESS NOTES
[facility]  MD Haris Skelton MD ALI HASSAN MD .  Progress note  Patient's Name: Krystal Hyatt  8:20 PM  2/18/2024    Clinically better comfortable euvolemic no edema no JVD     History of Present Ilness:    84 yo female w/ hx of CKD3, COPD (5L O2), CHF, Afib on Coumadin, Lung CA s/p radiation, who p/w dyspnea that is worse with exertion that started 1 day ago .   Nephrology asked to see for TRISTEN/CKD   Pt has CKD at baseline with cr at b/l 1.3-1.7   Pt seen in ED she is on o2/nc   Pt appear clinically euvolemic to mildly dry   Pt reports no n/v/d/cp     Past Medical History:   Diagnosis Date    Atrial fibrillation (HCC)     CHF (congestive heart failure) (HCC)     Emphysema, unspecified (HCC)     Hyperlipidemia     Hypertension     Lung cancer (HCC)     Mitral valve regurgitation     Oxygen dependent     Prolonged emergence from general anesthesia     post general anesthesia    Skin cancer     Thyroid disease        Past Surgical History:   Procedure Laterality Date    BREAST BIOPSY Right     benign    BRONCHOSCOPY N/A 04/20/2021    BRONCHOSCOPY/TRANSBRONCHIAL NEEDLE BIOPSY performed by Antony Rosario MD at [facility] ENDOSCOPY    CARDIOVERSION  10/06/2023    Dr Jones    INTRACAPSULAR CATARACT EXTRACTION Right 12/16/2021    RIGHT EYE CATARACT EXTRACTION IOL IMPLANT performed by Kay Pimentel MD at [facility] OR    INTRACAPSULAR CATARACT EXTRACTION Left 02/24/2022    LEFT EYE CATARACT EXTRACTION IOL IMPLANT performed by Kay Pimentel MD at [facility] OR    TUBAL LIGATION         Family History   Problem Relation Age of Onset    Cancer Mother     Other Mother         APLASTIC ANEMIA    Peripheral Vascular Disease Mother     Emphysema Father     Lung Disease Father         reports that she quit smoking about 25 years ago. Her smoking use included cigarettes. She started smoking about 71 years ago. She has a 92.0 pack-year smoking history. She has been exposed to    -encourage po intake   -check iron studies , b12 , folate acid .   -guide all abx by pharmacy dosing   -am labs        Electronically signed by Owen Prescott MD on 2/18/2024 at 8:20 PM

## 2024-02-19 NOTE — PROGRESS NOTES
[facility]  MD Haris Skelton MD ALI HASSAN MD .  Progress note  Patient's Name: Krystal Hyatt  3:24 PM  2/19/2024    Clinically better comfortable euvolemic no edema no JVD     2/19: Sitting up in bed. No acute distress. Asking if she can be discharged. She is on nasal canula which she does use chronically. Appetite is good. She denies any dyspnea, chest pain, or palpitations.     History of Present Ilness:    82 yo female w/ hx of CKD3, COPD (5L O2), CHF, Afib on Coumadin, Lung CA s/p radiation, who p/w dyspnea that is worse with exertion that started 1 day ago .   Nephrology asked to see for TRISTEN/CKD   Pt has CKD at baseline with cr at b/l 1.3-1.7   Pt seen in ED she is on o2/nc   Pt appear clinically euvolemic to mildly dry   Pt reports no n/v/d/cp     Past Medical History:   Diagnosis Date    Atrial fibrillation (HCC)     CHF (congestive heart failure) (HCC)     Emphysema, unspecified (HCC)     Hyperlipidemia     Hypertension     Lung cancer (HCC)     Mitral valve regurgitation     Oxygen dependent     Prolonged emergence from general anesthesia     post general anesthesia    Skin cancer     Thyroid disease        Past Surgical History:   Procedure Laterality Date    BREAST BIOPSY Right     benign    BRONCHOSCOPY N/A 04/20/2021    BRONCHOSCOPY/TRANSBRONCHIAL NEEDLE BIOPSY performed by Antony Rosario MD at [facility] ENDOSCOPY    CARDIOVERSION  10/06/2023    Dr Jones    INTRACAPSULAR CATARACT EXTRACTION Right 12/16/2021    RIGHT EYE CATARACT EXTRACTION IOL IMPLANT performed by Kay Pimentel MD at [facility] OR    INTRACAPSULAR CATARACT EXTRACTION Left 02/24/2022    LEFT EYE CATARACT EXTRACTION IOL IMPLANT performed by Kay Pimentel MD at [facility] OR    TUBAL LIGATION         Family History   Problem Relation Age of Onset    Cancer Mother     Other Mother         APLASTIC ANEMIA    Peripheral Vascular Disease Mother     Emphysema Father     Lung Disease  02/19/2024 05:43 AM    GLUCOSE 90 02/19/2024 05:43 AM    GLUCOSE 87 05/04/2012 11:00 AM    PROT 6.8 02/16/2024 09:59 PM    LABALBU 3.8 02/16/2024 09:59 PM    LABALBU 4.4 05/04/2012 11:00 AM    CALCIUM 8.9 02/19/2024 05:43 AM    BILITOT 0.8 02/16/2024 09:59 PM    ALKPHOS 69 02/16/2024 09:59 PM    AST 27 02/16/2024 09:59 PM    ALT 16 02/16/2024 09:59 PM     Ionized Calcium:  No results found for: \"IONCA\"  Magnesium:    Lab Results   Component Value Date/Time    MG 1.6 01/03/2024 04:57 AM     Phosphorus:    Lab Results   Component Value Date/Time    PHOS 2.6 01/03/2024 04:57 AM     U/A:  No results found for: \"NITRITE\", \"COLORU\", \"PHUR\", \"LABCAST\", \"WBCUA\", \"RBCUA\", \"MUCUS\", \"TRICHOMONAS\", \"YEAST\", \"BACTERIA\", \"CLARITYU\", \"SPECGRAV\", \"LEUKOCYTESUR\", \"UROBILINOGEN\", \"BILIRUBINUR\", \"BLOODU\", \"GLUCOSEU\", \"AMORPHOUS\"  Microalbumen/Creatinine ratio:  No components found for: \"RUCREAT\"  Iron Saturation:  No components found for: \"PERCENTFE\"  TIBC:  No results found for: \"TIBC\"  FERRITIN:  No results found for: \"FERRITIN\"     Imaging:  US RETROPERITONEAL COMPLETE   Final Result      Possible mild left renal cortical thinning/atrophy.  Otherwise negative   examination.         XR CHEST PORTABLE   Final Result   1.  Lung hyperinflation with coarsened interstitial markings.   Atherosclerotic disease and cardiomegaly.      2.  Worsened opacities in the left greater than right mid and lower lungs   with pleural effusions.  Probably represents superimposed infection.  Edema   could potentially have a similar appearance.  No pneumothorax.      RECOMMENDATION:   (Recommend upright PA and lateral chest radiographs 10-12 weeks after   resolution of patient's symptoms to ensure complete resolution of   radiographic findings.).             Assessment    -Acute kidney injury due to decrease effective volume   -chronic kidney disease stage III baseline cr 1.3-1.7   -Pneumonia   -chornic respiratory failure on o2/nc   -anaemia   -[place]

## 2024-02-20 LAB
ANION GAP SERPL CALCULATED.3IONS-SCNC: 6 MMOL/L (ref 7–16)
BASOPHILS # BLD: 0.01 K/UL (ref 0–0.2)
BASOPHILS NFR BLD: 0 % (ref 0–2)
BUN SERPL-MCNC: 18 MG/DL (ref 6–23)
CALCIUM SERPL-MCNC: 8.6 MG/DL (ref 8.6–10.2)
CHLORIDE SERPL-SCNC: 108 MMOL/L (ref 98–107)
CO2 SERPL-SCNC: 29 MMOL/L (ref 22–29)
CREAT SERPL-MCNC: 1.1 MG/DL (ref 0.5–1)
EOSINOPHIL # BLD: 0.03 K/UL (ref 0.05–0.5)
EOSINOPHILS RELATIVE PERCENT: 1 % (ref 0–6)
ERYTHROCYTE [DISTWIDTH] IN BLOOD BY AUTOMATED COUNT: 18.7 % (ref 11.5–15)
FERRITIN SERPL-MCNC: 241 NG/ML
FOLATE SERPL-MCNC: 12.2 NG/ML (ref 4.8–24.2)
GFR SERPL CREATININE-BSD FRML MDRD: 48 ML/MIN/1.73M2
GLUCOSE SERPL-MCNC: 96 MG/DL (ref 74–99)
HCT VFR BLD AUTO: 25.7 % (ref 34–48)
HGB BLD-MCNC: 7.8 G/DL (ref 11.5–15.5)
IMM GRANULOCYTES # BLD AUTO: 0.04 K/UL (ref 0–0.58)
IMM GRANULOCYTES NFR BLD: 1 % (ref 0–5)
INR PPP: 2.8
IRON SATN MFR SERPL: 32 % (ref 15–50)
IRON SERPL-MCNC: 67 UG/DL (ref 37–145)
LYMPHOCYTES NFR BLD: 1.14 K/UL (ref 1.5–4)
LYMPHOCYTES RELATIVE PERCENT: 30 % (ref 20–42)
MCH RBC QN AUTO: 30.7 PG (ref 26–35)
MCHC RBC AUTO-ENTMCNC: 30.4 G/DL (ref 32–34.5)
MCV RBC AUTO: 101.2 FL (ref 80–99.9)
MONOCYTES NFR BLD: 0.28 K/UL (ref 0.1–0.95)
MONOCYTES NFR BLD: 7 % (ref 2–12)
NEUTROPHILS NFR BLD: 61 % (ref 43–80)
NEUTS SEG NFR BLD: 2.35 K/UL (ref 1.8–7.3)
PLATELET # BLD AUTO: 159 K/UL (ref 130–450)
PMV BLD AUTO: 9.8 FL (ref 7–12)
POTASSIUM SERPL-SCNC: 3.9 MMOL/L (ref 3.5–5)
PROTHROMBIN TIME: 30.5 SEC (ref 9.3–12.4)
RBC # BLD AUTO: 2.54 M/UL (ref 3.5–5.5)
SODIUM SERPL-SCNC: 143 MMOL/L (ref 132–146)
TIBC SERPL-MCNC: 211 UG/DL (ref 250–450)
VIT B12 SERPL-MCNC: 797 PG/ML (ref 211–946)
WBC OTHER # BLD: 3.9 K/UL (ref 4.5–11.5)

## 2024-02-20 PROCEDURE — 2700000000 HC OXYGEN THERAPY PER DAY

## 2024-02-20 PROCEDURE — 6370000000 HC RX 637 (ALT 250 FOR IP): Performed by: STUDENT IN AN ORGANIZED HEALTH CARE EDUCATION/TRAINING PROGRAM

## 2024-02-20 PROCEDURE — 2580000003 HC RX 258: Performed by: STUDENT IN AN ORGANIZED HEALTH CARE EDUCATION/TRAINING PROGRAM

## 2024-02-20 PROCEDURE — 6360000002 HC RX W HCPCS: Performed by: STUDENT IN AN ORGANIZED HEALTH CARE EDUCATION/TRAINING PROGRAM

## 2024-02-20 PROCEDURE — 99233 SBSQ HOSP IP/OBS HIGH 50: CPT | Performed by: STUDENT IN AN ORGANIZED HEALTH CARE EDUCATION/TRAINING PROGRAM

## 2024-02-20 PROCEDURE — 83550 IRON BINDING TEST: CPT

## 2024-02-20 PROCEDURE — 2060000000 HC ICU INTERMEDIATE R&B

## 2024-02-20 PROCEDURE — 36415 COLL VENOUS BLD VENIPUNCTURE: CPT

## 2024-02-20 PROCEDURE — 82728 ASSAY OF FERRITIN: CPT

## 2024-02-20 PROCEDURE — 82607 VITAMIN B-12: CPT

## 2024-02-20 PROCEDURE — 87040 BLOOD CULTURE FOR BACTERIA: CPT

## 2024-02-20 PROCEDURE — 83540 ASSAY OF IRON: CPT

## 2024-02-20 PROCEDURE — 94640 AIRWAY INHALATION TREATMENT: CPT

## 2024-02-20 PROCEDURE — 80048 BASIC METABOLIC PNL TOTAL CA: CPT

## 2024-02-20 PROCEDURE — 85025 COMPLETE CBC W/AUTO DIFF WBC: CPT

## 2024-02-20 PROCEDURE — 85610 PROTHROMBIN TIME: CPT

## 2024-02-20 PROCEDURE — 82746 ASSAY OF FOLIC ACID SERUM: CPT

## 2024-02-20 RX ORDER — WARFARIN SODIUM 5 MG/1
5 TABLET ORAL
Status: COMPLETED | OUTPATIENT
Start: 2024-02-20 | End: 2024-02-20

## 2024-02-20 RX ADMIN — Medication 10 ML: at 20:05

## 2024-02-20 RX ADMIN — BUDESONIDE 250 MCG: 0.25 SUSPENSION RESPIRATORY (INHALATION) at 21:20

## 2024-02-20 RX ADMIN — ROSUVASTATIN CALCIUM 10 MG: 10 TABLET, FILM COATED ORAL at 20:05

## 2024-02-20 RX ADMIN — WARFARIN SODIUM 5 MG: 5 TABLET ORAL at 17:38

## 2024-02-20 RX ADMIN — PANTOPRAZOLE SODIUM 40 MG: 40 TABLET, DELAYED RELEASE ORAL at 06:38

## 2024-02-20 RX ADMIN — MICONAZOLE NITRATE: 2 OINTMENT TOPICAL at 09:45

## 2024-02-20 RX ADMIN — IPRATROPIUM BROMIDE 0.5 MG: 0.5 SOLUTION RESPIRATORY (INHALATION) at 21:20

## 2024-02-20 RX ADMIN — ARFORMOTEROL TARTRATE 15 MCG: 15 SOLUTION RESPIRATORY (INHALATION) at 10:00

## 2024-02-20 RX ADMIN — CEFEPIME 2000 MG: 2 INJECTION, POWDER, FOR SOLUTION INTRAVENOUS at 20:08

## 2024-02-20 RX ADMIN — IPRATROPIUM BROMIDE 0.5 MG: 0.5 SOLUTION RESPIRATORY (INHALATION) at 13:56

## 2024-02-20 RX ADMIN — BUDESONIDE 250 MCG: 0.25 SUSPENSION RESPIRATORY (INHALATION) at 10:01

## 2024-02-20 RX ADMIN — MICONAZOLE NITRATE: 2 OINTMENT TOPICAL at 20:11

## 2024-02-20 RX ADMIN — VANCOMYCIN HYDROCHLORIDE 1750 MG: 1 INJECTION, POWDER, LYOPHILIZED, FOR SOLUTION INTRAVENOUS at 17:00

## 2024-02-20 RX ADMIN — WATER 1000 MG: 1 INJECTION INTRAMUSCULAR; INTRAVENOUS; SUBCUTANEOUS at 00:05

## 2024-02-20 RX ADMIN — ARFORMOTEROL TARTRATE 15 MCG: 15 SOLUTION RESPIRATORY (INHALATION) at 21:20

## 2024-02-20 RX ADMIN — METOPROLOL SUCCINATE 12.5 MG: 25 TABLET, EXTENDED RELEASE ORAL at 09:43

## 2024-02-20 RX ADMIN — DIGOXIN 62.5 MCG: 0.12 TABLET ORAL at 09:43

## 2024-02-20 RX ADMIN — LEVOTHYROXINE SODIUM 50 MCG: 0.05 TABLET ORAL at 06:38

## 2024-02-20 RX ADMIN — IPRATROPIUM BROMIDE 0.5 MG: 0.5 SOLUTION RESPIRATORY (INHALATION) at 10:00

## 2024-02-20 RX ADMIN — Medication 10 ML: at 09:44

## 2024-02-20 RX ADMIN — DOXYCYCLINE HYCLATE 100 MG: 100 CAPSULE ORAL at 11:44

## 2024-02-20 RX ADMIN — DOXYCYCLINE HYCLATE 100 MG: 100 CAPSULE ORAL at 00:04

## 2024-02-20 ASSESSMENT — PAIN SCALES - GENERAL
PAINLEVEL_OUTOF10: 0
PAINLEVEL_OUTOF10: 0

## 2024-02-20 NOTE — PROGRESS NOTES
[facility]  MD Haris Skelton MD ALI HASSAN MD .  Progress note  Patient's Name: Krystal Hyatt  5:14 PM  2/20/2024    Clinically better comfortable euvolemic no edema no JVD     2/19: Sitting up in bed. No acute distress. Asking if she can be discharged. She is on nasal canula which she does use chronically. Appetite is good. She denies any dyspnea, chest pain, or palpitations.     2/20: sitting up in bed. No acute distress. She is on 6 liters of oxygen. Still having wheezing. Pulmonology was consulted today. Her appetite is fair. She denies diarrhea, nausea, or vomiting.     History of Present Ilness:    82 yo female w/ hx of CKD3, COPD (5L O2), CHF, Afib on Coumadin, Lung CA s/p radiation, who p/w dyspnea that is worse with exertion that started 1 day ago .   Nephrology asked to see for TRISTEN/CKD   Pt has CKD at baseline with cr at b/l 1.3-1.7   Pt seen in ED she is on o2/nc   Pt appear clinically euvolemic to mildly dry   Pt reports no n/v/d/cp     Past Medical History:   Diagnosis Date    Atrial fibrillation (HCC)     CHF (congestive heart failure) (HCC)     Emphysema, unspecified (HCC)     Hyperlipidemia     Hypertension     Lung cancer (HCC)     Mitral valve regurgitation     Oxygen dependent     Prolonged emergence from general anesthesia     post general anesthesia    Skin cancer     Thyroid disease        Past Surgical History:   Procedure Laterality Date    BREAST BIOPSY Right     benign    BRONCHOSCOPY N/A 04/20/2021    BRONCHOSCOPY/TRANSBRONCHIAL NEEDLE BIOPSY performed by Antony Rosario MD at [facility] ENDOSCOPY    CARDIOVERSION  10/06/2023    Dr Jones    INTRACAPSULAR CATARACT EXTRACTION Right 12/16/2021    RIGHT EYE CATARACT EXTRACTION IOL IMPLANT performed by Kay Pimentel MD at [facility] OR    INTRACAPSULAR CATARACT EXTRACTION Left 02/24/2022    LEFT EYE CATARACT EXTRACTION IOL IMPLANT performed by Kay Pimentel MD at [facility] OR    TUBAL  placeholder: dosing by pharmacy, RX Placeholder  miconazole nitrate 2 % ointment, BID        Review of Systems:   Constitutional: no fevers , no chills , feels ok   Eyes: no eye pain , no itching , no drainage  Ears, nose, mouth, throat, and face: no ear ,nose pain , hearing is ok ,no nasal drainage   Respiratory: no sob ,no cough ,no wheezing .   Cardiovascular: no chest pain , no palpitation ,no sob .   Gastrointestinal: no nausea, vomiting , constipation , no abdominal pain .   Genitourinary:no urinary retention , no burning , dysuria . No polyuria   Hematologic/lymphatic: no bleeding , no cougulation issues .   Musculoskeletal:no joint pain , no swelling .   Neurological: no headaches ,no weakness , no numbness .   Endocrine: no thirst , no weight issues .     Physical exam:   Vital signs /60   Pulse 65   Temp 98.4 °F (36.9 °C) (Oral)   Resp 16   Ht 1.727 m (5' 8\")   Wt 74.6 kg (164 lb 8 oz)   SpO2 93%   BMI 25.01 kg/m²   Gen : NAD , appropriate for stated age .   Head : at , nc   Neck : supple , no thyromegaly noted .   Eyes : EOMI , PERRLA   CV : RRR , No M/R/G .   Lungs: Expiratory wheezes bilaterally, no crackles, good flow heard b/l   Abd : soft , NT , BS + , No Organomegaly appreciated .   Skin : soft, dry .   Neuro : CN  II-XII grossly intact , no focal neurologic deficit .   Psych : cooperative .     Data:   Labs:  CBC with Differential:    Lab Results   Component Value Date/Time    WBC 3.9 02/20/2024 03:47 AM    RBC 2.54 02/20/2024 03:47 AM    HGB 7.8 02/20/2024 03:47 AM    HCT 25.7 02/20/2024 03:47 AM     02/20/2024 03:47 AM    .2 02/20/2024 03:47 AM    MCH 30.7 02/20/2024 03:47 AM    MCHC 30.4 02/20/2024 03:47 AM    RDW 18.7 02/20/2024 03:47 AM    NRBC 0.0 04/09/2023 05:02 AM    SEGSPCT 46 02/14/2014 11:27 AM    LYMPHOPCT 30 02/20/2024 03:47 AM    MONOPCT 7 02/20/2024 03:47 AM    BASOPCT 0 02/20/2024 03:47 AM    MONOSABS 0.28 02/20/2024 03:47 AM    LYMPHSABS 1.14 02/20/2024

## 2024-02-20 NOTE — PROGRESS NOTES
Pharmacy Consultation Note  (Antibiotic Dosing and Monitoring)    Initial consult date: 2/20/2024  Consulting physician/provider: Dr. Anthony Shah  Drug: Vancomycin  Indication: HAP    Age/  Gender Height Weight IBW  Allergy Information   83 y.o./female 172.7 cm (5' 8\") 71.7 kg (158 lb)     Ideal body weight: 63.9 kg (140 lb 14 oz)  Adjusted ideal body weight: 68.2 kg (150 lb 5.2 oz)   Pacerone [amiodarone], Cat hair extract, Eggs or egg-derived products, Erythromycin, Pcn [penicillins], and Seasonal      Renal Function:  Recent Labs     02/18/24  0418 02/19/24  0543 02/20/24  0347   BUN 29* 24* 18   CREATININE 1.7* 1.2* 1.1*     No intake or output data in the 24 hours ending 02/20/24 1417    Vancomycin Monitoring:  Trough:  No results for input(s): \"VANCOTROUGH\" in the last 72 hours.  Random:  No results for input(s): \"VANCORANDOM\" in the last 72 hours.    Vancomycin Administration Times:  Recent vancomycin administrations        No vancomycin IV orders with administrations found.            Orders not given:            vancomycin (VANCOCIN) 1,750 mg in sodium chloride 0.9 % 500 mL IVPB    vancomycin 1000 mg IVPB in 250 mL NS addavial                    Assessment:  Patient is a 83 y.o. female who has been initiated on vancomycin  Estimated Creatinine Clearance: 38 mL/min (A) (based on SCr of 1.1 mg/dL (H)).  To dose vancomycin, pharmacy will be utilizing [employer] calculation software for goal AUC/MORA 400-600 mg/L-hr (predicted AUC/MORA = 546, Tr =16.1 mcg/mL)    Plan:  Will give loading dose of vancomycin 1750 mg IV x 1 dose, and then will continue vancomycin 1000 mg IV every 24 hours  Will check vancomycin levels when appropriate  Will continue to monitor renal function   Pharmacy to follow    Joaquin Aldana, PharmD, [facility]  2/20/2024  2:18 PM    RADHA: [phone]  SEY: [phone]  SJW: [phone]

## 2024-02-20 NOTE — PLAN OF CARE
Problem: Discharge Planning  Goal: Discharge to home or other facility with appropriate resources  2/20/2024 0018 by Monserrat Recinos RN  Outcome: Progressing  2/19/2024 1034 by Idris Landaverde RN  Outcome: Progressing     Problem: Safety - Adult  Goal: Free from fall injury  2/20/2024 0018 by Monserrat Recinos RN  Outcome: Progressing  2/19/2024 1034 by Idris Landaverde RN  Outcome: Progressing     Problem: ABCDS Injury Assessment  Goal: Absence of physical injury  2/20/2024 0018 by Monserrat eRcinos RN  Outcome: Progressing  2/19/2024 1034 by Idris Landaverde RN  Outcome: Progressing     Problem: Skin/Tissue Integrity  Goal: Absence of new skin breakdown  Description: 1.  Monitor for areas of redness and/or skin breakdown  2.  Assess vascular access sites hourly  3.  Every 4-6 hours minimum:  Change oxygen saturation probe site  4.  Every 4-6 hours:  If on nasal continuous positive airway pressure, respiratory therapy assess nares and determine need for appliance change or resting period.  2/20/2024 0018 by Monserrat Recinos RN  Outcome: Progressing  2/19/2024 1034 by Idris Landaverde RN  Outcome: Progressing     Problem: Chronic Conditions and Co-morbidities  Goal: Patient's chronic conditions and co-morbidity symptoms are monitored and maintained or improved  2/20/2024 0018 by Monserrat Recinos RN  Outcome: Progressing  2/19/2024 1034 by Idris Landaverde RN  Outcome: Progressing

## 2024-02-20 NOTE — PROGRESS NOTES
Pharmacy Consultation Note  (Warfarin Dosing and Monitoring)    Initial consult date: 2/17  Consulting Provider: TANA Davies    Krystal Hyatt is a 83 y.o. female for whom pharmacy has been asked to manage warfarin therapy.     Weight:   Wt Readings from Last 1 Encounters:   02/20/24 74.6 kg (164 lb 8 oz)       TSH:    Lab Results   Component Value Date/Time    TSH 7.44 01/14/2024 06:00 AM       Hepatic Function Panel:                            Lab Results   Component Value Date/Time    ALKPHOS 69 02/16/2024 09:59 PM    ALT 16 02/16/2024 09:59 PM    AST 27 02/16/2024 09:59 PM    PROT 6.8 02/16/2024 09:59 PM    BILITOT 0.8 02/16/2024 09:59 PM    BILIDIR 0.2 10/15/2017 02:40 AM    IBILI 0.7 10/15/2017 02:40 AM    LABALBU 3.8 02/16/2024 09:59 PM    LABALBU 4.4 05/04/2012 11:00 AM       Current significant warfarin drug-drug interactions include:   -Levothyroxine, ceftriaxone, doxycyline, rosuvastatin: may enhance the anticoagulatory effect of warfarin.     Recent Labs     02/18/24  0418 02/19/24  0543 02/20/24  0347   HGB 7.8* 7.6* 7.8*    157 159       Date Warfarin Dose INR Heparin or LMWH Comment   2/17 5 mg 1.5 x    2/18 NO DOSE 3.1 x    2/19 NO DOSE 3.8 x    2/20 5 mg 2.8 x             Assessment:  Patient is a 83 y.o. female on warfarin for Atrial Fibrillation.  Patient's home warfarin dosing regimen is 5 mg once daily per med rec.   Goal INR 2 - 3  INR 2.8    Plan:  Will resume home warfarin dose given drop in INR from 3.8 yesterday, to 2.8 today with expectation of continued drop tomorrow.  Warfarin 5 mg po x 1 tonight..  Daily PT/INR until the INR is stable within the therapeutic range  Pharmacist will follow and monitor/adjust dosing as necessary      Brett Junior RPH 2/20/2024 9:32 AM    RADHA: [phone]  SEY: [phone]  SJW: [phone]

## 2024-02-20 NOTE — PROGRESS NOTES
[facility] Hospitalist Progress Note    Admitting Date and Time: 2/16/2024  9:14 PM  Admit Dx: Dehydration [E86.0]  Pleural effusion [J90]  COPD exacerbation (HCC) [J44.1]  TRISTEN (acute kidney injury) (HCC) [N17.9]  Acute respiratory failure with hypoxia (HCC) [J96.01]  Pneumonia due to infectious organism, unspecified laterality, unspecified part of lung [J18.9]    Subjective:  Patient is being followed for Dehydration [E86.0]  Pleural effusion [J90]  COPD exacerbation (HCC) [J44.1]  TRISTEN (acute kidney injury) (HCC) [N17.9]  Acute respiratory failure with hypoxia (HCC) [J96.01]  Pneumonia due to infectious organism, unspecified laterality, unspecified part of lung [J18.9]   Pt was seen and examined today. States respiratory status is unchanged today, although she has less difficulty breathing on today's exam. Family at bedside, updated on current plan.    ROS: denies fever, chills, cp, sob, n/v, HA unless stated above.     warfarin  5 mg Oral Once    cefepime  2,000 mg IntraVENous Q12H    vancomycin  25 mg/kg IntraVENous Once    [START ON 2/21/2024] vancomycin  1,000 mg IntraVENous Q24H    pantoprazole  40 mg Oral QAM AC    digoxin  62.5 mcg Oral Daily    metoprolol succinate  12.5 mg Oral QAM    rosuvastatin  10 mg Oral Nightly    levothyroxine  50 mcg Oral QAM AC    budesonide  0.25 mg Nebulization BID RT    And    arformoterol tartrate  15 mcg Nebulization BID RT    And    ipratropium  0.5 mg Nebulization 4x Daily RT    sodium chloride flush  5-40 mL IntraVENous 2 times per day    warfarin placeholder: dosing by pharmacy   Other RX Placeholder    miconazole nitrate   Topical BID     sodium chloride, 1 spray, Q4H PRN  ipratropium 0.5 mg-albuterol 2.5 mg, 1 Dose, Q4H PRN  sodium chloride flush, 5-40 mL, PRN  sodium chloride, , PRN  polyethylene glycol, 17 g, Daily PRN  acetaminophen, 650 mg, Q6H PRN   Or  acetaminophen, 650 mg, Q6H PRN         Objective:    /60   Pulse 65   Temp 98.4 °F (36.9 °C)

## 2024-02-20 NOTE — PROGRESS NOTES
Called to pt room, she is feeling short of breath o2 sat 96% on 6 liters, pt asking to be put on nonrebreather mask, because it helped in ED

## 2024-02-21 LAB
ANION GAP SERPL CALCULATED.3IONS-SCNC: 10 MMOL/L (ref 7–16)
BASOPHILS # BLD: 0.02 K/UL (ref 0–0.2)
BASOPHILS NFR BLD: 1 % (ref 0–2)
BUN SERPL-MCNC: 19 MG/DL (ref 6–23)
CALCIUM SERPL-MCNC: 8.8 MG/DL (ref 8.6–10.2)
CHLORIDE SERPL-SCNC: 104 MMOL/L (ref 98–107)
CO2 SERPL-SCNC: 25 MMOL/L (ref 22–29)
CREAT SERPL-MCNC: 1.1 MG/DL (ref 0.5–1)
CRP SERPL HS-MCNC: 3 MG/L (ref 0–5)
EOSINOPHIL # BLD: 0.04 K/UL (ref 0.05–0.5)
EOSINOPHILS RELATIVE PERCENT: 1 % (ref 0–6)
ERYTHROCYTE [DISTWIDTH] IN BLOOD BY AUTOMATED COUNT: 18.6 % (ref 11.5–15)
ERYTHROCYTE [SEDIMENTATION RATE] IN BLOOD BY WESTERGREN METHOD: 36 MM/HR (ref 0–20)
GFR SERPL CREATININE-BSD FRML MDRD: 51 ML/MIN/1.73M2
GLUCOSE SERPL-MCNC: 118 MG/DL (ref 74–99)
HCT VFR BLD AUTO: 29.1 % (ref 34–48)
HGB BLD-MCNC: 8.8 G/DL (ref 11.5–15.5)
IMM GRANULOCYTES # BLD AUTO: 0.05 K/UL (ref 0–0.58)
IMM GRANULOCYTES NFR BLD: 1 % (ref 0–5)
INR PPP: 2.2
LYMPHOCYTES NFR BLD: 1.42 K/UL (ref 1.5–4)
LYMPHOCYTES RELATIVE PERCENT: 32 % (ref 20–42)
MCH RBC QN AUTO: 30.9 PG (ref 26–35)
MCHC RBC AUTO-ENTMCNC: 30.2 G/DL (ref 32–34.5)
MCV RBC AUTO: 102.1 FL (ref 80–99.9)
MONOCYTES NFR BLD: 0.24 K/UL (ref 0.1–0.95)
MONOCYTES NFR BLD: 5 % (ref 2–12)
NEUTROPHILS NFR BLD: 60 % (ref 43–80)
NEUTS SEG NFR BLD: 2.67 K/UL (ref 1.8–7.3)
PLATELET # BLD AUTO: 148 K/UL (ref 130–450)
PMV BLD AUTO: 10 FL (ref 7–12)
POTASSIUM SERPL-SCNC: 4.3 MMOL/L (ref 3.5–5)
PROCALCITONIN SERPL-MCNC: 0.04 NG/ML (ref 0–0.08)
PROTHROMBIN TIME: 24.3 SEC (ref 9.3–12.4)
RBC # BLD AUTO: 2.85 M/UL (ref 3.5–5.5)
SODIUM SERPL-SCNC: 139 MMOL/L (ref 132–146)
WBC OTHER # BLD: 4.4 K/UL (ref 4.5–11.5)

## 2024-02-21 PROCEDURE — 2580000003 HC RX 258: Performed by: STUDENT IN AN ORGANIZED HEALTH CARE EDUCATION/TRAINING PROGRAM

## 2024-02-21 PROCEDURE — 92526 ORAL FUNCTION THERAPY: CPT

## 2024-02-21 PROCEDURE — 2700000000 HC OXYGEN THERAPY PER DAY

## 2024-02-21 PROCEDURE — 36415 COLL VENOUS BLD VENIPUNCTURE: CPT

## 2024-02-21 PROCEDURE — 6370000000 HC RX 637 (ALT 250 FOR IP): Performed by: STUDENT IN AN ORGANIZED HEALTH CARE EDUCATION/TRAINING PROGRAM

## 2024-02-21 PROCEDURE — 92610 EVALUATE SWALLOWING FUNCTION: CPT

## 2024-02-21 PROCEDURE — 87205 SMEAR GRAM STAIN: CPT

## 2024-02-21 PROCEDURE — 86140 C-REACTIVE PROTEIN: CPT

## 2024-02-21 PROCEDURE — 94640 AIRWAY INHALATION TREATMENT: CPT

## 2024-02-21 PROCEDURE — 6360000002 HC RX W HCPCS: Performed by: STUDENT IN AN ORGANIZED HEALTH CARE EDUCATION/TRAINING PROGRAM

## 2024-02-21 PROCEDURE — 6370000000 HC RX 637 (ALT 250 FOR IP): Performed by: NURSE PRACTITIONER

## 2024-02-21 PROCEDURE — 2060000000 HC ICU INTERMEDIATE R&B

## 2024-02-21 PROCEDURE — 85652 RBC SED RATE AUTOMATED: CPT

## 2024-02-21 PROCEDURE — 94669 MECHANICAL CHEST WALL OSCILL: CPT

## 2024-02-21 PROCEDURE — 99233 SBSQ HOSP IP/OBS HIGH 50: CPT | Performed by: STUDENT IN AN ORGANIZED HEALTH CARE EDUCATION/TRAINING PROGRAM

## 2024-02-21 PROCEDURE — 85025 COMPLETE CBC W/AUTO DIFF WBC: CPT

## 2024-02-21 PROCEDURE — 85610 PROTHROMBIN TIME: CPT

## 2024-02-21 PROCEDURE — 80048 BASIC METABOLIC PNL TOTAL CA: CPT

## 2024-02-21 PROCEDURE — 87070 CULTURE OTHR SPECIMN AEROBIC: CPT

## 2024-02-21 PROCEDURE — 84145 PROCALCITONIN (PCT): CPT

## 2024-02-21 RX ORDER — WARFARIN SODIUM 5 MG/1
5 TABLET ORAL
Status: COMPLETED | OUTPATIENT
Start: 2024-02-21 | End: 2024-02-21

## 2024-02-21 RX ORDER — PREDNISONE 20 MG/1
40 TABLET ORAL DAILY
Status: DISCONTINUED | OUTPATIENT
Start: 2024-02-21 | End: 2024-02-22 | Stop reason: HOSPADM

## 2024-02-21 RX ORDER — GUAIFENESIN 400 MG/1
400 TABLET ORAL 3 TIMES DAILY
Status: DISCONTINUED | OUTPATIENT
Start: 2024-02-21 | End: 2024-02-22 | Stop reason: HOSPADM

## 2024-02-21 RX ADMIN — ARFORMOTEROL TARTRATE 15 MCG: 15 SOLUTION RESPIRATORY (INHALATION) at 08:47

## 2024-02-21 RX ADMIN — Medication 10 ML: at 20:35

## 2024-02-21 RX ADMIN — GUAIFENESIN 400 MG: 400 TABLET ORAL at 13:44

## 2024-02-21 RX ADMIN — ARFORMOTEROL TARTRATE 15 MCG: 15 SOLUTION RESPIRATORY (INHALATION) at 21:29

## 2024-02-21 RX ADMIN — GUAIFENESIN 400 MG: 400 TABLET ORAL at 09:36

## 2024-02-21 RX ADMIN — GUAIFENESIN 400 MG: 400 TABLET ORAL at 20:35

## 2024-02-21 RX ADMIN — PREDNISONE 40 MG: 20 TABLET ORAL at 13:44

## 2024-02-21 RX ADMIN — IPRATROPIUM BROMIDE 0.5 MG: 0.5 SOLUTION RESPIRATORY (INHALATION) at 16:09

## 2024-02-21 RX ADMIN — IPRATROPIUM BROMIDE 0.5 MG: 0.5 SOLUTION RESPIRATORY (INHALATION) at 08:48

## 2024-02-21 RX ADMIN — ROSUVASTATIN CALCIUM 10 MG: 10 TABLET, FILM COATED ORAL at 20:35

## 2024-02-21 RX ADMIN — PANTOPRAZOLE SODIUM 40 MG: 40 TABLET, DELAYED RELEASE ORAL at 05:12

## 2024-02-21 RX ADMIN — DIGOXIN 62.5 MCG: 0.12 TABLET ORAL at 09:11

## 2024-02-21 RX ADMIN — BUDESONIDE 250 MCG: 0.25 SUSPENSION RESPIRATORY (INHALATION) at 08:47

## 2024-02-21 RX ADMIN — METOPROLOL SUCCINATE 12.5 MG: 25 TABLET, EXTENDED RELEASE ORAL at 09:13

## 2024-02-21 RX ADMIN — MICONAZOLE NITRATE: 2 OINTMENT TOPICAL at 20:37

## 2024-02-21 RX ADMIN — Medication 10 ML: at 09:14

## 2024-02-21 RX ADMIN — IPRATROPIUM BROMIDE 0.5 MG: 0.5 SOLUTION RESPIRATORY (INHALATION) at 12:50

## 2024-02-21 RX ADMIN — IPRATROPIUM BROMIDE 0.5 MG: 0.5 SOLUTION RESPIRATORY (INHALATION) at 21:29

## 2024-02-21 RX ADMIN — BUDESONIDE 250 MCG: 0.25 SUSPENSION RESPIRATORY (INHALATION) at 21:29

## 2024-02-21 RX ADMIN — VANCOMYCIN HYDROCHLORIDE 1000 MG: 1 INJECTION, POWDER, LYOPHILIZED, FOR SOLUTION INTRAVENOUS at 09:47

## 2024-02-21 RX ADMIN — CEFEPIME 2000 MG: 2 INJECTION, POWDER, FOR SOLUTION INTRAVENOUS at 08:19

## 2024-02-21 RX ADMIN — SODIUM CHLORIDE, PRESERVATIVE FREE 10 ML: 5 INJECTION INTRAVENOUS at 09:07

## 2024-02-21 RX ADMIN — MICONAZOLE NITRATE: 2 OINTMENT TOPICAL at 09:14

## 2024-02-21 RX ADMIN — LEVOTHYROXINE SODIUM 50 MCG: 0.05 TABLET ORAL at 05:12

## 2024-02-21 RX ADMIN — CEFEPIME 2000 MG: 2 INJECTION, POWDER, FOR SOLUTION INTRAVENOUS at 20:39

## 2024-02-21 RX ADMIN — WARFARIN SODIUM 5 MG: 5 TABLET ORAL at 17:07

## 2024-02-21 ASSESSMENT — PAIN SCALES - GENERAL
PAINLEVEL_OUTOF10: 0

## 2024-02-21 NOTE — PROGRESS NOTES
Put pt down to 3 liters NC per order. At rest pt was only 78%, so we did not ambulate her. Had to place her on 15 lters NRB to recover. Once she recovered, placed pt on 8 liters HF. Yareli Weston RN

## 2024-02-21 NOTE — PROGRESS NOTES
Pharmacy Consultation Note  (Warfarin Dosing and Monitoring)    Initial consult date: 2/17  Consulting Provider: TANA Davies    Krystal Hyatt is a 83 y.o. female for whom pharmacy has been asked to manage warfarin therapy.     Weight:   Wt Readings from Last 1 Encounters:   02/21/24 76 kg (167 lb 9.6 oz)       TSH:    Lab Results   Component Value Date/Time    TSH 7.44 01/14/2024 06:00 AM       Hepatic Function Panel:                            Lab Results   Component Value Date/Time    ALKPHOS 69 02/16/2024 09:59 PM    ALT 16 02/16/2024 09:59 PM    AST 27 02/16/2024 09:59 PM    PROT 6.8 02/16/2024 09:59 PM    BILITOT 0.8 02/16/2024 09:59 PM    BILIDIR 0.2 10/15/2017 02:40 AM    IBILI 0.7 10/15/2017 02:40 AM    LABALBU 3.8 02/16/2024 09:59 PM    LABALBU 4.4 05/04/2012 11:00 AM       Current significant warfarin drug-drug interactions include:   -Levothyroxine, ceftriaxone, doxycyline, rosuvastatin: may enhance the anticoagulatory effect of warfarin.     Recent Labs     02/19/24  0543 02/20/24  0347   HGB 7.6* 7.8*    159       Date Warfarin Dose INR Heparin or LMWH Comment   2/17 5 mg 1.5 x    2/18 NO DOSE 3.1 x    2/19 NO DOSE 3.8 x    2/20 5 mg 2.8 x    2/21 5 mg 2.2 x                    Assessment:  Patient is a 83 y.o. female on warfarin for Atrial Fibrillation.  Patient's home warfarin dosing regimen is 5 mg once daily per med rec.   Goal INR 2 - 3  INR 2.2    Plan:  Will continue home warfarin regimen.  Warfarin 5 mg po x 1 tonight..  Daily PT/INR until the INR is stable within the therapeutic range  Pharmacist will follow and monitor/adjust dosing as necessary      Brett Junior RPH 2/21/2024 9:44 AM    RADHA: [phone]  SEY: [phone]  SJW: [phone]

## 2024-02-21 NOTE — CARE COORDINATION
Met with patient and daughter at bedside.  Patient states she is maintaining her independent mobility status and has been up out of bed, ambulated to bathroom etc.  She denies need for [facility] services and has expectations of returning to work soon after discharge.  Patient will need followed and assisted with discharge planning as necessary.   Explained ELOS of 24 hours; patient voiced understanding and agreement.    Rich Hall, MSN RN  [facility]  [phone]

## 2024-02-21 NOTE — PROGRESS NOTES
in assessment     yes     PRIOR LEVEL OF SWALLOW FUNCTION:    PAST HISTORY OF OROPHARYNGEAL DYSPHAGIA?: none reported    Home diet: Regular consistency solids (IDDSI level 7) with  thin liquids (IDDSI level 0)  Current Diet Order:  ADULT DIET; Regular; Low Sodium (2 gm)    PROCEDURE:  Consistencies Administered During the Evaluation   Liquids: thin liquid   Solids:  Pureed  and Hard solid      Method of Intake:   cup, straw, spoon  Self fed      Position:   Sitting in bed with head elevated above 75 degrees    CLINICAL ASSESSMENT:  Oral Stage:       The oral stage of swallowing was within functional limits for consistencies administered      Pharyngeal Stage:    Throat clearing present after presentation of thin liquid  Latent wet cough was noted after presentation of thin liquid  Wet respirations were noted after presentation of thin liquid    Cognition:   Within functional limits for this exam    Oral Peripheral Examination   Generalized oral weakness    Current Respiratory Status    6 liters O2 via nasal cannula, baseline 5 liters     Parameters of Speech Production  Respiration:  Adequate for speech production  Quality:   Within functional limits  Intensity: Within functional limits    Volitional Swallow: Present     Volitional Cough:  Present     Pain: No pain reported.    EDUCATION:   The Speech Language Pathologist (SLP) completed education regarding results of evaluation and that intervention is warranted at this time.  Learner: Patient  Education: Reviewed results and recommendations of this evaluation  Evaluation of Education:  Verbalizes understanding    This plan may be re-evaluated and revised as warranted.      Evaluation Time includes thorough review of current medical information, gathering information on past medical history/social history and prior level of function, completion of standardized testing/informal observation of tasks, assessment of data and education on plan of care and  Reviewed current solid/liquid consistency diet recommendations and discussed compensatory strategies to ensure safe PO intake. Reviewed aspiration precautions. Encouraged patient and/or family to engage SLP in unstructured Q&A session relative to identified deficit areas; indicated understanding of all information provided via satisfactory verbal response.    CPT Code: 78817  dysphagia tx

## 2024-02-21 NOTE — PROGRESS NOTES
Pharmacy Consultation Note  (Antibiotic Dosing and Monitoring)        Note vancomycin discontinued. Clinical pharmacy will sign-off. Please re-consult if we can be of further assistance.    Thanks for this consult,  Christiana Puri RP, PharmD, BCPS  2/21/2024  12:42 PM      Pharmacy Consultation Note  (Antibiotic Dosing and Monitoring)    Initial consult date: 2/20/2024  Consulting physician/provider: Dr. Anthony Shah  Drug: Vancomycin  Indication: HAP    Age/  Gender Height Weight IBW  Allergy Information   83 y.o./female 172.7 cm (5' 8\") 71.7 kg (158 lb)     Ideal body weight: 63.9 kg (140 lb 14 oz)  Adjusted ideal body weight: 68.7 kg (151 lb 9 oz)   Pacerone [amiodarone], Cat hair extract, Eggs or egg-derived products, Erythromycin, Pcn [penicillins], and Seasonal      Renal Function:  Recent Labs     02/19/24  0543 02/20/24  0347   BUN 24* 18   CREATININE 1.2* 1.1*         Intake/Output Summary (Last 24 hours) at 2/21/2024 0946  Last data filed at 2/21/2024 0819  Gross per 24 hour   Intake 120 ml   Output --   Net 120 ml       Vancomycin Monitoring:  Trough:  No results for input(s): \"VANCOTROUGH\" in the last 72 hours.  Random:  No results for input(s): \"VANCORANDOM\" in the last 72 hours.    Vancomycin Administration Times:  Recent vancomycin administrations        No vancomycin IV orders with administrations found.            Orders not given:            vancomycin (VANCOCIN) 1,750 mg in sodium chloride 0.9 % 500 mL IVPB    vancomycin 1000 mg IVPB in 250 mL NS addavial                    Assessment:  Patient is a 83 y.o. female who has been initiated on vancomycin  Estimated Creatinine Clearance: 38 mL/min (A) (based on SCr of 1.1 mg/dL (H)).    Plan:  Will continue vancomycin 1000 mg IV q24h at this time.  Will check vancomycin levels when appropriate  Will continue to monitor renal function   Pharmacy to follow    eZb Junior RPh  2/21/2024  9:46 AM    RADHA: [phone]  SEY: [phone]  SJW: [phone]

## 2024-02-21 NOTE — PROGRESS NOTES
[facility] Hospitalist Progress Note    Admitting Date and Time: 2/16/2024  9:14 PM  Admit Dx: Dehydration [E86.0]  Pleural effusion [J90]  COPD exacerbation (HCC) [J44.1]  TRISTEN (acute kidney injury) (HCC) [N17.9]  Acute respiratory failure with hypoxia (HCC) [J96.01]  Pneumonia due to infectious organism, unspecified laterality, unspecified part of lung [J18.9]    Subjective:  Patient is being followed for Dehydration [E86.0]  Pleural effusion [J90]  COPD exacerbation (HCC) [J44.1]  TRISTEN (acute kidney injury) (HCC) [N17.9]  Acute respiratory failure with hypoxia (HCC) [J96.01]  Pneumonia due to infectious organism, unspecified laterality, unspecified part of lung [J18.9]   Pt was seen and examined today. States feels the same today. Patient frustrated she is not going home today.    ROS: denies fever, chills, cp, sob, n/v, HA unless stated above.     guaiFENesin  400 mg Oral TID    warfarin  5 mg Oral Once    cefepime  2,000 mg IntraVENous Q12H    vancomycin  1,000 mg IntraVENous Q24H    pantoprazole  40 mg Oral QAM AC    digoxin  62.5 mcg Oral Daily    metoprolol succinate  12.5 mg Oral QAM    rosuvastatin  10 mg Oral Nightly    levothyroxine  50 mcg Oral QAM AC    budesonide  0.25 mg Nebulization BID RT    And    arformoterol tartrate  15 mcg Nebulization BID RT    And    ipratropium  0.5 mg Nebulization 4x Daily RT    sodium chloride flush  5-40 mL IntraVENous 2 times per day    warfarin placeholder: dosing by pharmacy   Other RX Placeholder    miconazole nitrate   Topical BID     sodium chloride, 1 spray, Q4H PRN  ipratropium 0.5 mg-albuterol 2.5 mg, 1 Dose, Q4H PRN  sodium chloride flush, 5-40 mL, PRN  sodium chloride, , PRN  polyethylene glycol, 17 g, Daily PRN  acetaminophen, 650 mg, Q6H PRN   Or  acetaminophen, 650 mg, Q6H PRN         Objective:    /64   Pulse 68   Temp 97.6 °F (36.4 °C) (Oral)   Resp 16   Ht 1.727 m (5' 8\")   Wt 76 kg (167 lb 9.6 oz)   SpO2 98%   BMI 25.48 kg/m²

## 2024-02-21 NOTE — PROGRESS NOTES
[facility]  MD Haris Skelton MD ALI HASSAN MD .  Progress note  Patient's Name: Krystal Hyatt  4:27 PM  2/21/2024    Clinically better comfortable euvolemic no edema no JVD     2/19: Sitting up in bed. No acute distress. Asking if she can be discharged. She is on nasal canula which she does use chronically. Appetite is good. She denies any dyspnea, chest pain, or palpitations.     2/20: sitting up in bed. No acute distress. She is on 6 liters of oxygen. Still having wheezing. Pulmonology was consulted today. Her appetite is fair. She denies diarrhea, nausea, or vomiting.     2/21 charts reviewed     History of Present Ilness:    84 yo female w/ hx of CKD3, COPD (5L O2), CHF, Afib on Coumadin, Lung CA s/p radiation, who p/w dyspnea that is worse with exertion that started 1 day ago .   Nephrology asked to see for TRISTEN/CKD   Pt has CKD at baseline with cr at b/l 1.3-1.7   Pt seen in ED she is on o2/nc   Pt appear clinically euvolemic to mildly dry   Pt reports no n/v/d/cp     Past Medical History:   Diagnosis Date    Atrial fibrillation (HCC)     CHF (congestive heart failure) (HCC)     Emphysema, unspecified (HCC)     Hyperlipidemia     Hypertension     Lung cancer (HCC)     Mitral valve regurgitation     Oxygen dependent     Prolonged emergence from general anesthesia     post general anesthesia    Skin cancer     Thyroid disease        Past Surgical History:   Procedure Laterality Date    BREAST BIOPSY Right     benign    BRONCHOSCOPY N/A 04/20/2021    BRONCHOSCOPY/TRANSBRONCHIAL NEEDLE BIOPSY performed by Antony Rosario MD at [facility] ENDOSCOPY    CARDIOVERSION  10/06/2023    Dr Jones    INTRACAPSULAR CATARACT EXTRACTION Right 12/16/2021    RIGHT EYE CATARACT EXTRACTION IOL IMPLANT performed by Kay Pimentel MD at [facility] OR    INTRACAPSULAR CATARACT EXTRACTION Left 02/24/2022    LEFT EYE CATARACT EXTRACTION IOL IMPLANT performed by Kay Pimentel MD  nitrate 2 % ointment, BID        Review of Systems:   Constitutional: no fevers , no chills , feels ok   Eyes: no eye pain , no itching , no drainage  Ears, nose, mouth, throat, and face: no ear ,nose pain , hearing is ok ,no nasal drainage   Respiratory: no sob ,no cough ,no wheezing .   Cardiovascular: no chest pain , no palpitation ,no sob .   Gastrointestinal: no nausea, vomiting , constipation , no abdominal pain .   Genitourinary:no urinary retention , no burning , dysuria . No polyuria   Hematologic/lymphatic: no bleeding , no cougulation issues .   Musculoskeletal:no joint pain , no swelling .   Neurological: no headaches ,no weakness , no numbness .   Endocrine: no thirst , no weight issues .     Physical exam:   Vital signs /67   Pulse 74   Temp 97.7 °F (36.5 °C) (Temporal)   Resp 18   Ht 1.727 m (5' 8\")   Wt 76 kg (167 lb 9.6 oz)   SpO2 100%   BMI 25.48 kg/m²   Gen : NAD , appropriate for stated age .   Head : at , nc   Neck : supple , no thyromegaly noted .   Eyes : EOMI , PERRLA   CV : RRR , No M/R/G .   Lungs: Expiratory wheezes bilaterally, no crackles, good flow heard b/l   Abd : soft , NT , BS + , No Organomegaly appreciated .   Skin : soft, dry .   Neuro : CN  II-XII grossly intact , no focal neurologic deficit .   Psych : cooperative .     Data:   Labs:  CBC with Differential:    Lab Results   Component Value Date/Time    WBC 4.4 02/21/2024 12:10 PM    RBC 2.85 02/21/2024 12:10 PM    HGB 8.8 02/21/2024 12:10 PM    HCT 29.1 02/21/2024 12:10 PM     02/21/2024 12:10 PM    .1 02/21/2024 12:10 PM    MCH 30.9 02/21/2024 12:10 PM    MCHC 30.2 02/21/2024 12:10 PM    RDW 18.6 02/21/2024 12:10 PM    NRBC 0.0 04/09/2023 05:02 AM    SEGSPCT 46 02/14/2014 11:27 AM    LYMPHOPCT 32 02/21/2024 12:10 PM    MONOPCT 5 02/21/2024 12:10 PM    BASOPCT 1 02/21/2024 12:10 PM    MONOSABS 0.24 02/21/2024 12:10 PM    LYMPHSABS 1.42 02/21/2024 12:10 PM    EOSABS 0.04 02/21/2024 12:10 PM    BASOSABS

## 2024-02-22 VITALS
RESPIRATION RATE: 16 BRPM | BODY MASS INDEX: 25.4 KG/M2 | WEIGHT: 167.6 LBS | HEIGHT: 68 IN | DIASTOLIC BLOOD PRESSURE: 70 MMHG | SYSTOLIC BLOOD PRESSURE: 128 MMHG | TEMPERATURE: 97.6 F | HEART RATE: 78 BPM | OXYGEN SATURATION: 92 %

## 2024-02-22 LAB
ANION GAP SERPL CALCULATED.3IONS-SCNC: 5 MMOL/L (ref 7–16)
BASOPHILS # BLD: 0.01 K/UL (ref 0–0.2)
BASOPHILS NFR BLD: 0 % (ref 0–2)
BUN SERPL-MCNC: 19 MG/DL (ref 6–23)
CALCIUM SERPL-MCNC: 8.6 MG/DL (ref 8.6–10.2)
CHLORIDE SERPL-SCNC: 105 MMOL/L (ref 98–107)
CO2 SERPL-SCNC: 27 MMOL/L (ref 22–29)
CREAT SERPL-MCNC: 1.1 MG/DL (ref 0.5–1)
EOSINOPHIL # BLD: 0 K/UL (ref 0.05–0.5)
EOSINOPHILS RELATIVE PERCENT: 0 % (ref 0–6)
ERYTHROCYTE [DISTWIDTH] IN BLOOD BY AUTOMATED COUNT: 18.3 % (ref 11.5–15)
GFR SERPL CREATININE-BSD FRML MDRD: 47 ML/MIN/1.73M2
GLUCOSE SERPL-MCNC: 173 MG/DL (ref 74–99)
HCT VFR BLD AUTO: 26.9 % (ref 34–48)
HGB BLD-MCNC: 8.4 G/DL (ref 11.5–15.5)
IMM GRANULOCYTES # BLD AUTO: 0.06 K/UL (ref 0–0.58)
IMM GRANULOCYTES NFR BLD: 2 % (ref 0–5)
INR PPP: 2.6
LYMPHOCYTES NFR BLD: 0.57 K/UL (ref 1.5–4)
LYMPHOCYTES RELATIVE PERCENT: 16 % (ref 20–42)
MCH RBC QN AUTO: 30.7 PG (ref 26–35)
MCHC RBC AUTO-ENTMCNC: 31.2 G/DL (ref 32–34.5)
MCV RBC AUTO: 98.2 FL (ref 80–99.9)
MICROORGANISM SPEC CULT: NORMAL
MICROORGANISM/AGENT SPEC: NORMAL
MONOCYTES NFR BLD: 0.12 K/UL (ref 0.1–0.95)
MONOCYTES NFR BLD: 4 % (ref 2–12)
NEUTROPHILS NFR BLD: 78 % (ref 43–80)
NEUTS SEG NFR BLD: 2.71 K/UL (ref 1.8–7.3)
PLATELET # BLD AUTO: 148 K/UL (ref 130–450)
PMV BLD AUTO: 10.3 FL (ref 7–12)
POTASSIUM SERPL-SCNC: 4.3 MMOL/L (ref 3.5–5)
PROTHROMBIN TIME: 28.3 SEC (ref 9.3–12.4)
RBC # BLD AUTO: 2.74 M/UL (ref 3.5–5.5)
RBC # BLD: ABNORMAL 10*6/UL
SODIUM SERPL-SCNC: 137 MMOL/L (ref 132–146)
SPECIMEN DESCRIPTION: NORMAL
WBC OTHER # BLD: 3.5 K/UL (ref 4.5–11.5)

## 2024-02-22 PROCEDURE — 85025 COMPLETE CBC W/AUTO DIFF WBC: CPT

## 2024-02-22 PROCEDURE — 97161 PT EVAL LOW COMPLEX 20 MIN: CPT

## 2024-02-22 PROCEDURE — 85610 PROTHROMBIN TIME: CPT

## 2024-02-22 PROCEDURE — 2580000003 HC RX 258: Performed by: STUDENT IN AN ORGANIZED HEALTH CARE EDUCATION/TRAINING PROGRAM

## 2024-02-22 PROCEDURE — 94669 MECHANICAL CHEST WALL OSCILL: CPT

## 2024-02-22 PROCEDURE — 6360000002 HC RX W HCPCS: Performed by: STUDENT IN AN ORGANIZED HEALTH CARE EDUCATION/TRAINING PROGRAM

## 2024-02-22 PROCEDURE — 99239 HOSP IP/OBS DSCHRG MGMT >30: CPT | Performed by: INTERNAL MEDICINE

## 2024-02-22 PROCEDURE — 2700000000 HC OXYGEN THERAPY PER DAY

## 2024-02-22 PROCEDURE — 6370000000 HC RX 637 (ALT 250 FOR IP): Performed by: STUDENT IN AN ORGANIZED HEALTH CARE EDUCATION/TRAINING PROGRAM

## 2024-02-22 PROCEDURE — 94640 AIRWAY INHALATION TREATMENT: CPT

## 2024-02-22 PROCEDURE — 6370000000 HC RX 637 (ALT 250 FOR IP): Performed by: NURSE PRACTITIONER

## 2024-02-22 PROCEDURE — 97165 OT EVAL LOW COMPLEX 30 MIN: CPT

## 2024-02-22 PROCEDURE — 36415 COLL VENOUS BLD VENIPUNCTURE: CPT

## 2024-02-22 PROCEDURE — 80048 BASIC METABOLIC PNL TOTAL CA: CPT

## 2024-02-22 RX ORDER — ALBUTEROL SULFATE 90 UG/1
2 AEROSOL, METERED RESPIRATORY (INHALATION) EVERY 6 HOURS PRN
Qty: 18 G | Refills: 3 | Status: ON HOLD | OUTPATIENT
Start: 2024-02-22

## 2024-02-22 RX ORDER — GUAIFENESIN 400 MG/1
400 TABLET ORAL 3 TIMES DAILY
Qty: 56 TABLET | Refills: 0 | Status: ON HOLD | OUTPATIENT
Start: 2024-02-22

## 2024-02-22 RX ORDER — WARFARIN SODIUM 2.5 MG/1
2.5 TABLET ORAL
Status: DISCONTINUED | OUTPATIENT
Start: 2024-02-22 | End: 2024-02-22 | Stop reason: HOSPADM

## 2024-02-22 RX ORDER — CEFUROXIME AXETIL 500 MG/1
500 TABLET ORAL 2 TIMES DAILY
Qty: 4 TABLET | Refills: 0 | Status: ON HOLD | OUTPATIENT
Start: 2024-02-22 | End: 2024-02-26

## 2024-02-22 RX ORDER — DOXYCYCLINE HYCLATE 100 MG
100 TABLET ORAL 2 TIMES DAILY
Qty: 4 TABLET | Refills: 0 | Status: ON HOLD | OUTPATIENT
Start: 2024-02-22 | End: 2024-02-26 | Stop reason: HOSPADM

## 2024-02-22 RX ORDER — PREDNISONE 20 MG/1
40 TABLET ORAL DAILY
Qty: 6 TABLET | Refills: 0 | Status: SHIPPED | OUTPATIENT
Start: 2024-02-23 | End: 2024-02-26

## 2024-02-22 RX ADMIN — ARFORMOTEROL TARTRATE 15 MCG: 15 SOLUTION RESPIRATORY (INHALATION) at 09:02

## 2024-02-22 RX ADMIN — PREDNISONE 40 MG: 20 TABLET ORAL at 07:43

## 2024-02-22 RX ADMIN — GUAIFENESIN 400 MG: 400 TABLET ORAL at 13:32

## 2024-02-22 RX ADMIN — BUDESONIDE 250 MCG: 0.25 SUSPENSION RESPIRATORY (INHALATION) at 09:02

## 2024-02-22 RX ADMIN — IPRATROPIUM BROMIDE 0.5 MG: 0.5 SOLUTION RESPIRATORY (INHALATION) at 13:12

## 2024-02-22 RX ADMIN — DIGOXIN 62.5 MCG: 0.12 TABLET ORAL at 07:43

## 2024-02-22 RX ADMIN — MICONAZOLE NITRATE: 2 OINTMENT TOPICAL at 07:44

## 2024-02-22 RX ADMIN — GUAIFENESIN 400 MG: 400 TABLET ORAL at 07:42

## 2024-02-22 RX ADMIN — METOPROLOL SUCCINATE 12.5 MG: 25 TABLET, EXTENDED RELEASE ORAL at 07:42

## 2024-02-22 RX ADMIN — Medication 10 ML: at 07:43

## 2024-02-22 RX ADMIN — LEVOTHYROXINE SODIUM 50 MCG: 0.05 TABLET ORAL at 06:12

## 2024-02-22 RX ADMIN — PANTOPRAZOLE SODIUM 40 MG: 40 TABLET, DELAYED RELEASE ORAL at 06:12

## 2024-02-22 RX ADMIN — CEFEPIME 2000 MG: 2 INJECTION, POWDER, FOR SOLUTION INTRAVENOUS at 07:45

## 2024-02-22 RX ADMIN — IPRATROPIUM BROMIDE 0.5 MG: 0.5 SOLUTION RESPIRATORY (INHALATION) at 09:02

## 2024-02-22 NOTE — PROGRESS NOTES
Pt ambulated on 6 liters NC and Spo2 was 86%. Once returned to bed, it took her 10-15 minutes to recover to 92% on 6 liters NC at rest. Yareli Weston RN

## 2024-02-22 NOTE — PROGRESS NOTES
Physical Therapy  Facility/Department: [address] INTERNAL MEDICINE 2  Physical Therapy Initial Assessment    Name: Krystal Hyatt  : 1940  MRN: [id number]  Date of Service: 2024      Patient Diagnosis(es): The primary encounter diagnosis was COPD exacerbation (HCC). Diagnoses of TRISTEN (acute kidney injury) (HCC), Dehydration, Pleural effusion, and Pneumonia due to infectious organism, unspecified laterality, unspecified part of lung were also pertinent to this visit.  Past Medical History:  has a past medical history of Atrial fibrillation (HCC), CHF (congestive heart failure) (HCC), Emphysema, unspecified (HCC), Hyperlipidemia, Hypertension, Lung cancer (HCC), Mitral valve regurgitation, Oxygen dependent, Prolonged emergence from general anesthesia, Skin cancer, and Thyroid disease.  Past Surgical History:  has a past surgical history that includes Breast biopsy (Right); Tubal ligation; bronchoscopy (N/A, 2021); Intracapsular cataract extraction (Right, 2021); Intracapsular cataract extraction (Left, 2022); and Cardioversion (10/06/2023).      Evaluating Therapist: Nickie Garcia PT      Room #:  0403/0403-A  Diagnosis:  Dehydration [E86.0]  Pleural effusion [J90]  COPD exacerbation (HCC) [J44.1]  TRISTEN (acute kidney injury) (HCC) [N17.9]  Acute respiratory failure with hypoxia (HCC) [J96.01]  Pneumonia due to infectious organism, unspecified laterality, unspecified part of lung [J18.9]  PMHx/PSHx:  CHF, HTN, COPD  Precautions:  falls, O2      Social:  Pt lives with alone but daughter has been staying with her the past 2 weeks.  Pt in a 1 floor plan. Uses home O2. Independent without device has cane and ww if needed.      Initial Evaluation  Date: 24 Treatment      Short Term/ Long Term   Goals   Was pt agreeable to Eval/treatment? yes     Does pt have pain? No c/o pain     Bed Mobility   NT pt sitting up edge of bed  independent   Transfers Sit to stand: SBA  Stand to sit: SBA  Stand  gait mechanics, endurance and assess need for appropriate assistive device  [x] Stair Training in preparation for safe discharge home and/or into the community   [] Positioning to prevent skin breakdown and contractures  [x] Safety and Education Training   [x] Patient/Caregiver Education   [] HEP  [] Other     PT long term treatment goals are located in above grid    Frequency of treatments: 2-5x/week x 5 days.    Time in  1120  Time out  1135      Evaluation Time includes thorough review of current medical information, gathering information on past medical history/social history and prior level of function, completion of standardized testing/informal observation of tasks, assessment of data and education on plan of care and goals.      CPT codes:  [x] Low Complexity PT evaluation 03390  [] Moderate Complexity PT evaluation 80083  [] High Complexity PT evaluation 88849  [] PT Re-evaluation 24186  [] Gait training 52689 minutes  [] Manual therapy 37645 minutes  [] Therapeutic activities 30803 minutes  [] Therapeutic exercises 22732 minutes  [] Neuromuscular reeducation 26609 minutes     Nickie Garcia PT [id number]

## 2024-02-22 NOTE — PROGRESS NOTES
Occupational Therapy  OCCUPATIONAL THERAPY INITIAL EVALUATION  [place]  [address], [place]    Date: 2024     Patient Name: Krystal Hyatt  MRN: [id number]  : 1940  Room: [address] [place] [address]    Evaluating OT: Daniela Richmond, OTR/L - [id number]    Referring Provider: Micky Chua APRN - CNP  Specific Provider Orders/Date: \"OT eval and treat\" - 2024    Diagnosis: Dehydration [E86.0]  Pleural effusion [J90]  COPD exacerbation (HCC) [J44.1]  TRISTEN (acute kidney injury) (HCC) [N17.9]  Acute respiratory failure with hypoxia (HCC) [J96.01]  Pneumonia due to infectious organism, unspecified laterality, unspecified part of lung [J18.9]     Pertinent Medical History: COPD, CHF, HTN, lung cancer, a-fib     Precautions: fall risk, 6L O2 via high flow nasal cannula    Assessment of Current Deficits:    [x] Functional mobility   [x] ADLs  [x] Strength               [] Cognition   [x] Functional transfers   [x] IADLs         [x] Safety Awareness   [x] Endurance   [] Fine Motor Coordination  [x] Balance      [] Vision/Perception   [x] Sensation    [] Gross Motor Coordination [] ROM          [] Delirium                  [] Motor Control     OT PLAN OF CARE   OT POC is based on physician orders, patient diagnosis, and results of clinical assessment.  Frequency/Duration 2-5 days/week for 2 weeks PRN   Specific OT Treatment Interventions to Include:   * Instruction/training on adapted ADL techniques and AE recommendations to increase functional independence within precautions       * Training on energy conservation strategies, correct breathing pattern and techniques to improve independence/tolerance for self-care routine  * Functional transfer/mobility training/DME recommendations for increased independence, safety, and fall prevention  * Patient/Family education to increase follow through with safety techniques and functional independence  *  negative

## 2024-02-22 NOTE — PROGRESS NOTES
Yusuf Castro M.D.,[facility]  Carlin Barrera D.O., RED., [facility]  Kristin Bruner M.D.  Monica Aponte M.D.   JOE Doyle.O.        Daily Pulmonary Progress Note    Patient:  Krystal Hyatt 83 y.o. female MRN: [id number]     Date of Service: 2/22/2024      Synopsis     We are following patient for Multifocal pneumonia, history of COPD on 5 L O2     \"CC\" shortness of breath    Code status: Full      Subjective      Patient was seen and examined.  Dyspnea with exertion.  Await ambulatory O2 testing.  O2 6 L.  Baseline oxygen 5  L.      Review of Systems:  Constitutional: Denies fever, weight loss, night sweats, and fatigue  Skin: Denies pigmentation, dark lesions, and rashes   HEENT: Denies hearing loss, tinnitus, ear drainage, epistaxis, sore throat, and hoarseness.  Cardiovascular: Denies palpitations, chest pain, and chest pressure.  Respiratory: Cough, worsening dyspnea and hypoxia  Gastrointestinal: Denies nausea, vomiting, poor appetite, diarrhea, heartburn or reflux  Genitourinary: Denies dysuria, frequency, urgency or hematuria  Musculoskeletal: Denies myalgias, muscle weakness, and bone pain  Neurological: Denies dizziness, vertigo, headache, and focal weakness  Psychological: Denies anxiety and depression  Endocrine: Denies heat intolerance and cold intolerance  Hematopoietic/Lymphatic: Denies bleeding problems and blood transfusions    24-hour events:  None    Objective   OBJECTIVE:   /70   Pulse 78   Temp 97.6 °F (36.4 °C) (Oral)   Resp 16   Ht 1.727 m (5' 8\")   Wt 76 kg (167 lb 9.6 oz)   SpO2 92%   BMI 25.48 kg/m²   SpO2 Readings from Last 1 Encounters:   02/22/24 92%        I/O:    Intake/Output Summary (Last 24 hours) at 2/22/2024 1225  Last data filed at 2/22/2024 1000  Gross per 24 hour   Intake 120 ml   Output 300 ml   Net -180 ml                      CURRENT MEDS :  Scheduled Meds:   warfarin  2.5 mg Oral Once    guaiFENesin  400 mg Oral TID    predniSONE  40 mg Oral  hypoxia  Multilobar pneumonia  Gold stage II COPD with exacerbation  History squamous cell lung cancer stage IIb treated with radiation in August 2021 at [facility]  Chronic right middle lobe lung collapse since 2021 imaging  Chronic O2 dependence 2 to 3 L  Prior heavy nicotine abuse quit 1998-92 pack years  Recent covid 19 December 2023  Recent influenza A Jan 2024  Centrilobular emphysema  Pulmonary fibrosis  Pulmonary hypertension WHO group 2  Systolic heart failure reduced EF 38%  Valvular heart disease   A-fib on warfarin  Large hiatal hernia  GERD  CKD stage III        Plan:   Oxygen therapy down to 6 L SpO2 92%  Check oxygen testing on baseline 5 L prior to discharge-orders placed  Admission ABG with hypoxemia  Chest imaging reviewed  Ceftin and Doxy for DC 2 more days upon discharge  Scheduled bronchodilators-Brovana, budesonide, Atrovent.  Resume Trelegy Ellipta for DC.  Continue EZ Pap  And incentive spirometer, flutter valve  Continue prednisone 40 po daily x 5 total days  Guaifenesin 3 times daily  Azotemia improved creatinine 1.1.  Nephrology following  DVT, GI prophylaxis.  INR therapeutic 2.6  Speech evaluation-regular solids thin liquids  PT/OT  Follow-up routed to office.  This plan of care was reviewed in collaboration with Dr. Aponte    Electronically signed by OUMOU Price CNP on 2/22/2024 at 12:25 PM    This is confirmation that I have personally performed a substantial portion of medical decision making (>50%) related to this patient encounter.  The medications & laboratory data and imagery were discussed and adjusted where necessary. Key issues of the case were discussed among consultants.  Review of CNP documentation was conducted and revisions were made as appropriate. I agree with the above documented exam, problem list and plan of care with the following additions    Okay to d/c   2 more days of antibiotics  Complete prednisone    Monica Aponte MD

## 2024-02-22 NOTE — PROGRESS NOTES
[facility]  MD Haris Skelton MD ALI HASSAN MD .  Progress note  Patient's Name: Krystal Hyatt  12:24 PM  2/22/2024    Clinically better comfortable euvolemic no edema no JVD     2/19: Sitting up in bed. No acute distress. Asking if she can be discharged. She is on nasal canula which she does use chronically. Appetite is good. She denies any dyspnea, chest pain, or palpitations.     2/20: sitting up in bed. No acute distress. She is on 6 liters of oxygen. Still having wheezing. Pulmonology was consulted today. Her appetite is fair. She denies diarrhea, nausea, or vomiting.     2/21 charts reviewed     2/22: Sitting on the edge of the bed. On 6 liters of oxygen via nasal canula. No acute distress. Wants to go home. Appetite is good.     History of Present Ilness:    84 yo female w/ hx of CKD3, COPD (5L O2), CHF, Afib on Coumadin, Lung CA s/p radiation, who p/w dyspnea that is worse with exertion that started 1 day ago .   Nephrology asked to see for TRISTEN/CKD   Pt has CKD at baseline with cr at b/l 1.3-1.7   Pt seen in ED she is on o2/nc   Pt appear clinically euvolemic to mildly dry   Pt reports no n/v/d/cp     Past Medical History:   Diagnosis Date    Atrial fibrillation (HCC)     CHF (congestive heart failure) (HCC)     Emphysema, unspecified (HCC)     Hyperlipidemia     Hypertension     Lung cancer (HCC)     Mitral valve regurgitation     Oxygen dependent     Prolonged emergence from general anesthesia     post general anesthesia    Skin cancer     Thyroid disease        Past Surgical History:   Procedure Laterality Date    BREAST BIOPSY Right     benign    BRONCHOSCOPY N/A 04/20/2021    BRONCHOSCOPY/TRANSBRONCHIAL NEEDLE BIOPSY performed by Antony Rosario MD at [facility] ENDOSCOPY    CARDIOVERSION  10/06/2023    Dr Jones    INTRACAPSULAR CATARACT EXTRACTION Right 12/16/2021    RIGHT EYE CATARACT EXTRACTION IOL IMPLANT performed by Kay Pimentel MD at [facility] OR  measuring up to 3.0 cm.         XR CHEST PORTABLE   Final Result   1. Hyperinflation.   2. Nonspecific interstitial prominence, chronic changes versus component of   edema or atypical infection in the acute setting.   3. Trace bilateral pleural effusions/thickening.  Hazy bibasilar opacities   likely atelectasis/scar or edema/inflammatory disease.         US RETROPERITONEAL COMPLETE   Final Result      Possible mild left renal cortical thinning/atrophy.  Otherwise negative   examination.         XR CHEST PORTABLE   Final Result   1.  Lung hyperinflation with coarsened interstitial markings.   Atherosclerotic disease and cardiomegaly.      2.  Worsened opacities in the left greater than right mid and lower lungs   with pleural effusions.  Probably represents superimposed infection.  Edema   could potentially have a similar appearance.  No pneumothorax.      RECOMMENDATION:   (Recommend upright PA and lateral chest radiographs 10-12 weeks after   resolution of patient's symptoms to ensure complete resolution of   radiographic findings.).             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

## 2024-02-22 NOTE — PROGRESS NOTES
Pharmacy Consultation Note  (Warfarin Dosing and Monitoring)    Initial consult date: 2/17  Consulting Provider: TANA Davies    Krystal Hyatt is a 83 y.o. female for whom pharmacy has been asked to manage warfarin therapy.     Weight:   Wt Readings from Last 1 Encounters:   02/21/24 76 kg (167 lb 9.6 oz)       TSH:    Lab Results   Component Value Date/Time    TSH 7.44 01/14/2024 06:00 AM       Hepatic Function Panel:                            Lab Results   Component Value Date/Time    ALKPHOS 69 02/16/2024 09:59 PM    ALT 16 02/16/2024 09:59 PM    AST 27 02/16/2024 09:59 PM    PROT 6.8 02/16/2024 09:59 PM    BILITOT 0.8 02/16/2024 09:59 PM    BILIDIR 0.2 10/15/2017 02:40 AM    IBILI 0.7 10/15/2017 02:40 AM    LABALBU 3.8 02/16/2024 09:59 PM    LABALBU 4.4 05/04/2012 11:00 AM       Current significant warfarin drug-drug interactions include:   -Levothyroxine, ceftriaxone, doxycyline, rosuvastatin: may enhance the anticoagulatory effect of warfarin.     Recent Labs     02/20/24  0347 02/21/24  1210 02/22/24  0330   HGB 7.8* 8.8* 8.4*    148 148       Date Warfarin Dose INR Heparin or LMWH Comment   2/17 5 mg 1.5 x    2/18 NO DOSE 3.1 x    2/19 NO DOSE 3.8 x    2/20 5 mg 2.8 x    2/21 5 mg 2.2 x    2/22 2.5 mg 2.6                                   Assessment:  Patient is a 83 y.o. female on warfarin for Atrial Fibrillation.  Patient's home warfarin dosing regimen is 5 mg once daily per med rec.   Goal INR 2 - 3  INR 2.6  INR has been labile. Home regimen may need to be adjusted.     Plan:  Will order lower dose of warfarin tonight.  Warfarin 2.5 mg po x 1 tonight..  Daily PT/INR until the INR is stable within the therapeutic range  Pharmacist will follow and monitor/adjust dosing as necessary      Brett Junior RPH 2/22/2024 9:41 AM    RADHA: [phone]  SEY: [phone]  SJW: [phone]

## 2024-02-22 NOTE — DISCHARGE SUMMARY
Patient ambulatory to infusion center. Pump d/c completed. Port de-accessed. Patient discharged home ambulatory.  Aware of [facility] appt tomorrow at 2:30 pm.    148 148   MPV 9.8 10.0 10.3       No results for input(s): \"GLUMET\" in the last 72 hours.        Imaging:  US RETROPERITONEAL COMPLETE    Result Date: 2/17/2024  EXAM: US Retroperitoneal Complete, Renal EXAM DATE/TIME: 2/17/2024 8:27 am CLINICAL HISTORY: ORDERING SYSTEM PROVIDED HISTORY: renal US - lauren  TECHNOLOGIST PROVIDED HISTORY:  Reason for exam:->renal US - lauren  What reading provider will be dictating this exam?->CRC TECHNIQUE: Real-time complete ultrasound of the retroperitoneum with image documentation. COMPARISON: No relevant prior studies available. FINDINGS: Right kidney:  No acute findings.  No stones.  No hydronephrosis.  The right kidney measures 10.1 x 4.3 x 3.9 cm. Left kidney:  Possible mild left renal cortical thinning/atrophy.  No stones. No hydronephrosis.  The left kidney measures 9.9 x 3.9 x 4.5 cm. Bladder:  Unremarkable as visualized.  Neither ureteral jet was identified on evaluation of the urinary bladder.     Possible mild left renal cortical thinning/atrophy.  Otherwise negative examination.     XR CHEST PORTABLE    Result Date: 2/16/2024  EXAMINATION: ONE XRAY VIEW OF THE CHEST 2/16/2024 11:06 pm COMPARISON: Chest series from January 13, 2024 HISTORY: ORDERING SYSTEM PROVIDED HISTORY: short of breath TECHNOLOGIST PROVIDED HISTORY: Reason for exam:->short of breath FINDINGS: Lung hyperinflation with coarsened interstitial markings.  Worsened opacities in the left mid to lower lung and right lower lung compared to prior exam suggestive of superimposed infection.  There appear to be small pleural effusions.  No pneumothorax.  Atherosclerotic disease and cardiomegaly. Normal pulmonary vascularity.  Osseous and thoracic soft tissue structures demonstrate no acute findings.     1.  Lung hyperinflation with coarsened interstitial markings. Atherosclerotic disease and cardiomegaly. 2.  Worsened opacities in the left greater than right mid and lower lungs with pleural effusions.     fluticasone-umeclidin-vilant (TRELEGY ELLIPTA) 100-62.5-25 MCG/ACT AEPB inhaler Inhale 1 puff into the lungs daily  Qty: 1 each, Refills: 3      albuterol (PROVENTIL) (2.5 MG/3ML) 0.083% nebulizer solution Take 3 mLs by nebulization 4 times daily as needed for Wheezing  Qty: 120 each, Refills: 1    Associated Diagnoses: COPD, very severe (HCC)           STOP taking these medications       furosemide (LASIX) 20 MG tablet Comments:   Reason for Stopping:         bumetanide (BUMEX) 1 MG tablet Comments:   Reason for Stopping:         Apoaequorin (PREVAGEN EXTRA STRENGTH) 20 MG CAPS Comments:   Reason for Stopping:                 Note that greater than 30 minutes was spent in preparing discharge papers, discussing discharge with patient, medication review, etc.      Signed:  Electronically signed by Nitin Gaines MD on 2/22/2024 at 1:49 PM

## 2024-02-23 ENCOUNTER — APPOINTMENT (OUTPATIENT)
Dept: GENERAL RADIOLOGY | Age: 84
DRG: 291 | End: 2024-02-23
Payer: MEDICARE

## 2024-02-23 ENCOUNTER — HOSPITAL ENCOUNTER (INPATIENT)
Age: 84
LOS: 1 days | Discharge: HOME HEALTH | DRG: 291 | End: 2024-02-26
Attending: STUDENT IN AN ORGANIZED HEALTH CARE EDUCATION/TRAINING PROGRAM | Admitting: STUDENT IN AN ORGANIZED HEALTH CARE EDUCATION/TRAINING PROGRAM
Payer: MEDICARE

## 2024-02-23 ENCOUNTER — CLINICAL DOCUMENTATION ONLY (OUTPATIENT)
Facility: CLINIC | Age: 84
End: 2024-02-23

## 2024-02-23 DIAGNOSIS — J90 PLEURAL EFFUSION: ICD-10-CM

## 2024-02-23 DIAGNOSIS — D64.9 ANEMIA, UNSPECIFIED TYPE: ICD-10-CM

## 2024-02-23 DIAGNOSIS — Z87.09 HISTORY OF COPD: ICD-10-CM

## 2024-02-23 DIAGNOSIS — I50.43 ACUTE ON CHRONIC COMBINED SYSTOLIC AND DIASTOLIC CHF (CONGESTIVE HEART FAILURE) (HCC): Primary | ICD-10-CM

## 2024-02-23 LAB
ALBUMIN SERPL-MCNC: 3.6 G/DL (ref 3.5–5.2)
ALP SERPL-CCNC: 53 U/L (ref 35–104)
ALT SERPL-CCNC: 16 U/L (ref 0–32)
ANION GAP SERPL CALCULATED.3IONS-SCNC: 10 MMOL/L (ref 7–16)
AST SERPL-CCNC: 39 U/L (ref 0–31)
BASOPHILS # BLD: 0 K/UL (ref 0–0.2)
BASOPHILS NFR BLD: 0 % (ref 0–2)
BILIRUB SERPL-MCNC: 0.6 MG/DL (ref 0–1.2)
BNP SERPL-MCNC: 1528 PG/ML (ref 0–450)
BUN SERPL-MCNC: 17 MG/DL (ref 6–23)
CALCIUM SERPL-MCNC: 9.1 MG/DL (ref 8.6–10.2)
CHLORIDE SERPL-SCNC: 107 MMOL/L (ref 98–107)
CO2 SERPL-SCNC: 25 MMOL/L (ref 22–29)
CREAT SERPL-MCNC: 0.9 MG/DL (ref 0.5–1)
EOSINOPHIL # BLD: 0 K/UL (ref 0.05–0.5)
EOSINOPHILS RELATIVE PERCENT: 0 % (ref 0–6)
ERYTHROCYTE [DISTWIDTH] IN BLOOD BY AUTOMATED COUNT: 18.6 % (ref 11.5–15)
GFR SERPL CREATININE-BSD FRML MDRD: 59 ML/MIN/1.73M2
GLUCOSE SERPL-MCNC: 110 MG/DL (ref 74–99)
HCT VFR BLD AUTO: 28.1 % (ref 34–48)
HGB BLD-MCNC: 8.7 G/DL (ref 11.5–15.5)
IMM GRANULOCYTES # BLD AUTO: 0.05 K/UL (ref 0–0.58)
IMM GRANULOCYTES NFR BLD: 1 % (ref 0–5)
INFLUENZA A BY PCR: NOT DETECTED
INFLUENZA B BY PCR: NOT DETECTED
INR PPP: 3.4
LYMPHOCYTES NFR BLD: 0.49 K/UL (ref 1.5–4)
LYMPHOCYTES RELATIVE PERCENT: 14 % (ref 20–42)
MCH RBC QN AUTO: 30.5 PG (ref 26–35)
MCHC RBC AUTO-ENTMCNC: 31 G/DL (ref 32–34.5)
MCV RBC AUTO: 98.6 FL (ref 80–99.9)
MONOCYTES NFR BLD: 0.17 K/UL (ref 0.1–0.95)
MONOCYTES NFR BLD: 5 % (ref 2–12)
NEUTROPHILS NFR BLD: 80 % (ref 43–80)
NEUTS SEG NFR BLD: 2.93 K/UL (ref 1.8–7.3)
PLATELET, FLUORESCENCE: 122 K/UL (ref 130–450)
PMV BLD AUTO: 10.6 FL (ref 7–12)
POTASSIUM SERPL-SCNC: 5.4 MMOL/L (ref 3.5–5)
PROT SERPL-MCNC: 6.2 G/DL (ref 6.4–8.3)
PROTHROMBIN TIME: 36.9 SEC (ref 9.3–12.4)
RBC # BLD AUTO: 2.85 M/UL (ref 3.5–5.5)
RBC # BLD: ABNORMAL 10*6/UL
SARS-COV-2 RDRP RESP QL NAA+PROBE: NOT DETECTED
SODIUM SERPL-SCNC: 142 MMOL/L (ref 132–146)
SPECIMEN DESCRIPTION: NORMAL
WBC OTHER # BLD: 3.6 K/UL (ref 4.5–11.5)

## 2024-02-23 PROCEDURE — 96374 THER/PROPH/DIAG INJ IV PUSH: CPT

## 2024-02-23 PROCEDURE — 93005 ELECTROCARDIOGRAM TRACING: CPT | Performed by: STUDENT IN AN ORGANIZED HEALTH CARE EDUCATION/TRAINING PROGRAM

## 2024-02-23 PROCEDURE — 71046 X-RAY EXAM CHEST 2 VIEWS: CPT

## 2024-02-23 PROCEDURE — 85025 COMPLETE CBC W/AUTO DIFF WBC: CPT

## 2024-02-23 PROCEDURE — 87635 SARS-COV-2 COVID-19 AMP PRB: CPT

## 2024-02-23 PROCEDURE — 80053 COMPREHEN METABOLIC PANEL: CPT

## 2024-02-23 PROCEDURE — 85610 PROTHROMBIN TIME: CPT

## 2024-02-23 PROCEDURE — 80162 ASSAY OF DIGOXIN TOTAL: CPT

## 2024-02-23 PROCEDURE — 87502 INFLUENZA DNA AMP PROBE: CPT

## 2024-02-23 PROCEDURE — 99285 EMERGENCY DEPT VISIT HI MDM: CPT

## 2024-02-23 PROCEDURE — 83880 ASSAY OF NATRIURETIC PEPTIDE: CPT

## 2024-02-23 RX ORDER — FUROSEMIDE 10 MG/ML
20 INJECTION INTRAMUSCULAR; INTRAVENOUS ONCE
Status: COMPLETED | OUTPATIENT
Start: 2024-02-24 | End: 2024-02-24

## 2024-02-23 ASSESSMENT — PAIN - FUNCTIONAL ASSESSMENT: PAIN_FUNCTIONAL_ASSESSMENT: NONE - DENIES PAIN

## 2024-02-23 NOTE — PROGRESS NOTES
Patient was discharged without miconazole ointment.  Called patient, no answer.  Placed script on hold

## 2024-02-24 PROBLEM — I50.43 ACUTE ON CHRONIC COMBINED SYSTOLIC AND DIASTOLIC CHF (CONGESTIVE HEART FAILURE) (HCC): Status: ACTIVE | Noted: 2024-02-24

## 2024-02-24 PROBLEM — J44.1 COPD WITH ACUTE EXACERBATION (HCC): Status: ACTIVE | Noted: 2024-02-24

## 2024-02-24 LAB
ALBUMIN SERPL-MCNC: 3.4 G/DL (ref 3.5–5.2)
ALP SERPL-CCNC: 51 U/L (ref 35–104)
ALT SERPL-CCNC: 16 U/L (ref 0–32)
ANION GAP SERPL CALCULATED.3IONS-SCNC: 8 MMOL/L (ref 7–16)
AST SERPL-CCNC: 25 U/L (ref 0–31)
BILIRUB SERPL-MCNC: 0.3 MG/DL (ref 0–1.2)
BUN SERPL-MCNC: 17 MG/DL (ref 6–23)
CALCIUM SERPL-MCNC: 9 MG/DL (ref 8.6–10.2)
CHLORIDE SERPL-SCNC: 105 MMOL/L (ref 98–107)
CO2 SERPL-SCNC: 28 MMOL/L (ref 22–29)
CREAT SERPL-MCNC: 1.1 MG/DL (ref 0.5–1)
DIGOXIN SERPL-MCNC: 0.6 NG/ML (ref 0.8–2)
GFR SERPL CREATININE-BSD FRML MDRD: 51 ML/MIN/1.73M2
GLUCOSE SERPL-MCNC: 168 MG/DL (ref 74–99)
INR PPP: 3.3
MAGNESIUM SERPL-MCNC: 1.2 MG/DL (ref 1.6–2.6)
POTASSIUM SERPL-SCNC: 3.4 MMOL/L (ref 3.5–5)
PROT SERPL-MCNC: 5.4 G/DL (ref 6.4–8.3)
PROTHROMBIN TIME: 37.3 SEC (ref 9.3–12.4)
SODIUM SERPL-SCNC: 141 MMOL/L (ref 132–146)
TROPONIN I SERPL HS-MCNC: 25 NG/L (ref 0–9)

## 2024-02-24 PROCEDURE — G0378 HOSPITAL OBSERVATION PER HR: HCPCS

## 2024-02-24 PROCEDURE — 94640 AIRWAY INHALATION TREATMENT: CPT

## 2024-02-24 PROCEDURE — 94664 DEMO&/EVAL PT USE INHALER: CPT

## 2024-02-24 PROCEDURE — 80053 COMPREHEN METABOLIC PANEL: CPT

## 2024-02-24 PROCEDURE — 99221 1ST HOSP IP/OBS SF/LOW 40: CPT | Performed by: STUDENT IN AN ORGANIZED HEALTH CARE EDUCATION/TRAINING PROGRAM

## 2024-02-24 PROCEDURE — 2580000003 HC RX 258: Performed by: STUDENT IN AN ORGANIZED HEALTH CARE EDUCATION/TRAINING PROGRAM

## 2024-02-24 PROCEDURE — 6360000002 HC RX W HCPCS: Performed by: STUDENT IN AN ORGANIZED HEALTH CARE EDUCATION/TRAINING PROGRAM

## 2024-02-24 PROCEDURE — 36415 COLL VENOUS BLD VENIPUNCTURE: CPT

## 2024-02-24 PROCEDURE — 96375 TX/PRO/DX INJ NEW DRUG ADDON: CPT

## 2024-02-24 PROCEDURE — 84484 ASSAY OF TROPONIN QUANT: CPT

## 2024-02-24 PROCEDURE — 85610 PROTHROMBIN TIME: CPT

## 2024-02-24 PROCEDURE — 2700000000 HC OXYGEN THERAPY PER DAY

## 2024-02-24 PROCEDURE — 96376 TX/PRO/DX INJ SAME DRUG ADON: CPT

## 2024-02-24 PROCEDURE — 6370000000 HC RX 637 (ALT 250 FOR IP): Performed by: STUDENT IN AN ORGANIZED HEALTH CARE EDUCATION/TRAINING PROGRAM

## 2024-02-24 PROCEDURE — 96374 THER/PROPH/DIAG INJ IV PUSH: CPT

## 2024-02-24 PROCEDURE — 83735 ASSAY OF MAGNESIUM: CPT

## 2024-02-24 RX ORDER — ALBUTEROL SULFATE 90 UG/1
2 AEROSOL, METERED RESPIRATORY (INHALATION) EVERY 6 HOURS PRN
Status: DISCONTINUED | OUTPATIENT
Start: 2024-02-24 | End: 2024-02-24 | Stop reason: CLARIF

## 2024-02-24 RX ORDER — ONDANSETRON 4 MG/1
4 TABLET, ORALLY DISINTEGRATING ORAL EVERY 8 HOURS PRN
Status: DISCONTINUED | OUTPATIENT
Start: 2024-02-24 | End: 2024-02-26 | Stop reason: HOSPADM

## 2024-02-24 RX ORDER — SODIUM CHLORIDE 0.9 % (FLUSH) 0.9 %
5-40 SYRINGE (ML) INJECTION EVERY 12 HOURS SCHEDULED
Status: DISCONTINUED | OUTPATIENT
Start: 2024-02-24 | End: 2024-02-26 | Stop reason: HOSPADM

## 2024-02-24 RX ORDER — ACETAMINOPHEN 650 MG/1
650 SUPPOSITORY RECTAL EVERY 6 HOURS PRN
Status: DISCONTINUED | OUTPATIENT
Start: 2024-02-24 | End: 2024-02-26 | Stop reason: HOSPADM

## 2024-02-24 RX ORDER — WARFARIN SODIUM 5 MG/1
5 TABLET ORAL NIGHTLY
Status: DISCONTINUED | OUTPATIENT
Start: 2024-02-24 | End: 2024-02-25

## 2024-02-24 RX ORDER — DIGOXIN 125 MCG
62.5 TABLET ORAL DAILY
Status: DISCONTINUED | OUTPATIENT
Start: 2024-02-24 | End: 2024-02-26 | Stop reason: HOSPADM

## 2024-02-24 RX ORDER — ARFORMOTEROL TARTRATE 15 UG/2ML
15 SOLUTION RESPIRATORY (INHALATION)
Status: DISCONTINUED | OUTPATIENT
Start: 2024-02-24 | End: 2024-02-26 | Stop reason: HOSPADM

## 2024-02-24 RX ORDER — METHYLPREDNISOLONE SODIUM SUCCINATE 40 MG/ML
40 INJECTION, POWDER, LYOPHILIZED, FOR SOLUTION INTRAMUSCULAR; INTRAVENOUS EVERY 12 HOURS
Status: COMPLETED | OUTPATIENT
Start: 2024-02-24 | End: 2024-02-25

## 2024-02-24 RX ORDER — ROSUVASTATIN CALCIUM 10 MG/1
10 TABLET, COATED ORAL NIGHTLY
Status: DISCONTINUED | OUTPATIENT
Start: 2024-02-24 | End: 2024-02-26 | Stop reason: HOSPADM

## 2024-02-24 RX ORDER — METOPROLOL SUCCINATE 25 MG/1
12.5 TABLET, EXTENDED RELEASE ORAL EVERY MORNING
Status: DISCONTINUED | OUTPATIENT
Start: 2024-02-24 | End: 2024-02-26 | Stop reason: HOSPADM

## 2024-02-24 RX ORDER — BUDESONIDE 0.25 MG/2ML
0.25 INHALANT ORAL
Status: DISCONTINUED | OUTPATIENT
Start: 2024-02-24 | End: 2024-02-26 | Stop reason: HOSPADM

## 2024-02-24 RX ORDER — SODIUM CHLORIDE 0.9 % (FLUSH) 0.9 %
5-40 SYRINGE (ML) INJECTION PRN
Status: DISCONTINUED | OUTPATIENT
Start: 2024-02-24 | End: 2024-02-26 | Stop reason: HOSPADM

## 2024-02-24 RX ORDER — POTASSIUM CHLORIDE 7.45 MG/ML
10 INJECTION INTRAVENOUS PRN
Status: DISCONTINUED | OUTPATIENT
Start: 2024-02-24 | End: 2024-02-26 | Stop reason: HOSPADM

## 2024-02-24 RX ORDER — POTASSIUM CHLORIDE 20 MEQ/1
40 TABLET, EXTENDED RELEASE ORAL PRN
Status: DISCONTINUED | OUTPATIENT
Start: 2024-02-24 | End: 2024-02-26 | Stop reason: HOSPADM

## 2024-02-24 RX ORDER — ALBUTEROL SULFATE 2.5 MG/3ML
2.5 SOLUTION RESPIRATORY (INHALATION)
Status: DISCONTINUED | OUTPATIENT
Start: 2024-02-24 | End: 2024-02-25

## 2024-02-24 RX ORDER — ONDANSETRON 2 MG/ML
4 INJECTION INTRAMUSCULAR; INTRAVENOUS EVERY 6 HOURS PRN
Status: DISCONTINUED | OUTPATIENT
Start: 2024-02-24 | End: 2024-02-26 | Stop reason: HOSPADM

## 2024-02-24 RX ORDER — IPRATROPIUM BROMIDE AND ALBUTEROL SULFATE 2.5; .5 MG/3ML; MG/3ML
1 SOLUTION RESPIRATORY (INHALATION)
Status: DISCONTINUED | OUTPATIENT
Start: 2024-02-24 | End: 2024-02-24 | Stop reason: CLARIF

## 2024-02-24 RX ORDER — ALBUTEROL SULFATE 2.5 MG/3ML
2.5 SOLUTION RESPIRATORY (INHALATION) EVERY 6 HOURS PRN
Status: DISCONTINUED | OUTPATIENT
Start: 2024-02-24 | End: 2024-02-26 | Stop reason: HOSPADM

## 2024-02-24 RX ORDER — MAGNESIUM SULFATE IN WATER 40 MG/ML
2000 INJECTION, SOLUTION INTRAVENOUS PRN
Status: DISCONTINUED | OUTPATIENT
Start: 2024-02-24 | End: 2024-02-26 | Stop reason: HOSPADM

## 2024-02-24 RX ORDER — ACETAMINOPHEN 325 MG/1
650 TABLET ORAL EVERY 6 HOURS PRN
Status: DISCONTINUED | OUTPATIENT
Start: 2024-02-24 | End: 2024-02-26 | Stop reason: HOSPADM

## 2024-02-24 RX ORDER — LEVOTHYROXINE SODIUM 0.05 MG/1
50 TABLET ORAL
Status: DISCONTINUED | OUTPATIENT
Start: 2024-02-24 | End: 2024-02-26 | Stop reason: HOSPADM

## 2024-02-24 RX ORDER — POLYETHYLENE GLYCOL 3350 17 G/17G
17 POWDER, FOR SOLUTION ORAL DAILY PRN
Status: DISCONTINUED | OUTPATIENT
Start: 2024-02-24 | End: 2024-02-26 | Stop reason: HOSPADM

## 2024-02-24 RX ORDER — SODIUM CHLORIDE 9 MG/ML
INJECTION, SOLUTION INTRAVENOUS PRN
Status: DISCONTINUED | OUTPATIENT
Start: 2024-02-24 | End: 2024-02-26 | Stop reason: HOSPADM

## 2024-02-24 RX ADMIN — IPRATROPIUM BROMIDE 0.5 MG: 0.5 SOLUTION RESPIRATORY (INHALATION) at 20:24

## 2024-02-24 RX ADMIN — BUDESONIDE 250 MCG: 0.25 SUSPENSION RESPIRATORY (INHALATION) at 09:49

## 2024-02-24 RX ADMIN — METHYLPREDNISOLONE SODIUM SUCCINATE 40 MG: 40 INJECTION INTRAMUSCULAR; INTRAVENOUS at 22:24

## 2024-02-24 RX ADMIN — ROSUVASTATIN CALCIUM 10 MG: 10 TABLET, FILM COATED ORAL at 22:24

## 2024-02-24 RX ADMIN — ALBUTEROL SULFATE 2.5 MG: 2.5 SOLUTION RESPIRATORY (INHALATION) at 09:49

## 2024-02-24 RX ADMIN — ARFORMOTEROL TARTRATE 15 MCG: 15 SOLUTION RESPIRATORY (INHALATION) at 20:24

## 2024-02-24 RX ADMIN — ALBUTEROL SULFATE 2.5 MG: 2.5 SOLUTION RESPIRATORY (INHALATION) at 13:08

## 2024-02-24 RX ADMIN — ARFORMOTEROL TARTRATE 15 MCG: 15 SOLUTION RESPIRATORY (INHALATION) at 09:49

## 2024-02-24 RX ADMIN — LEVOTHYROXINE SODIUM 50 MCG: 0.05 TABLET ORAL at 11:00

## 2024-02-24 RX ADMIN — SODIUM CHLORIDE, PRESERVATIVE FREE 10 ML: 5 INJECTION INTRAVENOUS at 22:24

## 2024-02-24 RX ADMIN — FUROSEMIDE 20 MG: 10 INJECTION, SOLUTION INTRAMUSCULAR; INTRAVENOUS at 00:08

## 2024-02-24 RX ADMIN — ALBUTEROL SULFATE 2.5 MG: 2.5 SOLUTION RESPIRATORY (INHALATION) at 16:24

## 2024-02-24 RX ADMIN — BUDESONIDE 250 MCG: 0.25 SUSPENSION RESPIRATORY (INHALATION) at 20:24

## 2024-02-24 RX ADMIN — ALBUTEROL SULFATE 2.5 MG: 2.5 SOLUTION RESPIRATORY (INHALATION) at 20:24

## 2024-02-24 RX ADMIN — DIGOXIN 62.5 MCG: 0.12 TABLET ORAL at 10:59

## 2024-02-24 RX ADMIN — IPRATROPIUM BROMIDE 0.5 MG: 0.5 SOLUTION RESPIRATORY (INHALATION) at 09:49

## 2024-02-24 RX ADMIN — IPRATROPIUM BROMIDE 0.5 MG: 0.5 SOLUTION RESPIRATORY (INHALATION) at 16:24

## 2024-02-24 RX ADMIN — METOPROLOL SUCCINATE 12.5 MG: 25 TABLET, EXTENDED RELEASE ORAL at 10:59

## 2024-02-24 RX ADMIN — METHYLPREDNISOLONE SODIUM SUCCINATE 40 MG: 40 INJECTION INTRAMUSCULAR; INTRAVENOUS at 10:59

## 2024-02-24 RX ADMIN — IPRATROPIUM BROMIDE 0.5 MG: 0.5 SOLUTION RESPIRATORY (INHALATION) at 13:08

## 2024-02-24 RX ADMIN — WARFARIN SODIUM 5 MG: 5 TABLET ORAL at 22:24

## 2024-02-24 NOTE — ED NOTES
..ED to Inpatient Handoff Report    Notified Kaylin that electronic handoff available and patient ready for transport to room 537.    Safety Risks: Risk of falls    Patient in Restraints: no    Constant Observer or Patient : no    Telemetry Monitoring Ordered: Yes     Cardiac Rhythm: Sinus rhythm    Order to transfer to unit without monitor: NO    Last MEWS: 0 Time completed: 0131    Deterioration Index: 37.91    Vitals:    02/23/24 2152 02/23/24 2200 02/24/24 0008 02/24/24 0131   BP: (!) 143/73  139/72 128/69   Pulse: 89   68   Resp: 14   14   Temp:    97.6 °F (36.4 °C)   TempSrc:    Oral   SpO2: 95%   99%   Weight:  75.8 kg (167 lb)     Height:  1.727 m (5' 8\")         Opportunity for questions and clarification was provided.

## 2024-02-24 NOTE — PROGRESS NOTES
4 Eyes Skin Assessment     NAME:  Krystal Hyatt  YOB: 1940  MEDICAL RECORD NUMBER:  [id number]    The patient is being assessed for  Admission    I agree that at least one RN has performed a thorough Head to Toe Skin Assessment on the patient. ALL assessment sites listed below have been assessed.      Areas assessed by both nurses:    Head, Face, Ears, Shoulders, Back, Chest, Arms, Elbows, Hands, Sacrum. Buttock, Coccyx, Ischium, Legs. Feet and Heels, and Under Medical Devices         Does the Patient have a Wound? No noted wound(s)       Leandro Prevention initiated by RN: No  Wound Care Orders initiated by RN: No    Pressure Injury (Stage 3,4, Unstageable, DTI, NWPT, and Complex wounds) if present, place Wound referral order by RN under : No    New Ostomies, if present place, Ostomy referral order under : No     Nurse 1 eSignature: Electronically signed by Kaylin Horta RN on 2/24/24 at 5:58 AM EST    **SHARE this note so that the co-signing nurse can place an eSignature**    Nurse 2 eSignature: {Esignature:[id number]}

## 2024-02-24 NOTE — PLAN OF CARE
Patient was  admitted overnight by the nocturnist.    Recent hospitalization for pna and chf exacerbation. Case discussed with family at  bedside, requested pulmonology and cardiology consults.

## 2024-02-24 NOTE — ED PROVIDER NOTES
[facility] MED SURG/TELE  EMERGENCY DEPARTMENT ENCOUNTER        Pt Name: Krystal Hyatt  MRN: [id number]  Birthdate 1940  Date of evaluation: 2/23/2024  Provider: Letty Narvaez DO  PCP: Rom Mckeon MD  Note Started: 9:29 PM EST 2/23/24    CHIEF COMPLAINT       Chief Complaint   Patient presents with    Shortness of Breath     97% at pivot on 5 liters. Discharged yesterday from upstairs for COPD exacerbation        HISTORY OF PRESENT ILLNESS: 1 or more Elements   History From: patient    Limitations to history : None    Krystal Hyatt is a 83 y.o. female with a history of CHF, COPD, atrial fibrillation anticoagulated on Coumadin presenting to the emergency department complaining of shortness of breath.  Patient states that her symptoms have been gradual onset over the past several days, persistent, moderate in severity, nothing makes it better.  Is worse with exertion.  Patient states that she was just admitted here and discharged yesterday.  She states that her pulmonologist is Dr. Aponte.  The patient states that while she was in the hospital they decreased her oxygen from 5 L to 2 to 3 L.  She states that last night she felt very short of breath and her oxygen level dropped to 83%.  She states that she was unable to increase oxygen back to 5 L for some reason.  Patient states that her legs have been swollen since she left the hospital.  Patient was discharged on steroids and antibiotics and she has taken 1 dose since she left here.  Patient denies any chest pain, abdominal pain, nausea, vomiting, diarrhea, lightheadedness, dizziness, syncope, recent hospitalization, recent illness, or other acute symptoms or concerns.     Nursing Notes were all reviewed and agreed with or any disagreements were addressed in the HPI.    REVIEW OF SYSTEMS :      Review of Systems    Positives and Pertinent negatives as per HPI.     SURGICAL HISTORY     Past Surgical History:   Procedure Laterality Date

## 2024-02-24 NOTE — H&P
[facility] Hospitalist Group History and Physical      CHIEF COMPLAINT: Shortness of breath    History of Present Illness: 83-year-old lady with past medical history significant for COPD, hypertension, hyperlipidemia, congestive heart failure, atrial fibrillation presented to ED with worsening shortness of breath.  She was recently admitted with COPD exacerbation, uses oxygen by nasal cannula at 5 L close yesterday.  Overnight her shortness of breath worsened.  Worse with exertion.  She follows with a pulmonologist outpatient.  She stated that on discharge her oxygen was increased from 5 to 2 to 3 L.  As she felt short of breath yesterday her oxygen level dropped into 80s.  Denies any chest pain, belly pain, nausea vomiting diarrhea, no worsening cough, no lightheadedness or dizziness, no focal deficits, no fever or chills.  She also stated that her legs has been swollen and is getting worse since discharge yesterday.    Informant(s) for H&P: Patient    REVIEW OF SYSTEMS:  A comprehensive review of systems was negative except for: what is in the HPI      PMH:  Past Medical History:   Diagnosis Date    Atrial fibrillation (HCC)     CHF (congestive heart failure) (HCC)     Emphysema, unspecified (HCC)     Hyperlipidemia     Hypertension     Lung cancer (HCC)     Mitral valve regurgitation     Oxygen dependent     Prolonged emergence from general anesthesia     post general anesthesia    Skin cancer     Thyroid disease        Surgical History:  Past Surgical History:   Procedure Laterality Date    BREAST BIOPSY Right     benign    BRONCHOSCOPY N/A 04/20/2021    BRONCHOSCOPY/TRANSBRONCHIAL NEEDLE BIOPSY performed by Antony Rosario MD at [facility] ENDOSCOPY    CARDIOVERSION  10/06/2023    Dr Jones    INTRACAPSULAR CATARACT EXTRACTION Right 12/16/2021    RIGHT EYE CATARACT EXTRACTION IOL IMPLANT performed by Kay Pimentel MD at [facility] OR    INTRACAPSULAR CATARACT EXTRACTION Left 02/24/2022    LEFT EYE CATARACT

## 2024-02-24 NOTE — ED NOTES
Department of Emergency Medicine  FIRST PROVIDER TRIAGE NOTE             Independent MLP           2/23/24  8:23 PM EST    Date of Encounter: 2/23/24   MRN: [id number]      HPI: Krystal Hyatt is a 83 y.o. female who presents to the ED for Shortness of Breath (97% at pivot on 5 liters. Discharged yesterday from upstairs for COPD exacerbation )   PT presents with shortness of breath just released from inpatient stay for copd exacerbation lauren. She had covid, then flu, then pneumonia. No fevers. She gets winded with short exertions, wears oxygen at home    ROS: Negative for abd pain, back pain, fever, vomiting, diarrhea, or urinary complaints.    PE: Gen Appearance/Constitutional: alert  CV: regular rate  Pulm: diminished breaths sounds with wheezing     Initial Plan of Care: All treatment areas with department are currently occupied. Plan to order/Initiate the following while awaiting opening in ED: labs, EKG, and imaging studies.  Initiate Treatment-Testing, Proceed toTreatment Area When Bed Available for ED Attending/MLP to Continue Care    Vitals:    02/23/24 1915   BP: (!) 146/71   Pulse: 91   Resp: 18   Temp: 98.2 °F (36.8 °C)   SpO2: 95%       Electronically signed by Carolynn Daniel PA-C   DD: 2/23/24

## 2024-02-24 NOTE — CONSULTS
CARDIOLOGY CONSULTATION    Patient Name:  Krystal Hyatt    :  1940    Reason for Consultation:   Left ventricular dysfunction    History of Present Illness:   Krystal Hyatt returns to [facility], following a recent hospitalization for an upper respiratory infection.  She has a longstanding history of recurrent palpitations and shortness of breath and fatigue following a history of onset of atrial fibrillation with a rapid ventricular response.as well as having significant underlying valvular heart disease.  Over the past several months however she has remained in sinus rhythm having had a hyperthyroid state for probably >1-year.  At this time she presented with gradual increase in shortness of breath and edema in her lower extremities.  In addition, she has a history of carcinoma of the lung and is status post history of radiation therapy and underlying chronic obstructive pulmonary disease.  From a cardiac standpoint she has fared well over the past 5 years but recently has sustained multiple readmissions for her upper respiratory status as well as recurrence of atrial fibrillation and intolerance of multiple cardiac medications specifically that of amiodarone which had maintained her in sinus rhythm following cardioversion.  She now returns once again for adjustment of her medical regimen but on this occasion with the purpose of regularizing her ventricular response.  Past Medical History:   has a past medical history of Atrial fibrillation (HCC), CHF (congestive heart failure) (HCC), Emphysema, unspecified (HCC), Hyperlipidemia, Hypertension, Lung cancer (HCC), Mitral valve regurgitation, Oxygen dependent, Prolonged emergence from general anesthesia, Skin cancer, and Thyroid disease.    Surgical History:   has a past surgical history that includes Breast biopsy (Right); Tubal ligation; bronchoscopy (N/A, 2021); Intracapsular cataract extraction (Right, 2021);  than 55 minutes face to face with Krystal Hyatt,and reviewing notes and laboratory data with greater than 50% of this time instructing and counseling the patient and her family members regarding my findings and recommendations and I have answered all questions as posed to me by MsAna Paula Olena.     Dieter Jones, DO , FACP, FACC, [facility]    NOTE:  This report was transcribed using voice recognition software.  Every effort was made to ensure accuracy; however, inadvertent computerized transcription errors may be present.

## 2024-02-25 LAB
25(OH)D3 SERPL-MCNC: 28.4 NG/ML (ref 30–100)
ALBUMIN SERPL-MCNC: 3.5 G/DL (ref 3.5–5.2)
ALBUMIN SERPL-MCNC: 3.5 G/DL (ref 3.5–5.2)
ALP SERPL-CCNC: 48 U/L (ref 35–104)
ALP SERPL-CCNC: 52 U/L (ref 35–104)
ALT SERPL-CCNC: 15 U/L (ref 0–32)
ALT SERPL-CCNC: 15 U/L (ref 0–32)
ANION GAP SERPL CALCULATED.3IONS-SCNC: 10 MMOL/L (ref 7–16)
ANION GAP SERPL CALCULATED.3IONS-SCNC: 10 MMOL/L (ref 7–16)
AST SERPL-CCNC: 20 U/L (ref 0–31)
AST SERPL-CCNC: 21 U/L (ref 0–31)
BASOPHILS # BLD: 0 K/UL (ref 0–0.2)
BASOPHILS NFR BLD: 0 % (ref 0–2)
BILIRUB SERPL-MCNC: 0.3 MG/DL (ref 0–1.2)
BILIRUB SERPL-MCNC: 0.4 MG/DL (ref 0–1.2)
BNP SERPL-MCNC: 1271 PG/ML (ref 0–450)
BUN SERPL-MCNC: 15 MG/DL (ref 6–23)
BUN SERPL-MCNC: 17 MG/DL (ref 6–23)
CALCIUM SERPL-MCNC: 8.7 MG/DL (ref 8.6–10.2)
CALCIUM SERPL-MCNC: 9.2 MG/DL (ref 8.6–10.2)
CHLORIDE SERPL-SCNC: 103 MMOL/L (ref 98–107)
CHLORIDE SERPL-SCNC: 106 MMOL/L (ref 98–107)
CO2 SERPL-SCNC: 24 MMOL/L (ref 22–29)
CO2 SERPL-SCNC: 26 MMOL/L (ref 22–29)
CREAT SERPL-MCNC: 0.9 MG/DL (ref 0.5–1)
CREAT SERPL-MCNC: 0.9 MG/DL (ref 0.5–1)
EOSINOPHIL # BLD: 0 K/UL (ref 0.05–0.5)
EOSINOPHILS RELATIVE PERCENT: 0 % (ref 0–6)
ERYTHROCYTE [DISTWIDTH] IN BLOOD BY AUTOMATED COUNT: 18.7 % (ref 11.5–15)
GFR SERPL CREATININE-BSD FRML MDRD: >60 ML/MIN/1.73M2
GFR SERPL CREATININE-BSD FRML MDRD: >60 ML/MIN/1.73M2
GLUCOSE SERPL-MCNC: 160 MG/DL (ref 74–99)
GLUCOSE SERPL-MCNC: 195 MG/DL (ref 74–99)
HCT VFR BLD AUTO: 25.5 % (ref 34–48)
HGB BLD-MCNC: 7.9 G/DL (ref 11.5–15.5)
INR PPP: 3.2
LYMPHOCYTES NFR BLD: 0.28 K/UL (ref 1.5–4)
LYMPHOCYTES RELATIVE PERCENT: 8 % (ref 20–42)
MCH RBC QN AUTO: 31 PG (ref 26–35)
MCHC RBC AUTO-ENTMCNC: 31 G/DL (ref 32–34.5)
MCV RBC AUTO: 100 FL (ref 80–99.9)
MICROORGANISM SPEC CULT: NORMAL
MICROORGANISM SPEC CULT: NORMAL
MONOCYTES NFR BLD: 0.03 K/UL (ref 0.1–0.95)
MONOCYTES NFR BLD: 1 % (ref 2–12)
MYELOCYTES ABSOLUTE COUNT: 0.03 K/UL
MYELOCYTES: 1 %
NEUTROPHILS NFR BLD: 90 % (ref 43–80)
NEUTS SEG NFR BLD: 3.25 K/UL (ref 1.8–7.3)
PLATELET, FLUORESCENCE: 126 K/UL (ref 130–450)
PMV BLD AUTO: 10.9 FL (ref 7–12)
POTASSIUM SERPL-SCNC: 3.7 MMOL/L (ref 3.5–5)
POTASSIUM SERPL-SCNC: 4 MMOL/L (ref 3.5–5)
PROT SERPL-MCNC: 5.6 G/DL (ref 6.4–8.3)
PROT SERPL-MCNC: 5.7 G/DL (ref 6.4–8.3)
PROTHROMBIN TIME: 34.7 SEC (ref 9.3–12.4)
RBC # BLD AUTO: 2.55 M/UL (ref 3.5–5.5)
RBC # BLD: ABNORMAL 10*6/UL
SERVICE CMNT-IMP: NORMAL
SERVICE CMNT-IMP: NORMAL
SODIUM SERPL-SCNC: 139 MMOL/L (ref 132–146)
SODIUM SERPL-SCNC: 140 MMOL/L (ref 132–146)
SPECIMEN DESCRIPTION: NORMAL
SPECIMEN DESCRIPTION: NORMAL
WBC OTHER # BLD: 3.6 K/UL (ref 4.5–11.5)

## 2024-02-25 PROCEDURE — 6370000000 HC RX 637 (ALT 250 FOR IP): Performed by: NURSE PRACTITIONER

## 2024-02-25 PROCEDURE — G0378 HOSPITAL OBSERVATION PER HR: HCPCS

## 2024-02-25 PROCEDURE — 85610 PROTHROMBIN TIME: CPT

## 2024-02-25 PROCEDURE — 80053 COMPREHEN METABOLIC PANEL: CPT

## 2024-02-25 PROCEDURE — 96376 TX/PRO/DX INJ SAME DRUG ADON: CPT

## 2024-02-25 PROCEDURE — 6360000002 HC RX W HCPCS: Performed by: STUDENT IN AN ORGANIZED HEALTH CARE EDUCATION/TRAINING PROGRAM

## 2024-02-25 PROCEDURE — 2580000003 HC RX 258: Performed by: STUDENT IN AN ORGANIZED HEALTH CARE EDUCATION/TRAINING PROGRAM

## 2024-02-25 PROCEDURE — 6370000000 HC RX 637 (ALT 250 FOR IP): Performed by: STUDENT IN AN ORGANIZED HEALTH CARE EDUCATION/TRAINING PROGRAM

## 2024-02-25 PROCEDURE — 6370000000 HC RX 637 (ALT 250 FOR IP): Performed by: INTERNAL MEDICINE

## 2024-02-25 PROCEDURE — 94640 AIRWAY INHALATION TREATMENT: CPT

## 2024-02-25 PROCEDURE — 6360000002 HC RX W HCPCS: Performed by: NURSE PRACTITIONER

## 2024-02-25 PROCEDURE — 83880 ASSAY OF NATRIURETIC PEPTIDE: CPT

## 2024-02-25 PROCEDURE — 82306 VITAMIN D 25 HYDROXY: CPT

## 2024-02-25 PROCEDURE — 36415 COLL VENOUS BLD VENIPUNCTURE: CPT

## 2024-02-25 PROCEDURE — 85025 COMPLETE CBC W/AUTO DIFF WBC: CPT

## 2024-02-25 PROCEDURE — 99231 SBSQ HOSP IP/OBS SF/LOW 25: CPT | Performed by: STUDENT IN AN ORGANIZED HEALTH CARE EDUCATION/TRAINING PROGRAM

## 2024-02-25 PROCEDURE — 1200000000 HC SEMI PRIVATE

## 2024-02-25 PROCEDURE — 2700000000 HC OXYGEN THERAPY PER DAY

## 2024-02-25 RX ORDER — DOXYCYCLINE HYCLATE 100 MG/1
100 CAPSULE ORAL EVERY 12 HOURS SCHEDULED
Status: COMPLETED | OUTPATIENT
Start: 2024-02-25 | End: 2024-02-26

## 2024-02-25 RX ORDER — CEFDINIR 300 MG/1
300 CAPSULE ORAL EVERY 12 HOURS SCHEDULED
Status: COMPLETED | OUTPATIENT
Start: 2024-02-25 | End: 2024-02-26

## 2024-02-25 RX ORDER — FUROSEMIDE 10 MG/ML
40 INJECTION INTRAMUSCULAR; INTRAVENOUS ONCE
Status: COMPLETED | OUTPATIENT
Start: 2024-02-25 | End: 2024-02-25

## 2024-02-25 RX ORDER — PREDNISONE 20 MG/1
40 TABLET ORAL
Status: DISCONTINUED | OUTPATIENT
Start: 2024-02-26 | End: 2024-02-26 | Stop reason: HOSPADM

## 2024-02-25 RX ORDER — IPRATROPIUM BROMIDE AND ALBUTEROL SULFATE 2.5; .5 MG/3ML; MG/3ML
1 SOLUTION RESPIRATORY (INHALATION)
Status: DISCONTINUED | OUTPATIENT
Start: 2024-02-25 | End: 2024-02-26 | Stop reason: HOSPADM

## 2024-02-25 RX ORDER — VITAMIN B COMPLEX
5000 TABLET ORAL DAILY
Status: DISCONTINUED | OUTPATIENT
Start: 2024-02-25 | End: 2024-02-26 | Stop reason: HOSPADM

## 2024-02-25 RX ADMIN — DIGOXIN 62.5 MCG: 0.12 TABLET ORAL at 08:32

## 2024-02-25 RX ADMIN — IPRATROPIUM BROMIDE AND ALBUTEROL SULFATE 1 DOSE: 2.5; .5 SOLUTION RESPIRATORY (INHALATION) at 10:31

## 2024-02-25 RX ADMIN — IPRATROPIUM BROMIDE AND ALBUTEROL SULFATE 1 DOSE: 2.5; .5 SOLUTION RESPIRATORY (INHALATION) at 15:50

## 2024-02-25 RX ADMIN — IPRATROPIUM BROMIDE AND ALBUTEROL SULFATE 1 DOSE: 2.5; .5 SOLUTION RESPIRATORY (INHALATION) at 19:38

## 2024-02-25 RX ADMIN — BUDESONIDE 250 MCG: 0.25 SUSPENSION RESPIRATORY (INHALATION) at 10:31

## 2024-02-25 RX ADMIN — ARFORMOTEROL TARTRATE 15 MCG: 15 SOLUTION RESPIRATORY (INHALATION) at 19:37

## 2024-02-25 RX ADMIN — METOPROLOL SUCCINATE 12.5 MG: 25 TABLET, EXTENDED RELEASE ORAL at 08:32

## 2024-02-25 RX ADMIN — SODIUM CHLORIDE, PRESERVATIVE FREE 10 ML: 5 INJECTION INTRAVENOUS at 22:40

## 2024-02-25 RX ADMIN — BUDESONIDE 250 MCG: 0.25 SUSPENSION RESPIRATORY (INHALATION) at 19:38

## 2024-02-25 RX ADMIN — LEVOTHYROXINE SODIUM 50 MCG: 0.05 TABLET ORAL at 08:32

## 2024-02-25 RX ADMIN — DOXYCYCLINE HYCLATE 100 MG: 100 CAPSULE ORAL at 22:40

## 2024-02-25 RX ADMIN — SODIUM CHLORIDE, PRESERVATIVE FREE 10 ML: 5 INJECTION INTRAVENOUS at 14:25

## 2024-02-25 RX ADMIN — Medication 5000 UNITS: at 15:40

## 2024-02-25 RX ADMIN — METHYLPREDNISOLONE SODIUM SUCCINATE 40 MG: 40 INJECTION INTRAMUSCULAR; INTRAVENOUS at 08:33

## 2024-02-25 RX ADMIN — METHYLPREDNISOLONE SODIUM SUCCINATE 40 MG: 40 INJECTION INTRAMUSCULAR; INTRAVENOUS at 22:40

## 2024-02-25 RX ADMIN — SODIUM CHLORIDE, PRESERVATIVE FREE 10 ML: 5 INJECTION INTRAVENOUS at 08:33

## 2024-02-25 RX ADMIN — CEFDINIR 300 MG: 300 CAPSULE ORAL at 22:40

## 2024-02-25 RX ADMIN — ARFORMOTEROL TARTRATE 15 MCG: 15 SOLUTION RESPIRATORY (INHALATION) at 10:31

## 2024-02-25 RX ADMIN — ROSUVASTATIN CALCIUM 10 MG: 10 TABLET, FILM COATED ORAL at 22:40

## 2024-02-25 RX ADMIN — FUROSEMIDE 40 MG: 10 INJECTION, SOLUTION INTRAMUSCULAR; INTRAVENOUS at 15:41

## 2024-02-25 ASSESSMENT — PAIN SCALES - GENERAL: PAINLEVEL_OUTOF10: 0

## 2024-02-25 NOTE — PLAN OF CARE
Problem: Discharge Planning  Goal: Discharge to home or other facility with appropriate resources  2/25/2024 1812 by Yareli Bunn RN  Outcome: Progressing  2/25/2024 0509 by Kaylin Horta RN  Outcome: Progressing     Problem: Safety - Adult  Goal: Free from fall injury  2/25/2024 1812 by Yareli Bunn RN  Outcome: Progressing  2/25/2024 0509 by Kaylin Horta RN  Outcome: Progressing     Problem: ABCDS Injury Assessment  Goal: Absence of physical injury  2/25/2024 0509 by Kaylin Horta, RN  Outcome: Progressing

## 2024-02-25 NOTE — PROGRESS NOTES
PROGRESS NOTE       PATIENT PROBLEM LIST:  Patient Active Problem List   Diagnosis Code    Atrial fibrillation with RVR ([facility])- Recurrent I48.91    Acute on chronic diastolic congestive heart failure (HCC) I50.33    Cardiomyopathy as manifestation of underlying disease (HCC) I43    Non-rheumatic mitral regurgitation I34.0    Acute combined systolic and diastolic congestive heart failure (HCC) I50.41    Chronic anticoagulation Z79.01    Essential hypertension I10    COPD exacerbation (HCC) J44.1    History of atrial fibrillation Z86.79    Dilated idiopathic cardiomyopathy (HCC) I42.0    Severe sinus bradycardia R00.1    Moderate tricuspid regurgitation I07.1    Hypoxia R09.02    Combined forms of age-related cataract of both eyes H25.813    Dyspnea on exertion R06.09    Mass of right lung R91.8    Supplemental oxygen dependent Z99.81    Pneumonia of both lower lobes due to infectious organism J18.9    Stage 3a chronic kidney disease (HCC) N18.31    Malignant neoplasm of right lung (HCC) C34.91    Atrial flutter with rapid ventricular response (HCC) I48.92    PAF (paroxysmal atrial fibrillation) ([facility]) I48.0    Centrilobular emphysema (HCC) J43.2    Thyroid disease E07.9    Acquired hypothyroidism E03.9    Bronchitis J40    Chronic respiratory failure with hypoxia (HCC) J96.11    Mixed hyperlipidemia E78.2    Gastroesophageal reflux disease K21.9    Primary hypothyroidism E03.9    Hyperlipidemia E78.5    Atrial fibrillation (HCC) I48.91    Atrial flutter (HCC) I48.92    Atrial fibrillation with rapid ventricular response (HCC) I48.91    COVID-19 virus infection U07.1    Iatrogenic hyperthyroidism E05.80    COVID-19 U07.1    TRISTEN (acute kidney injury) (HCC) N17.9    Thrombocytopenia (HCC) D69.6    High thyroid hormone level R79.89    Hypothyroidism E03.9    Chronic hypoxic respiratory failure (HCC) J96.11    Chronic systolic CHF (congestive heart failure) (HCC) I50.22    Acute respiratory failure with hypoxia (HCC) J96.01  E03.9    Bronchitis J40    Chronic respiratory failure with hypoxia (HCC) J96.11    Mixed hyperlipidemia E78.2    Gastroesophageal reflux disease K21.9    Primary hypothyroidism E03.9    Hyperlipidemia E78.5    Atrial fibrillation (HCC) I48.91    Atrial flutter (HCC) I48.92    Atrial fibrillation with rapid ventricular response (HCC) I48.91    COVID-19 virus infection U07.1    Iatrogenic hyperthyroidism E05.80    COVID-19 U07.1    TRISTEN (acute kidney injury) (HCC) N17.9    Thrombocytopenia (HCC) D69.6    High thyroid hormone level R79.89    Hypothyroidism E03.9    Chronic hypoxic respiratory failure (HCC) J96.11    Chronic systolic CHF (congestive heart failure) (HCC) I50.22    Acute respiratory failure with hypoxia (HCC) J96.01    Acute on chronic combined systolic and diastolic CHF (congestive heart failure) (HCC) I50.43    COPD with acute exacerbation (HCC) J44.1       RECOMMENDATIONS:  From a cardiac standpoint I see no reason why this is Olena cannot be discharged today and will give singular dose of vitamin D in anticipation of her expected result.  Her proBNP appears upper limits of normal for her age group and obviously affected by her elevated creatinine as well.  Fortunately she remains in sinus rhythm on her present medical regimen and I have encouraged her to follow-up with her primary physician Dr. Omar Frias and I will see her again as an outpatient as well.    I have spent more than 25 minutes face to face with Krystal Hyatt and reviewing notes and laboratory data, with greater than 50% of this time instructing and counseling the patient and her daughter face to face regarding my findings and recommendations and I have answered all questions as posed to me by Ms. Hyatt and her daughter who is at her bedside..    Dieter Jones, DO FACP,FACC,[facility]      NOTE:  This report was transcribed using voice recognition software.  Every effort was made to ensure accuracy; however, inadvertent

## 2024-02-25 NOTE — CONSULTS
post-nasal drip   Neck: supple without adenopathy  Cardiovascular: regular rate and rhythm without murmur or gallop  Respiratory: Clear to auscultation, diminished bilaterally without wheezing or crackles.  Air entry is symmetric  Abdomen: soft, non-tender, non-distended, normal bowel sounds  Extremities: warm,  no clubbing, slight pitting edema  Skin: no rash or lesion  Neurologic: CN II-XII grossly intact, no focal deficits    Pulmonary Function Testing personally reviewed and interpreted  8/3/23  1. Spirometry reveals no obstruction   2. There is no bronchodilator response.   3. Lung volumes indicate restriction   4. The diffusing capacity is moderate reduced     Imaging personally reviewed:  2/23/2024  IMPRESSION:  Small bilateral pleural effusions versus pleural thickening.     Bilateral atelectasis.      Echo:  4/7/2023  Left ventricle grossly normal in size.   Global hypokinesis is noted to be mild to moderate.   Estimated left ventricular ejection fraction is 38 ±5%.   Mild - moderate left ventricular concentric hypertrophy noted.   Diastolic function cannot be accurately assessed due to significant mitral   regurgitation.   Borderline dilated right ventricle.   TAPSE is decreased .   Mild mitral regurgitation is present.   The aortic valve appears moderately sclerotic.   Mild aortic regurgitation is noted.   Early calcific aortic valve disease.   Moderate tricuspid regurgitation.   RVSP is 47 mmHg.   Pulmonary hypertension is mild .   Physiologic and/or trace pulmonic regurgitation present.   Technically fair quality study.   Technically difficult study due to patient''s body habitus.   Compared to prior echo, changes noted.   Suggest clinical correlation.   Suspect underlying atrial fibrillation.    Labs:  Lab Results   Component Value Date/Time    WBC 3.6 02/23/2024 10:09 PM    RBC 2.85 02/23/2024 10:09 PM    HGB 8.7 02/23/2024 10:09 PM    HCT 28.1 02/23/2024 10:09 PM    MCV 98.6 02/23/2024 10:09 PM

## 2024-02-25 NOTE — PROGRESS NOTES
[facility] Hospitalist Progress Note    Admitting Date and Time: 2/23/2024  9:17 PM  Admit Dx: Pleural effusion [J90]  COPD with acute exacerbation (HCC) [J44.1]  History of COPD [Z87.09]  Acute on chronic combined systolic and diastolic CHF (congestive heart failure) (HCC) [I50.43]  Anemia, unspecified type [D64.9]    Subjective:  Patient is being followed for Pleural effusion [J90]  COPD with acute exacerbation (HCC) [J44.1]  History of COPD [Z87.09]  Acute on chronic combined systolic and diastolic CHF (congestive heart failure) (HCC) [I50.43]  Anemia, unspecified type [D64.9]   Pt feels breathing has improved.  Per RN: No major concerns.    ROS: denies fever, chills, cp, sob, n/v, HA unless stated above.      ipratropium 0.5 mg-albuterol 2.5 mg  1 Dose Inhalation Q4H WA RT    sodium chloride flush  5-40 mL IntraVENous 2 times per day    digoxin  62.5 mcg Oral Daily    metoprolol succinate  12.5 mg Oral QAM    rosuvastatin  10 mg Oral Nightly    levothyroxine  50 mcg Oral QAM AC    warfarin  5 mg Oral Nightly    methylPREDNISolone  40 mg IntraVENous Q12H    budesonide  0.25 mg Nebulization BID RT    And    arformoterol tartrate  15 mcg Nebulization BID RT     sodium chloride flush, 5-40 mL, PRN  sodium chloride, , PRN  potassium chloride, 40 mEq, PRN   Or  potassium alternative oral replacement, 40 mEq, PRN   Or  potassium chloride, 10 mEq, PRN  magnesium sulfate, 2,000 mg, PRN  ondansetron, 4 mg, Q8H PRN   Or  ondansetron, 4 mg, Q6H PRN  polyethylene glycol, 17 g, Daily PRN  acetaminophen, 650 mg, Q6H PRN   Or  acetaminophen, 650 mg, Q6H PRN  albuterol, 2.5 mg, Q6H PRN         Objective:    /68   Pulse 72   Temp 98.1 °F (36.7 °C)   Resp 16   Ht 1.727 m (5' 8\")   Wt 75.5 kg (166 lb 7.2 oz)   SpO2 95%   BMI 25.31 kg/m²     General Appearance: alert and oriented to person, place and time and in no acute distress  Skin: warm and dry  Head: normocephalic and atraumatic  Eyes: pupils equal,

## 2024-02-25 NOTE — PROGRESS NOTES
Pharmacy Consultation Note  (Warfarin Dosing and Monitoring)    Initial consult date: 2/25  Consulting Provider: Archie    Krystal Hyatt is a 83 y.o. female for whom pharmacy has been asked to manage warfarin therapy.     Weight:   Wt Readings from Last 1 Encounters:   02/24/24 75.5 kg (166 lb 7.2 oz)       TSH:    Lab Results   Component Value Date/Time    TSH 7.44 01/14/2024 06:00 AM       Hepatic Function Panel:                            Lab Results   Component Value Date/Time    ALKPHOS 52 02/25/2024 04:00 AM    ALT 15 02/25/2024 04:00 AM    AST 21 02/25/2024 04:00 AM    PROT 5.6 02/25/2024 04:00 AM    BILITOT 0.4 02/25/2024 04:00 AM    BILIDIR 0.2 10/15/2017 02:40 AM    IBILI 0.7 10/15/2017 02:40 AM    LABALBU 3.5 02/25/2024 04:00 AM    LABALBU 4.4 05/04/2012 11:00 AM       Current significant warfarin drug-drug interactions include: steroids (variable/dose dependent)    Recent Labs     02/23/24  2209   HGB 8.7*     Date Warfarin Dose INR Heparin or LMWH Comment   2/25 HOLD 3.2 --                                  Assessment:  Patient is a 83 y.o. female on warfarin for Atrial Fibrillation.  Patient's home warfarin dosing regimen is 2.5mg daily.   Goal INR 2 - 3  INR 3.2  today    Plan:  Warfarin ON HOLD tonight  Daily PT/INR until the INR is stable within the therapeutic range  Pharmacist will follow and monitor/adjust dosing as necessary    Thank you for this consult,    Christiana Puri, [facility] 2/25/2024 1:18 PM    RADHA: [phone]  SEY: [phone]  SJW: [phone]

## 2024-02-25 NOTE — PLAN OF CARE
Problem: Discharge Planning  Goal: Discharge to home or other facility with appropriate resources  Outcome: Progressing     Problem: Safety - Adult  Goal: Free from fall injury  2/25/2024 0509 by Kaylin Horta, YIN  Outcome: Progressing  2/24/2024 2354 by Yareli Bunn, RN  Outcome: Progressing     Problem: ABCDS Injury Assessment  Goal: Absence of physical injury  Outcome: Progressing

## 2024-02-26 VITALS
HEART RATE: 77 BPM | BODY MASS INDEX: 25.29 KG/M2 | OXYGEN SATURATION: 94 % | RESPIRATION RATE: 18 BRPM | WEIGHT: 166.89 LBS | TEMPERATURE: 97.3 F | HEIGHT: 68 IN | SYSTOLIC BLOOD PRESSURE: 145 MMHG | DIASTOLIC BLOOD PRESSURE: 69 MMHG

## 2024-02-26 LAB
ANION GAP SERPL CALCULATED.3IONS-SCNC: 11 MMOL/L (ref 7–16)
BUN SERPL-MCNC: 19 MG/DL (ref 6–23)
CALCIUM SERPL-MCNC: 9.4 MG/DL (ref 8.6–10.2)
CHLORIDE SERPL-SCNC: 101 MMOL/L (ref 98–107)
CO2 SERPL-SCNC: 29 MMOL/L (ref 22–29)
CREAT SERPL-MCNC: 0.9 MG/DL (ref 0.5–1)
EKG ATRIAL RATE: 75 BPM
EKG P AXIS: 34 DEGREES
EKG P-R INTERVAL: 184 MS
EKG Q-T INTERVAL: 402 MS
EKG QRS DURATION: 80 MS
EKG QTC CALCULATION (BAZETT): 448 MS
EKG R AXIS: 30 DEGREES
EKG T AXIS: 77 DEGREES
EKG VENTRICULAR RATE: 75 BPM
ERYTHROCYTE [DISTWIDTH] IN BLOOD BY AUTOMATED COUNT: 18.5 % (ref 11.5–15)
GFR SERPL CREATININE-BSD FRML MDRD: >60 ML/MIN/1.73M2
GLUCOSE SERPL-MCNC: 160 MG/DL (ref 74–99)
HCT VFR BLD AUTO: 28.1 % (ref 34–48)
HGB BLD-MCNC: 8.5 G/DL (ref 11.5–15.5)
INR PPP: 3.5
MAGNESIUM SERPL-MCNC: 1.3 MG/DL (ref 1.6–2.6)
MCH RBC QN AUTO: 30.7 PG (ref 26–35)
MCHC RBC AUTO-ENTMCNC: 30.2 G/DL (ref 32–34.5)
MCV RBC AUTO: 101.4 FL (ref 80–99.9)
PLATELET, FLUORESCENCE: 142 K/UL (ref 130–450)
PMV BLD AUTO: 10.6 FL (ref 7–12)
POTASSIUM SERPL-SCNC: 3.9 MMOL/L (ref 3.5–5)
PROTHROMBIN TIME: 38.4 SEC (ref 9.3–12.4)
RBC # BLD AUTO: 2.77 M/UL (ref 3.5–5.5)
SODIUM SERPL-SCNC: 141 MMOL/L (ref 132–146)
WBC OTHER # BLD: 4.9 K/UL (ref 4.5–11.5)

## 2024-02-26 PROCEDURE — 93010 ELECTROCARDIOGRAM REPORT: CPT | Performed by: INTERNAL MEDICINE

## 2024-02-26 PROCEDURE — 6370000000 HC RX 637 (ALT 250 FOR IP): Performed by: NURSE PRACTITIONER

## 2024-02-26 PROCEDURE — 83735 ASSAY OF MAGNESIUM: CPT

## 2024-02-26 PROCEDURE — 85027 COMPLETE CBC AUTOMATED: CPT

## 2024-02-26 PROCEDURE — 36415 COLL VENOUS BLD VENIPUNCTURE: CPT

## 2024-02-26 PROCEDURE — 97530 THERAPEUTIC ACTIVITIES: CPT

## 2024-02-26 PROCEDURE — 99239 HOSP IP/OBS DSCHRG MGMT >30: CPT | Performed by: INTERNAL MEDICINE

## 2024-02-26 PROCEDURE — 6360000002 HC RX W HCPCS: Performed by: STUDENT IN AN ORGANIZED HEALTH CARE EDUCATION/TRAINING PROGRAM

## 2024-02-26 PROCEDURE — 85610 PROTHROMBIN TIME: CPT

## 2024-02-26 PROCEDURE — 97161 PT EVAL LOW COMPLEX 20 MIN: CPT

## 2024-02-26 PROCEDURE — 2700000000 HC OXYGEN THERAPY PER DAY

## 2024-02-26 PROCEDURE — 94640 AIRWAY INHALATION TREATMENT: CPT

## 2024-02-26 PROCEDURE — 97165 OT EVAL LOW COMPLEX 30 MIN: CPT

## 2024-02-26 PROCEDURE — 6370000000 HC RX 637 (ALT 250 FOR IP): Performed by: STUDENT IN AN ORGANIZED HEALTH CARE EDUCATION/TRAINING PROGRAM

## 2024-02-26 PROCEDURE — 2580000003 HC RX 258: Performed by: STUDENT IN AN ORGANIZED HEALTH CARE EDUCATION/TRAINING PROGRAM

## 2024-02-26 PROCEDURE — 6370000000 HC RX 637 (ALT 250 FOR IP): Performed by: INTERNAL MEDICINE

## 2024-02-26 PROCEDURE — 80048 BASIC METABOLIC PNL TOTAL CA: CPT

## 2024-02-26 RX ORDER — LANOLIN ALCOHOL/MO/W.PET/CERES
800 CREAM (GRAM) TOPICAL ONCE
Status: COMPLETED | OUTPATIENT
Start: 2024-02-26 | End: 2024-02-26

## 2024-02-26 RX ORDER — CHOLECALCIFEROL (VITAMIN D3) 50 MCG
2000 TABLET ORAL DAILY
Qty: 30 TABLET | Refills: 1 | Status: ON HOLD | OUTPATIENT
Start: 2024-02-27

## 2024-02-26 RX ORDER — CEFUROXIME AXETIL 500 MG/1
500 TABLET ORAL 2 TIMES DAILY
Qty: 4 TABLET | Refills: 0 | Status: SHIPPED | OUTPATIENT
Start: 2024-02-26 | End: 2024-02-28

## 2024-02-26 RX ADMIN — METOPROLOL SUCCINATE 12.5 MG: 25 TABLET, EXTENDED RELEASE ORAL at 08:26

## 2024-02-26 RX ADMIN — ARFORMOTEROL TARTRATE 15 MCG: 15 SOLUTION RESPIRATORY (INHALATION) at 08:07

## 2024-02-26 RX ADMIN — Medication 5000 UNITS: at 08:27

## 2024-02-26 RX ADMIN — IPRATROPIUM BROMIDE AND ALBUTEROL SULFATE 1 DOSE: 2.5; .5 SOLUTION RESPIRATORY (INHALATION) at 12:06

## 2024-02-26 RX ADMIN — SODIUM CHLORIDE, PRESERVATIVE FREE 10 ML: 5 INJECTION INTRAVENOUS at 08:31

## 2024-02-26 RX ADMIN — IPRATROPIUM BROMIDE AND ALBUTEROL SULFATE 1 DOSE: 2.5; .5 SOLUTION RESPIRATORY (INHALATION) at 15:48

## 2024-02-26 RX ADMIN — CEFDINIR 300 MG: 300 CAPSULE ORAL at 08:27

## 2024-02-26 RX ADMIN — LEVOTHYROXINE SODIUM 50 MCG: 0.05 TABLET ORAL at 06:40

## 2024-02-26 RX ADMIN — BUDESONIDE 250 MCG: 0.25 SUSPENSION RESPIRATORY (INHALATION) at 08:07

## 2024-02-26 RX ADMIN — IPRATROPIUM BROMIDE AND ALBUTEROL SULFATE 1 DOSE: 2.5; .5 SOLUTION RESPIRATORY (INHALATION) at 08:07

## 2024-02-26 RX ADMIN — Medication 800 MG: at 16:32

## 2024-02-26 RX ADMIN — PREDNISONE 40 MG: 20 TABLET ORAL at 08:27

## 2024-02-26 RX ADMIN — DOXYCYCLINE HYCLATE 100 MG: 100 CAPSULE ORAL at 08:27

## 2024-02-26 RX ADMIN — DIGOXIN 62.5 MCG: 0.12 TABLET ORAL at 08:27

## 2024-02-26 ASSESSMENT — PAIN SCALES - GENERAL: PAINLEVEL_OUTOF10: 0

## 2024-02-26 NOTE — PLAN OF CARE
Problem: Discharge Planning  Goal: Discharge to home or other facility with appropriate resources  2/26/2024 0640 by Kaylin Horta RN  Outcome: Progressing  2/25/2024 1812 by Yareli Bunn RN  Outcome: Progressing     Problem: Safety - Adult  Goal: Free from fall injury  2/26/2024 0640 by Kaylin Horta RN  Outcome: Progressing  2/25/2024 1812 by Yareli Bunn RN  Outcome: Progressing     Problem: ABCDS Injury Assessment  Goal: Absence of physical injury  Outcome: Progressing

## 2024-02-26 NOTE — PROGRESS NOTES
Pharmacy Consultation Note  (Warfarin Dosing and Monitoring)    Initial consult date: 2/25  Consulting Provider: Archie    Krystal Hyatt is a 83 y.o. female for whom pharmacy has been asked to manage warfarin therapy.     Weight:   Wt Readings from Last 1 Encounters:   02/26/24 75.7 kg (166 lb 14.2 oz)       TSH:    Lab Results   Component Value Date/Time    TSH 7.44 01/14/2024 06:00 AM       Hepatic Function Panel:                            Lab Results   Component Value Date/Time    ALKPHOS 48 02/25/2024 02:25 PM    ALT 15 02/25/2024 02:25 PM    AST 20 02/25/2024 02:25 PM    PROT 5.7 02/25/2024 02:25 PM    BILITOT 0.3 02/25/2024 02:25 PM    BILIDIR 0.2 10/15/2017 02:40 AM    IBILI 0.7 10/15/2017 02:40 AM    LABALBU 3.5 02/25/2024 02:25 PM    LABALBU 4.4 05/04/2012 11:00 AM       Current significant warfarin drug-drug interactions include:   -Levothyroxine, cefdinir (x1), doxycyline (x2), methylprednisolone (x4), prednisone, rosuvastatin: may enhance the anticoagulatory effect of warfarin.      Recent Labs     02/23/24  2209 02/25/24  1425   HGB 8.7* 7.9*       Date Warfarin Dose INR Heparin or LMWH Comment   2/17 5 mg 1.5 x     2/18 NO DOSE 3.1 x     2/19 NO DOSE 3.8 x     2/20 5 mg 2.8 x     2/21 5 mg 2.2 x     2/22 2.5 mg 2.6 x     DISCHARGED ON 2/22 2/25 NO DOSE 3.2 x      2/26 NO DOSE 3.5 x                                   Assessment:  Patient is a 83 y.o. female on warfarin for Atrial Fibrillation.  Patient's home warfarin dosing regimen is 2.5mg daily.   Goal INR 2 - 3  INR 3.5  today    Plan:  NO DOSE of warfarin tonight  Daily PT/INR until the INR is stable within the therapeutic range  Pharmacist will follow and monitor/adjust dosing as necessary      Brett Junior RPH 2/26/2024 11:58 AM    SEB: [phone]  SEY: [phone]  SJW: [phone]

## 2024-02-26 NOTE — ACP (ADVANCE CARE PLANNING)
Advance Care Planning   Healthcare Decision Maker:    Primary Decision Maker: Olena MunizKrystal - [place] - [phone]    Secondary Decision Maker: Dieter Hyatt - [place] - [phone]    Ila Samuel RN,CM.

## 2024-02-26 NOTE — DISCHARGE INSTRUCTIONS
[facility] will see you after discharge  [phone]    Start taking vitamin D supplements daily    Please hold warfarin tonight and tomorrow.  See if you can get your INR checked at your PCPs office on Wednesday or Thursday (if you are unable to get your INR checked until Thursday then hold warfarin on Wednesday as well).  Further recommendations per PCP                     HEART FAILURE  / CONGESTIVE HEART FAILURE  DISCHARGE INSTRUCTIONS:  GUIDELINES TO FOLLOW AT HOME    Self- Managed Care:     MEDICATIONS:  Take your medication as directed. If you are experiencing any side effects, inform your doctor, Do not stop taking any of your medications without letting your doctor know.   Check with your doctor before taking any over-the-counter medications / herbal / or dietary supplements. They may interfere with your other medications.  Do not take ibuprofen (Advil or Motrin) and naproxen (Aleve) without talking to your doctor first. They could make your heart failure worse.         WEIGHT MONITORING:   Weigh yourself everyday (with the same scale) around the same time of the day and write it down. (you can chart them on a calendar or keep track of them on paper.   Notify your doctor of a weight gain of 3 pounds or more in 1 day   OR a total of 5 pounds or more in 1 week    Take your weight record to your doctor visits  Also, the same goes if you loose more than 3# in one day, let your heart doctor know.         DIET:   Cardiac heart healthy diet- Low saturated / low trans fat, no added salt, caffeine restricted, Low sodium diet-   No more than 2,000mg (2 grams) of salt / sodium per day (which equals to a little less than  a teaspoon of salt)  If your doctor wants you on a fluid restriction...it is usually recommended a fluid limit of 2,000cc -  Fluid restriction- 2,000 ml (milliliters) = 64 ounces = you can have 8 glasses of fluid per day (each glass 8 ounces)    Follow a low salt diet - avoid using salt at the  table, avoid / limit use of canned soups, processed / packaged foods, salted snacks, olives and pickles.  Do not use a salt substitute without checking with your doctor, they may contain a high amount of potassioum. (Mrs. Dash is safe to use).    Limit the use of alcohol       CALL YOUR DOCTOR THE FIRST DAY YOU NOTICE ANY OF THESE   SYMPTOMS:  You have a weight gain of 3 pounds or more in 1 day         OR 5 pounds or more in one week  More shortness of breath  More swelling of your stomach, legs, ankles or feet  Feeling more tired, No energy  Dry hacky cough  Dizziness  More chest pain / discomfort       (CALL 911 IF ANY OF THE FOLLOWING OCCURS  Chest pain (not relieved with nitroglycerine, if you have been prescribed this medication)  Severe shortness of breath  Faint / Pass out  Confusion / cannot think clearly  If symptoms get worse           SMOKING - TOBACCO USE:  * IF YOU SMOKE - STOP!  Kick the habit.  [facility] Program is offered at [facility] and [facility]. Call [phone] extension [phone] for more information.             ACTIVITY:   (Ask your doctor when you will be able to return to work and before starting any exercise program.  Do not drive unless unless your doctor has given you permission to do so).  Start light exercise.  Even if you can only do a small amount, exercise will help you get stronger, have more energy, help manage your weight and decrease  stress. Walking is an easy way to get exercise. Start out slowly and  increase the amount you walk as tolerated  If you become short of breath, dizzy or have chest pain; stop, sit down, and rest.  If you feel \"wiped out\" the day after you exercise, walk at a slower pace or for a shorter distance. You can gradually increase the pace or amount of time.            (Do not exercise right after a meal or in extreme temperatures, such as above 85 degrees, if the air is really

## 2024-02-26 NOTE — CARE COORDINATION
Pt from home with daughter. PCP Dr.M Ga. Has home 02 5lnc / nebs /portability through [employer]. (Verified) denies /declines any HHC/needs at discharge. Plan is to return home with daughter. Has cane/ww, but doesn't use .cardiology following. Ila Quinn RN,CM.    Pt now agress to [facility] accepts, orders on chart. Ila quinn RN,CM.

## 2024-02-26 NOTE — PROGRESS NOTES
Physician Progress Note      PATIENT:               SRINIVAS DYE  CSN #:                  [id number]  :                       1940  ADMIT DATE:       2024 9:17 PM  DISCH DATE:  RESPONDING  PROVIDER #:        Bharti Jones MD          QUERY TEXT:    Pt admitted with AECOPD and has Acute on chronic combined systolic and   diastolic CHF noted in hx and in the ED notes and Pulmonology notes. Also   noted in CARDS  If possible, please document in progress notes and discharge   summary further specificity regarding the type and acuity of CHF:    The medical record reflects the following:  Risk Factors: Acute on chronic combined systolic and diastolic CHF noted in hx   and in the ED notes adn Pulmonology notes  Clinical Indicators: chest x-ray showed small bilateral pleural effusions and   atelectasis.Congestive heart failure with reduced ejection fraction-ejection   fraction 38%, last echo done in 2023, would benefit from repeat echo,   BNP stable 1300.  Not on Lasix at this time. She has trace pitting edema to   her legs.BNP today 1271.  Her proBNP appears upper limits of normal for her   age group and obviously affected by her elevated creatinine as well per CARDS  Treatment: CARDS consult, Monitoring, Labs, 1 dose of lasix ordered    Thank you, Roopa Mcclelland Rn, CDS [phone]  Options provided:  -- Acute on Chronic Systolic and Diastolic CHF  -- Chronic Systolic and Diastolic CHF  -- Other - I will add my own diagnosis  -- Disagree - Not applicable / Not valid  -- Disagree - Clinically unable to determine / Unknown  -- Refer to Clinical Documentation Reviewer    PROVIDER RESPONSE TEXT:    This patient is in acute on chronic systolic and diastolic CHF.    Query created by: Roopa Mcclelland on 2024 12:34 PM      QUERY TEXT:    Pt admitted with AECOPD.  Noted documentation of Multilobar pneumonia - on abx   from previous admission on 24 CN by ordered Pulmonology consultant.  If   possible,

## 2024-02-26 NOTE — DISCHARGE SUMMARY
[facility] Hospitalist Physician Discharge Summary       Rom Mckeon MD  [address] [id number]  [phone]    Follow up      Rom Mckeon MD  [address] [id number] [phone]          Dieter Jones DO  [address] [id number]  [phone]    Follow up  follow up      Activity level: As tolerated     Dispo: Home      Condition on discharge: Stable     Patient ID:  Krystal Hyatt  [id number]  83 y.o.  1940    Admit date: 2/23/2024    Discharge date and time:  2/26/2024  4:26 PM    Admission Diagnoses: Principal Problem:    Acute on chronic combined systolic and diastolic CHF (congestive heart failure) (HCC)  Active Problems:    COPD with acute exacerbation (HCC)  Resolved Problems:    * No resolved hospital problems. *      Discharge Diagnoses: Principal Problem:    Acute on chronic combined systolic and diastolic CHF (congestive heart failure) (HCC)  Active Problems:    COPD with acute exacerbation (HCC)  Resolved Problems:    * No resolved hospital problems. *      Consults:  IP CONSULT TO PULMONOLOGY  IP CONSULT TO CARDIOLOGY  IP CONSULT TO IV TEAM  IP CONSULT TO PHARMACY  IP CONSULT TO IV TEAM  IP CONSULT TO HEART FAILURE NURSE/COORDINATOR  IP CONSULT TO IV TEAM  IP CONSULT TO HOME CARE NEEDS    Hospital Course:   Patient Krystal Hyatt is a 83 y.o. presented with Pleural effusion [J90]  COPD with acute exacerbation (HCC) [J44.1]  History of COPD [Z87.09]  Acute on chronic combined systolic and diastolic CHF (congestive heart failure) (HCC) [I50.43]  Anemia, unspecified type [D64.9]    Patient is an 83-year-old female who was admitted due to acute hypoxic respiratory failure 2/2 acute COPD and CHF exacerbation.  Cardiology and pulmonology following.  Placed on breathing treatments, IV steroids per pulm.  Lasix IV given.  Symptoms improved.  Ambulated with no issues.  At her baseline 5 L NC.  She will be discharged home in

## 2024-02-26 NOTE — PROGRESS NOTES
OCCUPATIONAL THERAPY INITIAL EVALUATION    [facility]   [address] [facility]        Date:2024                                                  Patient Name: Krystal Hyatt    MRN: [id number]    : 1940    Room: [address] [place] [address]     Evaluating OT: Mariah Lester OTR/L #[id number]    Referring Provider:  Getachew Campos MD     Specific Provider Orders/Date:  OT Eval and Treat , 2024     Diagnosis:   1. Acute on chronic combined systolic and diastolic CHF (congestive heart failure) (HCC)    2. History of COPD    3. Anemia, unspecified type    4. Pleural effusion        Surgery: None      Pertinent Medical History: A-fib, CHF, HTN, lung CA, thyroid disease     Precautions:  Fall Risk, alarm, O2      Assessment of current deficits    [x] Functional mobility  [x]ADLs  [x] Strength               []Cognition    [x] Functional transfers   [x] IADLs         [x] Safety Awareness   [x]Endurance    [] Fine Coordination              [x] Balance      [] Vision/perception   []Sensation     []Gross Motor Coordination  [] ROM  [] Delirium                   [] Motor Control     OT PLAN OF CARE   OT POC based on physician orders, patient diagnosis and results of clinical assessment    Frequency/Duration 1-3 days/wk for 2 weeks PRN     Specific OT Treatment Interventions to include:   * Instruction/training on adapted ADL techniques and AE recommendations to increase functional independence within precautions       * Training on energy conservation strategies, correct breathing pattern and techniques to improve independence/tolerance for self-care routine  * Functional transfer/mobility training/DME recommendations for increased independence, safety, and fall prevention  * Patient/Family education to increase follow through with safety techniques and functional independence  * Recommendation of environmental modifications for increased safety with functional  and safety.     Modified [place] with use of ww    Balance Sitting:     Static: good    Dynamic: good  Standing: fair plus with ww    Sitting:     Static: good    Dynamic: good  Standing: good with ww   Activity Tolerance fair    Increase standing tolerance >3  minutes for improved engagement with functional transfers and indep in ADLs     Visual/  Perceptual Glasses: Yes     Reports changes in vision since admission: No      NA      Hand Dominance:      AROM (PROM) Strength Additional Info:  Goal:   RUE  WFL 4-/5 good  and wfl FMC/dexterity noted during ADL tasks   Improve overall RUE strength  for participation in functional tasks   LUE WFL 4-/5 good  and wfl FMC/dexterity noted during ADL tasks   Improve overall LUE strength  for participation in functional tasks     Hearing:  WFL   Sensation:   No c/o numbness or tingling  Tone:  WFL   Edema: None      Vitals: 5L   SpO2 at rest: 93% SpO2 with activity: 87-89% SpO2 at end of session: 93%     Comments: RN cleared patient for OT.   Upon arrival patient in chair, daughter in room. Therapist facilitated and instructed pt on adapted  techniques & compensatory strategies to improve safety and independence with basic ADLs, bed mobility, functional transfers and mobility  to allow pt to achieve highest level of independence and safely.  Pt demonstrated fair plus understanding of education & follow through.  At end of session, patient was in chair with call light and phone within reach, all lines and tubes intact.  Overall, patient demonstrated  decreased independence and safety during completion of ADL tasks.  Pt would benefit from continued skilled OT to increase safety and independence with completion of ADL tasks and functional mobility for improved quality of life.       Treatment: OT treatment provided this date includes:   Instructions/training on safety, sequencing, and adapted techniques for completion of ADLs.  Instruction/training on safe

## 2024-02-26 NOTE — PROGRESS NOTES
Physical Therapy  Facility/Department: [address] MED SURG/TELE  Physical Therapy Initial Assessment    Name: Krystal Hyatt  : 1940  MRN: [id number]  Date of Service: 2024    Attending Provider:  Bharti Jones MD    Evaluating PT:  Luis E Sharp Jr., P.T.    Room #:  [facility]  Diagnosis:  Pleural effusion [J90]  COPD with acute exacerbation ([facility]) [J44.1]  History of COPD [Z87.09]  Acute on chronic combined systolic and diastolic CHF (congestive heart failure) ([facility]) [I50.43]  Anemia, unspecified type [D64.9]  Precautions:  falls, O2 sat drops with activity     SUBJECTIVE:    Pt lives with her daughter and son in a 2 story home with a ramp to enter.  Her bed and bath are on the first floor.  Pt ambulated with no AD PTA. She uses 5L Home O2.  She has a cane and ww if needed.     OBJECTIVE:   Initial Evaluation  Date: 24 Treatment Short Term/ Long Term   Goals   Was pt agreeable to Eval/treatment? yes     Does pt have pain? No c/o pain     Bed Mobility  Rolling: Independent  Supine to sit: Independent  Sit to supine: NA  Scooting: Independent  Independent   Transfers Sit to stand: SBA  Stand to sit: SBA  Stand pivot: SBA/CGA  supervision   Ambulation   75 feet with no AD SBA/CGA  150 feet with AAD if needed SBA   Stair negotiation: ascended and descended NA  NA   AM-PAC 6 Clicks        BLE ROM is WFL.   BLE strength is grossly 4/5 to 4+/5.   Sensation:  Pt denies numbness and tingling to extremities  Balance: sitting is Independent and standing with no AD is SBA  Endurance: fair    Vitals:  Pt was on 5L O2 throughout PT session and at rest O2 sat was 94% and after amb she had mild SOB and O2 sat  was 87% and continued to drop to 85% before rising to 91% after 2 minutes.      ASSESSMENT:    Conditions Requiring Skilled Therapeutic Intervention:    [x]Decreased strength     []Decreased ROM  [x]Decreased functional mobility  [x]Decreased balance   [x]Decreased endurance   []Decreased

## 2024-02-26 NOTE — PROGRESS NOTES
PROGRESS NOTE       PATIENT PROBLEM LIST:  Patient Active Problem List   Diagnosis Code    Atrial fibrillation with RVR ([facility])- Recurrent I48.91    Acute on chronic diastolic congestive heart failure (HCC) I50.33    Cardiomyopathy as manifestation of underlying disease (HCC) I43    Non-rheumatic mitral regurgitation I34.0    Acute combined systolic and diastolic congestive heart failure (HCC) I50.41    Chronic anticoagulation Z79.01    Essential hypertension I10    COPD exacerbation (HCC) J44.1    History of atrial fibrillation Z86.79    Dilated idiopathic cardiomyopathy (HCC) I42.0    Severe sinus bradycardia R00.1    Moderate tricuspid regurgitation I07.1    Hypoxia R09.02    Combined forms of age-related cataract of both eyes H25.813    Dyspnea on exertion R06.09    Mass of right lung R91.8    Supplemental oxygen dependent Z99.81    Pneumonia of both lower lobes due to infectious organism J18.9    Stage 3a chronic kidney disease (HCC) N18.31    Malignant neoplasm of right lung (HCC) C34.91    Atrial flutter with rapid ventricular response (HCC) I48.92    PAF (paroxysmal atrial fibrillation) ([facility]) I48.0    Centrilobular emphysema (HCC) J43.2    Thyroid disease E07.9    Acquired hypothyroidism E03.9    Bronchitis J40    Chronic respiratory failure with hypoxia (HCC) J96.11    Mixed hyperlipidemia E78.2    Gastroesophageal reflux disease K21.9    Primary hypothyroidism E03.9    Hyperlipidemia E78.5    Atrial fibrillation (HCC) I48.91    Atrial flutter (HCC) I48.92    Atrial fibrillation with rapid ventricular response (HCC) I48.91    COVID-19 virus infection U07.1    Iatrogenic hyperthyroidism E05.80    COVID-19 U07.1    TRISTEN (acute kidney injury) (HCC) N17.9    Thrombocytopenia (HCC) D69.6    High thyroid hormone level R79.89    Hypothyroidism E03.9    Chronic hypoxic respiratory failure (HCC) J96.11    Chronic systolic CHF (congestive heart failure) (HCC) I50.22    Acute respiratory failure with hypoxia (HCC) J96.01

## 2024-02-26 NOTE — PROGRESS NOTES
Missed attempt at drawing labs. Pt stated\" Im hard to get, she uses that machine to get my blood work. \" Order placed for IV team to obtain labs.

## 2024-02-26 NOTE — PROGRESS NOTES
Verbal and written discharge instructions given, voiced understanding. Discharged off the unit in wheelchair with staff and family.

## 2024-02-26 NOTE — PROGRESS NOTES
Yusuf Castro M.D.,[facility]  Carlin Barrera D.O., FCLAIER., [facility]  Kristin Bruner M.D.  Monica Aponte M.D.   NYA DoyleO.        Daily Pulmonary Progress Note    Patient:  Krystal Hyatt 83 y.o. female MRN: [id number]     Date of Service: 2/26/2024      Synopsis     We are following patient for Multifocal pneumonia, history of COPD on 5 L O2     \"CC\" shortness of breath    Code status: Full      Subjective      Patient was seen and examined. She was readmitted over the weekend for respiratory failure and shortness of breath.  She states at home her oxygen saturation fell to 83%.  She is on her baseline oxygen at 5 L nasal cannula. She did walk 75 feet [facility] 20/24.       Review of Systems:  Constitutional: Denies fever, weight loss, night sweats, and fatigue  Skin: Denies pigmentation, dark lesions, and rashes   HEENT: Denies hearing loss, tinnitus, ear drainage, epistaxis, sore throat, and hoarseness.  Cardiovascular: Denies palpitations, chest pain, and chest pressure.  Respiratory: Cough, worsening dyspnea and hypoxia  Gastrointestinal: Denies nausea, vomiting, poor appetite, diarrhea, heartburn or reflux  Genitourinary: Denies dysuria, frequency, urgency or hematuria  Musculoskeletal: Denies myalgias, muscle weakness, and bone pain  Neurological: Denies dizziness, vertigo, headache, and focal weakness  Psychological: Denies anxiety and depression  Endocrine: Denies heat intolerance and cold intolerance  Hematopoietic/Lymphatic: Denies bleeding problems and blood transfusions    24-hour events:  None    Objective   OBJECTIVE:   BP (!) 145/69   Pulse 77   Temp 97.3 °F (36.3 °C) (Oral)   Resp 16   Ht 1.727 m (5' 8\")   Wt 75.7 kg (166 lb 14.2 oz)   SpO2 96%   BMI 25.38 kg/m²   SpO2 Readings from Last 1 Encounters:   02/26/24 96%        I/O:    Intake/Output Summary (Last 24 hours) at 2/26/2024 1037  Last data filed at 2/26/2024 0751  Gross per 24 hour   Intake 360 ml   Output 1550 ml

## 2024-02-26 NOTE — PLAN OF CARE
Problem: Discharge Planning  Goal: Discharge to home or other facility with appropriate resources  2/26/2024 1636 by Haley Campbell RN  Outcome: Adequate for Discharge  Flowsheets (Taken 2/26/2024 0831)  Discharge to home or other facility with appropriate resources: Identify barriers to discharge with patient and caregiver  2/26/2024 0640 by Kaylin Horta RN  Outcome: Progressing     Problem: Safety - Adult  Goal: Free from fall injury  2/26/2024 1636 by Haley Campbell RN  Outcome: Adequate for Discharge  2/26/2024 1411 by Haley Campbell RN  Outcome: Progressing  2/26/2024 0640 by Kaylin Horta RN  Outcome: Progressing     Problem: ABCDS Injury Assessment  Goal: Absence of physical injury  2/26/2024 1636 by Haley Campbell RN  Outcome: Adequate for Discharge  2/26/2024 1411 by Haley Campbell RN  Outcome: Progressing  2/26/2024 0640 by Kaylin Horta RN  Outcome: Progressing     Problem: Chronic Conditions and Co-morbidities  Goal: Patient's chronic conditions and co-morbidity symptoms are monitored and maintained or improved  2/26/2024 1636 by Haley Campbell RN  Outcome: Adequate for Discharge  2/26/2024 1411 by Haley Campbell RN  Outcome: Progressing  Flowsheets (Taken 2/26/2024 0831)  Care Plan - Patient's Chronic Conditions and Co-Morbidity Symptoms are Monitored and Maintained or Improved: Monitor and assess patient's chronic conditions and comorbid symptoms for stability, deterioration, or improvement     Problem: Pain  Goal: Verbalizes/displays adequate comfort level or baseline comfort level  Outcome: Adequate for Discharge

## 2024-02-26 NOTE — PLAN OF CARE
Problem: Safety - Adult  Goal: Free from fall injury  2/26/2024 1411 by Haley Campbell RN  Outcome: Progressing  2/26/2024 0640 by Kaylin Horta RN  Outcome: Progressing     Problem: ABCDS Injury Assessment  Goal: Absence of physical injury  2/26/2024 1411 by Haley Campbell RN  Outcome: Progressing  2/26/2024 0640 by Kaylin Horta RN  Outcome: Progressing     Problem: Chronic Conditions and Co-morbidities  Goal: Patient's chronic conditions and co-morbidity symptoms are monitored and maintained or improved  Outcome: Progressing  Flowsheets (Taken 2/26/2024 0831)  Care Plan - Patient's Chronic Conditions and Co-Morbidity Symptoms are Monitored and Maintained or Improved: Monitor and assess patient's chronic conditions and comorbid symptoms for stability, deterioration, or improvement

## 2024-02-27 ENCOUNTER — CLINICAL DOCUMENTATION ONLY (OUTPATIENT)
Facility: CLINIC | Age: 84
End: 2024-02-27

## 2024-02-27 ENCOUNTER — TELEPHONE (OUTPATIENT)
Dept: PRIMARY CARE CLINIC | Age: 84
End: 2024-02-27

## 2024-02-27 NOTE — TELEPHONE ENCOUNTER
Patients daughter Krystal called in and needs someone to call her back and go over Krystal's medications with her.  States Krystal has all her medications mixed up and she is trying to get them sorted out. She can be reached at [phone]. Thank you.

## 2024-02-28 ENCOUNTER — OFFICE VISIT (OUTPATIENT)
Dept: PRIMARY CARE CLINIC | Age: 84
End: 2024-02-28

## 2024-02-28 VITALS
HEART RATE: 74 BPM | RESPIRATION RATE: 16 BRPM | BODY MASS INDEX: 24.25 KG/M2 | HEIGHT: 68 IN | DIASTOLIC BLOOD PRESSURE: 70 MMHG | SYSTOLIC BLOOD PRESSURE: 122 MMHG | TEMPERATURE: 97.5 F | WEIGHT: 160 LBS | OXYGEN SATURATION: 97 %

## 2024-02-28 DIAGNOSIS — I48.0 PAF (PAROXYSMAL ATRIAL FIBRILLATION) (HCC): ICD-10-CM

## 2024-02-28 DIAGNOSIS — J96.11 CHRONIC RESPIRATORY FAILURE WITH HYPOXIA (HCC): Primary | ICD-10-CM

## 2024-02-28 DIAGNOSIS — E07.9 THYROID DISEASE: ICD-10-CM

## 2024-02-28 DIAGNOSIS — E03.9 ACQUIRED HYPOTHYROIDISM: ICD-10-CM

## 2024-02-28 DIAGNOSIS — C34.31 MALIGNANT NEOPLASM OF LOWER LOBE OF RIGHT LUNG (HCC): ICD-10-CM

## 2024-02-28 PROBLEM — D68.69 SECONDARY HYPERCOAGULABLE STATE (HCC): Status: ACTIVE | Noted: 2024-02-28

## 2024-02-28 LAB — INR BLD: 1.7

## 2024-02-28 RX ORDER — PANTOPRAZOLE SODIUM 40 MG/1
40 FOR SUSPENSION ORAL
Qty: 30 EACH | Refills: 2 | Status: ON HOLD | OUTPATIENT
Start: 2024-02-28

## 2024-02-28 RX ORDER — LEVOTHYROXINE SODIUM 100 MCG
50 TABLET ORAL
Qty: 90 TABLET | Refills: 0 | Status: ON HOLD | OUTPATIENT
Start: 2024-02-28

## 2024-02-28 RX ORDER — BUMETANIDE 1 MG/1
1 TABLET ORAL DAILY
COMMUNITY
End: 2024-02-28 | Stop reason: SDUPTHER

## 2024-02-28 RX ORDER — PANTOPRAZOLE SODIUM 40 MG/1
40 FOR SUSPENSION ORAL
COMMUNITY
End: 2024-02-28 | Stop reason: SDUPTHER

## 2024-02-28 RX ORDER — DIGOXIN 0.06 MG/1
62.5 TABLET ORAL DAILY
Qty: 30 TABLET | Refills: 3 | Status: ON HOLD | OUTPATIENT
Start: 2024-02-28

## 2024-02-28 RX ORDER — BUMETANIDE 1 MG/1
1 TABLET ORAL DAILY
Qty: 30 TABLET | Refills: 2 | Status: ON HOLD | OUTPATIENT
Start: 2024-02-28

## 2024-02-28 RX ORDER — METOPROLOL SUCCINATE 25 MG/1
12.5 TABLET, EXTENDED RELEASE ORAL EVERY MORNING
Qty: 90 TABLET | Refills: 0 | Status: ON HOLD | OUTPATIENT
Start: 2024-02-28

## 2024-02-28 ASSESSMENT — ENCOUNTER SYMPTOMS
NAUSEA: 0
ABDOMINAL PAIN: 0
SHORTNESS OF BREATH: 0
VOMITING: 0
DIARRHEA: 0
COUGH: 0

## 2024-02-28 NOTE — PROGRESS NOTES
Palpations: There is no hepatomegaly or splenomegaly.      Tenderness: There is no abdominal tenderness.   Skin:     Coloration: Skin is not cyanotic.      Findings: No bruising or rash.      Nails: There is no clubbing.   Neurological:      General: No focal deficit present.       Extremities: Pedal pulses No Edema     ASSESSMENT/PLAN  Krystal was seen today for coagulation disorder.    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),in remission     CHF compensated continue Bumex, Entresto     Other orders  -     Digoxin (LANOXIN) 62.5 MCG TABS; Take 62.5 mcg by mouth daily  -     SYNTHROID 100 MCG tablet; Take 0.5 tablets by mouth every morning (before breakfast)  -     bumetanide (BUMEX) 1 MG tablet; Take 1 tablet by mouth daily  -     sacubitril-valsartan (ENTRESTO) 24-26 MG per tablet; Take 0.5 tablets by mouth 2 times daily  -     metoprolol succinate (TOPROL XL) 25 MG extended release tablet; Take 0.5 tablets by mouth every morning  -     pantoprazole sodium (PROTONIX) 40 MG PACK packet; Take 1 packet by mouth every morning (before breakfast)    Hospital record ,consults reviewed     Outpatient Encounter Medications as of 2/28/2024   Medication Sig Dispense Refill    Digoxin (LANOXIN) 62.5 MCG TABS Take 62.5 mcg by mouth daily 30 tablet 3    SYNTHROID 100 MCG tablet Take 0.5 tablets by mouth every morning (before breakfast) 90 tablet 0    bumetanide (BUMEX) 1 MG tablet Take 1 tablet by mouth daily 30 tablet 2    sacubitril-valsartan (ENTRESTO) 24-26 MG per tablet Take 0.5 tablets by mouth 2 times daily 60 tablet 3    metoprolol succinate (TOPROL XL) 25 MG extended release tablet Take 0.5 tablets by mouth every morning 90

## 2024-02-28 NOTE — PROGRESS NOTES
Physician Progress Note      PATIENT:               SRINIVAS DYE  CSN #:                  [id number]  :                       1940  ADMIT DATE:       2024 9:14 PM  DISCH DATE:        2024 4:20 PM  RESPONDING  PROVIDER #:        Nitin Gaines MD          QUERY TEXT:    Dear Attending Physician,    Pt admitted with Acute Respiratory Failure and Pneumonia.  Per PCP ,\"...   CT Chest showing multifocal PNA, given this and worsened crackles on exam   after 3 days of antibiotics, will switch to Vancomycin and Cefepime...\" If   possible, please document in the progress notes and discharge summary if you   are evaluating and/or treating any of the following:    Note: CAP and HCAP indicate where the pneumonia was acquired, not a specific   type.    The medical record reflects the following:  Risk Factors: advanced age, Pneumonia previously failed antibiotics, recent   viral infection  Clinical Indicators: Per Pulmo ,\"... PULMO-Appears to have bacterial   pneumonia after recent viral infections She has been on antibiotics which were   broadened yesterday Continue cefepime, stop vancomycin Add prednisone...   Bronchodilators...\"  Treatment: IV ATB Vancomycin and Cefepime--> Home meds Doxy po, Ceftin po    Thank you,  Monserrat Alcaraz RN CCDS  Clinical Documentation Improvement Specialist  Phone# [phone]  Options provided:  -- Gram negative pneumonia  -- Pneumonia due to:;This patient has Pneumonia due to, Please specify the   organism.  -- Other - I will add my own diagnosis  -- Disagree - Not applicable / Not valid  -- Disagree - Clinically unable to determine / Unknown  -- Refer to Clinical Documentation Reviewer    PROVIDER RESPONSE TEXT:    This patient has gram negative pneumonia.    Query created by: Monserrat Alcaraz on 2024 3:53 PM      Electronically signed by:  Nitin Gaines MD 2024 10:13 AM

## 2024-03-02 ENCOUNTER — APPOINTMENT (OUTPATIENT)
Dept: GENERAL RADIOLOGY | Age: 84
DRG: 315 | End: 2024-03-02
Payer: MEDICARE

## 2024-03-02 ENCOUNTER — APPOINTMENT (OUTPATIENT)
Dept: CT IMAGING | Age: 84
DRG: 315 | End: 2024-03-02
Payer: MEDICARE

## 2024-03-02 ENCOUNTER — HOSPITAL ENCOUNTER (INPATIENT)
Age: 84
LOS: 3 days | Discharge: HOME | DRG: 315 | End: 2024-03-05
Attending: EMERGENCY MEDICINE | Admitting: STUDENT IN AN ORGANIZED HEALTH CARE EDUCATION/TRAINING PROGRAM
Payer: MEDICARE

## 2024-03-02 DIAGNOSIS — N17.9 AKI (ACUTE KIDNEY INJURY) (HCC): Primary | ICD-10-CM

## 2024-03-02 DIAGNOSIS — J96.01 ACUTE RESPIRATORY FAILURE WITH HYPOXIA (HCC): ICD-10-CM

## 2024-03-02 DIAGNOSIS — I48.0 PAF (PAROXYSMAL ATRIAL FIBRILLATION) (HCC): ICD-10-CM

## 2024-03-02 DIAGNOSIS — J18.1 LUNG CONSOLIDATION (HCC): ICD-10-CM

## 2024-03-02 LAB
ALBUMIN SERPL-MCNC: 3.8 G/DL (ref 3.5–5.2)
ALP SERPL-CCNC: 58 U/L (ref 35–104)
ALT SERPL-CCNC: 23 U/L (ref 0–32)
ANION GAP SERPL CALCULATED.3IONS-SCNC: 14 MMOL/L (ref 7–16)
AST SERPL-CCNC: 30 U/L (ref 0–31)
B PARAP IS1001 DNA NPH QL NAA+NON-PROBE: NOT DETECTED
B PERT DNA SPEC QL NAA+PROBE: NOT DETECTED
B.E.: 8.7 MMOL/L (ref -3–3)
BASOPHILS # BLD: 0.01 K/UL (ref 0–0.2)
BASOPHILS NFR BLD: 0 % (ref 0–2)
BILIRUB SERPL-MCNC: 1 MG/DL (ref 0–1.2)
BILIRUB UR QL STRIP: NEGATIVE
BNP SERPL-MCNC: 367 PG/ML (ref 0–450)
BUN SERPL-MCNC: 19 MG/DL (ref 6–23)
C PNEUM DNA NPH QL NAA+NON-PROBE: NOT DETECTED
CALCIUM SERPL-MCNC: 9.6 MG/DL (ref 8.6–10.2)
CHLORIDE SERPL-SCNC: 89 MMOL/L (ref 98–107)
CK SERPL-CCNC: 39 U/L (ref 20–180)
CLARITY UR: CLEAR
CO2 SERPL-SCNC: 35 MMOL/L (ref 22–29)
COHB: 0.9 % (ref 0–1.5)
COLOR UR: YELLOW
CREAT SERPL-MCNC: 1.5 MG/DL (ref 0.5–1)
CRITICAL: ABNORMAL
CRP SERPL HS-MCNC: 7 MG/L (ref 0–5)
DATE ANALYZED: ABNORMAL
DATE OF COLLECTION: ABNORMAL
DIGOXIN SERPL-MCNC: 0.8 NG/ML (ref 0.8–2)
EOSINOPHIL # BLD: 0.1 K/UL (ref 0.05–0.5)
EOSINOPHILS RELATIVE PERCENT: 2 % (ref 0–6)
ERYTHROCYTE [DISTWIDTH] IN BLOOD BY AUTOMATED COUNT: 17.3 % (ref 11.5–15)
FLUAV RNA NPH QL NAA+NON-PROBE: NOT DETECTED
FLUBV RNA NPH QL NAA+NON-PROBE: NOT DETECTED
GFR SERPL CREATININE-BSD FRML MDRD: 35 ML/MIN/1.73M2
GLUCOSE BLD-MCNC: 122 MG/DL (ref 74–99)
GLUCOSE SERPL-MCNC: 109 MG/DL (ref 74–99)
GLUCOSE UR STRIP-MCNC: NEGATIVE MG/DL
HADV DNA NPH QL NAA+NON-PROBE: NOT DETECTED
HCO3: 31.3 MMOL/L (ref 22–26)
HCOV 229E RNA NPH QL NAA+NON-PROBE: NOT DETECTED
HCOV HKU1 RNA NPH QL NAA+NON-PROBE: NOT DETECTED
HCOV NL63 RNA NPH QL NAA+NON-PROBE: NOT DETECTED
HCOV OC43 RNA NPH QL NAA+NON-PROBE: NOT DETECTED
HCT VFR BLD AUTO: 34.4 % (ref 34–48)
HGB BLD-MCNC: 10.8 G/DL (ref 11.5–15.5)
HGB UR QL STRIP.AUTO: NEGATIVE
HHB: 3.4 % (ref 0–5)
HMPV RNA NPH QL NAA+NON-PROBE: NOT DETECTED
HPIV1 RNA NPH QL NAA+NON-PROBE: NOT DETECTED
HPIV2 RNA NPH QL NAA+NON-PROBE: NOT DETECTED
HPIV3 RNA NPH QL NAA+NON-PROBE: NOT DETECTED
HPIV4 RNA NPH QL NAA+NON-PROBE: NOT DETECTED
IMM GRANULOCYTES # BLD AUTO: <0.03 K/UL (ref 0–0.58)
IMM GRANULOCYTES NFR BLD: 0 % (ref 0–5)
INFLUENZA A BY PCR: NOT DETECTED
INFLUENZA B BY PCR: NOT DETECTED
INR PPP: 2.2
KETONES UR STRIP-MCNC: NEGATIVE MG/DL
LAB: ABNORMAL
LACTATE BLDV-SCNC: 1.1 MMOL/L (ref 0.5–2.2)
LACTATE BLDV-SCNC: 2.4 MMOL/L (ref 0.5–2.2)
LEUKOCYTE ESTERASE UR QL STRIP: NEGATIVE
LYMPHOCYTES NFR BLD: 1.18 K/UL (ref 1.5–4)
LYMPHOCYTES RELATIVE PERCENT: 20 % (ref 20–42)
Lab: 730
M PNEUMO DNA NPH QL NAA+NON-PROBE: NOT DETECTED
MCH RBC QN AUTO: 31 PG (ref 26–35)
MCHC RBC AUTO-ENTMCNC: 31.4 G/DL (ref 32–34.5)
MCV RBC AUTO: 98.9 FL (ref 80–99.9)
METHB: 0.3 % (ref 0–1.5)
MODE: ABNORMAL
MONOCYTES NFR BLD: 0.37 K/UL (ref 0.1–0.95)
MONOCYTES NFR BLD: 6 % (ref 2–12)
NEUTROPHILS NFR BLD: 71 % (ref 43–80)
NEUTS SEG NFR BLD: 4.14 K/UL (ref 1.8–7.3)
NITRITE UR QL STRIP: NEGATIVE
O2 CONTENT: 15.4 ML/DL
O2 SATURATION: 96.6 % (ref 92–98.5)
O2HB: 95.4 % (ref 94–97)
OPERATOR ID: 1893
PATIENT TEMP: 37 C
PCO2: 35.8 MMHG (ref 35–45)
PH BLOOD GAS: 7.56 (ref 7.35–7.45)
PH UR STRIP: 6.5 [PH] (ref 5–9)
PLATELET, FLUORESCENCE: 129 K/UL (ref 130–450)
PMV BLD AUTO: 11.6 FL (ref 7–12)
PO2: 80.4 MMHG (ref 75–100)
POTASSIUM SERPL-SCNC: 3.4 MMOL/L (ref 3.5–5)
PROCALCITONIN SERPL-MCNC: 0.05 NG/ML (ref 0–0.08)
PROT SERPL-MCNC: 6.2 G/DL (ref 6.4–8.3)
PROT UR STRIP-MCNC: NEGATIVE MG/DL
PROTHROMBIN TIME: 23.9 SEC (ref 9.3–12.4)
RBC # BLD AUTO: 3.48 M/UL (ref 3.5–5.5)
RBC #/AREA URNS HPF: NORMAL /HPF
RSV RNA NPH QL NAA+NON-PROBE: NOT DETECTED
RV+EV RNA NPH QL NAA+NON-PROBE: NOT DETECTED
SARS-COV-2 RDRP RESP QL NAA+PROBE: NOT DETECTED
SARS-COV-2 RNA NPH QL NAA+NON-PROBE: NOT DETECTED
SODIUM SERPL-SCNC: 138 MMOL/L (ref 132–146)
SOURCE, BLOOD GAS: ABNORMAL
SP GR UR STRIP: 1.01 (ref 1–1.03)
SPECIMEN DESCRIPTION: NORMAL
SPECIMEN DESCRIPTION: NORMAL
THB: 11.4 G/DL (ref 11.5–16.5)
TIME ANALYZED: 738
TROPONIN I SERPL HS-MCNC: 48 NG/L (ref 0–9)
TROPONIN I SERPL HS-MCNC: 49 NG/L (ref 0–9)
UROBILINOGEN UR STRIP-ACNC: 0.2 EU/DL (ref 0–1)
WBC #/AREA URNS HPF: NORMAL /HPF
WBC OTHER # BLD: 5.8 K/UL (ref 4.5–11.5)

## 2024-03-02 PROCEDURE — 84484 ASSAY OF TROPONIN QUANT: CPT

## 2024-03-02 PROCEDURE — 87081 CULTURE SCREEN ONLY: CPT

## 2024-03-02 PROCEDURE — 99285 EMERGENCY DEPT VISIT HI MDM: CPT

## 2024-03-02 PROCEDURE — 82962 GLUCOSE BLOOD TEST: CPT

## 2024-03-02 PROCEDURE — 2500000003 HC RX 250 WO HCPCS: Performed by: EMERGENCY MEDICINE

## 2024-03-02 PROCEDURE — 80053 COMPREHEN METABOLIC PANEL: CPT

## 2024-03-02 PROCEDURE — 73610 X-RAY EXAM OF ANKLE: CPT

## 2024-03-02 PROCEDURE — 87502 INFLUENZA DNA AMP PROBE: CPT

## 2024-03-02 PROCEDURE — 83605 ASSAY OF LACTIC ACID: CPT

## 2024-03-02 PROCEDURE — 94640 AIRWAY INHALATION TREATMENT: CPT

## 2024-03-02 PROCEDURE — 6370000000 HC RX 637 (ALT 250 FOR IP): Performed by: INTERNAL MEDICINE

## 2024-03-02 PROCEDURE — 82805 BLOOD GASES W/O2 SATURATION: CPT

## 2024-03-02 PROCEDURE — 80162 ASSAY OF DIGOXIN TOTAL: CPT

## 2024-03-02 PROCEDURE — 2580000003 HC RX 258: Performed by: INTERNAL MEDICINE

## 2024-03-02 PROCEDURE — 2060000000 HC ICU INTERMEDIATE R&B

## 2024-03-02 PROCEDURE — 85610 PROTHROMBIN TIME: CPT

## 2024-03-02 PROCEDURE — 2580000003 HC RX 258: Performed by: EMERGENCY MEDICINE

## 2024-03-02 PROCEDURE — 71045 X-RAY EXAM CHEST 1 VIEW: CPT

## 2024-03-02 PROCEDURE — 87040 BLOOD CULTURE FOR BACTERIA: CPT

## 2024-03-02 PROCEDURE — 85025 COMPLETE CBC W/AUTO DIFF WBC: CPT

## 2024-03-02 PROCEDURE — 6360000002 HC RX W HCPCS: Performed by: EMERGENCY MEDICINE

## 2024-03-02 PROCEDURE — 0202U NFCT DS 22 TRGT SARS-COV-2: CPT

## 2024-03-02 PROCEDURE — 86140 C-REACTIVE PROTEIN: CPT

## 2024-03-02 PROCEDURE — 93005 ELECTROCARDIOGRAM TRACING: CPT | Performed by: EMERGENCY MEDICINE

## 2024-03-02 PROCEDURE — 84145 PROCALCITONIN (PCT): CPT

## 2024-03-02 PROCEDURE — 82550 ASSAY OF CK (CPK): CPT

## 2024-03-02 PROCEDURE — 83880 ASSAY OF NATRIURETIC PEPTIDE: CPT

## 2024-03-02 PROCEDURE — 87635 SARS-COV-2 COVID-19 AMP PRB: CPT

## 2024-03-02 PROCEDURE — 6370000000 HC RX 637 (ALT 250 FOR IP): Performed by: EMERGENCY MEDICINE

## 2024-03-02 PROCEDURE — 71250 CT THORAX DX C-: CPT

## 2024-03-02 PROCEDURE — 81001 URINALYSIS AUTO W/SCOPE: CPT

## 2024-03-02 PROCEDURE — 99223 1ST HOSP IP/OBS HIGH 75: CPT | Performed by: INTERNAL MEDICINE

## 2024-03-02 RX ORDER — IPRATROPIUM BROMIDE AND ALBUTEROL SULFATE 2.5; .5 MG/3ML; MG/3ML
1 SOLUTION RESPIRATORY (INHALATION)
Status: COMPLETED | OUTPATIENT
Start: 2024-03-02 | End: 2024-03-02

## 2024-03-02 RX ORDER — ACETAMINOPHEN 650 MG/1
650 SUPPOSITORY RECTAL EVERY 6 HOURS PRN
Status: DISCONTINUED | OUTPATIENT
Start: 2024-03-02 | End: 2024-03-05 | Stop reason: HOSPADM

## 2024-03-02 RX ORDER — MAGNESIUM SULFATE IN WATER 40 MG/ML
2000 INJECTION, SOLUTION INTRAVENOUS PRN
Status: DISCONTINUED | OUTPATIENT
Start: 2024-03-02 | End: 2024-03-05 | Stop reason: HOSPADM

## 2024-03-02 RX ORDER — ROSUVASTATIN CALCIUM 10 MG/1
10 TABLET, COATED ORAL NIGHTLY
Status: DISCONTINUED | OUTPATIENT
Start: 2024-03-02 | End: 2024-03-05 | Stop reason: HOSPADM

## 2024-03-02 RX ORDER — SODIUM CHLORIDE 0.9 % (FLUSH) 0.9 %
5-40 SYRINGE (ML) INJECTION PRN
Status: DISCONTINUED | OUTPATIENT
Start: 2024-03-02 | End: 2024-03-05 | Stop reason: HOSPADM

## 2024-03-02 RX ORDER — POTASSIUM CHLORIDE 20 MEQ/1
40 TABLET, EXTENDED RELEASE ORAL ONCE
Status: COMPLETED | OUTPATIENT
Start: 2024-03-02 | End: 2024-03-02

## 2024-03-02 RX ORDER — POTASSIUM CHLORIDE 7.45 MG/ML
10 INJECTION INTRAVENOUS PRN
Status: DISCONTINUED | OUTPATIENT
Start: 2024-03-02 | End: 2024-03-05 | Stop reason: HOSPADM

## 2024-03-02 RX ORDER — ONDANSETRON 4 MG/1
4 TABLET, ORALLY DISINTEGRATING ORAL EVERY 8 HOURS PRN
Status: DISCONTINUED | OUTPATIENT
Start: 2024-03-02 | End: 2024-03-02 | Stop reason: CLARIF

## 2024-03-02 RX ORDER — PANTOPRAZOLE SODIUM 40 MG/1
40 TABLET, DELAYED RELEASE ORAL
Status: DISCONTINUED | OUTPATIENT
Start: 2024-03-03 | End: 2024-03-05 | Stop reason: HOSPADM

## 2024-03-02 RX ORDER — PROCHLORPERAZINE EDISYLATE 5 MG/ML
10 INJECTION INTRAMUSCULAR; INTRAVENOUS EVERY 6 HOURS PRN
Status: DISCONTINUED | OUTPATIENT
Start: 2024-03-02 | End: 2024-03-05 | Stop reason: HOSPADM

## 2024-03-02 RX ORDER — DIGOXIN 125 MCG
62.5 TABLET ORAL DAILY
Status: DISCONTINUED | OUTPATIENT
Start: 2024-03-02 | End: 2024-03-05 | Stop reason: HOSPADM

## 2024-03-02 RX ORDER — ASCORBIC ACID 500 MG
500 TABLET ORAL DAILY
COMMUNITY

## 2024-03-02 RX ORDER — CHOLECALCIFEROL (VITAMIN D3) 50 MCG
2000 TABLET ORAL DAILY
Status: DISCONTINUED | OUTPATIENT
Start: 2024-03-02 | End: 2024-03-05 | Stop reason: HOSPADM

## 2024-03-02 RX ORDER — 0.9 % SODIUM CHLORIDE 0.9 %
1000 INTRAVENOUS SOLUTION INTRAVENOUS ONCE
Status: COMPLETED | OUTPATIENT
Start: 2024-03-02 | End: 2024-03-02

## 2024-03-02 RX ORDER — POLYETHYLENE GLYCOL 3350 17 G/17G
17 POWDER, FOR SOLUTION ORAL DAILY PRN
Status: DISCONTINUED | OUTPATIENT
Start: 2024-03-02 | End: 2024-03-05 | Stop reason: HOSPADM

## 2024-03-02 RX ORDER — POTASSIUM CHLORIDE 20 MEQ/1
40 TABLET, EXTENDED RELEASE ORAL PRN
Status: DISCONTINUED | OUTPATIENT
Start: 2024-03-02 | End: 2024-03-05 | Stop reason: HOSPADM

## 2024-03-02 RX ORDER — LEVOTHYROXINE SODIUM 0.05 MG/1
50 TABLET ORAL
Status: DISCONTINUED | OUTPATIENT
Start: 2024-03-03 | End: 2024-03-05 | Stop reason: HOSPADM

## 2024-03-02 RX ORDER — ACETAMINOPHEN 325 MG/1
650 TABLET ORAL EVERY 6 HOURS PRN
Status: DISCONTINUED | OUTPATIENT
Start: 2024-03-02 | End: 2024-03-05 | Stop reason: HOSPADM

## 2024-03-02 RX ORDER — METOPROLOL SUCCINATE 25 MG/1
12.5 TABLET, EXTENDED RELEASE ORAL EVERY MORNING
Status: DISCONTINUED | OUTPATIENT
Start: 2024-03-03 | End: 2024-03-03

## 2024-03-02 RX ORDER — WARFARIN SODIUM 5 MG/1
5 TABLET ORAL NIGHTLY
Status: DISCONTINUED | OUTPATIENT
Start: 2024-03-02 | End: 2024-03-05 | Stop reason: HOSPADM

## 2024-03-02 RX ORDER — PROCHLORPERAZINE MALEATE 10 MG
10 TABLET ORAL EVERY 8 HOURS PRN
Status: DISCONTINUED | OUTPATIENT
Start: 2024-03-02 | End: 2024-03-05 | Stop reason: HOSPADM

## 2024-03-02 RX ORDER — MIDODRINE HYDROCHLORIDE 5 MG/1
10 TABLET ORAL ONCE
Status: COMPLETED | OUTPATIENT
Start: 2024-03-02 | End: 2024-03-02

## 2024-03-02 RX ORDER — SODIUM CHLORIDE 9 MG/ML
INJECTION, SOLUTION INTRAVENOUS PRN
Status: DISCONTINUED | OUTPATIENT
Start: 2024-03-02 | End: 2024-03-05 | Stop reason: HOSPADM

## 2024-03-02 RX ORDER — SODIUM CHLORIDE 0.9 % (FLUSH) 0.9 %
5-40 SYRINGE (ML) INJECTION EVERY 12 HOURS SCHEDULED
Status: DISCONTINUED | OUTPATIENT
Start: 2024-03-02 | End: 2024-03-05 | Stop reason: HOSPADM

## 2024-03-02 RX ORDER — ONDANSETRON 2 MG/ML
4 INJECTION INTRAMUSCULAR; INTRAVENOUS EVERY 6 HOURS PRN
Status: DISCONTINUED | OUTPATIENT
Start: 2024-03-02 | End: 2024-03-02 | Stop reason: CLARIF

## 2024-03-02 RX ADMIN — WATER 2000 MG: 1 INJECTION INTRAMUSCULAR; INTRAVENOUS; SUBCUTANEOUS at 15:45

## 2024-03-02 RX ADMIN — IPRATROPIUM BROMIDE AND ALBUTEROL SULFATE 1 DOSE: 2.5; .5 SOLUTION RESPIRATORY (INHALATION) at 07:52

## 2024-03-02 RX ADMIN — WARFARIN SODIUM 5 MG: 5 TABLET ORAL at 20:35

## 2024-03-02 RX ADMIN — IPRATROPIUM BROMIDE AND ALBUTEROL SULFATE 1 DOSE: 2.5; .5 SOLUTION RESPIRATORY (INHALATION) at 07:48

## 2024-03-02 RX ADMIN — DOXYCYCLINE 100 MG: 100 INJECTION, POWDER, LYOPHILIZED, FOR SOLUTION INTRAVENOUS at 15:47

## 2024-03-02 RX ADMIN — POTASSIUM CHLORIDE 40 MEQ: 1500 TABLET, EXTENDED RELEASE ORAL at 12:32

## 2024-03-02 RX ADMIN — ROSUVASTATIN CALCIUM 10 MG: 10 TABLET, FILM COATED ORAL at 20:35

## 2024-03-02 RX ADMIN — SODIUM CHLORIDE 1000 ML: 9 INJECTION, SOLUTION INTRAVENOUS at 12:31

## 2024-03-02 RX ADMIN — DIGOXIN 62.5 MCG: 0.12 TABLET ORAL at 20:35

## 2024-03-02 RX ADMIN — MIDODRINE HYDROCHLORIDE 10 MG: 5 TABLET ORAL at 14:42

## 2024-03-02 RX ADMIN — SODIUM CHLORIDE, PRESERVATIVE FREE 10 ML: 5 INJECTION INTRAVENOUS at 20:35

## 2024-03-02 RX ADMIN — IPRATROPIUM BROMIDE AND ALBUTEROL SULFATE 1 DOSE: 2.5; .5 SOLUTION RESPIRATORY (INHALATION) at 07:50

## 2024-03-02 RX ADMIN — Medication 2000 UNITS: at 20:34

## 2024-03-02 ASSESSMENT — PAIN - FUNCTIONAL ASSESSMENT: PAIN_FUNCTIONAL_ASSESSMENT: NONE - DENIES PAIN

## 2024-03-02 NOTE — ED NOTES
...ED to Inpatient Handoff Report    Notified [facility]  that electronic handoff available and patient ready for transport to room 407.    Safety Risks: Risk of falls    Patient in Restraints: no    Constant Observer or Patient : no    Telemetry Monitoring Ordered :Yes           Order to transfer to unit without monitor:YES    Last MEWS: 1 Time completed: 1604    Deterioration Index Score:   Predictive Model Details          31  Factor Value    Calculated 3/2/2024 16:15 43% Age 83 years old    Deterioration Index Model 36% Supplemental oxygen Nasal cannula     9% Blood pH abnormal (7.560)     5% Systolic 104     4% Pulse oximetry 100 %     3% Potassium abnormal (3.4 mmol/L)     0% Sodium 138 mmol/L     0% Temperature 97.2 °F (36.2 °C)     0% Hematocrit 34.4 %     0% WBC count 5.8 k/uL     0% Respiratory rate 16     0% Pulse 74        Vitals:    03/02/24 1139 03/02/24 1140 03/02/24 1345 03/02/24 1604   BP:   (!) 90/56 104/63   Pulse: 72 71 71 74   Resp: 14 14 17 16   Temp:    97.2 °F (36.2 °C)   TempSrc:    Axillary   SpO2: 100% 100% 100% 100%   Weight:       Height:             Opportunity for questions and clarification was provided.

## 2024-03-02 NOTE — ED NOTES
Ambulated patient to bathroom on 4 liters o2 patient oxygenation dropped to 80% upon returning to bed

## 2024-03-02 NOTE — H&P
performed with the patient and is otherwise negative.      PMH:  Past Medical History:   Diagnosis Date    Atrial fibrillation (HCC)     CHF (congestive heart failure) (HCC)     Emphysema, unspecified (HCC)     Hyperlipidemia     Hypertension     Lung cancer (HCC)     Mitral valve regurgitation     Oxygen dependent     Prolonged emergence from general anesthesia     post general anesthesia    Skin cancer     Thyroid disease        Surgical History:  Past Surgical History:   Procedure Laterality Date    BREAST BIOPSY Right     benign    BRONCHOSCOPY N/A 04/20/2021    BRONCHOSCOPY/TRANSBRONCHIAL NEEDLE BIOPSY performed by Antony Rosario MD at [facility] ENDOSCOPY    CARDIOVERSION  10/06/2023    Dr Jones    INTRACAPSULAR CATARACT EXTRACTION Right 12/16/2021    RIGHT EYE CATARACT EXTRACTION IOL IMPLANT performed by Kay Pimentel MD at [facility] OR    INTRACAPSULAR CATARACT EXTRACTION Left 02/24/2022    LEFT EYE CATARACT EXTRACTION IOL IMPLANT performed by Kay Pimentel MD at [facility] OR    TUBAL LIGATION         Medications Prior to Admission:    Prior to Admission medications    Medication Sig Start Date End Date Taking? Authorizing Provider   Digoxin (LANOXIN) 62.5 MCG TABS Take 62.5 mcg by mouth daily 2/28/24   Rom Mckeon MD   SYNTHROID 100 MCG tablet Take 0.5 tablets by mouth every morning (before breakfast) 2/28/24   Rom Mckeon MD   bumetanide (BUMEX) 1 MG tablet Take 1 tablet by mouth daily 2/28/24   Rom Mckeon MD   sacubitril-valsartan (ENTRESTO) 24-26 MG per tablet Take 0.5 tablets by mouth 2 times daily 2/28/24   Rom Mckeon MD   metoprolol succinate (TOPROL XL) 25 MG extended release tablet Take 0.5 tablets by mouth every morning 2/28/24   Rom Mckeon MD   pantoprazole sodium (PROTONIX) 40 MG PACK packet Take 1 packet by mouth every morning (before breakfast) 2/28/24   Rom Mckeon MD   Vitamin D (CHOLECALCIFEROL) 50 MCG (2000 UT) TABS tablet Take 1 tablet by mouth  disease-levothyroxine  HFrEF- 4/2023 ECHO LVEF 38±5% mild aortic regurgitation.  Moderate tricuspid regurgitation.  Mild pulmonary hypertension.  Holding cardiac regimen.  I/os.  Weights daily    Code Status: Full  DVT prophylaxis: Coumadin    NOTE: This report was transcribed using voice recognition software. Every effort was made to ensure accuracy; however, inadvertent computerized transcription errors may be present.     Electronically signed by OUMOU Broussard CNP on 3/2/2024 at 2:20 PM

## 2024-03-02 NOTE — ED PROVIDER NOTES
[facility] EMERGENCY DEPARTMENT  EMERGENCY DEPARTMENT ENCOUNTER        Pt Name: Krystal Hyatt  MRN: [id number]  Birthdate 1940  Date of evaluation: 3/2/2024  Provider: Derrick Moncada DO  PCP: Rom Mckeon MD  Note Started: 7:20 AM EST 3/2/24    CHIEF COMPLAINT       Chief Complaint   Patient presents with    Shortness of Breath     Arrived on non re-breather        HISTORY OF PRESENT ILLNESS: 1 or more Elements     History from : Patient    Limitations to history : None    Krystal Hyatt is a 83 y.o. 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  Patient is confused and is a poor historian.  She recently was discharged and followed up with her primary care physician on 228.  Patient is chronically on 2 L.  Patient allegedly lives with her 2 daughters. Pt was brought in on nonrebreather    Nursing Notes were all reviewed and agreed with or any disagreements were addressed in the HPI.    REVIEW OF SYSTEMS :      Review of Systems   Unable to perform ROS: Mental status change   Constitutional:  Positive for fatigue.   Respiratory:  Positive for shortness of breath.        Positives and Pertinent negatives as per HPI.     SURGICAL HISTORY     Past Surgical History:   Procedure Laterality Date    BREAST BIOPSY Right     benign    BRONCHOSCOPY N/A 04/20/2021    BRONCHOSCOPY/TRANSBRONCHIAL NEEDLE BIOPSY performed by Antony Rosario MD at [facility] ENDOSCOPY    CARDIOVERSION  10/06/2023    Dr Jones    INTRACAPSULAR CATARACT EXTRACTION Right 12/16/2021    RIGHT EYE CATARACT EXTRACTION IOL IMPLANT performed by Kay Pimentel MD at [facility] OR    INTRACAPSULAR CATARACT EXTRACTION Left 02/24/2022    LEFT EYE  reveals decreased breath sounds. Examination of the right-lower field reveals decreased breath sounds. Examination of the left-lower field reveals decreased breath sounds. Decreased breath sounds present. No wheezing, rhonchi or rales.   Chest:      Chest wall: No tenderness.   Abdominal:      General: Bowel sounds are normal.      Tenderness: There is no abdominal tenderness. There is no right CVA tenderness, left CVA tenderness or guarding.   Musculoskeletal:         General: No swelling or deformity.      Cervical back: Normal range of motion and neck supple. No muscular tenderness.   Skin:     General: Skin is warm and dry.      Capillary Refill: Capillary refill takes less than 2 seconds.   Neurological:      General: No focal deficit present.      Mental Status: She is alert and oriented to person, place, and time.   Psychiatric:         Mood and Affect: Mood normal.             DIAGNOSTIC RESULTS   LABS:    Labs Reviewed   CBC WITH AUTO DIFFERENTIAL - Abnormal; Notable for the following components:       Result Value    RBC 3.48 (*)     Hemoglobin 10.8 (*)     MCHC 31.4 (*)     RDW 17.3 (*)     Platelet, Fluorescence 129 (*)     Lymphocytes Absolute 1.18 (*)     All other components within normal limits   COMPREHENSIVE METABOLIC PANEL - Abnormal; Notable for the following components:    Potassium 3.4 (*)     Chloride 89 (*)     CO2 35 (*)     Glucose 109 (*)     Creatinine 1.5 (*)     Est, Glom Filt Rate 35 (*)     Total Protein 6.2 (*)     All other components within normal limits   LACTIC ACID - Abnormal; Notable for the following components:    Lactic Acid 2.4 (*)     All other components within normal limits   TROPONIN - Abnormal; Notable for the following components:    Troponin, High Sensitivity 49 (*)     All other components within normal limits   PROTIME-INR - Abnormal; Notable for the following components:    Protime 23.9 (*)     All other components within normal limits   BLOOD GAS, ARTERIAL -

## 2024-03-03 LAB
ANION GAP SERPL CALCULATED.3IONS-SCNC: 12 MMOL/L (ref 7–16)
BASOPHILS # BLD: 0.01 K/UL (ref 0–0.2)
BASOPHILS NFR BLD: 0 % (ref 0–2)
BUN SERPL-MCNC: 17 MG/DL (ref 6–23)
CALCIUM SERPL-MCNC: 8.4 MG/DL (ref 8.6–10.2)
CHLORIDE SERPL-SCNC: 96 MMOL/L (ref 98–107)
CO2 SERPL-SCNC: 32 MMOL/L (ref 22–29)
CREAT SERPL-MCNC: 1.1 MG/DL (ref 0.5–1)
EOSINOPHIL # BLD: 0.12 K/UL (ref 0.05–0.5)
EOSINOPHILS RELATIVE PERCENT: 3 % (ref 0–6)
ERYTHROCYTE [DISTWIDTH] IN BLOOD BY AUTOMATED COUNT: 17.5 % (ref 11.5–15)
GFR SERPL CREATININE-BSD FRML MDRD: 48 ML/MIN/1.73M2
GLUCOSE SERPL-MCNC: 124 MG/DL (ref 74–99)
HCT VFR BLD AUTO: 28.5 % (ref 34–48)
HGB BLD-MCNC: 8.8 G/DL (ref 11.5–15.5)
IMM GRANULOCYTES # BLD AUTO: <0.03 K/UL (ref 0–0.58)
IMM GRANULOCYTES NFR BLD: 1 % (ref 0–5)
INR PPP: 3.1
LYMPHOCYTES NFR BLD: 1.17 K/UL (ref 1.5–4)
LYMPHOCYTES RELATIVE PERCENT: 28 % (ref 20–42)
MCH RBC QN AUTO: 31.4 PG (ref 26–35)
MCHC RBC AUTO-ENTMCNC: 30.9 G/DL (ref 32–34.5)
MCV RBC AUTO: 101.8 FL (ref 80–99.9)
MONOCYTES NFR BLD: 0.29 K/UL (ref 0.1–0.95)
MONOCYTES NFR BLD: 7 % (ref 2–12)
NEUTROPHILS NFR BLD: 62 % (ref 43–80)
NEUTS SEG NFR BLD: 2.58 K/UL (ref 1.8–7.3)
PLATELET, FLUORESCENCE: 104 K/UL (ref 130–450)
PMV BLD AUTO: 11.4 FL (ref 7–12)
POTASSIUM SERPL-SCNC: 3.4 MMOL/L (ref 3.5–5)
PROTHROMBIN TIME: 34.9 SEC (ref 9.3–12.4)
RBC # BLD AUTO: 2.8 M/UL (ref 3.5–5.5)
SODIUM SERPL-SCNC: 140 MMOL/L (ref 132–146)
WBC OTHER # BLD: 4.2 K/UL (ref 4.5–11.5)

## 2024-03-03 PROCEDURE — 6370000000 HC RX 637 (ALT 250 FOR IP): Performed by: INTERNAL MEDICINE

## 2024-03-03 PROCEDURE — 2580000003 HC RX 258: Performed by: INTERNAL MEDICINE

## 2024-03-03 PROCEDURE — 2060000000 HC ICU INTERMEDIATE R&B

## 2024-03-03 PROCEDURE — 99232 SBSQ HOSP IP/OBS MODERATE 35: CPT | Performed by: INTERNAL MEDICINE

## 2024-03-03 PROCEDURE — 94640 AIRWAY INHALATION TREATMENT: CPT

## 2024-03-03 PROCEDURE — 85025 COMPLETE CBC W/AUTO DIFF WBC: CPT

## 2024-03-03 PROCEDURE — 85610 PROTHROMBIN TIME: CPT

## 2024-03-03 PROCEDURE — 80048 BASIC METABOLIC PNL TOTAL CA: CPT

## 2024-03-03 PROCEDURE — 6360000002 HC RX W HCPCS: Performed by: INTERNAL MEDICINE

## 2024-03-03 PROCEDURE — 2700000000 HC OXYGEN THERAPY PER DAY

## 2024-03-03 RX ORDER — BUDESONIDE 0.5 MG/2ML
0.5 INHALANT ORAL
Status: DISCONTINUED | OUTPATIENT
Start: 2024-03-03 | End: 2024-03-05 | Stop reason: HOSPADM

## 2024-03-03 RX ORDER — IPRATROPIUM BROMIDE AND ALBUTEROL SULFATE 2.5; .5 MG/3ML; MG/3ML
1 SOLUTION RESPIRATORY (INHALATION)
Status: DISCONTINUED | OUTPATIENT
Start: 2024-03-03 | End: 2024-03-05 | Stop reason: HOSPADM

## 2024-03-03 RX ORDER — METOPROLOL SUCCINATE 25 MG/1
12.5 TABLET, EXTENDED RELEASE ORAL EVERY MORNING
Status: DISCONTINUED | OUTPATIENT
Start: 2024-03-03 | End: 2024-03-05 | Stop reason: HOSPADM

## 2024-03-03 RX ORDER — POTASSIUM CHLORIDE 20 MEQ/1
40 TABLET, EXTENDED RELEASE ORAL
Status: DISPENSED | OUTPATIENT
Start: 2024-03-03 | End: 2024-03-03

## 2024-03-03 RX ORDER — ARFORMOTEROL TARTRATE 15 UG/2ML
15 SOLUTION RESPIRATORY (INHALATION)
Status: DISCONTINUED | OUTPATIENT
Start: 2024-03-03 | End: 2024-03-05 | Stop reason: HOSPADM

## 2024-03-03 RX ORDER — KETOROLAC TROMETHAMINE 30 MG/ML
30 INJECTION, SOLUTION INTRAMUSCULAR; INTRAVENOUS ONCE
Status: COMPLETED | OUTPATIENT
Start: 2024-03-03 | End: 2024-03-03

## 2024-03-03 RX ADMIN — IPRATROPIUM BROMIDE AND ALBUTEROL SULFATE 1 DOSE: 2.5; .5 SOLUTION RESPIRATORY (INHALATION) at 17:08

## 2024-03-03 RX ADMIN — IPRATROPIUM BROMIDE AND ALBUTEROL SULFATE 1 DOSE: 2.5; .5 SOLUTION RESPIRATORY (INHALATION) at 19:23

## 2024-03-03 RX ADMIN — BUDESONIDE INHALATION 500 MCG: 0.5 SUSPENSION RESPIRATORY (INHALATION) at 19:23

## 2024-03-03 RX ADMIN — SODIUM CHLORIDE, PRESERVATIVE FREE 10 ML: 5 INJECTION INTRAVENOUS at 08:17

## 2024-03-03 RX ADMIN — SODIUM CHLORIDE, PRESERVATIVE FREE 10 ML: 5 INJECTION INTRAVENOUS at 20:36

## 2024-03-03 RX ADMIN — ARFORMOTEROL TARTRATE 15 MCG: 15 SOLUTION RESPIRATORY (INHALATION) at 19:23

## 2024-03-03 RX ADMIN — ACETAMINOPHEN 650 MG: 325 TABLET ORAL at 04:32

## 2024-03-03 RX ADMIN — ROSUVASTATIN CALCIUM 10 MG: 10 TABLET, FILM COATED ORAL at 20:36

## 2024-03-03 RX ADMIN — KETOROLAC TROMETHAMINE 30 MG: 30 INJECTION, SOLUTION INTRAMUSCULAR; INTRAVENOUS at 19:48

## 2024-03-03 RX ADMIN — POTASSIUM CHLORIDE 40 MEQ: 1500 TABLET, EXTENDED RELEASE ORAL at 06:23

## 2024-03-03 RX ADMIN — POTASSIUM CHLORIDE 40 MEQ: 1500 TABLET, EXTENDED RELEASE ORAL at 08:14

## 2024-03-03 RX ADMIN — PANTOPRAZOLE SODIUM 40 MG: 40 TABLET, DELAYED RELEASE ORAL at 06:23

## 2024-03-03 RX ADMIN — IPRATROPIUM BROMIDE AND ALBUTEROL SULFATE 1 DOSE: 2.5; .5 SOLUTION RESPIRATORY (INHALATION) at 13:15

## 2024-03-03 RX ADMIN — LEVOTHYROXINE SODIUM 50 MCG: 0.05 TABLET ORAL at 06:23

## 2024-03-03 RX ADMIN — Medication 2000 UNITS: at 08:17

## 2024-03-03 RX ADMIN — DIGOXIN 62.5 MCG: 0.12 TABLET ORAL at 08:17

## 2024-03-03 RX ADMIN — METOPROLOL SUCCINATE 12.5 MG: 25 TABLET, FILM COATED, EXTENDED RELEASE ORAL at 11:24

## 2024-03-03 RX ADMIN — SODIUM CHLORIDE, PRESERVATIVE FREE 10 ML: 5 INJECTION INTRAVENOUS at 09:23

## 2024-03-03 RX ADMIN — WARFARIN SODIUM 5 MG: 5 TABLET ORAL at 20:36

## 2024-03-03 ASSESSMENT — PAIN DESCRIPTION - LOCATION
LOCATION: BACK
LOCATION: CHEST
LOCATION: BACK

## 2024-03-03 ASSESSMENT — PAIN DESCRIPTION - ORIENTATION
ORIENTATION: UPPER;MID
ORIENTATION: LEFT
ORIENTATION: MID

## 2024-03-03 ASSESSMENT — PAIN SCALES - GENERAL
PAINLEVEL_OUTOF10: 5
PAINLEVEL_OUTOF10: 0
PAINLEVEL_OUTOF10: 5

## 2024-03-03 ASSESSMENT — PAIN - FUNCTIONAL ASSESSMENT
PAIN_FUNCTIONAL_ASSESSMENT: ACTIVITIES ARE NOT PREVENTED
PAIN_FUNCTIONAL_ASSESSMENT: PREVENTS OR INTERFERES SOME ACTIVE ACTIVITIES AND ADLS
PAIN_FUNCTIONAL_ASSESSMENT: ACTIVITIES ARE NOT PREVENTED

## 2024-03-03 ASSESSMENT — PAIN DESCRIPTION - DESCRIPTORS
DESCRIPTORS: DULL
DESCRIPTORS: PATIENT UNABLE TO DESCRIBE
DESCRIPTORS: DULL

## 2024-03-03 ASSESSMENT — PAIN DESCRIPTION - PAIN TYPE
TYPE: ACUTE PAIN

## 2024-03-03 NOTE — PROGRESS NOTES
[facility] Hospitalist Progress Note    Admitting Date and Time: 3/2/2024  7:08 AM  Admit Dx: Lung consolidation (HCC) [J18.1]  Acute respiratory failure with hypoxia (HCC) [J96.01]    Subjective:  Patient is being followed for Lung consolidation (HCC) [J18.1]  Acute respiratory failure with hypoxia (HCC) [J96.01]   Pt seen and examined this morning  No acute events overnight   stated she felt short of breath when she got up but she is currently on 2 L and her baseline is 5 L    ROS: denies fever, chills, cp, sob, n/v, HA unless stated above.      potassium chloride  40 mEq Oral Q2H    ipratropium 0.5 mg-albuterol 2.5 mg  1 Dose Inhalation Q4H WA RT    arformoterol tartrate  15 mcg Nebulization BID RT    budesonide  0.5 mg Nebulization BID RT    metoprolol succinate  12.5 mg Oral QAM    digoxin  62.5 mcg Oral Daily    pantoprazole  40 mg Oral QAM AC    rosuvastatin  10 mg Oral Nightly    [Held by provider] sacubitril-valsartan  0.5 tablet Oral BID    levothyroxine  50 mcg Oral QAM AC    vitamin D  2,000 Units Oral Daily    sodium chloride flush  5-40 mL IntraVENous 2 times per day    warfarin  5 mg Oral Nightly     sodium chloride flush, 5-40 mL, PRN  sodium chloride, , PRN  potassium chloride, 40 mEq, PRN   Or  potassium alternative oral replacement, 40 mEq, PRN   Or  potassium chloride, 10 mEq, PRN  magnesium sulfate, 2,000 mg, PRN  polyethylene glycol, 17 g, Daily PRN  acetaminophen, 650 mg, Q6H PRN   Or  acetaminophen, 650 mg, Q6H PRN  prochlorperazine, 10 mg, Q8H PRN   Or  prochlorperazine, 10 mg, Q6H PRN         Objective:    BP (!) 94/57   Pulse 73   Temp 98.3 °F (36.8 °C) (Oral)   Resp 18   Ht 1.727 m (5' 8\")   Wt 78 kg (172 lb)   SpO2 100%   BMI 26.15 kg/m²     General Appearance: alert and oriented to person, place and time and in no acute distress  Skin: warm and dry  Head: normocephalic and atraumatic  Eyes: pupils equal, round, and reactive to light, extraocular eye movements intact,

## 2024-03-03 NOTE — PROGRESS NOTES
Patient ambulated 45 feet on 4 liter nasal cannula, pulse ox began at 94% and then dropped as low as 84%.  Patient returned to her room and resumed 5 L nasal cannula.  Kaylin Castellon RN

## 2024-03-04 LAB
ANION GAP SERPL CALCULATED.3IONS-SCNC: 10 MMOL/L (ref 7–16)
BASOPHILS # BLD: 0.01 K/UL (ref 0–0.2)
BASOPHILS NFR BLD: 0 % (ref 0–2)
BUN SERPL-MCNC: 20 MG/DL (ref 6–23)
CALCIUM SERPL-MCNC: 9.4 MG/DL (ref 8.6–10.2)
CHLORIDE SERPL-SCNC: 96 MMOL/L (ref 98–107)
CO2 SERPL-SCNC: 29 MMOL/L (ref 22–29)
CREAT SERPL-MCNC: 1.5 MG/DL (ref 0.5–1)
EKG ATRIAL RATE: 56 BPM
EKG ATRIAL RATE: 89 BPM
EKG P AXIS: 14 DEGREES
EKG P-R INTERVAL: 140 MS
EKG Q-T INTERVAL: 408 MS
EKG Q-T INTERVAL: 438 MS
EKG QRS DURATION: 70 MS
EKG QRS DURATION: 78 MS
EKG QTC CALCULATION (BAZETT): 410 MS
EKG QTC CALCULATION (BAZETT): 496 MS
EKG R AXIS: 14 DEGREES
EKG R AXIS: 33 DEGREES
EKG T AXIS: 0 DEGREES
EKG T AXIS: 32 DEGREES
EKG VENTRICULAR RATE: 53 BPM
EKG VENTRICULAR RATE: 89 BPM
EOSINOPHIL # BLD: 0.07 K/UL (ref 0.05–0.5)
EOSINOPHILS RELATIVE PERCENT: 2 % (ref 0–6)
ERYTHROCYTE [DISTWIDTH] IN BLOOD BY AUTOMATED COUNT: 17.7 % (ref 11.5–15)
GFR SERPL CREATININE-BSD FRML MDRD: 34 ML/MIN/1.73M2
GLUCOSE SERPL-MCNC: 128 MG/DL (ref 74–99)
HCT VFR BLD AUTO: 30.1 % (ref 34–48)
HGB BLD-MCNC: 9.2 G/DL (ref 11.5–15.5)
IMM GRANULOCYTES # BLD AUTO: <0.03 K/UL (ref 0–0.58)
IMM GRANULOCYTES NFR BLD: 0 % (ref 0–5)
INR PPP: 4.2
LYMPHOCYTES NFR BLD: 1.39 K/UL (ref 1.5–4)
LYMPHOCYTES RELATIVE PERCENT: 30 % (ref 20–42)
MAGNESIUM SERPL-MCNC: 1.2 MG/DL (ref 1.6–2.6)
MCH RBC QN AUTO: 30.6 PG (ref 26–35)
MCHC RBC AUTO-ENTMCNC: 30.6 G/DL (ref 32–34.5)
MCV RBC AUTO: 100 FL (ref 80–99.9)
MICROORGANISM SPEC CULT: NORMAL
MONOCYTES NFR BLD: 0.42 K/UL (ref 0.1–0.95)
MONOCYTES NFR BLD: 9 % (ref 2–12)
NEUTROPHILS NFR BLD: 59 % (ref 43–80)
NEUTS SEG NFR BLD: 2.73 K/UL (ref 1.8–7.3)
PLATELET CONFIRMATION: NORMAL
PLATELET, FLUORESCENCE: 98 K/UL (ref 130–450)
PMV BLD AUTO: 11.8 FL (ref 7–12)
POTASSIUM SERPL-SCNC: 4.4 MMOL/L (ref 3.5–5)
PROTHROMBIN TIME: 45.5 SEC (ref 9.3–12.4)
RBC # BLD AUTO: 3.01 M/UL (ref 3.5–5.5)
SODIUM SERPL-SCNC: 135 MMOL/L (ref 132–146)
SPECIMEN DESCRIPTION: NORMAL
WBC OTHER # BLD: 4.6 K/UL (ref 4.5–11.5)

## 2024-03-04 PROCEDURE — 6370000000 HC RX 637 (ALT 250 FOR IP): Performed by: INTERNAL MEDICINE

## 2024-03-04 PROCEDURE — 85610 PROTHROMBIN TIME: CPT

## 2024-03-04 PROCEDURE — 6360000002 HC RX W HCPCS: Performed by: INTERNAL MEDICINE

## 2024-03-04 PROCEDURE — 83735 ASSAY OF MAGNESIUM: CPT

## 2024-03-04 PROCEDURE — 2700000000 HC OXYGEN THERAPY PER DAY

## 2024-03-04 PROCEDURE — 93005 ELECTROCARDIOGRAM TRACING: CPT | Performed by: INTERNAL MEDICINE

## 2024-03-04 PROCEDURE — 36415 COLL VENOUS BLD VENIPUNCTURE: CPT

## 2024-03-04 PROCEDURE — 2580000003 HC RX 258: Performed by: INTERNAL MEDICINE

## 2024-03-04 PROCEDURE — 85025 COMPLETE CBC W/AUTO DIFF WBC: CPT

## 2024-03-04 PROCEDURE — 94640 AIRWAY INHALATION TREATMENT: CPT

## 2024-03-04 PROCEDURE — 80048 BASIC METABOLIC PNL TOTAL CA: CPT

## 2024-03-04 PROCEDURE — 2060000000 HC ICU INTERMEDIATE R&B

## 2024-03-04 PROCEDURE — 99232 SBSQ HOSP IP/OBS MODERATE 35: CPT | Performed by: INTERNAL MEDICINE

## 2024-03-04 RX ORDER — LANOLIN ALCOHOL/MO/W.PET/CERES
400 CREAM (GRAM) TOPICAL DAILY
Status: DISCONTINUED | OUTPATIENT
Start: 2024-03-04 | End: 2024-03-05 | Stop reason: HOSPADM

## 2024-03-04 RX ORDER — BUMETANIDE 1 MG/1
0.5 TABLET ORAL DAILY
Status: DISCONTINUED | OUTPATIENT
Start: 2024-03-05 | End: 2024-03-05 | Stop reason: HOSPADM

## 2024-03-04 RX ORDER — IPRATROPIUM BROMIDE AND ALBUTEROL SULFATE 2.5; .5 MG/3ML; MG/3ML
1 SOLUTION RESPIRATORY (INHALATION) ONCE
Status: COMPLETED | OUTPATIENT
Start: 2024-03-04 | End: 2024-03-04

## 2024-03-04 RX ORDER — LIDOCAINE 4 G/G
1 PATCH TOPICAL DAILY
Status: DISCONTINUED | OUTPATIENT
Start: 2024-03-04 | End: 2024-03-05 | Stop reason: HOSPADM

## 2024-03-04 RX ORDER — KETOROLAC TROMETHAMINE 30 MG/ML
15 INJECTION, SOLUTION INTRAMUSCULAR; INTRAVENOUS ONCE
Status: COMPLETED | OUTPATIENT
Start: 2024-03-04 | End: 2024-03-04

## 2024-03-04 RX ADMIN — IPRATROPIUM BROMIDE AND ALBUTEROL SULFATE 1 DOSE: 2.5; .5 SOLUTION RESPIRATORY (INHALATION) at 09:13

## 2024-03-04 RX ADMIN — BUDESONIDE INHALATION 500 MCG: 0.5 SUSPENSION RESPIRATORY (INHALATION) at 21:06

## 2024-03-04 RX ADMIN — LEVOTHYROXINE SODIUM 50 MCG: 0.05 TABLET ORAL at 05:34

## 2024-03-04 RX ADMIN — ROSUVASTATIN CALCIUM 10 MG: 10 TABLET, FILM COATED ORAL at 21:44

## 2024-03-04 RX ADMIN — PANTOPRAZOLE SODIUM 40 MG: 40 TABLET, DELAYED RELEASE ORAL at 05:34

## 2024-03-04 RX ADMIN — IPRATROPIUM BROMIDE AND ALBUTEROL SULFATE 1 DOSE: .5; 2.5 SOLUTION RESPIRATORY (INHALATION) at 02:55

## 2024-03-04 RX ADMIN — KETOROLAC TROMETHAMINE 15 MG: 30 INJECTION, SOLUTION INTRAMUSCULAR; INTRAVENOUS at 02:53

## 2024-03-04 RX ADMIN — ARFORMOTEROL TARTRATE 15 MCG: 15 SOLUTION RESPIRATORY (INHALATION) at 21:06

## 2024-03-04 RX ADMIN — SODIUM CHLORIDE, PRESERVATIVE FREE 10 ML: 5 INJECTION INTRAVENOUS at 08:25

## 2024-03-04 RX ADMIN — IPRATROPIUM BROMIDE AND ALBUTEROL SULFATE 1 DOSE: 2.5; .5 SOLUTION RESPIRATORY (INHALATION) at 13:12

## 2024-03-04 RX ADMIN — WARFARIN SODIUM 5 MG: 5 TABLET ORAL at 21:38

## 2024-03-04 RX ADMIN — SODIUM CHLORIDE, PRESERVATIVE FREE 10 ML: 5 INJECTION INTRAVENOUS at 22:05

## 2024-03-04 RX ADMIN — Medication 2000 UNITS: at 08:24

## 2024-03-04 RX ADMIN — MAGNESIUM OXIDE 400 MG (241.3 MG MAGNESIUM) TABLET 400 MG: TABLET at 13:24

## 2024-03-04 RX ADMIN — BUDESONIDE INHALATION 500 MCG: 0.5 SUSPENSION RESPIRATORY (INHALATION) at 09:13

## 2024-03-04 RX ADMIN — ARFORMOTEROL TARTRATE 15 MCG: 15 SOLUTION RESPIRATORY (INHALATION) at 09:13

## 2024-03-04 RX ADMIN — DIGOXIN 62.5 MCG: 0.12 TABLET ORAL at 08:24

## 2024-03-04 RX ADMIN — IPRATROPIUM BROMIDE AND ALBUTEROL SULFATE 1 DOSE: 2.5; .5 SOLUTION RESPIRATORY (INHALATION) at 16:26

## 2024-03-04 RX ADMIN — IPRATROPIUM BROMIDE AND ALBUTEROL SULFATE 1 DOSE: 2.5; .5 SOLUTION RESPIRATORY (INHALATION) at 21:06

## 2024-03-04 RX ADMIN — METOPROLOL SUCCINATE 12.5 MG: 25 TABLET, FILM COATED, EXTENDED RELEASE ORAL at 08:24

## 2024-03-04 ASSESSMENT — PAIN DESCRIPTION - PAIN TYPE: TYPE: ACUTE PAIN

## 2024-03-04 ASSESSMENT — PAIN DESCRIPTION - LOCATION
LOCATION: BACK
LOCATION: CHEST

## 2024-03-04 ASSESSMENT — PAIN SCALES - GENERAL
PAINLEVEL_OUTOF10: 5
PAINLEVEL_OUTOF10: 5

## 2024-03-04 ASSESSMENT — PAIN DESCRIPTION - ORIENTATION: ORIENTATION: LEFT

## 2024-03-04 ASSESSMENT — PAIN DESCRIPTION - DESCRIPTORS: DESCRIPTORS: DULL

## 2024-03-04 ASSESSMENT — PAIN - FUNCTIONAL ASSESSMENT: PAIN_FUNCTIONAL_ASSESSMENT: ACTIVITIES ARE NOT PREVENTED

## 2024-03-04 NOTE — CARE COORDINATION
Social Work/Discharge Planning:  Met with patient and her daughter Krystal and completed initial assessment.  Explained Social Work role and discussed transition of care/discharge planning.  Patient lives alone in a two story house, but stays on the first floor.  PTA she is independent with no adaptive device.  She has a cane, wheeled walker, nebulizer, bedside commode, rollator and wears five liters oxygen through [employer].  She states she was discharged home with [facility] last hospitalization.  She states [facility] saw her for initial assessment and it was mutually decided there was no need for home health care.  She states she receives weekly calls through [facility].  She plans to return home at discharge and does not want home health care.  Will continue to follow and assist with discharge planning.  Electronically signed by KAREN Ba on 3/4/2024 at 12:42 PM

## 2024-03-04 NOTE — CONSULTS
CARDIOLOGY CONSULTATION    Patient Name:  Krystal Hyatt    :  1940    Reason for Consultation:   History of CHF; atrial fibrillation    History of Present Illness:   Krystal Hyatt returns to [facility], following a history of increasing shortness of breath and cough over the past few days prior to admission but importantly her oxygen saturation and blood pressure were both low and for this reason her daughter called from [place] to have EMS take her to the hospital.  She also has a history of medication induced hyperthyroidism with subsequent atrial fibrillation for which she has remained in sinus rhythm for the past several months.  In addition, she has a history of carcinoma of the lung and is status post history of radiation therapy and underlying chronic obstructive pulmonary disease.  There has been no loss of consciousness and she does have renal insufficiency stage III as well.  During a previous hospitalization in 2024 she was found to have a left ventricular ejection fraction of only 38±5%.  She has been on a neprilysin inhibitor, diuretic, low-dose digoxin but has had multiple recurrent problems with increasing shortness of breath as well as cough.  Past Medical History:   has a past medical history of Atrial fibrillation (HCC), CHF (congestive heart failure) (HCC), Emphysema, unspecified (HCC), Hyperlipidemia, Hypertension, Lung cancer (HCC), Mitral valve regurgitation, Oxygen dependent, Prolonged emergence from general anesthesia, Skin cancer, and Thyroid disease.    Surgical History:   has a past surgical history that includes Breast biopsy (Right); Tubal ligation; bronchoscopy (N/A, 2021); Intracapsular cataract extraction (Right, 2021); Intracapsular cataract extraction (Left, 2022); and Cardioversion (10/06/2023).     Social History:   reports that she quit smoking about 25 years ago. Her smoking use included cigarettes. She started smoking  pruritis.  Neurological: No headache, diplopia, change in muscle strength, numbness or tingling. No change in gait, balance, coordination, mood, affect, memory, mentation, behavior.  Psychiatric: No anxiety or depression.  Endocrine: No temperature intolerance. No excessive thirst, fluid intake, or urination. No tremor.  Hematologic/Lymphatic: No abnormal bruising or bleeding, blood clots or swollen lymph nodes.  Allergic/Immunologic: No nasal congestion or hives.    Physical Examination:    Vital Signs: BP (!) 105/54   Pulse 63   Temp 98 °F (36.7 °C) (Oral)   Resp 20   Ht 1.727 m (5' 8\")   Wt 73.6 kg (162 lb 4.1 oz)   SpO2 96%   BMI 24.67 kg/m²   General appearance: Well preserved, mesomorphic body habitus, alert, no distress.  Skin: Skin color, texture, turgor normal. No rashes or lesions. No induration or tightening palpated.  Head: Normocephalic. No masses, lesions, tenderness or abnormalities  Eyes: Conjunctivae/corneas clear. PERRL, EOMs intact. Sclera non icteric.  Ears: External ears normal. Canals clear. TM's clear bilaterally. Hearing normal to finger rub.  Nose/Sinuses: Nares normal. Septum midline. Mucosa normal. No drainage or sinus tenderness.  Oropharynx: Lips, mucosa, and tongue normal. Oropharynx clear with no exudate seen.  Neck: Neck supple and symmetric. No adenopathy. Thyroid symmetric, normal size, without nodules. Trachea is midline. Carotids brisk in upstroke without bruits, no abnormal JVP noted at 45°.  Chest: Even excursion  Lungs: Lungs grossly clear to auscultation bilaterally. No retractions or use of accessory muscles. No tactile vocal fremitus. No rhonchi, crackles or rales.  Heart:  S1 > S2.  Irregular, irregular rhythm. No gallop grade 2/6 early systolic murmur second right intercostal space. No rub, palpable thrill or heave noted. PMI 5th intercostal space midclavicular line.  Abdomen: Abdomen soft, moderately protuberant, non-tender. BS normal. No masses, organomegaly. No

## 2024-03-04 NOTE — ACP (ADVANCE CARE PLANNING)
Advance Care Planning   Healthcare Decision Maker:    Primary Decision Maker: Olena Krsytal Muniz - Ju[place] - [phone]    Secondary Decision Maker: OlenaDieter - Juancarlos - [phone]    Click here to complete Healthcare Decision Makers including selection of the Healthcare Decision Maker Relationship (ie \"Primary\").

## 2024-03-04 NOTE — PLAN OF CARE
Problem: Discharge Planning  Goal: Discharge to home or other facility with appropriate resources  3/4/2024 0947 by Lesa Giron RN  Outcome: Progressing  3/4/2024 0105 by Paz Patiño RN  Outcome: Progressing     Problem: Risk for Elopement  Goal: Patient will not exit the unit/facility without proper excort  3/4/2024 0947 by Lesa Giron RN  Outcome: Progressing  3/4/2024 0105 by Paz Patiño RN  Outcome: Progressing     Problem: Safety - Adult  Goal: Free from fall injury  3/4/2024 0947 by Lesa Giron RN  Outcome: Progressing  3/4/2024 0105 by Paz Patiño RN  Outcome: Progressing

## 2024-03-04 NOTE — PROGRESS NOTES
[facility] Hospitalist Progress Note    Admitting Date and Time: 3/2/2024  7:08 AM  Admit Dx: Lung consolidation (HCC) [J18.1]  Acute respiratory failure with hypoxia (HCC) [J96.01]    Subjective:  Patient is being followed for Lung consolidation (HCC) [J18.1]  Acute respiratory failure with hypoxia (HCC) [J96.01]   Pt seen and examined this morning  No acute events overnight  Denies any acute complains today   States she's ready to leave     ROS: denies fever, chills, cp, sob, n/v, HA unless stated above.      lidocaine  1 patch TransDERmal Daily    magnesium oxide  400 mg Oral Daily    [START ON 3/5/2024] bumetanide  0.5 mg Oral Daily    ipratropium 0.5 mg-albuterol 2.5 mg  1 Dose Inhalation Q4H WA RT    arformoterol tartrate  15 mcg Nebulization BID RT    budesonide  0.5 mg Nebulization BID RT    metoprolol succinate  12.5 mg Oral QAM    digoxin  62.5 mcg Oral Daily    pantoprazole  40 mg Oral QAM AC    rosuvastatin  10 mg Oral Nightly    sacubitril-valsartan  0.5 tablet Oral BID    levothyroxine  50 mcg Oral QAM AC    vitamin D  2,000 Units Oral Daily    sodium chloride flush  5-40 mL IntraVENous 2 times per day    warfarin  5 mg Oral Nightly     sodium chloride flush, 5-40 mL, PRN  sodium chloride, , PRN  potassium chloride, 40 mEq, PRN   Or  potassium alternative oral replacement, 40 mEq, PRN   Or  potassium chloride, 10 mEq, PRN  magnesium sulfate, 2,000 mg, PRN  polyethylene glycol, 17 g, Daily PRN  acetaminophen, 650 mg, Q6H PRN   Or  acetaminophen, 650 mg, Q6H PRN  prochlorperazine, 10 mg, Q8H PRN   Or  prochlorperazine, 10 mg, Q6H PRN         Objective:    BP (!) 105/54   Pulse 63   Temp 98 °F (36.7 °C) (Oral)   Resp 20   Ht 1.727 m (5' 8\")   Wt 73.6 kg (162 lb 4.1 oz)   SpO2 96%   BMI 24.67 kg/m²     General Appearance: alert and oriented to person, place and time and in no acute distress  Skin: warm and dry  Head: normocephalic and atraumatic  Eyes: pupils equal, round, and reactive to  light, extraocular eye movements intact, conjunctivae normal  Neck: neck supple and non tender without mass   Pulmonary/Chest: clear to auscultation bilaterally- no wheezes, rales or rhonchi, normal air movement, no respiratory distress  Cardiovascular: normal rate, normal S1 and S2 and no carotid bruits  Abdomen: soft, non-tender, non-distended, normal bowel sounds, no masses or organomegaly  Extremities: no cyanosis, no clubbing and no edema  Neurologic: no cranial nerve deficit and speech normal        Recent Labs     03/02/24  0730 03/03/24  0425 03/04/24  0935    140 135   K 3.4* 3.4* 4.4   CL 89* 96* 96*   CO2 35* 32* 29   BUN 19 17 20   CREATININE 1.5* 1.1* 1.5*   GLUCOSE 109* 124* 128*   CALCIUM 9.6 8.4* 9.4       Recent Labs     03/02/24  0730 03/03/24  0425 03/04/24  0935   WBC 5.8 4.2* 4.6   RBC 3.48* 2.80* 3.01*   HGB 10.8* 8.8* 9.2*   HCT 34.4 28.5* 30.1*   MCV 98.9 101.8* 100.0*   MCH 31.0 31.4 30.6   MCHC 31.4* 30.9* 30.6*   RDW 17.3* 17.5* 17.7*   MPV 11.6 11.4 11.8         Assessment and Plan:     Principal Problem:    Acute respiratory failure with hypoxia (HCC)  Resolved Problems:    * No resolved hospital problems. *    Sepsis.  (Hypothermia, tachypnea, lactic acidosis).  POA.  Check procal, UA, resp panel, blood cultures > all negative.  Rocephin and doxycycline x 1 in the ED.  Monitor off antibiotics.  Doubt underlying infection.  Supportive care  Hypotension.  Likely 2/2 dehydration.  Patient had a poor appetite and was taking Bumex on top of it.  1 L bolus ordered in the ED.  BP improved.  Continue to bolus as needed.  Improving.  Restart Toprol-XL, low dose Bumex and Entresto. Monitor BP  TRISTEN.  Creatinine 1.5 on admission.  Normal baseline.  Likely prerenal 2/2 dehydration.  Bolus as needed.  Improved and down to 1.1  Lactic acidosis.  Resolved  COPD.  Not in acute exacerbation.  Breathing treatments  Chronic hypoxic respiratory failure.  On 5 L at baseline.  Currently saturating well

## 2024-03-05 VITALS
BODY MASS INDEX: 24.79 KG/M2 | OXYGEN SATURATION: 94 % | HEART RATE: 77 BPM | RESPIRATION RATE: 18 BRPM | DIASTOLIC BLOOD PRESSURE: 52 MMHG | TEMPERATURE: 98.1 F | HEIGHT: 68 IN | SYSTOLIC BLOOD PRESSURE: 93 MMHG | WEIGHT: 163.58 LBS

## 2024-03-05 LAB
ANION GAP SERPL CALCULATED.3IONS-SCNC: 10 MMOL/L (ref 7–16)
BASOPHILS # BLD: 0.01 K/UL (ref 0–0.2)
BASOPHILS NFR BLD: 0 % (ref 0–2)
BUN SERPL-MCNC: 21 MG/DL (ref 6–23)
CALCIUM SERPL-MCNC: 9 MG/DL (ref 8.6–10.2)
CHLORIDE SERPL-SCNC: 101 MMOL/L (ref 98–107)
CO2 SERPL-SCNC: 30 MMOL/L (ref 22–29)
CREAT SERPL-MCNC: 1.4 MG/DL (ref 0.5–1)
EOSINOPHIL # BLD: 0.08 K/UL (ref 0.05–0.5)
EOSINOPHILS RELATIVE PERCENT: 2 % (ref 0–6)
ERYTHROCYTE [DISTWIDTH] IN BLOOD BY AUTOMATED COUNT: 17.6 % (ref 11.5–15)
GFR SERPL CREATININE-BSD FRML MDRD: 38 ML/MIN/1.73M2
GLUCOSE SERPL-MCNC: 103 MG/DL (ref 74–99)
HCT VFR BLD AUTO: 26.2 % (ref 34–48)
HGB BLD-MCNC: 8 G/DL (ref 11.5–15.5)
IMM GRANULOCYTES # BLD AUTO: <0.03 K/UL (ref 0–0.58)
IMM GRANULOCYTES NFR BLD: 1 % (ref 0–5)
INR PPP: 4.6
LYMPHOCYTES NFR BLD: 1.22 K/UL (ref 1.5–4)
LYMPHOCYTES RELATIVE PERCENT: 37 % (ref 20–42)
MCH RBC QN AUTO: 31 PG (ref 26–35)
MCHC RBC AUTO-ENTMCNC: 30.5 G/DL (ref 32–34.5)
MCV RBC AUTO: 101.6 FL (ref 80–99.9)
MONOCYTES NFR BLD: 0.23 K/UL (ref 0.1–0.95)
MONOCYTES NFR BLD: 7 % (ref 2–12)
NEUTROPHILS NFR BLD: 53 % (ref 43–80)
NEUTS SEG NFR BLD: 1.74 K/UL (ref 1.8–7.3)
PLATELET CONFIRMATION: NORMAL
PLATELET, FLUORESCENCE: 96 K/UL (ref 130–450)
PMV BLD AUTO: 11.3 FL (ref 7–12)
POTASSIUM SERPL-SCNC: 4.2 MMOL/L (ref 3.5–5)
PROTHROMBIN TIME: 52 SEC (ref 9.3–12.4)
RBC # BLD AUTO: 2.58 M/UL (ref 3.5–5.5)
SODIUM SERPL-SCNC: 141 MMOL/L (ref 132–146)
T4 FREE SERPL-MCNC: 1 NG/DL (ref 0.9–1.7)
TSH SERPL DL<=0.05 MIU/L-ACNC: 41.59 UIU/ML (ref 0.27–4.2)
WBC OTHER # BLD: 3.3 K/UL (ref 4.5–11.5)

## 2024-03-05 PROCEDURE — 85025 COMPLETE CBC W/AUTO DIFF WBC: CPT

## 2024-03-05 PROCEDURE — 99239 HOSP IP/OBS DSCHRG MGMT >30: CPT | Performed by: INTERNAL MEDICINE

## 2024-03-05 PROCEDURE — 2580000003 HC RX 258: Performed by: INTERNAL MEDICINE

## 2024-03-05 PROCEDURE — 36415 COLL VENOUS BLD VENIPUNCTURE: CPT

## 2024-03-05 PROCEDURE — 80048 BASIC METABOLIC PNL TOTAL CA: CPT

## 2024-03-05 PROCEDURE — 94640 AIRWAY INHALATION TREATMENT: CPT

## 2024-03-05 PROCEDURE — 84439 ASSAY OF FREE THYROXINE: CPT

## 2024-03-05 PROCEDURE — 85610 PROTHROMBIN TIME: CPT

## 2024-03-05 PROCEDURE — 6360000002 HC RX W HCPCS: Performed by: INTERNAL MEDICINE

## 2024-03-05 PROCEDURE — 84443 ASSAY THYROID STIM HORMONE: CPT

## 2024-03-05 PROCEDURE — 6370000000 HC RX 637 (ALT 250 FOR IP): Performed by: INTERNAL MEDICINE

## 2024-03-05 PROCEDURE — 2700000000 HC OXYGEN THERAPY PER DAY

## 2024-03-05 RX ORDER — BUMETANIDE 0.5 MG/1
0.5 TABLET ORAL DAILY
Qty: 30 TABLET | Refills: 3 | Status: SHIPPED | OUTPATIENT
Start: 2024-03-06

## 2024-03-05 RX ORDER — LEVOTHYROXINE SODIUM 0.07 MG/1
75 TABLET ORAL DAILY
Qty: 30 TABLET | Refills: 1 | Status: SHIPPED | OUTPATIENT
Start: 2024-03-05

## 2024-03-05 RX ADMIN — Medication 2000 UNITS: at 08:39

## 2024-03-05 RX ADMIN — METOPROLOL SUCCINATE 12.5 MG: 25 TABLET, FILM COATED, EXTENDED RELEASE ORAL at 08:44

## 2024-03-05 RX ADMIN — BUDESONIDE INHALATION 500 MCG: 0.5 SUSPENSION RESPIRATORY (INHALATION) at 09:07

## 2024-03-05 RX ADMIN — IPRATROPIUM BROMIDE AND ALBUTEROL SULFATE 1 DOSE: 2.5; .5 SOLUTION RESPIRATORY (INHALATION) at 09:07

## 2024-03-05 RX ADMIN — IPRATROPIUM BROMIDE AND ALBUTEROL SULFATE 1 DOSE: 2.5; .5 SOLUTION RESPIRATORY (INHALATION) at 12:55

## 2024-03-05 RX ADMIN — BUMETANIDE 0.5 MG: 1 TABLET ORAL at 08:39

## 2024-03-05 RX ADMIN — PANTOPRAZOLE SODIUM 40 MG: 40 TABLET, DELAYED RELEASE ORAL at 06:08

## 2024-03-05 RX ADMIN — DIGOXIN 62.5 MCG: 0.12 TABLET ORAL at 08:39

## 2024-03-05 RX ADMIN — MAGNESIUM OXIDE 400 MG (241.3 MG MAGNESIUM) TABLET 400 MG: TABLET at 08:39

## 2024-03-05 RX ADMIN — ARFORMOTEROL TARTRATE 15 MCG: 15 SOLUTION RESPIRATORY (INHALATION) at 09:07

## 2024-03-05 RX ADMIN — LEVOTHYROXINE SODIUM 50 MCG: 0.05 TABLET ORAL at 06:08

## 2024-03-05 RX ADMIN — SACUBITRIL AND VALSARTAN 0.5 TABLET: 24; 26 TABLET, FILM COATED ORAL at 08:39

## 2024-03-05 RX ADMIN — SODIUM CHLORIDE, PRESERVATIVE FREE 10 ML: 5 INJECTION INTRAVENOUS at 08:41

## 2024-03-05 NOTE — PLAN OF CARE
Problem: Discharge Planning  Goal: Discharge to home or other facility with appropriate resources  3/5/2024 1016 by Stephy Alvarado  Outcome: Progressing  3/4/2024 2244 by Mojgan Smith RN  Outcome: Progressing     Problem: Risk for Elopement  Goal: Patient will not exit the unit/facility without proper excort  3/5/2024 1016 by Stephy Alvarado  Outcome: Progressing  3/4/2024 2244 by Mojgan Smith RN  Outcome: Progressing     Problem: Safety - Adult  Goal: Free from fall injury  3/5/2024 1016 by Stephy Alvarado  Outcome: Progressing  3/4/2024 2244 by Mojgan Smith RN  Outcome: Progressing     Problem: Pain  Goal: Verbalizes/displays adequate comfort level or baseline comfort level  3/5/2024 1016 by Stephy Alvarado  Outcome: Progressing  3/4/2024 2244 by Mojgan Smith RN  Outcome: Progressing     Problem: ABCDS Injury Assessment  Goal: Absence of physical injury  3/5/2024 1016 by Stephy Alvarado  Outcome: Progressing  3/4/2024 2244 by Mojgan Smith RN  Outcome: Progressing     Problem: Chronic Conditions and Co-morbidities  Goal: Patient's chronic conditions and co-morbidity symptoms are monitored and maintained or improved  Outcome: Progressing

## 2024-03-05 NOTE — DISCHARGE INSTRUCTIONS
hold coumadin for 48 hours and resume at 2.5 mg.     Call [facility] to get prescription for Coumadin    Get blood work on Monday     Bumex dose changed to 0.5 mg once daily    Entresto changed to half a tablet once daily    Synthroid dose increased to 75mcg

## 2024-03-05 NOTE — PROGRESS NOTES
Physician Progress Note      PATIENT:               SRINIVAS DYE  CSN #:                  [id number]  :                       1940  ADMIT DATE:       3/2/2024 7:08 AM  DISCH DATE:  RESPONDING  PROVIDER #:        Bharti Jones MD          QUERY TEXT:    Patient admitted with shortness of breath. Noted documentation of Acute Kidney   Injury in IM notes. In order to support the diagnosis of TRISTEN, please include   additional clinical indicators in your documentation.? Or please document if   the diagnosis of TRISTEN has been ruled out after further study.    The medical record reflects the following:  Risk Factors: advanced age  Clinical Indicators: Bun/Cr: 19/1.5 on admission -> 17/1.1, per IM \"...TRISTEN.    Creatinine 1.5 on admission.  Normal baseline.  Likely prerenal 2/2   dehydration.  Bolus as needed.  Improved and down to 1.1...\"  Treatment: serial lab monitoring    Defined by Kidney Disease Improving Global Outcomes (KDIGO) clinical practice   guideline for acute kidney injury:  -Increase in SCr by greater than or equal to 0.3 mg/dl within 48 hours; or  -Increase or decrease in SCr to greater than or equal to 1.5 times baseline,   which is known or presumed to have occurred within the prior 7 days; or  -Urine volume < 0.5ml/kg/h for 6 hours.    Thank you,  Concepción Latham RN, BSN, CDIS  Clinical Documentation Integrity  [email or web address]  Options provided:  -- Acute kidney injury evidenced by, Please document evidence as well as a   numerical baseline creatinine, if known.  -- Currently resolved acute kidney injury was evidenced by, Please document   evidence as well as a numerical baseline creatinine, if known.  -- Acute kidney injury ruled out after study  -- Other - I will add my own diagnosis  -- Disagree - Not applicable / Not valid  -- Disagree - Clinically unable to determine / Unknown  -- Refer to Clinical Documentation Reviewer    PROVIDER RESPONSE TEXT:    This patient has acute kidney  injury as evidenced by 0.3 increase in Cr    Query created by: Yeison, April on 3/5/2024 9:11 AM      Electronically signed by:  Bharti Jones MD 3/5/2024 11:20 AM

## 2024-03-05 NOTE — DISCHARGE SUMMARY
[facility] Hospitalist Physician Discharge Summary       Dieter Jones, DO  [address] [id number]  [phone]    Call on 3/7/2024  Call Dr. Jones's office so he can order the new Coumadin dosage (2.5mg)      Activity level: As tolerated     Dispo: Home      Condition on discharge: Stable     Patient ID:  Krystal Hyatt  [id number]  83 y.o.  1940    Admit date: 3/2/2024    Discharge date and time:  3/5/2024  1:32 PM    Admission Diagnoses: Principal Problem:    Acute respiratory failure with hypoxia (HCC)  Resolved Problems:    * No resolved hospital problems. *      Discharge Diagnoses: Principal Problem:    Acute respiratory failure with hypoxia (HCC)  Resolved Problems:    * No resolved hospital problems. *      Consults:  IP CONSULT TO IV TEAM  IP CONSULT TO CARDIOLOGY  IP CONSULT TO IV TEAM    Hospital Course:   Patient Krystal Haytt is a 83 y.o. presented with Lung consolidation (HCC) [J18.1]  Acute respiratory failure with hypoxia (HCC) [J96.01]    Patient is an 83-year-old female who was admitted due to hypotension she met sepsis criteria on admission but likely due to hypotension.  No underlying infection.  She was monitored off antibiotics.  Cardiac meds held initially.  Cardiology consulted.  Entresto and Bumex doses decreased.  Kidney function improved and remained stable.  Her respiratory status also remained stable.  At rest she would be saturating well on 2 to 3 L but will require 5 L, which is her baseline, with activities.  Her INR was elevated at 4.6, so she was recommended to hold it for 48 hours and then restart at a lower dose of 2.5 mg daily per cardiology.  Also noted her TSH level to be elevated at 41.  Synthroid dose increased to 75 mcg from 50.  She will be discharged home in stable condition.    Discharge Exam:    General Appearance: alert and oriented to person, place and time and in no acute distress  Skin: warm and dry  Head: normocephalic and

## 2024-03-05 NOTE — PROGRESS NOTES
[facility] Hospitalist Progress Note    Admitting Date and Time: 3/2/2024  7:08 AM  Admit Dx: Lung consolidation (HCC) [J18.1]  Acute respiratory failure with hypoxia (HCC) [J96.01]    Subjective:  Patient is being followed for Lung consolidation (HCC) [J18.1]  Acute respiratory failure with hypoxia (HCC) [J96.01]   Pt seen and examined this morning  No acute events overnight  Denies any acute complains today       ROS: denies fever, chills, cp, sob, n/v, HA unless stated above.      lidocaine  1 patch TransDERmal Daily    magnesium oxide  400 mg Oral Daily    bumetanide  0.5 mg Oral Daily    ipratropium 0.5 mg-albuterol 2.5 mg  1 Dose Inhalation Q4H WA RT    arformoterol tartrate  15 mcg Nebulization BID RT    budesonide  0.5 mg Nebulization BID RT    metoprolol succinate  12.5 mg Oral QAM    digoxin  62.5 mcg Oral Daily    pantoprazole  40 mg Oral QAM AC    rosuvastatin  10 mg Oral Nightly    sacubitril-valsartan  0.5 tablet Oral BID    levothyroxine  50 mcg Oral QAM AC    vitamin D  2,000 Units Oral Daily    sodium chloride flush  5-40 mL IntraVENous 2 times per day    warfarin  5 mg Oral Nightly     sodium chloride flush, 5-40 mL, PRN  sodium chloride, , PRN  potassium chloride, 40 mEq, PRN   Or  potassium alternative oral replacement, 40 mEq, PRN   Or  potassium chloride, 10 mEq, PRN  magnesium sulfate, 2,000 mg, PRN  polyethylene glycol, 17 g, Daily PRN  acetaminophen, 650 mg, Q6H PRN   Or  acetaminophen, 650 mg, Q6H PRN  prochlorperazine, 10 mg, Q8H PRN   Or  prochlorperazine, 10 mg, Q6H PRN         Objective:    BP (!) 93/52   Pulse 77   Temp 98.1 °F (36.7 °C) (Oral)   Resp 18   Ht 1.727 m (5' 8\")   Wt 74.2 kg (163 lb 9.3 oz)   SpO2 94%   BMI 24.87 kg/m²     General Appearance: alert and oriented to person, place and time and in no acute distress  Skin: warm and dry  Head: normocephalic and atraumatic  Eyes: pupils equal, round, and reactive to light, extraocular eye movements intact,  failure.  On 5 L at baseline.  Currently saturating well on 2 L at rest.  But will need to increase it to 5 L upon ambulation  A-fib.  Currently rate controlled.  Continue warfarin.  Adjust Dig dose per cardio.   HFrEF.  continue Toprol XL. Restart Entresto at a low dose and start Bumex 0.5 mg. Monitor BP    BP dropped overnight and she was unable to take her night dose of Entresto.  Appreciate cardiology's input.  Can DC when cleared by time of    Time spent reviewing chart, clinical exam, discussing case and answering questions with staff/consultants/patient/family aprox 25 mins.     NOTE: This report was transcribed using voice recognition software. Every effort was made to ensure accuracy; however, inadvertent computerized transcription errors may be present.  Electronically signed by Bharti Jones MD on 3/5/2024 at 12:19 PM

## 2024-03-05 NOTE — PLAN OF CARE
Problem: Discharge Planning  Goal: Discharge to home or other facility with appropriate resources  3/4/2024 2244 by Mojgan Smith RN  Outcome: Progressing  3/4/2024 0947 by Lesa Giron RN  Outcome: Progressing     Problem: Risk for Elopement  Goal: Patient will not exit the unit/facility without proper excort  3/4/2024 2244 by Mojgan Smith RN  Outcome: Progressing  3/4/2024 0947 by Lesa Giron RN  Outcome: Progressing     Problem: Safety - Adult  Goal: Free from fall injury  3/4/2024 2244 by Mojgan Smith RN  Outcome: Progressing  3/4/2024 0947 by Lesa Giron RN  Outcome: Progressing     Problem: Pain  Goal: Verbalizes/displays adequate comfort level or baseline comfort level  Outcome: Progressing     Problem: ABCDS Injury Assessment  Goal: Absence of physical injury  Outcome: Progressing

## 2024-03-05 NOTE — PROGRESS NOTES
Dr. Jones rounding. Patient to hold coumadin for 48 hours and resume at 2.5 mg. Patient to call Dr. Jones to get that prescription. Patient is to have a BMP and INR drawn on Monday.  Pauly Rosales RN

## 2024-03-05 NOTE — PROGRESS NOTES
PROGRESS NOTE       PATIENT PROBLEM LIST:  Patient Active Problem List   Diagnosis Code    Atrial fibrillation with RVR ([facility])- Recurrent I48.91    Acute on chronic diastolic congestive heart failure (HCC) I50.33    Cardiomyopathy as manifestation of underlying disease (HCC) I43    Non-rheumatic mitral regurgitation I34.0    Acute combined systolic and diastolic congestive heart failure (HCC) I50.41    Chronic anticoagulation Z79.01    Essential hypertension I10    COPD exacerbation (HCC) J44.1    History of atrial fibrillation Z86.79    Dilated idiopathic cardiomyopathy (HCC) I42.0    Severe sinus bradycardia R00.1    Moderate tricuspid regurgitation I07.1    Hypoxia R09.02    Combined forms of age-related cataract of both eyes H25.813    Dyspnea on exertion R06.09    Mass of right lung R91.8    Supplemental oxygen dependent Z99.81    Pneumonia of both lower lobes due to infectious organism J18.9    Stage 3a chronic kidney disease (HCC) N18.31    Malignant neoplasm of right lung (HCC) C34.91    Atrial flutter with rapid ventricular response (HCC) I48.92    PAF (paroxysmal atrial fibrillation) ([facility]) I48.0    Centrilobular emphysema (HCC) J43.2    Thyroid disease E07.9    Acquired hypothyroidism E03.9    Bronchitis J40    Chronic respiratory failure with hypoxia (HCC) J96.11    Mixed hyperlipidemia E78.2    Gastroesophageal reflux disease K21.9    Primary hypothyroidism E03.9    Hyperlipidemia E78.5    Atrial fibrillation (HCC) I48.91    Atrial flutter (HCC) I48.92    Atrial fibrillation with rapid ventricular response (HCC) I48.91    COVID-19 virus infection U07.1    Iatrogenic hyperthyroidism E05.80    COVID-19 U07.1    TRISTEN (acute kidney injury) (HCC) N17.9    Thrombocytopenia (HCC) D69.6    High thyroid hormone level R79.89    Hypothyroidism E03.9    Chronic hypoxic respiratory failure (HCC) J96.11    Chronic systolic CHF (congestive heart failure) (HCC) I50.22    Acute respiratory failure with hypoxia (HCC) J96.01  50 mcg Oral QAM AC    vitamin D  2,000 Units Oral Daily    sodium chloride flush  5-40 mL IntraVENous 2 times per day    warfarin  5 mg Oral Nightly       VITAL SIGNS:                                                                                                                          BP (!) 93/52   Pulse 77   Temp 98.1 °F (36.7 °C) (Oral)   Resp 18   Ht 1.727 m (5' 8\")   Wt 74.2 kg (163 lb 9.3 oz)   SpO2 94%   BMI 24.87 kg/m²   Patient Vitals for the past 96 hrs (Last 3 readings):   Weight   03/05/24 0424 74.2 kg (163 lb 9.3 oz)   03/03/24 2334 73.6 kg (162 lb 4.1 oz)   03/02/24 0803 78 kg (172 lb)     OBJECTIVE:    HEENT: PERRL, EOM  Intact; sclera non-icteric, conjunctiva pink. Carotids are brisk in upstroke with normal contour. No carotid bruits. Normal jugular venous pulsation at 45°. No palpable cervical nor supraclavicular nodes.Thyroid not palpable. Trachea midline.  Chest: Even excursion  Lungs: CTA B, no expiratory wheezes or rhonchi, no decreased tactile fremitus without inspiratory rales.  Heart: Regular  rhythm; S1 > S2, no gallop grade 2/6 systolic murmur heard left lower sternal border and apex.. No clicks, rub, palpable thrills   or heaves. PMI nondisplaced, 5th intercostal space MCL.   Abdomen: Soft, nontender, nondistended,  moderately protuberant, no masses or organomegaly.  Bowel sounds active.  Extremities: Without clubbing, cyanosis or edema. Pulses present 3+ upper extermities bilaterally; present 1+ DP  bilaterally and present 1+ PT bilaterally.     Data:   Scheduled Meds: Reviewed  Continuous Infusions:    sodium chloride         Intake/Output Summary (Last 24 hours) at 3/5/2024 1238  Last data filed at 3/5/2024 1043  Gross per 24 hour   Intake --   Output 900 ml   Net -900 ml     CBC:   Recent Labs     03/03/24  0425 03/04/24  0935 03/05/24  0405   WBC 4.2* 4.6 3.3*   HGB 8.8* 9.2* 8.0*   HCT 28.5* 30.1* 26.2*     BMP:  Recent Labs     03/03/24  0425 03/04/24  0935 03/05/24  0405

## 2024-03-06 NOTE — PROGRESS NOTES
Physician Progress Note      PATIENT:               SRINIVAS DYE  CSN #:                  [id number]  :                       1940  ADMIT DATE:       3/2/2024 7:08 AM  DISCH DATE:        3/5/2024 3:14 PM  RESPONDING  PROVIDER #:        Bharti Jones MD          QUERY TEXT:    Patient admitted with shortness of breath. Documentation reflects Sepsis in   H&P and IM notes. If possible, please document in the progress notes and   discharge summary if Sepsis was:    The medical record reflects the following:  Risk Factors: advanced age  Clinical Indicators: per H&P \"...Sepsis.  (Hypothermia, tachypnea, lactic   acidosis).  POA.  Check procal, UA, resp panel, blood cultures.  Rocephin and   doxycycline x 1 in the ED.  Monitor off antibiotics.  Doubt underlying   infection...\", per dc summary \"...admitted due to hypotension she met sepsis   criteria on admission but likely due to hypotension.  No underlying infection.    She was monitored off antibiotics...Check procal, UA, resp panel, blood   cultures > all negative...\"  Treatment: IVF, IV Rocephin and Vibramycin x1 dose each    Thank you,  Concepción Latham, RN, BSN, CDIS  Clinical Documentation Integrity  [email or web address]  Options provided:  -- Sepsis confirmed after study due to, please document source.  -- Sepsis ruled out after study  -- Other - I will add my own diagnosis  -- Disagree - Not applicable / Not valid  -- Disagree - Clinically unable to determine / Unknown  -- Refer to Clinical Documentation Reviewer    PROVIDER RESPONSE TEXT:    Sepsis ruled out after study.    Query created by: Concepción Latham on 3/6/2024 6:22 AM      Electronically signed by:  Bharti Jones MD 3/6/2024 9:42 AM

## 2024-03-07 LAB
MICROORGANISM SPEC CULT: NORMAL
MICROORGANISM SPEC CULT: NORMAL
SERVICE CMNT-IMP: NORMAL
SERVICE CMNT-IMP: NORMAL
SPECIMEN DESCRIPTION: NORMAL
SPECIMEN DESCRIPTION: NORMAL

## 2024-03-11 ENCOUNTER — NURSE ONLY (OUTPATIENT)
Dept: PRIMARY CARE CLINIC | Age: 84
End: 2024-03-11
Payer: MEDICARE

## 2024-03-11 DIAGNOSIS — J96.11 CHRONIC RESPIRATORY FAILURE WITH HYPOXIA (HCC): ICD-10-CM

## 2024-03-11 DIAGNOSIS — I48.0 PAF (PAROXYSMAL ATRIAL FIBRILLATION) (HCC): ICD-10-CM

## 2024-03-11 DIAGNOSIS — N17.9 AKI (ACUTE KIDNEY INJURY) (HCC): ICD-10-CM

## 2024-03-11 DIAGNOSIS — J96.11 CHRONIC RESPIRATORY FAILURE WITH HYPOXIA (HCC): Primary | ICD-10-CM

## 2024-03-11 LAB
ABSOLUTE IMMATURE GRANULOCYTE: <0.03 K/UL (ref 0–0.58)
ANION GAP SERPL CALCULATED.3IONS-SCNC: 14 MMOL/L (ref 7–16)
BASOPHILS ABSOLUTE: 0.02 K/UL (ref 0–0.2)
BASOPHILS RELATIVE PERCENT: 1 % (ref 0–2)
BUN BLDV-MCNC: 16 MG/DL (ref 6–23)
CALCIUM SERPL-MCNC: 8.3 MG/DL (ref 8.6–10.2)
CHLORIDE BLD-SCNC: 97 MMOL/L (ref 98–107)
CO2: 29 MMOL/L (ref 22–29)
CREAT SERPL-MCNC: 1.4 MG/DL (ref 0.5–1)
EOSINOPHILS ABSOLUTE: 0.06 K/UL (ref 0.05–0.5)
EOSINOPHILS RELATIVE PERCENT: 2 % (ref 0–6)
GFR SERPL CREATININE-BSD FRML MDRD: 38 ML/MIN/1.73M2
GLUCOSE BLD-MCNC: 99 MG/DL (ref 74–99)
HCT VFR BLD CALC: 30.9 % (ref 34–48)
HEMOGLOBIN: 9.2 G/DL (ref 11.5–15.5)
IMMATURE GRANULOCYTES: 0 % (ref 0–5)
INR BLD: 2.4
INTERNATIONAL NORMALIZATION RATIO, POC: 2.5
LYMPHOCYTES ABSOLUTE: 1.17 K/UL (ref 1.5–4)
LYMPHOCYTES RELATIVE PERCENT: 32 % (ref 20–42)
MCH RBC QN AUTO: 30.4 PG (ref 26–35)
MCHC RBC AUTO-ENTMCNC: 29.8 G/DL (ref 32–34.5)
MCV RBC AUTO: 102 FL (ref 80–99.9)
MONOCYTES ABSOLUTE: 0.21 K/UL (ref 0.1–0.95)
MONOCYTES RELATIVE PERCENT: 6 % (ref 2–12)
NEUTROPHILS ABSOLUTE: 2.21 K/UL (ref 1.8–7.3)
NEUTROPHILS RELATIVE PERCENT: 60 % (ref 43–80)
PDW BLD-RTO: 17.2 % (ref 11.5–15)
PLATELET # BLD: 113 K/UL (ref 130–450)
PMV BLD AUTO: 11.8 FL (ref 7–12)
POTASSIUM SERPL-SCNC: 3.7 MMOL/L (ref 3.5–5)
PROTHROMBIN TIME, POC: 0
PROTHROMBIN TIME: 25.8 SEC (ref 9.3–12.4)
RBC # BLD: 3.03 M/UL (ref 3.5–5.5)
SODIUM BLD-SCNC: 140 MMOL/L (ref 132–146)
WBC # BLD: 3.7 K/UL (ref 4.5–11.5)

## 2024-03-11 PROCEDURE — 85610 PROTHROMBIN TIME: CPT | Performed by: INTERNAL MEDICINE

## 2024-03-18 ENCOUNTER — OFFICE VISIT (OUTPATIENT)
Dept: PRIMARY CARE CLINIC | Age: 84
End: 2024-03-18
Payer: MEDICARE

## 2024-03-18 VITALS
WEIGHT: 152 LBS | OXYGEN SATURATION: 97 % | SYSTOLIC BLOOD PRESSURE: 110 MMHG | DIASTOLIC BLOOD PRESSURE: 78 MMHG | TEMPERATURE: 97.6 F | BODY MASS INDEX: 23.04 KG/M2 | HEIGHT: 68 IN | HEART RATE: 62 BPM | RESPIRATION RATE: 16 BRPM

## 2024-03-18 DIAGNOSIS — Z00.00 MEDICARE ANNUAL WELLNESS VISIT, SUBSEQUENT: Primary | ICD-10-CM

## 2024-03-18 LAB — INR BLD: 3.1

## 2024-03-18 PROCEDURE — 3074F SYST BP LT 130 MM HG: CPT | Performed by: INTERNAL MEDICINE

## 2024-03-18 PROCEDURE — G0439 PPPS, SUBSEQ VISIT: HCPCS | Performed by: INTERNAL MEDICINE

## 2024-03-18 PROCEDURE — 1123F ACP DISCUSS/DSCN MKR DOCD: CPT | Performed by: INTERNAL MEDICINE

## 2024-03-18 PROCEDURE — G8484 FLU IMMUNIZE NO ADMIN: HCPCS | Performed by: INTERNAL MEDICINE

## 2024-03-18 PROCEDURE — 3078F DIAST BP <80 MM HG: CPT | Performed by: INTERNAL MEDICINE

## 2024-03-18 RX ORDER — WARFARIN SODIUM 2.5 MG/1
2.5 TABLET ORAL NIGHTLY
COMMUNITY
Start: 2024-03-05

## 2024-03-18 ASSESSMENT — PATIENT HEALTH QUESTIONNAIRE - PHQ9
SUM OF ALL RESPONSES TO PHQ QUESTIONS 1-9: 0
1. LITTLE INTEREST OR PLEASURE IN DOING THINGS: NOT AT ALL
2. FEELING DOWN, DEPRESSED OR HOPELESS: NOT AT ALL
SUM OF ALL RESPONSES TO PHQ9 QUESTIONS 1 & 2: 0

## 2024-03-18 NOTE — PROGRESS NOTES
Medicare Annual Wellness Visit    Krystal Hyatt is here for Medicare AWV (Patient is here for an INR check )    Assessment & Plan   Medicare annual wellness visit, subsequent  Recommendations for Preventive Services Due: see orders and patient instructions/AVS.  Recommended screening schedule for the next 5-10 years is provided to the patient in written form: see Patient Instructions/AVS.     Return in 4 weeks (on 4/15/2024) for Medicare Annual Wellness Visit in 1 year.     Subjective       Patient's complete Health Risk Assessment and screening values have been reviewed and are found in Flowsheets. The following problems were reviewed today and where indicated follow up appointments were made and/or referrals ordered.    No Positive Risk Factors identified today.                                  Objective   Vitals:    03/18/24 0943   BP: 110/78   Pulse: 62   Resp: 16   Temp: 97.6 °F (36.4 °C)   SpO2: 97%   Weight: 68.9 kg (152 lb)   Height: 1.727 m (5' 8\")      Body mass index is 23.11 kg/m².               Allergies   Allergen Reactions    Pacerone [Amiodarone] Shortness Of Breath    Cat Hair Extract Itching and Other (See Comments)     Patient states \"My son put this. I hope I'm not allergic, I have 4 Cats and 2 Birds, maybe to their dust.\"    Eggs Or Egg-Derived Products Nausea Only    Erythromycin Diarrhea, Nausea And Vomiting and Other (See Comments)     \"STOMACH PAINS\"      Pcn [Penicillins] Itching and Rash     PT STATES \"RASH FROM HEAD TO TOE, W/ SOMETHING CRAWLING UNDER MY SKIN\"    Seasonal Itching, Swelling and Other (See Comments)     RUNNY/ITCHY NOSE, WATERY/ITCHY EYES     Prior to Visit Medications    Medication Sig Taking? Authorizing Provider   warfarin (COUMADIN) 2.5 MG tablet Take 1 tablet by mouth nightly Yes ProviderAngela MD   sacubitril-valsartan (ENTRESTO) 24-26 MG per tablet Take 0.5 tablets by mouth daily Yes Bharti Jones MD   bumetanide (BUMEX) 0.5 MG tablet Take 1 tablet by

## 2024-03-30 ENCOUNTER — APPOINTMENT (OUTPATIENT)
Dept: GENERAL RADIOLOGY | Age: 84
End: 2024-03-30
Payer: MEDICARE

## 2024-03-30 ENCOUNTER — HOSPITAL ENCOUNTER (EMERGENCY)
Age: 84
Discharge: HOME | End: 2024-03-31
Attending: STUDENT IN AN ORGANIZED HEALTH CARE EDUCATION/TRAINING PROGRAM
Payer: MEDICARE

## 2024-03-30 DIAGNOSIS — J44.1 COPD EXACERBATION (HCC): Primary | ICD-10-CM

## 2024-03-30 LAB
ALBUMIN SERPL-MCNC: 4.2 G/DL (ref 3.5–5.2)
ALP SERPL-CCNC: 62 U/L (ref 35–104)
ALT SERPL-CCNC: 9 U/L (ref 0–32)
ANION GAP SERPL CALCULATED.3IONS-SCNC: 13 MMOL/L (ref 7–16)
AST SERPL-CCNC: 25 U/L (ref 0–31)
BASOPHILS # BLD: 0.01 K/UL (ref 0–0.2)
BASOPHILS NFR BLD: 0 % (ref 0–2)
BILIRUB SERPL-MCNC: 0.5 MG/DL (ref 0–1.2)
BNP SERPL-MCNC: 1408 PG/ML (ref 0–450)
BUN SERPL-MCNC: 22 MG/DL (ref 6–23)
CALCIUM SERPL-MCNC: 8.9 MG/DL (ref 8.6–10.2)
CHLORIDE SERPL-SCNC: 103 MMOL/L (ref 98–107)
CO2 SERPL-SCNC: 30 MMOL/L (ref 22–29)
CREAT SERPL-MCNC: 1.1 MG/DL (ref 0.5–1)
EOSINOPHIL # BLD: 0.02 K/UL (ref 0.05–0.5)
EOSINOPHILS RELATIVE PERCENT: 1 % (ref 0–6)
ERYTHROCYTE [DISTWIDTH] IN BLOOD BY AUTOMATED COUNT: 14.1 % (ref 11.5–15)
GFR SERPL CREATININE-BSD FRML MDRD: 48 ML/MIN/1.73M2
GLUCOSE SERPL-MCNC: 148 MG/DL (ref 74–99)
HCT VFR BLD AUTO: 30.4 % (ref 34–48)
HGB BLD-MCNC: 9.4 G/DL (ref 11.5–15.5)
IMM GRANULOCYTES # BLD AUTO: <0.03 K/UL (ref 0–0.58)
IMM GRANULOCYTES NFR BLD: 0 % (ref 0–5)
INR PPP: 2.7
LYMPHOCYTES NFR BLD: 1.66 K/UL (ref 1.5–4)
LYMPHOCYTES RELATIVE PERCENT: 42 % (ref 20–42)
MAGNESIUM SERPL-MCNC: 1.3 MG/DL (ref 1.6–2.6)
MCH RBC QN AUTO: 30.9 PG (ref 26–35)
MCHC RBC AUTO-ENTMCNC: 30.9 G/DL (ref 32–34.5)
MCV RBC AUTO: 100 FL (ref 80–99.9)
MONOCYTES NFR BLD: 0.28 K/UL (ref 0.1–0.95)
MONOCYTES NFR BLD: 7 % (ref 2–12)
NEUTROPHILS NFR BLD: 50 % (ref 43–80)
NEUTS SEG NFR BLD: 1.94 K/UL (ref 1.8–7.3)
PLATELET, FLUORESCENCE: 121 K/UL (ref 130–450)
PMV BLD AUTO: 11 FL (ref 7–12)
POTASSIUM SERPL-SCNC: 3 MMOL/L (ref 3.5–5)
PROT SERPL-MCNC: 6.4 G/DL (ref 6.4–8.3)
PROTHROMBIN TIME: 30.3 SEC (ref 9.3–12.4)
RBC # BLD AUTO: 3.04 M/UL (ref 3.5–5.5)
SODIUM SERPL-SCNC: 146 MMOL/L (ref 132–146)
TROPONIN I SERPL HS-MCNC: 28 NG/L (ref 0–9)
TROPONIN I SERPL HS-MCNC: 28 NG/L (ref 0–9)
WBC OTHER # BLD: 3.9 K/UL (ref 4.5–11.5)

## 2024-03-30 PROCEDURE — 96365 THER/PROPH/DIAG IV INF INIT: CPT

## 2024-03-30 PROCEDURE — 6370000000 HC RX 637 (ALT 250 FOR IP): Performed by: STUDENT IN AN ORGANIZED HEALTH CARE EDUCATION/TRAINING PROGRAM

## 2024-03-30 PROCEDURE — 93005 ELECTROCARDIOGRAM TRACING: CPT

## 2024-03-30 PROCEDURE — 85610 PROTHROMBIN TIME: CPT

## 2024-03-30 PROCEDURE — 71045 X-RAY EXAM CHEST 1 VIEW: CPT

## 2024-03-30 PROCEDURE — 99285 EMERGENCY DEPT VISIT HI MDM: CPT

## 2024-03-30 PROCEDURE — 84484 ASSAY OF TROPONIN QUANT: CPT

## 2024-03-30 PROCEDURE — 6360000002 HC RX W HCPCS: Performed by: STUDENT IN AN ORGANIZED HEALTH CARE EDUCATION/TRAINING PROGRAM

## 2024-03-30 PROCEDURE — 94664 DEMO&/EVAL PT USE INHALER: CPT

## 2024-03-30 PROCEDURE — 83880 ASSAY OF NATRIURETIC PEPTIDE: CPT

## 2024-03-30 PROCEDURE — 6370000000 HC RX 637 (ALT 250 FOR IP)

## 2024-03-30 PROCEDURE — 83735 ASSAY OF MAGNESIUM: CPT

## 2024-03-30 PROCEDURE — 85025 COMPLETE CBC W/AUTO DIFF WBC: CPT

## 2024-03-30 PROCEDURE — 80053 COMPREHEN METABOLIC PANEL: CPT

## 2024-03-30 PROCEDURE — 94640 AIRWAY INHALATION TREATMENT: CPT

## 2024-03-30 RX ORDER — 0.9 % SODIUM CHLORIDE 0.9 %
500 INTRAVENOUS SOLUTION INTRAVENOUS ONCE
Status: COMPLETED | OUTPATIENT
Start: 2024-03-31 | End: 2024-03-31

## 2024-03-30 RX ORDER — 0.9 % SODIUM CHLORIDE 0.9 %
1000 INTRAVENOUS SOLUTION INTRAVENOUS ONCE
Status: DISCONTINUED | OUTPATIENT
Start: 2024-03-31 | End: 2024-03-30

## 2024-03-30 RX ORDER — MAGNESIUM SULFATE IN WATER 40 MG/ML
2000 INJECTION, SOLUTION INTRAVENOUS ONCE
Status: COMPLETED | OUTPATIENT
Start: 2024-03-30 | End: 2024-03-31

## 2024-03-30 RX ORDER — IPRATROPIUM BROMIDE AND ALBUTEROL SULFATE 2.5; .5 MG/3ML; MG/3ML
3 SOLUTION RESPIRATORY (INHALATION) ONCE
Status: COMPLETED | OUTPATIENT
Start: 2024-03-30 | End: 2024-03-30

## 2024-03-30 RX ADMIN — POTASSIUM BICARBONATE 40 MEQ: 782 TABLET, EFFERVESCENT ORAL at 23:09

## 2024-03-30 RX ADMIN — MAGNESIUM SULFATE HEPTAHYDRATE 2000 MG: 40 INJECTION, SOLUTION INTRAVENOUS at 23:08

## 2024-03-30 RX ADMIN — IPRATROPIUM BROMIDE AND ALBUTEROL SULFATE 3 DOSE: 2.5; .5 SOLUTION RESPIRATORY (INHALATION) at 21:12

## 2024-03-30 ASSESSMENT — PAIN - FUNCTIONAL ASSESSMENT: PAIN_FUNCTIONAL_ASSESSMENT: NONE - DENIES PAIN

## 2024-03-31 VITALS
HEART RATE: 90 BPM | BODY MASS INDEX: 16.88 KG/M2 | TEMPERATURE: 98.1 F | OXYGEN SATURATION: 96 % | RESPIRATION RATE: 19 BRPM | WEIGHT: 114 LBS | HEIGHT: 69 IN | DIASTOLIC BLOOD PRESSURE: 57 MMHG | SYSTOLIC BLOOD PRESSURE: 114 MMHG

## 2024-03-31 PROCEDURE — 2580000003 HC RX 258

## 2024-03-31 RX ORDER — METOPROLOL TARTRATE 1 MG/ML
5 INJECTION, SOLUTION INTRAVENOUS ONCE
Status: DISCONTINUED | OUTPATIENT
Start: 2024-03-31 | End: 2024-03-31

## 2024-03-31 RX ADMIN — SODIUM CHLORIDE 500 ML: 9 INJECTION, SOLUTION INTRAVENOUS at 00:31

## 2024-04-01 LAB
EKG ATRIAL RATE: 240 BPM
EKG P AXIS: 113 DEGREES
EKG Q-T INTERVAL: 274 MS
EKG QRS DURATION: 74 MS
EKG QTC CALCULATION (BAZETT): 387 MS
EKG R AXIS: 41 DEGREES
EKG T AXIS: -133 DEGREES
EKG VENTRICULAR RATE: 120 BPM

## 2024-04-01 PROCEDURE — 93010 ELECTROCARDIOGRAM REPORT: CPT | Performed by: INTERNAL MEDICINE

## 2024-04-02 NOTE — ED PROVIDER NOTES
---------------------------   The nursing notes within the ED encounter and vital signs as below have been reviewed by myself and ED attending.  BP (!) 114/57   Pulse 90   Temp 98.1 °F (36.7 °C) (Temporal)   Resp 19   Ht 1.74 m (5' 8.5\")   Wt 51.7 kg (114 lb)   SpO2 96%   BMI 17.08 kg/m²   Oxygen Saturation Interpretation: Abnormal - but at baseline    The patient’s available past medical records and past encounters were reviewed by myself and ED attending        ------------------------------ ED COURSE/MEDICAL DECISION MAKING----------------------    Medical Decision Making/Differential Diagnosis:    CC/HPI Summary, Pertinent Physical Exam Findings, Social Determinants of health, Records Reviewed, DDx, testing done/not done, ED Course, Reassessment, disposition considerations/shared decision making with patient, consults, disposition:      Medical Decision Making/ED COURSE:    Myself and ED attending interpreted findings during patient's stay.   Records reviewed as detailed on the note.    History From: Patient    Limitations to history : None  Social Determinants : None    Chronic Conditions affecting care:    has a past medical history of Atrial fibrillation (HCC), CHF (congestive heart failure) (HCC), Emphysema, unspecified (HCC), Hyperlipidemia, Hypertension, Lung cancer (HCC), Mitral valve regurgitation, Oxygen dependent, Prolonged emergence from general anesthesia, Skin cancer, and Thyroid disease.     Krystal Hyatt is a 83 y.o. female     Vital signs BP (!) 114/57   Pulse 90   Temp 98.1 °F (36.7 °C) (Temporal)   Resp 19   Ht 1.74 m (5' 8.5\")   Wt 51.7 kg (114 lb)   SpO2 96%   BMI 17.08 kg/m²   While in the ED patient was afebrile, nontoxic-appearing, in no respiratory distress.   Physical exam remarkable for diminished breath sounds throughout no wheezing rales or rhonchi.  Ddx: Working diagnoses include but not limited to acute viral syndrome, pneumonia, pulmonary embolism, ACS, anemia,

## 2024-04-11 ENCOUNTER — HOSPITAL ENCOUNTER (INPATIENT)
Age: 84
LOS: 2 days | Discharge: HOME | End: 2024-04-13
Attending: EMERGENCY MEDICINE | Admitting: INTERNAL MEDICINE
Payer: MEDICARE

## 2024-04-11 ENCOUNTER — APPOINTMENT (OUTPATIENT)
Dept: GENERAL RADIOLOGY | Age: 84
End: 2024-04-11
Payer: MEDICARE

## 2024-04-11 DIAGNOSIS — J44.1 COPD EXACERBATION (HCC): Primary | ICD-10-CM

## 2024-04-11 DIAGNOSIS — I48.91 ATRIAL FIBRILLATION, UNSPECIFIED TYPE (HCC): ICD-10-CM

## 2024-04-11 PROBLEM — R00.0 TACHYCARDIA: Status: ACTIVE | Noted: 2024-04-11

## 2024-04-11 LAB
ALBUMIN SERPL-MCNC: 4.2 G/DL (ref 3.5–5.2)
ALP SERPL-CCNC: 65 U/L (ref 35–104)
ALT SERPL-CCNC: 10 U/L (ref 0–32)
ANION GAP SERPL CALCULATED.3IONS-SCNC: 9 MMOL/L (ref 7–16)
AST SERPL-CCNC: 24 U/L (ref 0–31)
BASOPHILS # BLD: 0.01 K/UL (ref 0–0.2)
BASOPHILS NFR BLD: 0 % (ref 0–2)
BILIRUB SERPL-MCNC: 0.6 MG/DL (ref 0–1.2)
BNP SERPL-MCNC: 832 PG/ML (ref 0–450)
BUN SERPL-MCNC: 14 MG/DL (ref 6–23)
CALCIUM SERPL-MCNC: 9.4 MG/DL (ref 8.6–10.2)
CHLORIDE SERPL-SCNC: 103 MMOL/L (ref 98–107)
CO2 SERPL-SCNC: 31 MMOL/L (ref 22–29)
CREAT SERPL-MCNC: 0.9 MG/DL (ref 0.5–1)
EOSINOPHIL # BLD: 0.03 K/UL (ref 0.05–0.5)
EOSINOPHILS RELATIVE PERCENT: 1 % (ref 0–6)
ERYTHROCYTE [DISTWIDTH] IN BLOOD BY AUTOMATED COUNT: 13.2 % (ref 11.5–15)
GFR SERPL CREATININE-BSD FRML MDRD: 65 ML/MIN/1.73M2
GLUCOSE SERPL-MCNC: 93 MG/DL (ref 74–99)
HCT VFR BLD AUTO: 30.9 % (ref 34–48)
HGB BLD-MCNC: 9.5 G/DL (ref 11.5–15.5)
IMM GRANULOCYTES # BLD AUTO: <0.03 K/UL (ref 0–0.58)
IMM GRANULOCYTES NFR BLD: 0 % (ref 0–5)
INR PPP: 2.3
LYMPHOCYTES NFR BLD: 0.92 K/UL (ref 1.5–4)
LYMPHOCYTES RELATIVE PERCENT: 26 % (ref 20–42)
MCH RBC QN AUTO: 30.4 PG (ref 26–35)
MCHC RBC AUTO-ENTMCNC: 30.7 G/DL (ref 32–34.5)
MCV RBC AUTO: 99 FL (ref 80–99.9)
MONOCYTES NFR BLD: 0.3 K/UL (ref 0.1–0.95)
MONOCYTES NFR BLD: 8 % (ref 2–12)
NEUTROPHILS NFR BLD: 65 % (ref 43–80)
NEUTS SEG NFR BLD: 2.31 K/UL (ref 1.8–7.3)
PARTIAL THROMBOPLASTIN TIME: 37.5 SEC (ref 24.5–35.1)
PLATELET, FLUORESCENCE: 139 K/UL (ref 130–450)
PMV BLD AUTO: 10.6 FL (ref 7–12)
POTASSIUM SERPL-SCNC: 3.3 MMOL/L (ref 3.5–5)
PROT SERPL-MCNC: 6.6 G/DL (ref 6.4–8.3)
PROTHROMBIN TIME: 25.7 SEC (ref 9.3–12.4)
RBC # BLD AUTO: 3.12 M/UL (ref 3.5–5.5)
SODIUM SERPL-SCNC: 143 MMOL/L (ref 132–146)
TROPONIN I SERPL HS-MCNC: 25 NG/L (ref 0–9)
TROPONIN I SERPL HS-MCNC: 27 NG/L (ref 0–9)
WBC OTHER # BLD: 3.6 K/UL (ref 4.5–11.5)

## 2024-04-11 PROCEDURE — 2060000000 HC ICU INTERMEDIATE R&B

## 2024-04-11 PROCEDURE — 99222 1ST HOSP IP/OBS MODERATE 55: CPT | Performed by: INTERNAL MEDICINE

## 2024-04-11 PROCEDURE — 6370000000 HC RX 637 (ALT 250 FOR IP)

## 2024-04-11 PROCEDURE — 96374 THER/PROPH/DIAG INJ IV PUSH: CPT

## 2024-04-11 PROCEDURE — 99285 EMERGENCY DEPT VISIT HI MDM: CPT

## 2024-04-11 PROCEDURE — 85730 THROMBOPLASTIN TIME PARTIAL: CPT

## 2024-04-11 PROCEDURE — 2580000003 HC RX 258

## 2024-04-11 PROCEDURE — 84484 ASSAY OF TROPONIN QUANT: CPT

## 2024-04-11 PROCEDURE — 71046 X-RAY EXAM CHEST 2 VIEWS: CPT

## 2024-04-11 PROCEDURE — 96361 HYDRATE IV INFUSION ADD-ON: CPT

## 2024-04-11 PROCEDURE — 83880 ASSAY OF NATRIURETIC PEPTIDE: CPT

## 2024-04-11 PROCEDURE — 94640 AIRWAY INHALATION TREATMENT: CPT

## 2024-04-11 PROCEDURE — 85610 PROTHROMBIN TIME: CPT

## 2024-04-11 PROCEDURE — 94664 DEMO&/EVAL PT USE INHALER: CPT

## 2024-04-11 PROCEDURE — 2580000003 HC RX 258: Performed by: INTERNAL MEDICINE

## 2024-04-11 PROCEDURE — 85025 COMPLETE CBC W/AUTO DIFF WBC: CPT

## 2024-04-11 PROCEDURE — 80053 COMPREHEN METABOLIC PANEL: CPT

## 2024-04-11 PROCEDURE — 6360000002 HC RX W HCPCS

## 2024-04-11 PROCEDURE — 93005 ELECTROCARDIOGRAM TRACING: CPT | Performed by: PHYSICIAN ASSISTANT

## 2024-04-11 RX ORDER — IPRATROPIUM BROMIDE AND ALBUTEROL SULFATE 2.5; .5 MG/3ML; MG/3ML
3 SOLUTION RESPIRATORY (INHALATION) ONCE
Status: COMPLETED | OUTPATIENT
Start: 2024-04-11 | End: 2024-04-11

## 2024-04-11 RX ORDER — ONDANSETRON 2 MG/ML
4 INJECTION INTRAMUSCULAR; INTRAVENOUS EVERY 6 HOURS PRN
Status: DISCONTINUED | OUTPATIENT
Start: 2024-04-11 | End: 2024-04-13 | Stop reason: HOSPADM

## 2024-04-11 RX ORDER — BUMETANIDE 1 MG/1
0.5 TABLET ORAL DAILY
Status: DISCONTINUED | OUTPATIENT
Start: 2024-04-12 | End: 2024-04-13 | Stop reason: HOSPADM

## 2024-04-11 RX ORDER — POTASSIUM CHLORIDE 20 MEQ/1
40 TABLET, EXTENDED RELEASE ORAL ONCE
Status: COMPLETED | OUTPATIENT
Start: 2024-04-11 | End: 2024-04-11

## 2024-04-11 RX ORDER — ACETAMINOPHEN 325 MG/1
650 TABLET ORAL EVERY 4 HOURS PRN
Status: DISCONTINUED | OUTPATIENT
Start: 2024-04-11 | End: 2024-04-13 | Stop reason: HOSPADM

## 2024-04-11 RX ORDER — ARFORMOTEROL TARTRATE 15 UG/2ML
15 SOLUTION RESPIRATORY (INHALATION)
Status: DISCONTINUED | OUTPATIENT
Start: 2024-04-11 | End: 2024-04-13 | Stop reason: HOSPADM

## 2024-04-11 RX ORDER — METHYLPREDNISOLONE SODIUM SUCCINATE 40 MG/ML
40 INJECTION, POWDER, LYOPHILIZED, FOR SOLUTION INTRAMUSCULAR; INTRAVENOUS EVERY 12 HOURS
Status: DISCONTINUED | OUTPATIENT
Start: 2024-04-11 | End: 2024-04-11

## 2024-04-11 RX ORDER — METHYLPREDNISOLONE SODIUM SUCCINATE 40 MG/ML
40 INJECTION, POWDER, LYOPHILIZED, FOR SOLUTION INTRAMUSCULAR; INTRAVENOUS EVERY 12 HOURS
Status: DISCONTINUED | OUTPATIENT
Start: 2024-04-12 | End: 2024-04-13

## 2024-04-11 RX ORDER — ONDANSETRON 4 MG/1
4 TABLET, ORALLY DISINTEGRATING ORAL EVERY 8 HOURS PRN
Status: DISCONTINUED | OUTPATIENT
Start: 2024-04-11 | End: 2024-04-13 | Stop reason: HOSPADM

## 2024-04-11 RX ORDER — METHYLPREDNISOLONE SODIUM SUCCINATE 125 MG/2ML
125 INJECTION, POWDER, LYOPHILIZED, FOR SOLUTION INTRAMUSCULAR; INTRAVENOUS ONCE
Status: COMPLETED | OUTPATIENT
Start: 2024-04-11 | End: 2024-04-11

## 2024-04-11 RX ORDER — BUDESONIDE 0.25 MG/2ML
0.25 INHALANT ORAL
Status: DISCONTINUED | OUTPATIENT
Start: 2024-04-11 | End: 2024-04-13 | Stop reason: HOSPADM

## 2024-04-11 RX ORDER — ALBUTEROL SULFATE 2.5 MG/3ML
2.5 SOLUTION RESPIRATORY (INHALATION) 4 TIMES DAILY PRN
Status: DISCONTINUED | OUTPATIENT
Start: 2024-04-11 | End: 2024-04-13 | Stop reason: HOSPADM

## 2024-04-11 RX ORDER — SODIUM CHLORIDE 0.9 % (FLUSH) 0.9 %
5-40 SYRINGE (ML) INJECTION EVERY 12 HOURS SCHEDULED
Status: DISCONTINUED | OUTPATIENT
Start: 2024-04-11 | End: 2024-04-13 | Stop reason: HOSPADM

## 2024-04-11 RX ORDER — 0.9 % SODIUM CHLORIDE 0.9 %
500 INTRAVENOUS SOLUTION INTRAVENOUS ONCE
Status: COMPLETED | OUTPATIENT
Start: 2024-04-11 | End: 2024-04-11

## 2024-04-11 RX ORDER — DIGOXIN 125 MCG
62.5 TABLET ORAL DAILY
Status: DISCONTINUED | OUTPATIENT
Start: 2024-04-12 | End: 2024-04-13 | Stop reason: HOSPADM

## 2024-04-11 RX ORDER — SODIUM CHLORIDE 0.9 % (FLUSH) 0.9 %
5-40 SYRINGE (ML) INJECTION PRN
Status: DISCONTINUED | OUTPATIENT
Start: 2024-04-11 | End: 2024-04-13 | Stop reason: HOSPADM

## 2024-04-11 RX ORDER — SODIUM CHLORIDE 9 MG/ML
INJECTION, SOLUTION INTRAVENOUS PRN
Status: DISCONTINUED | OUTPATIENT
Start: 2024-04-11 | End: 2024-04-13 | Stop reason: HOSPADM

## 2024-04-11 RX ADMIN — METHYLPREDNISOLONE SODIUM SUCCINATE 125 MG: 125 INJECTION INTRAMUSCULAR; INTRAVENOUS at 16:02

## 2024-04-11 RX ADMIN — SODIUM CHLORIDE, PRESERVATIVE FREE 10 ML: 5 INJECTION INTRAVENOUS at 23:12

## 2024-04-11 RX ADMIN — SODIUM CHLORIDE 500 ML: 9 INJECTION, SOLUTION INTRAVENOUS at 18:13

## 2024-04-11 RX ADMIN — IPRATROPIUM BROMIDE AND ALBUTEROL SULFATE 3 DOSE: 2.5; .5 SOLUTION RESPIRATORY (INHALATION) at 15:50

## 2024-04-11 RX ADMIN — POTASSIUM CHLORIDE 40 MEQ: 1500 TABLET, EXTENDED RELEASE ORAL at 18:16

## 2024-04-11 ASSESSMENT — PAIN - FUNCTIONAL ASSESSMENT: PAIN_FUNCTIONAL_ASSESSMENT: NONE - DENIES PAIN

## 2024-04-11 NOTE — ED TRIAGE NOTES
Department of Emergency Medicine    FIRST PROVIDER TRIAGE NOTE             Independent MLP           4/11/24  2:59 PM EDT    Date of Encounter: 4/11/24   MRN: [id number]    Vitals:    04/11/24 1522 04/11/24 1550 04/11/24 1551 04/11/24 1552   BP: 101/60      Pulse: (!) 120 (!) 113 (!) 107 (!) 115   Resp: 16 21 21 20   Temp: 98.1 °F (36.7 °C)      TempSrc: Oral      SpO2: 100% 94% 93% 94%   Weight: 69.9 kg (154 lb)      Height: 1.702 m (5' 7\")         HPI: Krystal Hyatt is a 83 y.o. female who presents to the ED for Shortness of Breath and Tachycardia (Rate 120's at home)     ROS: Negative for abd pain, back pain, or vomiting.    Physical Exam:   Gen Appearance/Constitutional: alert  CV: tachycardia     Initial Plan of Care: All treatment areas with department are currently occupied.     Plan to order/Initiate the following while awaiting opening in ED: Triage evaluation  EKG and imaging studies.    Initial Plan of Care: Initiate Treatment-Testing, Proceed toTreatment Area When Bed Available for ED Attending/MLP to Continue Care  Exam limited due to being in triage. Focused exam only. Care of patient relinquished after triage    Electronically signed by Arielle Mane PA-C   DD: 4/11/24

## 2024-04-12 LAB
ALBUMIN SERPL-MCNC: 3.7 G/DL (ref 3.5–5.2)
ALP SERPL-CCNC: 53 U/L (ref 35–104)
ALT SERPL-CCNC: 8 U/L (ref 0–32)
ANION GAP SERPL CALCULATED.3IONS-SCNC: 11 MMOL/L (ref 7–16)
AST SERPL-CCNC: 21 U/L (ref 0–31)
B PARAP IS1001 DNA NPH QL NAA+NON-PROBE: NOT DETECTED
B PERT DNA SPEC QL NAA+PROBE: NOT DETECTED
BASOPHILS # BLD: 0 K/UL (ref 0–0.2)
BASOPHILS NFR BLD: 0 % (ref 0–2)
BILIRUB SERPL-MCNC: 0.5 MG/DL (ref 0–1.2)
BUN SERPL-MCNC: 16 MG/DL (ref 6–23)
C PNEUM DNA NPH QL NAA+NON-PROBE: NOT DETECTED
CALCIUM SERPL-MCNC: 8.8 MG/DL (ref 8.6–10.2)
CHLORIDE SERPL-SCNC: 104 MMOL/L (ref 98–107)
CO2 SERPL-SCNC: 29 MMOL/L (ref 22–29)
CREAT SERPL-MCNC: 0.8 MG/DL (ref 0.5–1)
CRP SERPL HS-MCNC: 5 MG/L (ref 0–5)
EOSINOPHIL # BLD: 0 K/UL (ref 0.05–0.5)
EOSINOPHILS RELATIVE PERCENT: 0 % (ref 0–6)
ERYTHROCYTE [DISTWIDTH] IN BLOOD BY AUTOMATED COUNT: 13.2 % (ref 11.5–15)
FLUAV RNA NPH QL NAA+NON-PROBE: NOT DETECTED
FLUBV RNA NPH QL NAA+NON-PROBE: NOT DETECTED
GFR SERPL CREATININE-BSD FRML MDRD: 78 ML/MIN/1.73M2
GLUCOSE SERPL-MCNC: 163 MG/DL (ref 74–99)
HADV DNA NPH QL NAA+NON-PROBE: NOT DETECTED
HCOV 229E RNA NPH QL NAA+NON-PROBE: NOT DETECTED
HCOV HKU1 RNA NPH QL NAA+NON-PROBE: NOT DETECTED
HCOV NL63 RNA NPH QL NAA+NON-PROBE: NOT DETECTED
HCOV OC43 RNA NPH QL NAA+NON-PROBE: NOT DETECTED
HCT VFR BLD AUTO: 29 % (ref 34–48)
HGB BLD-MCNC: 8.9 G/DL (ref 11.5–15.5)
HMPV RNA NPH QL NAA+NON-PROBE: NOT DETECTED
HPIV1 RNA NPH QL NAA+NON-PROBE: NOT DETECTED
HPIV2 RNA NPH QL NAA+NON-PROBE: NOT DETECTED
HPIV3 RNA NPH QL NAA+NON-PROBE: NOT DETECTED
HPIV4 RNA NPH QL NAA+NON-PROBE: NOT DETECTED
IMM GRANULOCYTES # BLD AUTO: <0.03 K/UL (ref 0–0.58)
IMM GRANULOCYTES NFR BLD: 0 % (ref 0–5)
INR PPP: 2.1
LYMPHOCYTES NFR BLD: 0.39 K/UL (ref 1.5–4)
LYMPHOCYTES RELATIVE PERCENT: 17 % (ref 20–42)
M PNEUMO DNA NPH QL NAA+NON-PROBE: NOT DETECTED
MCH RBC QN AUTO: 30.4 PG (ref 26–35)
MCHC RBC AUTO-ENTMCNC: 30.7 G/DL (ref 32–34.5)
MCV RBC AUTO: 99 FL (ref 80–99.9)
MONOCYTES NFR BLD: 0.03 K/UL (ref 0.1–0.95)
MONOCYTES NFR BLD: 1 % (ref 2–12)
NEUTROPHILS NFR BLD: 81 % (ref 43–80)
NEUTS SEG NFR BLD: 1.86 K/UL (ref 1.8–7.3)
PLATELET # BLD AUTO: 128 K/UL (ref 130–450)
PMV BLD AUTO: 10.6 FL (ref 7–12)
POTASSIUM SERPL-SCNC: 3.7 MMOL/L (ref 3.5–5)
PROCALCITONIN SERPL-MCNC: 0.04 NG/ML (ref 0–0.08)
PROT SERPL-MCNC: 5.8 G/DL (ref 6.4–8.3)
PROTHROMBIN TIME: 23.5 SEC (ref 9.3–12.4)
RBC # BLD AUTO: 2.93 M/UL (ref 3.5–5.5)
RBC # BLD: ABNORMAL 10*6/UL
RSV RNA NPH QL NAA+NON-PROBE: NOT DETECTED
RV+EV RNA NPH QL NAA+NON-PROBE: NOT DETECTED
SARS-COV-2 RNA NPH QL NAA+NON-PROBE: NOT DETECTED
SODIUM SERPL-SCNC: 144 MMOL/L (ref 132–146)
SPECIMEN DESCRIPTION: NORMAL
WBC OTHER # BLD: 2.3 K/UL (ref 4.5–11.5)

## 2024-04-12 PROCEDURE — 6360000002 HC RX W HCPCS: Performed by: INTERNAL MEDICINE

## 2024-04-12 PROCEDURE — 80053 COMPREHEN METABOLIC PANEL: CPT

## 2024-04-12 PROCEDURE — 6370000000 HC RX 637 (ALT 250 FOR IP): Performed by: INTERNAL MEDICINE

## 2024-04-12 PROCEDURE — 2580000003 HC RX 258: Performed by: INTERNAL MEDICINE

## 2024-04-12 PROCEDURE — 86140 C-REACTIVE PROTEIN: CPT

## 2024-04-12 PROCEDURE — 94640 AIRWAY INHALATION TREATMENT: CPT

## 2024-04-12 PROCEDURE — 84145 PROCALCITONIN (PCT): CPT

## 2024-04-12 PROCEDURE — 85610 PROTHROMBIN TIME: CPT

## 2024-04-12 PROCEDURE — 85025 COMPLETE CBC W/AUTO DIFF WBC: CPT

## 2024-04-12 PROCEDURE — 2700000000 HC OXYGEN THERAPY PER DAY

## 2024-04-12 PROCEDURE — 2060000000 HC ICU INTERMEDIATE R&B

## 2024-04-12 PROCEDURE — 99232 SBSQ HOSP IP/OBS MODERATE 35: CPT | Performed by: INTERNAL MEDICINE

## 2024-04-12 PROCEDURE — 0202U NFCT DS 22 TRGT SARS-COV-2: CPT

## 2024-04-12 RX ORDER — SACUBITRIL AND VALSARTAN 24; 26 MG/1; MG/1
0.5 TABLET, FILM COATED ORAL DAILY
Status: ON HOLD | COMMUNITY
End: 2024-04-13

## 2024-04-12 RX ORDER — WARFARIN SODIUM 2.5 MG/1
2.5 TABLET ORAL NIGHTLY
Status: DISCONTINUED | OUTPATIENT
Start: 2024-04-12 | End: 2024-04-13

## 2024-04-12 RX ORDER — CHOLECALCIFEROL (VITAMIN D3) 50 MCG
2000 TABLET ORAL DAILY
Status: DISCONTINUED | OUTPATIENT
Start: 2024-04-12 | End: 2024-04-13 | Stop reason: HOSPADM

## 2024-04-12 RX ORDER — ROSUVASTATIN CALCIUM 10 MG/1
10 TABLET, COATED ORAL NIGHTLY
Status: DISCONTINUED | OUTPATIENT
Start: 2024-04-12 | End: 2024-04-13 | Stop reason: HOSPADM

## 2024-04-12 RX ORDER — POTASSIUM CHLORIDE 20 MEQ/1
40 TABLET, EXTENDED RELEASE ORAL ONCE
Status: COMPLETED | OUTPATIENT
Start: 2024-04-12 | End: 2024-04-12

## 2024-04-12 RX ORDER — METOPROLOL SUCCINATE 25 MG/1
12.5 TABLET, EXTENDED RELEASE ORAL EVERY MORNING
Status: DISCONTINUED | OUTPATIENT
Start: 2024-04-12 | End: 2024-04-13 | Stop reason: HOSPADM

## 2024-04-12 RX ORDER — ASCORBIC ACID 500 MG
500 TABLET ORAL DAILY
Status: DISCONTINUED | OUTPATIENT
Start: 2024-04-12 | End: 2024-04-13 | Stop reason: HOSPADM

## 2024-04-12 RX ORDER — LEVOTHYROXINE SODIUM 0.07 MG/1
75 TABLET ORAL DAILY
Status: DISCONTINUED | OUTPATIENT
Start: 2024-04-12 | End: 2024-04-13 | Stop reason: HOSPADM

## 2024-04-12 RX ADMIN — OXYCODONE HYDROCHLORIDE AND ACETAMINOPHEN 500 MG: 500 TABLET ORAL at 11:44

## 2024-04-12 RX ADMIN — Medication 2000 UNITS: at 11:45

## 2024-04-12 RX ADMIN — BUMETANIDE 0.5 MG: 1 TABLET ORAL at 09:00

## 2024-04-12 RX ADMIN — DIGOXIN 62.5 MCG: 0.12 TABLET ORAL at 09:00

## 2024-04-12 RX ADMIN — ARFORMOTEROL TARTRATE 15 MCG: 15 SOLUTION RESPIRATORY (INHALATION) at 09:10

## 2024-04-12 RX ADMIN — POTASSIUM CHLORIDE 40 MEQ: 1500 TABLET, EXTENDED RELEASE ORAL at 11:44

## 2024-04-12 RX ADMIN — SALINE NASAL SPRAY 1 SPRAY: 1.5 SOLUTION NASAL at 02:13

## 2024-04-12 RX ADMIN — SODIUM CHLORIDE, PRESERVATIVE FREE 10 ML: 5 INJECTION INTRAVENOUS at 09:22

## 2024-04-12 RX ADMIN — BUDESONIDE 250 MCG: 0.25 SUSPENSION RESPIRATORY (INHALATION) at 09:10

## 2024-04-12 RX ADMIN — METOPROLOL SUCCINATE 12.5 MG: 25 TABLET, FILM COATED, EXTENDED RELEASE ORAL at 11:45

## 2024-04-12 RX ADMIN — IPRATROPIUM BROMIDE 0.5 MG: 0.5 SOLUTION RESPIRATORY (INHALATION) at 19:10

## 2024-04-12 RX ADMIN — ROSUVASTATIN CALCIUM 10 MG: 10 TABLET, FILM COATED ORAL at 19:52

## 2024-04-12 RX ADMIN — METHYLPREDNISOLONE SODIUM SUCCINATE 40 MG: 40 INJECTION INTRAMUSCULAR; INTRAVENOUS at 15:26

## 2024-04-12 RX ADMIN — ARFORMOTEROL TARTRATE 15 MCG: 15 SOLUTION RESPIRATORY (INHALATION) at 19:10

## 2024-04-12 RX ADMIN — SODIUM CHLORIDE, PRESERVATIVE FREE 10 ML: 5 INJECTION INTRAVENOUS at 19:52

## 2024-04-12 RX ADMIN — BUDESONIDE 250 MCG: 0.25 SUSPENSION RESPIRATORY (INHALATION) at 19:10

## 2024-04-12 RX ADMIN — IPRATROPIUM BROMIDE 0.5 MG: 0.5 SOLUTION RESPIRATORY (INHALATION) at 11:06

## 2024-04-12 RX ADMIN — IPRATROPIUM BROMIDE 0.5 MG: 0.5 SOLUTION RESPIRATORY (INHALATION) at 09:10

## 2024-04-12 RX ADMIN — WARFARIN SODIUM 2.5 MG: 2.5 TABLET ORAL at 19:52

## 2024-04-12 RX ADMIN — METHYLPREDNISOLONE SODIUM SUCCINATE 40 MG: 40 INJECTION INTRAMUSCULAR; INTRAVENOUS at 04:36

## 2024-04-12 RX ADMIN — IPRATROPIUM BROMIDE 0.5 MG: 0.5 SOLUTION RESPIRATORY (INHALATION) at 15:08

## 2024-04-12 RX ADMIN — LEVOTHYROXINE SODIUM 75 MCG: 75 TABLET ORAL at 11:44

## 2024-04-12 ASSESSMENT — ENCOUNTER SYMPTOMS
DIARRHEA: 0
VOMITING: 0
NAUSEA: 0
ABDOMINAL PAIN: 0
BACK PAIN: 0
SHORTNESS OF BREATH: 1
VOICE CHANGE: 0
WHEEZING: 0
PHOTOPHOBIA: 0
TROUBLE SWALLOWING: 0

## 2024-04-12 ASSESSMENT — PAIN SCALES - GENERAL: PAINLEVEL_OUTOF10: 0

## 2024-04-12 NOTE — PROGRESS NOTES
[facility] Hospitalist Progress Note    Admitting Date and Time: 4/11/2024  3:25 PM  Admit Dx: COPD exacerbation (HCC) [J44.1]    Subjective:  Patient is being followed for COPD exacerbation (HCC) [J44.1]   Pt seen and examined this morning  No acute events overnight  Sitting up in bed comfortably, no acute distress  Denies any acute complaints  On her baseline of 5 L NC    ROS: denies fever, chills, cp, sob, n/v, HA unless stated above.      levothyroxine  75 mcg Oral Daily    metoprolol succinate  12.5 mg Oral QAM    rosuvastatin  10 mg Oral Nightly    vitamin C  500 mg Oral Daily    vitamin D  2,000 Units Oral Daily    warfarin  2.5 mg Oral Nightly    warfarin placeholder: dosing by pharmacy   Other RX Placeholder    sodium chloride flush  5-40 mL IntraVENous 2 times per day    bumetanide  0.5 mg Oral Daily    digoxin  62.5 mcg Oral Daily    budesonide  0.25 mg Nebulization BID RT    And    arformoterol tartrate  15 mcg Nebulization BID RT    And    ipratropium  0.5 mg Nebulization 4x Daily RT    methylPREDNISolone  40 mg IntraVENous Q12H     sodium chloride flush, 5-40 mL, PRN  sodium chloride, , PRN  acetaminophen, 650 mg, Q4H PRN  ondansetron, 4 mg, Q8H PRN   Or  ondansetron, 4 mg, Q6H PRN  albuterol, 2.5 mg, 4x Daily PRN  sodium chloride, 1 spray, PRN         Objective:    BP (!) 114/54   Pulse 78   Temp 97.8 °F (36.6 °C) (Oral)   Resp 16   Ht 1.702 m (5' 7\")   Wt 69.9 kg (154 lb)   SpO2 93%   BMI 24.12 kg/m²     General Appearance: alert and oriented to person, place and time and in no acute distress  Skin: warm and dry  Head: normocephalic and atraumatic  Eyes: pupils equal, round, and reactive to light, extraocular eye movements intact, conjunctivae normal  Neck: neck supple and non tender without mass   Pulmonary/Chest: clear to auscultation bilaterally- no wheezes, rales or rhonchi, normal air movement, no respiratory distress  Cardiovascular: normal rate, normal S1 and S2 and no carotid

## 2024-04-12 NOTE — PLAN OF CARE
Problem: Discharge Planning  Goal: Discharge to home or other facility with appropriate resources  Outcome: Progressing  Flowsheets (Taken 4/12/2024 0005)  Discharge to home or other facility with appropriate resources:   Arrange for needed discharge resources and transportation as appropriate   Identify barriers to discharge with patient and caregiver     Problem: Safety - Adult  Goal: Free from fall injury  Outcome: Progressing  Flowsheets (Taken 4/12/2024 0005)  Free From Fall Injury: Instruct family/caregiver on patient safety     Problem: ABCDS Injury Assessment  Goal: Absence of physical injury  Outcome: Progressing  Flowsheets (Taken 4/12/2024 0005)  Absence of Physical Injury: Implement safety measures based on patient assessment

## 2024-04-12 NOTE — PROGRESS NOTES
Telephone message left for harper Garcias to bring and help the pharmacy verify home medications. Pharmacy aware. Unable to adminster morning doses at this time. No verification completed upon admission to Er or unit.

## 2024-04-12 NOTE — ACP (ADVANCE CARE PLANNING)
Advance Care Planning   Healthcare Decision Maker:    Primary Decision Maker: Olena Krystal Muniz - Juancarlos - [phone]    Secondary Decision Maker: OlenaDieter - Juancarlos - [phone]    Click here to complete Healthcare Decision Makers including selection of the Healthcare Decision Maker Relationship (ie \"Primary\").

## 2024-04-12 NOTE — ED NOTES
ED to Inpatient Handoff Report    Notified Dione that electronic handoff available and patient ready for transport to room 409.    Safety Risks: Risk of falls    Patient in Restraints: no    Constant Observer or Patient : no    Telemetry Monitoring Ordered :Yes           Order to transfer to unit without monitor:YES    Last MEWS: 3 Time completed: 2215    Deterioration Index Score:   Predictive Model Details          40 (Caution)  Factor Value    Calculated 4/11/2024 22:27 33% Age 83 years old    Deterioration Index Model 27% Supplemental oxygen Nasal cannula     13% Respiratory rate 20     13% Pulse 113     5% Sodium 143 mmol/L     2% Pulse oximetry 93 %     2% Potassium abnormal (3.3 mmol/L)     2% Systolic 124     1% Hematocrit abnormal (30.9 %)     1% WBC count abnormal (3.6 k/uL)     0% Temperature 98 °F (36.7 °C)        Vitals:    04/11/24 2013 04/11/24 2014 04/11/24 2115 04/11/24 2215   BP:  135/80 107/75 124/65   Pulse: (!) 117 (!) 115 (!) 114 (!) 113   Resp:  23 24 20   Temp:    98 °F (36.7 °C)   TempSrc:    Oral   SpO2: (!) 86% 93% 94% 93%   Weight:       Height:             Opportunity for questions and clarification was provided.

## 2024-04-12 NOTE — H&P
[facility] History and Physical        Chief Complaint:  inc HR  History of Present Illness   The patient is a 83 y.o. female    PCP: Rom Mckeon MD   Chronic 5LNC home o2  But feeling slighly more sob at home today and she checked her -140s at home- some palpitations noted over past 24 hours- hx of afib, no change in cough, baseline STEPHENSON/wheezing/tightness/congestion, known COPD, no chest pain- but with HR elevated dr Jones sent her in   No med changes recently, except dr Jones told her to take 1/2tab of entresto bid- which she did yesterday.  No URI symptoms, no fevers, sore throat, etc..    - hx taken from the patient  REVIEW OF SYSTEMS:  no fevers, chills,     Past Medical History:      Diagnosis Date    Atrial fibrillation (HCC)     CHF (congestive heart failure) (HCC)     Emphysema, unspecified (HCC)     Hyperlipidemia     Hypertension     Lung cancer (HCC)     Mitral valve regurgitation     Oxygen dependent     Prolonged emergence from general anesthesia     post general anesthesia    Skin cancer     Thyroid disease        Past Surgical History:        Procedure Laterality Date    BREAST BIOPSY Right     benign    BRONCHOSCOPY N/A 04/20/2021    BRONCHOSCOPY/TRANSBRONCHIAL NEEDLE BIOPSY performed by Antony Rosario MD at [facility] ENDOSCOPY    CARDIOVERSION  10/06/2023    Dr Jones    INTRACAPSULAR CATARACT EXTRACTION Right 12/16/2021    RIGHT EYE CATARACT EXTRACTION IOL IMPLANT performed by Kay Pimentel MD at [facility] OR    INTRACAPSULAR CATARACT EXTRACTION Left 02/24/2022    LEFT EYE CATARACT EXTRACTION IOL IMPLANT performed by Kay Pimentel MD at [facility] OR    TUBAL LIGATION         Home Medications:  Prior to Admission medications    Medication Sig Start Date End Date Taking? Authorizing Provider   warfarin (COUMADIN) 2.5 MG tablet Take 1 tablet by mouth nightly 3/5/24   Provider, MD Angela   bumetanide (BUMEX) 0.5 MG tablet Take 1 tablet by mouth  changes greatest in the mid and lower lungs left greater than right with probable trace right and small left pleural effusions.  Opacities in the left lung stable to slightly decreased. \"    Baseleine Bumex- doubt chf exac    Dieter Bee MD   Night Officer, overnight admitting doctor at [facility]--please call day time doctor   for questions after 7:30am    Covering for [facility] Hospitalist Service  If Qs please call [phone]  Electronically signed by Dieter Bee MD on 4/11/2024 at 10:52 PM  \

## 2024-04-12 NOTE — CARE COORDINATION
Introduced my self and provided explanation of CM role to patient.  Patient is awake, alert, and aware of current diagnosis and treatment plan including iv steroid therapy, cardiology consult.  She voices she resides at home with her daughter and completes her adl's with independence. She does not use walker or cane.  She has home O2 5 L at baseline from [facility], she also has nebulizer.  Patient is established with a pcp and denies any issue with retail pharmaceutical coverage.  She denies new discharge planning needs inclusive of [facility] services.  Patient will need followed and assisted with discharge planning as necessary.   Rich Hall, MSN RN  [facility] Case Management  [phone]           CURRENT CARDIAC WORKUP:       Echo:  Stress Test:  Cardiac Cath:    Allergies:   No Known Allergies      MEDICATIONS  (STANDING):  ALPRAZolam 0.25 milliGRAM(s) Oral two times a day  aspirin  chewable 81 milliGRAM(s) Oral daily  atorvastatin 80 milliGRAM(s) Oral at bedtime  azelastine 0.1% Nasal Spray 2 Spray(s) Both Nostrils two times a day  bethanechol 25 milliGRAM(s) Oral two times a day  cefTRIAXone Injectable. 1000 milliGRAM(s) IV Push every 24 hours  cefTRIAXone Injectable.      cholecalciferol 1000 Unit(s) Oral daily  finasteride 5 milliGRAM(s) Oral daily  heparin  Injectable 5000 Unit(s) SubCutaneous every 12 hours  levothyroxine 75 MICROGram(s) Oral daily  loteprednol 0.2% Ophthalmic Suspension 1 Drop(s) Both EYES daily  pantoprazole    Tablet 40 milliGRAM(s) Oral before breakfast  ranolazine 1000 milliGRAM(s) Oral two times a day  sertraline 100 milliGRAM(s) Oral daily    MEDICATIONS  (PRN):  ALPRAZolam 0.25 milliGRAM(s) Oral daily PRN for anxiety      ROS:     .ros      Vital Signs Last 24 Hrs  T(C): 36.8 (27 Jan 2019 05:43), Max: 36.8 (27 Jan 2019 05:43)  T(F): 98.3 (27 Jan 2019 05:43), Max: 98.3 (27 Jan 2019 05:43)  HR: 77 (27 Jan 2019 05:43) (77 - 96)  BP: 168/66 (27 Jan 2019 05:43) (156/59 - 168/66)  BP(mean): --  RR: 18 (27 Jan 2019 05:43) (17 - 18)  SpO2: 100% (27 Jan 2019 05:43) (100% - 100%)    I&O's Summary      PHYSICAL EXAM:    .phy      INTERPRETATION OF TELEMETRY:    ECG:    LABS:                        9.6    6.46  )-----------( 182      ( 27 Jan 2019 06:12 )             28.7     01-27    143  |  107  |  28<H>  ----------------------------<  103<H>  3.8   |  30  |  1.32<H>    Ca    8.3<L>      27 Jan 2019 06:12  Phos  2.7     01-26  Mg     1.8     01-26        Lipid Panel  Chl 140  HDL 75  LDL 54  Trg 55        01-24 @ 10:51  Trop-I 0.138  CK     --  CK MB  --    01-24 @ 04:16  Trop-I 0.160  CK     --  CK MB  --    01-23 @ 14:03  Trop-I 0.093  CK     --  CK MB  --                RADIOLOGY & ADDITIONAL STUDIES: 89-year-old male admitted with complaints of slurred speech that has resolved spontaneously. Diagnosed with a TIA. Patient has had a history of severe aortic stenosis and severe right internal carotid artery stenosis. He had refused invasive treatment for both in the past.    Today, no new events. Again, had a brief paroxysm of atrial flutter, spontaneously converted to sinus    PAST MEDICAL & SURGICAL HISTORY:  Skin cancer  SATISH Carotid stenosis  Severe AS  HTN (hypertension)  HLD  CKD      PREVIOUS CARDIAC WORKUP:      Echo:  10/18 Nml EF, severe AS, mod MR, mod TR, moderate PAH    CURRENT CARDIAC WORKUP:       Echo: 1/27/19 - mild decrease LV EF 50%. Mod to severe AS      Allergies:   No Known Allergies      MEDICATIONS  (STANDING):  ALPRAZolam 0.25 milliGRAM(s) Oral two times a day  aspirin  chewable 81 milliGRAM(s) Oral daily  atorvastatin 80 milliGRAM(s) Oral at bedtime  azelastine 0.1% Nasal Spray 2 Spray(s) Both Nostrils two times a day  bethanechol 25 milliGRAM(s) Oral two times a day  cefTRIAXone Injectable. 1000 milliGRAM(s) IV Push every 24 hours  cefTRIAXone Injectable.      cholecalciferol 1000 Unit(s) Oral daily  finasteride 5 milliGRAM(s) Oral daily  heparin  Injectable 5000 Unit(s) SubCutaneous every 12 hours  levothyroxine 75 MICROGram(s) Oral daily  loteprednol 0.2% Ophthalmic Suspension 1 Drop(s) Both EYES daily  pantoprazole    Tablet 40 milliGRAM(s) Oral before breakfast  ranolazine 1000 milliGRAM(s) Oral two times a day  sertraline 100 milliGRAM(s) Oral daily    MEDICATIONS  (PRN):  ALPRAZolam 0.25 milliGRAM(s) Oral daily PRN for anxiety      ROS:     Detailed ten system ROS was performed and was negative except for history as eluded to above.    no fever  no chills  no nausea  no vomiting  no diarrhea  no constipation  no melena  no hematochezia  no chest pain  no palpitations  no sob at rest  no dyspnea on exertion  no cough  no wheezing  no anorexia  no headache  no dizziness  no syncope  no weakness  no myalgia  no dysuria  no polyuria  no hematuria       Vital Signs Last 24 Hrs  T(C): 36.8 (27 Jan 2019 05:43), Max: 36.8 (27 Jan 2019 05:43)  T(F): 98.3 (27 Jan 2019 05:43), Max: 98.3 (27 Jan 2019 05:43)  HR: 77 (27 Jan 2019 05:43) (77 - 96)  BP: 168/66 (27 Jan 2019 05:43) (156/59 - 168/66)  BP(mean): --  RR: 18 (27 Jan 2019 05:43) (17 - 18)  SpO2: 100% (27 Jan 2019 05:43) (100% - 100%)    I&O's Summary      PHYSICAL EXAM:    General:                Comfortable, AAO X 3, in no distress.  HEENT:                  Atraumatic, PERRLA, EOMI, conjunctiva clear.    Neck:                     Supple, no adenopathy, no thyromegaly, no JVD, + bruit.  Back:                     Symmetric, non tender.  Chest:                    Clear, B/L symmetric air entry, no tachypnea  Heart:                     S1, S2 normal, +S4, + murmur.  Abdomen:              Soft, non-tender, bowel sounds active. No palpable masses.  Extremities:           no cyanosis, no edema. Peripheral pulses normal.  Skin:                      Skin color, texture normal. No rashes.  Neurologic:            Grossly nonfocal.       TELEMETRY:    Normal sinus rhythm, paroxysmal atrial flutter.     ECG:  NSR, PVCs, septal infarct pattern    LABS:                        9.6    6.46  )-----------( 182      ( 27 Jan 2019 06:12 )             28.7     01-27    143  |  107  |  28<H>  ----------------------------<  103<H>  3.8   |  30  |  1.32<H>    Ca    8.3<L>      27 Jan 2019 06:12  Phos  2.7     01-26  Mg     1.8     01-26      RADIOLOGY & ADDITIONAL STUDIES:    MRI brain and MR Angio Neck, Head No Cont (01.24.19 @ 19:37) >  IMPRESSION:    MRI brain: Diffuse FLAIR hyperintense signal throughout the sulci of the bilateral cerebri and a tiny subdural collection overlying the right occipital lobe, nonspecific, may reflect hemorrhage or cellular material from infection or inflammation. There are bilateral whole hemispheric subdural fluid collections measuring up to 0.9 cm overlying the bilateral frontal lobes. No significant mass effect or midline shift.    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 There is a normal congenital variant anastomosis from the cavernous segment of the left internal carotid artery to the basilar artery that remains widely patent.     MRA neck: High-grade focal stenosis of the proximal right internal carotid artery with distal reconstitution of flow.

## 2024-04-12 NOTE — ED PROVIDER NOTES
[facility] [place] INTERNAL MEDICINE 2  EMERGENCY DEPARTMENT ENCOUNTER      Pt Name: Krystal Hyatt  MRN: [id number]  Birthdate 1940  Date of evaluation: 4/11/2024  Provider: Leo Mena DO  PCP: Rom Mckeon MD    HPI: 83-year-old female present emerged part complaints of shortness of breath, tachycardia onset yesterday night and has been progressive since that time.  Denies any traumatic injury.  Denies any falls.  Heart rate greater than 120s at home.  Previous history of A-fib.  Patient is on blood thinners at home .  Previous history of COPD.  Denies any cardiac chest pain answering all questions.  Chronically on oxygen at home.  Sent in by Dr. Jones cardiology.  Chief Complaint   Patient presents with    Shortness of Breath    Tachycardia     Rate 120's at home       Review of Systems   Constitutional:  Negative for chills, fatigue and fever.   HENT:  Negative for congestion, trouble swallowing and voice change.    Eyes:  Negative for photophobia and visual disturbance.   Respiratory:  Positive for shortness of breath. Negative for wheezing.    Cardiovascular:  Positive for palpitations and leg swelling. Negative for chest pain.   Gastrointestinal:  Negative for abdominal pain, diarrhea, nausea and vomiting.   Genitourinary:  Negative for dysuria, hematuria and urgency.   Musculoskeletal:  Negative for arthralgias, back pain, neck pain and neck stiffness.   Skin:  Negative for wound.   Neurological:  Negative for dizziness, syncope, weakness, light-headedness, numbness and headaches.   Psychiatric/Behavioral:  Negative for behavioral problems and confusion.         Physical Exam  Vitals reviewed.   Constitutional:       General: She is not in acute distress.     Appearance: Normal appearance. She is not diaphoretic.   HENT:      Head: Normocephalic.      Right Ear: External ear normal.      Left Ear: External ear normal.      Nose: Nose normal.      Mouth/Throat:      Pharynx: No oropharyngeal exudate.

## 2024-04-12 NOTE — CONSULTS
CARDIOLOGY CONSULTATION    Patient Name:  Krystal Hyatt    :  1940    Reason for Consultation:   History of CHF; atrial fibrillation; COPD    History of Present Illness:   Krystal Hyatt returns to [facility], following a history of increasing shortness of breath over the past several days prior to admission..  She  has a longstanding history  of carcinoma of the lung and is status post history of radiation therapy and underlying chronic obstructive pulmonary disease.  More recently she was found to have drug-induced hyperthyroidism which resulted in atrial fibrillation for which she now for the most part remains in sinus rhythm..  Importantly following admission on this occasion she was in atrial fibrillation and it has been more than 2 months since her dosage of medication has been adjusted.  She has known left ventricular systolic dysfunction with an estimated left ventricular ejection fraction of 38±5%.  She is now admitted for further observation and adjustment of her medical regimen as needed.  Past Medical History:   has a past medical history of Atrial fibrillation (HCC), CHF (congestive heart failure) (HCC), Emphysema, unspecified (HCC), Hyperlipidemia, Hypertension, Lung cancer (HCC), Mitral valve regurgitation, Oxygen dependent, Prolonged emergence from general anesthesia, Skin cancer, and Thyroid disease.    Surgical History:   has a past surgical history that includes Breast biopsy (Right); Tubal ligation; bronchoscopy (N/A, 2021); Intracapsular cataract extraction (Right, 2021); Intracapsular cataract extraction (Left, 2022); and Cardioversion (10/06/2023).     Social History:   reports that she quit smoking about 25 years ago. Her smoking use included cigarettes. She started smoking about 71 years ago. She has a 92.0 pack-year smoking history. She has been exposed to tobacco smoke. She has never used smokeless tobacco. She reports current alcohol use

## 2024-04-12 NOTE — PROGRESS NOTES
Pharmacy Consultation Note  (Warfarin Dosing and Monitoring)    Initial consult date: 4/12  Consulting Provider: Karen    Krystal Hyatt is a 83 y.o. female for whom pharmacy has been asked to manage warfarin therapy.     Weight:   Wt Readings from Last 1 Encounters:   04/11/24 69.9 kg (154 lb)       TSH:    Lab Results   Component Value Date/Time    TSH 41.59 03/05/2024 04:05 AM       Hepatic Function Panel:                            Lab Results   Component Value Date/Time    ALKPHOS 53 04/12/2024 04:46 AM    ALT 8 04/12/2024 04:46 AM    AST 21 04/12/2024 04:46 AM    PROT 5.8 04/12/2024 04:46 AM    BILITOT 0.5 04/12/2024 04:46 AM    BILIDIR 0.2 10/15/2017 02:40 AM    IBILI 0.7 10/15/2017 02:40 AM    LABALBU 3.7 04/12/2024 04:46 AM    LABALBU 4.4 05/04/2012 11:00 AM       Current significant warfarin drug-drug interactions include: steroids (variable/dose dependent)    Recent Labs     04/11/24  1559 04/12/24  0446   HGB 9.5* 8.9*   PLT  --  128*     Date Warfarin Dose INR Heparin or LMWH Comment   4/12 2.5mg 2.1 --                                  Assessment:  Patient is a 83 y.o. female on warfarin for Atrial Fibrillation.  Patient's home warfarin dosing regimen is 2.5mg daily.   Goal INR 2 - 3  INR 2.3 today    Plan:  Warfarin 2.5mg tonight  Daily PT/INR until the INR is stable within the therapeutic range  Pharmacist will follow and monitor/adjust dosing as necessary    Thank you for this consult,    Christiana Puri, [facility] 4/12/2024 2:56 PM    RADHA: [phone]  SEY: [phone]  SJW: [phone]

## 2024-04-12 NOTE — PROGRESS NOTES
4 Eyes Skin Assessment     NAME:  Krystal Hyatt  YOB: 1940  MEDICAL RECORD NUMBER:  [id number]    The patient is being assessed for  Admission    I agree that at least one RN has performed a thorough Head to Toe Skin Assessment on the patient. ALL assessment sites listed below have been assessed.      Areas assessed by both nurses:    Head, Face, Ears, Shoulders, Back, Chest, Arms, Elbows, Hands, Sacrum. Buttock, Coccyx, Ischium, Legs. Feet and Heels, and Under Medical Devices         Does the Patient have a Wound? No noted wound(s)       Leandro Prevention initiated by RN: No  Wound Care Orders initiated by RN: No    Pressure Injury (Stage 3,4, Unstageable, DTI, NWPT, and Complex wounds) if present, place Wound referral order by RN under : No    New Ostomies, if present place, Ostomy referral order under : No     Nurse 1 eSignature: Electronically signed by Lizz Giron RN on 4/11/24 at 11:41 PM EDT    **SHARE this note so that the co-signing nurse can place an eSignature**    Nurse 2 eSignature: Electronically signed by Katie Starr RN on 4/11/24 at 11:42 PM EDT

## 2024-04-12 NOTE — PROGRESS NOTES
Attempted to review patient home medication list with patient. Patient is not aware of medications and dosages and states that she will have her son obtain a list from her home in the morning. Dr. Bee notified.

## 2024-04-13 VITALS
OXYGEN SATURATION: 94 % | TEMPERATURE: 98 F | BODY MASS INDEX: 24.37 KG/M2 | DIASTOLIC BLOOD PRESSURE: 74 MMHG | HEART RATE: 74 BPM | HEIGHT: 67 IN | WEIGHT: 155.3 LBS | RESPIRATION RATE: 18 BRPM | SYSTOLIC BLOOD PRESSURE: 139 MMHG

## 2024-04-13 LAB
ANION GAP SERPL CALCULATED.3IONS-SCNC: 10 MMOL/L (ref 7–16)
BUN SERPL-MCNC: 23 MG/DL (ref 6–23)
CALCIUM SERPL-MCNC: 9.6 MG/DL (ref 8.6–10.2)
CHLORIDE SERPL-SCNC: 103 MMOL/L (ref 98–107)
CO2 SERPL-SCNC: 28 MMOL/L (ref 22–29)
CREAT SERPL-MCNC: 0.8 MG/DL (ref 0.5–1)
EKG ATRIAL RATE: 236 BPM
EKG Q-T INTERVAL: 332 MS
EKG QRS DURATION: 82 MS
EKG QTC CALCULATION (BAZETT): 465 MS
EKG R AXIS: 32 DEGREES
EKG T AXIS: -20 DEGREES
EKG VENTRICULAR RATE: 118 BPM
ERYTHROCYTE [DISTWIDTH] IN BLOOD BY AUTOMATED COUNT: 13.4 % (ref 11.5–15)
GFR SERPL CREATININE-BSD FRML MDRD: 73 ML/MIN/1.73M2
GLUCOSE SERPL-MCNC: 151 MG/DL (ref 74–99)
HCT VFR BLD AUTO: 29.6 % (ref 34–48)
HGB BLD-MCNC: 9.2 G/DL (ref 11.5–15.5)
INR PPP: 2.7
MAGNESIUM SERPL-MCNC: 1.7 MG/DL (ref 1.6–2.6)
MCH RBC QN AUTO: 30.7 PG (ref 26–35)
MCHC RBC AUTO-ENTMCNC: 31.1 G/DL (ref 32–34.5)
MCV RBC AUTO: 98.7 FL (ref 80–99.9)
PLATELET # BLD AUTO: 133 K/UL (ref 130–450)
PMV BLD AUTO: 11.1 FL (ref 7–12)
POTASSIUM SERPL-SCNC: 4.3 MMOL/L (ref 3.5–5)
PROTHROMBIN TIME: 29.6 SEC (ref 9.3–12.4)
RBC # BLD AUTO: 3 M/UL (ref 3.5–5.5)
SODIUM SERPL-SCNC: 141 MMOL/L (ref 132–146)
WBC OTHER # BLD: 5.4 K/UL (ref 4.5–11.5)

## 2024-04-13 PROCEDURE — 6370000000 HC RX 637 (ALT 250 FOR IP): Performed by: INTERNAL MEDICINE

## 2024-04-13 PROCEDURE — 94640 AIRWAY INHALATION TREATMENT: CPT

## 2024-04-13 PROCEDURE — 85027 COMPLETE CBC AUTOMATED: CPT

## 2024-04-13 PROCEDURE — 99239 HOSP IP/OBS DSCHRG MGMT >30: CPT | Performed by: INTERNAL MEDICINE

## 2024-04-13 PROCEDURE — 6360000002 HC RX W HCPCS: Performed by: INTERNAL MEDICINE

## 2024-04-13 PROCEDURE — 2700000000 HC OXYGEN THERAPY PER DAY

## 2024-04-13 PROCEDURE — 80048 BASIC METABOLIC PNL TOTAL CA: CPT

## 2024-04-13 PROCEDURE — 83735 ASSAY OF MAGNESIUM: CPT

## 2024-04-13 PROCEDURE — 6370000000 HC RX 637 (ALT 250 FOR IP)

## 2024-04-13 PROCEDURE — 2580000003 HC RX 258: Performed by: INTERNAL MEDICINE

## 2024-04-13 PROCEDURE — 85610 PROTHROMBIN TIME: CPT

## 2024-04-13 RX ORDER — SACUBITRIL AND VALSARTAN 24; 26 MG/1; MG/1
0.5 TABLET, FILM COATED ORAL 2 TIMES DAILY
Qty: 60 TABLET | Refills: 1 | Status: SHIPPED | OUTPATIENT
Start: 2024-04-13

## 2024-04-13 RX ORDER — CALCIUM CARBONATE 500 MG/1
500 TABLET, CHEWABLE ORAL 3 TIMES DAILY PRN
Status: DISCONTINUED | OUTPATIENT
Start: 2024-04-13 | End: 2024-04-13 | Stop reason: HOSPADM

## 2024-04-13 RX ORDER — WARFARIN SODIUM 1 MG/1
1 TABLET ORAL
Status: DISCONTINUED | OUTPATIENT
Start: 2024-04-13 | End: 2024-04-13 | Stop reason: HOSPADM

## 2024-04-13 RX ADMIN — DIGOXIN 62.5 MCG: 0.12 TABLET ORAL at 09:02

## 2024-04-13 RX ADMIN — OXYCODONE HYDROCHLORIDE AND ACETAMINOPHEN 500 MG: 500 TABLET ORAL at 09:03

## 2024-04-13 RX ADMIN — IPRATROPIUM BROMIDE 0.5 MG: 0.5 SOLUTION RESPIRATORY (INHALATION) at 07:47

## 2024-04-13 RX ADMIN — IPRATROPIUM BROMIDE 0.5 MG: 0.5 SOLUTION RESPIRATORY (INHALATION) at 11:14

## 2024-04-13 RX ADMIN — Medication 2000 UNITS: at 09:02

## 2024-04-13 RX ADMIN — CALCIUM CARBONATE 500 MG: 500 TABLET, CHEWABLE ORAL at 01:27

## 2024-04-13 RX ADMIN — LEVOTHYROXINE SODIUM 75 MCG: 75 TABLET ORAL at 05:48

## 2024-04-13 RX ADMIN — SODIUM CHLORIDE, PRESERVATIVE FREE 10 ML: 5 INJECTION INTRAVENOUS at 09:04

## 2024-04-13 RX ADMIN — METHYLPREDNISOLONE SODIUM SUCCINATE 40 MG: 40 INJECTION INTRAMUSCULAR; INTRAVENOUS at 03:17

## 2024-04-13 RX ADMIN — BUDESONIDE 250 MCG: 0.25 SUSPENSION RESPIRATORY (INHALATION) at 07:47

## 2024-04-13 RX ADMIN — METOPROLOL SUCCINATE 12.5 MG: 25 TABLET, FILM COATED, EXTENDED RELEASE ORAL at 09:03

## 2024-04-13 RX ADMIN — ARFORMOTEROL TARTRATE 15 MCG: 15 SOLUTION RESPIRATORY (INHALATION) at 07:47

## 2024-04-13 RX ADMIN — BUMETANIDE 0.5 MG: 1 TABLET ORAL at 09:03

## 2024-04-13 ASSESSMENT — PAIN SCALES - GENERAL
PAINLEVEL_OUTOF10: 0
PAINLEVEL_OUTOF10: 0

## 2024-04-13 NOTE — PROGRESS NOTES
PROGRESS NOTE       PATIENT PROBLEM LIST:  Patient Active Problem List   Diagnosis Code    Atrial fibrillation with RVR ([facility])- Recurrent I48.91    Acute on chronic diastolic congestive heart failure (HCC) I50.33    Cardiomyopathy as manifestation of underlying disease (HCC) I43    Non-rheumatic mitral regurgitation I34.0    Acute combined systolic and diastolic congestive heart failure (HCC) I50.41    Chronic anticoagulation Z79.01    Essential hypertension I10    COPD exacerbation (HCC) J44.1    History of atrial fibrillation Z86.79    Dilated idiopathic cardiomyopathy (HCC) I42.0    Severe sinus bradycardia R00.1    Moderate tricuspid regurgitation I07.1    Hypoxia R09.02    Combined forms of age-related cataract of both eyes H25.813    Dyspnea on exertion R06.09    Mass of right lung R91.8    Supplemental oxygen dependent Z99.81    Pneumonia of both lower lobes due to infectious organism J18.9    Stage 3a chronic kidney disease (HCC) N18.31    Malignant neoplasm of right lung (HCC) C34.91    Atrial flutter with rapid ventricular response (HCC) I48.92    PAF (paroxysmal atrial fibrillation) ([facility]) I48.0    Centrilobular emphysema (HCC) J43.2    Thyroid disease E07.9    Acquired hypothyroidism E03.9    Bronchitis J40    Chronic respiratory failure with hypoxia (HCC) J96.11    Mixed hyperlipidemia E78.2    Gastroesophageal reflux disease K21.9    Primary hypothyroidism E03.9    Hyperlipidemia E78.5    Atrial fibrillation (HCC) I48.91    Atrial flutter (HCC) I48.92    Atrial fibrillation with rapid ventricular response (HCC) I48.91    COVID-19 virus infection U07.1    Iatrogenic hyperthyroidism E05.80    COVID-19 U07.1    TRISTEN (acute kidney injury) (HCC) N17.9    Thrombocytopenia (HCC) D69.6    High thyroid hormone level R79.89    Hypothyroidism E03.9    Chronic hypoxic respiratory failure (HCC) J96.11    Chronic systolic CHF (congestive heart failure) (HCC) I50.22    Acute respiratory failure with hypoxia (HCC) J96.01    CO2 31* 29 28   BUN 14 16 23   CREATININE 0.9 0.8 0.8   LABGLOM 65 78 73     ABGs:   Lab Results   Component Value Date/Time    PH 7.560 03/02/2024 07:30 AM    PO2 80.4 03/02/2024 07:30 AM    PCO2 35.8 03/02/2024 07:30 AM     INR:   Recent Labs     04/11/24  1559 04/12/24  0954 04/13/24  0442   INR 2.3 2.1 2.7     PRO-BNP:   Lab Results   Component Value Date    PROBNP 832 (H) 04/11/2024    PROBNP 1,408 (H) 03/30/2024      TSH:   Lab Results   Component Value Date    TSH 41.59 (H) 03/05/2024      Cardiac Injury Profile:   Recent Labs     04/11/24  1559 04/11/24  1813   TROPHS 27* 25*      Lipid Profile:   Lab Results   Component Value Date/Time    TRIG 87 09/18/2023 09:41 AM    TRIG 87 09/18/2023 09:41 AM    HDL 75 09/18/2023 09:41 AM    HDL 75 09/18/2023 09:41 AM    LDLCALC 77 05/02/2022 10:40 AM    CHOL 165 09/18/2023 09:41 AM    CHOL 165 09/18/2023 09:41 AM    CHOL 156 05/02/2022 10:40 AM      Hemoglobin A1C: No components found for: \"HGBA1C\"      RAD:   XR CHEST (2 VW)    Result Date: 4/11/2024  1. Cardiomegaly. 2. Scarring and chronic changes greatest in the mid and lower lungs left greater than right with probable trace right and small left pleural effusions.  Opacities in the left lung stable to slightly decreased.         EKG: See Report  Echo: See Report      IMPRESSIONS:  Patient Active Problem List   Diagnosis Code    Atrial fibrillation with RVR ([facility])- Recurrent I48.91    Acute on chronic diastolic congestive heart failure (HCC) I50.33    Cardiomyopathy as manifestation of underlying disease ([facility]) I43    Non-rheumatic mitral regurgitation I34.0    Acute combined systolic and diastolic congestive heart failure ([facility]) I50.41    Chronic anticoagulation Z79.01    Essential hypertension I10    COPD exacerbation ([facility]) J44.1    History of atrial fibrillation Z86.79    Dilated idiopathic cardiomyopathy ([facility]) I42.0    Severe sinus bradycardia R00.1    Moderate tricuspid regurgitation I07.1    Hypoxia R09.02

## 2024-04-13 NOTE — PROGRESS NOTES
Pharmacy Consultation Note  (Warfarin Dosing and Monitoring)    Initial consult date: 4/12  Consulting Provider: Paulhiram Hyatt is a 83 y.o. female for whom pharmacy has been asked to manage warfarin therapy.     Weight:   Wt Readings from Last 1 Encounters:   04/13/24 70.4 kg (155 lb 4.8 oz)       TSH:    Lab Results   Component Value Date/Time    TSH 41.59 03/05/2024 04:05 AM       Hepatic Function Panel:                            Lab Results   Component Value Date/Time    ALKPHOS 53 04/12/2024 04:46 AM    ALT 8 04/12/2024 04:46 AM    AST 21 04/12/2024 04:46 AM    PROT 5.8 04/12/2024 04:46 AM    BILITOT 0.5 04/12/2024 04:46 AM    BILIDIR 0.2 10/15/2017 02:40 AM    IBILI 0.7 10/15/2017 02:40 AM    LABALBU 3.7 04/12/2024 04:46 AM    LABALBU 4.4 05/04/2012 11:00 AM       Current significant warfarin drug-drug interactions include:   -Levothyroxine, rosuvastatin: may enhance anticoagulatory effect of warfarin.     Recent Labs     04/11/24  1559 04/12/24  0446 04/13/24  0442   HGB 9.5* 8.9* 9.2*   PLT  --  128* 133       Date Warfarin Dose INR Heparin or LMWH Comment   4/12 2.5mg 2.1 --    4/13 1 mg 2.7 x                           Assessment:  Patient is a 83 y.o. female on warfarin for Atrial Fibrillation.  Patient's home warfarin dosing regimen is 2.5mg daily per med rec.   Goal INR 2 - 3  INR 2.3 today    Plan:  Warfarin 1 mg tonight  Daily PT/INR until the INR is stable within the therapeutic range  Pharmacist will follow and monitor/adjust dosing as necessary        Brett Junior RPH 4/13/2024 10:41 AM    SEB: [phone]  SEY: [phone]  SJW: [phone]

## 2024-04-13 NOTE — DISCHARGE SUMMARY
[facility] Hospitalist Physician Discharge Summary       No follow-up provider specified.    Activity level: As tolerated     Dispo: Home      Condition on discharge: Stable     Patient ID:  Krystal Hyatt  [id number]  83 y.o.  1940    Admit date: 4/11/2024    Discharge date and time:  4/13/2024  1:53 PM    Admission Diagnoses: Principal Problem:    Tachycardia  Active Problems:    Stage 3a chronic kidney disease (HCC)    Atrial fibrillation with RVR (HCC)- Recurrent    Acute on chronic diastolic congestive heart failure (HCC)    Essential hypertension    COPD exacerbation (HCC)  Resolved Problems:    * No resolved hospital problems. *      Discharge Diagnoses: Principal Problem:    Tachycardia  Active Problems:    Stage 3a chronic kidney disease (HCC)    Atrial fibrillation with RVR (HCC)- Recurrent    Acute on chronic diastolic congestive heart failure (HCC)    Essential hypertension    COPD exacerbation (HCC)  Resolved Problems:    * No resolved hospital problems. *      Consults:  IP CONSULT TO CARDIOLOGY  IP CONSULT TO PHARMACY    Hospital Course:   Patient Krystal Hyatt is a 83 y.o. presented with COPD exacerbation (HCC) [J44.1]    Patient is an 83-year-old female who was sent in by her cardiologist due to palpitations.  She was also managed for mild COPD exacerbation with breathing treatments and IV Solu-Medrol.  Respiratory panel and procalcitonin negative.  For palpitations, she has a history of A-fib, and heart rate was in the 120s to 140s on admission.  Cardiology consulted.  Patient remained hemodynamically stable with controlled heart rates while here.  She will be discharged home in stable condition with close follow-up with her PCP and cardiologist.    Discharge Exam:    General Appearance: alert and oriented to person, place and time and in no acute distress  Skin: warm and dry  Head: normocephalic and atraumatic  Eyes: pupils equal, round, and reactive to light, extraocular eye  movements intact, conjunctivae normal  Neck: neck supple and non tender without mass   Pulmonary/Chest: Faint crackles bilaterally  Cardiovascular: normal rate, normal S1 and S2 and no carotid bruits  Abdomen: soft, non-tender, non-distended, normal bowel sounds, no masses or organomegaly  Extremities: no cyanosis, no clubbing and no edema  Neurologic: no cranial nerve deficit and speech normal    I/O last 3 completed shifts:  In: 720 [P.O.:720]  Out: 1500 [Urine:1500]  I/O this shift:  In: -   Out: 1300 [Urine:1300]      LABS:  Recent Labs     04/11/24  1559 04/12/24  0446 04/13/24 0442    144 141   K 3.3* 3.7 4.3    104 103   CO2 31* 29 28   BUN 14 16 23   CREATININE 0.9 0.8 0.8   GLUCOSE 93 163* 151*   CALCIUM 9.4 8.8 9.6       Recent Labs     04/11/24  1559 04/12/24 0446 04/13/24 0442   WBC 3.6* 2.3* 5.4   RBC 3.12* 2.93* 3.00*   HGB 9.5* 8.9* 9.2*   HCT 30.9* 29.0* 29.6*   MCV 99.0 99.0 98.7   MCH 30.4 30.4 30.7   MCHC 30.7* 30.7* 31.1*   RDW 13.2 13.2 13.4   PLT  --  128* 133   MPV 10.6 10.6 11.1       No results for input(s): \"POCGLU\" in the last 72 hours.    Imaging:  XR CHEST (2 VW)    Result Date: 4/11/2024  EXAMINATION: TWO XRAY VIEWS OF THE CHEST 4/11/2024 4:44 pm COMPARISON: Chest x-ray 03/30/2024 HISTORY: ORDERING SYSTEM PROVIDED HISTORY: Shortness of breath TECHNOLOGIST PROVIDED HISTORY: Reason for exam:->Shortness of breath FINDINGS: PA and lateral views the chest reveal cardiac size enlarged.  Scarring and chronic changes greatest in the mid and lower lungs left greater than right with probable trace right and small left pleural effusions.  No focal consolidation separate.  Similar appearance to recent comparison 03/30/2024     1. Cardiomegaly. 2. Scarring and chronic changes greatest in the mid and lower lungs left greater than right with probable trace right and small left pleural effusions.  Opacities in the left lung stable to slightly decreased.       Patient Instructions:

## 2024-04-14 ENCOUNTER — APPOINTMENT (OUTPATIENT)
Dept: GENERAL RADIOLOGY | Age: 84
DRG: 190 | End: 2024-04-14
Payer: MEDICARE

## 2024-04-14 ENCOUNTER — HOSPITAL ENCOUNTER (INPATIENT)
Age: 84
LOS: 3 days | Discharge: HOME | DRG: 190 | End: 2024-04-17
Attending: STUDENT IN AN ORGANIZED HEALTH CARE EDUCATION/TRAINING PROGRAM | Admitting: HOSPITALIST
Payer: MEDICARE

## 2024-04-14 ENCOUNTER — APPOINTMENT (OUTPATIENT)
Dept: CT IMAGING | Age: 84
DRG: 190 | End: 2024-04-14
Payer: MEDICARE

## 2024-04-14 DIAGNOSIS — R06.02 SHORTNESS OF BREATH: Primary | ICD-10-CM

## 2024-04-14 DIAGNOSIS — J96.20 ACUTE ON CHRONIC RESPIRATORY FAILURE, UNSPECIFIED WHETHER WITH HYPOXIA OR HYPERCAPNIA (HCC): ICD-10-CM

## 2024-04-14 DIAGNOSIS — J96.21 ACUTE ON CHRONIC RESPIRATORY FAILURE WITH HYPOXIA (HCC): ICD-10-CM

## 2024-04-14 LAB
ALBUMIN SERPL-MCNC: 4.8 G/DL (ref 3.5–5.2)
ALP SERPL-CCNC: 72 U/L (ref 35–104)
ALT SERPL-CCNC: 12 U/L (ref 0–32)
ANION GAP SERPL CALCULATED.3IONS-SCNC: 9 MMOL/L (ref 7–16)
ANION GAP SERPL CALCULATED.3IONS-SCNC: 9 MMOL/L (ref 7–16)
AST SERPL-CCNC: 24 U/L (ref 0–31)
B PARAP IS1001 DNA NPH QL NAA+NON-PROBE: NOT DETECTED
B PERT DNA SPEC QL NAA+PROBE: NOT DETECTED
BASOPHILS # BLD: 0 K/UL (ref 0–0.2)
BASOPHILS NFR BLD: 0 % (ref 0–2)
BILIRUB SERPL-MCNC: 0.4 MG/DL (ref 0–1.2)
BILIRUB UR QL STRIP: NEGATIVE
BNP SERPL-MCNC: 2382 PG/ML (ref 0–450)
BUN SERPL-MCNC: 29 MG/DL (ref 6–23)
BUN SERPL-MCNC: 32 MG/DL (ref 6–23)
C PNEUM DNA NPH QL NAA+NON-PROBE: NOT DETECTED
CALCIUM SERPL-MCNC: 10.4 MG/DL (ref 8.6–10.2)
CALCIUM SERPL-MCNC: 9.6 MG/DL (ref 8.6–10.2)
CHLORIDE SERPL-SCNC: 100 MMOL/L (ref 98–107)
CHLORIDE SERPL-SCNC: 103 MMOL/L (ref 98–107)
CLARITY UR: CLEAR
CO2 SERPL-SCNC: 31 MMOL/L (ref 22–29)
CO2 SERPL-SCNC: 32 MMOL/L (ref 22–29)
COLOR UR: YELLOW
CREAT SERPL-MCNC: 1 MG/DL (ref 0.5–1)
CREAT SERPL-MCNC: 1.1 MG/DL (ref 0.5–1)
DATE LAST DOSE: ABNORMAL
DIGOXIN DOSE TIME: ABNORMAL
DIGOXIN DOSE: ABNORMAL MG
DIGOXIN SERPL-MCNC: 0.6 NG/ML (ref 0.8–2)
EOSINOPHIL # BLD: 0 K/UL (ref 0.05–0.5)
EOSINOPHILS RELATIVE PERCENT: 0 % (ref 0–6)
EPI CELLS #/AREA URNS HPF: ABNORMAL /HPF
ERYTHROCYTE [DISTWIDTH] IN BLOOD BY AUTOMATED COUNT: 13.4 % (ref 11.5–15)
FLUAV RNA NPH QL NAA+NON-PROBE: NOT DETECTED
FLUBV RNA NPH QL NAA+NON-PROBE: NOT DETECTED
GFR SERPL CREATININE-BSD FRML MDRD: 50 ML/MIN/1.73M2
GFR SERPL CREATININE-BSD FRML MDRD: 58 ML/MIN/1.73M2
GLUCOSE SERPL-MCNC: 102 MG/DL (ref 74–99)
GLUCOSE SERPL-MCNC: 108 MG/DL (ref 74–99)
GLUCOSE UR STRIP-MCNC: NEGATIVE MG/DL
HADV DNA NPH QL NAA+NON-PROBE: NOT DETECTED
HCOV 229E RNA NPH QL NAA+NON-PROBE: NOT DETECTED
HCOV HKU1 RNA NPH QL NAA+NON-PROBE: NOT DETECTED
HCOV NL63 RNA NPH QL NAA+NON-PROBE: NOT DETECTED
HCOV OC43 RNA NPH QL NAA+NON-PROBE: NOT DETECTED
HCT VFR BLD AUTO: 32.7 % (ref 34–48)
HGB BLD-MCNC: 10 G/DL (ref 11.5–15.5)
HGB UR QL STRIP.AUTO: ABNORMAL
HMPV RNA NPH QL NAA+NON-PROBE: NOT DETECTED
HPIV1 RNA NPH QL NAA+NON-PROBE: NOT DETECTED
HPIV2 RNA NPH QL NAA+NON-PROBE: NOT DETECTED
HPIV3 RNA NPH QL NAA+NON-PROBE: NOT DETECTED
HPIV4 RNA NPH QL NAA+NON-PROBE: NOT DETECTED
IMM GRANULOCYTES # BLD AUTO: <0.03 K/UL (ref 0–0.58)
IMM GRANULOCYTES NFR BLD: 0 % (ref 0–5)
INR PPP: 2.6
KETONES UR STRIP-MCNC: NEGATIVE MG/DL
LEUKOCYTE ESTERASE UR QL STRIP: ABNORMAL
LYMPHOCYTES NFR BLD: 0.96 K/UL (ref 1.5–4)
LYMPHOCYTES RELATIVE PERCENT: 12 % (ref 20–42)
M PNEUMO DNA NPH QL NAA+NON-PROBE: NOT DETECTED
MAGNESIUM SERPL-MCNC: 1.7 MG/DL (ref 1.6–2.6)
MCH RBC QN AUTO: 30.7 PG (ref 26–35)
MCHC RBC AUTO-ENTMCNC: 30.6 G/DL (ref 32–34.5)
MCV RBC AUTO: 100.3 FL (ref 80–99.9)
MONOCYTES NFR BLD: 0.56 K/UL (ref 0.1–0.95)
MONOCYTES NFR BLD: 7 % (ref 2–12)
NEUTROPHILS NFR BLD: 81 % (ref 43–80)
NEUTS SEG NFR BLD: 6.66 K/UL (ref 1.8–7.3)
NITRITE UR QL STRIP: NEGATIVE
O2 DELIVERY DEVICE: ABNORMAL
PH UR STRIP: 7 [PH] (ref 5–9)
PLATELET # BLD AUTO: 157 K/UL (ref 130–450)
PMV BLD AUTO: 10.6 FL (ref 7–12)
POC HCO3: 29.5 MMOL/L (ref 22–26)
POC O2 SATURATION: 96.1 % (ref 92–98.5)
POC PCO2: 41.1 MM HG (ref 35–45)
POC PH: 7.46 (ref 7.35–7.45)
POC PO2: 77.8 MM HG (ref 60–80)
POSITIVE BASE EXCESS, ART: 5.2 MMOL/L (ref 0–3)
POTASSIUM SERPL-SCNC: 3.4 MMOL/L (ref 3.5–5)
POTASSIUM SERPL-SCNC: 3.7 MMOL/L (ref 3.5–5)
PROT SERPL-MCNC: 7.4 G/DL (ref 6.4–8.3)
PROT UR STRIP-MCNC: NEGATIVE MG/DL
PROTHROMBIN TIME: 28.1 SEC (ref 9.3–12.4)
RBC # BLD AUTO: 3.26 M/UL (ref 3.5–5.5)
RBC #/AREA URNS HPF: ABNORMAL /HPF
RSV RNA NPH QL NAA+NON-PROBE: NOT DETECTED
RV+EV RNA NPH QL NAA+NON-PROBE: NOT DETECTED
SARS-COV-2 RDRP RESP QL NAA+PROBE: NOT DETECTED
SARS-COV-2 RNA NPH QL NAA+NON-PROBE: NOT DETECTED
SODIUM SERPL-SCNC: 141 MMOL/L (ref 132–146)
SODIUM SERPL-SCNC: 143 MMOL/L (ref 132–146)
SP GR UR STRIP: 1.01 (ref 1–1.03)
SPECIMEN DESCRIPTION: NORMAL
SPECIMEN DESCRIPTION: NORMAL
TROPONIN I SERPL HS-MCNC: 30 NG/L (ref 0–9)
TROPONIN I SERPL HS-MCNC: 31 NG/L (ref 0–9)
UROBILINOGEN UR STRIP-ACNC: 1 EU/DL (ref 0–1)
WBC #/AREA URNS HPF: ABNORMAL /HPF
WBC OTHER # BLD: 8.2 K/UL (ref 4.5–11.5)

## 2024-04-14 PROCEDURE — 2060000000 HC ICU INTERMEDIATE R&B

## 2024-04-14 PROCEDURE — 80162 ASSAY OF DIGOXIN TOTAL: CPT

## 2024-04-14 PROCEDURE — 80053 COMPREHEN METABOLIC PANEL: CPT

## 2024-04-14 PROCEDURE — 80048 BASIC METABOLIC PNL TOTAL CA: CPT

## 2024-04-14 PROCEDURE — 94640 AIRWAY INHALATION TREATMENT: CPT

## 2024-04-14 PROCEDURE — 87070 CULTURE OTHR SPECIMN AEROBIC: CPT

## 2024-04-14 PROCEDURE — 2580000003 HC RX 258: Performed by: INTERNAL MEDICINE

## 2024-04-14 PROCEDURE — 82803 BLOOD GASES ANY COMBINATION: CPT

## 2024-04-14 PROCEDURE — 6370000000 HC RX 637 (ALT 250 FOR IP): Performed by: INTERNAL MEDICINE

## 2024-04-14 PROCEDURE — 6370000000 HC RX 637 (ALT 250 FOR IP)

## 2024-04-14 PROCEDURE — 6360000002 HC RX W HCPCS

## 2024-04-14 PROCEDURE — 94669 MECHANICAL CHEST WALL OSCILL: CPT

## 2024-04-14 PROCEDURE — 0202U NFCT DS 22 TRGT SARS-COV-2: CPT

## 2024-04-14 PROCEDURE — 83735 ASSAY OF MAGNESIUM: CPT

## 2024-04-14 PROCEDURE — 87205 SMEAR GRAM STAIN: CPT

## 2024-04-14 PROCEDURE — 6370000000 HC RX 637 (ALT 250 FOR IP): Performed by: STUDENT IN AN ORGANIZED HEALTH CARE EDUCATION/TRAINING PROGRAM

## 2024-04-14 PROCEDURE — 85025 COMPLETE CBC W/AUTO DIFF WBC: CPT

## 2024-04-14 PROCEDURE — 84484 ASSAY OF TROPONIN QUANT: CPT

## 2024-04-14 PROCEDURE — 85610 PROTHROMBIN TIME: CPT

## 2024-04-14 PROCEDURE — 87635 SARS-COV-2 COVID-19 AMP PRB: CPT

## 2024-04-14 PROCEDURE — 87899 AGENT NOS ASSAY W/OPTIC: CPT

## 2024-04-14 PROCEDURE — 83880 ASSAY OF NATRIURETIC PEPTIDE: CPT

## 2024-04-14 PROCEDURE — 2500000003 HC RX 250 WO HCPCS: Performed by: INTERNAL MEDICINE

## 2024-04-14 PROCEDURE — 99285 EMERGENCY DEPT VISIT HI MDM: CPT

## 2024-04-14 PROCEDURE — 6360000002 HC RX W HCPCS: Performed by: INTERNAL MEDICINE

## 2024-04-14 PROCEDURE — 2700000000 HC OXYGEN THERAPY PER DAY

## 2024-04-14 PROCEDURE — 71045 X-RAY EXAM CHEST 1 VIEW: CPT

## 2024-04-14 PROCEDURE — 2580000003 HC RX 258

## 2024-04-14 PROCEDURE — 93005 ELECTROCARDIOGRAM TRACING: CPT | Performed by: STUDENT IN AN ORGANIZED HEALTH CARE EDUCATION/TRAINING PROGRAM

## 2024-04-14 PROCEDURE — APPSS45 APP SPLIT SHARED TIME 31-45 MINUTES

## 2024-04-14 PROCEDURE — 87449 NOS EACH ORGANISM AG IA: CPT

## 2024-04-14 PROCEDURE — 6360000002 HC RX W HCPCS: Performed by: STUDENT IN AN ORGANIZED HEALTH CARE EDUCATION/TRAINING PROGRAM

## 2024-04-14 PROCEDURE — 71250 CT THORAX DX C-: CPT

## 2024-04-14 PROCEDURE — 81001 URINALYSIS AUTO W/SCOPE: CPT

## 2024-04-14 RX ORDER — METHYLPREDNISOLONE SODIUM SUCCINATE 125 MG/2ML
80 INJECTION, POWDER, LYOPHILIZED, FOR SOLUTION INTRAMUSCULAR; INTRAVENOUS ONCE
Status: COMPLETED | OUTPATIENT
Start: 2024-04-14 | End: 2024-04-14

## 2024-04-14 RX ORDER — POLYETHYLENE GLYCOL 3350 17 G/17G
17 POWDER, FOR SOLUTION ORAL DAILY PRN
Status: DISCONTINUED | OUTPATIENT
Start: 2024-04-14 | End: 2024-04-17 | Stop reason: HOSPADM

## 2024-04-14 RX ORDER — IPRATROPIUM BROMIDE AND ALBUTEROL SULFATE 2.5; .5 MG/3ML; MG/3ML
1 SOLUTION RESPIRATORY (INHALATION)
Status: DISCONTINUED | OUTPATIENT
Start: 2024-04-14 | End: 2024-04-14

## 2024-04-14 RX ORDER — LEVOTHYROXINE SODIUM 0.07 MG/1
75 TABLET ORAL DAILY
Status: DISCONTINUED | OUTPATIENT
Start: 2024-04-14 | End: 2024-04-17 | Stop reason: HOSPADM

## 2024-04-14 RX ORDER — BUDESONIDE 0.25 MG/2ML
0.25 INHALANT ORAL
Status: DISCONTINUED | OUTPATIENT
Start: 2024-04-14 | End: 2024-04-17 | Stop reason: HOSPADM

## 2024-04-14 RX ORDER — GUAIFENESIN 400 MG/1
400 TABLET ORAL 3 TIMES DAILY
Status: DISCONTINUED | OUTPATIENT
Start: 2024-04-14 | End: 2024-04-17 | Stop reason: HOSPADM

## 2024-04-14 RX ORDER — SODIUM CHLORIDE 9 MG/ML
INJECTION, SOLUTION INTRAVENOUS PRN
Status: DISCONTINUED | OUTPATIENT
Start: 2024-04-14 | End: 2024-04-17 | Stop reason: HOSPADM

## 2024-04-14 RX ORDER — IPRATROPIUM BROMIDE AND ALBUTEROL SULFATE 2.5; .5 MG/3ML; MG/3ML
1 SOLUTION RESPIRATORY (INHALATION) ONCE
Status: COMPLETED | OUTPATIENT
Start: 2024-04-14 | End: 2024-04-14

## 2024-04-14 RX ORDER — ARFORMOTEROL TARTRATE 15 UG/2ML
15 SOLUTION RESPIRATORY (INHALATION)
Status: DISCONTINUED | OUTPATIENT
Start: 2024-04-14 | End: 2024-04-17 | Stop reason: HOSPADM

## 2024-04-14 RX ORDER — ROSUVASTATIN CALCIUM 10 MG/1
10 TABLET, COATED ORAL NIGHTLY
Status: DISCONTINUED | OUTPATIENT
Start: 2024-04-14 | End: 2024-04-17 | Stop reason: HOSPADM

## 2024-04-14 RX ORDER — SODIUM CHLORIDE 0.9 % (FLUSH) 0.9 %
5-40 SYRINGE (ML) INJECTION EVERY 12 HOURS SCHEDULED
Status: DISCONTINUED | OUTPATIENT
Start: 2024-04-14 | End: 2024-04-17 | Stop reason: HOSPADM

## 2024-04-14 RX ORDER — ACETYLCYSTEINE 200 MG/ML
600 SOLUTION ORAL; RESPIRATORY (INHALATION)
Status: DISCONTINUED | OUTPATIENT
Start: 2024-04-14 | End: 2024-04-17 | Stop reason: HOSPADM

## 2024-04-14 RX ORDER — BUMETANIDE 1 MG/1
0.5 TABLET ORAL DAILY
Status: DISCONTINUED | OUTPATIENT
Start: 2024-04-14 | End: 2024-04-14

## 2024-04-14 RX ORDER — WARFARIN SODIUM 2.5 MG/1
2.5 TABLET ORAL
Status: COMPLETED | OUTPATIENT
Start: 2024-04-14 | End: 2024-04-14

## 2024-04-14 RX ORDER — LEVALBUTEROL INHALATION SOLUTION 0.63 MG/3ML
0.63 SOLUTION RESPIRATORY (INHALATION) EVERY 6 HOURS
Status: DISCONTINUED | OUTPATIENT
Start: 2024-04-14 | End: 2024-04-17 | Stop reason: HOSPADM

## 2024-04-14 RX ORDER — BUMETANIDE 1 MG/1
0.5 TABLET ORAL 2 TIMES DAILY
Status: DISCONTINUED | OUTPATIENT
Start: 2024-04-14 | End: 2024-04-17 | Stop reason: HOSPADM

## 2024-04-14 RX ORDER — POTASSIUM CHLORIDE 20 MEQ/1
40 TABLET, EXTENDED RELEASE ORAL ONCE
Status: COMPLETED | OUTPATIENT
Start: 2024-04-14 | End: 2024-04-14

## 2024-04-14 RX ORDER — ACETAMINOPHEN 325 MG/1
650 TABLET ORAL EVERY 6 HOURS PRN
Status: DISCONTINUED | OUTPATIENT
Start: 2024-04-14 | End: 2024-04-17 | Stop reason: HOSPADM

## 2024-04-14 RX ORDER — PANTOPRAZOLE SODIUM 40 MG/1
40 TABLET, DELAYED RELEASE ORAL
Status: DISCONTINUED | OUTPATIENT
Start: 2024-04-14 | End: 2024-04-17 | Stop reason: HOSPADM

## 2024-04-14 RX ORDER — METOPROLOL SUCCINATE 25 MG/1
12.5 TABLET, EXTENDED RELEASE ORAL EVERY MORNING
Status: DISCONTINUED | OUTPATIENT
Start: 2024-04-14 | End: 2024-04-17 | Stop reason: HOSPADM

## 2024-04-14 RX ORDER — SODIUM CHLORIDE 0.9 % (FLUSH) 0.9 %
5-40 SYRINGE (ML) INJECTION PRN
Status: DISCONTINUED | OUTPATIENT
Start: 2024-04-14 | End: 2024-04-17 | Stop reason: HOSPADM

## 2024-04-14 RX ORDER — DIGOXIN 125 MCG
62.5 TABLET ORAL DAILY
Status: DISCONTINUED | OUTPATIENT
Start: 2024-04-14 | End: 2024-04-17 | Stop reason: HOSPADM

## 2024-04-14 RX ORDER — ACETAMINOPHEN 650 MG/1
650 SUPPOSITORY RECTAL EVERY 6 HOURS PRN
Status: DISCONTINUED | OUTPATIENT
Start: 2024-04-14 | End: 2024-04-17 | Stop reason: HOSPADM

## 2024-04-14 RX ORDER — METHYLPREDNISOLONE SODIUM SUCCINATE 40 MG/ML
40 INJECTION, POWDER, LYOPHILIZED, FOR SOLUTION INTRAMUSCULAR; INTRAVENOUS EVERY 8 HOURS
Status: DISCONTINUED | OUTPATIENT
Start: 2024-04-14 | End: 2024-04-16

## 2024-04-14 RX ADMIN — BUMETANIDE 0.5 MG: 1 TABLET ORAL at 08:37

## 2024-04-14 RX ADMIN — SODIUM CHLORIDE, PRESERVATIVE FREE 10 ML: 5 INJECTION INTRAVENOUS at 21:31

## 2024-04-14 RX ADMIN — SACUBITRIL AND VALSARTAN 0.5 TABLET: 24; 26 TABLET, FILM COATED ORAL at 08:37

## 2024-04-14 RX ADMIN — GUAIFENESIN 400 MG: 400 TABLET ORAL at 21:30

## 2024-04-14 RX ADMIN — GUAIFENESIN 400 MG: 400 TABLET ORAL at 08:37

## 2024-04-14 RX ADMIN — BUDESONIDE 250 MCG: 0.25 SUSPENSION RESPIRATORY (INHALATION) at 19:15

## 2024-04-14 RX ADMIN — LEVALBUTEROL HYDROCHLORIDE 0.63 MG: 0.63 SOLUTION RESPIRATORY (INHALATION) at 19:15

## 2024-04-14 RX ADMIN — DOXYCYCLINE 100 MG: 100 INJECTION, POWDER, LYOPHILIZED, FOR SOLUTION INTRAVENOUS at 14:21

## 2024-04-14 RX ADMIN — LEVOTHYROXINE SODIUM 75 MCG: 75 TABLET ORAL at 08:37

## 2024-04-14 RX ADMIN — DIGOXIN 62.5 MCG: 0.12 TABLET ORAL at 08:37

## 2024-04-14 RX ADMIN — WARFARIN SODIUM 2.5 MG: 2.5 TABLET ORAL at 17:10

## 2024-04-14 RX ADMIN — POTASSIUM CHLORIDE 40 MEQ: 1500 TABLET, EXTENDED RELEASE ORAL at 08:54

## 2024-04-14 RX ADMIN — EMPAGLIFLOZIN 10 MG: 10 TABLET, FILM COATED ORAL at 14:16

## 2024-04-14 RX ADMIN — BUDESONIDE 250 MCG: 0.25 SUSPENSION RESPIRATORY (INHALATION) at 12:10

## 2024-04-14 RX ADMIN — ROSUVASTATIN CALCIUM 10 MG: 10 TABLET, FILM COATED ORAL at 21:30

## 2024-04-14 RX ADMIN — IPRATROPIUM BROMIDE AND ALBUTEROL SULFATE 1 DOSE: 2.5; .5 SOLUTION RESPIRATORY (INHALATION) at 12:10

## 2024-04-14 RX ADMIN — ACETYLCYSTEINE 600 MG: 200 SOLUTION ORAL; RESPIRATORY (INHALATION) at 19:16

## 2024-04-14 RX ADMIN — ARFORMOTEROL TARTRATE 15 MCG: 15 SOLUTION RESPIRATORY (INHALATION) at 19:15

## 2024-04-14 RX ADMIN — ARFORMOTEROL TARTRATE 15 MCG: 15 SOLUTION RESPIRATORY (INHALATION) at 12:10

## 2024-04-14 RX ADMIN — METHYLPREDNISOLONE SODIUM SUCCINATE 80 MG: 125 INJECTION INTRAMUSCULAR; INTRAVENOUS at 07:11

## 2024-04-14 RX ADMIN — METHYLPREDNISOLONE SODIUM SUCCINATE 40 MG: 40 INJECTION INTRAMUSCULAR; INTRAVENOUS at 13:00

## 2024-04-14 RX ADMIN — METHYLPREDNISOLONE SODIUM SUCCINATE 40 MG: 40 INJECTION INTRAMUSCULAR; INTRAVENOUS at 21:30

## 2024-04-14 RX ADMIN — SODIUM CHLORIDE, PRESERVATIVE FREE 10 ML: 5 INJECTION INTRAVENOUS at 08:36

## 2024-04-14 RX ADMIN — GUAIFENESIN 400 MG: 400 TABLET ORAL at 15:53

## 2024-04-14 RX ADMIN — PANTOPRAZOLE SODIUM 40 MG: 40 TABLET, DELAYED RELEASE ORAL at 08:37

## 2024-04-14 RX ADMIN — IPRATROPIUM BROMIDE AND ALBUTEROL SULFATE 1 DOSE: 2.5; .5 SOLUTION RESPIRATORY (INHALATION) at 02:14

## 2024-04-14 RX ADMIN — CEFTRIAXONE 1000 MG: 1 INJECTION, POWDER, FOR SOLUTION INTRAMUSCULAR; INTRAVENOUS at 14:16

## 2024-04-14 RX ADMIN — METOPROLOL SUCCINATE 12.5 MG: 25 TABLET, FILM COATED, EXTENDED RELEASE ORAL at 08:37

## 2024-04-14 RX ADMIN — POTASSIUM BICARBONATE 40 MEQ: 782 TABLET, EFFERVESCENT ORAL at 02:19

## 2024-04-14 RX ADMIN — SACUBITRIL AND VALSARTAN 0.5 TABLET: 24; 26 TABLET, FILM COATED ORAL at 21:30

## 2024-04-14 RX ADMIN — BUMETANIDE 0.5 MG: 1 TABLET ORAL at 17:10

## 2024-04-14 RX ADMIN — SODIUM CHLORIDE, PRESERVATIVE FREE 10 ML: 5 INJECTION INTRAVENOUS at 13:00

## 2024-04-14 ASSESSMENT — LIFESTYLE VARIABLES
HOW MANY STANDARD DRINKS CONTAINING ALCOHOL DO YOU HAVE ON A TYPICAL DAY: PATIENT DOES NOT DRINK
HOW MANY STANDARD DRINKS CONTAINING ALCOHOL DO YOU HAVE ON A TYPICAL DAY: PATIENT DOES NOT DRINK
HOW OFTEN DO YOU HAVE A DRINK CONTAINING ALCOHOL: NEVER
HOW OFTEN DO YOU HAVE A DRINK CONTAINING ALCOHOL: NEVER

## 2024-04-14 NOTE — PLAN OF CARE
Problem: Safety - Adult  Goal: Free from fall injury  Outcome: Progressing     Problem: Discharge Planning  Goal: Discharge to home or other facility with appropriate resources  Outcome: Progressing  Flowsheets (Taken 4/14/2024 6171)  Discharge to home or other facility with appropriate resources: Arrange for needed discharge resources and transportation as appropriate     Problem: ABCDS Injury Assessment  Goal: Absence of physical injury  Outcome: Progressing

## 2024-04-14 NOTE — ED NOTES
ED to Inpatient Handoff Report    Notified 4W that electronic handoff available and patient ready for transport to room 419.    Safety Risks: Risk of falls    Patient in Restraints: no    Constant Observer or Patient : no    Telemetry Monitoring Ordered: Yes          Order to transfer to unit without monitor: NO    Last MEWS: 2 Time completed: 0744    Deterioration Index: 37.04    Vitals:    04/14/24 0422 04/14/24 0424 04/14/24 0622 04/14/24 0744   BP: (!) 157/70  (!) 146/73 (!) 148/78   Pulse: 97  86 81   Resp: 26  18 21   Temp:    98.1 °F (36.7 °C)   TempSrc:    Oral   SpO2:  91% 94% 92%   Weight:       Height:           Opportunity for questions and clarification was provided.

## 2024-04-14 NOTE — CONSULTS
PULMONARY CONSULTATION    Reason for Consultation: acute on chronic hypoxic respiratory failure   Referring Physician: Dr. Lee    Communication with the referring physician will be sent via the electronic medical record.    HPI:    The patient is an 83 year old female known to me with a history of chronic hypoxic respiratory failure on 4L home oxygen, COPD/emphysema, ILD, Squamous cell lung cancer stage IIB s/p radiation Aug 2021, chronic systolic heart failure with EF 30%, chronic RML collapse, hiatal hernia, nicotine dependence in remission, who was just discharged from the hospital yesterday after being treated for a copd exacerbation and tachycardia.  We were not involved in this admission.  She states that she felt well at the time of discharge but started having increased SOB, cough, purulent sputum production, and wheezing. CT of the chest shows extensive chronic changes and likely a new LLL pneumonia.  She is requiring 9L oxygen.     Frequent hospital admissions.       Past Medical History:   Diagnosis Date    Atrial fibrillation (HCC)     CHF (congestive heart failure) (HCC)     Emphysema, unspecified (HCC)     Hyperlipidemia     Hypertension     Lung cancer (HCC)     Mitral valve regurgitation     Oxygen dependent     Prolonged emergence from general anesthesia     post general anesthesia    Skin cancer     Thyroid disease        Past Surgical History:   Procedure Laterality Date    BREAST BIOPSY Right     benign    BRONCHOSCOPY N/A 04/20/2021    BRONCHOSCOPY/TRANSBRONCHIAL NEEDLE BIOPSY performed by Antony Rosario MD at [facility] ENDOSCOPY    CARDIOVERSION  10/06/2023    Dr Jones    INTRACAPSULAR CATARACT EXTRACTION Right 12/16/2021    RIGHT EYE CATARACT EXTRACTION IOL IMPLANT performed by Kay Pimentel MD at [facility] OR    INTRACAPSULAR CATARACT EXTRACTION Left 02/24/2022    LEFT EYE CATARACT EXTRACTION IOL IMPLANT performed by Kay Pimentel MD at [facility] OR    TUBAL LIGATION    1:07 PM

## 2024-04-14 NOTE — H&P
[facility] Hospitalist Group History and Physical      CHIEF COMPLAINT: Shortness of breath    History of Present Illness:  This is an 83-year-old female with a past medical history of atrial fibrillation, CHF, COPD, hyperlipidemia, hypertension, and lung cancer in remission who presents to the ED with shortness of breath.  Patient was discharged yesterday afternoon around 3 PM after being treated with breathing treatments and IV Solu-Medrol for a COPD exacerbation. Patient states she woke up to go to the bathroom around 11 PM and was acutely short of breath.  Complains of wheezing and chest congestion.  States she attempted to use her nebulizer with little improvement.  Denies chest pain or palpitations.  Denies fevers, chills, nausea, vomiting, diarrhea, or dysuria.  Patient wears 5 L oxygen via nasal cannula at baseline.  Reports a history of lung cancer in 2022 and states she is in remission.  Chest x-ray in ED was negative for acute cardiopulmonary process.  CT of chest showed no acute intrathoracic abnormality, chronic right middle lobe collapse.  Patient was treated with DuoNeb treatment and 80 mg IV Solu-Medrol in ED and reports significant improvement in breathing.  Admitted for further evaluation and treatment.    Informant(s) for H&P: Patient and chart review    REVIEW OF SYSTEMS:  A comprehensive review of systems was negative except for: what is in the HPI      PMH:  Past Medical History:   Diagnosis Date    Atrial fibrillation (HCC)     CHF (congestive heart failure) (HCC)     Emphysema, unspecified (HCC)     Hyperlipidemia     Hypertension     Lung cancer (HCC)     Mitral valve regurgitation     Oxygen dependent     Prolonged emergence from general anesthesia     post general anesthesia    Skin cancer     Thyroid disease        Surgical History:  Past Surgical History:   Procedure Laterality Date    BREAST BIOPSY Right     benign    BRONCHOSCOPY N/A 04/20/2021    BRONCHOSCOPY/TRANSBRONCHIAL  family, discussing plan of care with collaborating physician, and documentation.    NOTE: This report was transcribed using voice recognition software. Every effort was made to ensure accuracy; however, inadvertent computerized transcription errors may be present.  Electronically signed by OUMOU Treviño CNP on 4/14/2024 at 6:11 AM

## 2024-04-14 NOTE — PROGRESS NOTES
Pharmacy Consultation Note  (Warfarin Dosing and Monitoring)    Initial consult date: 4/14  Consulting Provider: Archie    Krystal Hyatt is a 83 y.o. female for whom pharmacy has been asked to manage warfarin therapy.     Weight:   Wt Readings from Last 1 Encounters:   04/14/24 70.3 kg (155 lb)       TSH:    Lab Results   Component Value Date/Time    TSH 41.59 03/05/2024 04:05 AM       Hepatic Function Panel:                            Lab Results   Component Value Date/Time    ALKPHOS 72 04/14/2024 01:30 AM    ALT 12 04/14/2024 01:30 AM    AST 24 04/14/2024 01:30 AM    PROT 7.4 04/14/2024 01:30 AM    BILITOT 0.4 04/14/2024 01:30 AM    BILIDIR 0.2 10/15/2017 02:40 AM    IBILI 0.7 10/15/2017 02:40 AM    LABALBU 4.8 04/14/2024 01:30 AM    LABALBU 4.4 05/04/2012 11:00 AM       Current significant warfarin drug-drug interactions include:   -Levothyroxine, rosuvastatin, methylprednisolone: may enhance anticoagulatory effect of warfarin.     Recent Labs     04/12/24  0446 04/13/24  0442 04/14/24  0130   HGB 8.9* 9.2* 10.0*   * 133 157       Date Warfarin Dose INR Heparin or LMWH Comment   4/14 2.5 mg 2.6 x                                  Assessment:  Patient is a 83 y.o. female on warfarin for Atrial Fibrillation.  Patient's home warfarin dosing regimen is 2.5mg daily per med rec.   Goal INR 2 - 3  INR 2.6 today  Patient was discharged on 4/13 and re-admitted on 4/14.    Plan:  Warfarin 2.5 mg x 1 tonight  Daily PT/INR until the INR is stable within the therapeutic range  Pharmacist will follow and monitor/adjust dosing as necessary        Brett Junior RPH 4/14/2024 9:15 AM    SEB: [phone]  SEY: [phone]  SJW: [phone]

## 2024-04-14 NOTE — PLAN OF CARE
Patient was seen and examined..   Patient admitted by nocturnist this morning.  I agree with the plan, keep IV Solu-Medrol, nebulizer treatment  Consult pulmonology.   Get 2D echo.

## 2024-04-14 NOTE — ED PROVIDER NOTES
abnormality.   2. Chronic right middle lobe collapse.   3. Cardiomegaly with coronary artery calcification.   4. Large retrocardiac hiatal hernia.         XR CHEST PORTABLE   Final Result   1. No acute cardiopulmonary process.   2. Large retrocardiac hiatal hernia suspected.                 ------------------------- NURSING NOTES AND VITALS REVIEWED ---------------------------  Date / Time Roomed:  4/14/2024 12:25 AM  ED Bed Assignment:  03/03    The nursing notes within the ED encounter and vital signs as below have been reviewed.     Patient Vitals for the past 24 hrs:   BP Temp Temp src Pulse Resp SpO2 Height Weight   04/14/24 0424 -- -- -- -- -- 91 % -- --   04/14/24 0422 (!) 157/70 -- -- 97 26 -- -- --   04/14/24 0352 -- -- -- 92 26 92 % -- --   04/14/24 0200 -- -- -- 85 22 95 % -- --   04/14/24 0037 -- -- -- 90 20 92 % -- --   04/14/24 0035 -- -- -- 92 19 (!) 89 % -- --   04/14/24 0034 -- -- -- 96 18 (!) 85 % -- --   04/14/24 0031 (!) 133/112 -- -- -- -- -- -- --   04/14/24 0024 (!) 140/75 97.5 °F (36.4 °C) Temporal 94 24 95 % -- --   04/14/24 0023 -- -- -- -- -- -- 1.74 m (5' 8.5\") 70.3 kg (155 lb)       Oxygen Saturation Interpretation: Normal    ------------------------------------------ PROGRESS NOTES ------------------------------------------  Re-evaluation(s):  Time: 5am  Patient’s symptoms show no change  Repeat physical examination is not changed    Counseling:  I have spoken with the patient and discussed today’s results, in addition to providing specific details for the plan of care and counseling regarding the diagnosis and prognosis.  Their questions are answered at this time and they are agreeable with the plan of admission.    --------------------------------- ADDITIONAL PROVIDER NOTES ---------------------------------  Consultations:  Spoke with [facility] hospitalist.  Discussed case.  They will admit the patient.  This patient's ED course included: a personal history and physicial examination,  re-evaluation prior to disposition, multiple bedside re-evaluations, IV medications, cardiac monitoring, and complex medical decision making and emergency management    This patient has remained hemodynamically stable during their ED course.    Diagnosis:  1. Shortness of breath    2. Acute on chronic respiratory failure with hypoxia (HCC)        Disposition:  Patient's disposition: Admit to telemetry  Patient's condition is stable.           Katina Weller MD  04/14/24 0517

## 2024-04-14 NOTE — CONSULTS
She started smoking about 71 years ago. She has a 92.0 pack-year smoking history. She has been exposed to tobacco smoke. She has never used smokeless tobacco. She reports current alcohol use of about 1.0 standard drink of alcohol per week. She reports that she does not use drugs.     Family History:  family history includes Cancer in her mother; Emphysema in her father; Lung Disease in her father; Other in her mother; Peripheral Vascular Disease in her mother.     Medications:  Prior to Admission medications    Medication Sig Start Date End Date Taking? Authorizing Provider   sacubitril-valsartan (ENTRESTO) 24-26 MG per tablet Take 0.5 tablets by mouth 2 times daily 4/13/24   Dieter Jones DO   warfarin (COUMADIN) 2.5 MG tablet Take 1 tablet by mouth nightly 3/5/24   Angela Chau MD   bumetanide (BUMEX) 0.5 MG tablet Take 1 tablet by mouth daily 3/6/24   Bharti Jones MD   levothyroxine (SYNTHROID) 75 MCG tablet Take 1 tablet by mouth daily 3/5/24   Bharti Jones MD   vitamin C (ASCORBIC ACID) 500 MG tablet Take 1 tablet by mouth daily    ProvidernAgela MD   Digoxin (LANOXIN) 62.5 MCG TABS Take 62.5 mcg by mouth daily 2/28/24   Rom Mckeon MD   metoprolol succinate (TOPROL XL) 25 MG extended release tablet Take 0.5 tablets by mouth every morning 2/28/24   Rom Mckeon MD   pantoprazole sodium (PROTONIX) 40 MG PACK packet Take 1 packet by mouth every morning (before breakfast) 2/28/24   Rom Mckeon MD   Vitamin D (CHOLECALCIFEROL) 50 MCG (2000 UT) TABS tablet Take 1 tablet by mouth daily 2/27/24   Bharti Jones MD   albuterol sulfate HFA (PROVENTIL HFA) 108 (90 Base) MCG/ACT inhaler Inhale 2 puffs into the lungs every 6 hours as needed for Wheezing 2/22/24   Micky Chua, APRN - CNP   rosuvastatin (CRESTOR) 10 MG tablet Take 1 tablet by mouth nightly 2/16/24   Rom Mckeon MD   OXYGEN Inhale into the lungs continuous Indications: 5 Lpm    Provider, Historical,  Examination:    Vital Signs: /65   Pulse 70   Temp 98.1 °F (36.7 °C) (Oral)   Resp 20   Ht 1.74 m (5' 8.5\")   Wt 70.3 kg (155 lb)   SpO2 96%   BMI 23.22 kg/m²   General appearance: Well preserved, mesomorphic body habitus, alert, no distress.  Skin: Skin color, texture, turgor normal. No rashes or lesions. No induration or tightening palpated.  Head: Normocephalic. No masses, lesions, tenderness or abnormalities  Eyes: Conjunctivae/corneas clear. PERRL, EOMs intact. Sclera non icteric.  Ears: External ears normal. Canals clear. TM's clear bilaterally. Hearing normal to finger rub.  Nose/Sinuses: Nares normal. Septum midline. Mucosa normal. No drainage or sinus tenderness.  Oropharynx: Lips, mucosa, and tongue normal. Oropharynx clear with no exudate seen.  Neck: Neck supple and symmetric. No adenopathy. Thyroid symmetric, normal size, without nodules. Trachea is midline. Carotids brisk in upstroke without bruits, no abnormal JVP noted at 45°.  Chest: Even excursion  Lungs: Lungs grossly clear to auscultation bilaterally. No retractions or use of accessory muscles. No tactile vocal fremitus. No rhonchi, crackles or rales.  Heart:  S1 > S2.  Regular, regular rhythm. No gallop grade 2/6 early systolic murmur second right intercostal space. No rub, palpable thrill or heave noted. PMI 5th intercostal space midclavicular line.  Abdomen: Abdomen soft, moderately protuberant, non-tender. BS normal. No masses, organomegaly. No hernia noted.  Extremities: Extremities normal. No deformities, 2+ bilateral lower pretibial and ankle edema, or skin discoloration.  No cyanosis or clubbing noted to the nails. Peripheral pulses present 1+ upper extremities and present 1+  lower extremities.  Musculoskeletal: Spine ROM normal. Muscular strength intact.  Neuro: Cranial nerves intact. Motor: Strength 5/5 in all extremities. Reflexes 2+ in all extremities. No focal weakness. Sensory: grossly normal to touch. Coordination

## 2024-04-15 LAB
ANION GAP SERPL CALCULATED.3IONS-SCNC: 9 MMOL/L (ref 7–16)
BASOPHILS # BLD: 0 K/UL (ref 0–0.2)
BASOPHILS NFR BLD: 0 % (ref 0–2)
BUN SERPL-MCNC: 24 MG/DL (ref 6–23)
CALCIUM SERPL-MCNC: 8.8 MG/DL (ref 8.6–10.2)
CHLORIDE SERPL-SCNC: 100 MMOL/L (ref 98–107)
CO2 SERPL-SCNC: 33 MMOL/L (ref 22–29)
CREAT SERPL-MCNC: 0.9 MG/DL (ref 0.5–1)
EKG ATRIAL RATE: 86 BPM
EKG P AXIS: 37 DEGREES
EKG P-R INTERVAL: 160 MS
EKG Q-T INTERVAL: 380 MS
EKG QRS DURATION: 82 MS
EKG QTC CALCULATION (BAZETT): 454 MS
EKG R AXIS: 54 DEGREES
EKG T AXIS: 52 DEGREES
EKG VENTRICULAR RATE: 86 BPM
EOSINOPHIL # BLD: 0 K/UL (ref 0.05–0.5)
EOSINOPHILS RELATIVE PERCENT: 0 % (ref 0–6)
ERYTHROCYTE [DISTWIDTH] IN BLOOD BY AUTOMATED COUNT: 13.2 % (ref 11.5–15)
GFR SERPL CREATININE-BSD FRML MDRD: 66 ML/MIN/1.73M2
GLUCOSE SERPL-MCNC: 166 MG/DL (ref 74–99)
HCT VFR BLD AUTO: 31.3 % (ref 34–48)
HGB BLD-MCNC: 9.5 G/DL (ref 11.5–15.5)
INR PPP: 1.8
L PNEUMO1 AG UR QL IA.RAPID: NEGATIVE
LYMPHOCYTES NFR BLD: 0.35 K/UL (ref 1.5–4)
LYMPHOCYTES RELATIVE PERCENT: 8 % (ref 20–42)
MCH RBC QN AUTO: 30.4 PG (ref 26–35)
MCHC RBC AUTO-ENTMCNC: 30.4 G/DL (ref 32–34.5)
MCV RBC AUTO: 100.3 FL (ref 80–99.9)
MICROORGANISM SPEC CULT: NORMAL
MICROORGANISM/AGENT SPEC: NORMAL
MONOCYTES NFR BLD: 0.04 K/UL (ref 0.1–0.95)
MONOCYTES NFR BLD: 1 % (ref 2–12)
NEUTROPHILS NFR BLD: 91 % (ref 43–80)
NEUTS SEG NFR BLD: 4.01 K/UL (ref 1.8–7.3)
NUCLEATED RED BLOOD CELLS: 1 PER 100 WBC
PLATELET, FLUORESCENCE: 142 K/UL (ref 130–450)
PMV BLD AUTO: 11 FL (ref 7–12)
POTASSIUM SERPL-SCNC: 4.3 MMOL/L (ref 3.5–5)
PROTHROMBIN TIME: 19.9 SEC (ref 9.3–12.4)
RBC # BLD AUTO: 3.12 M/UL (ref 3.5–5.5)
RBC # BLD: ABNORMAL 10*6/UL
RBC # BLD: ABNORMAL 10*6/UL
S PNEUM AG SPEC QL: NEGATIVE
SODIUM SERPL-SCNC: 142 MMOL/L (ref 132–146)
SPECIMEN DESCRIPTION: NORMAL
SPECIMEN SOURCE: NORMAL
WBC OTHER # BLD: 4.4 K/UL (ref 4.5–11.5)

## 2024-04-15 PROCEDURE — 2580000003 HC RX 258: Performed by: INTERNAL MEDICINE

## 2024-04-15 PROCEDURE — 6370000000 HC RX 637 (ALT 250 FOR IP): Performed by: INTERNAL MEDICINE

## 2024-04-15 PROCEDURE — 6370000000 HC RX 637 (ALT 250 FOR IP)

## 2024-04-15 PROCEDURE — 85610 PROTHROMBIN TIME: CPT

## 2024-04-15 PROCEDURE — 6360000002 HC RX W HCPCS: Performed by: INTERNAL MEDICINE

## 2024-04-15 PROCEDURE — 6360000002 HC RX W HCPCS

## 2024-04-15 PROCEDURE — 85025 COMPLETE CBC W/AUTO DIFF WBC: CPT

## 2024-04-15 PROCEDURE — 99233 SBSQ HOSP IP/OBS HIGH 50: CPT | Performed by: STUDENT IN AN ORGANIZED HEALTH CARE EDUCATION/TRAINING PROGRAM

## 2024-04-15 PROCEDURE — 94640 AIRWAY INHALATION TREATMENT: CPT

## 2024-04-15 PROCEDURE — 2580000003 HC RX 258

## 2024-04-15 PROCEDURE — 6370000000 HC RX 637 (ALT 250 FOR IP): Performed by: STUDENT IN AN ORGANIZED HEALTH CARE EDUCATION/TRAINING PROGRAM

## 2024-04-15 PROCEDURE — 94669 MECHANICAL CHEST WALL OSCILL: CPT

## 2024-04-15 PROCEDURE — 80048 BASIC METABOLIC PNL TOTAL CA: CPT

## 2024-04-15 PROCEDURE — 2500000003 HC RX 250 WO HCPCS: Performed by: INTERNAL MEDICINE

## 2024-04-15 PROCEDURE — 2700000000 HC OXYGEN THERAPY PER DAY

## 2024-04-15 PROCEDURE — 2060000000 HC ICU INTERMEDIATE R&B

## 2024-04-15 RX ORDER — WARFARIN SODIUM 5 MG/1
5 TABLET ORAL
Status: COMPLETED | OUTPATIENT
Start: 2024-04-15 | End: 2024-04-15

## 2024-04-15 RX ORDER — DOXYCYCLINE HYCLATE 100 MG/1
100 CAPSULE ORAL EVERY 12 HOURS SCHEDULED
Status: DISCONTINUED | OUTPATIENT
Start: 2024-04-15 | End: 2024-04-17 | Stop reason: HOSPADM

## 2024-04-15 RX ADMIN — ACETYLCYSTEINE 600 MG: 200 SOLUTION ORAL; RESPIRATORY (INHALATION) at 20:58

## 2024-04-15 RX ADMIN — CEFTRIAXONE 1000 MG: 1 INJECTION, POWDER, FOR SOLUTION INTRAMUSCULAR; INTRAVENOUS at 13:23

## 2024-04-15 RX ADMIN — LEVALBUTEROL HYDROCHLORIDE 0.63 MG: 0.63 SOLUTION RESPIRATORY (INHALATION) at 20:55

## 2024-04-15 RX ADMIN — WARFARIN SODIUM 5 MG: 5 TABLET ORAL at 16:54

## 2024-04-15 RX ADMIN — LEVALBUTEROL HYDROCHLORIDE 0.63 MG: 0.63 SOLUTION RESPIRATORY (INHALATION) at 13:36

## 2024-04-15 RX ADMIN — ARFORMOTEROL TARTRATE 15 MCG: 15 SOLUTION RESPIRATORY (INHALATION) at 21:10

## 2024-04-15 RX ADMIN — PANTOPRAZOLE SODIUM 40 MG: 40 TABLET, DELAYED RELEASE ORAL at 05:09

## 2024-04-15 RX ADMIN — ROSUVASTATIN CALCIUM 10 MG: 10 TABLET, FILM COATED ORAL at 21:24

## 2024-04-15 RX ADMIN — LEVALBUTEROL HYDROCHLORIDE 0.63 MG: 0.63 SOLUTION RESPIRATORY (INHALATION) at 01:02

## 2024-04-15 RX ADMIN — GUAIFENESIN 400 MG: 400 TABLET ORAL at 21:24

## 2024-04-15 RX ADMIN — METHYLPREDNISOLONE SODIUM SUCCINATE 40 MG: 40 INJECTION INTRAMUSCULAR; INTRAVENOUS at 13:23

## 2024-04-15 RX ADMIN — LEVOTHYROXINE SODIUM 75 MCG: 75 TABLET ORAL at 08:52

## 2024-04-15 RX ADMIN — DOXYCYCLINE HYCLATE 100 MG: 100 CAPSULE ORAL at 17:15

## 2024-04-15 RX ADMIN — SALINE NASAL SPRAY 1 SPRAY: 1.5 SOLUTION NASAL at 21:23

## 2024-04-15 RX ADMIN — BUMETANIDE 0.5 MG: 1 TABLET ORAL at 08:51

## 2024-04-15 RX ADMIN — ARFORMOTEROL TARTRATE 15 MCG: 15 SOLUTION RESPIRATORY (INHALATION) at 10:27

## 2024-04-15 RX ADMIN — GUAIFENESIN 400 MG: 400 TABLET ORAL at 13:23

## 2024-04-15 RX ADMIN — EMPAGLIFLOZIN 10 MG: 10 TABLET, FILM COATED ORAL at 08:52

## 2024-04-15 RX ADMIN — METOPROLOL SUCCINATE 12.5 MG: 25 TABLET, FILM COATED, EXTENDED RELEASE ORAL at 08:51

## 2024-04-15 RX ADMIN — METHYLPREDNISOLONE SODIUM SUCCINATE 40 MG: 40 INJECTION INTRAMUSCULAR; INTRAVENOUS at 21:24

## 2024-04-15 RX ADMIN — ACETYLCYSTEINE 600 MG: 200 SOLUTION ORAL; RESPIRATORY (INHALATION) at 10:27

## 2024-04-15 RX ADMIN — LEVALBUTEROL HYDROCHLORIDE 0.63 MG: 0.63 SOLUTION RESPIRATORY (INHALATION) at 10:27

## 2024-04-15 RX ADMIN — BUDESONIDE 250 MCG: 0.25 SUSPENSION RESPIRATORY (INHALATION) at 21:11

## 2024-04-15 RX ADMIN — DIGOXIN 62.5 MCG: 0.12 TABLET ORAL at 08:52

## 2024-04-15 RX ADMIN — DOXYCYCLINE 100 MG: 100 INJECTION, POWDER, LYOPHILIZED, FOR SOLUTION INTRAVENOUS at 05:19

## 2024-04-15 RX ADMIN — GUAIFENESIN 400 MG: 400 TABLET ORAL at 08:51

## 2024-04-15 RX ADMIN — BUDESONIDE 250 MCG: 0.25 SUSPENSION RESPIRATORY (INHALATION) at 10:27

## 2024-04-15 RX ADMIN — BUMETANIDE 0.5 MG: 1 TABLET ORAL at 16:54

## 2024-04-15 RX ADMIN — SODIUM CHLORIDE, PRESERVATIVE FREE 10 ML: 5 INJECTION INTRAVENOUS at 08:53

## 2024-04-15 RX ADMIN — SACUBITRIL AND VALSARTAN 0.5 TABLET: 24; 26 TABLET, FILM COATED ORAL at 08:52

## 2024-04-15 RX ADMIN — SODIUM CHLORIDE, PRESERVATIVE FREE 10 ML: 5 INJECTION INTRAVENOUS at 21:24

## 2024-04-15 RX ADMIN — METHYLPREDNISOLONE SODIUM SUCCINATE 40 MG: 40 INJECTION INTRAMUSCULAR; INTRAVENOUS at 05:09

## 2024-04-15 RX ADMIN — SACUBITRIL AND VALSARTAN 0.5 TABLET: 24; 26 TABLET, FILM COATED ORAL at 21:24

## 2024-04-15 ASSESSMENT — PAIN SCALES - GENERAL
PAINLEVEL_OUTOF10: 0

## 2024-04-15 NOTE — PROGRESS NOTES
Acute on chronic combined systolic and diastolic CHF (congestive heart failure) ([facility]) I50.43    COPD with acute exacerbation ([facility]) J44.1    Secondary hypercoagulable state ([facility]) D68.69    Tachycardia R00.0    Acute on chronic respiratory failure ([facility]) J96.20       SUBJECTIVE:  Krystal Hyatt was resting comfortably in bed with oxygen saturation 97% while asleep and following her waking up dropped to 95%.  She notes that her breathing has already improved within 24 hours of readmission.    REVIEW OF SYSTEMS:  General ROS: negative for - fatigue, malaise,  weight gain or weight loss  Psychological ROS: negative for - anxiety , depression  Ophthalmic ROS: negative for - decreased vision or visual distortion.  ENT ROS: negative  Allergy and Immunology ROS: negative  Hematological and Lymphatic ROS: negative  Endocrine: no heat or cold intolerance and no polyphagia, polydipsia, or polyuria  Respiratory ROS: positive for - cough and shortness of breath  Cardiovascular ROS: positive for - dyspnea on exertion, irregular heartbeat, and shortness of breath.  Gastrointestinal ROS: no abdominal pain, change in bowel habits, or black or bloody stools  Genito-Urinary ROS: no nocturia, dysuria, trouble voiding, frequency or hematuria  Musculoskeletal ROS: negative for- myalgias, arthralgias, or claudication  Neurological ROS: no TIA or stroke symptoms otherwise no significant change in symptoms or problems since yesterday as documented in previous progress notes.    SCHEDULED MEDICATIONS:   sodium chloride flush  5-40 mL IntraVENous 2 times per day    guaiFENesin  400 mg Oral TID    methylPREDNISolone  40 mg IntraVENous Q8H    digoxin  62.5 mcg Oral Daily    levothyroxine  75 mcg Oral Daily    metoprolol succinate  12.5 mg Oral QAM    pantoprazole  40 mg Oral QAM AC    rosuvastatin  10 mg Oral Nightly    sacubitril-valsartan  0.5 tablet Oral BID    budesonide  0.25 mg Nebulization BID RT    And    arformoterol tartrate  15  9.2* 10.0* 9.5*   HCT 29.6* 32.7* 31.3*    157  --      BMP:  Recent Labs     04/13/24  0442 04/14/24  0130 04/14/24  0740 04/15/24  0457    141 143 142   K 4.3 3.4* 3.7 4.3    100 103 100   CO2 28 32* 31* 33*   BUN 23 32* 29* 24*   CREATININE 0.8 1.1* 1.0 0.9   LABGLOM 73 50* 58* 66     ABGs:   Lab Results   Component Value Date/Time    PH 7.560 03/02/2024 07:30 AM    PO2 80.4 03/02/2024 07:30 AM    PCO2 35.8 03/02/2024 07:30 AM     INR:   Recent Labs     04/13/24  0442 04/14/24  0130 04/15/24  0457   INR 2.7 2.6 1.8     PRO-BNP:   Lab Results   Component Value Date    PROBNP 2,382 (H) 04/14/2024    PROBNP 832 (H) 04/11/2024      TSH:   Lab Results   Component Value Date    TSH 41.59 (H) 03/05/2024      Cardiac Injury Profile:   Recent Labs     04/14/24  0130 04/14/24  0740   TROPHS 30* 31*      Lipid Profile:   Lab Results   Component Value Date/Time    TRIG 87 09/18/2023 09:41 AM    TRIG 87 09/18/2023 09:41 AM    HDL 75 09/18/2023 09:41 AM    HDL 75 09/18/2023 09:41 AM    LDLCALC 77 05/02/2022 10:40 AM    CHOL 165 09/18/2023 09:41 AM    CHOL 165 09/18/2023 09:41 AM    CHOL 156 05/02/2022 10:40 AM      Hemoglobin A1C: No components found for: \"HGBA1C\"      RAD:   CT CHEST WO CONTRAST    Result Date: 4/14/2024  1. No acute intrathoracic abnormality. 2. Chronic right middle lobe collapse. 3. Cardiomegaly with coronary artery calcification. 4. Large retrocardiac hiatal hernia.     XR CHEST PORTABLE    Result Date: 4/14/2024  1. No acute cardiopulmonary process. 2. Large retrocardiac hiatal hernia suspected.         EKG: See Report  Echo: See Report      IMPRESSIONS:  Patient Active Problem List   Diagnosis Code    Atrial fibrillation with RVR ([facility])- Recurrent I48.91    Acute on chronic diastolic congestive heart failure ([facility]) I50.33    Cardiomyopathy as manifestation of underlying disease ([facility]) I43    Non-rheumatic mitral regurgitation I34.0    Acute combined systolic and diastolic congestive heart

## 2024-04-15 NOTE — CONSULTS
Palliative Care Department  [phone]  Palliative Care Initial Consult  Provider OUMOU Thomas CNP     Krystal Hyatt  [id number]  Hospital Day: 2  Date of Initial Consult: 4/15/2024  Referring Provider:  Micky Chua APRN - CNP  Palliative Medicine was consulted for assistance with: goals of care, symptom management, code status discussion, end stage disease    HPI:   Krystal Hyatt is a 83 y.o. with a medical history of atrial fibrillation, CHF EF 30%, COPD, hyperlipidemia, hypertension, and lung cancer in remission who was admitted on 4/14/2024 from home with a CHIEF COMPLAINT of SOB.  Patient was discharged 4/13 around 3 PM after being treated with breathing treatments and IV Solu-Medrol for a COPD exacerbation. Patient states she woke up to go to the bathroom around 11 PM and was acutely short of breath.  Patient wears 5 L oxygen via nasal cannula at baseline.  Reports a history of lung cancer in 2022 and states she is in remission.  Chest x-ray in ED was negative for acute cardiopulmonary process.  CT of chest showed no acute intrathoracic abnormality, chronic right middle lobe collapse, likely a new LLL pneumonia.  Pulmonology and cardiology consulted.  Palliative medicine consulted for further assistance.    ASSESSMENT/PLAN:     Pertinent Hospital Diagnoses     Acute on chronic respiratory failure with hypoxia  COPD exacerbation  PAF  CHF    Palliative Care Encounter / Counseling Regarding Goals of Care  Please see detailed goals of care discussion as below  At this time, Krystal Hyatt, Does have capacity for medical decision-making.  Capacity is time limited and situation/question specific  Outcome of goals of care meeting:   Goal is to continue with all current medical management and return home  She wishes to discuss CODE STATUS with her daughter  Code status Full Code  Advanced Directives: no POA or living will in [facility]  Surrogate/Legal NOK:  Krystal Muniz (child)  declined at this time.  She denied any other questions or needs at this time.  Will continue to follow.    OBJECTIVE:   Prognosis: Guarded    Physical Exam:  BP (!) 105/57   Pulse 76   Temp 98.1 °F (36.7 °C) (Oral)   Resp 18   Ht 1.74 m (5' 8.5\")   Wt 69.1 kg (152 lb 5.4 oz)   SpO2 99%   BMI 22.83 kg/m²   Constitutional:  Elderly, thin, NAD, awake, alert  Eyes: no scleral icterus, normal lids, no discharge  ENMT:  Normocephalic, atraumatic, mucosa moist, EOMI  Neck:  trachea midline, no JVD  Lungs: Respirations easy and unlabored  Heart::  RRR, distant heart tones, no murmur, rub, or gallop noted during exam  Abd:  Soft, non tender, non distended, bowel sounds present  Ext:  Moving all extremities, no edema, pulses present  Skin:  Warm and dry, no rashes on visible skin  Psych: non-anxious affect  Neuro:  PERRL, Alert, grossly nonfocal; following commands    Objective data reviewed: labs, images, records, medication use, vitals, and chart    Discussed patient and the plan of care with the other IDT members: Floor Nurse and Patient    Time/Communication  Greater than 50% of time spent, total 55 minutes in counseling and coordination of care at the bedside regarding goals of care, symptom management, diagnosis and prognosis, and see above.    Thank you for allowing Palliative Medicine to participate in the care of Krystal Hyatt.

## 2024-04-15 NOTE — PROGRESS NOTES
SPIRITUAL HEALTH SERVICES - [facility]  PROGRESS NOTE    Name: Krystal Hyatt                Scientologist: Druze   Anointed (Last Rites): NA    Referral:  Palliative Care Consult    Assessment:  Upon entering the room  observes Patient sitting in bed, working on word search, wearing oxygen. Patient is receiving medication from her Nurse.       Intervention:  Patient has four Children and many grandchildren who are of support to her. Patient's youngest Son prepares meals for Patient in her home and usually stays overnight with her. Patient has a strong joel and receives comfort from praying daily. Patient stated that her Daughter is her MHCPA, however Patient does not have ACP documents in her medical record. Patient would prefer to be placed on a bi-pap prior to being intubated.     Outcome:  Patient received prayer and prayer card. Patient is open to discussion about her life and joel.    Plan:  Chaplains will remain available to offer spiritual and emotional support as needed.      Electronically signed by MIKE Holman, on 4/15/2024 at 2:24 PM.  Spiritual Care Department  [facility]  [phone]

## 2024-04-15 NOTE — PROGRESS NOTES
normal  Neck: neck supple and non tender without mass   Pulmonary/Chest:  +rhonchi LLL   Cardiovascular: normal rate, normal S1 and S2 and no carotid bruits  Abdomen: soft, non-tender, non-distended, normal bowel sounds, no masses or organomegaly  Extremities: no cyanosis, no clubbing and no edema  Neurologic: no cranial nerve deficit and speech normal        Recent Labs     04/14/24  0130 04/14/24  0740 04/15/24  0457    143 142   K 3.4* 3.7 4.3    103 100   CO2 32* 31* 33*   BUN 32* 29* 24*   CREATININE 1.1* 1.0 0.9   GLUCOSE 108* 102* 166*   CALCIUM 10.4* 9.6 8.8       Recent Labs     04/13/24  0442 04/14/24  0130 04/15/24  0457   WBC 5.4 8.2 4.4*   RBC 3.00* 3.26* 3.12*   HGB 9.2* 10.0* 9.5*   HCT 29.6* 32.7* 31.3*   MCV 98.7 100.3* 100.3*   MCH 30.7 30.7 30.4   MCHC 31.1* 30.6* 30.4*   RDW 13.4 13.4 13.2    157  --    MPV 11.1 10.6 11.0       Radiology:     Assessment:    Principal Problem:    Acute on chronic respiratory failure (HCC)  Active Problems:    Chronic anticoagulation    Essential hypertension    COPD exacerbation ([facility])    PAF (paroxysmal atrial fibrillation) ([facility])  Resolved Problems:    * No resolved hospital problems. *    # Acute on chronic hypoxic respite failure, patient requiring 9 L high flow nasal cannula  Patient uses 4 L oxygen at home  # Acute COPD exacerbation  # Left lower lobe bacterial pneumonia  # Right middle lobe collapse lung  # History of squamous cell lung cancer stage IIb status radiation August 2021  -Pulmonology is following, appreciate your assistance for this patient care  -Continue incentive spirometry  Continue systemic steroids with trilogy elliptica, Rocephin and Doxy   -Continue flutter valve with mucolytic's  -Sputum culture shows few polymorphic leukocytes normal aayush few mixed bacterial morphotypes seen on Gram stain  -Urine Legionella and strep pneumo negative  Respiratory viral panel negative  -Get 2D echo     # History of paroxysmal atrial  fibrillation, pharmacy to dose Coumadin.  Continue Toprol      # History of CHF, not in exacerbation patient does have a very elevated BNP on admission.  Euvolemic on my exam.  Per patient request, cardiology consulted for comanagement will continue Entresto Bumex digoxin metoprolol.  Last echocardiogram shows ejection fraction 38%.  Patient has significant mitral regurgitation  Her cardiologist Dr. Jones.       # Hypokalemia, monitor with repletion      Palliative care consulted by pulmonology.  Waiting clinical improvement.         NOTE: This report was transcribed using voice recognition software. Every effort was made to ensure accuracy; however, inadvertent computerized transcription errors may be present.  Electronically signed by Ramya Lee DO on 4/15/2024 at 10:21 AM

## 2024-04-15 NOTE — CARE COORDINATION
Introduced my self and provided explanation of CM role to patient.  Patient is awake, alert, and aware of current diagnosis and treatment plan including cardio consult, IV antibiotics, O2 via n/c, check echo.  She voices she resides at home with her daughter and completes her adl's with independence. Has support from her son as well.  Patient is established with a pcp and denies any issue with retail pharmaceutical coverage.   She does not use walker or cane.  She has home O2 5 L at baseline from [facility], she also has nebulizer.  She continues to deny new discharge planning needs inclusive of [facility] services.  Patient will need followed and assisted with discharge planning as necessary.   Rich Hall, MSN RN  [facility] Case Management  [phone]

## 2024-04-15 NOTE — ACP (ADVANCE CARE PLANNING)
Advance Care Planning   Healthcare Decision Maker:    Primary Decision Maker: Olena Krystal Muniz - Juancarlos - [phone]    Secondary Decision Maker: OlenaDieter - Juancarlos - [phone]    Click here to complete Healthcare Decision Makers including selection of the Healthcare Decision Maker Relationship (ie \"Primary\").

## 2024-04-15 NOTE — PROGRESS NOTES
Yusuf Catsro M.D.,[facility]  Carlin Barrera D.O., F.ISSA., [facility]  Kristin Bruner M.D.  Monica Aponte M.D.   NYA DoyleO.        Daily Pulmonary Progress Note    Patient:  Krystal Hyatt 83 y.o. female MRN: [id number]            Synopsis     Reason for Consultation: acute on chronic hypoxic respiratory failure     \"CC\" SOB, productive cough    Code status: Full code      Subjective      4/15/24:  Patient was seen and examined. Today, she has no concerns or complaints.  States that her breathing has significantly improved and is near her baseline.  She states that she has had a more productive cough with respect to the amount of mucus produced however this has helped with her breathing.  No fevers, chills, nausea, vomiting, chest pain.  She reports feeling comfortable on 6 L, reports baseline of 5      Review of Systems:  Constitutional: Denies fever, fatigue  Skin: Denies dark lesions, and rashes   HEENT: Denies sore throat, and hoarseness.  Cardiovascular: Denies palpitations, chest pain, and chest pressure.  Respiratory: SOB improving, increased mucous production. Denies hemoptysis,  Gastrointestinal: Denies nausea, vomiting, poor appetite, diarrhea, heartburn or reflux  Genitourinary: Denies dysuria, frequency  Musculoskeletal: Denies muscle weakness, and bone pain  Neurological: Denies headache, and focal weakness  Psychological: Denies anxiety and depression  Endocrine: Denies heat intolerance and cold intolerance  Hematopoietic/Lymphatic: Denies bleeding and bruising problems    24-hour events:      Objective   OBJECTIVE:   BP (!) 105/57   Pulse 76   Temp 98.1 °F (36.7 °C) (Oral)   Resp 18   Ht 1.74 m (5' 8.5\")   Wt 69.1 kg (152 lb 5.4 oz)   SpO2 94%   BMI 22.83 kg/m²   SpO2 Readings from Last 1 Encounters:   04/15/24 94%        I/O:    Intake/Output Summary (Last 24 hours) at 4/15/2024 1504  Last data filed at 4/15/2024 1350  Gross per 24 hour   Intake 600 ml   Output 1000 ml  04/14/2024 01:30 AM    MPV 11.0 04/15/2024 04:57 AM     Lab Results   Component Value Date/Time     04/15/2024 04:57 AM    K 4.3 04/15/2024 04:57 AM    K 4.3 04/05/2023 10:48 PM     04/15/2024 04:57 AM    CO2 33 04/15/2024 04:57 AM    BUN 24 04/15/2024 04:57 AM    CREATININE 0.9 04/15/2024 04:57 AM    LABALBU 4.8 04/14/2024 01:30 AM    LABALBU 4.4 05/04/2012 11:00 AM    CALCIUM 8.8 04/15/2024 04:57 AM    GFRAA 52 08/15/2022 10:30 AM    LABGLOM 66 04/15/2024 04:57 AM     Lab Results   Component Value Date/Time    PROTIME 19.9 04/15/2024 04:57 AM    PROTIME 0.0 03/11/2024 10:10 AM    INR 1.8 04/15/2024 04:57 AM     Recent Labs     04/14/24  0130   PROBNP 2,382*     No results for input(s): \"TROPONINI\" in the last 72 hours.  No results for input(s): \"PROCAL\" in the last 72 hours.  This SmartLink has not been configured with any valid records.       Micro:  No results for input(s): \"CULTRESP\" in the last 72 hours.  No results for input(s): \"LABGRAM\" in the last 72 hours.  No results for input(s): \"LEGUR\" in the last 72 hours.  No results for input(s): \"STREPNEUMAGU\" in the last 72 hours.  No results for input(s): \"LP1UAG\" in the last 72 hours.     Assessment:    Acute on chronic hypoxic respiratory failure  COPD with acute exacerbation  LLL pneumonia  Chronic RML collapse  Squamous cell lung cancer stage IIB s/p radiation Aug 2021  ILD  Chronic systolic heart failure EF 30%  Atrial fibrillation      Plan:   Wean oxygen as able, target greater than 90%.  Currently requiring 6 L, target her baseline oxygen is 4-5 L   ABG with a mild respiratory alkalosis (pH = 7.464, Pco2 = 41.1, PO2 = 77.8, HCO3 = 29.5)  Strep and Legionella unremarkable  RFA unremarkable  Respiratory culture with normal aayush  Continue Rocephin and doxycycline for presumed CAP  Continue Brovana and Pulmicort twice daily  Continue levalbuterol, every 6 hours  Continue IV Solu-Medrol, 40 mg, every 8 hours; wean to oral steroids when

## 2024-04-15 NOTE — PROGRESS NOTES
Pharmacy Consultation Note  (Warfarin Dosing and Monitoring)    Initial consult date: 4/14  Consulting Provider: Archie    Krystal Hyatt is a 83 y.o. female for whom pharmacy has been asked to manage warfarin therapy.     Weight:   Wt Readings from Last 1 Encounters:   04/15/24 69.1 kg (152 lb 5.4 oz)       TSH:    Lab Results   Component Value Date/Time    TSH 41.59 03/05/2024 04:05 AM       Hepatic Function Panel:                            Lab Results   Component Value Date/Time    ALKPHOS 72 04/14/2024 01:30 AM    ALT 12 04/14/2024 01:30 AM    AST 24 04/14/2024 01:30 AM    PROT 7.4 04/14/2024 01:30 AM    BILITOT 0.4 04/14/2024 01:30 AM    BILIDIR 0.2 10/15/2017 02:40 AM    IBILI 0.7 10/15/2017 02:40 AM    LABALBU 4.8 04/14/2024 01:30 AM    LABALBU 4.4 05/04/2012 11:00 AM       Current significant warfarin drug-drug interactions include:   -Levothyroxine, rosuvastatin, methylprednisolone: may enhance anticoagulatory effect of warfarin.     Recent Labs     04/13/24  0442 04/14/24  0130 04/15/24  0457   HGB 9.2* 10.0* 9.5*    157  --        Date Warfarin Dose INR Heparin or LMWH Comment   4/14 2.5 mg 2.6 x    4/15 5 mg 1.8 x                           Assessment:  Patient is a 83 y.o. female on warfarin for Atrial Fibrillation.  Patient's home warfarin dosing regimen is 2.5mg daily per med rec.   Goal INR 2 - 3  INR 2.6 today  Patient was discharged on 4/13 and re-admitted on 4/14.    Plan:  Warfarin 5 mg x 1 tonight  Daily PT/INR until the INR is stable within the therapeutic range  Pharmacist will follow and monitor/adjust dosing as necessary        Christiana Puri RPH 4/15/2024 1:22 PM    RADHA: [phone]  SEY: [phone]  SJW: [phone]

## 2024-04-16 ENCOUNTER — APPOINTMENT (OUTPATIENT)
Age: 84
DRG: 190 | End: 2024-04-16
Attending: STUDENT IN AN ORGANIZED HEALTH CARE EDUCATION/TRAINING PROGRAM
Payer: MEDICARE

## 2024-04-16 LAB
ANION GAP SERPL CALCULATED.3IONS-SCNC: 11 MMOL/L (ref 7–16)
BASOPHILS # BLD: 0 K/UL (ref 0–0.2)
BASOPHILS NFR BLD: 0 % (ref 0–2)
BUN SERPL-MCNC: 30 MG/DL (ref 6–23)
CALCIUM SERPL-MCNC: 9.1 MG/DL (ref 8.6–10.2)
CHLORIDE SERPL-SCNC: 99 MMOL/L (ref 98–107)
CO2 SERPL-SCNC: 30 MMOL/L (ref 22–29)
CREAT SERPL-MCNC: 0.9 MG/DL (ref 0.5–1)
ECHO AO ASC DIAM: 3.1 CM
ECHO AO ASCENDING AORTA INDEX: 1.69 CM/M2
ECHO AR MAX VEL PISA: 4.2 M/S
ECHO AV AREA PEAK VELOCITY: 1.5 CM2
ECHO AV AREA VTI: 1.6 CM2
ECHO AV AREA/BSA PEAK VELOCITY: 0.8 CM2/M2
ECHO AV AREA/BSA VTI: 0.9 CM2/M2
ECHO AV CUSP MM: 1.9 CM
ECHO AV MEAN GRADIENT: 16 MMHG
ECHO AV MEAN VELOCITY: 1.9 M/S
ECHO AV PEAK GRADIENT: 30 MMHG
ECHO AV PEAK VELOCITY: 2.8 M/S
ECHO AV REGURGITANT PHT: 544.4 MILLISECOND
ECHO AV VELOCITY RATIO: 0.46
ECHO AV VTI: 43.4 CM
ECHO BSA: 1.84 M2
ECHO EST RA PRESSURE: 8 MMHG
ECHO LA DIAMETER INDEX: 1.75 CM/M2
ECHO LA DIAMETER: 3.2 CM
ECHO LA VOL A-L A2C: 57 ML (ref 22–52)
ECHO LA VOL A-L A4C: 79 ML (ref 22–52)
ECHO LA VOL MOD A2C: 55 ML (ref 22–52)
ECHO LA VOL MOD A4C: 73 ML (ref 22–52)
ECHO LA VOLUME AREA LENGTH: 68 ML
ECHO LA VOLUME INDEX A-L A2C: 31 ML/M2 (ref 16–34)
ECHO LA VOLUME INDEX A-L A4C: 43 ML/M2 (ref 16–34)
ECHO LA VOLUME INDEX AREA LENGTH: 37 ML/M2 (ref 16–34)
ECHO LA VOLUME INDEX MOD A2C: 30 ML/M2 (ref 16–34)
ECHO LA VOLUME INDEX MOD A4C: 40 ML/M2 (ref 16–34)
ECHO LV EF PHYSICIAN: 60 %
ECHO LV FRACTIONAL SHORTENING: 31 % (ref 28–44)
ECHO LV INTERNAL DIMENSION DIASTOLE INDEX: 2.3 CM/M2
ECHO LV INTERNAL DIMENSION DIASTOLIC: 4.2 CM (ref 3.9–5.3)
ECHO LV INTERNAL DIMENSION SYSTOLIC INDEX: 1.58 CM/M2
ECHO LV INTERNAL DIMENSION SYSTOLIC: 2.9 CM
ECHO LV ISOVOLUMETRIC RELAXATION TIME (IVRT): 90 MS
ECHO LV IVSD: 1 CM (ref 0.6–0.9)
ECHO LV IVSS: 1.2 CM
ECHO LV MASS 2D: 157.1 G (ref 67–162)
ECHO LV MASS INDEX 2D: 85.8 G/M2 (ref 43–95)
ECHO LV POSTERIOR WALL DIASTOLIC: 1.2 CM (ref 0.6–0.9)
ECHO LV POSTERIOR WALL SYSTOLIC: 1.7 CM
ECHO LV RELATIVE WALL THICKNESS RATIO: 0.57
ECHO LVOT AREA: 3.1 CM2
ECHO LVOT AV VTI INDEX: 0.52
ECHO LVOT DIAM: 2 CM
ECHO LVOT MEAN GRADIENT: 3 MMHG
ECHO LVOT PEAK GRADIENT: 7 MMHG
ECHO LVOT PEAK VELOCITY: 1.3 M/S
ECHO LVOT STROKE VOLUME INDEX: 38.8 ML/M2
ECHO LVOT SV: 71 ML
ECHO LVOT VTI: 22.6 CM
ECHO MV "A" WAVE DURATION: 103.8 MSEC
ECHO MV A VELOCITY: 0.88 M/S
ECHO MV AREA PHT: 4.5 CM2
ECHO MV AREA VTI: 3.1 CM2
ECHO MV E DECELERATION TIME (DT): 180.2 MS
ECHO MV E VELOCITY: 0.75 M/S
ECHO MV E/A RATIO: 0.85
ECHO MV LVOT VTI INDEX: 1.01
ECHO MV MAX VELOCITY: 1 M/S
ECHO MV MEAN GRADIENT: 2 MMHG
ECHO MV MEAN VELOCITY: 0.7 M/S
ECHO MV PEAK GRADIENT: 4 MMHG
ECHO MV PRESSURE HALF TIME (PHT): 49.1 MS
ECHO MV VTI: 22.8 CM
ECHO PULMONARY ARTERY END DIASTOLIC PRESSURE: 8 MMHG
ECHO PV MAX VELOCITY: 1.2 M/S
ECHO PV MEAN GRADIENT: 2 MMHG
ECHO PV MEAN VELOCITY: 0.7 M/S
ECHO PV PEAK GRADIENT: 5 MMHG
ECHO PV REGURGITANT MAX VELOCITY: 1.4 M/S
ECHO PV VTI: 17.3 CM
ECHO PVEIN A DURATION: 101.5 MS
ECHO PVEIN A VELOCITY: 0.2 M/S
ECHO PVEIN PEAK D VELOCITY: 0.5 M/S
ECHO PVEIN PEAK S VELOCITY: 0.7 M/S
ECHO PVEIN S/D RATIO: 1.4
ECHO RIGHT VENTRICULAR SYSTOLIC PRESSURE (RVSP): 52 MMHG
ECHO TV REGURGITANT MAX VELOCITY: 3.3 M/S
ECHO TV REGURGITANT PEAK GRADIENT: 43 MMHG
EOSINOPHIL # BLD: 0 K/UL (ref 0.05–0.5)
EOSINOPHILS RELATIVE PERCENT: 0 % (ref 0–6)
ERYTHROCYTE [DISTWIDTH] IN BLOOD BY AUTOMATED COUNT: 12.9 % (ref 11.5–15)
GFR SERPL CREATININE-BSD FRML MDRD: 62 ML/MIN/1.73M2
GLUCOSE SERPL-MCNC: 148 MG/DL (ref 74–99)
HCT VFR BLD AUTO: 32.7 % (ref 34–48)
HGB BLD-MCNC: 10.1 G/DL (ref 11.5–15.5)
INR PPP: 2.2
LYMPHOCYTES NFR BLD: 0.33 K/UL (ref 1.5–4)
LYMPHOCYTES RELATIVE PERCENT: 5 % (ref 20–42)
MCH RBC QN AUTO: 30 PG (ref 26–35)
MCHC RBC AUTO-ENTMCNC: 30.9 G/DL (ref 32–34.5)
MCV RBC AUTO: 97 FL (ref 80–99.9)
MONOCYTES NFR BLD: 0.16 K/UL (ref 0.1–0.95)
MONOCYTES NFR BLD: 3 % (ref 2–12)
NEUTROPHILS NFR BLD: 92 % (ref 43–80)
NEUTS SEG NFR BLD: 5.81 K/UL (ref 1.8–7.3)
PLATELET # BLD AUTO: 149 K/UL (ref 130–450)
PMV BLD AUTO: 11.5 FL (ref 7–12)
POTASSIUM SERPL-SCNC: 3.9 MMOL/L (ref 3.5–5)
PROTHROMBIN TIME: 23.9 SEC (ref 9.3–12.4)
RBC # BLD AUTO: 3.37 M/UL (ref 3.5–5.5)
RBC # BLD: ABNORMAL 10*6/UL
SODIUM SERPL-SCNC: 140 MMOL/L (ref 132–146)
WBC OTHER # BLD: 6.3 K/UL (ref 4.5–11.5)

## 2024-04-16 PROCEDURE — 6360000002 HC RX W HCPCS: Performed by: INTERNAL MEDICINE

## 2024-04-16 PROCEDURE — 6370000000 HC RX 637 (ALT 250 FOR IP): Performed by: STUDENT IN AN ORGANIZED HEALTH CARE EDUCATION/TRAINING PROGRAM

## 2024-04-16 PROCEDURE — 80048 BASIC METABOLIC PNL TOTAL CA: CPT

## 2024-04-16 PROCEDURE — 36415 COLL VENOUS BLD VENIPUNCTURE: CPT

## 2024-04-16 PROCEDURE — 85025 COMPLETE CBC W/AUTO DIFF WBC: CPT

## 2024-04-16 PROCEDURE — 6360000002 HC RX W HCPCS

## 2024-04-16 PROCEDURE — 93306 TTE W/DOPPLER COMPLETE: CPT

## 2024-04-16 PROCEDURE — 99231 SBSQ HOSP IP/OBS SF/LOW 25: CPT

## 2024-04-16 PROCEDURE — 94669 MECHANICAL CHEST WALL OSCILL: CPT

## 2024-04-16 PROCEDURE — 99233 SBSQ HOSP IP/OBS HIGH 50: CPT | Performed by: STUDENT IN AN ORGANIZED HEALTH CARE EDUCATION/TRAINING PROGRAM

## 2024-04-16 PROCEDURE — 94640 AIRWAY INHALATION TREATMENT: CPT

## 2024-04-16 PROCEDURE — 6370000000 HC RX 637 (ALT 250 FOR IP)

## 2024-04-16 PROCEDURE — 2580000003 HC RX 258

## 2024-04-16 PROCEDURE — 2700000000 HC OXYGEN THERAPY PER DAY

## 2024-04-16 PROCEDURE — 85610 PROTHROMBIN TIME: CPT

## 2024-04-16 PROCEDURE — 6360000002 HC RX W HCPCS: Performed by: STUDENT IN AN ORGANIZED HEALTH CARE EDUCATION/TRAINING PROGRAM

## 2024-04-16 PROCEDURE — 2580000003 HC RX 258: Performed by: INTERNAL MEDICINE

## 2024-04-16 PROCEDURE — 6370000000 HC RX 637 (ALT 250 FOR IP): Performed by: INTERNAL MEDICINE

## 2024-04-16 PROCEDURE — 2060000000 HC ICU INTERMEDIATE R&B

## 2024-04-16 RX ORDER — METHYLPREDNISOLONE SODIUM SUCCINATE 40 MG/ML
40 INJECTION, POWDER, LYOPHILIZED, FOR SOLUTION INTRAMUSCULAR; INTRAVENOUS EVERY 12 HOURS
Status: DISCONTINUED | OUTPATIENT
Start: 2024-04-16 | End: 2024-04-17

## 2024-04-16 RX ORDER — WARFARIN SODIUM 2.5 MG/1
2.5 TABLET ORAL NIGHTLY
Status: DISCONTINUED | OUTPATIENT
Start: 2024-04-16 | End: 2024-04-17 | Stop reason: HOSPADM

## 2024-04-16 RX ORDER — CEFDINIR 300 MG/1
300 CAPSULE ORAL EVERY 12 HOURS SCHEDULED
Status: DISCONTINUED | OUTPATIENT
Start: 2024-04-17 | End: 2024-04-17 | Stop reason: HOSPADM

## 2024-04-16 RX ADMIN — LEVALBUTEROL HYDROCHLORIDE 0.63 MG: 0.63 SOLUTION RESPIRATORY (INHALATION) at 02:42

## 2024-04-16 RX ADMIN — LEVOTHYROXINE SODIUM 75 MCG: 75 TABLET ORAL at 07:58

## 2024-04-16 RX ADMIN — SACUBITRIL AND VALSARTAN 0.5 TABLET: 24; 26 TABLET, FILM COATED ORAL at 20:48

## 2024-04-16 RX ADMIN — ARFORMOTEROL TARTRATE 15 MCG: 15 SOLUTION RESPIRATORY (INHALATION) at 10:59

## 2024-04-16 RX ADMIN — PANTOPRAZOLE SODIUM 40 MG: 40 TABLET, DELAYED RELEASE ORAL at 07:38

## 2024-04-16 RX ADMIN — DIGOXIN 62.5 MCG: 0.12 TABLET ORAL at 07:58

## 2024-04-16 RX ADMIN — BUDESONIDE 250 MCG: 0.25 SUSPENSION RESPIRATORY (INHALATION) at 21:32

## 2024-04-16 RX ADMIN — WARFARIN SODIUM 2.5 MG: 2.5 TABLET ORAL at 20:49

## 2024-04-16 RX ADMIN — POLYETHYLENE GLYCOL 3350 17 G: 17 POWDER, FOR SOLUTION ORAL at 16:09

## 2024-04-16 RX ADMIN — LEVALBUTEROL HYDROCHLORIDE 0.63 MG: 0.63 SOLUTION RESPIRATORY (INHALATION) at 21:32

## 2024-04-16 RX ADMIN — ACETYLCYSTEINE 600 MG: 200 SOLUTION ORAL; RESPIRATORY (INHALATION) at 21:32

## 2024-04-16 RX ADMIN — METHYLPREDNISOLONE SODIUM SUCCINATE 40 MG: 40 INJECTION INTRAMUSCULAR; INTRAVENOUS at 20:48

## 2024-04-16 RX ADMIN — GUAIFENESIN 400 MG: 400 TABLET ORAL at 20:48

## 2024-04-16 RX ADMIN — BUMETANIDE 0.5 MG: 1 TABLET ORAL at 07:57

## 2024-04-16 RX ADMIN — GUAIFENESIN 400 MG: 400 TABLET ORAL at 13:27

## 2024-04-16 RX ADMIN — DOXYCYCLINE HYCLATE 100 MG: 100 CAPSULE ORAL at 07:57

## 2024-04-16 RX ADMIN — BUMETANIDE 0.5 MG: 1 TABLET ORAL at 16:08

## 2024-04-16 RX ADMIN — ROSUVASTATIN CALCIUM 10 MG: 10 TABLET, FILM COATED ORAL at 20:48

## 2024-04-16 RX ADMIN — GUAIFENESIN 400 MG: 400 TABLET ORAL at 07:57

## 2024-04-16 RX ADMIN — EMPAGLIFLOZIN 10 MG: 10 TABLET, FILM COATED ORAL at 07:58

## 2024-04-16 RX ADMIN — ACETYLCYSTEINE 600 MG: 200 SOLUTION ORAL; RESPIRATORY (INHALATION) at 10:59

## 2024-04-16 RX ADMIN — ARFORMOTEROL TARTRATE 15 MCG: 15 SOLUTION RESPIRATORY (INHALATION) at 21:32

## 2024-04-16 RX ADMIN — LEVALBUTEROL HYDROCHLORIDE 0.63 MG: 0.63 SOLUTION RESPIRATORY (INHALATION) at 13:47

## 2024-04-16 RX ADMIN — LEVALBUTEROL HYDROCHLORIDE 0.63 MG: 0.63 SOLUTION RESPIRATORY (INHALATION) at 10:59

## 2024-04-16 RX ADMIN — CEFTRIAXONE 1000 MG: 1 INJECTION, POWDER, FOR SOLUTION INTRAMUSCULAR; INTRAVENOUS at 13:27

## 2024-04-16 RX ADMIN — METHYLPREDNISOLONE SODIUM SUCCINATE 40 MG: 40 INJECTION INTRAMUSCULAR; INTRAVENOUS at 07:37

## 2024-04-16 RX ADMIN — SODIUM CHLORIDE, PRESERVATIVE FREE 10 ML: 5 INJECTION INTRAVENOUS at 20:49

## 2024-04-16 RX ADMIN — SACUBITRIL AND VALSARTAN 0.5 TABLET: 24; 26 TABLET, FILM COATED ORAL at 07:57

## 2024-04-16 RX ADMIN — DOXYCYCLINE HYCLATE 100 MG: 100 CAPSULE ORAL at 20:48

## 2024-04-16 RX ADMIN — BUDESONIDE 250 MCG: 0.25 SUSPENSION RESPIRATORY (INHALATION) at 10:59

## 2024-04-16 RX ADMIN — SODIUM CHLORIDE, PRESERVATIVE FREE 10 ML: 5 INJECTION INTRAVENOUS at 07:59

## 2024-04-16 RX ADMIN — METOPROLOL SUCCINATE 12.5 MG: 25 TABLET, FILM COATED, EXTENDED RELEASE ORAL at 07:58

## 2024-04-16 ASSESSMENT — PAIN SCALES - GENERAL
PAINLEVEL_OUTOF10: 0

## 2024-04-16 NOTE — PLAN OF CARE
Problem: Safety - Adult  Goal: Free from fall injury  Outcome: Progressing     Problem: Discharge Planning  Goal: Discharge to home or other facility with appropriate resources  Outcome: Progressing     Problem: ABCDS Injury Assessment  Goal: Absence of physical injury  Outcome: Progressing     Problem: Chronic Conditions and Co-morbidities  Goal: Patient's chronic conditions and co-morbidity symptoms are monitored and maintained or improved  Outcome: Progressing     Problem: Pain  Goal: Verbalizes/displays adequate comfort level or baseline comfort level  Outcome: Progressing

## 2024-04-16 NOTE — PROGRESS NOTES
Pharmacy Consultation Note  (Warfarin Dosing and Monitoring)    Initial consult date: 4/14  Consulting Provider: Archie    Krystal Hyatt is a 83 y.o. female for whom pharmacy has been asked to manage warfarin therapy.     Weight:   Wt Readings from Last 1 Encounters:   04/15/24 69.1 kg (152 lb 5.4 oz)       TSH:    Lab Results   Component Value Date/Time    TSH 41.59 03/05/2024 04:05 AM       Hepatic Function Panel:                            Lab Results   Component Value Date/Time    ALKPHOS 72 04/14/2024 01:30 AM    ALT 12 04/14/2024 01:30 AM    AST 24 04/14/2024 01:30 AM    PROT 7.4 04/14/2024 01:30 AM    BILITOT 0.4 04/14/2024 01:30 AM    BILIDIR 0.2 10/15/2017 02:40 AM    IBILI 0.7 10/15/2017 02:40 AM       Current significant warfarin drug-drug interactions include:   -Levothyroxine, rosuvastatin, methylprednisolone: may enhance anticoagulatory effect of warfarin.     Recent Labs     04/14/24  0130 04/15/24  0457 04/16/24  0503   HGB 10.0* 9.5* 10.1*     --  149       Date Warfarin Dose INR Heparin or LMWH Comment   4/14 2.5 mg 2.6 x    4/15 5 mg 1.8 x    4/16 2.5 mg 2.2 x                    Assessment:  Patient is a 83 y.o. female on warfarin for Atrial Fibrillation.  Patient's home warfarin dosing regimen is 2.5mg daily per med rec.   Goal INR 2 - 3  INR 2.2 today  Patient was discharged on 4/13 and re-admitted on 4/14.    Plan:  Warfarin 2.5 mg x 1 tonight  Daily PT/INR until the INR is stable within the therapeutic range  Pharmacist will follow and monitor/adjust dosing as necessary      Christiana Puri OSWALDO 4/16/2024 2:08 PM    RADHA: [phone]  SEY: [phone]  SJW: [phone]

## 2024-04-16 NOTE — PROGRESS NOTES
Palliative Care Department  [phone]  Palliative Care Progress Note  Provider OUMOU Avilez CNP     Krystal Hyatt  [id number]  Hospital Day: 3  Date of Initial Consult: 4/15/2024  Referring Provider:  Micky Chua APRN - CNP  Palliative Medicine was consulted for assistance with: goals of care, symptom management, code status discussion, end stage disease    HPI:   Krystal Hyatt is a 83 y.o. with a medical history of atrial fibrillation, CHF EF 30%, COPD, hyperlipidemia, hypertension, and lung cancer in remission who was admitted on 4/14/2024 from home with a CHIEF COMPLAINT of SOB.  Patient was discharged 4/13 around 3 PM after being treated with breathing treatments and IV Solu-Medrol for a COPD exacerbation. Patient states she woke up to go to the bathroom around 11 PM and was acutely short of breath.  Patient wears 5 L oxygen via nasal cannula at baseline.  Reports a history of lung cancer in 2022 and states she is in remission.  Chest x-ray in ED was negative for acute cardiopulmonary process.  CT of chest showed no acute intrathoracic abnormality, chronic right middle lobe collapse, likely a new LLL pneumonia.  Pulmonology and cardiology consulted.  Palliative medicine consulted for further assistance.    ASSESSMENT/PLAN:     Pertinent Hospital Diagnoses     Acute on chronic respiratory failure with hypoxia  COPD exacerbation  PAF  CHF    Palliative Care Encounter / Counseling Regarding Goals of Care  Please see detailed goals of care discussion as below  At this time, Krystal Hyatt, Does have capacity for medical decision-making.  Capacity is time limited and situation/question specific  Outcome of goals of care meeting:   Goal is to continue with all current medical management and return home  Continue FULL CODE  Code status Full Code  Advanced Directives: no POA or living will in [facility]  Surrogate/Legal NOK:  Krystal Muniz (child) [phone]  Dieter Hyatt (child)

## 2024-04-16 NOTE — PROGRESS NOTES
PROGRESS NOTE       PATIENT PROBLEM LIST:  Patient Active Problem List   Diagnosis Code    Atrial fibrillation with RVR ([facility])- Recurrent I48.91    Acute on chronic diastolic congestive heart failure (HCC) I50.33    Cardiomyopathy as manifestation of underlying disease (HCC) I43    Non-rheumatic mitral regurgitation I34.0    Acute combined systolic and diastolic congestive heart failure (HCC) I50.41    Chronic anticoagulation Z79.01    Essential hypertension I10    COPD exacerbation (HCC) J44.1    History of atrial fibrillation Z86.79    Dilated idiopathic cardiomyopathy (HCC) I42.0    Severe sinus bradycardia R00.1    Moderate tricuspid regurgitation I07.1    Hypoxia R09.02    Combined forms of age-related cataract of both eyes H25.813    Dyspnea on exertion R06.09    Mass of right lung R91.8    Supplemental oxygen dependent Z99.81    Pneumonia of both lower lobes due to infectious organism J18.9    Stage 3a chronic kidney disease (HCC) N18.31    Malignant neoplasm of right lung (HCC) C34.91    Atrial flutter with rapid ventricular response (HCC) I48.92    PAF (paroxysmal atrial fibrillation) ([facility]) I48.0    Centrilobular emphysema (HCC) J43.2    Thyroid disease E07.9    Acquired hypothyroidism E03.9    Bronchitis J40    Chronic respiratory failure with hypoxia (HCC) J96.11    Mixed hyperlipidemia E78.2    Gastroesophageal reflux disease K21.9    Primary hypothyroidism E03.9    Hyperlipidemia E78.5    Atrial fibrillation (HCC) I48.91    Atrial flutter (HCC) I48.92    Atrial fibrillation with rapid ventricular response (HCC) I48.91    COVID-19 virus infection U07.1    Iatrogenic hyperthyroidism E05.80    COVID-19 U07.1    TRISTEN (acute kidney injury) (HCC) N17.9    Thrombocytopenia (HCC) D69.6    High thyroid hormone level R79.89    Hypothyroidism E03.9    Chronic hypoxic respiratory failure (HCC) J96.11    Chronic systolic CHF (congestive heart failure) (HCC) I50.22    Acute respiratory failure with hypoxia (HCC) J96.01  J18.9    Stage 3a chronic kidney disease (HCC) N18.31    Malignant neoplasm of right lung (HCC) C34.91    Atrial flutter with rapid ventricular response (HCC) I48.92    PAF (paroxysmal atrial fibrillation) (HCC) I48.0    Centrilobular emphysema (HCC) J43.2    Thyroid disease E07.9    Acquired hypothyroidism E03.9    Bronchitis J40    Chronic respiratory failure with hypoxia (HCC) J96.11    Mixed hyperlipidemia E78.2    Gastroesophageal reflux disease K21.9    Primary hypothyroidism E03.9    Hyperlipidemia E78.5    Atrial fibrillation (HCC) I48.91    Atrial flutter (HCC) I48.92    Atrial fibrillation with rapid ventricular response (HCC) I48.91    COVID-19 virus infection U07.1    Iatrogenic hyperthyroidism E05.80    COVID-19 U07.1    TRISTEN (acute kidney injury) (HCC) N17.9    Thrombocytopenia (HCC) D69.6    High thyroid hormone level R79.89    Hypothyroidism E03.9    Chronic hypoxic respiratory failure (HCC) J96.11    Chronic systolic CHF (congestive heart failure) (HCC) I50.22    Acute respiratory failure with hypoxia (HCC) J96.01    Acute on chronic combined systolic and diastolic CHF (congestive heart failure) (HCC) I50.43    COPD with acute exacerbation (HCC) J44.1    Secondary hypercoagulable state (HCC) D68.69    Tachycardia R00.0    Acute on chronic respiratory failure (HCC) J96.20       RECOMMENDATIONS:  Following review of two-dimensional echocardiogram it appears that Mrs. Hyatt's left ventricular systolic function remains normal but does suggest that she has stage II diastolic dysfunction and elevated pulmonary wedge pressure of approximately 20 mmHg adding to her overall pulmonary issues.  Would recommend that she be maintained on diuretics and on a yearly basis reevaluate her aortic valve as she has documented mild aortic stenosis with mild-moderate aortic regurgitation.  None of which are responsible for her present pulmonary status.  Fortunately she remains in sinus rhythm.    I have spent more than

## 2024-04-16 NOTE — PROGRESS NOTES
[facility] Hospitalist Progress Note    Admitting Date and Time: 4/14/2024 12:25 AM  Admit Dx: Shortness of breath [R06.02]  Acute on chronic respiratory failure (HCC) [J96.20]  Acute on chronic respiratory failure with hypoxia (HCC) [J96.21]    Subjective:  Patient is being followed for Shortness of breath [R06.02]  Acute on chronic respiratory failure (HCC) [J96.20]  Acute on chronic respiratory failure with hypoxia (HCC) [J96.21]     Patient was seen and examined    ROS: denies fever, chills, cp, sob, n/v, HA unless stated above.      methylPREDNISolone  40 mg IntraVENous Q12H    doxycycline hyclate  100 mg Oral 2 times per day    sodium chloride flush  5-40 mL IntraVENous 2 times per day    guaiFENesin  400 mg Oral TID    digoxin  62.5 mcg Oral Daily    levothyroxine  75 mcg Oral Daily    metoprolol succinate  12.5 mg Oral QAM    pantoprazole  40 mg Oral QAM AC    rosuvastatin  10 mg Oral Nightly    sacubitril-valsartan  0.5 tablet Oral BID    budesonide  0.25 mg Nebulization BID RT    And    arformoterol tartrate  15 mcg Nebulization BID RT    warfarin placeholder: dosing by pharmacy   Other RX Placeholder    cefTRIAXone (ROCEPHIN) IV  1,000 mg IntraVENous Q24H    levalbuterol  0.63 mg Nebulization Q6H    acetylcysteine  600 mg Inhalation BID RT    bumetanide  0.5 mg Oral BID    empagliflozin  10 mg Oral Daily     sodium chloride, 1 spray, PRN  sodium chloride flush, 5-40 mL, PRN  sodium chloride, , PRN  polyethylene glycol, 17 g, Daily PRN  acetaminophen, 650 mg, Q6H PRN   Or  acetaminophen, 650 mg, Q6H PRN         Objective:    /65   Pulse 75   Temp 97.9 °F (36.6 °C) (Oral)   Resp 17   Ht 1.74 m (5' 8.5\")   Wt 69.1 kg (152 lb 5.4 oz)   SpO2 97%   BMI 22.83 kg/m²     General Appearance: alert and oriented to person, place and time and in no acute distress  Skin: warm and dry  Head: normocephalic and atraumatic  Eyes: pupils equal, round, and reactive to light, extraocular eye movements

## 2024-04-16 NOTE — PROGRESS NOTES
Yusuf Castro M.D.,[facility]  Carlin Barrera D.O., F.CYRIL.MARCUS.EMILIO., [facility]  Kristin Bruner M.D.  Monica Aponte M.D.   NYA DoyleO.        Daily Pulmonary Progress Note    Patient:  Krystal Hyatt 83 y.o. female MRN: [id number]            Synopsis     Reason for Consultation: acute on chronic hypoxic respiratory failure     \"CC\" SOB, productive cough    Code status: Full code      Subjective      4/16/24: No acute events overnight.  Patient continues to have a productive cough but otherwise has no concerns or complaints.  She still does have shortness of breath but states that this is improving.  She reports that she has been using her flutter valve.  She denies fever, chills, nausea, vomiting.    Seen saturating on 5 L which is her baseline      Review of Systems:  Constitutional: Denies fever, fatigue  Skin: Denies dark lesions, and rashes   HEENT: Denies sore throat, and hoarseness.  Cardiovascular: Denies palpitations, chest pain, and chest pressure.  Respiratory: SOB improving, increased mucous production. Denies hemoptysis,  Gastrointestinal: Denies nausea, vomiting, poor appetite, diarrhea, heartburn or reflux  Genitourinary: Denies dysuria, frequency  Musculoskeletal: Denies muscle weakness, and bone pain  Neurological: Denies headache, and focal weakness  Psychological: Denies anxiety and depression  Endocrine: Denies heat intolerance and cold intolerance  Hematopoietic/Lymphatic: Denies bleeding and bruising problems    24-hour events:      Objective   OBJECTIVE:   /65   Pulse 75   Temp 97.9 °F (36.6 °C) (Oral)   Resp 17   Ht 1.74 m (5' 8.5\")   Wt 69.1 kg (152 lb 5.4 oz)   SpO2 97%   BMI 22.83 kg/m²   SpO2 Readings from Last 1 Encounters:   04/16/24 97%        I/O:    Intake/Output Summary (Last 24 hours) at 4/16/2024 1219  Last data filed at 4/16/2024 1004  Gross per 24 hour   Intake 240 ml   Output 1600 ml   Net -1360 ml                      CURRENT MEDS :  Scheduled Meds:    consulted, follow-up discussion on CODE STATUS  Continue monitoring INR, on warfarin for A-fib/a flutter   Cardiology consulted, follow-up their recommendations      This plan of care was reviewed in collaboration with Dr. Bruner    Electronically signed by Shawn Abdalla MD on 4/16/2024 at 12:19 PM        This is confirmation that I have personally performed a substantial portion of medical decision making (>50%) related to this patient encounter.  The medications & laboratory data and imagery were discussed and adjusted where necessary. Key issues of the case were discussed among consultants.  Review of resident documentation was conducted and revisions were made as appropriate. I agree with the above documented exam, problem list and plan of care .    Her antibiotics have been switched to oral.  Will recommend PT OT evaluation.  Will need ambulatory pulse oximetry evaluation prior to discharge    Anjelica Bruner MD

## 2024-04-17 VITALS
SYSTOLIC BLOOD PRESSURE: 116 MMHG | HEART RATE: 60 BPM | WEIGHT: 151.46 LBS | OXYGEN SATURATION: 98 % | HEIGHT: 69 IN | TEMPERATURE: 98.3 F | RESPIRATION RATE: 20 BRPM | BODY MASS INDEX: 22.43 KG/M2 | DIASTOLIC BLOOD PRESSURE: 65 MMHG

## 2024-04-17 LAB
ANION GAP SERPL CALCULATED.3IONS-SCNC: 11 MMOL/L (ref 7–16)
BASOPHILS # BLD: 0 K/UL (ref 0–0.2)
BASOPHILS NFR BLD: 0 % (ref 0–2)
BUN SERPL-MCNC: 33 MG/DL (ref 6–23)
CALCIUM SERPL-MCNC: 9 MG/DL (ref 8.6–10.2)
CHLORIDE SERPL-SCNC: 99 MMOL/L (ref 98–107)
CO2 SERPL-SCNC: 31 MMOL/L (ref 22–29)
CREAT SERPL-MCNC: 0.9 MG/DL (ref 0.5–1)
EOSINOPHIL # BLD: 0 K/UL (ref 0.05–0.5)
EOSINOPHILS RELATIVE PERCENT: 0 % (ref 0–6)
ERYTHROCYTE [DISTWIDTH] IN BLOOD BY AUTOMATED COUNT: 13 % (ref 11.5–15)
GFR SERPL CREATININE-BSD FRML MDRD: 64 ML/MIN/1.73M2
GLUCOSE SERPL-MCNC: 149 MG/DL (ref 74–99)
HCT VFR BLD AUTO: 33.8 % (ref 34–48)
HGB BLD-MCNC: 10.4 G/DL (ref 11.5–15.5)
IMM GRANULOCYTES # BLD AUTO: 0.04 K/UL (ref 0–0.58)
IMM GRANULOCYTES NFR BLD: 1 % (ref 0–5)
LYMPHOCYTES NFR BLD: 0.49 K/UL (ref 1.5–4)
LYMPHOCYTES RELATIVE PERCENT: 9 % (ref 20–42)
MCH RBC QN AUTO: 30.1 PG (ref 26–35)
MCHC RBC AUTO-ENTMCNC: 30.8 G/DL (ref 32–34.5)
MCV RBC AUTO: 98 FL (ref 80–99.9)
MONOCYTES NFR BLD: 0.21 K/UL (ref 0.1–0.95)
MONOCYTES NFR BLD: 4 % (ref 2–12)
NEUTROPHILS NFR BLD: 86 % (ref 43–80)
NEUTS SEG NFR BLD: 4.47 K/UL (ref 1.8–7.3)
PLATELET # BLD AUTO: 165 K/UL (ref 130–450)
PMV BLD AUTO: 10.8 FL (ref 7–12)
POTASSIUM SERPL-SCNC: 4.3 MMOL/L (ref 3.5–5)
RBC # BLD AUTO: 3.45 M/UL (ref 3.5–5.5)
RBC # BLD: NORMAL 10*6/UL
SODIUM SERPL-SCNC: 141 MMOL/L (ref 132–146)
WBC OTHER # BLD: 5.2 K/UL (ref 4.5–11.5)

## 2024-04-17 PROCEDURE — 6360000002 HC RX W HCPCS: Performed by: STUDENT IN AN ORGANIZED HEALTH CARE EDUCATION/TRAINING PROGRAM

## 2024-04-17 PROCEDURE — 6370000000 HC RX 637 (ALT 250 FOR IP)

## 2024-04-17 PROCEDURE — 2700000000 HC OXYGEN THERAPY PER DAY

## 2024-04-17 PROCEDURE — 99239 HOSP IP/OBS DSCHRG MGMT >30: CPT | Performed by: STUDENT IN AN ORGANIZED HEALTH CARE EDUCATION/TRAINING PROGRAM

## 2024-04-17 PROCEDURE — 6370000000 HC RX 637 (ALT 250 FOR IP): Performed by: INTERNAL MEDICINE

## 2024-04-17 PROCEDURE — 80048 BASIC METABOLIC PNL TOTAL CA: CPT

## 2024-04-17 PROCEDURE — 94669 MECHANICAL CHEST WALL OSCILL: CPT

## 2024-04-17 PROCEDURE — 36415 COLL VENOUS BLD VENIPUNCTURE: CPT

## 2024-04-17 PROCEDURE — 6370000000 HC RX 637 (ALT 250 FOR IP): Performed by: STUDENT IN AN ORGANIZED HEALTH CARE EDUCATION/TRAINING PROGRAM

## 2024-04-17 PROCEDURE — 2580000003 HC RX 258

## 2024-04-17 PROCEDURE — 85025 COMPLETE CBC W/AUTO DIFF WBC: CPT

## 2024-04-17 PROCEDURE — 6360000002 HC RX W HCPCS

## 2024-04-17 PROCEDURE — 6360000002 HC RX W HCPCS: Performed by: INTERNAL MEDICINE

## 2024-04-17 PROCEDURE — 94640 AIRWAY INHALATION TREATMENT: CPT

## 2024-04-17 RX ORDER — PREDNISONE 10 MG/1
TABLET ORAL
Qty: 20 TABLET | Refills: 0 | Status: SHIPPED | OUTPATIENT
Start: 2024-04-18

## 2024-04-17 RX ORDER — METHYLPREDNISOLONE SODIUM SUCCINATE 40 MG/ML
40 INJECTION, POWDER, LYOPHILIZED, FOR SOLUTION INTRAMUSCULAR; INTRAVENOUS EVERY 12 HOURS
Status: DISCONTINUED | OUTPATIENT
Start: 2024-04-17 | End: 2024-04-17 | Stop reason: HOSPADM

## 2024-04-17 RX ORDER — CEFDINIR 300 MG/1
300 CAPSULE ORAL EVERY 12 HOURS SCHEDULED
Qty: 7 CAPSULE | Refills: 0 | Status: SHIPPED | OUTPATIENT
Start: 2024-04-17 | End: 2024-04-21

## 2024-04-17 RX ORDER — DOXYCYCLINE HYCLATE 100 MG/1
100 CAPSULE ORAL EVERY 12 HOURS SCHEDULED
Qty: 8 CAPSULE | Refills: 0 | Status: SHIPPED | OUTPATIENT
Start: 2024-04-17 | End: 2024-04-21

## 2024-04-17 RX ORDER — PREDNISONE 20 MG/1
40 TABLET ORAL
Status: DISCONTINUED | OUTPATIENT
Start: 2024-04-18 | End: 2024-04-17 | Stop reason: HOSPADM

## 2024-04-17 RX ADMIN — PANTOPRAZOLE SODIUM 40 MG: 40 TABLET, DELAYED RELEASE ORAL at 05:16

## 2024-04-17 RX ADMIN — ACETYLCYSTEINE 600 MG: 200 SOLUTION ORAL; RESPIRATORY (INHALATION) at 09:17

## 2024-04-17 RX ADMIN — DOXYCYCLINE HYCLATE 100 MG: 100 CAPSULE ORAL at 08:18

## 2024-04-17 RX ADMIN — LEVALBUTEROL HYDROCHLORIDE 0.63 MG: 0.63 SOLUTION RESPIRATORY (INHALATION) at 12:33

## 2024-04-17 RX ADMIN — LEVALBUTEROL HYDROCHLORIDE 0.63 MG: 0.63 SOLUTION RESPIRATORY (INHALATION) at 09:16

## 2024-04-17 RX ADMIN — ARFORMOTEROL TARTRATE 15 MCG: 15 SOLUTION RESPIRATORY (INHALATION) at 09:16

## 2024-04-17 RX ADMIN — LEVOTHYROXINE SODIUM 75 MCG: 75 TABLET ORAL at 08:18

## 2024-04-17 RX ADMIN — BUDESONIDE 250 MCG: 0.25 SUSPENSION RESPIRATORY (INHALATION) at 09:16

## 2024-04-17 RX ADMIN — EMPAGLIFLOZIN 10 MG: 10 TABLET, FILM COATED ORAL at 08:18

## 2024-04-17 RX ADMIN — METOPROLOL SUCCINATE 12.5 MG: 25 TABLET, FILM COATED, EXTENDED RELEASE ORAL at 08:19

## 2024-04-17 RX ADMIN — SACUBITRIL AND VALSARTAN 0.5 TABLET: 24; 26 TABLET, FILM COATED ORAL at 08:18

## 2024-04-17 RX ADMIN — SODIUM CHLORIDE, PRESERVATIVE FREE 10 ML: 5 INJECTION INTRAVENOUS at 08:19

## 2024-04-17 RX ADMIN — GUAIFENESIN 400 MG: 400 TABLET ORAL at 08:18

## 2024-04-17 RX ADMIN — METHYLPREDNISOLONE SODIUM SUCCINATE 40 MG: 40 INJECTION INTRAMUSCULAR; INTRAVENOUS at 08:19

## 2024-04-17 RX ADMIN — DIGOXIN 62.5 MCG: 0.12 TABLET ORAL at 08:17

## 2024-04-17 RX ADMIN — BUMETANIDE 0.5 MG: 1 TABLET ORAL at 08:19

## 2024-04-17 RX ADMIN — CEFDINIR 300 MG: 300 CAPSULE ORAL at 08:18

## 2024-04-17 RX ADMIN — LEVALBUTEROL HYDROCHLORIDE 0.63 MG: 0.63 SOLUTION RESPIRATORY (INHALATION) at 00:48

## 2024-04-17 ASSESSMENT — PAIN SCALES - GENERAL: PAINLEVEL_OUTOF10: 0

## 2024-04-17 NOTE — PROGRESS NOTES
Oral 2 times per day    doxycycline hyclate  100 mg Oral 2 times per day    sodium chloride flush  5-40 mL IntraVENous 2 times per day    guaiFENesin  400 mg Oral TID    digoxin  62.5 mcg Oral Daily    levothyroxine  75 mcg Oral Daily    metoprolol succinate  12.5 mg Oral QAM    pantoprazole  40 mg Oral QAM AC    rosuvastatin  10 mg Oral Nightly    sacubitril-valsartan  0.5 tablet Oral BID    budesonide  0.25 mg Nebulization BID RT    And    arformoterol tartrate  15 mcg Nebulization BID RT    levalbuterol  0.63 mg Nebulization Q6H    acetylcysteine  600 mg Inhalation BID RT    bumetanide  0.5 mg Oral BID    empagliflozin  10 mg Oral Daily       Physical Exam:  General Appearance: appears comfortable in no acute distress.   HEENT: Normocephalic atraumatic without obvious abnormality   Neck: Supple, no adenopathy;thyroid  Lung: Inspiratory crackles in all lung fields, most prominent in the left lower field. No wheezing.  Heart: RRR, normal S1, S2. No MRG  Abdomen: Soft, NT, ND. BS present x 4 quadrants. No bruit or organomegaly.   Extremities: Pedal pulses 2+ symmetric b/l.  Extremities normal, no cyanosis, clubbing, or edema.   Musculokeletal: No joint swelling, no muscle tenderness. ROM normal in all joints of extremities.   Neurologic: Mental status: Alert and Oriented X3 .      Pertinent/ New Labs and Imaging Studies     Imaging personally reviewed:  Chest x-ray 4/14/2024.  No acute cardiopulmonary process.  Large retrocardiac hiatal hernia.      CT chest 4/14/2024  IMPRESSION:  1. No acute intrathoracic abnormality.  2. Chronic right middle lobe collapse.  3. Cardiomegaly with coronary artery calcification.  4. Large retrocardiac hiatal hernia.         Echo: 4/16/2024    Interpretation Summary         Left Ventricle: Normal left ventricular systolic function. EF by visual approximation is 60%. Left ventricle size is normal. Normal wall thickness. Normal wall motion. Grade II diastolic dysfunction with increased  results for input(s): \"LABGRAM\" in the last 72 hours.  No results for input(s): \"LEGUR\" in the last 72 hours.  No results for input(s): \"STREPNEUMAGU\" in the last 72 hours.  No results for input(s): \"LP1UAG\" in the last 72 hours.  Strep and Legionella urine antigens negative.  Respiratory viral panel negative.  Respiratory culture with normal aayush.   Assessment:    Acute on chronic hypoxic respiratory failure  COPD with acute exacerbation  New LLL pneumonia  Chronic RML collapse  Squamous cell lung cancer stage IIB s/p radiation Aug 2021  ILD  Chronic heart failure improved EF 60%, grade 2 diastolic dysfunction.  Recent echo 4/16/2024  Atrial fibrillation      Plan:   Oxygen therapy 5 L nasal cannula this is her baseline keep SpO2 greater than 90%  ABG with a mild respiratory alkalosis (pH = 7.464, Pco2 = 41.1, PO2 = 77.8, HCO3 = 29.5)  Continue Omnicef and Doxy for CAP coverage.  Left lower lobe infiltrate.  Scheduled bronchodilators-continue Brovana and Pulmicort twice daily,  levalbuterol, every 6 hours, Mucomyst twice daily.  Flutter valve  Add incentive spirometer and EZ Pap  Solu-Medrol with taper every 12 hours as symptoms improve..  Oral prednisone in a.m.  Mucolytic-guaifenesin 400mg, 3 times daily  Gentle diuresis Bumex, 0.5 mg, twice daily  Palliative care signed off.  Patient wants to remain full code with all aggressive measures.  Including intubation   INR 2.2, on warfarin for A-fib/a flutter.  Cardiology following  Okay to discharge from a pulmonary perspective.  Next appointment scheduled 05/28/2024 at 01:20 PM in Pulmonology ([facility]) She also has oncology appointment at [facility] May 29.  This plan of care was reviewed in collaboration with Dr. Bruner    Electronically signed by OUMOU Price CNP on 4/17/2024 at 8:40 AM    This is confirmation that I have personally performed a substantial portion of medical decision making (>50%) related to this patient encounter.  The

## 2024-04-17 NOTE — PLAN OF CARE
Problem: Safety - Adult  Goal: Free from fall injury  4/17/2024 1016 by Esther Bill RN  Outcome: Progressing  4/16/2024 2332 by Derick Colon RN  Outcome: Progressing     Problem: Discharge Planning  Goal: Discharge to home or other facility with appropriate resources  4/17/2024 1016 by Esther Bill RN  Outcome: Progressing  4/16/2024 2332 by Derick Colon RN  Outcome: Progressing     Problem: ABCDS Injury Assessment  Goal: Absence of physical injury  4/17/2024 1016 by Esther Bill RN  Outcome: Progressing  4/16/2024 2332 by Derick Colon RN  Outcome: Progressing     Problem: Chronic Conditions and Co-morbidities  Goal: Patient's chronic conditions and co-morbidity symptoms are monitored and maintained or improved  4/17/2024 1016 by Esther Bill RN  Outcome: Progressing  4/16/2024 2332 by Derick Colon RN  Outcome: Progressing     Problem: Pain  Goal: Verbalizes/displays adequate comfort level or baseline comfort level  4/17/2024 1016 by Esther Bill RN  Outcome: Progressing  4/16/2024 2332 by Derick Colon RN  Outcome: Progressing

## 2024-04-17 NOTE — PLAN OF CARE
Problem: Safety - Adult  Goal: Free from fall injury  4/17/2024 1247 by Esther Bill RN  Outcome: Completed  4/17/2024 1016 by Esther Bill RN  Outcome: Progressing  4/16/2024 2332 by Derick Colon RN  Outcome: Progressing     Problem: Discharge Planning  Goal: Discharge to home or other facility with appropriate resources  4/17/2024 1247 by Esther Bill RN  Outcome: Completed  4/17/2024 1016 by Esther Bill RN  Outcome: Progressing  4/16/2024 2332 by Derick Colon RN  Outcome: Progressing     Problem: ABCDS Injury Assessment  Goal: Absence of physical injury  4/17/2024 1247 by Esther Bill RN  Outcome: Completed  4/17/2024 1016 by Esther Bill RN  Outcome: Progressing  4/16/2024 2332 by Derick Colon RN  Outcome: Progressing     Problem: Chronic Conditions and Co-morbidities  Goal: Patient's chronic conditions and co-morbidity symptoms are monitored and maintained or improved  4/17/2024 1247 by Esther Bill RN  Outcome: Completed  4/17/2024 1016 by Esther Bill RN  Outcome: Progressing  4/16/2024 2332 by Derick Colon RN  Outcome: Progressing     Problem: Pain  Goal: Verbalizes/displays adequate comfort level or baseline comfort level  4/17/2024 1247 by Esther Bill RN  Outcome: Completed  4/17/2024 1016 by Esther Bill RN  Outcome: Progressing  4/16/2024 2332 by Derick Colon RN  Outcome: Progressing

## 2024-04-17 NOTE — PLAN OF CARE
Problem: ABCDS Injury Assessment  Goal: Absence of physical injury  4/16/2024 2332 by Derick Colon RN  Outcome: Progressing  4/16/2024 1301 by Colton Hammond RN  Outcome: Progressing     Problem: Chronic Conditions and Co-morbidities  Goal: Patient's chronic conditions and co-morbidity symptoms are monitored and maintained or improved  Outcome: Progressing     Problem: Pain  Goal: Verbalizes/displays adequate comfort level or baseline comfort level  4/16/2024 2332 by Derick Colon RN  Outcome: Progressing  4/16/2024 1301 by Colton Hammond RN  Outcome: Progressing

## 2024-04-17 NOTE — PROGRESS NOTES
Pt and son given and explained all discharge instructions. All questions answered to satisfaction. Stressed the importance of all follow up doctor appts and medication compliance. Pt. In understanding.this RN wheeled patient to car.

## 2024-04-17 NOTE — DISCHARGE SUMMARY
[facility] Hospitalist Physician Discharge Summary       No follow-up provider specified.    Activity level: As tolerated     Dispo: home       Condition on discharge: Stable     Patient ID:  Krysatl Hyatt  [id number]  83 y.o.  1940    Admit date: 4/14/2024    Discharge date and time:  4/17/2024  11:58 AM    Admission Diagnoses: Principal Problem:    Acute on chronic respiratory failure (HCC)  Active Problems:    Chronic anticoagulation    Essential hypertension    COPD exacerbation (HCC)    PAF (paroxysmal atrial fibrillation) (HCC)  Resolved Problems:    * No resolved hospital problems. *      Discharge Diagnoses: Principal Problem:    Acute on chronic respiratory failure (HCC)  Active Problems:    Chronic anticoagulation    Essential hypertension    COPD exacerbation (HCC)    PAF (paroxysmal atrial fibrillation) (HCC)  Resolved Problems:    * No resolved hospital problems. *      Consults:  IP CONSULT TO PHARMACY  IP CONSULT TO PULMONOLOGY  IP CONSULT TO CARDIOLOGY  IP CONSULT TO IV TEAM  IP CONSULT TO PALLIATIVE CARE    Procedures:     Hospital Course:     83-year-old female present for short of breath.  Patient has a chronic hypoxic respiratory failure and also COPD on 4 L oxygen at home.  Admitted for bacterial pneumonia, COPD exacerbation.  Echo shows ejection fraction 60% pulmonology and cardiology consulted.  Patient continued to improve with antibiotics and a flutter well with mucolytic's.  Medically stable discharge home with antibiotic to finish full course.  Patient follow-up pulmonology and all cardiologist 1 week.    Discharge Exam:    General Appearance: alert and oriented to person, place and time and in no acute distress  Skin: warm and dry  Head: normocephalic and atraumatic  Eyes: pupils equal, round, and reactive to light, extraocular eye movements intact, conjunctivae normal  Neck: neck supple and non tender without mass   Pulmonary/Chest: clear to auscultation bilaterally- no  [phone]  [facility] [address]      Phone: [phone]   cefdinir 300 MG capsule  doxycycline hyclate 100 MG capsule  predniSONE 10 MG tablet           Note that more than 30 minutes was spent in preparing discharge papers, discussing discharge with patient, medication review, etc.    Signed:  Electronically signed by Ramya Lee DO on 4/17/2024 at 11:58 AM

## 2024-04-17 NOTE — PROGRESS NOTES
Pharmacy Consultation Note  (Warfarin Dosing and Monitoring)    Initial consult date: 4/14  Consulting physician: Archie    Clinical Pharmacy will defer to hospitalist for warfarin dosing and sign-off. Hospitalist will re-consult Clinical Pharmacy if needed.    Thank you for this consult,    Christiana Puri [facility] PharmD 4/17/2024 10:11 AM

## 2024-04-18 ENCOUNTER — COMMUNITY CARE MANAGEMENT (OUTPATIENT)
Facility: CLINIC | Age: 84
End: 2024-04-18

## 2024-04-22 ENCOUNTER — OFFICE VISIT (OUTPATIENT)
Dept: PRIMARY CARE CLINIC | Age: 84
End: 2024-04-22
Payer: MEDICARE

## 2024-04-22 VITALS
HEART RATE: 78 BPM | TEMPERATURE: 97.3 F | WEIGHT: 151 LBS | OXYGEN SATURATION: 98 % | DIASTOLIC BLOOD PRESSURE: 58 MMHG | SYSTOLIC BLOOD PRESSURE: 112 MMHG | BODY MASS INDEX: 22.36 KG/M2 | HEIGHT: 69 IN

## 2024-04-22 DIAGNOSIS — I48.0 PAF (PAROXYSMAL ATRIAL FIBRILLATION) (HCC): ICD-10-CM

## 2024-04-22 DIAGNOSIS — I10 ESSENTIAL HYPERTENSION: ICD-10-CM

## 2024-04-22 DIAGNOSIS — J96.11 CHRONIC RESPIRATORY FAILURE WITH HYPOXIA (HCC): ICD-10-CM

## 2024-04-22 DIAGNOSIS — C34.31 MALIGNANT NEOPLASM OF LOWER LOBE OF RIGHT LUNG (HCC): ICD-10-CM

## 2024-04-22 DIAGNOSIS — J44.9 COPD, VERY SEVERE (HCC): ICD-10-CM

## 2024-04-22 DIAGNOSIS — E03.9 ACQUIRED HYPOTHYROIDISM: ICD-10-CM

## 2024-04-22 DIAGNOSIS — Z09 HOSPITAL DISCHARGE FOLLOW-UP: Primary | ICD-10-CM

## 2024-04-22 PROCEDURE — 1090F PRES/ABSN URINE INCON ASSESS: CPT | Performed by: INTERNAL MEDICINE

## 2024-04-22 PROCEDURE — 3078F DIAST BP <80 MM HG: CPT | Performed by: INTERNAL MEDICINE

## 2024-04-22 PROCEDURE — 3023F SPIROM DOC REV: CPT | Performed by: INTERNAL MEDICINE

## 2024-04-22 PROCEDURE — 1111F DSCHRG MED/CURRENT MED MERGE: CPT | Performed by: INTERNAL MEDICINE

## 2024-04-22 PROCEDURE — 3074F SYST BP LT 130 MM HG: CPT | Performed by: INTERNAL MEDICINE

## 2024-04-22 PROCEDURE — 99214 OFFICE O/P EST MOD 30 MIN: CPT | Performed by: INTERNAL MEDICINE

## 2024-04-22 PROCEDURE — G8420 CALC BMI NORM PARAMETERS: HCPCS | Performed by: INTERNAL MEDICINE

## 2024-04-22 PROCEDURE — G8400 PT W/DXA NO RESULTS DOC: HCPCS | Performed by: INTERNAL MEDICINE

## 2024-04-22 PROCEDURE — 1036F TOBACCO NON-USER: CPT | Performed by: INTERNAL MEDICINE

## 2024-04-22 PROCEDURE — G8427 DOCREV CUR MEDS BY ELIG CLIN: HCPCS | Performed by: INTERNAL MEDICINE

## 2024-04-22 PROCEDURE — 1123F ACP DISCUSS/DSCN MKR DOCD: CPT | Performed by: INTERNAL MEDICINE

## 2024-04-22 RX ORDER — PANTOPRAZOLE SODIUM 40 MG/1
TABLET, DELAYED RELEASE ORAL
COMMUNITY
Start: 2024-04-21

## 2024-04-22 RX ORDER — CHOLECALCIFEROL (VITAMIN D3) 50 MCG
2000 TABLET ORAL DAILY
Qty: 30 TABLET | Refills: 1 | Status: SHIPPED | OUTPATIENT
Start: 2024-04-22

## 2024-04-22 RX ORDER — WARFARIN SODIUM 2.5 MG/1
2.5 TABLET ORAL NIGHTLY
Qty: 30 TABLET | Refills: 0 | Status: SHIPPED | OUTPATIENT
Start: 2024-04-22

## 2024-04-22 RX ORDER — SACUBITRIL AND VALSARTAN 24; 26 MG/1; MG/1
0.5 TABLET, FILM COATED ORAL 2 TIMES DAILY
Qty: 60 TABLET | Refills: 1 | Status: SHIPPED | OUTPATIENT
Start: 2024-04-22

## 2024-04-22 RX ORDER — LEVOTHYROXINE SODIUM 0.07 MG/1
75 TABLET ORAL DAILY
Qty: 30 TABLET | Refills: 1 | Status: SHIPPED | OUTPATIENT
Start: 2024-04-22

## 2024-04-22 RX ORDER — DIGOXIN 0.06 MG/1
62.5 TABLET ORAL DAILY
Qty: 30 TABLET | Refills: 3 | Status: SHIPPED | OUTPATIENT
Start: 2024-04-22

## 2024-04-22 RX ORDER — METOPROLOL SUCCINATE 25 MG/1
12.5 TABLET, EXTENDED RELEASE ORAL EVERY MORNING
Qty: 90 TABLET | Refills: 0 | Status: SHIPPED | OUTPATIENT
Start: 2024-04-22

## 2024-04-22 RX ORDER — BUMETANIDE 0.5 MG/1
0.5 TABLET ORAL DAILY
Qty: 30 TABLET | Refills: 3 | Status: SHIPPED | OUTPATIENT
Start: 2024-04-22

## 2024-04-22 RX ORDER — ROSUVASTATIN CALCIUM 10 MG/1
10 TABLET, COATED ORAL NIGHTLY
Qty: 90 TABLET | Refills: 2 | Status: SHIPPED | OUTPATIENT
Start: 2024-04-22

## 2024-04-22 ASSESSMENT — ENCOUNTER SYMPTOMS
SHORTNESS OF BREATH: 0
COUGH: 0
DIARRHEA: 0
VOMITING: 0
NAUSEA: 0
ABDOMINAL PAIN: 0

## 2024-04-22 NOTE — PROGRESS NOTES
succinate (TOPROL XL) 25 MG extended release tablet Take 0.5 tablets by mouth every morning 90 tablet 0    [DISCONTINUED] pantoprazole sodium (PROTONIX) 40 MG PACK packet Take 1 packet by mouth every morning (before breakfast) 30 each 2    [DISCONTINUED] Vitamin D (CHOLECALCIFEROL) 50 MCG (2000 UT) TABS tablet Take 1 tablet by mouth daily 30 tablet 1    [DISCONTINUED] rosuvastatin (CRESTOR) 10 MG tablet Take 1 tablet by mouth nightly 90 tablet 2    [DISCONTINUED] Apoaequorin (PREVAGEN EXTRA STRENGTH) 20 MG CAPS Take 20 mg by mouth every morning        No facility-administered encounter medications on file as of 4/22/2024.       Return in about 4 weeks (around 5/20/2024).        Reviewed recent labs related to Krystal's current problems      Discussed importance of regular Health Maintenance follow up  Health Maintenance   Topic    DTaP/Tdap/Td vaccine (1 - Tdap)    Shingles vaccine (1 of 2)    DEXA (modify frequency per FRAX score)     Respiratory Syncytial Virus (RSV) Pregnant or age 60 yrs+ (1 - 1-dose 60+ series)    Pneumococcal 65+ years Vaccine (2 of 2 - PPSV23 or PCV20)    COVID-19 Vaccine (5 - 2023-24 season)    Flu vaccine (Season Ended)    Lipids     Depression Screen     Annual Wellness Visit (Medicare)     Hepatitis A vaccine     Hepatitis B vaccine     Hib vaccine     Polio vaccine     Meningococcal (ACWY) vaccine

## 2024-04-30 ENCOUNTER — OFFICE VISIT (OUTPATIENT)
Dept: PRIMARY CARE CLINIC | Age: 84
End: 2024-04-30

## 2024-04-30 VITALS
TEMPERATURE: 98 F | OXYGEN SATURATION: 98 % | SYSTOLIC BLOOD PRESSURE: 120 MMHG | WEIGHT: 148 LBS | DIASTOLIC BLOOD PRESSURE: 80 MMHG | HEIGHT: 68 IN | BODY MASS INDEX: 22.43 KG/M2 | RESPIRATION RATE: 16 BRPM | HEART RATE: 72 BPM

## 2024-04-30 DIAGNOSIS — J44.9 COPD, VERY SEVERE (HCC): ICD-10-CM

## 2024-04-30 DIAGNOSIS — I48.0 PAF (PAROXYSMAL ATRIAL FIBRILLATION) (HCC): Primary | ICD-10-CM

## 2024-04-30 DIAGNOSIS — J20.9 ACUTE BRONCHITIS, UNSPECIFIED ORGANISM: ICD-10-CM

## 2024-04-30 RX ORDER — LEVOFLOXACIN 500 MG/1
500 TABLET, FILM COATED ORAL DAILY
Qty: 10 TABLET | Refills: 0 | Status: SHIPPED | OUTPATIENT
Start: 2024-04-30 | End: 2024-05-10

## 2024-04-30 ASSESSMENT — ENCOUNTER SYMPTOMS
COUGH: 0
SHORTNESS OF BREATH: 0
NAUSEA: 0
DIARRHEA: 0
VOMITING: 0
ABDOMINAL PAIN: 0

## 2024-04-30 NOTE — PROGRESS NOTES
SUBJECTIVE  Krystal Hyatt is a 83 y.o. female established was seen In the office  for evaluation.    HPI/Chief C/O:  Chief Complaint   Patient presents with    Coagulation Disorder     Patient is here for INR check up    Cough     Patient is here for a bloody cough/nose drainage    Greenish cough with streak of blood    Allergies   Allergen Reactions    Pacerone [Amiodarone] Shortness Of Breath    Cat Hair Extract Itching and Other (See Comments)     Patient states \"My son put this. I hope I'm not allergic, I have 4 Cats and 2 Birds, maybe to their dust.\"    Egg-Derived Products Nausea Only    Erythromycin Diarrhea, Nausea And Vomiting and Other (See Comments)     \"STOMACH PAINS\"      Pcn [Penicillins] Itching and Rash     PT STATES \"RASH FROM HEAD TO TOE, W/ SOMETHING CRAWLING UNDER MY SKIN\"    Seasonal Itching, Swelling and Other (See Comments)     RUNNY/ITCHY NOSE, WATERY/ITCHY EYES       ROS:  Review of Systems   Respiratory:  Negative for cough and shortness of breath.         Negative for Hemoptysis   Cardiovascular:  Negative for chest pain.   Gastrointestinal:  Negative for abdominal pain, diarrhea, nausea and vomiting.   Endocrine: Negative for polydipsia, polyphagia and polyuria.   Genitourinary:  Negative for dysuria and hematuria.   Skin:  Negative for rash.   Neurological:  Negative for tremors and seizures.        Past Medical/Surgical Hx;  Reviewed with patient      Diagnosis Date    Atrial fibrillation (HCC)     CHF (congestive heart failure) (HCC)     Emphysema, unspecified (HCC)     Hyperlipidemia     Hypertension     Lung cancer (HCC)     Mitral valve regurgitation     Oxygen dependent     Prolonged emergence from general anesthesia     post general anesthesia    Skin cancer     Thyroid disease      Past Surgical History:   Procedure Laterality Date    BREAST BIOPSY Right     benign    BRONCHOSCOPY N/A 04/20/2021    BRONCHOSCOPY/TRANSBRONCHIAL NEEDLE BIOPSY performed by Antony Rosario MD at

## 2024-05-01 ENCOUNTER — HOSPITAL ENCOUNTER (EMERGENCY)
Age: 84
Discharge: HOME | End: 2024-05-02
Attending: STUDENT IN AN ORGANIZED HEALTH CARE EDUCATION/TRAINING PROGRAM
Payer: MEDICARE

## 2024-05-01 ENCOUNTER — APPOINTMENT (OUTPATIENT)
Dept: GENERAL RADIOLOGY | Age: 84
End: 2024-05-01
Payer: MEDICARE

## 2024-05-01 ENCOUNTER — APPOINTMENT (OUTPATIENT)
Dept: CT IMAGING | Age: 84
End: 2024-05-01
Payer: MEDICARE

## 2024-05-01 VITALS
HEIGHT: 68 IN | TEMPERATURE: 97.2 F | WEIGHT: 148 LBS | HEART RATE: 79 BPM | DIASTOLIC BLOOD PRESSURE: 67 MMHG | OXYGEN SATURATION: 100 % | SYSTOLIC BLOOD PRESSURE: 132 MMHG | RESPIRATION RATE: 17 BRPM | BODY MASS INDEX: 22.43 KG/M2

## 2024-05-01 DIAGNOSIS — J18.9 PNEUMONIA DUE TO INFECTIOUS ORGANISM, UNSPECIFIED LATERALITY, UNSPECIFIED PART OF LUNG: ICD-10-CM

## 2024-05-01 DIAGNOSIS — E83.42 HYPOMAGNESEMIA: ICD-10-CM

## 2024-05-01 DIAGNOSIS — J44.1 COPD EXACERBATION (HCC): Primary | ICD-10-CM

## 2024-05-01 LAB
ALBUMIN SERPL-MCNC: 3.9 G/DL (ref 3.5–5.2)
ALP SERPL-CCNC: 58 U/L (ref 35–104)
ALT SERPL-CCNC: 12 U/L (ref 0–32)
ANION GAP SERPL CALCULATED.3IONS-SCNC: 9 MMOL/L (ref 7–16)
AST SERPL-CCNC: 21 U/L (ref 0–31)
BASOPHILS # BLD: 0 K/UL (ref 0–0.2)
BASOPHILS NFR BLD: 0 % (ref 0–2)
BILIRUB SERPL-MCNC: 0.4 MG/DL (ref 0–1.2)
BNP SERPL-MCNC: 514 PG/ML (ref 0–450)
BUN SERPL-MCNC: 17 MG/DL (ref 6–23)
CALCIUM SERPL-MCNC: 9.4 MG/DL (ref 8.6–10.2)
CHLORIDE SERPL-SCNC: 100 MMOL/L (ref 98–107)
CO2 SERPL-SCNC: 32 MMOL/L (ref 22–29)
CREAT SERPL-MCNC: 0.9 MG/DL (ref 0.5–1)
EOSINOPHIL # BLD: 0.04 K/UL (ref 0.05–0.5)
EOSINOPHILS RELATIVE PERCENT: 1 % (ref 0–6)
ERYTHROCYTE [DISTWIDTH] IN BLOOD BY AUTOMATED COUNT: 13 % (ref 11.5–15)
GFR, ESTIMATED: 59 ML/MIN/1.73M2
GLUCOSE SERPL-MCNC: 101 MG/DL (ref 74–99)
HCT VFR BLD AUTO: 30.7 % (ref 34–48)
HGB BLD-MCNC: 9.3 G/DL (ref 11.5–15.5)
IMM GRANULOCYTES # BLD AUTO: <0.03 K/UL (ref 0–0.58)
IMM GRANULOCYTES NFR BLD: 1 % (ref 0–5)
INFLUENZA A BY PCR: NOT DETECTED
INFLUENZA B BY PCR: NOT DETECTED
INR PPP: 1.9
LACTATE BLDV-SCNC: 1 MMOL/L (ref 0.5–2.2)
LYMPHOCYTES NFR BLD: 0.82 K/UL (ref 1.5–4)
LYMPHOCYTES RELATIVE PERCENT: 26 % (ref 20–42)
MAGNESIUM SERPL-MCNC: 1.4 MG/DL (ref 1.6–2.6)
MCH RBC QN AUTO: 29.8 PG (ref 26–35)
MCHC RBC AUTO-ENTMCNC: 30.3 G/DL (ref 32–34.5)
MCV RBC AUTO: 98.4 FL (ref 80–99.9)
MONOCYTES NFR BLD: 0.21 K/UL (ref 0.1–0.95)
MONOCYTES NFR BLD: 7 % (ref 2–12)
NEUTROPHILS NFR BLD: 66 % (ref 43–80)
NEUTS SEG NFR BLD: 2.13 K/UL (ref 1.8–7.3)
PLATELET, FLUORESCENCE: 105 K/UL (ref 130–450)
PMV BLD AUTO: 11.1 FL (ref 7–12)
POTASSIUM SERPL-SCNC: 4.3 MMOL/L (ref 3.5–5)
PROT SERPL-MCNC: 6.5 G/DL (ref 6.4–8.3)
PROTHROMBIN TIME: 21 SEC (ref 9.3–12.4)
RBC # BLD AUTO: 3.12 M/UL (ref 3.5–5.5)
SARS-COV-2 RDRP RESP QL NAA+PROBE: NOT DETECTED
SODIUM SERPL-SCNC: 141 MMOL/L (ref 132–146)
SPECIMEN DESCRIPTION: NORMAL
TROPONIN I SERPL HS-MCNC: 22 NG/L (ref 0–9)
WBC OTHER # BLD: 3.2 K/UL (ref 4.5–11.5)

## 2024-05-01 PROCEDURE — 87502 INFLUENZA DNA AMP PROBE: CPT

## 2024-05-01 PROCEDURE — 85610 PROTHROMBIN TIME: CPT

## 2024-05-01 PROCEDURE — 96375 TX/PRO/DX INJ NEW DRUG ADDON: CPT

## 2024-05-01 PROCEDURE — 71260 CT THORAX DX C+: CPT

## 2024-05-01 PROCEDURE — 83880 ASSAY OF NATRIURETIC PEPTIDE: CPT

## 2024-05-01 PROCEDURE — 94664 DEMO&/EVAL PT USE INHALER: CPT

## 2024-05-01 PROCEDURE — 96366 THER/PROPH/DIAG IV INF ADDON: CPT

## 2024-05-01 PROCEDURE — 71045 X-RAY EXAM CHEST 1 VIEW: CPT

## 2024-05-01 PROCEDURE — 87635 SARS-COV-2 COVID-19 AMP PRB: CPT

## 2024-05-01 PROCEDURE — 6360000002 HC RX W HCPCS: Performed by: STUDENT IN AN ORGANIZED HEALTH CARE EDUCATION/TRAINING PROGRAM

## 2024-05-01 PROCEDURE — 84145 PROCALCITONIN (PCT): CPT

## 2024-05-01 PROCEDURE — 83605 ASSAY OF LACTIC ACID: CPT

## 2024-05-01 PROCEDURE — 99285 EMERGENCY DEPT VISIT HI MDM: CPT

## 2024-05-01 PROCEDURE — 96365 THER/PROPH/DIAG IV INF INIT: CPT

## 2024-05-01 PROCEDURE — 6370000000 HC RX 637 (ALT 250 FOR IP): Performed by: STUDENT IN AN ORGANIZED HEALTH CARE EDUCATION/TRAINING PROGRAM

## 2024-05-01 PROCEDURE — 84484 ASSAY OF TROPONIN QUANT: CPT

## 2024-05-01 PROCEDURE — 83735 ASSAY OF MAGNESIUM: CPT

## 2024-05-01 PROCEDURE — 85025 COMPLETE CBC W/AUTO DIFF WBC: CPT

## 2024-05-01 PROCEDURE — 2580000003 HC RX 258: Performed by: STUDENT IN AN ORGANIZED HEALTH CARE EDUCATION/TRAINING PROGRAM

## 2024-05-01 PROCEDURE — 80053 COMPREHEN METABOLIC PANEL: CPT

## 2024-05-01 PROCEDURE — 6360000004 HC RX CONTRAST MEDICATION: Performed by: RADIOLOGY

## 2024-05-01 RX ORDER — 0.9 % SODIUM CHLORIDE 0.9 %
500 INTRAVENOUS SOLUTION INTRAVENOUS ONCE
Status: COMPLETED | OUTPATIENT
Start: 2024-05-01 | End: 2024-05-01

## 2024-05-01 RX ORDER — ONDANSETRON 2 MG/ML
4 INJECTION INTRAMUSCULAR; INTRAVENOUS ONCE
Status: DISCONTINUED | OUTPATIENT
Start: 2024-05-01 | End: 2024-05-02 | Stop reason: HOSPADM

## 2024-05-01 RX ORDER — IPRATROPIUM BROMIDE AND ALBUTEROL SULFATE 2.5; .5 MG/3ML; MG/3ML
1 SOLUTION RESPIRATORY (INHALATION) ONCE
Status: COMPLETED | OUTPATIENT
Start: 2024-05-01 | End: 2024-05-01

## 2024-05-01 RX ORDER — METHYLPREDNISOLONE SODIUM SUCCINATE 125 MG/2ML
125 INJECTION, POWDER, LYOPHILIZED, FOR SOLUTION INTRAMUSCULAR; INTRAVENOUS ONCE
Status: COMPLETED | OUTPATIENT
Start: 2024-05-01 | End: 2024-05-02

## 2024-05-01 RX ORDER — MAGNESIUM SULFATE IN WATER 40 MG/ML
2000 INJECTION, SOLUTION INTRAVENOUS ONCE
Status: COMPLETED | OUTPATIENT
Start: 2024-05-01 | End: 2024-05-02

## 2024-05-01 RX ADMIN — IPRATROPIUM BROMIDE AND ALBUTEROL SULFATE 1 DOSE: 2.5; .5 SOLUTION RESPIRATORY (INHALATION) at 19:12

## 2024-05-01 RX ADMIN — SODIUM CHLORIDE 500 ML: 9 INJECTION, SOLUTION INTRAVENOUS at 19:10

## 2024-05-01 RX ADMIN — MAGNESIUM SULFATE HEPTAHYDRATE 2000 MG: 40 INJECTION, SOLUTION INTRAVENOUS at 22:36

## 2024-05-01 RX ADMIN — IOPAMIDOL 75 ML: 755 INJECTION, SOLUTION INTRAVENOUS at 20:58

## 2024-05-01 NOTE — ED PROVIDER NOTES
[facility] EMERGENCY DEPARTMENT  EMERGENCY DEPARTMENT ENCOUNTER        Pt Name: Krystal Hyatt  MRN: [id number]  Birthdate 1940  Date of evaluation: 5/1/2024  Provider: Maria Luz Cerna MD  PCP: Rom Mckeon MD  Note Started: 7:01 PM EDT 5/1/24    HPI  83 y.o. female with COPD on 5 L NC O2 chronically presenting for shortness of breath, cough.  Patient states she started coughing up streaks of blood yesterday.  She saw her PCP, who put her on antibiotics and had her hold her Coumadin.  She states she is coughing up some streaks of blood with green and yellow sputum.  She complains of fatigue and feeling worse today.  She complains of some shortness of breath today as well.  She denies any fevers, chest pain, nausea, emesis, or diarrhea.    --------------------------------------------- PAST HISTORY ---------------------------------------------  Past Medical History:  has a past medical history of Atrial fibrillation (HCC), CHF (congestive heart failure) (HCC), Emphysema, unspecified (HCC), Hyperlipidemia, Hypertension, Lung cancer (HCC), Mitral valve regurgitation, Oxygen dependent, Prolonged emergence from general anesthesia, Skin cancer, and Thyroid disease.    Past Surgical History:  has a past surgical history that includes Breast biopsy (Right); Tubal ligation; bronchoscopy (N/A, 04/20/2021); Intracapsular cataract extraction (Right, 12/16/2021); Intracapsular cataract extraction (Left, 02/24/2022); and Cardioversion (10/06/2023).    Social History:  reports that she quit smoking about 25 years ago. Her smoking use included cigarettes. She started smoking about 71 years ago. She has a 92.0 pack-year smoking history. She has been exposed to tobacco smoke. She has never used smokeless tobacco. She reports current alcohol use of about 1.0 standard drink of alcohol per week. She reports that she does not use drugs.    Family History: family history includes Cancer in

## 2024-05-02 LAB
EKG ATRIAL RATE: 86 BPM
EKG P AXIS: 88 DEGREES
EKG P-R INTERVAL: 152 MS
EKG Q-T INTERVAL: 368 MS
EKG QRS DURATION: 72 MS
EKG QTC CALCULATION (BAZETT): 440 MS
EKG R AXIS: 14 DEGREES
EKG T AXIS: 57 DEGREES
EKG VENTRICULAR RATE: 86 BPM
PROCALCITONIN SERPL-MCNC: 0.04 NG/ML (ref 0–0.08)
TROPONIN I SERPL HS-MCNC: 27 NG/L (ref 0–9)

## 2024-05-02 PROCEDURE — 84484 ASSAY OF TROPONIN QUANT: CPT

## 2024-05-02 PROCEDURE — 96375 TX/PRO/DX INJ NEW DRUG ADDON: CPT

## 2024-05-02 PROCEDURE — 6360000002 HC RX W HCPCS: Performed by: STUDENT IN AN ORGANIZED HEALTH CARE EDUCATION/TRAINING PROGRAM

## 2024-05-02 RX ORDER — PREDNISONE 20 MG/1
40 TABLET ORAL DAILY
Qty: 10 TABLET | Refills: 0 | Status: SHIPPED | OUTPATIENT
Start: 2024-05-02 | End: 2024-05-07

## 2024-05-02 RX ADMIN — METHYLPREDNISOLONE SODIUM SUCCINATE 125 MG: 125 INJECTION INTRAMUSCULAR; INTRAVENOUS at 01:12

## 2024-05-02 NOTE — DISCHARGE INSTR - COC
Continuity of Care Form    Patient Name: Krystal Hyatt   :  1940  MRN:  [id number]    Admit date:  2024  Discharge date:  ***    Code Status Order: Prior   Advance Directives:     Admitting Physician:  No admitting provider for patient encounter.  PCP: Rom Mckeon MD    Discharging Nurse: ***  Discharging Hospital Unit/Room#:   Discharging Unit Phone Number: ***    Emergency Contact:   Extended Emergency Contact Information  Primary Emergency Contact: Dieter Hyatt   [facility]  Home Phone: [phone]  Mobile Phone: [phone]  Relation: Child  Secondary Emergency Contact: Krystal Rice  Address: [address] [place] [address]  Home Phone: [phone]  Mobile Phone: [phone]  Relation: Child    Past Surgical History:  Past Surgical History:   Procedure Laterality Date    BREAST BIOPSY Right     benign    BRONCHOSCOPY N/A 2021    BRONCHOSCOPY/TRANSBRONCHIAL NEEDLE BIOPSY performed by Antony Rosario MD at [facility] ENDOSCOPY    CARDIOVERSION  10/06/2023    Dr Jones    INTRACAPSULAR CATARACT EXTRACTION Right 2021    RIGHT EYE CATARACT EXTRACTION IOL IMPLANT performed by Kay Pimentel MD at [facility] OR    INTRACAPSULAR CATARACT EXTRACTION Left 2022    LEFT EYE CATARACT EXTRACTION IOL IMPLANT performed by Kay Pimentel MD at [facility] OR    TUBAL LIGATION         Immunization History:   Immunization History   Administered Date(s) Administered    COVID-19, MODERNA BLUE border, Primary or Immunocompromised, (age 12y+), IM, 100 mcg/0.5mL 2021, 2021, 2022    COVID-19, MODERNA Bivalent, (age 12y+), IM, 50 mcg/0.5 mL 2022    Pneumococcal, PCV-13, PREVNAR 13, (age 6w+), IM, 0.5mL 2018       Active Problems:  Patient Active Problem List   Diagnosis Code    Atrial fibrillation with RVR (HCC)- Recurrent I48.91    Acute on chronic diastolic congestive heart failure (HCC) I50.33

## 2024-05-02 NOTE — ED NOTES
Patient ambulated. Her Pulse ox was 93% throughout on her baseline of 5 L.  Her Heartrate was 80-85

## 2024-05-02 NOTE — DISCHARGE INSTRUCTIONS
Please return to the ER for any new or worsening symptoms including but not limited to Fever or Chest pain or difficulty breathing  If prescribed, please be sure to  your prescriptions from the pharmacy  Please follow-up with Primary care provider as instructed

## 2024-05-02 NOTE — ED NOTES
Discharge instructions discussed and reviewed with the patient.  She verbalized understanding and has no questions or concerns at this time.  Peripheral IV removed.

## 2024-05-03 ASSESSMENT — ENCOUNTER SYMPTOMS
ABDOMINAL PAIN: 0
SORE THROAT: 0
COUGH: 1
NAUSEA: 0
SHORTNESS OF BREATH: 1
DIARRHEA: 0
BACK PAIN: 0
PHOTOPHOBIA: 0

## 2024-05-06 LAB
EKG ATRIAL RATE: 86 BPM
EKG P AXIS: 88 DEGREES
EKG P-R INTERVAL: 152 MS
EKG Q-T INTERVAL: 368 MS
EKG QRS DURATION: 72 MS
EKG QTC CALCULATION (BAZETT): 440 MS
EKG R AXIS: 14 DEGREES
EKG T AXIS: 57 DEGREES
EKG VENTRICULAR RATE: 86 BPM

## 2024-05-09 DIAGNOSIS — J44.9 COPD, VERY SEVERE (HCC): ICD-10-CM

## 2024-05-09 NOTE — TELEPHONE ENCOUNTER
----- Message from Maureen Deal sent at 5/9/2024 10:08 AM EDT -----  Subject: Refill Request    QUESTIONS  Name of Medication? albuterol (PROVENTIL) (2.5 MG/3ML) 0.083% nebulizer   solution  Patient-reported dosage and instructions? 3 to 4 times daily   How many days do you have left? 0  Preferred Pharmacy? [id number]  Pharmacy phone number (if available)? [phone]  ---------------------------------------------------------------------------  --------------  CALL BACK INFO  What is the best way for the office to contact you? OK to leave message on   voicemail  Preferred Call Back Phone Number? [phone]  ---------------------------------------------------------------------------  --------------  SCRIPT ANSWERS  Relationship to Patient? Self

## 2024-05-10 DIAGNOSIS — J44.9 COPD, VERY SEVERE (HCC): ICD-10-CM

## 2024-05-10 RX ORDER — ALBUTEROL SULFATE 2.5 MG/3ML
2.5 SOLUTION RESPIRATORY (INHALATION) 4 TIMES DAILY PRN
Qty: 120 EACH | Refills: 2 | Status: SHIPPED
Start: 2024-05-10 | End: 2024-05-10 | Stop reason: SDUPTHER

## 2024-05-10 RX ORDER — CHOLECALCIFEROL (VITAMIN D3) 50 MCG
2000 TABLET ORAL DAILY
Qty: 30 TABLET | Refills: 1 | Status: SHIPPED | OUTPATIENT
Start: 2024-05-10

## 2024-05-10 RX ORDER — ASCORBIC ACID 500 MG
500 TABLET ORAL DAILY
Qty: 30 TABLET | Refills: 2 | Status: SHIPPED | OUTPATIENT
Start: 2024-05-10

## 2024-05-10 RX ORDER — PANTOPRAZOLE SODIUM 40 MG/1
TABLET, DELAYED RELEASE ORAL
Qty: 30 TABLET | Refills: 1 | Status: SHIPPED | OUTPATIENT
Start: 2024-05-10

## 2024-05-10 RX ORDER — ALBUTEROL SULFATE 2.5 MG/3ML
2.5 SOLUTION RESPIRATORY (INHALATION) 4 TIMES DAILY PRN
Qty: 120 EACH | Refills: 2 | Status: SHIPPED | OUTPATIENT
Start: 2024-05-10

## 2024-05-10 NOTE — TELEPHONE ENCOUNTER
Patient requesting a refill of her   albuterol (PROVENTIL) (2.5 MG/3ML) 0.083% nebulizer solution [       pantoprazole (PROTONIX) 40 MG tablet       vitamin D (CHOLECALCIFEROL) 50 MCG (2000 UT) TABS tablet       vitamin C (ASCORBIC ACID) 500 MG tablet        [facility] [address] [place] [address] [address] [facility]    Thank you.

## 2024-05-20 ENCOUNTER — OFFICE VISIT (OUTPATIENT)
Dept: PRIMARY CARE CLINIC | Age: 84
End: 2024-05-20
Payer: MEDICARE

## 2024-05-20 VITALS
RESPIRATION RATE: 17 BRPM | HEIGHT: 68 IN | HEART RATE: 85 BPM | DIASTOLIC BLOOD PRESSURE: 70 MMHG | SYSTOLIC BLOOD PRESSURE: 122 MMHG | BODY MASS INDEX: 21.82 KG/M2 | TEMPERATURE: 97 F | OXYGEN SATURATION: 95 % | WEIGHT: 144 LBS

## 2024-05-20 DIAGNOSIS — E03.9 ACQUIRED HYPOTHYROIDISM: ICD-10-CM

## 2024-05-20 DIAGNOSIS — J96.11 CHRONIC RESPIRATORY FAILURE WITH HYPOXIA (HCC): ICD-10-CM

## 2024-05-20 DIAGNOSIS — C34.31 MALIGNANT NEOPLASM OF LOWER LOBE OF RIGHT LUNG (HCC): ICD-10-CM

## 2024-05-20 DIAGNOSIS — I48.0 PAF (PAROXYSMAL ATRIAL FIBRILLATION) (HCC): Primary | ICD-10-CM

## 2024-05-20 DIAGNOSIS — I10 ESSENTIAL HYPERTENSION: ICD-10-CM

## 2024-05-20 DIAGNOSIS — J20.9 ACUTE BRONCHITIS, UNSPECIFIED ORGANISM: ICD-10-CM

## 2024-05-20 LAB — INR BLD: 1.3

## 2024-05-20 PROCEDURE — 1123F ACP DISCUSS/DSCN MKR DOCD: CPT | Performed by: INTERNAL MEDICINE

## 2024-05-20 PROCEDURE — 1036F TOBACCO NON-USER: CPT | Performed by: INTERNAL MEDICINE

## 2024-05-20 PROCEDURE — 3074F SYST BP LT 130 MM HG: CPT | Performed by: INTERNAL MEDICINE

## 2024-05-20 PROCEDURE — 99214 OFFICE O/P EST MOD 30 MIN: CPT | Performed by: INTERNAL MEDICINE

## 2024-05-20 PROCEDURE — G8427 DOCREV CUR MEDS BY ELIG CLIN: HCPCS | Performed by: INTERNAL MEDICINE

## 2024-05-20 PROCEDURE — G8400 PT W/DXA NO RESULTS DOC: HCPCS | Performed by: INTERNAL MEDICINE

## 2024-05-20 PROCEDURE — 1090F PRES/ABSN URINE INCON ASSESS: CPT | Performed by: INTERNAL MEDICINE

## 2024-05-20 PROCEDURE — G8420 CALC BMI NORM PARAMETERS: HCPCS | Performed by: INTERNAL MEDICINE

## 2024-05-20 PROCEDURE — 3078F DIAST BP <80 MM HG: CPT | Performed by: INTERNAL MEDICINE

## 2024-05-20 RX ORDER — LEVOFLOXACIN 750 MG/1
750 TABLET, FILM COATED ORAL DAILY
Qty: 10 TABLET | Refills: 0 | Status: SHIPPED | OUTPATIENT
Start: 2024-05-20 | End: 2024-05-30

## 2024-05-20 RX ORDER — METHYLPREDNISOLONE 4 MG/1
TABLET ORAL
Qty: 1 KIT | Refills: 0 | Status: SHIPPED | OUTPATIENT
Start: 2024-05-20 | End: 2024-05-26

## 2024-05-20 ASSESSMENT — ENCOUNTER SYMPTOMS
ABDOMINAL PAIN: 0
DIARRHEA: 0
COUGH: 1
SHORTNESS OF BREATH: 1
NAUSEA: 0
VOMITING: 0

## 2024-05-20 NOTE — PROGRESS NOTES
CARDIOVERSION  10/06/2023    Dr Jones    INTRACAPSULAR CATARACT EXTRACTION Right 2021    RIGHT EYE CATARACT EXTRACTION IOL IMPLANT performed by Kay Pimentel MD at [facility] OR    INTRACAPSULAR CATARACT EXTRACTION Left 2022    LEFT EYE CATARACT EXTRACTION IOL IMPLANT performed by Kay Pimentel MD at [facility] OR    TUBAL LIGATION         Past Family Hx:  Reviewed with patient      Problem Relation Age of Onset    Cancer Mother     Other Mother         APLASTIC ANEMIA    Peripheral Vascular Disease Mother     Emphysema Father     Lung Disease Father        Social Hx:  Reviewed with patient  Social History     Tobacco Use    Smoking status: Former     Current packs/day: 0.00     Average packs/day: 2.0 packs/day for 46.0 years (92.0 ttl pk-yrs)     Types: Cigarettes     Start date: 1952     Quit date: 1998     Years since quittin.8     Passive exposure: Past    Smokeless tobacco: Never    Tobacco comments:     Patient quit smoking 24 yrs. ago.   Substance Use Topics    Alcohol use: Yes     Alcohol/week: 1.0 standard drink of alcohol     Types: 1 Glasses of wine per week     Comment: nightly       OBJECTIVE  /70   Pulse 85   Temp 97 °F (36.1 °C)   Resp 17   Ht 1.727 m (5' 8\")   Wt 65.3 kg (144 lb)   SpO2 95%   BMI 21.90 kg/m²     Problem List:  Krystal does not have any pertinent problems on file.    PHYS EX:  Physical Exam  Eyes:      Extraocular Movements: Extraocular movements intact.      Pupils: Pupils are equal, round, and reactive to light.   Neck:      Thyroid: No thyromegaly.      Vascular: No carotid bruit or JVD.   Cardiovascular:      Heart sounds: No murmur heard.     No gallop.   Pulmonary:      Effort: Pulmonary effort is normal.      Breath sounds: Rhonchi and rales present.   Abdominal:      Palpations: There is no hepatomegaly or splenomegaly.      Tenderness: There is no abdominal tenderness.   Skin:     Coloration: Skin is not cyanotic.      Findings:

## 2024-05-31 RX ORDER — DOXYCYCLINE HYCLATE 100 MG
100 TABLET ORAL 2 TIMES DAILY
Qty: 20 TABLET | Refills: 0 | Status: SHIPPED | OUTPATIENT
Start: 2024-05-31 | End: 2024-06-10

## 2024-05-31 NOTE — TELEPHONE ENCOUNTER
Krystal requesting a call back to let her know if Dr. Jatin Narvaez wants her to remain on an antibiotic as she is still wheezing. States she is trying to do the Albuterol 4 times a day. Please give her a call back at [phone].

## 2024-06-17 ENCOUNTER — OFFICE VISIT (OUTPATIENT)
Dept: PRIMARY CARE CLINIC | Age: 84
End: 2024-06-17
Payer: MEDICARE

## 2024-06-17 VITALS
WEIGHT: 150 LBS | DIASTOLIC BLOOD PRESSURE: 70 MMHG | RESPIRATION RATE: 17 BRPM | OXYGEN SATURATION: 94 % | HEART RATE: 81 BPM | BODY MASS INDEX: 22.73 KG/M2 | SYSTOLIC BLOOD PRESSURE: 120 MMHG | TEMPERATURE: 97.6 F | HEIGHT: 68 IN

## 2024-06-17 DIAGNOSIS — J40 BRONCHITIS: ICD-10-CM

## 2024-06-17 DIAGNOSIS — J96.11 CHRONIC RESPIRATORY FAILURE WITH HYPOXIA (HCC): ICD-10-CM

## 2024-06-17 DIAGNOSIS — J44.9 COPD, VERY SEVERE (HCC): ICD-10-CM

## 2024-06-17 DIAGNOSIS — I48.0 PAF (PAROXYSMAL ATRIAL FIBRILLATION) (HCC): Primary | ICD-10-CM

## 2024-06-17 LAB — INR BLD: 1.3

## 2024-06-17 PROCEDURE — G8400 PT W/DXA NO RESULTS DOC: HCPCS | Performed by: INTERNAL MEDICINE

## 2024-06-17 PROCEDURE — 1036F TOBACCO NON-USER: CPT | Performed by: INTERNAL MEDICINE

## 2024-06-17 PROCEDURE — 3023F SPIROM DOC REV: CPT | Performed by: INTERNAL MEDICINE

## 2024-06-17 PROCEDURE — G8420 CALC BMI NORM PARAMETERS: HCPCS | Performed by: INTERNAL MEDICINE

## 2024-06-17 PROCEDURE — 1123F ACP DISCUSS/DSCN MKR DOCD: CPT | Performed by: INTERNAL MEDICINE

## 2024-06-17 PROCEDURE — 3074F SYST BP LT 130 MM HG: CPT | Performed by: INTERNAL MEDICINE

## 2024-06-17 PROCEDURE — G8427 DOCREV CUR MEDS BY ELIG CLIN: HCPCS | Performed by: INTERNAL MEDICINE

## 2024-06-17 PROCEDURE — 1090F PRES/ABSN URINE INCON ASSESS: CPT | Performed by: INTERNAL MEDICINE

## 2024-06-17 PROCEDURE — 3078F DIAST BP <80 MM HG: CPT | Performed by: INTERNAL MEDICINE

## 2024-06-17 PROCEDURE — 99213 OFFICE O/P EST LOW 20 MIN: CPT | Performed by: INTERNAL MEDICINE

## 2024-06-17 RX ORDER — BUMETANIDE 0.5 MG/1
0.5 TABLET ORAL DAILY
Qty: 30 TABLET | Refills: 3 | Status: SHIPPED | OUTPATIENT
Start: 2024-06-17

## 2024-06-17 RX ORDER — DIGOXIN 0.06 MG/1
62.5 TABLET ORAL DAILY
Qty: 30 TABLET | Refills: 3 | Status: SHIPPED | OUTPATIENT
Start: 2024-06-17

## 2024-06-17 RX ORDER — LEVOTHYROXINE SODIUM 0.07 MG/1
75 TABLET ORAL DAILY
Qty: 30 TABLET | Refills: 1 | Status: SHIPPED | OUTPATIENT
Start: 2024-06-17

## 2024-06-17 RX ORDER — ASCORBIC ACID 500 MG
500 TABLET ORAL DAILY
Qty: 30 TABLET | Refills: 2 | Status: SHIPPED | OUTPATIENT
Start: 2024-06-17

## 2024-06-17 RX ORDER — ALBUTEROL SULFATE 2.5 MG/3ML
2.5 SOLUTION RESPIRATORY (INHALATION) 4 TIMES DAILY PRN
Qty: 120 EACH | Refills: 2 | Status: SHIPPED | OUTPATIENT
Start: 2024-06-17

## 2024-06-17 RX ORDER — PANTOPRAZOLE SODIUM 40 MG/1
TABLET, DELAYED RELEASE ORAL
Qty: 30 TABLET | Refills: 1 | Status: SHIPPED | OUTPATIENT
Start: 2024-06-17

## 2024-06-17 RX ORDER — METOPROLOL SUCCINATE 25 MG/1
12.5 TABLET, EXTENDED RELEASE ORAL EVERY MORNING
Qty: 90 TABLET | Refills: 0 | Status: SHIPPED | OUTPATIENT
Start: 2024-06-17

## 2024-06-17 RX ORDER — ALBUTEROL SULFATE 90 UG/1
2 AEROSOL, METERED RESPIRATORY (INHALATION) EVERY 6 HOURS PRN
Qty: 18 G | Refills: 3 | Status: SHIPPED | OUTPATIENT
Start: 2024-06-17

## 2024-06-17 RX ORDER — SULFAMETHOXAZOLE AND TRIMETHOPRIM 800; 160 MG/1; MG/1
1 TABLET ORAL 2 TIMES DAILY
Qty: 20 TABLET | Refills: 0 | Status: SHIPPED | OUTPATIENT
Start: 2024-06-17 | End: 2024-06-27

## 2024-06-17 RX ORDER — SACUBITRIL AND VALSARTAN 24; 26 MG/1; MG/1
0.5 TABLET, FILM COATED ORAL 2 TIMES DAILY
Qty: 60 TABLET | Refills: 1 | Status: SHIPPED | OUTPATIENT
Start: 2024-06-17

## 2024-06-17 RX ORDER — CHOLECALCIFEROL (VITAMIN D3) 50 MCG
2000 TABLET ORAL DAILY
Qty: 30 TABLET | Refills: 1 | Status: SHIPPED | OUTPATIENT
Start: 2024-06-17

## 2024-06-17 RX ORDER — ROSUVASTATIN CALCIUM 10 MG/1
10 TABLET, COATED ORAL NIGHTLY
Qty: 90 TABLET | Refills: 2 | Status: SHIPPED | OUTPATIENT
Start: 2024-06-17

## 2024-06-17 ASSESSMENT — ENCOUNTER SYMPTOMS
DIARRHEA: 0
SHORTNESS OF BREATH: 0
VOMITING: 0
NAUSEA: 0
ABDOMINAL PAIN: 0
COUGH: 1

## 2024-06-17 NOTE — PROGRESS NOTES
SUBJECTIVE  Krystal Hyatt is a 83 y.o. female established was seen In the office  for evaluation.    HPI/Chief C/O:  Chief Complaint   Patient presents with    Coagulation Disorder     Patient is here for a check up    Still with yellowish cough , on O2 NC 6 L   Allergies   Allergen Reactions    Pacerone [Amiodarone] Shortness Of Breath    Cat Hair Extract Itching and Other (See Comments)     Patient states \"My son put this. I hope I'm not allergic, I have 4 Cats and 2 Birds, maybe to their dust.\"    Egg-Derived Products Nausea Only    Erythromycin Diarrhea, Nausea And Vomiting and Other (See Comments)     \"STOMACH PAINS\"      Pcn [Penicillins] Itching and Rash     PT STATES \"RASH FROM HEAD TO TOE, W/ SOMETHING CRAWLING UNDER MY SKIN\"    Seasonal Itching, Swelling and Other (See Comments)     RUNNY/ITCHY NOSE, WATERY/ITCHY EYES       ROS:  Review of Systems   Respiratory:  Positive for cough. Negative for shortness of breath.         Negative for Hemoptysis   Cardiovascular:  Negative for chest pain.   Gastrointestinal:  Negative for abdominal pain, diarrhea, nausea and vomiting.   Endocrine: Negative for polydipsia, polyphagia and polyuria.   Genitourinary:  Negative for dysuria and hematuria.   Skin:  Negative for rash.   Neurological:  Negative for tremors and seizures.        Past Medical/Surgical Hx;  Reviewed with patient      Diagnosis Date    Atrial fibrillation (HCC)     CHF (congestive heart failure) (HCC)     Emphysema, unspecified (HCC)     Hyperlipidemia     Hypertension     Lung cancer (HCC)     Mitral valve regurgitation     Oxygen dependent     Prolonged emergence from general anesthesia     post general anesthesia    Skin cancer     Thyroid disease      Past Surgical History:   Procedure Laterality Date    BREAST BIOPSY Right     benign    BRONCHOSCOPY N/A 04/20/2021    BRONCHOSCOPY/TRANSBRONCHIAL NEEDLE BIOPSY performed by Antony Rosario MD at [facility] ENDOSCOPY    CARDIOVERSION  10/06/2023

## 2024-06-18 RX ORDER — DOXYCYCLINE HYCLATE 100 MG
100 TABLET ORAL 2 TIMES DAILY WITH MEALS
Qty: 20 TABLET | Refills: 0 | Status: SHIPPED | OUTPATIENT
Start: 2024-06-18 | End: 2024-06-28

## 2024-06-18 RX ORDER — DOXYCYCLINE HYCLATE 100 MG
100 TABLET ORAL 2 TIMES DAILY
Qty: 20 TABLET | Refills: 0 | Status: CANCELLED | OUTPATIENT
Start: 2024-06-18 | End: 2024-06-28

## 2024-06-18 NOTE — TELEPHONE ENCOUNTER
Krystal called in this morning and is afraid to take the sulfamethoxazole-trimethoprim (BACTRIM DS) 800-160 MG per tablet that was prescribed for her yesterday. She did take one dose yesterday but is afraid to continue with the medication after reading up on the side effects. She would like to know if Dr. Jatin Narvaez could call something else in to   [facility] [address] [facility] [address] [facility]    Please give her a call back at [phone] to let her know if something else is being sent into the pharmacy. Thank you.

## 2024-07-09 ENCOUNTER — OFFICE VISIT (OUTPATIENT)
Dept: PRIMARY CARE CLINIC | Age: 84
End: 2024-07-09
Payer: MEDICARE

## 2024-07-09 VITALS
HEIGHT: 68 IN | HEART RATE: 91 BPM | WEIGHT: 154 LBS | RESPIRATION RATE: 17 BRPM | DIASTOLIC BLOOD PRESSURE: 80 MMHG | SYSTOLIC BLOOD PRESSURE: 130 MMHG | TEMPERATURE: 97.6 F | OXYGEN SATURATION: 94 % | BODY MASS INDEX: 23.34 KG/M2

## 2024-07-09 DIAGNOSIS — J44.9 COPD, VERY SEVERE (HCC): Primary | ICD-10-CM

## 2024-07-09 DIAGNOSIS — C34.31 MALIGNANT NEOPLASM OF LOWER LOBE OF RIGHT LUNG (HCC): ICD-10-CM

## 2024-07-09 DIAGNOSIS — E03.9 ACQUIRED HYPOTHYROIDISM: ICD-10-CM

## 2024-07-09 DIAGNOSIS — J96.11 CHRONIC RESPIRATORY FAILURE WITH HYPOXIA (HCC): ICD-10-CM

## 2024-07-09 DIAGNOSIS — I48.0 PAF (PAROXYSMAL ATRIAL FIBRILLATION) (HCC): ICD-10-CM

## 2024-07-09 DIAGNOSIS — J40 BRONCHITIS: ICD-10-CM

## 2024-07-09 PROCEDURE — G8400 PT W/DXA NO RESULTS DOC: HCPCS | Performed by: INTERNAL MEDICINE

## 2024-07-09 PROCEDURE — 1036F TOBACCO NON-USER: CPT | Performed by: INTERNAL MEDICINE

## 2024-07-09 PROCEDURE — 99214 OFFICE O/P EST MOD 30 MIN: CPT | Performed by: INTERNAL MEDICINE

## 2024-07-09 PROCEDURE — 1090F PRES/ABSN URINE INCON ASSESS: CPT | Performed by: INTERNAL MEDICINE

## 2024-07-09 PROCEDURE — 3023F SPIROM DOC REV: CPT | Performed by: INTERNAL MEDICINE

## 2024-07-09 PROCEDURE — G8427 DOCREV CUR MEDS BY ELIG CLIN: HCPCS | Performed by: INTERNAL MEDICINE

## 2024-07-09 PROCEDURE — 3079F DIAST BP 80-89 MM HG: CPT | Performed by: INTERNAL MEDICINE

## 2024-07-09 PROCEDURE — 1123F ACP DISCUSS/DSCN MKR DOCD: CPT | Performed by: INTERNAL MEDICINE

## 2024-07-09 PROCEDURE — G8420 CALC BMI NORM PARAMETERS: HCPCS | Performed by: INTERNAL MEDICINE

## 2024-07-09 PROCEDURE — 3075F SYST BP GE 130 - 139MM HG: CPT | Performed by: INTERNAL MEDICINE

## 2024-07-09 RX ORDER — SULFAMETHOXAZOLE AND TRIMETHOPRIM 800; 160 MG/1; MG/1
1 TABLET ORAL 2 TIMES DAILY
Qty: 20 TABLET | Refills: 0 | Status: SHIPPED | OUTPATIENT
Start: 2024-07-09 | End: 2024-07-19

## 2024-07-09 ASSESSMENT — ENCOUNTER SYMPTOMS
COUGH: 1
NAUSEA: 0
DIARRHEA: 0
ABDOMINAL PAIN: 0
VOMITING: 0
SHORTNESS OF BREATH: 1
WHEEZING: 1

## 2024-07-09 NOTE — PROGRESS NOTES
SUBJECTIVE  Krystal Hyatt is a 83 y.o. female established was seen In the office  for evaluation.    HPI/Chief C/O:  Chief Complaint   Patient presents with    Coagulation Disorder     Patient is here for a check up    Shortness of Breath     Wheezing    Yellowish cough ,increase SOB     Allergies   Allergen Reactions    Pacerone [Amiodarone] Shortness Of Breath    Cat Hair Extract Itching and Other (See Comments)     Patient states \"My son put this. I hope I'm not allergic, I have 4 Cats and 2 Birds, maybe to their dust.\"    Egg-Derived Products Nausea Only    Erythromycin Diarrhea, Nausea And Vomiting and Other (See Comments)     \"STOMACH PAINS\"      Pcn [Penicillins] Itching and Rash     PT STATES \"RASH FROM HEAD TO TOE, W/ SOMETHING CRAWLING UNDER MY SKIN\"    Seasonal Itching, Swelling and Other (See Comments)     RUNNY/ITCHY NOSE, WATERY/ITCHY EYES       ROS:  Review of Systems   Respiratory:  Positive for cough, shortness of breath and wheezing.         Negative for Hemoptysis   Cardiovascular:  Negative for chest pain.   Gastrointestinal:  Negative for abdominal pain, diarrhea, nausea and vomiting.   Endocrine: Negative for polydipsia, polyphagia and polyuria.   Genitourinary:  Negative for dysuria and hematuria.   Skin:  Negative for rash.   Neurological:  Negative for tremors and seizures.        Past Medical/Surgical Hx;  Reviewed with patient      Diagnosis Date    Atrial fibrillation (HCC)     CHF (congestive heart failure) (HCC)     Emphysema, unspecified (HCC)     Hyperlipidemia     Hypertension     Lung cancer (HCC)     Mitral valve regurgitation     Oxygen dependent     Prolonged emergence from general anesthesia     post general anesthesia    Skin cancer     Thyroid disease      Past Surgical History:   Procedure Laterality Date    BREAST BIOPSY Right     benign    BRONCHOSCOPY N/A 04/20/2021    BRONCHOSCOPY/TRANSBRONCHIAL NEEDLE BIOPSY performed by Antony Rosario MD at [facility] ENDOSCOPY

## 2024-07-28 ENCOUNTER — HOSPITAL ENCOUNTER (INPATIENT)
Age: 84
LOS: 3 days | Discharge: HOME | End: 2024-07-31
Attending: STUDENT IN AN ORGANIZED HEALTH CARE EDUCATION/TRAINING PROGRAM | Admitting: HOSPITALIST
Payer: MEDICARE

## 2024-07-28 ENCOUNTER — APPOINTMENT (OUTPATIENT)
Dept: CT IMAGING | Age: 84
End: 2024-07-28
Payer: MEDICARE

## 2024-07-28 DIAGNOSIS — N17.9 ACUTE KIDNEY INJURY (HCC): Primary | ICD-10-CM

## 2024-07-28 DIAGNOSIS — E87.5 HYPERKALEMIA: ICD-10-CM

## 2024-07-28 DIAGNOSIS — R09.02 HYPOXIA: ICD-10-CM

## 2024-07-28 PROBLEM — E03.9 HYPOTHYROIDISM: Status: RESOLVED | Noted: 2024-01-03 | Resolved: 2024-07-28

## 2024-07-28 PROBLEM — U07.1 COVID-19: Status: RESOLVED | Noted: 2023-12-29 | Resolved: 2024-07-28

## 2024-07-28 LAB
ALBUMIN SERPL-MCNC: 4.5 G/DL (ref 3.5–5.2)
ALP SERPL-CCNC: 68 U/L (ref 35–104)
ALT SERPL-CCNC: 10 U/L (ref 0–32)
ANION GAP SERPL CALCULATED.3IONS-SCNC: 15 MMOL/L (ref 7–16)
AST SERPL-CCNC: 23 U/L (ref 0–31)
B.E.: -1.2 MMOL/L (ref -3–3)
BASOPHILS # BLD: 0.01 K/UL (ref 0–0.2)
BASOPHILS NFR BLD: 0 % (ref 0–2)
BILIRUB SERPL-MCNC: 0.4 MG/DL (ref 0–1.2)
BUN SERPL-MCNC: 45 MG/DL (ref 6–23)
CALCIUM SERPL-MCNC: 10 MG/DL (ref 8.6–10.2)
CHLORIDE SERPL-SCNC: 98 MMOL/L (ref 98–107)
CO2 SERPL-SCNC: 24 MMOL/L (ref 22–29)
COHB: 0.3 % (ref 0–1.5)
CREAT SERPL-MCNC: 2.7 MG/DL (ref 0.5–1)
CRITICAL: ABNORMAL
DATE ANALYZED: ABNORMAL
DATE OF COLLECTION: ABNORMAL
EOSINOPHIL # BLD: 0.02 K/UL (ref 0.05–0.5)
EOSINOPHILS RELATIVE PERCENT: 0 % (ref 0–6)
ERYTHROCYTE [DISTWIDTH] IN BLOOD BY AUTOMATED COUNT: 14.6 % (ref 11.5–15)
GFR, ESTIMATED: 16 ML/MIN/1.73M2
GLUCOSE BLD-MCNC: 154 MG/DL (ref 74–99)
GLUCOSE SERPL-MCNC: 129 MG/DL (ref 74–99)
HCO3: 21.7 MMOL/L (ref 22–26)
HCT VFR BLD AUTO: 33.9 % (ref 34–48)
HGB BLD-MCNC: 10.5 G/DL (ref 11.5–15.5)
HHB: 5.6 % (ref 0–5)
IMM GRANULOCYTES # BLD AUTO: <0.03 K/UL (ref 0–0.58)
IMM GRANULOCYTES NFR BLD: 0 % (ref 0–5)
INFLUENZA A BY PCR: NOT DETECTED
INFLUENZA B BY PCR: NOT DETECTED
INR PPP: 1.4
LAB: ABNORMAL
LACTATE BLDV-SCNC: 2.4 MMOL/L (ref 0.5–2.2)
LACTATE BLDV-SCNC: 3 MMOL/L (ref 0.5–1.9)
LYMPHOCYTES NFR BLD: 1.1 K/UL (ref 1.5–4)
LYMPHOCYTES RELATIVE PERCENT: 23 % (ref 20–42)
Lab: 2140
MCH RBC QN AUTO: 28.7 PG (ref 26–35)
MCHC RBC AUTO-ENTMCNC: 31 G/DL (ref 32–34.5)
MCV RBC AUTO: 92.6 FL (ref 80–99.9)
METHB: 0.3 % (ref 0–1.5)
MODE: ABNORMAL
MONOCYTES NFR BLD: 0.31 K/UL (ref 0.1–0.95)
MONOCYTES NFR BLD: 7 % (ref 2–12)
NEUTROPHILS NFR BLD: 70 % (ref 43–80)
NEUTS SEG NFR BLD: 3.34 K/UL (ref 1.8–7.3)
O2 CONTENT: 14.7 ML/DL
O2 SATURATION: 94.4 % (ref 92–98.5)
O2HB: 93.8 % (ref 94–97)
OPERATOR ID: 3342
PATIENT TEMP: 37 C
PCO2: 30.5 MMHG (ref 35–45)
PH BLOOD GAS: 7.47 (ref 7.35–7.45)
PLATELET, FLUORESCENCE: 131 K/UL (ref 130–450)
PMV BLD AUTO: 10.7 FL (ref 7–12)
PO2: 70.8 MMHG (ref 75–100)
POTASSIUM SERPL-SCNC: 4.1 MMOL/L (ref 3.5–5)
POTASSIUM SERPL-SCNC: 5.9 MMOL/L (ref 3.5–5)
PROT SERPL-MCNC: 7.2 G/DL (ref 6.4–8.3)
PROTHROMBIN TIME: 15.6 SEC (ref 9.3–12.4)
RBC # BLD AUTO: 3.66 M/UL (ref 3.5–5.5)
SARS-COV-2 RDRP RESP QL NAA+PROBE: NOT DETECTED
SODIUM SERPL-SCNC: 137 MMOL/L (ref 132–146)
SOURCE, BLOOD GAS: ABNORMAL
SPECIMEN DESCRIPTION: NORMAL
THB: 11.1 G/DL (ref 11.5–16.5)
TIME ANALYZED: 2146
TROPONIN I SERPL HS-MCNC: 22 NG/L (ref 0–9)
TROPONIN I SERPL HS-MCNC: 30 NG/L (ref 0–9)
WBC OTHER # BLD: 4.8 K/UL (ref 4.5–11.5)

## 2024-07-28 PROCEDURE — 85610 PROTHROMBIN TIME: CPT

## 2024-07-28 PROCEDURE — 99285 EMERGENCY DEPT VISIT HI MDM: CPT

## 2024-07-28 PROCEDURE — 96375 TX/PRO/DX INJ NEW DRUG ADDON: CPT

## 2024-07-28 PROCEDURE — 2580000003 HC RX 258

## 2024-07-28 PROCEDURE — 6360000004 HC RX CONTRAST MEDICATION: Performed by: RADIOLOGY

## 2024-07-28 PROCEDURE — 82805 BLOOD GASES W/O2 SATURATION: CPT

## 2024-07-28 PROCEDURE — 82962 GLUCOSE BLOOD TEST: CPT

## 2024-07-28 PROCEDURE — 0202U NFCT DS 22 TRGT SARS-COV-2: CPT

## 2024-07-28 PROCEDURE — 83880 ASSAY OF NATRIURETIC PEPTIDE: CPT

## 2024-07-28 PROCEDURE — 6370000000 HC RX 637 (ALT 250 FOR IP)

## 2024-07-28 PROCEDURE — 85025 COMPLETE CBC W/AUTO DIFF WBC: CPT

## 2024-07-28 PROCEDURE — 99223 1ST HOSP IP/OBS HIGH 75: CPT | Performed by: HOSPITALIST

## 2024-07-28 PROCEDURE — 84156 ASSAY OF PROTEIN URINE: CPT

## 2024-07-28 PROCEDURE — 87635 SARS-COV-2 COVID-19 AMP PRB: CPT

## 2024-07-28 PROCEDURE — 2060000000 HC ICU INTERMEDIATE R&B

## 2024-07-28 PROCEDURE — 84484 ASSAY OF TROPONIN QUANT: CPT

## 2024-07-28 PROCEDURE — 80053 COMPREHEN METABOLIC PANEL: CPT

## 2024-07-28 PROCEDURE — 87040 BLOOD CULTURE FOR BACTERIA: CPT

## 2024-07-28 PROCEDURE — 82570 ASSAY OF URINE CREATININE: CPT

## 2024-07-28 PROCEDURE — 2500000003 HC RX 250 WO HCPCS

## 2024-07-28 PROCEDURE — 81001 URINALYSIS AUTO W/SCOPE: CPT

## 2024-07-28 PROCEDURE — 93005 ELECTROCARDIOGRAM TRACING: CPT

## 2024-07-28 PROCEDURE — 84132 ASSAY OF SERUM POTASSIUM: CPT

## 2024-07-28 PROCEDURE — 83605 ASSAY OF LACTIC ACID: CPT

## 2024-07-28 PROCEDURE — 96374 THER/PROPH/DIAG INJ IV PUSH: CPT

## 2024-07-28 PROCEDURE — 87502 INFLUENZA DNA AMP PROBE: CPT

## 2024-07-28 PROCEDURE — 80162 ASSAY OF DIGOXIN TOTAL: CPT

## 2024-07-28 PROCEDURE — 71275 CT ANGIOGRAPHY CHEST: CPT

## 2024-07-28 RX ORDER — 0.9 % SODIUM CHLORIDE 0.9 %
1000 INTRAVENOUS SOLUTION INTRAVENOUS ONCE
Status: COMPLETED | OUTPATIENT
Start: 2024-07-28 | End: 2024-07-28

## 2024-07-28 RX ORDER — DEXTROSE MONOHYDRATE 100 MG/ML
INJECTION, SOLUTION INTRAVENOUS CONTINUOUS PRN
Status: DISCONTINUED | OUTPATIENT
Start: 2024-07-28 | End: 2024-07-31 | Stop reason: HOSPADM

## 2024-07-28 RX ORDER — CALCIUM GLUCONATE 20 MG/ML
1000 INJECTION, SOLUTION INTRAVENOUS ONCE
Status: COMPLETED | OUTPATIENT
Start: 2024-07-28 | End: 2024-07-28

## 2024-07-28 RX ORDER — GLUCAGON 1 MG/ML
1 KIT INJECTION PRN
Status: DISCONTINUED | OUTPATIENT
Start: 2024-07-28 | End: 2024-07-31 | Stop reason: HOSPADM

## 2024-07-28 RX ADMIN — SODIUM CHLORIDE 1000 ML: 9 INJECTION, SOLUTION INTRAVENOUS at 22:22

## 2024-07-28 RX ADMIN — DEXTROSE MONOHYDRATE 250 ML: 100 INJECTION, SOLUTION INTRAVENOUS at 22:37

## 2024-07-28 RX ADMIN — SODIUM ZIRCONIUM CYCLOSILICATE 10 G: 10 POWDER, FOR SUSPENSION ORAL at 22:26

## 2024-07-28 RX ADMIN — IOPAMIDOL 75 ML: 755 INJECTION, SOLUTION INTRAVENOUS at 21:50

## 2024-07-28 RX ADMIN — CALCIUM GLUCONATE 1000 MG: 20 INJECTION, SOLUTION INTRAVENOUS at 22:25

## 2024-07-28 RX ADMIN — INSULIN HUMAN 5 UNITS: 100 INJECTION, SOLUTION PARENTERAL at 22:26

## 2024-07-29 ENCOUNTER — APPOINTMENT (OUTPATIENT)
Dept: ULTRASOUND IMAGING | Age: 84
End: 2024-07-29
Payer: MEDICARE

## 2024-07-29 LAB
ABSOLUTE BANDS: NORMAL K/UL (ref 0–1)
ABSOLUTE PLASMA CELLS: NORMAL K/UL
ALBUMIN: 3.7 G/DL (ref 3.5–5.2)
ANION GAP SERPL CALCULATED.3IONS-SCNC: 11 MMOL/L (ref 7–16)
ATYPICAL LYMPHOCYTE ABSOLUTE COUNT: NORMAL K/UL
ATYPICAL LYMPHOCYTES: NORMAL %
B PARAP IS1001 DNA NPH QL NAA+NON-PROBE: NOT DETECTED
B PERT DNA SPEC QL NAA+PROBE: NOT DETECTED
BANDS: NORMAL %
BASOPHILS # BLD: 0.02 K/UL (ref 0–0.2)
BASOPHILS # BLD: NORMAL K/UL (ref 0–0.2)
BASOPHILS NFR BLD: 1 % (ref 0–2)
BASOPHILS NFR BLD: NORMAL % (ref 0–2)
BILIRUB UR QL STRIP: NEGATIVE
BLASTS ABSOLUTE COUNT: NORMAL K/UL
BLASTS: NORMAL %
BNP SERPL-MCNC: 289 PG/ML (ref 0–450)
BUN SERPL-MCNC: 46 MG/DL (ref 6–23)
C PNEUM DNA NPH QL NAA+NON-PROBE: NOT DETECTED
CALCIUM SERPL-MCNC: 9.3 MG/DL (ref 8.6–10.2)
CHLORIDE SERPL-SCNC: 99 MMOL/L (ref 98–107)
CHLORIDE UR-SCNC: <20 MMOL/L
CLARITY UR: CLEAR
CO2 SERPL-SCNC: 26 MMOL/L (ref 22–29)
COLOR UR: YELLOW
CREAT SERPL-MCNC: 2.5 MG/DL (ref 0.5–1)
CREAT UR-MCNC: 41.2 MG/DL (ref 29–226)
CREAT UR-MCNC: 75.1 MG/DL (ref 29–226)
DIGOXIN SERPL-MCNC: 0.7 NG/ML (ref 0.8–2)
EOSINOPHIL # BLD: 0.05 K/UL (ref 0.05–0.5)
EOSINOPHIL # BLD: NORMAL K/UL (ref 0–0.4)
EOSINOPHILS RELATIVE PERCENT: 1 % (ref 0–6)
EOSINOPHILS RELATIVE PERCENT: NORMAL % (ref 1–4)
ERYTHROCYTE [DISTWIDTH] IN BLOOD BY AUTOMATED COUNT: 14.7 % (ref 11.5–15)
ERYTHROCYTE [DISTWIDTH] IN BLOOD BY AUTOMATED COUNT: NORMAL % (ref 11.8–14.4)
FLUAV RNA NPH QL NAA+NON-PROBE: NOT DETECTED
FLUBV RNA NPH QL NAA+NON-PROBE: NOT DETECTED
GFR, ESTIMATED: 18 ML/MIN/1.73M2
GLUCOSE BLD-MCNC: 114 MG/DL (ref 74–99)
GLUCOSE BLD-MCNC: 132 MG/DL (ref 74–99)
GLUCOSE BLD-MCNC: 133 MG/DL (ref 74–99)
GLUCOSE BLD-MCNC: 176 MG/DL (ref 74–99)
GLUCOSE SERPL-MCNC: 97 MG/DL (ref 74–99)
GLUCOSE UR STRIP-MCNC: NEGATIVE MG/DL
HADV DNA NPH QL NAA+NON-PROBE: NOT DETECTED
HCOV 229E RNA NPH QL NAA+NON-PROBE: NOT DETECTED
HCOV HKU1 RNA NPH QL NAA+NON-PROBE: NOT DETECTED
HCOV NL63 RNA NPH QL NAA+NON-PROBE: NOT DETECTED
HCOV OC43 RNA NPH QL NAA+NON-PROBE: NOT DETECTED
HCT VFR BLD AUTO: 28.1 % (ref 34–48)
HCT VFR BLD AUTO: NORMAL % (ref 36.3–47.1)
HGB BLD-MCNC: 8.8 G/DL (ref 11.5–15.5)
HGB BLD-MCNC: NORMAL G/DL (ref 11.9–15.1)
HGB UR QL STRIP.AUTO: ABNORMAL
HMPV RNA NPH QL NAA+NON-PROBE: NOT DETECTED
HPIV1 RNA NPH QL NAA+NON-PROBE: NOT DETECTED
HPIV2 RNA NPH QL NAA+NON-PROBE: NOT DETECTED
HPIV3 RNA NPH QL NAA+NON-PROBE: NOT DETECTED
HPIV4 RNA NPH QL NAA+NON-PROBE: NOT DETECTED
IMM GRANULOCYTES # BLD AUTO: <0.03 K/UL (ref 0–0.58)
IMM GRANULOCYTES # BLD AUTO: NORMAL K/UL (ref 0–0.3)
IMM GRANULOCYTES NFR BLD: 0 % (ref 0–5)
IMM GRANULOCYTES NFR BLD: NORMAL %
INR PPP: 1.5
KETONES UR STRIP-MCNC: NEGATIVE MG/DL
LACTATE BLDV-SCNC: 1.1 MMOL/L (ref 0.5–2.2)
LACTATE BLDV-SCNC: 1.6 MMOL/L (ref 0.5–1.9)
LEUKOCYTE ESTERASE UR QL STRIP: NEGATIVE
LYMPHOCYTES NFR BLD: 1.37 K/UL (ref 1.5–4)
LYMPHOCYTES NFR BLD: NORMAL K/UL (ref 1–4.8)
LYMPHOCYTES RELATIVE PERCENT: 35 % (ref 20–42)
LYMPHOCYTES RELATIVE PERCENT: NORMAL % (ref 24–44)
M PNEUMO DNA NPH QL NAA+NON-PROBE: NOT DETECTED
MAGNESIUM SERPL-MCNC: 1.8 MG/DL (ref 1.6–2.6)
MCH RBC QN AUTO: 28.7 PG (ref 26–35)
MCH RBC QN AUTO: NORMAL PG (ref 25.2–33.5)
MCHC RBC AUTO-ENTMCNC: 31.3 G/DL (ref 32–34.5)
MCHC RBC AUTO-ENTMCNC: NORMAL G/DL (ref 28.4–34.8)
MCV RBC AUTO: 91.5 FL (ref 80–99.9)
MCV RBC AUTO: NORMAL FL (ref 82.6–102.9)
METAMYELOCYTES ABSOLUTE COUNT: NORMAL K/UL
METAMYELOCYTES: NORMAL %
MONOCYTES NFR BLD: 0.29 K/UL (ref 0.1–0.95)
MONOCYTES NFR BLD: 7 % (ref 2–12)
MONOCYTES NFR BLD: NORMAL % (ref 1–7)
MONOCYTES NFR BLD: NORMAL K/UL (ref 0.1–0.8)
MYELOCYTES ABSOLUTE COUNT: NORMAL K/UL
MYELOCYTES: NORMAL %
NEUTROPHILS NFR BLD: 56 % (ref 43–80)
NEUTROPHILS NFR BLD: NORMAL % (ref 36–66)
NEUTS SEG NFR BLD: 2.21 K/UL (ref 1.8–7.3)
NEUTS SEG NFR BLD: NORMAL K/UL (ref 1.8–7.7)
NITRITE UR QL STRIP: NEGATIVE
NRBC BLD-RTO: NORMAL PER 100 WBC
NUCLEATED RED BLOOD CELLS: NORMAL PER 100 WBC
PH UR STRIP: 6 [PH] (ref 5–9)
PHOSPHATE SERPL-MCNC: 4.7 MG/DL (ref 2.5–4.5)
PLASMA CELLS: NORMAL %
PLATELET # BLD AUTO: NORMAL K/UL (ref 138–453)
PLATELET ESTIMATE: NORMAL
PLATELET, FLUORESCENCE: 108 K/UL (ref 130–450)
PLATELET, FLUORESCENCE: NORMAL K/UL (ref 138–453)
PLATELETS.RETICULATED NFR BLD AUTO: NORMAL % (ref 1.1–10.3)
PMV BLD AUTO: 10.9 FL (ref 7–12)
PMV BLD AUTO: NORMAL FL (ref 8.1–13.5)
POTASSIUM SERPL-SCNC: 5 MMOL/L (ref 3.5–5)
PROCALCITONIN SERPL-MCNC: 0.04 NG/ML (ref 0–0.08)
PROMYELOCYTES ABSOLUTE COUNT: NORMAL K/UL
PROMYELOCYTES: NORMAL %
PROT UR STRIP-MCNC: NEGATIVE MG/DL
PROTHROMBIN TIME: 16.1 SEC (ref 9.3–12.4)
RBC # BLD AUTO: 3.07 M/UL (ref 3.5–5.5)
RBC # BLD AUTO: NORMAL M/UL (ref 3.95–5.11)
RBC # BLD: NORMAL 10*6/UL
RBC #/AREA URNS HPF: ABNORMAL /HPF
RSV RNA NPH QL NAA+NON-PROBE: NOT DETECTED
RV+EV RNA NPH QL NAA+NON-PROBE: NOT DETECTED
SARS-COV-2 RNA NPH QL NAA+NON-PROBE: NOT DETECTED
SODIUM SERPL-SCNC: 136 MMOL/L (ref 132–146)
SODIUM UR-SCNC: 23 MMOL/L
SP GR UR STRIP: 1.01 (ref 1–1.03)
SPECIMEN DESCRIPTION: NORMAL
TOTAL PROTEIN, URINE: 20 MG/DL (ref 0–12)
UROBILINOGEN UR STRIP-ACNC: 0.2 EU/DL (ref 0–1)
WBC # BLD: NORMAL 10*3/UL
WBC #/AREA URNS HPF: ABNORMAL /HPF
WBC OTHER # BLD: 4 K/UL (ref 4.5–11.5)
WBC OTHER # BLD: NORMAL K/UL (ref 3.5–11.3)

## 2024-07-29 PROCEDURE — 82436 ASSAY OF URINE CHLORIDE: CPT

## 2024-07-29 PROCEDURE — 85610 PROTHROMBIN TIME: CPT

## 2024-07-29 PROCEDURE — 6370000000 HC RX 637 (ALT 250 FOR IP): Performed by: HOSPITALIST

## 2024-07-29 PROCEDURE — 82570 ASSAY OF URINE CREATININE: CPT

## 2024-07-29 PROCEDURE — 93005 ELECTROCARDIOGRAM TRACING: CPT

## 2024-07-29 PROCEDURE — 97165 OT EVAL LOW COMPLEX 30 MIN: CPT

## 2024-07-29 PROCEDURE — 83605 ASSAY OF LACTIC ACID: CPT

## 2024-07-29 PROCEDURE — 2580000003 HC RX 258: Performed by: HOSPITALIST

## 2024-07-29 PROCEDURE — 2700000000 HC OXYGEN THERAPY PER DAY

## 2024-07-29 PROCEDURE — 80069 RENAL FUNCTION PANEL: CPT

## 2024-07-29 PROCEDURE — 97535 SELF CARE MNGMENT TRAINING: CPT

## 2024-07-29 PROCEDURE — 83735 ASSAY OF MAGNESIUM: CPT

## 2024-07-29 PROCEDURE — 84145 PROCALCITONIN (PCT): CPT

## 2024-07-29 PROCEDURE — 82962 GLUCOSE BLOOD TEST: CPT

## 2024-07-29 PROCEDURE — 84300 ASSAY OF URINE SODIUM: CPT

## 2024-07-29 PROCEDURE — 2060000000 HC ICU INTERMEDIATE R&B

## 2024-07-29 PROCEDURE — 76770 US EXAM ABDO BACK WALL COMP: CPT

## 2024-07-29 PROCEDURE — 85025 COMPLETE CBC W/AUTO DIFF WBC: CPT

## 2024-07-29 PROCEDURE — 99233 SBSQ HOSP IP/OBS HIGH 50: CPT | Performed by: INTERNAL MEDICINE

## 2024-07-29 RX ORDER — ACETAMINOPHEN 325 MG/1
650 TABLET ORAL EVERY 6 HOURS PRN
Status: DISCONTINUED | OUTPATIENT
Start: 2024-07-29 | End: 2024-07-31 | Stop reason: HOSPADM

## 2024-07-29 RX ORDER — FLUTICASONE PROPIONATE 50 MCG
1 SPRAY, SUSPENSION (ML) NASAL 2 TIMES DAILY PRN
COMMUNITY

## 2024-07-29 RX ORDER — LEVOTHYROXINE SODIUM 0.07 MG/1
75 TABLET ORAL
Status: DISCONTINUED | OUTPATIENT
Start: 2024-07-29 | End: 2024-07-31 | Stop reason: HOSPADM

## 2024-07-29 RX ORDER — FLUTICASONE PROPIONATE 50 MCG
1 SPRAY, SUSPENSION (ML) NASAL 2 TIMES DAILY PRN
Status: DISCONTINUED | OUTPATIENT
Start: 2024-07-29 | End: 2024-07-31 | Stop reason: HOSPADM

## 2024-07-29 RX ORDER — ONDANSETRON 4 MG/1
4 TABLET, ORALLY DISINTEGRATING ORAL EVERY 8 HOURS PRN
Status: DISCONTINUED | OUTPATIENT
Start: 2024-07-29 | End: 2024-07-31 | Stop reason: HOSPADM

## 2024-07-29 RX ORDER — PANTOPRAZOLE SODIUM 40 MG/1
40 TABLET, DELAYED RELEASE ORAL
Status: DISCONTINUED | OUTPATIENT
Start: 2024-07-29 | End: 2024-07-31 | Stop reason: HOSPADM

## 2024-07-29 RX ORDER — WARFARIN SODIUM 2.5 MG/1
2.5 TABLET ORAL NIGHTLY
Status: DISCONTINUED | OUTPATIENT
Start: 2024-07-29 | End: 2024-07-31

## 2024-07-29 RX ORDER — ACETAMINOPHEN 650 MG/1
650 SUPPOSITORY RECTAL EVERY 6 HOURS PRN
Status: DISCONTINUED | OUTPATIENT
Start: 2024-07-29 | End: 2024-07-31 | Stop reason: HOSPADM

## 2024-07-29 RX ORDER — IBUPROFEN 200 MG
TABLET ORAL 2 TIMES DAILY
Status: DISCONTINUED | OUTPATIENT
Start: 2024-07-29 | End: 2024-07-31 | Stop reason: HOSPADM

## 2024-07-29 RX ORDER — ONDANSETRON 2 MG/ML
4 INJECTION INTRAMUSCULAR; INTRAVENOUS EVERY 6 HOURS PRN
Status: DISCONTINUED | OUTPATIENT
Start: 2024-07-29 | End: 2024-07-31 | Stop reason: HOSPADM

## 2024-07-29 RX ORDER — DIGOXIN 125 MCG
62.5 TABLET ORAL DAILY
Status: DISCONTINUED | OUTPATIENT
Start: 2024-07-29 | End: 2024-07-31 | Stop reason: HOSPADM

## 2024-07-29 RX ORDER — SODIUM CHLORIDE 9 MG/ML
INJECTION, SOLUTION INTRAVENOUS PRN
Status: DISCONTINUED | OUTPATIENT
Start: 2024-07-29 | End: 2024-07-31 | Stop reason: HOSPADM

## 2024-07-29 RX ORDER — ROSUVASTATIN CALCIUM 10 MG/1
10 TABLET, COATED ORAL NIGHTLY
Status: DISCONTINUED | OUTPATIENT
Start: 2024-07-29 | End: 2024-07-31 | Stop reason: HOSPADM

## 2024-07-29 RX ORDER — SODIUM CHLORIDE, SODIUM LACTATE, POTASSIUM CHLORIDE, CALCIUM CHLORIDE 600; 310; 30; 20 MG/100ML; MG/100ML; MG/100ML; MG/100ML
INJECTION, SOLUTION INTRAVENOUS CONTINUOUS
Status: ACTIVE | OUTPATIENT
Start: 2024-07-29 | End: 2024-07-31

## 2024-07-29 RX ORDER — SODIUM CHLORIDE 0.9 % (FLUSH) 0.9 %
5-40 SYRINGE (ML) INJECTION PRN
Status: DISCONTINUED | OUTPATIENT
Start: 2024-07-29 | End: 2024-07-31 | Stop reason: HOSPADM

## 2024-07-29 RX ORDER — METOPROLOL SUCCINATE 25 MG/1
12.5 TABLET, EXTENDED RELEASE ORAL EVERY MORNING
Status: DISCONTINUED | OUTPATIENT
Start: 2024-07-29 | End: 2024-07-31 | Stop reason: HOSPADM

## 2024-07-29 RX ORDER — ALBUTEROL SULFATE 2.5 MG/3ML
2.5 SOLUTION RESPIRATORY (INHALATION) 4 TIMES DAILY PRN
Status: DISCONTINUED | OUTPATIENT
Start: 2024-07-29 | End: 2024-07-31 | Stop reason: HOSPADM

## 2024-07-29 RX ORDER — IBUPROFEN 200 MG
1 TABLET ORAL 2 TIMES DAILY
COMMUNITY

## 2024-07-29 RX ORDER — SODIUM CHLORIDE 0.9 % (FLUSH) 0.9 %
5-40 SYRINGE (ML) INJECTION EVERY 12 HOURS SCHEDULED
Status: DISCONTINUED | OUTPATIENT
Start: 2024-07-29 | End: 2024-07-31 | Stop reason: HOSPADM

## 2024-07-29 RX ADMIN — PANTOPRAZOLE SODIUM 40 MG: 40 TABLET, DELAYED RELEASE ORAL at 06:47

## 2024-07-29 RX ADMIN — ROSUVASTATIN CALCIUM 10 MG: 10 TABLET, FILM COATED ORAL at 20:18

## 2024-07-29 RX ADMIN — SODIUM CHLORIDE, POTASSIUM CHLORIDE, SODIUM LACTATE AND CALCIUM CHLORIDE: 600; 310; 30; 20 INJECTION, SOLUTION INTRAVENOUS at 01:19

## 2024-07-29 RX ADMIN — LEVOTHYROXINE SODIUM 75 MCG: 75 TABLET ORAL at 06:47

## 2024-07-29 RX ADMIN — WARFARIN SODIUM 2.5 MG: 2.5 TABLET ORAL at 20:18

## 2024-07-29 RX ADMIN — WARFARIN SODIUM 2.5 MG: 2.5 TABLET ORAL at 01:17

## 2024-07-29 RX ADMIN — POLYMYXIN B SULFATE, BACITRACIN ZINC, NEOMYCIN SULFATE: 5000; 3.5; 4 OINTMENT TOPICAL at 20:20

## 2024-07-29 RX ADMIN — ROSUVASTATIN CALCIUM 10 MG: 10 TABLET, FILM COATED ORAL at 01:17

## 2024-07-29 NOTE — ACP (ADVANCE CARE PLANNING)
Advance Care Planning   Healthcare Decision Maker:    Primary Decision Maker: Olena Krystal Muniz - Juancarlos - [phone]    Secondary Decision Maker: OlenaDieter - Juancarlos - [phone]    Click here to complete Healthcare Decision Makers including selection of the Healthcare Decision Maker Relationship (ie \"Primary\").

## 2024-07-29 NOTE — PROGRESS NOTES
Occupational Therapy  OCCUPATIONAL THERAPY INITIAL EVALUATION  [facility]  [address] [place], [place]    Date: 2024     Patient Name: Krystal Hyatt  MRN: [id number]  : 1940  Room: [address] [place] [address]    Evaluating OT: Daniela Richmond, OTR/L - [id number]    Referring Provider: Byron Richardson MD  Specific Provider Orders/Date: \"OT eval and treat\" - 2024    Diagnosis: Hyperkalemia [E87.5]  Hypoxia [R09.02]  TRISTEN (acute kidney injury) (HCC) [N17.9]  Acute kidney injury (HCC) [N17.9]     Pertinent Medical History: a-fib, CHF, HTN, lung cancer, skin cancer     Precautions: fall risk, 6L O2 via nasal cannula    Assessment of Current Deficits:    [x] Functional mobility   [x] ADLs  [x] Strength               [] Cognition   [x] Functional transfers   [x] IADLs         [x] Safety Awareness   [x] Endurance   [] Fine Motor Coordination  [x] Balance      [] Vision/Perception   [x] Sensation    [] Gross Motor Coordination [] ROM          [] Delirium                  [] Motor Control     OT PLAN OF CARE   OT POC is based on physician orders, patient diagnosis, and results of clinical assessment.  Frequency/Duration 2-5 days/week for 2-4 weeks PRN   Specific OT Treatment Interventions to Include:   * Instruction/training on adapted ADL techniques and AE recommendations to increase functional independence within precautions       * Training on energy conservation strategies, correct breathing pattern and techniques to improve independence/tolerance for self-care routine  * Functional transfer/mobility training/DME recommendations for increased independence, safety, and fall prevention  * Patient/Family education to increase follow through with safety techniques and functional independence  * Recommendation of environmental modifications for increased safety with functional transfers/mobility and ADLs  * Therapeutic exercise to improve motor endurance, ROM, and  techniques.    Instruction/training on energy conservation/work simplification for completion of ADLs/IADLs upon return to home environment.    Patient education provided regardin) importance of having staff assistance with OOB activities to ensure safety during hospitalization. Patient indicated Good understanding.    Further skilled OT treatment indicated to increase patient's safety and independence with completion of ADL/IADL tasks in order to maximize patient's functional independence and quality of life.    Rehab Potential: Good for established goals.  Patient / Family Goal: Patient anticipates returning home.  Patient and/or family were instructed on functional diagnosis, prognosis/goals, and OT plan of care. Demonstrated Good understanding.    Eval Complexity: Low    Time In: 1355  Time Out: 1418  Total Treatment Time: 8 minutes      Minutes Units   OT Eval Low 78196 15 1   OT Eval Medium 72180     OT Eval High 24857     OT Re-Eval 87214     Therapeutic Ex 55168     Therapeutic Activities 58430     ADL/Self Care 90150 8 1   Orthotic Management 43380     Neuro Re-Ed 69111     Non-Billable Time N/A ---     Evaluation time includes thorough review of current medical information, gathering information on past medical history/social history and prior level of function, completion of standardized testing/informal observation of tasks, assessment of data, and education on plan of care and goals.    Daniela Richmond OTR/L  License Number: [id number]

## 2024-07-29 NOTE — PLAN OF CARE
Problem: Discharge Planning  Goal: Discharge to home or other facility with appropriate resources  Outcome: Not Progressing     Problem: Safety - Adult  Goal: Free from fall injury  Outcome: Progressing     Problem: ABCDS Injury Assessment  Goal: Absence of physical injury  Outcome: Progressing     Problem: Discharge Planning  Goal: Discharge to home or other facility with appropriate resources  Outcome: Not Progressing

## 2024-07-29 NOTE — PROGRESS NOTES
Notified Dr. Gonzáles of patients blood pressure and medication reconciliation completed with changes- ok to order Flonase and triple antibiotic ointment.

## 2024-07-29 NOTE — PROGRESS NOTES
New cardio consult placed to Dr. Jones via answering service.   States he will be in this am.

## 2024-07-29 NOTE — CARE COORDINATION
Introduced my self and provided explanation of CM role to patient.  Patient is awake, alert, and aware of current diagnosis and treatment plan including cardiology consult, serial labs, IV fluids  She voices she resides at home and completes her adl's with independence. She has walker and w/c, was not using.  Has home O2 5 L and nebulizer from [employer].  Patient is established with a pcp and denies any issue with retail pharmaceutical coverage.  She plans on a return to home and denies post discharge needs at this time.  Patient will need followed and assisted with discharge planning as necessary.   Rich Hall, MSN RN  [facility] Case Management  [phone]

## 2024-07-29 NOTE — CONSULTS
Nutrition Note     Reason for Visit:    Initial, Positive Nutrition Screen, Consult    Nutrition Assessment:  Pt admits w/ acute on chronic resp failure, TRISTEN, hyperkalemia. Hx COPD, CHF, CKD, lung CA s/p radiation. Pt reports good appetite, just takes awhile to eat d/t SOB. Intake appears >50%, she declines ONS at this time. Will monitor.     Current Nutrition Therapies:    ADULT DIET; Regular; Low Potassium (Less than 3000 mg/day)    Anthropometrics:   Current Height: 172.7 cm (5' 8\")  Current Weight - Scale: 66.7 kg (147 lb 0.8 oz)  Difficult to assess wt change d/t CHF/CKD    Monitoring and Evaluation:  No nutrition diagnosis. Patient will be monitored per nutrition standards of care.     Consult Dietitian if nutrition intervention essential to patient care is needed.     Discharge Planning:  No needs    Miranda D'Amico, RD, LD

## 2024-07-29 NOTE — ED NOTES
Radiology Procedure Waiver   Name: Krystal Hyatt  : 1940  MRN: [id number]    Date:  24    Time: 9:13 PM EDT    Benefits of immediately proceeding with Radiology exam(s) without pre-testing outweigh the risks or are not indicated as specified below and therefore the following is/are being waived:    [] Pregnancy test   [] Patients LMP on-time and regular.   [] Patient had Tubal Ligation or has other Contraception Device.   [] Patient  is Menopausal or Premenarcheal.    [] Patient had Full or Partial Hysterectomy.    [] Protocol for Iodine allergy    [] MRI Questionnaire     [x] BUN/Creatinine   [] Patient age w/no hx of renal dysfunction.   [] Patient on Dialysis.   [] Recent Normal Labs.  Electronically signed by Wild Mac DO on 24 at 9:13 PM EDT

## 2024-07-29 NOTE — PLAN OF CARE
Problem: Discharge Planning  Goal: Discharge to home or other facility with appropriate resources  7/29/2024 1416 by Na Degroot, RN  Outcome: Progressing  7/29/2024 1416 by Na Degroot, RN  Outcome: Progressing  Flowsheets (Taken 7/29/2024 0900)  Discharge to home or other facility with appropriate resources: Identify barriers to discharge with patient and caregiver  7/29/2024 0129 by Carolyn Nails RN  Outcome: Not Progressing     Problem: Metabolic/Fluid and Electrolytes - Adult  Goal: Electrolytes maintained within normal limits  Outcome: Not Progressing  Goal: Hemodynamic stability and optimal renal function maintained  Outcome: Not Progressing     Problem: Hematologic - Adult  Goal: Maintains hematologic stability  Outcome: Not Progressing

## 2024-07-29 NOTE — H&P
Hospital Medicine History & Physical      PCP: Rom Mckeon MD    Date of Admission: 7/28/2024    Date of Service: Pt seen/examined on 7/28/2024 and is  admitted to Inpatient with expected LOS greater than two midnights due to medical therapy.      Chief Complaint:  had concerns including Shortness of Breath (X2 days- normally on 5L-was 84% on 6 L upon arrival).    History Of Present Illness:    Ms. Krystal Hyatt, a 83 y.o. year old female  with PMH of paroxysmal atrial fibrillation, chronic anticoagulation with warfarin, moderate tricuspid regurgitation, COPD, on chronic oxygen 5 L, history of lung cancer s/p radiation therapy 2012, IN remission, CKD stage 2    Pt presented to ED for evaluation of shortness of breath, weakness.  Patient reports she has been on 5 L nasal cannula oxygen chronic at home due to history of lung cancer and COPD, she is faithfully taking warfarin for anticoagulation with history of atrial fibrillation, she has been having cough and shortness of breath for about 2 weeks, she has been prescribed Bactrim per PCP and take for about over a week, her workup in the ED concerning for acute kidney injury with potassium 5.9, creatinine 2.7, baseline renal function has been well-preserved with creatinine less than 1.0.  Patient is well oriented, denies fever, chills, denies chest pain or chest pressure, denies nausea vomiting abdominal pain, no diarrhea.    I have personally reviewed and interpreted the Initial workups as summarized below:   CTA pulm  IMPRESSION:  1. No evidence of pulmonary embolism or acute pulmonary abnormality.  2. Moderate/large retrocardiac hiatal hernia    CBC with normal white count, stable hemoglobin/hematocrit, platelet count is stable normal range  Renal function potassium 5.9, creatinine 2.7, baseline is less than 1  Hepatic function   in normal range  INR is 1.4, per review patient is on aspirin 1.3/subtherapeutic since May 2024   troponin 30  EKG reviewed  IMPLANT performed by Kay Pimentel MD at [facility] OR    TUBAL LIGATION         Medications Prior to Admission:      Prior to Admission medications    Medication Sig Start Date End Date Taking? Authorizing Provider   albuterol (PROVENTIL) (2.5 MG/3ML) 0.083% nebulizer solution Take 3 mLs by nebulization 4 times daily as needed for Wheezing 6/17/24   Rom Mckeon MD   albuterol sulfate HFA (PROVENTIL HFA) 108 (90 Base) MCG/ACT inhaler Inhale 2 puffs into the lungs every 6 hours as needed for Wheezing 6/17/24   Rom Mckeon MD   bumetanide (BUMEX) 0.5 MG tablet Take 1 tablet by mouth daily 6/17/24   Rom Mckeon MD   Digoxin (LANOXIN) 62.5 MCG TABS Take 62.5 mcg by mouth daily 6/17/24   Rom Mckeon MD   fluticasone-umeclidin-vilant (TRELEGY ELLIPTA) 100-62.5-25 MCG/ACT AEPB inhaler Inhale 1 puff into the lungs daily 6/17/24   Rom Mckeon MD   levothyroxine (SYNTHROID) 75 MCG tablet Take 1 tablet by mouth daily 6/17/24   Rom Mckeon MD   metoprolol succinate (TOPROL XL) 25 MG extended release tablet Take 0.5 tablets by mouth every morning 6/17/24   Rom Mckeon MD   pantoprazole (PROTONIX) 40 MG tablet qd 6/17/24   Rom Mckeon MD   rosuvastatin (CRESTOR) 10 MG tablet Take 1 tablet by mouth nightly 6/17/24   Rom Mckeon MD   sacubitril-valsartan (ENTRESTO) 24-26 MG per tablet Take 0.5 tablets by mouth 2 times daily 6/17/24   Rom Mckeon MD   vitamin C (ASCORBIC ACID) 500 MG tablet Take 1 tablet by mouth daily 6/17/24   Rom Mckeon MD   vitamin D (CHOLECALCIFEROL) 50 MCG (2000 UT) TABS tablet Take 1 tablet by mouth daily 6/17/24   Rom Mckeon MD   warfarin (COUMADIN) 2.5 MG tablet Take 1 tablet by mouth nightly 4/22/24   Rom Mckeon MD   OXYGEN Inhale into the lungs continuous Indications: 5 Lpm    Angela Chau MD   Apoaequorin (PREVAGEN EXTRA STRENGTH) 20 MG CAPS Take 20 mg by mouth every morning   7/15/22  ProviderAngela MD

## 2024-07-29 NOTE — PROGRESS NOTES
Pharmacy Consultation Note  (Warfarin Dosing and Monitoring)    Initial consult date: 7/29  Consulting Provider: Byron Richardson    Krystal Hyatt is a 83 y.o. female for whom pharmacy has been asked to manage warfarin therapy.     Weight:   Wt Readings from Last 1 Encounters:   07/29/24 66.7 kg (147 lb 0.8 oz)       TSH:    Lab Results   Component Value Date/Time    TSH 41.59 03/05/2024 04:05 AM       Hepatic Function Panel:                            Lab Results   Component Value Date/Time    ALKPHOS 68 07/28/2024 09:31 PM    ALT 10 07/28/2024 09:31 PM    AST 23 07/28/2024 09:31 PM    BILITOT 0.4 07/28/2024 09:31 PM    BILIDIR 0.2 10/15/2017 02:40 AM    IBILI 0.7 10/15/2017 02:40 AM       Current significant warfarin drug-drug interactions include: No significant interactions    Recent Labs     07/28/24  2131 07/29/24  0530 07/29/24  0650   HGB 10.5* Unable to perform testing: Specimen mislabeled. 8.8*   PLT  --  Unable to perform testing: Specimen mislabeled.  --      Date Warfarin Dose INR Heparin or LMWH Comment   7/29 2.5mg 1.5 --                                  Assessment:  Patient is a 83 y.o. female on warfarin for Atrial Fibrillation.  Patient's home warfarin dosing regimen is 2.5mg daily per notes as patient was on Bactrim.   Goal INR 2 - 3  INR 1.5 today    Plan:  Warfarin 2.5mg tonight  Daily PT/INR until the INR is stable within the therapeutic range  Pharmacist will follow and monitor/adjust dosing as necessary    Thank you for this consult,    Christiana Puri, [facility] 7/29/2024 1:12 PM    RADHA: [phone]  SEY: [phone]  SJW: [phone]

## 2024-07-29 NOTE — ED PROVIDER NOTES
parties and all questions answered at this time to the fullest extent possible.  Shared decision making utilized with patient and present parties, and due to their presenting conditions patient to be admitted to the hospital for inpatient management and monitoring.  Patient has remained closely monitored with multiple reevaluations and complex medical decision making throughout the course of their emergency department stay.  They have clinically remained stable and have hemodynamically improved.  After emergency department management is complete, patient to be admitted to the hospital.    Differential diagnosis includes but is not limited to: Sepsis, hyperkalemia, acute kidney injury, dehydration, pulmonary embolism, ACS, pneumonia, pneumothorax    Please refer to the ED Course as available for additional MDM.  ED Course as of 07/29/24 0534   Sun Jul 28, 2024 2126 EKG:  This EKG is signed and interpreted by me.    Rate: 66  Rhythm: Junctional  Interpretation: Accelerated junctional rhythm, normal axis, nonspecific ST changes throughout, QTc is 423  Comparison: no previous EKG available    [KG]   2127 EKG: Normal sinus rhythm rhythm at 66 bpm with normal axis and QTc of 423.  Nonspecific ST changes as compared to prior EKG.  EKG interpreted by myself. [RW]   2251 Dr. Richardson accepts pt for admission [RW]      ED Course User Index  [KG] Letty Narvaez DO  [RW] Wild Mac DO        Social Determinants affecting Dx or Tx: Patient has good medical compliance and fluency as well as established follow-up with family physician.    Records Reviewed: EKG from 5/1/2024 reviewed in comparison to today's.    I am the Primary Clinician of Record.    CONSULTS: (Who and What was discussed)  IP CONSULT TO NEPHROLOGY  IP CONSULT TO PHARMACY  IP CONSULT TO DIETITIAN    FINAL IMPRESSION      1. Acute kidney injury (HCC)    2. Hyperkalemia    3. Hypoxia          DISPOSITION/PLAN   DISPOSITION Admitted 07/28/2024 10:56:49

## 2024-07-29 NOTE — PROGRESS NOTES
4 Eyes Skin Assessment     NAME:  Krystal Hyatt  YOB: 1940  MEDICAL RECORD NUMBER:  [id number]    The patient is being assessed for  Admission    I agree that at least one RN has performed a thorough Head to Toe Skin Assessment on the patient. ALL assessment sites listed below have been assessed.      Areas assessed by both nurses:    Head, Face, Ears, Shoulders, Back, Chest, Arms, Elbows, Hands, Sacrum. Buttock, Coccyx, Ischium, and Legs. Feet and Heels        Does the Patient have a Wound? No noted wound(s)       Leandro Prevention initiated by RN: No  Wound Care Orders initiated by RN: No    Pressure Injury (Stage 3,4, Unstageable, DTI, NWPT, and Complex wounds) if present, place Wound referral order by RN under : No    New Ostomies, if present place, Ostomy referral order under : No     Nurse 1 eSignature: Electronically signed by Carolyn Nails RN on 7/29/24 at 4:49 AM EDT    **SHARE this note so that the co-signing nurse can place an eSignature**    Nurse 2 eSignature: Electronically signed by Paz Patiño RN on 7/29/24 at 4:51 AM EDT

## 2024-07-29 NOTE — ED NOTES
ED to Inpatient Handoff Report    Notified Carolyn that electronic handoff available and patient ready for transport to room 422.    Safety Risks: None identified    Patient in Restraints: no    Constant Observer or Patient : no    Telemetry Monitoring Ordered: Yes          Order to transfer to unit without monitor: NA    Last MEWS:  Time completed:     Deterioration Index: 41.62    Vitals:    07/28/24 2213 07/28/24 2229 07/28/24 2234 07/28/24 2308   BP: (!) 96/58  (!) 103/59 107/68   Pulse:  60 63 57   Resp:  19 20 17   Temp:       TempSrc:       SpO2:  98% 97% 99%   Weight:       Height:           Opportunity for questions and clarification was provided.

## 2024-07-29 NOTE — PROGRESS NOTES
[facility] Hospitalist Progress Note    Admitting Date and Time: 7/28/2024  9:07 PM  Admit Dx: Hyperkalemia [E87.5]  Hypoxia [R09.02]  TRISTEN (acute kidney injury) (HCC) [N17.9]  Acute kidney injury (HCC) [N17.9]    Subjective:  Patient is being followed for Hyperkalemia [E87.5]  Hypoxia [R09.02]  TRISTEN (acute kidney injury) (HCC) [N17.9]  Acute kidney injury (HCC) [N17.9]   Pt seen and examined this morning  No acute events overnight  On 6 L NC.  Still complaining of shortness of breath with minimal activities    ROS: denies fever, chills, cp, sob, n/v, HA unless stated above.      digoxin  62.5 mcg Oral Daily    levothyroxine  75 mcg Oral QAM AC    metoprolol succinate  12.5 mg Oral QAM    pantoprazole  40 mg Oral QAM AC    rosuvastatin  10 mg Oral Nightly    warfarin  2.5 mg Oral Nightly    sodium chloride flush  5-40 mL IntraVENous 2 times per day    neomycin-bacitracin-polymyxin   Topical BID     albuterol, 2.5 mg, 4x Daily PRN  sodium chloride flush, 5-40 mL, PRN  sodium chloride, , PRN  ondansetron, 4 mg, Q8H PRN   Or  ondansetron, 4 mg, Q6H PRN  acetaminophen, 650 mg, Q6H PRN   Or  acetaminophen, 650 mg, Q6H PRN  fluticasone, 1 spray, BID PRN  glucose, 4 tablet, PRN  dextrose bolus, 125 mL, PRN   Or  dextrose bolus, 250 mL, PRN  glucagon (rDNA), 1 mg, PRN  dextrose, , Continuous PRN         Objective:    BP (!) 84/50   Pulse 51   Temp 97.3 °F (36.3 °C) (Temporal)   Resp 18   Ht 1.727 m (5' 8\")   Wt 66.7 kg (147 lb 0.8 oz)   SpO2 94%   BMI 22.36 kg/m²     General Appearance: alert and oriented to person, place and time and in no acute distress  Skin: warm and dry  Head: normocephalic and atraumatic  Eyes: pupils equal, round, and reactive to light, extraocular eye movements intact, conjunctivae normal  Neck: neck supple and non tender without mass   Pulmonary/Chest: Slightly diminished  Cardiovascular: normal rate, normal S1 and S2 and no carotid bruits  Abdomen: soft, non-tender, non-distended,  normal bowel sounds, no masses or organomegaly  Extremities: no cyanosis, no clubbing and no edema  Neurologic: no cranial nerve deficit and speech normal        Recent Labs     07/28/24 2131 07/28/24  2315 07/29/24  0650     --  136   K 5.9* 4.1 5.0   CL 98  --  99   CO2 24  --  26   BUN 45*  --  46*   CREATININE 2.7*  --  2.5*   GLUCOSE 129*  --  97   CALCIUM 10.0  --  9.3       Recent Labs     07/28/24 2131 07/29/24  0530 07/29/24  0650   WBC 4.8 Unable to perform testing: Specimen mislabeled. 4.0*   RBC 3.66 Unable to perform testing: Specimen mislabeled. 3.07*   HGB 10.5* Unable to perform testing: Specimen mislabeled. 8.8*   HCT 33.9* Unable to perform testing: Specimen mislabeled. 28.1*   MCV 92.6 Unable to perform testing: Specimen mislabeled. 91.5   MCH 28.7 Unable to perform testing: Specimen mislabeled. 28.7   MCHC 31.0* Unable to perform testing: Specimen mislabeled. 31.3*   RDW 14.6 Unable to perform testing: Specimen mislabeled. 14.7   PLT  --  Unable to perform testing: Specimen mislabeled.  --    MPV 10.7 Unable to perform testing: Specimen mislabeled. 10.9         Assessment and Plan:     Principal Problem:    TRISTEN (acute kidney injury) (HCC)  Active Problems:    Chronic anticoagulation    Essential hypertension    History of atrial fibrillation    Moderate tricuspid regurgitation    Acquired hypothyroidism    Chronic respiratory failure with hypoxia (HCC)    Mixed hyperlipidemia    Atrial fibrillation (HCC)    Hyperkalemia  Resolved Problems:    * No resolved hospital problems. *    Acute on chronic hypoxic respiratory failure.  Uses 5 L at home.  Currently on 6 L consult complaining of shortness of breath with minimal activities.  CTA negative for acute process.  Monitor off antibiotics.  PT OT  TRISTEN.  Creatinine 2.7 on admission.  Normal baseline.  Appreciate nephrology's input.  History of A-fib.  Currently rate controlled.  Holding BB and digoxin due to hypotension and bradycardia.

## 2024-07-30 LAB
ALBUMIN: 3.4 G/DL (ref 3.5–5.2)
ANION GAP SERPL CALCULATED.3IONS-SCNC: 5 MMOL/L (ref 7–16)
BASOPHILS # BLD: 0.01 K/UL (ref 0–0.2)
BASOPHILS NFR BLD: 0 % (ref 0–2)
BUN SERPL-MCNC: 35 MG/DL (ref 6–23)
CALCIUM SERPL-MCNC: 9 MG/DL (ref 8.6–10.2)
CHLORIDE SERPL-SCNC: 107 MMOL/L (ref 98–107)
CO2 SERPL-SCNC: 28 MMOL/L (ref 22–29)
CREAT SERPL-MCNC: 1.9 MG/DL (ref 0.5–1)
EKG ATRIAL RATE: 45 BPM
EKG ATRIAL RATE: 51 BPM
EKG Q-T INTERVAL: 404 MS
EKG Q-T INTERVAL: 466 MS
EKG QRS DURATION: 76 MS
EKG QRS DURATION: 76 MS
EKG QTC CALCULATION (BAZETT): 412 MS
EKG QTC CALCULATION (BAZETT): 423 MS
EKG R AXIS: 57 DEGREES
EKG R AXIS: 61 DEGREES
EKG T AXIS: 37 DEGREES
EKG T AXIS: 60 DEGREES
EKG VENTRICULAR RATE: 47 BPM
EKG VENTRICULAR RATE: 66 BPM
EOSINOPHIL # BLD: 0.05 K/UL (ref 0.05–0.5)
EOSINOPHILS RELATIVE PERCENT: 2 % (ref 0–6)
ERYTHROCYTE [DISTWIDTH] IN BLOOD BY AUTOMATED COUNT: 14.4 % (ref 11.5–15)
GFR, ESTIMATED: 26 ML/MIN/1.73M2
GLUCOSE BLD-MCNC: 99 MG/DL (ref 74–99)
GLUCOSE SERPL-MCNC: 92 MG/DL (ref 74–99)
HCT VFR BLD AUTO: 26.2 % (ref 34–48)
HGB BLD-MCNC: 8.1 G/DL (ref 11.5–15.5)
IMM GRANULOCYTES # BLD AUTO: <0.03 K/UL (ref 0–0.58)
IMM GRANULOCYTES NFR BLD: 0 % (ref 0–5)
INR PPP: 1.6
LYMPHOCYTES NFR BLD: 1.05 K/UL (ref 1.5–4)
LYMPHOCYTES RELATIVE PERCENT: 39 % (ref 20–42)
MAGNESIUM SERPL-MCNC: 1.6 MG/DL (ref 1.6–2.6)
MCH RBC QN AUTO: 28.7 PG (ref 26–35)
MCHC RBC AUTO-ENTMCNC: 30.9 G/DL (ref 32–34.5)
MCV RBC AUTO: 92.9 FL (ref 80–99.9)
MONOCYTES NFR BLD: 0.24 K/UL (ref 0.1–0.95)
MONOCYTES NFR BLD: 9 % (ref 2–12)
NEUTROPHILS NFR BLD: 49 % (ref 43–80)
NEUTS SEG NFR BLD: 1.31 K/UL (ref 1.8–7.3)
PHOSPHATE SERPL-MCNC: 3.1 MG/DL (ref 2.5–4.5)
PLATELET CONFIRMATION: NORMAL
PLATELET, FLUORESCENCE: 89 K/UL (ref 130–450)
PMV BLD AUTO: 11 FL (ref 7–12)
POTASSIUM SERPL-SCNC: 4.8 MMOL/L (ref 3.5–5)
PROTHROMBIN TIME: 17 SEC (ref 9.3–12.4)
RBC # BLD AUTO: 2.82 M/UL (ref 3.5–5.5)
SODIUM SERPL-SCNC: 140 MMOL/L (ref 132–146)
WBC OTHER # BLD: 2.7 K/UL (ref 4.5–11.5)

## 2024-07-30 PROCEDURE — 6360000002 HC RX W HCPCS: Performed by: HOSPITALIST

## 2024-07-30 PROCEDURE — 36415 COLL VENOUS BLD VENIPUNCTURE: CPT

## 2024-07-30 PROCEDURE — 82962 GLUCOSE BLOOD TEST: CPT

## 2024-07-30 PROCEDURE — 2580000003 HC RX 258: Performed by: HOSPITALIST

## 2024-07-30 PROCEDURE — 85025 COMPLETE CBC W/AUTO DIFF WBC: CPT

## 2024-07-30 PROCEDURE — 94640 AIRWAY INHALATION TREATMENT: CPT

## 2024-07-30 PROCEDURE — 2700000000 HC OXYGEN THERAPY PER DAY

## 2024-07-30 PROCEDURE — 99232 SBSQ HOSP IP/OBS MODERATE 35: CPT | Performed by: INTERNAL MEDICINE

## 2024-07-30 PROCEDURE — 97161 PT EVAL LOW COMPLEX 20 MIN: CPT

## 2024-07-30 PROCEDURE — 2060000000 HC ICU INTERMEDIATE R&B

## 2024-07-30 PROCEDURE — 85610 PROTHROMBIN TIME: CPT

## 2024-07-30 PROCEDURE — 6370000000 HC RX 637 (ALT 250 FOR IP): Performed by: HOSPITALIST

## 2024-07-30 PROCEDURE — 83735 ASSAY OF MAGNESIUM: CPT

## 2024-07-30 PROCEDURE — 80069 RENAL FUNCTION PANEL: CPT

## 2024-07-30 RX ADMIN — METOPROLOL SUCCINATE 12.5 MG: 25 TABLET, EXTENDED RELEASE ORAL at 08:00

## 2024-07-30 RX ADMIN — PANTOPRAZOLE SODIUM 40 MG: 40 TABLET, DELAYED RELEASE ORAL at 06:40

## 2024-07-30 RX ADMIN — LEVOTHYROXINE SODIUM 75 MCG: 75 TABLET ORAL at 06:40

## 2024-07-30 RX ADMIN — POLYMYXIN B SULFATE, BACITRACIN ZINC, NEOMYCIN SULFATE: 5000; 3.5; 4 OINTMENT TOPICAL at 08:00

## 2024-07-30 RX ADMIN — POLYMYXIN B SULFATE, BACITRACIN ZINC, NEOMYCIN SULFATE: 5000; 3.5; 4 OINTMENT TOPICAL at 21:07

## 2024-07-30 RX ADMIN — ROSUVASTATIN CALCIUM 10 MG: 10 TABLET, FILM COATED ORAL at 21:06

## 2024-07-30 RX ADMIN — ALBUTEROL SULFATE 2.5 MG: 2.5 SOLUTION RESPIRATORY (INHALATION) at 21:14

## 2024-07-30 RX ADMIN — DIGOXIN 62.5 MCG: 0.12 TABLET ORAL at 08:00

## 2024-07-30 RX ADMIN — WARFARIN SODIUM 2.5 MG: 2.5 TABLET ORAL at 21:06

## 2024-07-30 RX ADMIN — SODIUM CHLORIDE, POTASSIUM CHLORIDE, SODIUM LACTATE AND CALCIUM CHLORIDE: 600; 310; 30; 20 INJECTION, SOLUTION INTRAVENOUS at 16:54

## 2024-07-30 NOTE — CONSULTS
[facility]  MD Haris Skelton MD Ali Hassan, MD Lisa Kniska, ANIA Foley CNP  Consultation  Patient's Name: Krystal Hyatt  10:15 AM  7/30/2024    Nephrologist: Haris Arechiga MD    Reason for Consult:  TRISTEN  Requesting Physician:  Bharti Jones MD     Chief Complaint:  Shortness of breath     History Obtained From: Patient, chart    History of Present Ilness:         Ms. Hyatt is a pleasant 83-year-old woman who presented to the hospital for shortness of breath.  She normally utilizes oxygen chronically at home at 5 L, though had to increase her oxygen to 8 L prior to coming to the hospital due to worsening dyspnea.  She also reports that she was using her albuterol inhaler frequently and began getting the shakes.  Two weeks ago she noticed that she was having worsening shortness of breath and a cough and was subsequently prescribed Bactrim by her PCP.  Workup in the emergency department included a CTA that showed no evidence of pulmonary embolism or acute pulmonary abnormality and a renal ultrasound that showed no evidence of hydronephrosis.  Labs were significant for potassium 5.9, BUN 45, creatinine 2.7, lactic acid 3, and hemoglobin of 10.5.  Her Bumex and Entresto were put on hold and she was started on intravenous fluids.  Her past medical history significant for atrial fibrillation, congestive heart failure, emphysema, hyperlipidemia, hypertension, lung cancer, oxygen dependency, skin cancer, and thyroid disease.         We were consulted for acute kidney injury.  She is known to our service from previous hospitalizations.  Her typical baseline creatinine level is around 0.9 mg/dL.  On arrival to the hospital her creatinine level was 2.7 mg/dL and has improved to 1.9 mg/dL as of today.  Urine indices were consistent with prerenal azotemia.  She was markedly hypotensive on arrival to the emergency department.  She tells me

## 2024-07-30 NOTE — PROGRESS NOTES
[facility] Hospitalist Progress Note    Admitting Date and Time: 7/28/2024  9:07 PM  Admit Dx: Hyperkalemia [E87.5]  Hypoxia [R09.02]  TRISTEN (acute kidney injury) (HCC) [N17.9]  Acute kidney injury (HCC) [N17.9]    Subjective:  Patient is being followed for Hyperkalemia [E87.5]  Hypoxia [R09.02]  TRISTEN (acute kidney injury) (HCC) [N17.9]  Acute kidney injury (HCC) [N17.9]   Pt seen and examined this morning  No acute events overnight  On 5 L NC.  Still complaining of shortness of breath with minimal activities    ROS: denies fever, chills, cp, sob, n/v, HA unless stated above.      digoxin  62.5 mcg Oral Daily    levothyroxine  75 mcg Oral QAM AC    metoprolol succinate  12.5 mg Oral QAM    pantoprazole  40 mg Oral QAM AC    rosuvastatin  10 mg Oral Nightly    warfarin  2.5 mg Oral Nightly    sodium chloride flush  5-40 mL IntraVENous 2 times per day    neomycin-bacitracin-polymyxin   Topical BID     albuterol, 2.5 mg, 4x Daily PRN  sodium chloride flush, 5-40 mL, PRN  sodium chloride, , PRN  ondansetron, 4 mg, Q8H PRN   Or  ondansetron, 4 mg, Q6H PRN  acetaminophen, 650 mg, Q6H PRN   Or  acetaminophen, 650 mg, Q6H PRN  fluticasone, 1 spray, BID PRN  glucose, 4 tablet, PRN  dextrose bolus, 125 mL, PRN   Or  dextrose bolus, 250 mL, PRN  glucagon (rDNA), 1 mg, PRN  dextrose, , Continuous PRN         Objective:    BP (!) 114/58   Pulse 71   Temp 97.9 °F (36.6 °C) (Oral)   Resp 18   Ht 1.727 m (5' 8\")   Wt 66.7 kg (147 lb 0.8 oz)   SpO2 96%   BMI 22.36 kg/m²     General Appearance: alert and oriented to person, place and time and in no acute distress  Skin: warm and dry  Head: normocephalic and atraumatic  Eyes: pupils equal, round, and reactive to light, extraocular eye movements intact, conjunctivae normal  Neck: neck supple and non tender without mass   Pulmonary/Chest: Slightly diminished  Cardiovascular: normal rate, normal S1 and S2 and no carotid bruits  Abdomen: soft, non-tender, non-distended,

## 2024-07-30 NOTE — PROGRESS NOTES
Pharmacy Consultation Note  (Warfarin Dosing and Monitoring)    Initial consult date: 7/29  Consulting Provider: Byron Richardson    Krystal Hyatt is a 83 y.o. female for whom pharmacy has been asked to manage warfarin therapy.     Weight:   Wt Readings from Last 1 Encounters:   07/29/24 66.7 kg (147 lb 0.8 oz)       TSH:    Lab Results   Component Value Date/Time    TSH 41.59 03/05/2024 04:05 AM       Hepatic Function Panel:                            Lab Results   Component Value Date/Time    ALKPHOS 68 07/28/2024 09:31 PM    ALT 10 07/28/2024 09:31 PM    AST 23 07/28/2024 09:31 PM    BILITOT 0.4 07/28/2024 09:31 PM    BILIDIR 0.2 10/15/2017 02:40 AM    IBILI 0.7 10/15/2017 02:40 AM       Current significant warfarin drug-drug interactions include: No significant interactions    Recent Labs     07/29/24  0530 07/29/24  0650 07/30/24  0600   HGB Unable to perform testing: Specimen mislabeled. 8.8* 8.1*   PLT Unable to perform testing: Specimen mislabeled.  --   --        Date Warfarin Dose INR Heparin or LMWH Comment   7/29 2.5 mg 1.5 --    7/30 2.5 mg 1.6 --                           Assessment:  Patient is a 83 y.o. female on warfarin for Atrial Fibrillation.  Patient's home warfarin dosing regimen is 2.5mg daily per notes as patient was on Bactrim.   Goal INR 2 - 3  INR 1.6 today    Plan:  Warfarin 2.5mg tonight  Daily PT/INR until the INR is stable within the therapeutic range  Pharmacist will follow and monitor/adjust dosing as necessary    Thank you for this consult,    Christiana Jaxson, [facility] 7/30/2024 12:11 PM    RADHA: [phone]  SEY: [phone]  SJW: [phone]

## 2024-07-30 NOTE — PROGRESS NOTES
Lab called to ask to reorder pts PT-INR due to lab results not turning out correctly, specimen questionable contamination.Will inform nurse to redraw

## 2024-07-30 NOTE — PROGRESS NOTES
Spoke with Dr. Jones regarding patient and son's concerns about the sutures being removed from the right temple area. Patient was supposed to have an appt with the dermatologist yesterday. Dr. Jones will evaluate the incision tomorrow.  Pauly Rosales RN

## 2024-07-30 NOTE — PLAN OF CARE
Problem: Discharge Planning  Goal: Discharge to home or other facility with appropriate resources  7/29/2024 2207 by Katie Starr RN  Outcome: Progressing  7/29/2024 1416 by Na Degroot RN  Outcome: Progressing  7/29/2024 1416 by Na Degroot RN  Outcome: Progressing  Flowsheets (Taken 7/29/2024 0900)  Discharge to home or other facility with appropriate resources: Identify barriers to discharge with patient and caregiver     Problem: Safety - Adult  Goal: Free from fall injury  7/29/2024 2207 by Katie Starr RN  Outcome: Progressing  7/29/2024 1416 by Na Degroot RN  Outcome: Progressing  7/29/2024 1416 by Na Degroot RN  Outcome: Progressing     Problem: Metabolic/Fluid and Electrolytes - Adult  Goal: Electrolytes maintained within normal limits  7/29/2024 1416 by Na Degroot RN  Outcome: Not Progressing  Goal: Hemodynamic stability and optimal renal function maintained  7/29/2024 1416 by Na Degroot RN  Outcome: Not Progressing     Problem: Hematologic - Adult  Goal: Maintains hematologic stability  7/29/2024 1416 by Na Degroot RN  Outcome: Not Progressing

## 2024-07-30 NOTE — PLAN OF CARE
Problem: Discharge Planning  Goal: Discharge to home or other facility with appropriate resources  7/30/2024 1052 by Pauly Rosales RN  Outcome: Progressing  7/29/2024 2207 by Katie Starr RN  Outcome: Progressing     Problem: Safety - Adult  Goal: Free from fall injury  7/30/2024 1052 by Pauly Rosales RN  Outcome: Progressing  7/29/2024 2207 by Katie Starr RN  Outcome: Progressing     Problem: ABCDS Injury Assessment  Goal: Absence of physical injury  7/30/2024 1052 by Pauly Rosales RN  Outcome: Progressing  7/29/2024 2207 by Katie Starr RN  Outcome: Progressing     Problem: Metabolic/Fluid and Electrolytes - Adult  Goal: Electrolytes maintained within normal limits  Outcome: Progressing  Goal: Hemodynamic stability and optimal renal function maintained  Outcome: Progressing     Problem: Hematologic - Adult  Goal: Maintains hematologic stability  Outcome: Progressing

## 2024-07-30 NOTE — PROGRESS NOTES
Physical Therapy  Facility/Department: [address] INTERNAL MEDICINE 2  Physical Therapy Initial Assessment    Name: Krystal Hyatt  : 1940  MRN: [id number]  Date of Service: 2024    Patient Diagnosis(es): The primary encounter diagnosis was Acute kidney injury (HCC). Diagnoses of Hyperkalemia and Hypoxia were also pertinent to this visit.  Past Medical History:  has a past medical history of Atrial fibrillation (HCC), CHF (congestive heart failure) (HCC), Emphysema, unspecified (HCC), Hyperlipidemia, Hypertension, Lung cancer (HCC), Mitral valve regurgitation, Oxygen dependent, Prolonged emergence from general anesthesia, Skin cancer, and Thyroid disease.  Past Surgical History:  has a past surgical history that includes Breast biopsy (Right); Tubal ligation; bronchoscopy (N/A, 2021); Intracapsular cataract extraction (Right, 2021); Intracapsular cataract extraction (Left, 2022); and Cardioversion (10/06/2023).          Referring provider:  Bharti Jones MD    PT Order:  PT eval and treat     Evaluating PT:  Harriet Boucher, PT, DPT PT [id number]    Room #:  0422/0422-A  Diagnosis:  Hyperkalemia [E87.5]  Hypoxia [R09.02]  TRISTEN (acute kidney injury) (HCC) [N17.9]  Acute kidney injury (HCC) [N17.9]  Precautions:  fall risk, O2  Equipment Needs:  none.  Pt reported owing a walker and cane.    SUBJECTIVE:    Pt lives alone in a 2 story home with 2 stairs to enter and 1 rail or a ramp.  Bed and bath is on first floor.  Pt ambulated with no device PTA.  Pt reported family checks on her frequently.    OBJECTIVE:   Initial Evaluation  Date:  Treatment Short Term/ Long Term   Goals   Was pt agreeable to Eval/treatment? yes     Does pt have pain? None reported     Bed Mobility  Rolling: NT  Supine to sit: NT  Sit to supine: NT  Scooting: NT  Pt sitting up in the chair  independent   Transfers Sit to stand: SBA  Stand to sit: SBA  Stand pivot: NT  Modified independent   Ambulation    150 feet with  Recommendations:     [x] Strengthening to improve independence with functional mobility   [] ROM to improve independence with functional mobility   [x] Balance Training to improve static/dynamic balance and to reduce fall risk  [x] Endurance Training to improve activity tolerance during functional mobility   [x] Transfer Training to improve safety and independence with all functional transfers   [x] Gait Training to improve gait mechanics, endurance and assess need for appropriate assistive device  [] Stair Training in preparation for safe discharge home and/or into the community   [] Positioning to prevent skin breakdown and contractures  [x] Safety and Education Training   [x] Patient/Caregiver Education   [] HEP  [] Other     PT long term treatment goals are located in above grid    Frequency of treatments: 2-5x/week x 1-2 weeks.    Time in  0922  Time out  0934    Evaluation Time includes thorough review of current medical information, gathering information on past medical history/social history and prior level of function, completion of standardized testing/informal observation of tasks, assessment of data and education on plan of care and goals.    CPT codes:  [x] Low Complexity PT evaluation 02733  [] Moderate Complexity PT evaluation 26832  [] High Complexity PT evaluation 01378  [] PT Re-evaluation 91612  [] Therapeutic activities 08561 __minutes  [] Therapeutic exercises 92848 __ minutes      Harriet Boucher, PT, DPT  PT [id number]

## 2024-07-31 VITALS
BODY MASS INDEX: 24.36 KG/M2 | HEIGHT: 68 IN | DIASTOLIC BLOOD PRESSURE: 62 MMHG | TEMPERATURE: 97.6 F | HEART RATE: 66 BPM | WEIGHT: 160.72 LBS | SYSTOLIC BLOOD PRESSURE: 133 MMHG | RESPIRATION RATE: 18 BRPM | OXYGEN SATURATION: 96 %

## 2024-07-31 LAB
ALBUMIN: 4 G/DL (ref 3.5–5.2)
ANION GAP SERPL CALCULATED.3IONS-SCNC: 12 MMOL/L (ref 7–16)
ANION GAP SERPL CALCULATED.3IONS-SCNC: 8 MMOL/L (ref 7–16)
BASOPHILS # BLD: 0.01 K/UL (ref 0–0.2)
BASOPHILS NFR BLD: 0 % (ref 0–2)
BUN SERPL-MCNC: 21 MG/DL (ref 6–23)
BUN SERPL-MCNC: 22 MG/DL (ref 6–23)
CALCIUM SERPL-MCNC: 9 MG/DL (ref 8.6–10.2)
CALCIUM SERPL-MCNC: 9.1 MG/DL (ref 8.6–10.2)
CHLORIDE SERPL-SCNC: 105 MMOL/L (ref 98–107)
CHLORIDE SERPL-SCNC: 106 MMOL/L (ref 98–107)
CO2 SERPL-SCNC: 25 MMOL/L (ref 22–29)
CO2 SERPL-SCNC: 26 MMOL/L (ref 22–29)
CREAT SERPL-MCNC: 1.1 MG/DL (ref 0.5–1)
CREAT SERPL-MCNC: 1.1 MG/DL (ref 0.5–1)
EOSINOPHIL # BLD: 0.05 K/UL (ref 0.05–0.5)
EOSINOPHILS RELATIVE PERCENT: 2 % (ref 0–6)
ERYTHROCYTE [DISTWIDTH] IN BLOOD BY AUTOMATED COUNT: 14 % (ref 11.5–15)
FERRITIN SERPL-MCNC: 158 NG/ML
GFR, ESTIMATED: 47 ML/MIN/1.73M2
GFR, ESTIMATED: 48 ML/MIN/1.73M2
GLUCOSE SERPL-MCNC: 138 MG/DL (ref 74–99)
GLUCOSE SERPL-MCNC: 143 MG/DL (ref 74–99)
HCT VFR BLD AUTO: 30 % (ref 34–48)
HGB BLD-MCNC: 9.3 G/DL (ref 11.5–15.5)
IMM GRANULOCYTES # BLD AUTO: <0.03 K/UL (ref 0–0.58)
IMM GRANULOCYTES NFR BLD: 0 % (ref 0–5)
INR PPP: 1.6
IRON SATN MFR SERPL: 31 % (ref 15–50)
IRON SERPL-MCNC: 73 UG/DL (ref 37–145)
LYMPHOCYTES NFR BLD: 0.8 K/UL (ref 1.5–4)
LYMPHOCYTES RELATIVE PERCENT: 29 % (ref 20–42)
MCH RBC QN AUTO: 29.1 PG (ref 26–35)
MCHC RBC AUTO-ENTMCNC: 31 G/DL (ref 32–34.5)
MCV RBC AUTO: 93.8 FL (ref 80–99.9)
MONOCYTES NFR BLD: 0.13 K/UL (ref 0.1–0.95)
MONOCYTES NFR BLD: 5 % (ref 2–12)
NEUTROPHILS NFR BLD: 64 % (ref 43–80)
NEUTS SEG NFR BLD: 1.74 K/UL (ref 1.8–7.3)
PHOSPHATE SERPL-MCNC: 2.9 MG/DL (ref 2.5–4.5)
PHOSPHATE SERPL-MCNC: 3 MG/DL (ref 2.5–4.5)
PLATELET # BLD AUTO: 90 K/UL (ref 130–450)
PLATELET CONFIRMATION: NORMAL
PMV BLD AUTO: 11.2 FL (ref 7–12)
POTASSIUM SERPL-SCNC: 4.1 MMOL/L (ref 3.5–5)
POTASSIUM SERPL-SCNC: 4.3 MMOL/L (ref 3.5–5)
PROTHROMBIN TIME: 16.7 SEC (ref 9.3–12.4)
RBC # BLD AUTO: 3.2 M/UL (ref 3.5–5.5)
RBC # BLD: ABNORMAL 10*6/UL
SODIUM SERPL-SCNC: 140 MMOL/L (ref 132–146)
SODIUM SERPL-SCNC: 142 MMOL/L (ref 132–146)
TIBC SERPL-MCNC: 232 UG/DL (ref 250–450)
WBC OTHER # BLD: 2.7 K/UL (ref 4.5–11.5)

## 2024-07-31 PROCEDURE — 80048 BASIC METABOLIC PNL TOTAL CA: CPT

## 2024-07-31 PROCEDURE — 2580000003 HC RX 258: Performed by: HOSPITALIST

## 2024-07-31 PROCEDURE — 83550 IRON BINDING TEST: CPT

## 2024-07-31 PROCEDURE — 83540 ASSAY OF IRON: CPT

## 2024-07-31 PROCEDURE — 2700000000 HC OXYGEN THERAPY PER DAY

## 2024-07-31 PROCEDURE — 85610 PROTHROMBIN TIME: CPT

## 2024-07-31 PROCEDURE — 84100 ASSAY OF PHOSPHORUS: CPT

## 2024-07-31 PROCEDURE — 36415 COLL VENOUS BLD VENIPUNCTURE: CPT

## 2024-07-31 PROCEDURE — 85025 COMPLETE CBC W/AUTO DIFF WBC: CPT

## 2024-07-31 PROCEDURE — 80069 RENAL FUNCTION PANEL: CPT

## 2024-07-31 PROCEDURE — 99239 HOSP IP/OBS DSCHRG MGMT >30: CPT | Performed by: INTERNAL MEDICINE

## 2024-07-31 PROCEDURE — 6370000000 HC RX 637 (ALT 250 FOR IP): Performed by: HOSPITALIST

## 2024-07-31 PROCEDURE — 82728 ASSAY OF FERRITIN: CPT

## 2024-07-31 RX ORDER — WARFARIN SODIUM 5 MG/1
5 TABLET ORAL
Status: DISCONTINUED | OUTPATIENT
Start: 2024-07-31 | End: 2024-07-31 | Stop reason: HOSPADM

## 2024-07-31 RX ADMIN — SODIUM CHLORIDE, PRESERVATIVE FREE 10 ML: 5 INJECTION INTRAVENOUS at 08:09

## 2024-07-31 RX ADMIN — PANTOPRAZOLE SODIUM 40 MG: 40 TABLET, DELAYED RELEASE ORAL at 06:20

## 2024-07-31 RX ADMIN — LEVOTHYROXINE SODIUM 75 MCG: 75 TABLET ORAL at 06:20

## 2024-07-31 RX ADMIN — DIGOXIN 62.5 MCG: 0.12 TABLET ORAL at 08:08

## 2024-07-31 RX ADMIN — METOPROLOL SUCCINATE 12.5 MG: 25 TABLET, EXTENDED RELEASE ORAL at 08:11

## 2024-07-31 RX ADMIN — POLYMYXIN B SULFATE, BACITRACIN ZINC, NEOMYCIN SULFATE: 5000; 3.5; 4 OINTMENT TOPICAL at 08:08

## 2024-07-31 ASSESSMENT — PAIN SCALES - GENERAL: PAINLEVEL_OUTOF10: 0

## 2024-07-31 NOTE — PLAN OF CARE
Problem: Discharge Planning  Goal: Discharge to home or other facility with appropriate resources  7/30/2024 2217 by Katie Starr RN  Outcome: Progressing  7/30/2024 1052 by Pauly Rosales RN  Outcome: Progressing     Problem: Safety - Adult  Goal: Free from fall injury  7/30/2024 2217 by Katie Starr RN  Outcome: Progressing  7/30/2024 1052 by Pauly Rosales RN  Outcome: Progressing     Problem: ABCDS Injury Assessment  Goal: Absence of physical injury  7/30/2024 2217 by Katie Starr RN  Outcome: Progressing  7/30/2024 1052 by Pauly Rosales RN  Outcome: Progressing

## 2024-07-31 NOTE — DISCHARGE SUMMARY
[facility] Hospitalist Physician Discharge Summary       No follow-up provider specified.    Activity level: As tolerated     Dispo: Home      Condition on discharge: Stable     Patient ID:  Krystal Hyatt  [id number]  83 y.o.  1940    Admit date: 7/28/2024    Discharge date and time:  7/31/2024  1:27 PM    Admission Diagnoses: Principal Problem:    TRISTEN (acute kidney injury) (HCC)  Active Problems:    Chronic anticoagulation    Essential hypertension    History of atrial fibrillation    Moderate tricuspid regurgitation    Acquired hypothyroidism    Chronic respiratory failure with hypoxia (HCC)    Mixed hyperlipidemia    Atrial fibrillation (HCC)    Hyperkalemia  Resolved Problems:    * No resolved hospital problems. *      Discharge Diagnoses: Principal Problem:    TRISTEN (acute kidney injury) (HCC)  Active Problems:    Chronic anticoagulation    Essential hypertension    History of atrial fibrillation    Moderate tricuspid regurgitation    Acquired hypothyroidism    Chronic respiratory failure with hypoxia (HCC)    Mixed hyperlipidemia    Atrial fibrillation (HCC)    Hyperkalemia  Resolved Problems:    * No resolved hospital problems. *      Consults:  IP CONSULT TO NEPHROLOGY  IP CONSULT TO PHARMACY  IP CONSULT TO DIETITIAN  IP CONSULT TO CARDIOLOGY    Hospital Course:   Patient Krystal Hyatt is a 83 y.o. presented with Hyperkalemia [E87.5]  Hypoxia [R09.02]  TRISTEN (acute kidney injury) (HCC) [N17.9]  Acute kidney injury (HCC) [N17.9]    Patient is an 83-year-old female who was admitted due to acute on chronic hypoxic respiratory failure.  Patient requiring 8 L NC on admission, baseline is 5.  CTA negative for acute process.  Likely from deconditioning.  Worked with PT.  Oxygen requirements back to baseline today.  Patient also noted to have TRISTEN that improved with IV fluids and kidney function is back to baseline.  Patient was cleared for discharge by nephrology and cardiology and will be going home  tablet  Commonly known as: CRESTOR  Take 1 tablet by mouth nightly     vitamin C 500 MG tablet  Commonly known as: ASCORBIC ACID  Take 1 tablet by mouth daily     vitamin D 50 MCG (2000 UT) Tabs tablet  Commonly known as: CHOLECALCIFEROL  Take 1 tablet by mouth daily     warfarin 2.5 MG tablet  Commonly known as: COUMADIN  Take as directed. If you are unsure how to take this medication, talk to your nurse or doctor.  Original instructions: Take 1 tablet by mouth nightly           * This list has 2 medication(s) that are the same as other medications prescribed for you. Read the directions carefully, and ask your doctor or other care provider to review them with you.                STOP taking these medications      Prevagen Extra Strength 20 MG Caps  Generic drug: Apoaequorin                Note that more than 30 minutes was spent in preparing discharge papers, discussing discharge with patient, medication review, etc.    Signed:  Electronically signed by Bharti Jones MD on 7/31/2024 at 1:27 PM

## 2024-07-31 NOTE — PROGRESS NOTES
[facility]  MD Haris Skelton, MD Roopa Bowman, CNP   Sylvie Rosales, ANIA  Progress Note    7/31/2024    SUBJECTIVE:   7/31:  Feeling much better today.  Oxygen on at 5L/NC.  Appetite is good.  Denies chest pain, palpitations or SOB.  Ready to go home.     PROBLEM LIST:    Principal Problem:    TRISTEN (acute kidney injury) (HCC)  Active Problems:    Chronic anticoagulation    Essential hypertension    History of atrial fibrillation    Moderate tricuspid regurgitation    Acquired hypothyroidism    Chronic respiratory failure with hypoxia (HCC)    Mixed hyperlipidemia    Atrial fibrillation (HCC)    Hyperkalemia  Resolved Problems:    * No resolved hospital problems. *         DIET:    ADULT DIET; Regular; Low Potassium (Less than 3000 mg/day)     MEDS (scheduled):    digoxin  62.5 mcg Oral Daily    levothyroxine  75 mcg Oral QAM AC    metoprolol succinate  12.5 mg Oral QAM    pantoprazole  40 mg Oral QAM AC    rosuvastatin  10 mg Oral Nightly    warfarin  2.5 mg Oral Nightly    sodium chloride flush  5-40 mL IntraVENous 2 times per day    neomycin-bacitracin-polymyxin   Topical BID       MEDS (infusions):   sodium chloride      dextrose         MEDS (prn):  albuterol, sodium chloride flush, sodium chloride, ondansetron **OR** ondansetron, acetaminophen **OR** acetaminophen, fluticasone, glucose, dextrose bolus **OR** dextrose bolus, glucagon (rDNA), dextrose    PHYSICAL EXAM:     Patient Vitals for the past 24 hrs:   BP Temp Temp src Pulse Resp SpO2 Weight   07/31/24 1057 133/62 97.6 °F (36.4 °C) Oral 66 -- -- --   07/31/24 0800 (!) 124/90 98.1 °F (36.7 °C) Oral 87 18 96 % --   07/31/24 0418 123/64 98 °F (36.7 °C) Oral 70 18 98 % 72.9 kg (160 lb 11.5 oz)   07/31/24 0035 127/61 97.7 °F (36.5 °C) Oral 77 18 97 % --   07/30/24 2115 -- -- -- 70 18 98 % --   07/30/24 2100 (!) 128/54 97.9 °F (36.6 °C) Oral 70 18 100 % --   07/30/24 1450 (!) 114/58 97.9 °F (36.6

## 2024-07-31 NOTE — PROGRESS NOTES
Pharmacy Consultation Note  (Warfarin Dosing and Monitoring)    Initial consult date: 7/29  Consulting Provider: Byron Richardson    Krystal Hyatt is a 83 y.o. female for whom pharmacy has been asked to manage warfarin therapy.     Weight:   Wt Readings from Last 1 Encounters:   07/31/24 72.9 kg (160 lb 11.5 oz)       TSH:    Lab Results   Component Value Date/Time    TSH 41.59 03/05/2024 04:05 AM       Hepatic Function Panel:                            Lab Results   Component Value Date/Time    ALKPHOS 68 07/28/2024 09:31 PM    ALT 10 07/28/2024 09:31 PM    AST 23 07/28/2024 09:31 PM    BILITOT 0.4 07/28/2024 09:31 PM    BILIDIR 0.2 10/15/2017 02:40 AM    IBILI 0.7 10/15/2017 02:40 AM       Current significant warfarin drug-drug interactions include: No significant interactions    Recent Labs     07/29/24  0530 07/29/24  0650 07/30/24  0600 07/31/24  0817   HGB Unable to perform testing: Specimen mislabeled. 8.8* 8.1* 9.3*   PLT Unable to perform testing: Specimen mislabeled.  --   --  90*       Date Warfarin Dose INR Heparin or LMWH Comment   7/29 2.5 mg 1.5 --    7/30 2.5 mg 1.6 --    7/31 5 mg 1.6 --                    Assessment:  Patient is a 83 y.o. female on warfarin for Atrial Fibrillation.  Patient's home warfarin dosing regimen is 2.5mg daily per notes as patient was on Bactrim.   Goal INR 2 - 3  INR 1.6 today    Plan:  Warfarin 5mg tonight  Daily PT/INR until the INR is stable within the therapeutic range  Pharmacist will follow and monitor/adjust dosing as necessary    Thank you for this consult,    Christiana Jaxson, [facility] 7/31/2024 3:08 PM    RADHA: [phone]  SEY: [phone]  SJW: [phone]

## 2024-07-31 NOTE — PLAN OF CARE
Problem: Discharge Planning  Goal: Discharge to home or other facility with appropriate resources  7/31/2024 1004 by Lesa Giron RN  Outcome: Progressing  7/30/2024 2217 by Katie Starr RN  Outcome: Progressing     Problem: Safety - Adult  Goal: Free from fall injury  7/31/2024 1004 by Lesa Giron RN  Outcome: Progressing  7/30/2024 2217 by Katie Strar RN  Outcome: Progressing     Problem: ABCDS Injury Assessment  Goal: Absence of physical injury  7/31/2024 1004 by Lesa Giron RN  Outcome: Progressing  7/30/2024 2217 by Katie Starr RN  Outcome: Progressing

## 2024-08-02 NOTE — PROGRESS NOTES
Physician Progress Note      PATIENT:               SRINIVAS DYE  CSN #:                  [id number]  :                       1940  ADMIT DATE:       2024 9:07 PM  DISCH DATE:        2024 3:54 PM  RESPONDING  PROVIDER #:        Bharti Jones MD          QUERY TEXT:    Pt admitted with TRISTEN and has anemia documented. If possible, please document   in progress notes and discharge summary further specificity regarding the   acuity and type of anemia:    The medical record reflects the following:  Risk Factors: Afib on warfarin, hx lung cancer, CKD  Clinical Indicators: Hct 33.9-26.2, Hgb 10.5-8.1 Per Nephro consult- Anemia.  Treatment: Iron panel, Follow labs, IVF    Thank You  HILL JoN, RN  Clinical Documentation Intergrity  Options provided:  -- Anemia due to chronic blood loss  -- Anemia due to acute on chronic blood loss  -- Anemia due to iron deficiency  -- Anemia due to CKD  -- Dilutional anemia  -- Other - I will add my own diagnosis  -- Disagree - Not applicable / Not valid  -- Disagree - Clinically unable to determine / Unknown  -- Refer to Clinical Documentation Reviewer    PROVIDER RESPONSE TEXT:    This patient has chronic blood loss anemia.    Query created by: Seth Wyatt on 2024 1:12 PM      Electronically signed by:  Bharti Jones MD 2024 12:42 PM

## 2024-08-05 ENCOUNTER — OFFICE VISIT (OUTPATIENT)
Dept: PRIMARY CARE CLINIC | Age: 84
End: 2024-08-05
Payer: MEDICARE

## 2024-08-05 VITALS
OXYGEN SATURATION: 99 % | HEART RATE: 124 BPM | DIASTOLIC BLOOD PRESSURE: 72 MMHG | TEMPERATURE: 97.8 F | HEIGHT: 68 IN | BODY MASS INDEX: 22.88 KG/M2 | SYSTOLIC BLOOD PRESSURE: 92 MMHG | WEIGHT: 151 LBS

## 2024-08-05 DIAGNOSIS — I10 ESSENTIAL HYPERTENSION: ICD-10-CM

## 2024-08-05 DIAGNOSIS — J44.9 COPD, VERY SEVERE (HCC): Primary | ICD-10-CM

## 2024-08-05 DIAGNOSIS — J96.11 CHRONIC RESPIRATORY FAILURE WITH HYPOXIA (HCC): ICD-10-CM

## 2024-08-05 DIAGNOSIS — I48.0 PAF (PAROXYSMAL ATRIAL FIBRILLATION) (HCC): ICD-10-CM

## 2024-08-05 DIAGNOSIS — C34.31 MALIGNANT NEOPLASM OF LOWER LOBE OF RIGHT LUNG (HCC): ICD-10-CM

## 2024-08-05 DIAGNOSIS — E03.9 ACQUIRED HYPOTHYROIDISM: ICD-10-CM

## 2024-08-05 LAB
INTERNATIONAL NORMALIZATION RATIO, POC: 1.3
PROTHROMBIN TIME, POC: 0

## 2024-08-05 PROCEDURE — 3078F DIAST BP <80 MM HG: CPT | Performed by: INTERNAL MEDICINE

## 2024-08-05 PROCEDURE — 1111F DSCHRG MED/CURRENT MED MERGE: CPT | Performed by: INTERNAL MEDICINE

## 2024-08-05 PROCEDURE — 99214 OFFICE O/P EST MOD 30 MIN: CPT | Performed by: INTERNAL MEDICINE

## 2024-08-05 PROCEDURE — 3074F SYST BP LT 130 MM HG: CPT | Performed by: INTERNAL MEDICINE

## 2024-08-05 PROCEDURE — 85610 PROTHROMBIN TIME: CPT | Performed by: INTERNAL MEDICINE

## 2024-08-05 PROCEDURE — 1090F PRES/ABSN URINE INCON ASSESS: CPT | Performed by: INTERNAL MEDICINE

## 2024-08-05 PROCEDURE — 3023F SPIROM DOC REV: CPT | Performed by: INTERNAL MEDICINE

## 2024-08-05 PROCEDURE — G8427 DOCREV CUR MEDS BY ELIG CLIN: HCPCS | Performed by: INTERNAL MEDICINE

## 2024-08-05 PROCEDURE — G8400 PT W/DXA NO RESULTS DOC: HCPCS | Performed by: INTERNAL MEDICINE

## 2024-08-05 PROCEDURE — G8420 CALC BMI NORM PARAMETERS: HCPCS | Performed by: INTERNAL MEDICINE

## 2024-08-05 PROCEDURE — 1123F ACP DISCUSS/DSCN MKR DOCD: CPT | Performed by: INTERNAL MEDICINE

## 2024-08-05 PROCEDURE — 1036F TOBACCO NON-USER: CPT | Performed by: INTERNAL MEDICINE

## 2024-08-05 RX ORDER — ROSUVASTATIN CALCIUM 10 MG/1
10 TABLET, COATED ORAL NIGHTLY
Qty: 90 TABLET | Refills: 0 | Status: SHIPPED | OUTPATIENT
Start: 2024-08-05

## 2024-08-05 RX ORDER — DIGOXIN 0.06 MG/1
62.5 TABLET ORAL DAILY
Qty: 90 TABLET | Refills: 0 | Status: SHIPPED | OUTPATIENT
Start: 2024-08-05

## 2024-08-05 RX ORDER — BUMETANIDE 0.5 MG/1
0.5 TABLET ORAL DAILY
Qty: 90 TABLET | Refills: 0 | Status: SHIPPED | OUTPATIENT
Start: 2024-08-05

## 2024-08-05 RX ORDER — CHOLECALCIFEROL (VITAMIN D3) 50 MCG
2000 TABLET ORAL DAILY
Qty: 90 TABLET | Refills: 0 | Status: SHIPPED | OUTPATIENT
Start: 2024-08-05

## 2024-08-05 RX ORDER — METOPROLOL SUCCINATE 25 MG/1
12.5 TABLET, EXTENDED RELEASE ORAL EVERY MORNING
Qty: 90 TABLET | Refills: 0 | Status: SHIPPED | OUTPATIENT
Start: 2024-08-05

## 2024-08-05 RX ORDER — SACUBITRIL AND VALSARTAN 24; 26 MG/1; MG/1
0.5 TABLET, FILM COATED ORAL 2 TIMES DAILY
Qty: 180 TABLET | Refills: 0 | Status: SHIPPED | OUTPATIENT
Start: 2024-08-05

## 2024-08-05 RX ORDER — LEVOTHYROXINE SODIUM 0.07 MG/1
75 TABLET ORAL DAILY
Qty: 90 TABLET | Refills: 0 | Status: SHIPPED | OUTPATIENT
Start: 2024-08-05

## 2024-08-05 RX ORDER — WARFARIN SODIUM 3 MG/1
3 TABLET ORAL DAILY
Qty: 30 TABLET | Refills: 1 | Status: SHIPPED | OUTPATIENT
Start: 2024-08-05

## 2024-08-05 RX ORDER — ASCORBIC ACID 500 MG
500 TABLET ORAL DAILY
Qty: 90 TABLET | Refills: 0 | Status: SHIPPED | OUTPATIENT
Start: 2024-08-05

## 2024-08-05 ASSESSMENT — ENCOUNTER SYMPTOMS
ABDOMINAL PAIN: 0
VOMITING: 0
COUGH: 0
DIARRHEA: 0
SHORTNESS OF BREATH: 0
NAUSEA: 0

## 2024-08-05 NOTE — PROGRESS NOTES
SUBJECTIVE  Krystal Hyatt is a 83 y.o. female established was seen In the office  for evaluation.    HPI/Chief C/O:  Chief Complaint   Patient presents with    Follow-Up from Hospital     Follow up after hospital for kidney failure; atb that was given caused it   Feels fine now   Allergies   Allergen Reactions    Pacerone [Amiodarone] Shortness Of Breath    Bactrim [Sulfamethoxazole-Trimethoprim] Other (See Comments)     Elevated kidney function    Cat Hair Extract Itching and Other (See Comments)     Patient states \"My son put this. I hope I'm not allergic, I have 4 Cats and 2 Birds, maybe to their dust.\"    Egg-Derived Products Nausea Only    Erythromycin Diarrhea, Nausea And Vomiting and Other (See Comments)     \"STOMACH PAINS\"      Pcn [Penicillins] Itching and Rash     PT STATES \"RASH FROM HEAD TO TOE, W/ SOMETHING CRAWLING UNDER MY SKIN\"    Seasonal Itching, Swelling and Other (See Comments)     RUNNY/ITCHY NOSE, WATERY/ITCHY EYES       ROS:  Review of Systems   Respiratory:  Negative for cough and shortness of breath.         Negative for Hemoptysis   Cardiovascular:  Negative for chest pain.   Gastrointestinal:  Negative for abdominal pain, diarrhea, nausea and vomiting.   Endocrine: Negative for polydipsia, polyphagia and polyuria.   Genitourinary:  Negative for dysuria and hematuria.   Skin:  Negative for rash.   Neurological:  Negative for tremors and seizures.        Past Medical/Surgical Hx;  Reviewed with patient      Diagnosis Date    Atrial fibrillation (HCC)     CHF (congestive heart failure) (HCC)     Emphysema, unspecified (HCC)     Hyperlipidemia     Hypertension     Lung cancer (HCC)     Mitral valve regurgitation     Oxygen dependent     Prolonged emergence from general anesthesia     post general anesthesia    Skin cancer     Thyroid disease      Past Surgical History:   Procedure Laterality Date    BREAST BIOPSY Right     benign    BRONCHOSCOPY N/A 04/20/2021    BRONCHOSCOPY/TRANSBRONCHIAL

## 2024-08-12 ENCOUNTER — NURSE ONLY (OUTPATIENT)
Dept: PRIMARY CARE CLINIC | Age: 84
End: 2024-08-12
Payer: MEDICARE

## 2024-08-12 DIAGNOSIS — N18.31 CHRONIC KIDNEY DISEASE, STAGE 3A (HCC): Primary | ICD-10-CM

## 2024-08-12 DIAGNOSIS — E78.2 MIXED HYPERLIPIDEMIA: ICD-10-CM

## 2024-08-12 DIAGNOSIS — I48.91 ATRIAL FIBRILLATION, UNSPECIFIED TYPE (HCC): ICD-10-CM

## 2024-08-12 LAB
ALBUMIN: 4 G/DL (ref 3.5–5.2)
ALP BLD-CCNC: 62 U/L (ref 35–104)
ALT SERPL-CCNC: 10 U/L (ref 0–32)
ANION GAP SERPL CALCULATED.3IONS-SCNC: 17 MMOL/L (ref 7–16)
AST SERPL-CCNC: 29 U/L (ref 0–31)
BASOPHILS ABSOLUTE: 0.01 K/UL (ref 0–0.2)
BASOPHILS RELATIVE PERCENT: 0 % (ref 0–2)
BILIRUB SERPL-MCNC: 0.6 MG/DL (ref 0–1.2)
BUN BLDV-MCNC: 21 MG/DL (ref 6–23)
CALCIUM SERPL-MCNC: 9.4 MG/DL (ref 8.6–10.2)
CHLORIDE BLD-SCNC: 104 MMOL/L (ref 98–107)
CHOLESTEROL, TOTAL: 171 MG/DL
CO2: 26 MMOL/L (ref 22–29)
CREAT SERPL-MCNC: 0.9 MG/DL (ref 0.5–1)
EOSINOPHILS ABSOLUTE: 0.05 K/UL (ref 0.05–0.5)
EOSINOPHILS RELATIVE PERCENT: 2 % (ref 0–6)
GFR, ESTIMATED: 65 ML/MIN/1.73M2
GLUCOSE BLD-MCNC: 104 MG/DL (ref 74–99)
HCT VFR BLD CALC: 31.4 % (ref 34–48)
HDLC SERPL-MCNC: 72 MG/DL
HEMOGLOBIN: 10 G/DL (ref 11.5–15.5)
IMMATURE GRANULOCYTES %: 0 % (ref 0–5)
IMMATURE GRANULOCYTES ABSOLUTE: <0.03 K/UL (ref 0–0.58)
INR BLD: 1.3
LDL CHOLESTEROL: 87 MG/DL
LYMPHOCYTES ABSOLUTE: 0.94 K/UL (ref 1.5–4)
LYMPHOCYTES RELATIVE PERCENT: 32 % (ref 20–42)
MCH RBC QN AUTO: 29.9 PG (ref 26–35)
MCHC RBC AUTO-ENTMCNC: 31.8 G/DL (ref 32–34.5)
MCV RBC AUTO: 94 FL (ref 80–99.9)
MONOCYTES ABSOLUTE: 0.24 K/UL (ref 0.1–0.95)
MONOCYTES RELATIVE PERCENT: 8 % (ref 2–12)
NEUTROPHILS ABSOLUTE: 1.72 K/UL (ref 1.8–7.3)
NEUTROPHILS RELATIVE PERCENT: 58 % (ref 43–80)
PDW BLD-RTO: 14.7 % (ref 11.5–15)
PLATELET # BLD: 95 K/UL (ref 130–450)
PLATELET CONFIRMATION: NORMAL
PMV BLD AUTO: 12.3 FL (ref 7–12)
POTASSIUM SERPL-SCNC: 4.4 MMOL/L (ref 3.5–5)
RBC # BLD: 3.34 M/UL (ref 3.5–5.5)
SODIUM BLD-SCNC: 147 MMOL/L (ref 132–146)
TOTAL PROTEIN: 6.3 G/DL (ref 6.4–8.3)
TRIGL SERPL-MCNC: 58 MG/DL
VLDLC SERPL CALC-MCNC: 12 MG/DL
WBC # BLD: 3 K/UL (ref 4.5–11.5)

## 2024-08-12 PROCEDURE — 36415 COLL VENOUS BLD VENIPUNCTURE: CPT | Performed by: INTERNAL MEDICINE

## 2024-08-22 NOTE — TELEPHONE ENCOUNTER
Last Appointment:  8/5/2024  Future Appointments   Date Time Provider Department Center   8/30/2024 11:45 AM Rom Mckeon MD [facility] DEP   11/27/2024  2:40 PM Dickson Ramon APRN - CNS [facility]MON[facility]   3/24/2025  9:30 AM Rom Mckeon MD [facility] DEP

## 2024-08-23 RX ORDER — PANTOPRAZOLE SODIUM 40 MG/1
40 TABLET, DELAYED RELEASE ORAL EVERY MORNING
Qty: 90 TABLET | Refills: 0 | Status: SHIPPED | OUTPATIENT
Start: 2024-08-23

## 2024-08-23 RX ORDER — DIGOXIN 0.06 MG/1
62.5 TABLET ORAL DAILY
Qty: 90 TABLET | Refills: 0 | Status: SHIPPED | OUTPATIENT
Start: 2024-08-23

## 2024-08-29 ENCOUNTER — HOSPITAL ENCOUNTER (INPATIENT)
Age: 84
LOS: 4 days | Discharge: HOME | DRG: 190 | End: 2024-09-02
Attending: EMERGENCY MEDICINE | Admitting: INTERNAL MEDICINE
Payer: MEDICARE

## 2024-08-29 ENCOUNTER — APPOINTMENT (OUTPATIENT)
Dept: GENERAL RADIOLOGY | Age: 84
DRG: 190 | End: 2024-08-29
Payer: MEDICARE

## 2024-08-29 DIAGNOSIS — E87.6 HYPOKALEMIA: ICD-10-CM

## 2024-08-29 DIAGNOSIS — J96.21 ACUTE ON CHRONIC RESPIRATORY FAILURE WITH HYPOXIA AND HYPERCAPNIA (HCC): ICD-10-CM

## 2024-08-29 DIAGNOSIS — J96.22 ACUTE ON CHRONIC RESPIRATORY FAILURE WITH HYPOXIA AND HYPERCAPNIA (HCC): ICD-10-CM

## 2024-08-29 DIAGNOSIS — J44.1 COPD EXACERBATION (HCC): ICD-10-CM

## 2024-08-29 DIAGNOSIS — I48.92 ATRIAL FLUTTER WITH RAPID VENTRICULAR RESPONSE (HCC): Primary | ICD-10-CM

## 2024-08-29 LAB
ALBUMIN SERPL-MCNC: 3.9 G/DL (ref 3.5–5.2)
ALP SERPL-CCNC: 62 U/L (ref 35–104)
ALT SERPL-CCNC: 17 U/L (ref 0–32)
ANION GAP SERPL CALCULATED.3IONS-SCNC: 7 MMOL/L (ref 7–16)
AST SERPL-CCNC: 25 U/L (ref 0–31)
B PARAP IS1001 DNA NPH QL NAA+NON-PROBE: NOT DETECTED
B PERT DNA SPEC QL NAA+PROBE: NOT DETECTED
B.E.: 9.3 MMOL/L (ref -3–3)
BASOPHILS # BLD: 0.01 K/UL (ref 0–0.2)
BASOPHILS NFR BLD: 0 % (ref 0–2)
BILIRUB SERPL-MCNC: 0.3 MG/DL (ref 0–1.2)
BNP SERPL-MCNC: 2134 PG/ML (ref 0–450)
BUN SERPL-MCNC: 17 MG/DL (ref 6–23)
C PNEUM DNA NPH QL NAA+NON-PROBE: NOT DETECTED
CALCIUM SERPL-MCNC: 9.3 MG/DL (ref 8.6–10.2)
CHLORIDE SERPL-SCNC: 103 MMOL/L (ref 98–107)
CO2 SERPL-SCNC: 33 MMOL/L (ref 22–29)
COHB: 0.3 % (ref 0–1.5)
CREAT SERPL-MCNC: 0.8 MG/DL (ref 0.5–1)
CRITICAL: ABNORMAL
DATE ANALYZED: ABNORMAL
DATE OF COLLECTION: ABNORMAL
EOSINOPHIL # BLD: 0.05 K/UL (ref 0.05–0.5)
EOSINOPHILS RELATIVE PERCENT: 1 % (ref 0–6)
ERYTHROCYTE [DISTWIDTH] IN BLOOD BY AUTOMATED COUNT: 14.6 % (ref 11.5–15)
FLUAV RNA NPH QL NAA+NON-PROBE: NOT DETECTED
FLUBV RNA NPH QL NAA+NON-PROBE: NOT DETECTED
GFR, ESTIMATED: 76 ML/MIN/1.73M2
GLUCOSE SERPL-MCNC: 136 MG/DL (ref 74–99)
HADV DNA NPH QL NAA+NON-PROBE: NOT DETECTED
HCO3: 34.6 MMOL/L (ref 22–26)
HCOV 229E RNA NPH QL NAA+NON-PROBE: NOT DETECTED
HCOV HKU1 RNA NPH QL NAA+NON-PROBE: NOT DETECTED
HCOV NL63 RNA NPH QL NAA+NON-PROBE: NOT DETECTED
HCOV OC43 RNA NPH QL NAA+NON-PROBE: NOT DETECTED
HCT VFR BLD AUTO: 33.9 % (ref 34–48)
HGB BLD-MCNC: 10.4 G/DL (ref 11.5–15.5)
HHB: 2.6 % (ref 0–5)
HMPV RNA NPH QL NAA+NON-PROBE: NOT DETECTED
HPIV1 RNA NPH QL NAA+NON-PROBE: NOT DETECTED
HPIV2 RNA NPH QL NAA+NON-PROBE: NOT DETECTED
HPIV3 RNA NPH QL NAA+NON-PROBE: NOT DETECTED
HPIV4 RNA NPH QL NAA+NON-PROBE: NOT DETECTED
IMM GRANULOCYTES # BLD AUTO: <0.03 K/UL (ref 0–0.58)
IMM GRANULOCYTES NFR BLD: 0 % (ref 0–5)
INR PPP: 2.2
LAB: ABNORMAL
LYMPHOCYTES NFR BLD: 1.04 K/UL (ref 1.5–4)
LYMPHOCYTES RELATIVE PERCENT: 17 % (ref 20–42)
Lab: 1919
M PNEUMO DNA NPH QL NAA+NON-PROBE: NOT DETECTED
MCH RBC QN AUTO: 29.4 PG (ref 26–35)
MCHC RBC AUTO-ENTMCNC: 30.7 G/DL (ref 32–34.5)
MCV RBC AUTO: 95.8 FL (ref 80–99.9)
METHB: 0.3 % (ref 0–1.5)
MODE: ABNORMAL
MONOCYTES NFR BLD: 0.42 K/UL (ref 0.1–0.95)
MONOCYTES NFR BLD: 7 % (ref 2–12)
NEUTROPHILS NFR BLD: 75 % (ref 43–80)
NEUTS SEG NFR BLD: 4.5 K/UL (ref 1.8–7.3)
O2 CONTENT: 14 ML/DL
O2 SATURATION: 97.4 % (ref 92–98.5)
O2HB: 96.8 % (ref 94–97)
OPERATOR ID: 1112
PATIENT TEMP: 37 C
PCO2: 51 MMHG (ref 35–45)
PH BLOOD GAS: 7.45 (ref 7.35–7.45)
PLATELET # BLD AUTO: 117 K/UL (ref 130–450)
PMV BLD AUTO: 10 FL (ref 7–12)
PO2: 94.4 MMHG (ref 75–100)
POTASSIUM SERPL-SCNC: 3.4 MMOL/L (ref 3.5–5)
PROT SERPL-MCNC: 6.3 G/DL (ref 6.4–8.3)
PROTHROMBIN TIME: 24.1 SEC (ref 9.3–12.4)
RBC # BLD AUTO: 3.54 M/UL (ref 3.5–5.5)
RSV RNA NPH QL NAA+NON-PROBE: NOT DETECTED
RV+EV RNA NPH QL NAA+NON-PROBE: NOT DETECTED
SARS-COV-2 RNA NPH QL NAA+NON-PROBE: NOT DETECTED
SODIUM SERPL-SCNC: 143 MMOL/L (ref 132–146)
SOURCE, BLOOD GAS: ABNORMAL
SPECIMEN DESCRIPTION: NORMAL
THB: 10.2 G/DL (ref 11.5–16.5)
TIME ANALYZED: 1923
TROPONIN I SERPL HS-MCNC: 23 NG/L (ref 0–9)
WBC OTHER # BLD: 6 K/UL (ref 4.5–11.5)

## 2024-08-29 PROCEDURE — 6370000000 HC RX 637 (ALT 250 FOR IP): Performed by: EMERGENCY MEDICINE

## 2024-08-29 PROCEDURE — 6360000002 HC RX W HCPCS: Performed by: EMERGENCY MEDICINE

## 2024-08-29 PROCEDURE — 80053 COMPREHEN METABOLIC PANEL: CPT

## 2024-08-29 PROCEDURE — 6370000000 HC RX 637 (ALT 250 FOR IP): Performed by: INTERNAL MEDICINE

## 2024-08-29 PROCEDURE — 84484 ASSAY OF TROPONIN QUANT: CPT

## 2024-08-29 PROCEDURE — 96374 THER/PROPH/DIAG INJ IV PUSH: CPT

## 2024-08-29 PROCEDURE — 94664 DEMO&/EVAL PT USE INHALER: CPT

## 2024-08-29 PROCEDURE — 2580000003 HC RX 258: Performed by: EMERGENCY MEDICINE

## 2024-08-29 PROCEDURE — 2060000000 HC ICU INTERMEDIATE R&B

## 2024-08-29 PROCEDURE — 82805 BLOOD GASES W/O2 SATURATION: CPT

## 2024-08-29 PROCEDURE — 71045 X-RAY EXAM CHEST 1 VIEW: CPT

## 2024-08-29 PROCEDURE — 99222 1ST HOSP IP/OBS MODERATE 55: CPT | Performed by: INTERNAL MEDICINE

## 2024-08-29 PROCEDURE — 83880 ASSAY OF NATRIURETIC PEPTIDE: CPT

## 2024-08-29 PROCEDURE — 93005 ELECTROCARDIOGRAM TRACING: CPT | Performed by: EMERGENCY MEDICINE

## 2024-08-29 PROCEDURE — 2500000003 HC RX 250 WO HCPCS: Performed by: EMERGENCY MEDICINE

## 2024-08-29 PROCEDURE — 85025 COMPLETE CBC W/AUTO DIFF WBC: CPT

## 2024-08-29 PROCEDURE — 0202U NFCT DS 22 TRGT SARS-COV-2: CPT

## 2024-08-29 PROCEDURE — 2580000003 HC RX 258: Performed by: INTERNAL MEDICINE

## 2024-08-29 PROCEDURE — 85610 PROTHROMBIN TIME: CPT

## 2024-08-29 PROCEDURE — 99285 EMERGENCY DEPT VISIT HI MDM: CPT

## 2024-08-29 PROCEDURE — 96375 TX/PRO/DX INJ NEW DRUG ADDON: CPT

## 2024-08-29 RX ORDER — DOXYCYCLINE 100 MG/1
100 CAPSULE ORAL ONCE
Status: COMPLETED | OUTPATIENT
Start: 2024-08-29 | End: 2024-08-29

## 2024-08-29 RX ORDER — METOPROLOL SUCCINATE 25 MG/1
12.5 TABLET, EXTENDED RELEASE ORAL EVERY MORNING
Status: DISCONTINUED | OUTPATIENT
Start: 2024-08-30 | End: 2024-08-31

## 2024-08-29 RX ORDER — SODIUM CHLORIDE 0.9 % (FLUSH) 0.9 %
5-40 SYRINGE (ML) INJECTION PRN
Status: DISCONTINUED | OUTPATIENT
Start: 2024-08-29 | End: 2024-09-02 | Stop reason: HOSPADM

## 2024-08-29 RX ORDER — ACETAMINOPHEN 325 MG/1
650 TABLET ORAL EVERY 4 HOURS PRN
Status: DISCONTINUED | OUTPATIENT
Start: 2024-08-29 | End: 2024-09-02 | Stop reason: HOSPADM

## 2024-08-29 RX ORDER — NEOMYCIN/BACITRACIN/POLYMYXINB 3.5-400-5K
1 OINTMENT (GRAM) TOPICAL 2 TIMES DAILY PRN
Status: DISCONTINUED | OUTPATIENT
Start: 2024-08-29 | End: 2024-09-02 | Stop reason: HOSPADM

## 2024-08-29 RX ORDER — BUDESONIDE 0.5 MG/2ML
1 INHALANT ORAL
Status: DISCONTINUED | OUTPATIENT
Start: 2024-08-29 | End: 2024-09-01

## 2024-08-29 RX ORDER — DIGOXIN 125 MCG
62.5 TABLET ORAL DAILY
Status: DISCONTINUED | OUTPATIENT
Start: 2024-08-30 | End: 2024-09-02 | Stop reason: HOSPADM

## 2024-08-29 RX ORDER — BUMETANIDE 1 MG/1
0.5 TABLET ORAL DAILY
Status: DISCONTINUED | OUTPATIENT
Start: 2024-08-30 | End: 2024-09-02 | Stop reason: HOSPADM

## 2024-08-29 RX ORDER — WARFARIN SODIUM 2.5 MG/1
2.5 TABLET ORAL EVERY EVENING
Status: DISCONTINUED | OUTPATIENT
Start: 2024-08-30 | End: 2024-09-02 | Stop reason: HOSPADM

## 2024-08-29 RX ORDER — SODIUM CHLORIDE 9 MG/ML
INJECTION, SOLUTION INTRAVENOUS PRN
Status: DISCONTINUED | OUTPATIENT
Start: 2024-08-29 | End: 2024-09-02 | Stop reason: HOSPADM

## 2024-08-29 RX ORDER — ROSUVASTATIN CALCIUM 10 MG/1
10 TABLET, COATED ORAL NIGHTLY
Status: DISCONTINUED | OUTPATIENT
Start: 2024-08-29 | End: 2024-09-02 | Stop reason: HOSPADM

## 2024-08-29 RX ORDER — METOPROLOL TARTRATE 1 MG/ML
10 INJECTION, SOLUTION INTRAVENOUS ONCE
Status: COMPLETED | OUTPATIENT
Start: 2024-08-29 | End: 2024-08-29

## 2024-08-29 RX ORDER — FLUTICASONE PROPIONATE 50 MCG
1 SPRAY, SUSPENSION (ML) NASAL 2 TIMES DAILY PRN
Status: DISCONTINUED | OUTPATIENT
Start: 2024-08-29 | End: 2024-09-02 | Stop reason: HOSPADM

## 2024-08-29 RX ORDER — ONDANSETRON 4 MG/1
4 TABLET, ORALLY DISINTEGRATING ORAL EVERY 8 HOURS PRN
Status: DISCONTINUED | OUTPATIENT
Start: 2024-08-29 | End: 2024-09-02 | Stop reason: HOSPADM

## 2024-08-29 RX ORDER — ARFORMOTEROL TARTRATE 15 UG/2ML
15 SOLUTION RESPIRATORY (INHALATION)
Status: DISCONTINUED | OUTPATIENT
Start: 2024-08-29 | End: 2024-09-01

## 2024-08-29 RX ORDER — PANTOPRAZOLE SODIUM 40 MG/1
40 TABLET, DELAYED RELEASE ORAL EVERY MORNING
Status: DISCONTINUED | OUTPATIENT
Start: 2024-08-30 | End: 2024-09-02 | Stop reason: HOSPADM

## 2024-08-29 RX ORDER — IPRATROPIUM BROMIDE AND ALBUTEROL SULFATE 2.5; .5 MG/3ML; MG/3ML
1 SOLUTION RESPIRATORY (INHALATION) ONCE
Status: COMPLETED | OUTPATIENT
Start: 2024-08-29 | End: 2024-08-29

## 2024-08-29 RX ORDER — ONDANSETRON 2 MG/ML
4 INJECTION INTRAMUSCULAR; INTRAVENOUS EVERY 6 HOURS PRN
Status: DISCONTINUED | OUTPATIENT
Start: 2024-08-29 | End: 2024-09-02 | Stop reason: HOSPADM

## 2024-08-29 RX ORDER — POTASSIUM CHLORIDE 1500 MG/1
40 TABLET, EXTENDED RELEASE ORAL ONCE
Status: COMPLETED | OUTPATIENT
Start: 2024-08-29 | End: 2024-08-29

## 2024-08-29 RX ORDER — SODIUM CHLORIDE 0.9 % (FLUSH) 0.9 %
5-40 SYRINGE (ML) INJECTION EVERY 12 HOURS SCHEDULED
Status: DISCONTINUED | OUTPATIENT
Start: 2024-08-29 | End: 2024-09-02 | Stop reason: HOSPADM

## 2024-08-29 RX ORDER — LEVOTHYROXINE SODIUM 75 UG/1
75 TABLET ORAL DAILY
Status: DISCONTINUED | OUTPATIENT
Start: 2024-08-30 | End: 2024-09-02 | Stop reason: HOSPADM

## 2024-08-29 RX ORDER — 0.9 % SODIUM CHLORIDE 0.9 %
500 INTRAVENOUS SOLUTION INTRAVENOUS ONCE
Status: COMPLETED | OUTPATIENT
Start: 2024-08-29 | End: 2024-08-29

## 2024-08-29 RX ORDER — IPRATROPIUM BROMIDE AND ALBUTEROL SULFATE 2.5; .5 MG/3ML; MG/3ML
1 SOLUTION RESPIRATORY (INHALATION)
Status: DISCONTINUED | OUTPATIENT
Start: 2024-08-30 | End: 2024-08-30

## 2024-08-29 RX ORDER — METHYLPREDNISOLONE SODIUM SUCCINATE 125 MG/2ML
60 INJECTION, POWDER, LYOPHILIZED, FOR SOLUTION INTRAMUSCULAR; INTRAVENOUS ONCE
Status: COMPLETED | OUTPATIENT
Start: 2024-08-29 | End: 2024-08-29

## 2024-08-29 RX ADMIN — SODIUM CHLORIDE 500 ML: 9 INJECTION, SOLUTION INTRAVENOUS at 19:02

## 2024-08-29 RX ADMIN — METOPROLOL TARTRATE 10 MG: 5 INJECTION INTRAVENOUS at 20:06

## 2024-08-29 RX ADMIN — SODIUM CHLORIDE, PRESERVATIVE FREE 10 ML: 5 INJECTION INTRAVENOUS at 22:33

## 2024-08-29 RX ADMIN — POTASSIUM CHLORIDE 40 MEQ: 1500 TABLET, EXTENDED RELEASE ORAL at 20:10

## 2024-08-29 RX ADMIN — ROSUVASTATIN CALCIUM 10 MG: 10 TABLET, FILM COATED ORAL at 22:32

## 2024-08-29 RX ADMIN — FLUTICASONE PROPIONATE 1 SPRAY: 50 SPRAY, METERED NASAL at 22:32

## 2024-08-29 RX ADMIN — IPRATROPIUM BROMIDE AND ALBUTEROL SULFATE 1 DOSE: 2.5; .5 SOLUTION RESPIRATORY (INHALATION) at 19:35

## 2024-08-29 RX ADMIN — SACUBITRIL AND VALSARTAN 0.5 TABLET: 24; 26 TABLET, FILM COATED ORAL at 22:32

## 2024-08-29 RX ADMIN — METHYLPREDNISOLONE SODIUM SUCCINATE 60 MG: 125 INJECTION INTRAMUSCULAR; INTRAVENOUS at 19:31

## 2024-08-29 RX ADMIN — DOXYCYCLINE HYCLATE 100 MG: 100 CAPSULE ORAL at 20:35

## 2024-08-29 ASSESSMENT — PAIN - FUNCTIONAL ASSESSMENT: PAIN_FUNCTIONAL_ASSESSMENT: NONE - DENIES PAIN

## 2024-08-29 NOTE — ED PROVIDER NOTES
HPI:  8/29/24, Time: 6:30 PM EDT         Krystal Hyatt is a 84 y.o. female presenting to the ED for cough with green sputum and shortness of breath beginning a few weeks ago but worsening.  Patient is on 5 L of oxygen at baseline, and she states she has been desaturating at home.  She has a history of CHF and A-fib on Coumadin.  Her pulmonologist is Dr. Aponte, and her cardiologist is Dr. Jones.  She denies fevers, chest pain, abdominal pain, nausea, vomiting, diarrhea, and leg swelling.    I reviewed the patient's chart.  Patient admitted on 7/28/2024 for TRISTEN.    Patient seen by pulmonology on 8/22/2024.  Patient has a history of COPD and ILD as well as history of lung cancer.    --------------------------------------------- PAST HISTORY ---------------------------------------------  Past Medical History:  has a past medical history of Atrial fibrillation (HCC), CHF (congestive heart failure) (HCC), Emphysema, unspecified (HCC), Hyperlipidemia, Hypertension, Lung cancer (HCC), Mitral valve regurgitation, Oxygen dependent, Prolonged emergence from general anesthesia, Skin cancer, and Thyroid disease.    Past Surgical History:  has a past surgical history that includes Breast biopsy (Right); Tubal ligation; bronchoscopy (N/A, 04/20/2021); Intracapsular cataract extraction (Right, 12/16/2021); Intracapsular cataract extraction (Left, 02/24/2022); and Cardioversion (10/06/2023).    Social History:  reports that she quit smoking about 26 years ago. Her smoking use included cigarettes. She started smoking about 72 years ago. She has a 92 pack-year smoking history. She has been exposed to tobacco smoke. She has never used smokeless tobacco. She reports current alcohol use of about 1.0 standard drink of alcohol per week. She reports that she does not use drugs.    Family History: family history includes Cancer in her mother; Emphysema in her father; Lung Disease in her father; Other in her mother; Peripheral Vascular     Pt Temp 37.0 C     ID 1112     Lab 05478     Critical(s) Notified . No Critical Values    EKG 12 Lead   Result Value Ref Range    Ventricular Rate 130 BPM    Atrial Rate 260 BPM    QRS Duration 76 ms    Q-T Interval 264 ms    QTc Calculation (Bazett) 388 ms    P Axis -113 degrees    R Axis 28 degrees    T Axis -63 degrees       RADIOLOGY:  Interpreted by Radiologist.  XR CHEST PORTABLE   Final Result   No acute process.             ------------------------- NURSING NOTES AND VITALS REVIEWED ---------------------------   The nursing notes within the ED encounter and vital signs as below have been reviewed.   /77   Pulse (!) 122   Temp 98.4 °F (36.9 °C) (Oral)   Resp 20   SpO2 96%   Oxygen Saturation Interpretation: Abnormal and Improved after treatment      ---------------------------------------------------PHYSICAL EXAM--------------------------------------      Constitutional/General: Alert and oriented x3, appears ill, non toxic in NAD  Head: Normocephalic and atraumatic  Mouth: Oropharynx clear, handling secretions, no trismus  Neck: Supple, full ROM,   Pulmonary: Lungs coarse with wheezing especially on the left.  Speaking in full sentences.  Mild respiratory distress  Cardiovascular: Tachycardic, irregular rhythm. 2+ distal pulses  Abdomen: Soft, non tender, non distended,   Extremities: Moves all extremities x 4. Warm and well perfused.  No leg swelling, no calf tenderness  Skin: warm and dry without rash  Neurologic: GCS 15, no focal motor or sensory deficits   Psych: Normal Affect. Behavior normal.      ------------------------------ ED COURSE/MEDICAL DECISION MAKING----------------------  Medications   sodium chloride flush 0.9 % injection 5-40 mL (10 mLs IntraVENous Given 8/29/24 4409)   sodium chloride flush 0.9 % injection 5-40 mL (has no administration in time range)   0.9 % sodium chloride infusion (has no administration in time range)   acetaminophen (TYLENOL) tablet 650 mg (has

## 2024-08-30 ENCOUNTER — APPOINTMENT (OUTPATIENT)
Dept: CT IMAGING | Age: 84
DRG: 190 | End: 2024-08-30
Payer: MEDICARE

## 2024-08-30 LAB
ALBUMIN SERPL-MCNC: 3.7 G/DL (ref 3.5–5.2)
ALP SERPL-CCNC: 64 U/L (ref 35–104)
ALT SERPL-CCNC: 17 U/L (ref 0–32)
ANION GAP SERPL CALCULATED.3IONS-SCNC: 9 MMOL/L (ref 7–16)
AST SERPL-CCNC: 24 U/L (ref 0–31)
BASOPHILS # BLD: 0.01 K/UL (ref 0–0.2)
BASOPHILS NFR BLD: 0 % (ref 0–2)
BILIRUB SERPL-MCNC: 0.4 MG/DL (ref 0–1.2)
BUN SERPL-MCNC: 17 MG/DL (ref 6–23)
CALCIUM SERPL-MCNC: 9.2 MG/DL (ref 8.6–10.2)
CHLORIDE SERPL-SCNC: 103 MMOL/L (ref 98–107)
CO2 SERPL-SCNC: 29 MMOL/L (ref 22–29)
CREAT SERPL-MCNC: 0.7 MG/DL (ref 0.5–1)
CRP SERPL HS-MCNC: 11 MG/L (ref 0–5)
DIGOXIN SERPL-MCNC: 0.6 NG/ML (ref 0.8–2)
EKG ATRIAL RATE: 260 BPM
EKG P AXIS: -113 DEGREES
EKG Q-T INTERVAL: 264 MS
EKG QRS DURATION: 76 MS
EKG QTC CALCULATION (BAZETT): 388 MS
EKG R AXIS: 28 DEGREES
EKG T AXIS: -63 DEGREES
EKG VENTRICULAR RATE: 130 BPM
EOSINOPHIL # BLD: 0.01 K/UL (ref 0.05–0.5)
EOSINOPHILS RELATIVE PERCENT: 0 % (ref 0–6)
ERYTHROCYTE [DISTWIDTH] IN BLOOD BY AUTOMATED COUNT: 14.6 % (ref 11.5–15)
ERYTHROCYTE [SEDIMENTATION RATE] IN BLOOD BY WESTERGREN METHOD: 20 MM/HR (ref 0–20)
GFR, ESTIMATED: 84 ML/MIN/1.73M2
GLUCOSE SERPL-MCNC: 190 MG/DL (ref 74–99)
HCT VFR BLD AUTO: 35.1 % (ref 34–48)
HGB BLD-MCNC: 10.9 G/DL (ref 11.5–15.5)
IMM GRANULOCYTES # BLD AUTO: 0.03 K/UL (ref 0–0.58)
IMM GRANULOCYTES NFR BLD: 0 % (ref 0–5)
INR PPP: 2.2
L PNEUMO1 AG UR QL IA.RAPID: NEGATIVE
LACTATE BLDV-SCNC: 1.5 MMOL/L (ref 0.5–1.9)
LYMPHOCYTES NFR BLD: 0.59 K/UL (ref 1.5–4)
LYMPHOCYTES RELATIVE PERCENT: 8 % (ref 20–42)
MCH RBC QN AUTO: 29.5 PG (ref 26–35)
MCHC RBC AUTO-ENTMCNC: 31.1 G/DL (ref 32–34.5)
MCV RBC AUTO: 95.1 FL (ref 80–99.9)
MONOCYTES NFR BLD: 0.08 K/UL (ref 0.1–0.95)
MONOCYTES NFR BLD: 1 % (ref 2–12)
NEUTROPHILS NFR BLD: 90 % (ref 43–80)
NEUTS SEG NFR BLD: 6.46 K/UL (ref 1.8–7.3)
PLATELET, FLUORESCENCE: 140 K/UL (ref 130–450)
PMV BLD AUTO: 10.4 FL (ref 7–12)
POTASSIUM SERPL-SCNC: 4.3 MMOL/L (ref 3.5–5)
PROCALCITONIN SERPL-MCNC: <0.02 NG/ML (ref 0–0.08)
PROT SERPL-MCNC: 6.3 G/DL (ref 6.4–8.3)
PROTHROMBIN TIME: 23.3 SEC (ref 9.3–12.4)
RBC # BLD AUTO: 3.69 M/UL (ref 3.5–5.5)
RBC # BLD: ABNORMAL 10*6/UL
S PNEUM AG SPEC QL: NEGATIVE
SODIUM SERPL-SCNC: 141 MMOL/L (ref 132–146)
TSH SERPL DL<=0.05 MIU/L-ACNC: 0.48 UIU/ML (ref 0.27–4.2)
WBC OTHER # BLD: 7.2 K/UL (ref 4.5–11.5)

## 2024-08-30 PROCEDURE — 6360000002 HC RX W HCPCS: Performed by: INTERNAL MEDICINE

## 2024-08-30 PROCEDURE — 99233 SBSQ HOSP IP/OBS HIGH 50: CPT

## 2024-08-30 PROCEDURE — 87449 NOS EACH ORGANISM AG IA: CPT

## 2024-08-30 PROCEDURE — 94640 AIRWAY INHALATION TREATMENT: CPT

## 2024-08-30 PROCEDURE — 86140 C-REACTIVE PROTEIN: CPT

## 2024-08-30 PROCEDURE — 87077 CULTURE AEROBIC IDENTIFY: CPT

## 2024-08-30 PROCEDURE — 83605 ASSAY OF LACTIC ACID: CPT

## 2024-08-30 PROCEDURE — 87070 CULTURE OTHR SPECIMN AEROBIC: CPT

## 2024-08-30 PROCEDURE — 6370000000 HC RX 637 (ALT 250 FOR IP): Performed by: INTERNAL MEDICINE

## 2024-08-30 PROCEDURE — 2580000003 HC RX 258: Performed by: INTERNAL MEDICINE

## 2024-08-30 PROCEDURE — 87081 CULTURE SCREEN ONLY: CPT

## 2024-08-30 PROCEDURE — 2700000000 HC OXYGEN THERAPY PER DAY

## 2024-08-30 PROCEDURE — 6360000002 HC RX W HCPCS: Performed by: NURSE PRACTITIONER

## 2024-08-30 PROCEDURE — 85025 COMPLETE CBC W/AUTO DIFF WBC: CPT

## 2024-08-30 PROCEDURE — 84443 ASSAY THYROID STIM HORMONE: CPT

## 2024-08-30 PROCEDURE — 87205 SMEAR GRAM STAIN: CPT

## 2024-08-30 PROCEDURE — 80162 ASSAY OF DIGOXIN TOTAL: CPT

## 2024-08-30 PROCEDURE — 2060000000 HC ICU INTERMEDIATE R&B

## 2024-08-30 PROCEDURE — 80053 COMPREHEN METABOLIC PANEL: CPT

## 2024-08-30 PROCEDURE — 85610 PROTHROMBIN TIME: CPT

## 2024-08-30 PROCEDURE — 84145 PROCALCITONIN (PCT): CPT

## 2024-08-30 PROCEDURE — 87899 AGENT NOS ASSAY W/OPTIC: CPT

## 2024-08-30 PROCEDURE — 71250 CT THORAX DX C-: CPT

## 2024-08-30 PROCEDURE — 85652 RBC SED RATE AUTOMATED: CPT

## 2024-08-30 RX ORDER — LEVALBUTEROL INHALATION SOLUTION 0.63 MG/3ML
0.63 SOLUTION RESPIRATORY (INHALATION) EVERY 6 HOURS
Status: DISCONTINUED | OUTPATIENT
Start: 2024-08-30 | End: 2024-09-02 | Stop reason: HOSPADM

## 2024-08-30 RX ORDER — DILTIAZEM HYDROCHLORIDE 120 MG/1
120 CAPSULE, COATED, EXTENDED RELEASE ORAL DAILY
Status: DISCONTINUED | OUTPATIENT
Start: 2024-08-30 | End: 2024-08-31

## 2024-08-30 RX ORDER — METHYLPREDNISOLONE SODIUM SUCCINATE 40 MG/ML
40 INJECTION, POWDER, LYOPHILIZED, FOR SOLUTION INTRAMUSCULAR; INTRAVENOUS EVERY 12 HOURS
Status: DISCONTINUED | OUTPATIENT
Start: 2024-08-30 | End: 2024-08-30

## 2024-08-30 RX ADMIN — ARFORMOTEROL TARTRATE 15 MCG: 15 SOLUTION RESPIRATORY (INHALATION) at 20:40

## 2024-08-30 RX ADMIN — METHYLPREDNISOLONE SODIUM SUCCINATE 40 MG: 40 INJECTION INTRAMUSCULAR; INTRAVENOUS at 08:14

## 2024-08-30 RX ADMIN — SACUBITRIL AND VALSARTAN 0.5 TABLET: 24; 26 TABLET, FILM COATED ORAL at 21:09

## 2024-08-30 RX ADMIN — SACUBITRIL AND VALSARTAN 0.5 TABLET: 24; 26 TABLET, FILM COATED ORAL at 08:13

## 2024-08-30 RX ADMIN — WARFARIN SODIUM 2.5 MG: 2.5 TABLET ORAL at 17:31

## 2024-08-30 RX ADMIN — PANTOPRAZOLE SODIUM 40 MG: 40 TABLET, DELAYED RELEASE ORAL at 08:12

## 2024-08-30 RX ADMIN — IPRATROPIUM BROMIDE AND ALBUTEROL SULFATE 1 DOSE: 2.5; .5 SOLUTION RESPIRATORY (INHALATION) at 08:22

## 2024-08-30 RX ADMIN — IPRATROPIUM BROMIDE AND ALBUTEROL SULFATE 1 DOSE: 2.5; .5 SOLUTION RESPIRATORY (INHALATION) at 11:59

## 2024-08-30 RX ADMIN — DIGOXIN 62.5 MCG: 0.12 TABLET ORAL at 08:13

## 2024-08-30 RX ADMIN — ARFORMOTEROL TARTRATE 15 MCG: 15 SOLUTION RESPIRATORY (INHALATION) at 08:23

## 2024-08-30 RX ADMIN — DILTIAZEM HYDROCHLORIDE 120 MG: 120 CAPSULE, COATED, EXTENDED RELEASE ORAL at 13:27

## 2024-08-30 RX ADMIN — LEVALBUTEROL HYDROCHLORIDE 0.63 MG: 0.63 SOLUTION RESPIRATORY (INHALATION) at 13:58

## 2024-08-30 RX ADMIN — ROSUVASTATIN CALCIUM 10 MG: 10 TABLET, FILM COATED ORAL at 21:09

## 2024-08-30 RX ADMIN — BUDESONIDE 1000 MCG: 0.5 SUSPENSION RESPIRATORY (INHALATION) at 08:22

## 2024-08-30 RX ADMIN — LEVALBUTEROL HYDROCHLORIDE 0.63 MG: 0.63 SOLUTION RESPIRATORY (INHALATION) at 20:39

## 2024-08-30 RX ADMIN — SODIUM CHLORIDE, PRESERVATIVE FREE 10 ML: 5 INJECTION INTRAVENOUS at 21:11

## 2024-08-30 RX ADMIN — METOPROLOL SUCCINATE 12.5 MG: 50 TABLET, EXTENDED RELEASE ORAL at 08:15

## 2024-08-30 RX ADMIN — SODIUM CHLORIDE, PRESERVATIVE FREE 10 ML: 5 INJECTION INTRAVENOUS at 08:14

## 2024-08-30 RX ADMIN — BUDESONIDE 1000 MCG: 0.5 SUSPENSION RESPIRATORY (INHALATION) at 20:40

## 2024-08-30 RX ADMIN — BUMETANIDE 0.5 MG: 1 TABLET ORAL at 08:13

## 2024-08-30 RX ADMIN — LEVOTHYROXINE SODIUM 75 MCG: 75 TABLET ORAL at 05:35

## 2024-08-30 ASSESSMENT — PAIN SCALES - GENERAL: PAINLEVEL_OUTOF10: 0

## 2024-08-30 NOTE — CARE COORDINATION
Social Work/Discharge Planning:  Social Work consult noted.  Met with patient and completed initial assessment.  Explained Social Work role and discussed transition of care/discharge planning.  Patient lives alone in a two story house, but stays on the first floor.  PTA she is independent with no adaptive device.  She has a cane, wheeled walker, wheelchair, nebulizer, bedside commode, rollator walker, and wears five liters oxygen supplied through [employer].  She has a history with [facility]. She denies any need for home health care at discharge.  Plan is home at discharge and she denies any needs at this time.  Will continue to follow and assist if needed.  Electronically signed by KAREN Ba on 8/30/2024 at 12:07 PM

## 2024-08-30 NOTE — PLAN OF CARE
Problem: Discharge Planning  Goal: Discharge to home or other facility with appropriate resources  Outcome: Progressing     Problem: Risk for Elopement  Goal: Patient will not exit the unit/facility without proper excort  Outcome: Progressing     Problem: Safety - Adult  Goal: Free from fall injury  Outcome: Progressing     Problem: ABCDS Injury Assessment  Goal: Absence of physical injury  Outcome: Progressing     Problem: Chronic Conditions and Co-morbidities  Goal: Patient's chronic conditions and co-morbidity symptoms are monitored and maintained or improved  Outcome: Progressing

## 2024-08-30 NOTE — CONSULTS
Yusuf Castro M.D.,[facility]  Carlin Barrera D.O., FCLAIRE., [facility]  Anjelica Bruner M.D.  Monica Aponte M.D.   Wild Goss D.O.  Mauro Medel M.D.       Patient:  Krystal Hyatt 84 y.o. female MRN: [id number]           PULMONARY CONSULTATION    Reason for Consultation: known to dr Aponte- ?plans for bronch soon, to clear secretions- admitted with inc green mucus production   Referring Physician: Dr Sherman Pagan MD    Communication with the referring physician will be sent via the electronic medical record.    Chief Complaint: shortness of breath     CODE STATUS:full    SUBJECTIVE:  HPI:  Krystal Hyatt is a 84 y.o. female who we are asked to evaluate :  known to dr Aponte- ?plans for bronch soon, to clear secretions- admitted with inc green mucus production .  She has a past medical history significant for a fib on warfarin, centrilobular emphysema, moderate copd, ILD, hypertension, Mitral valve regurgitation, diastolic heart failure, thyroid disease, chronic dyspnea,  chronic respiratory failure, prior lung cancer, cataracts, oxygen dependence, prior nicotine dependence in remission.     She is a known patient to our service followed by Dr Monica Aponte. She was most recently seen by NP 8/22/24 in office follow up for ILD, COPD, hx squamous cell lung cancer treated with radiation 8/2021, and chronic respiratory failure with hypoxia.  Her home pulmonary regimen includes oxygen 2-5 L nasal cannula, Trelegy ellipta daily, albuterol QID for rescue via nebulizer.     She has had multiple hospital admission over the past year for recurrent pneumonia. April 2024 treated with rocephin during inpatient stay for R upper lobe pneumonia, May 2024 treated with Levaquin and medrol dose pack- prescribed by PCP as OP. Hospital admission 7/28/24-7/31/24 treated for TRISTEN was hypoxic requiring 6 liters NC above her normal baseline for SPO2 84%. On  August 13, 2024 complained of cough, congestion, mucus production. She

## 2024-08-30 NOTE — H&P
alcohol per week.    Family History:       Problem Relation Age of Onset    Cancer Mother     Other Mother         APLASTIC ANEMIA    Peripheral Vascular Disease Mother     Emphysema Father     Lung Disease Father       Or deferred/otherwise considered non contributory to current admission  PHYSICAL EXAM:    VS: BP (!) 125/91   Pulse (!) 123   Temp 98.6 °F (37 °C) (Oral)   Resp 20   SpO2 97%     General Appearance:     +mild respiratory  acute distress.   Psych:  HEENT:    A.O. As per HPI details  NC/AT, PERRL, no pallor no icterus, lips/ext mucous membrane grossly dry   +NC   Neck:   Supple, trachea midline,D   Resp:     Dec BS bases ++wheezes, + rhonchi   Chest wall:    No tenderness or deformity   Heart:    Tachy Regular rate and rhythm, S1 and S2 normal, no rub or gallop.   Abdomen:     Soft, non-tender, bowel sounds active    no suspicious obvious masses/organomegaly   Genitalia & Rectal:    Deferred.   Extremities x4:   Extremities normal, atraumatic, no cyanosis, no clubbing   Musculoskeletal:      NO active synovitis or swollen b/l wrists, 2-5 MCPs examined   Skin:   Skin color, texture, turgor fairly dry, no ACUTE rashes in lower legs and arms examined       Neurologic:  .Grossly symmetric  strength in UEs and LEs with symmetric grossly intact to light touch sensation     LABS:  CBC:   Lab Results   Component Value Date/Time    WBC 6.0 08/29/2024 06:57 PM    RBC 3.54 08/29/2024 06:57 PM    HGB 10.4 08/29/2024 06:57 PM    HCT 33.9 08/29/2024 06:57 PM     08/29/2024 06:57 PM    MCV 95.8 08/29/2024 06:57 PM     BMP:    Lab Results   Component Value Date/Time     08/29/2024 06:57 PM    K 3.4 08/29/2024 06:57 PM    K 4.3 04/05/2023 10:48 PM     08/29/2024 06:57 PM    CO2 33 08/29/2024 06:57 PM    BUN 17 08/29/2024 06:57 PM    CREATININE 0.8 08/29/2024 06:57 PM    GLUCOSE 136 08/29/2024 06:57 PM    GLUCOSE 87 05/04/2012 11:00 AM    CALCIUM 9.3 08/29/2024 06:57 PM     Hepatic Function Panel:    ST now depressed in Anterolateral leads   Nonspecific T wave abnormality, worse in Inferior leads   Nonspecific T wave abnormality now evident in Lateral leads    Assessment & Plan   ACTIVE hospital problems being addressed/reassessed for this admission:  Principal Problem:    COPD exacerbation (HCC)  Active Problems:    Stage 3a chronic kidney disease (HCC)    Atrial fibrillation with RVR (HCC)- Recurrent    Acute on chronic diastolic congestive heart failure (HCC)    Acute combined systolic and diastolic congestive heart failure (HCC)    Chronic anticoagulation    Acute respiratory failure with hypoxia (HCC)  Resolved Problems:    * No resolved hospital problems. *    Code status/DVT prophylaxis and PLAN --see orders   Note extensive time spent coordinating care between ER docs, ER and floor nurses, and transitioning care over to day providers  Plan of care/ clinical impressions/communication specifics detailed below:  84 y.o. female  - over weeks   +STEPHENSON< worse sob + productive cough over past week  Known copd +lung CA  +/- ILD/scarring lung  No bipap or vent or cpap at home  She does use nebulizers  She uses home o2 5LNC baseline    Admitted one month ago per her was on abx that nearly \"killed her kidneys\"- been off abx over past month-- now over past week started having more greenish/yellow productive sputum and SOB    She also finished steroids last few weeks  Claims desaturating at home - 89% on 5LNC  No significant conversation dyspnea, but feels chest congestion- claims dr Aponte was going to do a bronch soon to clear out her chest but \"she's on vacation this month\"- seen by pulm 8/22 reportedly?    No chest vest or flutter valve or other mucolytic at home, rofluimast?  Mucus plugging?  Cxr clear  No fevers  Respiratory panel (addendum) ordered- did return neg  Wbc 7.2- although 90% N  Started on doxy per ER  Consult pulm- add pulmzyme +steroids  Will defer abx to pulm -- ?bronch as patient implies was

## 2024-08-30 NOTE — ACP (ADVANCE CARE PLANNING)
Advance Care Planning   Healthcare Decision Maker:    Primary Decision Maker: Olena Krystal Muniz - Ju[place] - [phone]    Secondary Decision Maker: OlenaDieter - Juancarlos - [phone]    Click here to complete Healthcare Decision Makers including selection of the Healthcare Decision Maker Relationship (ie \"Primary\").

## 2024-08-30 NOTE — PROGRESS NOTES
[facility] Hospitalist Progress Note    Admitting Date and Time: 8/29/2024  6:29 PM  Admit Dx: Hypokalemia [E87.6]  COPD exacerbation (HCC) [J44.1]  Atrial flutter with rapid ventricular response (HCC) [I48.92]  Acute on chronic respiratory failure with hypoxia and hypercapnia (HCC) [J96.21, J96.22]    Subjective:  Patient is being followed for Hypokalemia [E87.6]  COPD exacerbation (HCC) [J44.1]  Atrial flutter with rapid ventricular response (HCC) [I48.92]  Acute on chronic respiratory failure with hypoxia and hypercapnia (HCC) [J96.21, J96.22]   Pt feels patient was seen at bedside.  She mentions she feels a lot better as compared to yesterday.  She uses 5 L oxygen at home which is her baseline.  Denies any shortness of breath, chest pain, palpitation, nausea, vomiting, fever, chills.  He is eating and drinking well.    ROS: denies fever, chills, cp, sob, n/v, HA unless stated above.      levalbuterol  0.63 mg Nebulization Q6H    dilTIAZem  120 mg Oral Daily    sodium chloride flush  5-40 mL IntraVENous 2 times per day    levothyroxine  75 mcg Oral Daily    metoprolol succinate  12.5 mg Oral QAM    pantoprazole  40 mg Oral QAM    rosuvastatin  10 mg Oral Nightly    sacubitril-valsartan  0.5 tablet Oral BID    warfarin  2.5 mg Oral QPM    digoxin  62.5 mcg Oral Daily    bumetanide  0.5 mg Oral Daily    budesonide  1 mg Nebulization BID RT    And    arformoterol tartrate  15 mcg Nebulization BID RT     sodium chloride flush, 5-40 mL, PRN  sodium chloride, , PRN  acetaminophen, 650 mg, Q4H PRN  ondansetron, 4 mg, Q8H PRN   Or  ondansetron, 4 mg, Q6H PRN  neomycin-bacitracin-polymyxin, 1 each, BID PRN  fluticasone, 1 spray, BID PRN         Objective:    /83   Pulse (!) 134   Temp 97.9 °F (36.6 °C) (Oral)   Resp 18   Ht 1.727 m (5' 8\")   SpO2 94%   BMI 23.32 kg/m²     General Appearance: alert and oriented to person, place and time and in no acute distress  Skin: warm and dry  Head: normocephalic

## 2024-08-30 NOTE — PROGRESS NOTES
4 Eyes Skin Assessment     NAME:  Krystal Hyatt  YOB: 1940  MEDICAL RECORD NUMBER:  [id number]    The patient is being assessed for  Admission    I agree that at least one RN has performed a thorough Head to Toe Skin Assessment on the patient. ALL assessment sites listed below have been assessed.      Areas assessed by both nurses:    Head, Face, Ears, Shoulders, Back, Chest, Arms, Elbows, Hands, Sacrum. Buttock, Coccyx, Ischium, Legs. Feet and Heels, and Under Medical Devices         Does the Patient have a Wound? No noted wound(s)       Leandro Prevention initiated by RN: No  Wound Care Orders initiated by RN: No    Pressure Injury (Stage 3,4, Unstageable, DTI, NWPT, and Complex wounds) if present, place Wound referral order by RN under : No    New Ostomies, if present place, Ostomy referral order under : No     Nurse 1 eSignature: Electronically signed by Barbie Galvan RN on 8/29/24 at 11:31 PM EDT    **SHARE this note so that the co-signing nurse can place an eSignature**    Nurse 2 eSignature: {Esignature:[id number]}

## 2024-08-30 NOTE — CONSULTS
CARDIOLOGY CONSULTATION    Patient Name:  Krystal Hyatt    :  1940    Reason for Consultation:   History of CHF; atrial fibrillation; COPD Intolerance to multiple cardiac medications.    History of Present Illness:   Krystal Hyatt returns to [facility], following The onset of rapidly increasing shortness of breath.  She readily admits that 48 hours ago.  She ingested a beer because she noted her heart rate suddenly increased and assumed it was atrial fibrillation. She now feels rather well and no different than when she was discharged.  She  has a longstanding history  of carcinoma of the lung and is status post history of radiation therapy and underlying chronic obstructive pulmonary disease. In the recent past, she was found to have drug-induced hyperthyroidism which resulted in atrial fibrillation for which she now for the most part remains in sinus rhythm..  Importantly following admission on this occasion she was in atrial fibrillation and it has been more than 2 months since her dosage of medication has been adjusted.  She has known left ventricular systolic dysfunction with an estimated left ventricular ejection fraction of 38±5%.  She is now readmitted for further observation and adjustment of her medical regimen as needed.  Past Medical History:   has a past medical history of Atrial fibrillation (HCC), CHF (congestive heart failure) (HCC), Emphysema, unspecified (HCC), Hyperlipidemia, Hypertension, Lung cancer (HCC), Mitral valve regurgitation, Oxygen dependent, Prolonged emergence from general anesthesia, Skin cancer, and Thyroid disease.    Surgical History:   has a past surgical history that includes Breast biopsy (Right); Tubal ligation; bronchoscopy (N/A, 2021); Intracapsular cataract extraction (Right, 2021); Intracapsular cataract extraction (Left, 2022); and Cardioversion (10/06/2023).     Social History:   reports that she quit smoking about 26  notes and laboratory data with greater than 50% of this time instructing and counseling the patient and her family members regarding my findings and recommendations and I have answered all questions as posed to me by Ms. Hyatt.     Dieter Jones, DO , FACP, FACC, [facility]    NOTE:  This report was transcribed using voice recognition software.  Every effort was made to ensure accuracy; however, inadvertent computerized transcription errors may be present.

## 2024-08-30 NOTE — ED NOTES
ED to Inpatient Handoff Report    Notified donovan that electronic handoff available and patient ready for transport to room 0429.    Safety Risks: None identified    Patient in Restraints: no    Constant Observer or Patient : no    Telemetry Monitoring Ordered :Yes           Order to transfer to unit without monitor:NO    Last MEWS: 2036 Time completed: 3    Deterioration Index Score:   Predictive Model Details          42 (Caution)  Factor Value    Calculated 8/29/2024 20:44 31% Age 84 years old    Deterioration Index Model 26% Supplemental oxygen Nasal cannula     18% Pulse 123     13% Respiratory rate 20     5% Sodium 143 mmol/L     2% Systolic 125     2% Potassium abnormal (3.4 mmol/L)     2% Blood pH 7.449     1% Platelet count abnormal (117 k/uL)     0% Pulse oximetry 97 %     0% Hematocrit abnormal (33.9 %)     0% WBC count 6.0 k/uL     0% Temperature 98.6 °F (37 °C)        Vitals:    08/29/24 1935 08/29/24 2009 08/29/24 2027 08/29/24 2036   BP:  130/81  (!) 125/91   Pulse: (!) 127 (!) 128 (!) 112 (!) 123   Resp: 25 20 22 20   Temp:       TempSrc:       SpO2: 98% 96% 99% 97%         Opportunity for questions and clarification was provided.

## 2024-08-30 NOTE — PLAN OF CARE
Problem: Discharge Planning  Goal: Discharge to home or other facility with appropriate resources  8/30/2024 1412 by Christine Lazaro RN  Outcome: Progressing  8/30/2024 1339 by Christine Lazaro RN  Outcome: Progressing     Problem: Risk for Elopement  Goal: Patient will not exit the unit/facility without proper excort  8/30/2024 1412 by Christine Lazaro RN  Outcome: Progressing  8/30/2024 1339 by Christine Lazaro RN  Outcome: Progressing     Problem: Safety - Adult  Goal: Free from fall injury  8/30/2024 1412 by Christine Lazaro RN  Outcome: Progressing  8/30/2024 1339 by Christine Lazaro RN  Outcome: Progressing     Problem: ABCDS Injury Assessment  Goal: Absence of physical injury  8/30/2024 1412 by Christine Lazaro RN  Outcome: Progressing  8/30/2024 1339 by Christine Lazaro RN  Outcome: Progressing     Problem: Chronic Conditions and Co-morbidities  Goal: Patient's chronic conditions and co-morbidity symptoms are monitored and maintained or improved  8/30/2024 1412 by Christine Lazaro RN  Outcome: Progressing  8/30/2024 1339 by Christine Lazaro RN  Outcome: Progressing

## 2024-08-31 LAB
ANION GAP SERPL CALCULATED.3IONS-SCNC: 9 MMOL/L (ref 7–16)
BASOPHILS # BLD: 0 K/UL (ref 0–0.2)
BASOPHILS NFR BLD: 0 % (ref 0–2)
BUN SERPL-MCNC: 22 MG/DL (ref 6–23)
CALCIUM SERPL-MCNC: 9.1 MG/DL (ref 8.6–10.2)
CHLORIDE SERPL-SCNC: 102 MMOL/L (ref 98–107)
CO2 SERPL-SCNC: 29 MMOL/L (ref 22–29)
CREAT SERPL-MCNC: 0.8 MG/DL (ref 0.5–1)
EOSINOPHIL # BLD: 0 K/UL (ref 0.05–0.5)
EOSINOPHILS RELATIVE PERCENT: 0 % (ref 0–6)
ERYTHROCYTE [DISTWIDTH] IN BLOOD BY AUTOMATED COUNT: 15 % (ref 11.5–15)
GFR, ESTIMATED: 71 ML/MIN/1.73M2
GLUCOSE SERPL-MCNC: 166 MG/DL (ref 74–99)
HCT VFR BLD AUTO: 29.9 % (ref 34–48)
HGB BLD-MCNC: 9.1 G/DL (ref 11.5–15.5)
IMM GRANULOCYTES # BLD AUTO: <0.03 K/UL (ref 0–0.58)
IMM GRANULOCYTES NFR BLD: 0 % (ref 0–5)
INR PPP: 2.7
LYMPHOCYTES NFR BLD: 0.86 K/UL (ref 1.5–4)
LYMPHOCYTES RELATIVE PERCENT: 11 % (ref 20–42)
MCH RBC QN AUTO: 29.2 PG (ref 26–35)
MCHC RBC AUTO-ENTMCNC: 30.4 G/DL (ref 32–34.5)
MCV RBC AUTO: 95.8 FL (ref 80–99.9)
MICROORGANISM SPEC CULT: NORMAL
MONOCYTES NFR BLD: 0.32 K/UL (ref 0.1–0.95)
MONOCYTES NFR BLD: 4 % (ref 2–12)
NEUTROPHILS NFR BLD: 85 % (ref 43–80)
NEUTS SEG NFR BLD: 6.71 K/UL (ref 1.8–7.3)
PLATELET # BLD AUTO: 128 K/UL (ref 130–450)
PMV BLD AUTO: 10.6 FL (ref 7–12)
POTASSIUM SERPL-SCNC: 4 MMOL/L (ref 3.5–5)
PROTHROMBIN TIME: 29 SEC (ref 9.3–12.4)
RBC # BLD AUTO: 3.12 M/UL (ref 3.5–5.5)
SERVICE CMNT-IMP: NORMAL
SODIUM SERPL-SCNC: 140 MMOL/L (ref 132–146)
SPECIMEN DESCRIPTION: NORMAL
T4 FREE SERPL-MCNC: 1.1 NG/DL (ref 0.9–1.7)
WBC OTHER # BLD: 7.9 K/UL (ref 4.5–11.5)

## 2024-08-31 PROCEDURE — 2580000003 HC RX 258: Performed by: INTERNAL MEDICINE

## 2024-08-31 PROCEDURE — 93005 ELECTROCARDIOGRAM TRACING: CPT | Performed by: INTERNAL MEDICINE

## 2024-08-31 PROCEDURE — 84439 ASSAY OF FREE THYROXINE: CPT

## 2024-08-31 PROCEDURE — 36415 COLL VENOUS BLD VENIPUNCTURE: CPT

## 2024-08-31 PROCEDURE — 94640 AIRWAY INHALATION TREATMENT: CPT

## 2024-08-31 PROCEDURE — 6370000000 HC RX 637 (ALT 250 FOR IP): Performed by: INTERNAL MEDICINE

## 2024-08-31 PROCEDURE — 2060000000 HC ICU INTERMEDIATE R&B

## 2024-08-31 PROCEDURE — 6360000002 HC RX W HCPCS: Performed by: NURSE PRACTITIONER

## 2024-08-31 PROCEDURE — 99232 SBSQ HOSP IP/OBS MODERATE 35: CPT

## 2024-08-31 PROCEDURE — 6370000000 HC RX 637 (ALT 250 FOR IP)

## 2024-08-31 PROCEDURE — 6360000002 HC RX W HCPCS: Performed by: INTERNAL MEDICINE

## 2024-08-31 PROCEDURE — 85610 PROTHROMBIN TIME: CPT

## 2024-08-31 PROCEDURE — 80048 BASIC METABOLIC PNL TOTAL CA: CPT

## 2024-08-31 PROCEDURE — 2700000000 HC OXYGEN THERAPY PER DAY

## 2024-08-31 PROCEDURE — 85025 COMPLETE CBC W/AUTO DIFF WBC: CPT

## 2024-08-31 RX ORDER — PREDNISONE 20 MG/1
40 TABLET ORAL DAILY
Status: DISCONTINUED | OUTPATIENT
Start: 2024-09-01 | End: 2024-09-02 | Stop reason: HOSPADM

## 2024-08-31 RX ORDER — PREDNISONE 20 MG/1
40 TABLET ORAL 2 TIMES DAILY
Status: DISCONTINUED | OUTPATIENT
Start: 2024-08-31 | End: 2024-08-31

## 2024-08-31 RX ORDER — METOPROLOL SUCCINATE 25 MG/1
12.5 TABLET, EXTENDED RELEASE ORAL 2 TIMES DAILY
Status: DISCONTINUED | OUTPATIENT
Start: 2024-08-31 | End: 2024-09-02 | Stop reason: HOSPADM

## 2024-08-31 RX ADMIN — METOPROLOL SUCCINATE 12.5 MG: 50 TABLET, EXTENDED RELEASE ORAL at 08:54

## 2024-08-31 RX ADMIN — SODIUM CHLORIDE, PRESERVATIVE FREE 10 ML: 5 INJECTION INTRAVENOUS at 20:01

## 2024-08-31 RX ADMIN — WARFARIN SODIUM 2.5 MG: 2.5 TABLET ORAL at 18:14

## 2024-08-31 RX ADMIN — AMIODARONE HYDROCHLORIDE 150 MG: 50 INJECTION, SOLUTION INTRAVENOUS at 15:19

## 2024-08-31 RX ADMIN — ROSUVASTATIN CALCIUM 10 MG: 10 TABLET, FILM COATED ORAL at 20:01

## 2024-08-31 RX ADMIN — LEVALBUTEROL HYDROCHLORIDE 0.63 MG: 0.63 SOLUTION RESPIRATORY (INHALATION) at 08:43

## 2024-08-31 RX ADMIN — DILTIAZEM HYDROCHLORIDE 120 MG: 120 CAPSULE, COATED, EXTENDED RELEASE ORAL at 08:55

## 2024-08-31 RX ADMIN — SACUBITRIL AND VALSARTAN 0.5 TABLET: 24; 26 TABLET, FILM COATED ORAL at 20:01

## 2024-08-31 RX ADMIN — ARFORMOTEROL TARTRATE 15 MCG: 15 SOLUTION RESPIRATORY (INHALATION) at 08:43

## 2024-08-31 RX ADMIN — SACUBITRIL AND VALSARTAN 0.5 TABLET: 24; 26 TABLET, FILM COATED ORAL at 08:54

## 2024-08-31 RX ADMIN — PANTOPRAZOLE SODIUM 40 MG: 40 TABLET, DELAYED RELEASE ORAL at 08:55

## 2024-08-31 RX ADMIN — LEVOTHYROXINE SODIUM 75 MCG: 75 TABLET ORAL at 05:39

## 2024-08-31 RX ADMIN — BUDESONIDE 1000 MCG: 0.5 SUSPENSION RESPIRATORY (INHALATION) at 08:43

## 2024-08-31 RX ADMIN — FLUTICASONE PROPIONATE 1 SPRAY: 50 SPRAY, METERED NASAL at 08:56

## 2024-08-31 RX ADMIN — PREDNISONE 40 MG: 20 TABLET ORAL at 08:55

## 2024-08-31 RX ADMIN — SODIUM CHLORIDE, PRESERVATIVE FREE 10 ML: 5 INJECTION INTRAVENOUS at 08:56

## 2024-08-31 RX ADMIN — DIGOXIN 62.5 MCG: 0.12 TABLET ORAL at 08:55

## 2024-08-31 RX ADMIN — LEVALBUTEROL HYDROCHLORIDE 0.63 MG: 0.63 SOLUTION RESPIRATORY (INHALATION) at 02:02

## 2024-08-31 RX ADMIN — METOPROLOL SUCCINATE 12.5 MG: 25 TABLET, EXTENDED RELEASE ORAL at 16:07

## 2024-08-31 RX ADMIN — BUMETANIDE 0.5 MG: 1 TABLET ORAL at 08:53

## 2024-08-31 ASSESSMENT — PAIN SCALES - GENERAL: PAINLEVEL_OUTOF10: 0

## 2024-08-31 NOTE — PROGRESS NOTES
PROGRESS NOTE       PATIENT PROBLEM LIST:  Patient Active Problem List   Diagnosis Code    Atrial fibrillation with RVR ([facility])- Recurrent I48.91    Acute on chronic diastolic congestive heart failure (HCC) I50.33    Cardiomyopathy as manifestation of underlying disease (HCC) I43    Non-rheumatic mitral regurgitation I34.0    Acute combined systolic and diastolic congestive heart failure (HCC) I50.41    Chronic anticoagulation Z79.01    Essential hypertension I10    COPD exacerbation (HCC) J44.1    History of atrial fibrillation Z86.79    Dilated idiopathic cardiomyopathy (HCC) I42.0    Severe sinus bradycardia R00.1    Moderate tricuspid regurgitation I07.1    Hypoxia R09.02    Combined forms of age-related cataract of both eyes H25.813    Dyspnea on exertion R06.09    Mass of right lung R91.8    Supplemental oxygen dependent Z99.81    Pneumonia of both lower lobes due to infectious organism J18.9    Stage 3a chronic kidney disease (HCC) N18.31    Malignant neoplasm of right lung (HCC) C34.91    Atrial flutter with rapid ventricular response (HCC) I48.92    PAF (paroxysmal atrial fibrillation) ([facility]) I48.0    Centrilobular emphysema (HCC) J43.2    Thyroid disease E07.9    Acquired hypothyroidism E03.9    Bronchitis J40    Chronic respiratory failure with hypoxia (HCC) J96.11    Mixed hyperlipidemia E78.2    Gastroesophageal reflux disease K21.9    Primary hypothyroidism E03.9    Hyperlipidemia E78.5    Atrial fibrillation (HCC) I48.91    Atrial flutter (HCC) I48.92    Atrial fibrillation with rapid ventricular response (HCC) I48.91    COVID-19 virus infection U07.1    Iatrogenic hyperthyroidism E05.80    TRISTEN (acute kidney injury) (HCC) N17.9    Thrombocytopenia (HCC) D69.6    High thyroid hormone level R79.89    Chronic hypoxic respiratory failure (HCC) J96.11    Chronic systolic CHF (congestive heart failure) (HCC) I50.22    Acute respiratory failure with hypoxia (HCC) J96.01    Acute on chronic combined systolic and  diastolic CHF (congestive heart failure) ([facility]) I50.43    COPD with acute exacerbation ([facility]) J44.1    Secondary hypercoagulable state ([facility]) D68.69    Tachycardia R00.0    Acute on chronic respiratory failure ([facility]) J96.20    Hyperkalemia E87.5       SUBJECTIVE:  Krystal Hyatt states ***    REVIEW OF SYSTEMS:  General ROS: negative for - fatigue, malaise,  weight gain or weight loss  Psychological ROS: negative for - anxiety , depression  Ophthalmic ROS: negative for - decreased vision or visual distortion.  ENT ROS: negative  Allergy and Immunology ROS: negative  Hematological and Lymphatic ROS: negative  Endocrine: no heat or cold intolerance and no polyphagia, polydipsia, or polyuria  Respiratory ROS: positive for - {:[id number]}  Cardiovascular ROS: positive for - {:[id number]}.  Gastrointestinal ROS: no abdominal pain, change in bowel habits, or black or bloody stools  Genito-Urinary ROS: no nocturia, dysuria, trouble voiding, frequency or hematuria  Musculoskeletal ROS: negative for- myalgias, arthralgias, or claudication  Neurological ROS: no TIA or stroke symptoms otherwise no significant change in symptoms or problems since yesterday as documented in previous progress notes.    SCHEDULED MEDICATIONS:   [START ON 9/1/2024] predniSONE  40 mg Oral Daily    levalbuterol  0.63 mg Nebulization Q6H    dilTIAZem  120 mg Oral Daily    sodium chloride flush  5-40 mL IntraVENous 2 times per day    levothyroxine  75 mcg Oral Daily    metoprolol succinate  12.5 mg Oral QAM    pantoprazole  40 mg Oral QAM    rosuvastatin  10 mg Oral Nightly    sacubitril-valsartan  0.5 tablet Oral BID    warfarin  2.5 mg Oral QPM    digoxin  62.5 mcg Oral Daily    bumetanide  0.5 mg Oral Daily    budesonide  1 mg Nebulization BID RT    And    arformoterol tartrate  15 mcg Nebulization BID RT       VITAL SIGNS:                                                                                                                          /75

## 2024-08-31 NOTE — PROGRESS NOTES
Spoke to pharmacy and Dr Jones regarding listed amiodarone allergy and administration of amiodarone.  Patient is not experiencing any reaction at this time. No new orders at this time.

## 2024-08-31 NOTE — PROGRESS NOTES
[facility] Hospitalist Progress Note    Admitting Date and Time: 8/29/2024  6:29 PM  Admit Dx: Hypokalemia [E87.6]  COPD exacerbation (HCC) [J44.1]  Atrial flutter with rapid ventricular response (HCC) [I48.92]  Acute on chronic respiratory failure with hypoxia and hypercapnia (HCC) [J96.21, J96.22]    Subjective:  Patient is being followed for Hypokalemia [E87.6]  COPD exacerbation (HCC) [J44.1]  Atrial flutter with rapid ventricular response (HCC) [I48.92]  Acute on chronic respiratory failure with hypoxia and hypercapnia (HCC) [J96.21, J96.22]     Pt feels patient was seen at bedside.  She mentions she feels a lot better as compared to yesterday.  She uses 5 L oxygen at home which is her baseline.  Denies any shortness of breath, chest pain, palpitation, nausea, vomiting, fever, chills. But she is still wheezing  He is eating and drinking well.    ROS: denies fever, chills, cp, sob, n/v, HA unless stated above.      [START ON 9/1/2024] predniSONE  40 mg Oral Daily    amiodarone (CORDARONE) 150 mg in dextrose 5 % 100 mL bolus  150 mg IntraVENous Once    metoprolol succinate  12.5 mg Oral BID    levalbuterol  0.63 mg Nebulization Q6H    sodium chloride flush  5-40 mL IntraVENous 2 times per day    levothyroxine  75 mcg Oral Daily    pantoprazole  40 mg Oral QAM    rosuvastatin  10 mg Oral Nightly    sacubitril-valsartan  0.5 tablet Oral BID    warfarin  2.5 mg Oral QPM    digoxin  62.5 mcg Oral Daily    bumetanide  0.5 mg Oral Daily    budesonide  1 mg Nebulization BID RT    And    arformoterol tartrate  15 mcg Nebulization BID RT     sodium chloride flush, 5-40 mL, PRN  sodium chloride, , PRN  acetaminophen, 650 mg, Q4H PRN  ondansetron, 4 mg, Q8H PRN   Or  ondansetron, 4 mg, Q6H PRN  neomycin-bacitracin-polymyxin, 1 each, BID PRN  fluticasone, 1 spray, BID PRN         Objective:    BP (!) 141/87   Pulse (!) 134   Temp 98.1 °F (36.7 °C) (Oral)   Resp 20   Ht 1.727 m (5' 8\")   Wt 70.3 kg (155 lb)

## 2024-08-31 NOTE — PLAN OF CARE
Problem: Discharge Planning  Goal: Discharge to home or other facility with appropriate resources  8/31/2024 0045 by Pari Cai RN  Outcome: Progressing  Flowsheets (Taken 8/30/2024 2038)  Discharge to home or other facility with appropriate resources: Identify barriers to discharge with patient and caregiver  8/30/2024 1412 by Christine Lazaro RN  Outcome: Progressing  8/30/2024 1339 by Christine Lazaro RN  Outcome: Progressing     Problem: Risk for Elopement  Goal: Patient will not exit the unit/facility without proper excort  8/31/2024 0045 by Pari Cai RN  Outcome: Progressing  Flowsheets (Taken 8/30/2024 2038)  Nursing Interventions for Elopement Risk: Assist with personal care needs such as toileting, eating, dressing, as needed to reduce the risk of wandering  8/30/2024 1412 by Christine Lazaro RN  Outcome: Progressing  8/30/2024 1339 by Christine Lazaro RN  Outcome: Progressing     Problem: Safety - Adult  Goal: Free from fall injury  8/31/2024 0045 by Pari Cai RN  Outcome: Progressing  8/30/2024 1412 by Christine Lazaro RN  Outcome: Progressing  8/30/2024 1339 by Christine Lazaro RN  Outcome: Progressing     Problem: ABCDS Injury Assessment  Goal: Absence of physical injury  8/31/2024 0045 by Pari Cai RN  Outcome: Progressing  8/30/2024 1412 by Christine Lazaro RN  Outcome: Progressing  8/30/2024 1339 by Christine Lazaro RN  Outcome: Progressing     Problem: Chronic Conditions and Co-morbidities  Goal: Patient's chronic conditions and co-morbidity symptoms are monitored and maintained or improved  8/31/2024 0045 by Pari Cai RN  Outcome: Progressing  Flowsheets (Taken 8/30/2024 2038)  Care Plan - Patient's Chronic Conditions and Co-Morbidity Symptoms are Monitored and Maintained or Improved: Monitor and assess patient's chronic conditions and comorbid symptoms for stability, deterioration, or improvement  8/30/2024 1412

## 2024-08-31 NOTE — PROGRESS NOTES
Yusuf Castro M.D.,[facility]  Carlin Barrera D.O., RED., [facility]  Anjelica Bruner M.D.  Monica Aponte M.D.   Wild Goss D.O.  Mauro Medel M.D.       Patient:  Krystal Hyatt 84 y.o. female MRN: [id number]           PULMONARY progress note    Reason for Consultation: known to dr Aponte- ?plans for bronch soon, to clear secretions- admitted with inc green mucus production   Referring Physician: Dr Sherman Pagan MD    Communication with the referring physician will be sent via the electronic medical record.    Chief Complaint: shortness of breath     CODE STATUS:full    SUBJECTIVE:    8/31/24: Patient seen and examined.  Doing significant better.  Her heart rate is better controlled.  Denies any shortness of breath prior productive cough palpitation    HPI:  Krystal Hyatt is a 84 y.o. female who we are asked to evaluate :  known to dr Aponte- ?plans for bronch soon, to clear secretions- admitted with inc green mucus production .  She has a past medical history significant for a fib on warfarin, centrilobular emphysema, moderate copd, ILD, hypertension, Mitral valve regurgitation, diastolic heart failure, thyroid disease, chronic dyspnea,  chronic respiratory failure, prior lung cancer, cataracts, oxygen dependence, prior nicotine dependence in remission.     She is a known patient to our service followed by Dr Monica Aponte. She was most recently seen by NP 8/22/24 in office follow up for ILD, COPD, hx squamous cell lung cancer treated with radiation 8/2021, and chronic respiratory failure with hypoxia.  Her home pulmonary regimen includes oxygen 2-5 L nasal cannula, Trelegy ellipta daily, albuterol QID for rescue via nebulizer.     She has had multiple hospital admission over the past year for recurrent pneumonia. April 2024 treated with rocephin during inpatient stay for R upper lobe pneumonia, May 2024 treated with Levaquin and medrol dose pack- prescribed by PCP as OP. Hospital admission  emphysema  Hx squamous cell lung cancer RUL treated with radiation Aug 2021  Large hiatal hernia  HFpEF, grade I DD, EF 60%  A flutter with RVR, on warfarin  Mild AV stenosis, Moderate TV regurgitation  Thyroid disease     Plan:  Oxygen 5 liters NC-this is baseline keep > 92%  Scheduled bronchodilators - , budesonide and brovana bid. Pulmozyme daily. Switch to xopenex Q 6 scheduled,  while a flutter rvr  Repeat ct chest today, non contrast reviewed.  Patient has persistent right middle lobe atelectasis with a soft tissue density in the right middle lobe.  She is scheduled for an outpatient bronchoscopy with Dr. Aponte on September 5.  I personally discussed the case with Dr. Jones and he cleared patient from a cardiac standpoint she can proceed with her [facility] on September 5 and the Coumadin can be held 72 hours prior to the procedure  Wean down her Solu-Medrol to 40 mg daily  Respiratory viral panel negative. Check resp culture, strep/legionella antigens, mrsa swab, procal, sed rate, crp.  DVT, GI prophylaxis. Daily warfarin. INR 2.2.   Cardiology for mgmt of rate control, a flutter, chf.       Thank you for allowing me to participate in the care of Krystal Hyatt.   Please feel free to call with questions.       Electronically signed by Anjelica Bruner MD on 8/31/2024 at 12:56 PM      Note: This report was completed utilizing computer voice recognition software. Every effort has been made to ensure accuracy, however; inadvertent computerized transcription errors may be present

## 2024-09-01 LAB
ANION GAP SERPL CALCULATED.3IONS-SCNC: 12 MMOL/L (ref 7–16)
BASOPHILS # BLD: 0.01 K/UL (ref 0–0.2)
BASOPHILS NFR BLD: 0 % (ref 0–2)
BUN SERPL-MCNC: 28 MG/DL (ref 6–23)
CALCIUM SERPL-MCNC: 9.1 MG/DL (ref 8.6–10.2)
CHLORIDE SERPL-SCNC: 102 MMOL/L (ref 98–107)
CO2 SERPL-SCNC: 29 MMOL/L (ref 22–29)
CREAT SERPL-MCNC: 0.9 MG/DL (ref 0.5–1)
EOSINOPHIL # BLD: 0.01 K/UL (ref 0.05–0.5)
EOSINOPHILS RELATIVE PERCENT: 0 % (ref 0–6)
ERYTHROCYTE [DISTWIDTH] IN BLOOD BY AUTOMATED COUNT: 15.2 % (ref 11.5–15)
GFR, ESTIMATED: 62 ML/MIN/1.73M2
GLUCOSE SERPL-MCNC: 129 MG/DL (ref 74–99)
HCT VFR BLD AUTO: 34.1 % (ref 34–48)
HGB BLD-MCNC: 10.5 G/DL (ref 11.5–15.5)
IMM GRANULOCYTES # BLD AUTO: 0.03 K/UL (ref 0–0.58)
IMM GRANULOCYTES NFR BLD: 0 % (ref 0–5)
INR PPP: 2.3
LYMPHOCYTES NFR BLD: 1.44 K/UL (ref 1.5–4)
LYMPHOCYTES RELATIVE PERCENT: 20 % (ref 20–42)
MAGNESIUM SERPL-MCNC: 1.7 MG/DL (ref 1.6–2.6)
MCH RBC QN AUTO: 29.4 PG (ref 26–35)
MCHC RBC AUTO-ENTMCNC: 30.8 G/DL (ref 32–34.5)
MCV RBC AUTO: 95.5 FL (ref 80–99.9)
MONOCYTES NFR BLD: 0.37 K/UL (ref 0.1–0.95)
MONOCYTES NFR BLD: 5 % (ref 2–12)
NEUTROPHILS NFR BLD: 74 % (ref 43–80)
NEUTS SEG NFR BLD: 5.27 K/UL (ref 1.8–7.3)
PLATELET # BLD AUTO: 139 K/UL (ref 130–450)
PMV BLD AUTO: 10.7 FL (ref 7–12)
POTASSIUM SERPL-SCNC: 4.4 MMOL/L (ref 3.5–5)
PROTHROMBIN TIME: 24.9 SEC (ref 9.3–12.4)
RBC # BLD AUTO: 3.57 M/UL (ref 3.5–5.5)
SODIUM SERPL-SCNC: 143 MMOL/L (ref 132–146)
WBC OTHER # BLD: 7.1 K/UL (ref 4.5–11.5)

## 2024-09-01 PROCEDURE — 6360000002 HC RX W HCPCS: Performed by: INTERNAL MEDICINE

## 2024-09-01 PROCEDURE — 83735 ASSAY OF MAGNESIUM: CPT

## 2024-09-01 PROCEDURE — 2700000000 HC OXYGEN THERAPY PER DAY

## 2024-09-01 PROCEDURE — 94640 AIRWAY INHALATION TREATMENT: CPT

## 2024-09-01 PROCEDURE — 80048 BASIC METABOLIC PNL TOTAL CA: CPT

## 2024-09-01 PROCEDURE — 99232 SBSQ HOSP IP/OBS MODERATE 35: CPT

## 2024-09-01 PROCEDURE — 36415 COLL VENOUS BLD VENIPUNCTURE: CPT

## 2024-09-01 PROCEDURE — 85025 COMPLETE CBC W/AUTO DIFF WBC: CPT

## 2024-09-01 PROCEDURE — 2060000000 HC ICU INTERMEDIATE R&B

## 2024-09-01 PROCEDURE — 6360000002 HC RX W HCPCS: Performed by: NURSE PRACTITIONER

## 2024-09-01 PROCEDURE — 6370000000 HC RX 637 (ALT 250 FOR IP): Performed by: INTERNAL MEDICINE

## 2024-09-01 PROCEDURE — 2580000003 HC RX 258: Performed by: INTERNAL MEDICINE

## 2024-09-01 PROCEDURE — 85610 PROTHROMBIN TIME: CPT

## 2024-09-01 RX ORDER — BUDESONIDE 0.5 MG/2ML
0.5 INHALANT ORAL
Status: DISCONTINUED | OUTPATIENT
Start: 2024-09-01 | End: 2024-09-02 | Stop reason: HOSPADM

## 2024-09-01 RX ORDER — ARFORMOTEROL TARTRATE 15 UG/2ML
15 SOLUTION RESPIRATORY (INHALATION)
Status: DISCONTINUED | OUTPATIENT
Start: 2024-09-01 | End: 2024-09-02 | Stop reason: HOSPADM

## 2024-09-01 RX ADMIN — SODIUM CHLORIDE, PRESERVATIVE FREE 10 ML: 5 INJECTION INTRAVENOUS at 08:06

## 2024-09-01 RX ADMIN — LEVOTHYROXINE SODIUM 75 MCG: 75 TABLET ORAL at 05:38

## 2024-09-01 RX ADMIN — METOPROLOL SUCCINATE 12.5 MG: 25 TABLET, EXTENDED RELEASE ORAL at 17:06

## 2024-09-01 RX ADMIN — PREDNISONE 40 MG: 20 TABLET ORAL at 08:04

## 2024-09-01 RX ADMIN — METOPROLOL SUCCINATE 12.5 MG: 25 TABLET, EXTENDED RELEASE ORAL at 08:04

## 2024-09-01 RX ADMIN — SODIUM CHLORIDE, PRESERVATIVE FREE 10 ML: 5 INJECTION INTRAVENOUS at 20:37

## 2024-09-01 RX ADMIN — BUMETANIDE 0.5 MG: 1 TABLET ORAL at 08:04

## 2024-09-01 RX ADMIN — DIGOXIN 62.5 MCG: 0.12 TABLET ORAL at 08:04

## 2024-09-01 RX ADMIN — ARFORMOTEROL TARTRATE 15 MCG: 15 SOLUTION RESPIRATORY (INHALATION) at 07:43

## 2024-09-01 RX ADMIN — BUDESONIDE 1000 MCG: 0.5 SUSPENSION RESPIRATORY (INHALATION) at 07:43

## 2024-09-01 RX ADMIN — SACUBITRIL AND VALSARTAN 0.5 TABLET: 24; 26 TABLET, FILM COATED ORAL at 08:04

## 2024-09-01 RX ADMIN — LEVALBUTEROL HYDROCHLORIDE 0.63 MG: 0.63 SOLUTION RESPIRATORY (INHALATION) at 20:14

## 2024-09-01 RX ADMIN — ROSUVASTATIN CALCIUM 10 MG: 10 TABLET, FILM COATED ORAL at 20:37

## 2024-09-01 RX ADMIN — PANTOPRAZOLE SODIUM 40 MG: 40 TABLET, DELAYED RELEASE ORAL at 08:04

## 2024-09-01 RX ADMIN — LEVALBUTEROL HYDROCHLORIDE 0.63 MG: 0.63 SOLUTION RESPIRATORY (INHALATION) at 07:43

## 2024-09-01 RX ADMIN — SACUBITRIL AND VALSARTAN 1 TABLET: 24; 26 TABLET, FILM COATED ORAL at 20:37

## 2024-09-01 RX ADMIN — WARFARIN SODIUM 2.5 MG: 2.5 TABLET ORAL at 17:06

## 2024-09-01 NOTE — PROGRESS NOTES
Yusuf Castro M.D.,[facility]  Carlin Barrera D.O., F.CYRIL.[email or web address].OJOHAN., [facility]  Anjelica Bruner M.D.  Monica Aponte M.D.   Widl Goss D.O.  Mauro Medel M.D.       Patient:  Krystal Hyatt 84 y.o. female MRN: [id number]           PULMONARY progress note    Reason for Consultation: known to dr Aponte- ?plans for bronch soon, to clear secretions- admitted with inc green mucus production   Referring Physician: Dr Sherman Pagan MD    Communication with the referring physician will be sent via the electronic medical record.    Chief Complaint: shortness of breath     CODE STATUS:full    SUBJECTIVE:    9/1/24: Patient was seen and examined.  Her daughter at the bedside.  Patient remains in A-fib with RVR.  Patient informed me that she developed worsening palpitations difficulty breathing after her bronchodilators this morning.     8/31/24: Patient seen and examined.  Doing significant better.  Her heart rate is better controlled.  Denies any shortness of breath prior productive cough palpitation    HPI:  Krystal Hyatt is a 84 y.o. female who we are asked to evaluate :  known to dr Aponte- ?plans for bronch soon, to clear secretions- admitted with inc green mucus production .  She has a past medical history significant for a fib on warfarin, centrilobular emphysema, moderate copd, ILD, hypertension, Mitral valve regurgitation, diastolic heart failure, thyroid disease, chronic dyspnea,  chronic respiratory failure, prior lung cancer, cataracts, oxygen dependence, prior nicotine dependence in remission.     She is a known patient to our service followed by Dr Monica Aponte. She was most recently seen by NP 8/22/24 in office follow up for ILD, COPD, hx squamous cell lung cancer treated with radiation 8/2021, and chronic respiratory failure with hypoxia.  Her home pulmonary regimen includes oxygen 2-5 L nasal cannula, Trelegy ellipta daily, albuterol QID for rescue via nebulizer.     She has had multiple hospital admission over  mg Oral BID    levalbuterol  0.63 mg Nebulization Q6H    sodium chloride flush  5-40 mL IntraVENous 2 times per day    levothyroxine  75 mcg Oral Daily    pantoprazole  40 mg Oral QAM    rosuvastatin  10 mg Oral Nightly    warfarin  2.5 mg Oral QPM    digoxin  62.5 mcg Oral Daily    bumetanide  0.5 mg Oral Daily       Continuous Infusions:   sodium chloride         Allergies   Allergen Reactions    Pacerone [Amiodarone] Shortness Of Breath    Bactrim [Sulfamethoxazole-Trimethoprim] Other (See Comments)     Elevated kidney function    Cat Hair Extract Itching and Other (See Comments)     Patient states \"My son put this. I hope I'm not allergic, I have 4 Cats and 2 Birds, maybe to their dust.\"    Egg-Derived Products Nausea Only    Erythromycin Diarrhea, Nausea And Vomiting and Other (See Comments)     \"STOMACH PAINS\"      Pcn [Penicillins] Itching and Rash     PT STATES \"RASH FROM HEAD TO TOE, W/ SOMETHING CRAWLING UNDER MY SKIN\"    Seasonal Itching, Swelling and Other (See Comments)     RUNNY/ITCHY NOSE, WATERY/ITCHY EYES       REVIEW OF SYSTEMS:  Constitutional: Denies fever, weight loss, night sweats, and fatigue  Skin: Denies pigmentation, dark lesions, and rashes   HEENT: Denies hearing loss, tinnitus, ear drainage, epistaxis, sore throat, and hoarseness.  Cardiovascular: Denies palpitations, chest pain, and chest pressure.  Respiratory:  dyspnea, cough, mucus, chronic hypoxia, recurrent pneumonia  Gastrointestinal: Denies nausea, vomiting, poor appetite, diarrhea, heartburn or reflux  Genitourinary: Denies dysuria, frequency, urgency or hematuria  Musculoskeletal: Denies myalgias, muscle weakness, and bone pain  Neurological: Denies dizziness, vertigo, headache, and focal weakness  Psychological: Denies anxiety and depression  Endocrine: Denies heat intolerance and cold intolerance  Hematopoietic/Lymphatic: Denies bleeding problems and blood transfusions    OBJECTIVE:   BP (!) 111/59   Pulse (!) 119   Temp

## 2024-09-01 NOTE — PROGRESS NOTES
[facility] Hospitalist Progress Note    Admitting Date and Time: 8/29/2024  6:29 PM  Admit Dx: Hypokalemia [E87.6]  COPD exacerbation (HCC) [J44.1]  Atrial flutter with rapid ventricular response (HCC) [I48.92]  Acute on chronic respiratory failure with hypoxia and hypercapnia (HCC) [J96.21, J96.22]    Subjective:  Patient is being followed for Hypokalemia [E87.6]  COPD exacerbation (HCC) [J44.1]  Atrial flutter with rapid ventricular response (HCC) [I48.92]  Acute on chronic respiratory failure with hypoxia and hypercapnia (HCC) [J96.21, J96.22]     Pt feels patient was seen at bedside.   She is more anxious and tachycardic and mentioned that her heart rate goes soft after getting the breathing treatment.  She constantly check her pulse oximeter and she is more anxious.  She is in 5 L oxygen, eating and drinking well.  ROS: denies fever, chills, cp, sob, n/v, HA unless stated above.      sacubitril-valsartan  1 tablet Oral BID    [Held by provider] budesonide  0.5 mg Nebulization BID RT    And    [Held by provider] arformoterol tartrate  15 mcg Nebulization BID RT    predniSONE  40 mg Oral Daily    metoprolol succinate  12.5 mg Oral BID    levalbuterol  0.63 mg Nebulization Q6H    sodium chloride flush  5-40 mL IntraVENous 2 times per day    levothyroxine  75 mcg Oral Daily    pantoprazole  40 mg Oral QAM    rosuvastatin  10 mg Oral Nightly    warfarin  2.5 mg Oral QPM    digoxin  62.5 mcg Oral Daily    bumetanide  0.5 mg Oral Daily     sodium chloride flush, 5-40 mL, PRN  sodium chloride, , PRN  acetaminophen, 650 mg, Q4H PRN  ondansetron, 4 mg, Q8H PRN   Or  ondansetron, 4 mg, Q6H PRN  neomycin-bacitracin-polymyxin, 1 each, BID PRN  fluticasone, 1 spray, BID PRN         Objective:    BP (!) 111/59   Pulse (!) 119   Temp 97.7 °F (36.5 °C) (Oral)   Resp 16   Ht 1.727 m (5' 8\")   Wt 70.8 kg (156 lb)   SpO2 94%   BMI 23.72 kg/m²     General Appearance: alert and oriented to person, place and time and  in no acute distress  Skin: warm and dry  Head: normocephalic and atraumatic  Eyes: pupils equal, round, and reactive to light, extraocular eye movements intact, conjunctivae normal  Neck: neck supple and non tender without mass   Pulmonary/Chest: Mild bilateral wheezes present,, normal air movement, no respiratory distress  Cardiovascular: normal rate, normal S1 and S2 and no carotid bruits  Abdomen: soft, non-tender, non-distended, normal bowel sounds, no masses or organomegaly  Extremities: no cyanosis, no clubbing and no edema  Neurologic: no cranial nerve deficit and speech normal        Recent Labs     08/30/24  0140 08/31/24  0911 09/01/24  0121    140 143   K 4.3 4.0 4.4    102 102   CO2 29 29 29   BUN 17 22 28*   CREATININE 0.7 0.8 0.9   GLUCOSE 190* 166* 129*   CALCIUM 9.2 9.1 9.1       Recent Labs     08/29/24  1857 08/30/24  0140 08/31/24  0911 09/01/24  0731   WBC 6.0 7.2 7.9 7.1   RBC 3.54 3.69 3.12* 3.57   HGB 10.4* 10.9* 9.1* 10.5*   HCT 33.9* 35.1 29.9* 34.1   MCV 95.8 95.1 95.8 95.5   MCH 29.4 29.5 29.2 29.4   MCHC 30.7* 31.1* 30.4* 30.8*   RDW 14.6 14.6 15.0 15.2*   *  --  128* 139   MPV 10.0 10.4 10.6 10.7       Radiology: Reviewed    Assessment:    Principal Problem:    COPD exacerbation (HCC)  Active Problems:    Stage 3a chronic kidney disease (HCC)    Atrial fibrillation with RVR (HCC)- Recurrent    Acute on chronic diastolic congestive heart failure (HCC)    Acute combined systolic and diastolic congestive heart failure (HCC)    Chronic anticoagulation    Acute respiratory failure with hypoxia (HCC)  Resolved Problems:    * No resolved hospital problems. *      Plan:  1.  COPD exacerbation, acute on chronic hypoxic respiratory failure: She is on her baseline oxygen vibrator, on breathing treatment, prednisone tapering down, tapering, pulmonology on board.  Respiratory viral pathogen panel negative  Patient has history of recurrent pneumonia and chronic greenish sputum

## 2024-09-02 VITALS
RESPIRATION RATE: 18 BRPM | TEMPERATURE: 97.8 F | BODY MASS INDEX: 22.93 KG/M2 | OXYGEN SATURATION: 99 % | HEART RATE: 60 BPM | WEIGHT: 151.3 LBS | SYSTOLIC BLOOD PRESSURE: 118 MMHG | DIASTOLIC BLOOD PRESSURE: 74 MMHG | HEIGHT: 68 IN

## 2024-09-02 LAB
ANION GAP SERPL CALCULATED.3IONS-SCNC: 7 MMOL/L (ref 7–16)
BASOPHILS # BLD: 0 K/UL (ref 0–0.2)
BASOPHILS NFR BLD: 0 % (ref 0–2)
BUN SERPL-MCNC: 28 MG/DL (ref 6–23)
CALCIUM SERPL-MCNC: 9.5 MG/DL (ref 8.6–10.2)
CHLORIDE SERPL-SCNC: 101 MMOL/L (ref 98–107)
CO2 SERPL-SCNC: 35 MMOL/L (ref 22–29)
CREAT SERPL-MCNC: 0.9 MG/DL (ref 0.5–1)
EOSINOPHIL # BLD: 0 K/UL (ref 0.05–0.5)
EOSINOPHILS RELATIVE PERCENT: 0 % (ref 0–6)
ERYTHROCYTE [DISTWIDTH] IN BLOOD BY AUTOMATED COUNT: 15.2 % (ref 11.5–15)
GFR, ESTIMATED: 62 ML/MIN/1.73M2
GLUCOSE SERPL-MCNC: 92 MG/DL (ref 74–99)
HCT VFR BLD AUTO: 35.6 % (ref 34–48)
HGB BLD-MCNC: 10.9 G/DL (ref 11.5–15.5)
IMM GRANULOCYTES # BLD AUTO: 0.04 K/UL (ref 0–0.58)
IMM GRANULOCYTES NFR BLD: 1 % (ref 0–5)
LYMPHOCYTES NFR BLD: 1.04 K/UL (ref 1.5–4)
LYMPHOCYTES RELATIVE PERCENT: 19 % (ref 20–42)
MCH RBC QN AUTO: 29.5 PG (ref 26–35)
MCHC RBC AUTO-ENTMCNC: 30.6 G/DL (ref 32–34.5)
MCV RBC AUTO: 96.5 FL (ref 80–99.9)
MONOCYTES NFR BLD: 0.33 K/UL (ref 0.1–0.95)
MONOCYTES NFR BLD: 6 % (ref 2–12)
NEUTROPHILS NFR BLD: 74 % (ref 43–80)
NEUTS SEG NFR BLD: 4.09 K/UL (ref 1.8–7.3)
PLATELET # BLD AUTO: 139 K/UL (ref 130–450)
PMV BLD AUTO: 10.7 FL (ref 7–12)
POTASSIUM SERPL-SCNC: 3.9 MMOL/L (ref 3.5–5)
RBC # BLD AUTO: 3.69 M/UL (ref 3.5–5.5)
SODIUM SERPL-SCNC: 143 MMOL/L (ref 132–146)
WBC OTHER # BLD: 5.5 K/UL (ref 4.5–11.5)

## 2024-09-02 PROCEDURE — 6370000000 HC RX 637 (ALT 250 FOR IP): Performed by: INTERNAL MEDICINE

## 2024-09-02 PROCEDURE — 6360000002 HC RX W HCPCS: Performed by: NURSE PRACTITIONER

## 2024-09-02 PROCEDURE — 2580000003 HC RX 258: Performed by: INTERNAL MEDICINE

## 2024-09-02 PROCEDURE — 80048 BASIC METABOLIC PNL TOTAL CA: CPT

## 2024-09-02 PROCEDURE — 94667 MNPJ CHEST WALL 1ST: CPT

## 2024-09-02 PROCEDURE — 36415 COLL VENOUS BLD VENIPUNCTURE: CPT

## 2024-09-02 PROCEDURE — 85025 COMPLETE CBC W/AUTO DIFF WBC: CPT

## 2024-09-02 PROCEDURE — 94640 AIRWAY INHALATION TREATMENT: CPT

## 2024-09-02 PROCEDURE — 99239 HOSP IP/OBS DSCHRG MGMT >30: CPT

## 2024-09-02 PROCEDURE — 2700000000 HC OXYGEN THERAPY PER DAY

## 2024-09-02 RX ORDER — WARFARIN SODIUM 2.5 MG/1
TABLET ORAL
Qty: 30 TABLET | Refills: 3 | OUTPATIENT
Start: 2024-09-02

## 2024-09-02 RX ORDER — PREDNISONE 20 MG/1
40 TABLET ORAL DAILY
Qty: 4 TABLET | Refills: 0 | Status: SHIPPED | OUTPATIENT
Start: 2024-09-03 | End: 2024-09-05

## 2024-09-02 RX ORDER — AMIODARONE HYDROCHLORIDE 200 MG/1
100 TABLET ORAL DAILY
OUTPATIENT
Start: 2024-09-02

## 2024-09-02 RX ORDER — AMIODARONE HYDROCHLORIDE 200 MG/1
200 TABLET ORAL DAILY
Qty: 60 TABLET | Refills: 1 | Status: ON HOLD | OUTPATIENT
Start: 2024-09-02 | End: 2024-09-21 | Stop reason: HOSPADM

## 2024-09-02 RX ORDER — DIGOXIN 0.06 MG/1
62.5 TABLET ORAL
Qty: 90 TABLET | Refills: 0 | OUTPATIENT
Start: 2024-09-02

## 2024-09-02 RX ORDER — AMIODARONE HYDROCHLORIDE 200 MG/1
200 TABLET ORAL DAILY
Status: DISCONTINUED | OUTPATIENT
Start: 2024-09-02 | End: 2024-09-02 | Stop reason: HOSPADM

## 2024-09-02 RX ORDER — METOPROLOL SUCCINATE 25 MG/1
12.5 TABLET, EXTENDED RELEASE ORAL 2 TIMES DAILY
Qty: 30 TABLET | Refills: 3 | Status: ON HOLD | OUTPATIENT
Start: 2024-09-02 | End: 2024-09-10 | Stop reason: HOSPADM

## 2024-09-02 RX ADMIN — METOPROLOL SUCCINATE 12.5 MG: 25 TABLET, EXTENDED RELEASE ORAL at 17:21

## 2024-09-02 RX ADMIN — LEVALBUTEROL HYDROCHLORIDE 0.63 MG: 0.63 SOLUTION RESPIRATORY (INHALATION) at 13:12

## 2024-09-02 RX ADMIN — SODIUM CHLORIDE, PRESERVATIVE FREE 10 ML: 5 INJECTION INTRAVENOUS at 08:33

## 2024-09-02 RX ADMIN — METOPROLOL SUCCINATE 12.5 MG: 25 TABLET, EXTENDED RELEASE ORAL at 08:31

## 2024-09-02 RX ADMIN — PREDNISONE 40 MG: 20 TABLET ORAL at 08:32

## 2024-09-02 RX ADMIN — AMIODARONE HYDROCHLORIDE 200 MG: 200 TABLET ORAL at 17:21

## 2024-09-02 RX ADMIN — BUMETANIDE 0.5 MG: 1 TABLET ORAL at 08:31

## 2024-09-02 RX ADMIN — LEVALBUTEROL HYDROCHLORIDE 0.63 MG: 0.63 SOLUTION RESPIRATORY (INHALATION) at 07:52

## 2024-09-02 RX ADMIN — DIGOXIN 62.5 MCG: 0.12 TABLET ORAL at 08:32

## 2024-09-02 RX ADMIN — SACUBITRIL AND VALSARTAN 1 TABLET: 24; 26 TABLET, FILM COATED ORAL at 08:32

## 2024-09-02 RX ADMIN — PANTOPRAZOLE SODIUM 40 MG: 40 TABLET, DELAYED RELEASE ORAL at 08:32

## 2024-09-02 RX ADMIN — LEVOTHYROXINE SODIUM 75 MCG: 75 TABLET ORAL at 05:45

## 2024-09-02 ASSESSMENT — PAIN SCALES - GENERAL: PAINLEVEL_OUTOF10: 0

## 2024-09-02 NOTE — DISCHARGE SUMMARY
[facility] Hospitalist Physician Discharge Summary       Rom Mckeon MD  [address] [id number]  [phone]    Follow up      Rom Mckeon MD  [address] [id number] [phone]          Dieter Jones DO  [address] [id number]  [phone]    Schedule an appointment as soon as possible for a visit        Activity level: As tolerated     Dispo: Home    Condition on discharge: Stable     Patient ID:  Krystal Hyatt  [id number]  84 y.o.  1940    Admit date: 8/29/2024    Discharge date and time:  9/2/2024  4:15 PM    Admission Diagnoses: Principal Problem:    COPD exacerbation (HCC)  Active Problems:    Stage 3a chronic kidney disease (HCC)    Atrial fibrillation with RVR (HCC)- Recurrent    Acute on chronic diastolic congestive heart failure (HCC)    Acute combined systolic and diastolic congestive heart failure (HCC)    Chronic anticoagulation    Acute respiratory failure with hypoxia (HCC)  Resolved Problems:    * No resolved hospital problems. *      Discharge Diagnoses: Principal Problem:    COPD exacerbation (HCC)  Active Problems:    Stage 3a chronic kidney disease (HCC)    Atrial fibrillation with RVR (HCC)- Recurrent    Acute on chronic diastolic congestive heart failure (HCC)    Acute combined systolic and diastolic congestive heart failure (HCC)    Chronic anticoagulation    Acute respiratory failure with hypoxia (HCC)  Resolved Problems:    * No resolved hospital problems. *      Consults:  IP CONSULT TO CARDIOLOGY  IP CONSULT TO PULMONOLOGY  IP CONSULT TO SOCIAL WORK    Procedures: None    Hospital Course:   Patient Krystal Hyatt is a 84 y.o. presented with Hypokalemia [E87.6]  COPD exacerbation (HCC) [J44.1]  Atrial flutter with rapid ventricular response (HCC) [I48.92]  Acute on chronic respiratory failure with hypoxia and hypercapnia (HCC) [J96.21, J96.22]    Patient was admitted due to shortness of breath and

## 2024-09-02 NOTE — PLAN OF CARE
Problem: Discharge Planning  Goal: Discharge to home or other facility with appropriate resources  Outcome: Progressing     Problem: Risk for Elopement  Goal: Patient will not exit the unit/facility without proper excort  Outcome: Progressing     Problem: Safety - Adult  Goal: Free from fall injury  Outcome: Progressing     Problem: ABCDS Injury Assessment  Goal: Absence of physical injury  Outcome: Progressing     Problem: Chronic Conditions and Co-morbidities  Goal: Patient's chronic conditions and co-morbidity symptoms are monitored and maintained or improved  Outcome: Progressing     Problem: Skin/Tissue Integrity  Goal: Absence of new skin breakdown  Description: 1.  Monitor for areas of redness and/or skin breakdown  2.  Assess vascular access sites hourly  3.  Every 4-6 hours minimum:  Change oxygen saturation probe site  4.  Every 4-6 hours:  If on nasal continuous positive airway pressure, respiratory therapy assess nares and determine need for appliance change or resting period.  Outcome: Progressing

## 2024-09-02 NOTE — PROGRESS NOTES
PROGRESS NOTE       PATIENT PROBLEM LIST:  Patient Active Problem List   Diagnosis Code    Atrial fibrillation with RVR ([facility])- Recurrent I48.91    Acute on chronic diastolic congestive heart failure (HCC) I50.33    Cardiomyopathy as manifestation of underlying disease (HCC) I43    Non-rheumatic mitral regurgitation I34.0    Acute combined systolic and diastolic congestive heart failure (HCC) I50.41    Chronic anticoagulation Z79.01    Essential hypertension I10    COPD exacerbation (HCC) J44.1    History of atrial fibrillation Z86.79    Dilated idiopathic cardiomyopathy (HCC) I42.0    Severe sinus bradycardia R00.1    Moderate tricuspid regurgitation I07.1    Hypoxia R09.02    Combined forms of age-related cataract of both eyes H25.813    Dyspnea on exertion R06.09    Mass of right lung R91.8    Supplemental oxygen dependent Z99.81    Pneumonia of both lower lobes due to infectious organism J18.9    Stage 3a chronic kidney disease (HCC) N18.31    Malignant neoplasm of right lung (HCC) C34.91    Atrial flutter with rapid ventricular response (HCC) I48.92    PAF (paroxysmal atrial fibrillation) ([facility]) I48.0    Centrilobular emphysema (HCC) J43.2    Thyroid disease E07.9    Acquired hypothyroidism E03.9    Bronchitis J40    Chronic respiratory failure with hypoxia (HCC) J96.11    Mixed hyperlipidemia E78.2    Gastroesophageal reflux disease K21.9    Primary hypothyroidism E03.9    Hyperlipidemia E78.5    Atrial fibrillation (HCC) I48.91    Atrial flutter (HCC) I48.92    Atrial fibrillation with rapid ventricular response (HCC) I48.91    COVID-19 virus infection U07.1    Iatrogenic hyperthyroidism E05.80    TRISTEN (acute kidney injury) (HCC) N17.9    Thrombocytopenia (HCC) D69.6    High thyroid hormone level R79.89    Chronic hypoxic respiratory failure (HCC) J96.11    Chronic systolic CHF (congestive heart failure) (HCC) I50.22    Acute respiratory failure with hypoxia (HCC) J96.01    Acute on chronic combined systolic and

## 2024-09-02 NOTE — PROGRESS NOTES
Dr. Pagan called this nurse regarding cardiology plan. Patient reports tachycardia when ambulating to and from bathroom. Heart rate 130's to 140's when ambulating but recovers quickly while resting in 80's and wants to discuss with cardiology. Dr. Jones called who states he is here rounding and plans to visit patient this afternoon.

## 2024-09-02 NOTE — PROGRESS NOTES
Yusuf Castro M.D.,[facility]  Carlin Barrera D.O., F.ISSA., [facility]  Anjelica Bruner M.D.  Monica Aponte M.D.   Wild Goss D.O.  Mauro Medel M.D.       Patient:  Krystal Hyatt 84 y.o. female MRN: [id number]           PULMONARY progress note    Reason for Consultation: known to dr Aponte- ?plans for bronch soon, to clear secretions- admitted with inc green mucus production   Referring Physician: Dr Sherman Pagan MD    Communication with the referring physician will be sent via the electronic medical record.    Chief Complaint: shortness of breath     CODE STATUS:full    SUBJECTIVE:  9/2/2024: Patient was seen and examined at bedside with her daughter.  States her breathing is improved and feels that her home Trelegy and the use of Xopenex nebulizer has greatly helped her.  Has no acute complaints of a respiratory standpoint.  Is at her baseline 5 L oxygen.    9/1/24: Patient was seen and examined.  Her daughter at the bedside.  Patient remains in A-fib with RVR.  Patient informed me that she developed worsening palpitations difficulty breathing after her bronchodilators this morning.     8/31/24: Patient seen and examined.  Doing significant better.  Her heart rate is better controlled.  Denies any shortness of breath prior productive cough palpitation    HPI:  rKystal Hyatt is a 84 y.o. female who we are asked to evaluate :  known to dr Aponte- ?plans for bronch soon, to clear secretions- admitted with inc green mucus production .  She has a past medical history significant for a fib on warfarin, centrilobular emphysema, moderate copd, ILD, hypertension, Mitral valve regurgitation, diastolic heart failure, thyroid disease, chronic dyspnea,  chronic respiratory failure, prior lung cancer, cataracts, oxygen dependence, prior nicotine dependence in remission.     She is a known patient to our service followed by Dr Monica Aponte. She was most recently seen by NP 8/22/24 in office follow up for ILD, COPD, hx  Lpm    CURRENT MEDS :  Scheduled Meds:   sacubitril-valsartan  1 tablet Oral BID    [Held by provider] budesonide  0.5 mg Nebulization BID RT    And    [Held by provider] arformoterol tartrate  15 mcg Nebulization BID RT    predniSONE  40 mg Oral Daily    metoprolol succinate  12.5 mg Oral BID    levalbuterol  0.63 mg Nebulization Q6H    sodium chloride flush  5-40 mL IntraVENous 2 times per day    levothyroxine  75 mcg Oral Daily    pantoprazole  40 mg Oral QAM    rosuvastatin  10 mg Oral Nightly    warfarin  2.5 mg Oral QPM    digoxin  62.5 mcg Oral Daily    bumetanide  0.5 mg Oral Daily       Continuous Infusions:   sodium chloride         Allergies   Allergen Reactions    Pacerone [Amiodarone] Shortness Of Breath    Bactrim [Sulfamethoxazole-Trimethoprim] Other (See Comments)     Elevated kidney function    Cat Hair Extract Itching and Other (See Comments)     Patient states \"My son put this. I hope I'm not allergic, I have 4 Cats and 2 Birds, maybe to their dust.\"    Egg-Derived Products Nausea Only    Erythromycin Diarrhea, Nausea And Vomiting and Other (See Comments)     \"STOMACH PAINS\"      Pcn [Penicillins] Itching and Rash     PT STATES \"RASH FROM HEAD TO TOE, W/ SOMETHING CRAWLING UNDER MY SKIN\"    Seasonal Itching, Swelling and Other (See Comments)     RUNNY/ITCHY NOSE, WATERY/ITCHY EYES       REVIEW OF SYSTEMS:  Constitutional: Denies fever, weight loss, night sweats, and fatigue  Skin: Denies pigmentation, dark lesions, and rashes   HEENT: Denies hearing loss, tinnitus, ear drainage, epistaxis, sore throat, and hoarseness.  Cardiovascular: Denies palpitations, chest pain, and chest pressure.  Respiratory:  dyspnea, cough, mucus, chronic hypoxia, recurrent pneumonia  Gastrointestinal: Denies nausea, vomiting, poor appetite, diarrhea, heartburn or reflux  Genitourinary: Denies dysuria, frequency, urgency or hematuria  Musculoskeletal: Denies myalgias, muscle weakness, and bone pain  Neurological: Denies

## 2024-09-02 NOTE — PROGRESS NOTES
[facility] Hospitalist Progress Note    Admitting Date and Time: 8/29/2024  6:29 PM  Admit Dx: Hypokalemia [E87.6]  COPD exacerbation (HCC) [J44.1]  Atrial flutter with rapid ventricular response (HCC) [I48.92]  Acute on chronic respiratory failure with hypoxia and hypercapnia (HCC) [J96.21, J96.22]    Subjective:  Patient is being followed for Hypokalemia [E87.6]  COPD exacerbation (HCC) [J44.1]  Atrial flutter with rapid ventricular response (HCC) [I48.92]  Acute on chronic respiratory failure with hypoxia and hypercapnia (HCC) [J96.21, J96.22]     Pt feels patient was seen at bedside.   She mentions she is feeling more tachycardic when it was seen to go to the restroom.  Denies any fever or chills, shortness of breath, chest pain or palpitation.  She is in 5 L oxygen, eating and drinking well.  ROS: denies fever, chills, cp, sob, n/v, HA unless stated above.      sacubitril-valsartan  1 tablet Oral BID    [Held by provider] budesonide  0.5 mg Nebulization BID RT    And    [Held by provider] arformoterol tartrate  15 mcg Nebulization BID RT    predniSONE  40 mg Oral Daily    metoprolol succinate  12.5 mg Oral BID    levalbuterol  0.63 mg Nebulization Q6H    sodium chloride flush  5-40 mL IntraVENous 2 times per day    levothyroxine  75 mcg Oral Daily    pantoprazole  40 mg Oral QAM    rosuvastatin  10 mg Oral Nightly    warfarin  2.5 mg Oral QPM    digoxin  62.5 mcg Oral Daily    bumetanide  0.5 mg Oral Daily     sodium chloride flush, 5-40 mL, PRN  sodium chloride, , PRN  acetaminophen, 650 mg, Q4H PRN  ondansetron, 4 mg, Q8H PRN   Or  ondansetron, 4 mg, Q6H PRN  neomycin-bacitracin-polymyxin, 1 each, BID PRN  fluticasone, 1 spray, BID PRN         Objective:    /74   Pulse 60   Temp 97.8 °F (36.6 °C) (Oral)   Resp 18   Ht 1.727 m (5' 8\")   Wt 68.6 kg (151 lb 4.8 oz)   SpO2 99%   BMI 23.01 kg/m²     General Appearance: alert and oriented to person, place and time and in no acute  patient does not want to do right now and the appointment was canceled.   He is taking her home Trelegy inhaler    2. A flutter with RVR: Received IV metoprolol,  currently on metoprolol, Cardizem CD and digoxin and warfarin.  Cardiology on board.  Status post 1 dose amiodarone . FSH within normal range    09/02/24: Patient mentions she feels more short of breath and tachycardia when she go to the restroom.  Wants to talk to cardiologist before discharged.     NOTE: This report was transcribed using voice recognition software. Every effort was made to ensure accuracy; however, inadvertent computerized transcription errors may be present.  Electronically signed by FAMILIA CORREA MD on 9/2/2024 at 2:50 PM

## 2024-09-03 LAB
EKG ATRIAL RATE: 267 BPM
EKG Q-T INTERVAL: 288 MS
EKG QRS DURATION: 78 MS
EKG QTC CALCULATION (BAZETT): 375 MS
EKG R AXIS: 35 DEGREES
EKG T AXIS: -11 DEGREES
EKG VENTRICULAR RATE: 102 BPM

## 2024-09-03 NOTE — TELEPHONE ENCOUNTER
Patient requesting a refill of the following:  warfarin (COUMADIN) 2.5 MG tablet    And also her vitamin D (CHOLECALCIFEROL) 50 MCG (2000 UT) TABS tablet . Please send into [facility] in [place] as soon as possible so she can . Thank you.

## 2024-09-04 ENCOUNTER — COMMUNITY CARE MANAGEMENT (OUTPATIENT)
Facility: CLINIC | Age: 84
End: 2024-09-04

## 2024-09-04 LAB
MICROORGANISM SPEC CULT: ABNORMAL
MICROORGANISM/AGENT SPEC: ABNORMAL
SPECIMEN DESCRIPTION: ABNORMAL

## 2024-09-04 RX ORDER — CHOLECALCIFEROL (VITAMIN D3) 50 MCG
2000 TABLET ORAL DAILY
Qty: 90 TABLET | Refills: 0 | Status: SHIPPED | OUTPATIENT
Start: 2024-09-04

## 2024-09-04 RX ORDER — WARFARIN SODIUM 2.5 MG/1
2.5 TABLET ORAL NIGHTLY
Qty: 30 TABLET | Refills: 0 | Status: SHIPPED | OUTPATIENT
Start: 2024-09-04

## 2024-09-04 NOTE — PROGRESS NOTES
TCM Care Coordination Summary  09/04/24 Patient discharged from the hospital 09/02/24. Patient stated that she is feeling better. Patient stated that she had an appt with pulmonologist today. Patient stated that she has cardioversion scheduled for next week with cardiology, 09/12/24. Patient stated that they have no immediate health concerns. Patient stated that they have all of their medications and understands how to take them. Patient stated that she did not read her discharge instructions and declined speaking with TCM RN today regarding discharge. CC explained that LCM RN will be reaching out to patient in a few weeks to check in and make sure that patient is still doing well. Provided patient with info on the 24/7 nurse's line. Patient stated that she appreciated the outreach. CC advised patient to reach out if they have any questions or concerns. Patient stated that they understood.-ORIN PRINCE

## 2024-09-05 ENCOUNTER — TELEPHONE (OUTPATIENT)
Dept: PRIMARY CARE CLINIC | Age: 84
End: 2024-09-05

## 2024-09-05 NOTE — TELEPHONE ENCOUNTER
Called patient to make an appointment with Dr Jatin Narvaez  as a follow up from hospital. Coming in Monday at 900 am to see  and have blood work done.

## 2024-09-09 ENCOUNTER — APPOINTMENT (OUTPATIENT)
Dept: GENERAL RADIOLOGY | Age: 84
DRG: 308 | End: 2024-09-09
Payer: MEDICARE

## 2024-09-09 ENCOUNTER — HOSPITAL ENCOUNTER (INPATIENT)
Age: 84
LOS: 1 days | Discharge: HOME | DRG: 308 | End: 2024-09-10
Attending: EMERGENCY MEDICINE | Admitting: INTERNAL MEDICINE
Payer: MEDICARE

## 2024-09-09 ENCOUNTER — OFFICE VISIT (OUTPATIENT)
Dept: PRIMARY CARE CLINIC | Age: 84
End: 2024-09-09
Payer: MEDICARE

## 2024-09-09 VITALS
DIASTOLIC BLOOD PRESSURE: 80 MMHG | WEIGHT: 161 LBS | HEIGHT: 68 IN | RESPIRATION RATE: 17 BRPM | OXYGEN SATURATION: 97 % | BODY MASS INDEX: 24.4 KG/M2 | HEART RATE: 74 BPM | SYSTOLIC BLOOD PRESSURE: 120 MMHG | TEMPERATURE: 97.6 F

## 2024-09-09 DIAGNOSIS — E03.9 ACQUIRED HYPOTHYROIDISM: ICD-10-CM

## 2024-09-09 DIAGNOSIS — I48.0 PAROXYSMAL ATRIAL FIBRILLATION (HCC): Primary | ICD-10-CM

## 2024-09-09 DIAGNOSIS — I48.0 PAF (PAROXYSMAL ATRIAL FIBRILLATION) (HCC): ICD-10-CM

## 2024-09-09 DIAGNOSIS — I48.92 ATRIAL FLUTTER WITH RAPID VENTRICULAR RESPONSE (HCC): ICD-10-CM

## 2024-09-09 DIAGNOSIS — I10 ESSENTIAL HYPERTENSION: ICD-10-CM

## 2024-09-09 DIAGNOSIS — C34.31 MALIGNANT NEOPLASM OF LOWER LOBE OF RIGHT LUNG (HCC): ICD-10-CM

## 2024-09-09 DIAGNOSIS — J44.9 COPD, VERY SEVERE (HCC): Primary | ICD-10-CM

## 2024-09-09 PROBLEM — I48.91 A-FIB (HCC): Status: ACTIVE | Noted: 2024-09-09

## 2024-09-09 LAB
ALBUMIN SERPL-MCNC: 4 G/DL (ref 3.5–5.2)
ALP SERPL-CCNC: 57 U/L (ref 35–104)
ALT SERPL-CCNC: 18 U/L (ref 0–32)
ANION GAP SERPL CALCULATED.3IONS-SCNC: 8 MMOL/L (ref 7–16)
AST SERPL-CCNC: 25 U/L (ref 0–31)
BASOPHILS # BLD: 0 K/UL (ref 0–0.2)
BASOPHILS NFR BLD: 0 % (ref 0–2)
BILIRUB SERPL-MCNC: 0.4 MG/DL (ref 0–1.2)
BUN SERPL-MCNC: 22 MG/DL (ref 6–23)
CALCIUM SERPL-MCNC: 9.4 MG/DL (ref 8.6–10.2)
CHLORIDE SERPL-SCNC: 101 MMOL/L (ref 98–107)
CO2 SERPL-SCNC: 32 MMOL/L (ref 22–29)
CREAT SERPL-MCNC: 1 MG/DL (ref 0.5–1)
EOSINOPHIL # BLD: 0.06 K/UL (ref 0.05–0.5)
EOSINOPHILS RELATIVE PERCENT: 1 % (ref 0–6)
ERYTHROCYTE [DISTWIDTH] IN BLOOD BY AUTOMATED COUNT: 14.8 % (ref 11.5–15)
GFR, ESTIMATED: 56 ML/MIN/1.73M2
GLUCOSE SERPL-MCNC: 90 MG/DL (ref 74–99)
HCT VFR BLD AUTO: 34.1 % (ref 34–48)
HGB BLD-MCNC: 10.5 G/DL (ref 11.5–15.5)
IMM GRANULOCYTES # BLD AUTO: <0.03 K/UL (ref 0–0.58)
IMM GRANULOCYTES NFR BLD: 1 % (ref 0–5)
INR PPP: 1.4
LYMPHOCYTES NFR BLD: 1.54 K/UL (ref 1.5–4)
LYMPHOCYTES RELATIVE PERCENT: 35 % (ref 20–42)
MAGNESIUM SERPL-MCNC: 1.7 MG/DL (ref 1.6–2.6)
MCH RBC QN AUTO: 29.6 PG (ref 26–35)
MCHC RBC AUTO-ENTMCNC: 30.8 G/DL (ref 32–34.5)
MCV RBC AUTO: 96.1 FL (ref 80–99.9)
MONOCYTES NFR BLD: 0.25 K/UL (ref 0.1–0.95)
MONOCYTES NFR BLD: 6 % (ref 2–12)
NEUTROPHILS NFR BLD: 57 % (ref 43–80)
NEUTS SEG NFR BLD: 2.5 K/UL (ref 1.8–7.3)
PLATELET # BLD AUTO: 119 K/UL (ref 130–450)
PMV BLD AUTO: 11.2 FL (ref 7–12)
POTASSIUM SERPL-SCNC: 4.2 MMOL/L (ref 3.5–5)
PROT SERPL-MCNC: 6.2 G/DL (ref 6.4–8.3)
PROTHROMBIN TIME: 15.3 SEC (ref 9.3–12.4)
RBC # BLD AUTO: 3.55 M/UL (ref 3.5–5.5)
SODIUM SERPL-SCNC: 141 MMOL/L (ref 132–146)
TROPONIN I SERPL HS-MCNC: 21 NG/L (ref 0–9)
TROPONIN I SERPL HS-MCNC: 23 NG/L (ref 0–9)
WBC OTHER # BLD: 4.4 K/UL (ref 4.5–11.5)

## 2024-09-09 PROCEDURE — 71045 X-RAY EXAM CHEST 1 VIEW: CPT

## 2024-09-09 PROCEDURE — 6360000002 HC RX W HCPCS: Performed by: INTERNAL MEDICINE

## 2024-09-09 PROCEDURE — 84484 ASSAY OF TROPONIN QUANT: CPT

## 2024-09-09 PROCEDURE — 3023F SPIROM DOC REV: CPT | Performed by: INTERNAL MEDICINE

## 2024-09-09 PROCEDURE — 93005 ELECTROCARDIOGRAM TRACING: CPT | Performed by: EMERGENCY MEDICINE

## 2024-09-09 PROCEDURE — 83735 ASSAY OF MAGNESIUM: CPT

## 2024-09-09 PROCEDURE — 85025 COMPLETE CBC W/AUTO DIFF WBC: CPT

## 2024-09-09 PROCEDURE — G8420 CALC BMI NORM PARAMETERS: HCPCS | Performed by: INTERNAL MEDICINE

## 2024-09-09 PROCEDURE — 85610 PROTHROMBIN TIME: CPT

## 2024-09-09 PROCEDURE — 2500000003 HC RX 250 WO HCPCS: Performed by: EMERGENCY MEDICINE

## 2024-09-09 PROCEDURE — 1111F DSCHRG MED/CURRENT MED MERGE: CPT | Performed by: INTERNAL MEDICINE

## 2024-09-09 PROCEDURE — 1036F TOBACCO NON-USER: CPT | Performed by: INTERNAL MEDICINE

## 2024-09-09 PROCEDURE — 99213 OFFICE O/P EST LOW 20 MIN: CPT | Performed by: INTERNAL MEDICINE

## 2024-09-09 PROCEDURE — 2580000003 HC RX 258: Performed by: EMERGENCY MEDICINE

## 2024-09-09 PROCEDURE — 2060000000 HC ICU INTERMEDIATE R&B

## 2024-09-09 PROCEDURE — 1090F PRES/ABSN URINE INCON ASSESS: CPT | Performed by: INTERNAL MEDICINE

## 2024-09-09 PROCEDURE — 93005 ELECTROCARDIOGRAM TRACING: CPT | Performed by: PHYSICIAN ASSISTANT

## 2024-09-09 PROCEDURE — 1123F ACP DISCUSS/DSCN MKR DOCD: CPT | Performed by: INTERNAL MEDICINE

## 2024-09-09 PROCEDURE — G8427 DOCREV CUR MEDS BY ELIG CLIN: HCPCS | Performed by: INTERNAL MEDICINE

## 2024-09-09 PROCEDURE — 99285 EMERGENCY DEPT VISIT HI MDM: CPT

## 2024-09-09 PROCEDURE — 80053 COMPREHEN METABOLIC PANEL: CPT

## 2024-09-09 PROCEDURE — 99222 1ST HOSP IP/OBS MODERATE 55: CPT | Performed by: INTERNAL MEDICINE

## 2024-09-09 PROCEDURE — 6370000000 HC RX 637 (ALT 250 FOR IP): Performed by: INTERNAL MEDICINE

## 2024-09-09 PROCEDURE — 96365 THER/PROPH/DIAG IV INF INIT: CPT

## 2024-09-09 PROCEDURE — 96366 THER/PROPH/DIAG IV INF ADDON: CPT

## 2024-09-09 RX ORDER — BUDESONIDE 0.5 MG/2ML
1 INHALANT ORAL
Status: DISCONTINUED | OUTPATIENT
Start: 2024-09-09 | End: 2024-09-10 | Stop reason: HOSPADM

## 2024-09-09 RX ORDER — DIGOXIN 125 MCG
62.5 TABLET ORAL
Status: DISCONTINUED | OUTPATIENT
Start: 2024-09-11 | End: 2024-09-10 | Stop reason: HOSPADM

## 2024-09-09 RX ORDER — METOPROLOL SUCCINATE 25 MG/1
12.5 TABLET, EXTENDED RELEASE ORAL 2 TIMES DAILY
Status: DISCONTINUED | OUTPATIENT
Start: 2024-09-10 | End: 2024-09-10 | Stop reason: HOSPADM

## 2024-09-09 RX ORDER — ROSUVASTATIN CALCIUM 10 MG/1
10 TABLET, COATED ORAL NIGHTLY
Status: DISCONTINUED | OUTPATIENT
Start: 2024-09-09 | End: 2024-09-10 | Stop reason: HOSPADM

## 2024-09-09 RX ORDER — ARFORMOTEROL TARTRATE 15 UG/2ML
15 SOLUTION RESPIRATORY (INHALATION)
Status: DISCONTINUED | OUTPATIENT
Start: 2024-09-09 | End: 2024-09-10 | Stop reason: HOSPADM

## 2024-09-09 RX ORDER — LEVALBUTEROL INHALATION SOLUTION 0.63 MG/3ML
0.63 SOLUTION RESPIRATORY (INHALATION) EVERY 6 HOURS PRN
Status: DISCONTINUED | OUTPATIENT
Start: 2024-09-09 | End: 2024-09-10 | Stop reason: HOSPADM

## 2024-09-09 RX ORDER — PANTOPRAZOLE SODIUM 40 MG/1
40 TABLET, DELAYED RELEASE ORAL EVERY MORNING
Status: DISCONTINUED | OUTPATIENT
Start: 2024-09-10 | End: 2024-09-10 | Stop reason: HOSPADM

## 2024-09-09 RX ORDER — AMIODARONE HYDROCHLORIDE 200 MG/1
200 TABLET ORAL DAILY
Status: DISCONTINUED | OUTPATIENT
Start: 2024-09-09 | End: 2024-09-10 | Stop reason: HOSPADM

## 2024-09-09 RX ORDER — BUMETANIDE 0.25 MG/ML
0.5 INJECTION INTRAMUSCULAR; INTRAVENOUS ONCE
Status: COMPLETED | OUTPATIENT
Start: 2024-09-09 | End: 2024-09-09

## 2024-09-09 RX ORDER — WARFARIN SODIUM 3 MG/1
3 TABLET ORAL NIGHTLY
Status: DISCONTINUED | OUTPATIENT
Start: 2024-09-09 | End: 2024-09-10 | Stop reason: HOSPADM

## 2024-09-09 RX ORDER — WARFARIN SODIUM 3 MG/1
3 TABLET ORAL DAILY
Qty: 30 TABLET | Refills: 1 | Status: SHIPPED | OUTPATIENT
Start: 2024-09-09

## 2024-09-09 RX ORDER — LEVOTHYROXINE SODIUM 75 UG/1
75 TABLET ORAL DAILY
Status: DISCONTINUED | OUTPATIENT
Start: 2024-09-09 | End: 2024-09-10 | Stop reason: HOSPADM

## 2024-09-09 RX ORDER — DILTIAZEM HYDROCHLORIDE 5 MG/ML
10 INJECTION INTRAVENOUS ONCE
Status: COMPLETED | OUTPATIENT
Start: 2024-09-09 | End: 2024-09-09

## 2024-09-09 RX ADMIN — AMIODARONE HYDROCHLORIDE 200 MG: 200 TABLET ORAL at 21:43

## 2024-09-09 RX ADMIN — ROSUVASTATIN CALCIUM 10 MG: 10 TABLET, FILM COATED ORAL at 21:43

## 2024-09-09 RX ADMIN — DILTIAZEM HYDROCHLORIDE 10 MG: 5 INJECTION, SOLUTION INTRAVENOUS at 15:24

## 2024-09-09 RX ADMIN — WARFARIN SODIUM 3 MG: 3 TABLET ORAL at 21:43

## 2024-09-09 RX ADMIN — BUMETANIDE 0.5 MG: 0.25 INJECTION INTRAMUSCULAR; INTRAVENOUS at 22:39

## 2024-09-09 RX ADMIN — SODIUM CHLORIDE 5 MG/HR: 900 INJECTION, SOLUTION INTRAVENOUS at 15:30

## 2024-09-09 RX ADMIN — SACUBITRIL AND VALSARTAN 0.5 TABLET: 24; 26 TABLET, FILM COATED ORAL at 21:43

## 2024-09-09 ASSESSMENT — PAIN - FUNCTIONAL ASSESSMENT: PAIN_FUNCTIONAL_ASSESSMENT: NONE - DENIES PAIN

## 2024-09-09 ASSESSMENT — ENCOUNTER SYMPTOMS
ABDOMINAL PAIN: 0
SHORTNESS OF BREATH: 0
VOMITING: 0
DIARRHEA: 0
COUGH: 0
NAUSEA: 0

## 2024-09-10 ENCOUNTER — ANESTHESIA EVENT (OUTPATIENT)
Dept: INPATIENT UNIT | Age: 84
DRG: 308 | End: 2024-09-10
Payer: MEDICARE

## 2024-09-10 ENCOUNTER — APPOINTMENT (OUTPATIENT)
Dept: INPATIENT UNIT | Age: 84
DRG: 308 | End: 2024-09-10
Payer: MEDICARE

## 2024-09-10 ENCOUNTER — ANESTHESIA (OUTPATIENT)
Dept: INPATIENT UNIT | Age: 84
DRG: 308 | End: 2024-09-10
Payer: MEDICARE

## 2024-09-10 VITALS
OXYGEN SATURATION: 96 % | WEIGHT: 161 LBS | HEIGHT: 68 IN | TEMPERATURE: 97.1 F | DIASTOLIC BLOOD PRESSURE: 63 MMHG | RESPIRATION RATE: 18 BRPM | HEART RATE: 74 BPM | SYSTOLIC BLOOD PRESSURE: 115 MMHG | BODY MASS INDEX: 24.4 KG/M2

## 2024-09-10 LAB
ALBUMIN SERPL-MCNC: 3.4 G/DL (ref 3.5–5.2)
ALP SERPL-CCNC: 53 U/L (ref 35–104)
ALT SERPL-CCNC: 14 U/L (ref 0–32)
ANION GAP SERPL CALCULATED.3IONS-SCNC: 6 MMOL/L (ref 7–16)
AST SERPL-CCNC: 18 U/L (ref 0–31)
BASOPHILS # BLD: 0.01 K/UL (ref 0–0.2)
BASOPHILS NFR BLD: 0 % (ref 0–2)
BILIRUB SERPL-MCNC: 0.5 MG/DL (ref 0–1.2)
BUN SERPL-MCNC: 19 MG/DL (ref 6–23)
CALCIUM SERPL-MCNC: 8.5 MG/DL (ref 8.6–10.2)
CHLORIDE SERPL-SCNC: 101 MMOL/L (ref 98–107)
CO2 SERPL-SCNC: 34 MMOL/L (ref 22–29)
CREAT SERPL-MCNC: 1 MG/DL (ref 0.5–1)
DIGOXIN SERPL-MCNC: 0.4 NG/ML (ref 0.8–2)
EOSINOPHIL # BLD: 0.07 K/UL (ref 0.05–0.5)
EOSINOPHILS RELATIVE PERCENT: 2 % (ref 0–6)
ERYTHROCYTE [DISTWIDTH] IN BLOOD BY AUTOMATED COUNT: 15 % (ref 11.5–15)
GFR, ESTIMATED: 58 ML/MIN/1.73M2
GLUCOSE SERPL-MCNC: 106 MG/DL (ref 74–99)
HCT VFR BLD AUTO: 32 % (ref 34–48)
HGB BLD-MCNC: 9.9 G/DL (ref 11.5–15.5)
IMM GRANULOCYTES # BLD AUTO: <0.03 K/UL (ref 0–0.58)
IMM GRANULOCYTES NFR BLD: 0 % (ref 0–5)
INR PPP: 1.5
LYMPHOCYTES NFR BLD: 1.45 K/UL (ref 1.5–4)
LYMPHOCYTES RELATIVE PERCENT: 37 % (ref 20–42)
MCH RBC QN AUTO: 29.8 PG (ref 26–35)
MCHC RBC AUTO-ENTMCNC: 30.9 G/DL (ref 32–34.5)
MCV RBC AUTO: 96.4 FL (ref 80–99.9)
MONOCYTES NFR BLD: 0.24 K/UL (ref 0.1–0.95)
MONOCYTES NFR BLD: 6 % (ref 2–12)
NEUTROPHILS NFR BLD: 55 % (ref 43–80)
NEUTS SEG NFR BLD: 2.19 K/UL (ref 1.8–7.3)
PLATELET # BLD AUTO: 117 K/UL (ref 130–450)
PMV BLD AUTO: 10 FL (ref 7–12)
POTASSIUM SERPL-SCNC: 3.6 MMOL/L (ref 3.5–5)
PROT SERPL-MCNC: 5.1 G/DL (ref 6.4–8.3)
PROTHROMBIN TIME: 15.3 SEC (ref 9.3–12.4)
RBC # BLD AUTO: 3.32 M/UL (ref 3.5–5.5)
SODIUM SERPL-SCNC: 141 MMOL/L (ref 132–146)
TROPONIN I SERPL HS-MCNC: 25 NG/L (ref 0–9)
WBC OTHER # BLD: 4 K/UL (ref 4.5–11.5)

## 2024-09-10 PROCEDURE — 80162 ASSAY OF DIGOXIN TOTAL: CPT

## 2024-09-10 PROCEDURE — 85610 PROTHROMBIN TIME: CPT

## 2024-09-10 PROCEDURE — 7100000011 HC PHASE II RECOVERY - ADDTL 15 MIN: Performed by: ANESTHESIOLOGY

## 2024-09-10 PROCEDURE — 6360000002 HC RX W HCPCS

## 2024-09-10 PROCEDURE — 6370000000 HC RX 637 (ALT 250 FOR IP): Performed by: INTERNAL MEDICINE

## 2024-09-10 PROCEDURE — 36415 COLL VENOUS BLD VENIPUNCTURE: CPT

## 2024-09-10 PROCEDURE — 94640 AIRWAY INHALATION TREATMENT: CPT

## 2024-09-10 PROCEDURE — 99239 HOSP IP/OBS DSCHRG MGMT >30: CPT | Performed by: INTERNAL MEDICINE

## 2024-09-10 PROCEDURE — 6360000002 HC RX W HCPCS: Performed by: INTERNAL MEDICINE

## 2024-09-10 PROCEDURE — 2580000003 HC RX 258

## 2024-09-10 PROCEDURE — 84484 ASSAY OF TROPONIN QUANT: CPT

## 2024-09-10 PROCEDURE — 3700000000 HC ANESTHESIA ATTENDED CARE: Performed by: ANESTHESIOLOGY

## 2024-09-10 PROCEDURE — 5A2204Z RESTORATION OF CARDIAC RHYTHM, SINGLE: ICD-10-PCS | Performed by: INTERNAL MEDICINE

## 2024-09-10 PROCEDURE — 92960 CARDIOVERSION ELECTRIC EXT: CPT | Performed by: ANESTHESIOLOGY

## 2024-09-10 PROCEDURE — 85025 COMPLETE CBC W/AUTO DIFF WBC: CPT

## 2024-09-10 PROCEDURE — 7100000010 HC PHASE II RECOVERY - FIRST 15 MIN: Performed by: ANESTHESIOLOGY

## 2024-09-10 PROCEDURE — 80053 COMPREHEN METABOLIC PANEL: CPT

## 2024-09-10 RX ORDER — SODIUM CHLORIDE 9 MG/ML
INJECTION, SOLUTION INTRAVENOUS PRN
OUTPATIENT
Start: 2024-09-10

## 2024-09-10 RX ORDER — ENOXAPARIN SODIUM 100 MG/ML
1 INJECTION SUBCUTANEOUS 2 TIMES DAILY
Qty: 8 ML | Refills: 0 | Status: SHIPPED | OUTPATIENT
Start: 2024-09-10 | End: 2024-09-15

## 2024-09-10 RX ORDER — FENTANYL CITRATE 50 UG/ML
25 INJECTION, SOLUTION INTRAMUSCULAR; INTRAVENOUS EVERY 5 MIN PRN
OUTPATIENT
Start: 2024-09-10

## 2024-09-10 RX ORDER — SODIUM CHLORIDE 9 MG/ML
INJECTION, SOLUTION INTRAVENOUS CONTINUOUS PRN
Status: DISCONTINUED | OUTPATIENT
Start: 2024-09-10 | End: 2024-09-10 | Stop reason: SDUPTHER

## 2024-09-10 RX ORDER — FLUTICASONE PROPIONATE 50 MCG
1 SPRAY, SUSPENSION (ML) NASAL 2 TIMES DAILY PRN
Status: DISCONTINUED | OUTPATIENT
Start: 2024-09-10 | End: 2024-09-10 | Stop reason: HOSPADM

## 2024-09-10 RX ORDER — PROPOFOL 10 MG/ML
INJECTION, EMULSION INTRAVENOUS PRN
Status: DISCONTINUED | OUTPATIENT
Start: 2024-09-10 | End: 2024-09-10 | Stop reason: SDUPTHER

## 2024-09-10 RX ORDER — SODIUM CHLORIDE 0.9 % (FLUSH) 0.9 %
5-40 SYRINGE (ML) INJECTION EVERY 12 HOURS SCHEDULED
OUTPATIENT
Start: 2024-09-10

## 2024-09-10 RX ORDER — SODIUM CHLORIDE 0.9 % (FLUSH) 0.9 %
5-40 SYRINGE (ML) INJECTION PRN
OUTPATIENT
Start: 2024-09-10

## 2024-09-10 RX ORDER — DIGOXIN 0.06 MG/1
62.5 TABLET ORAL
Qty: 30 TABLET | Refills: 3 | Status: SHIPPED | OUTPATIENT
Start: 2024-09-11

## 2024-09-10 RX ORDER — METOPROLOL SUCCINATE 25 MG/1
12.5 TABLET, EXTENDED RELEASE ORAL 2 TIMES DAILY
Qty: 30 TABLET | Refills: 3 | Status: SHIPPED | OUTPATIENT
Start: 2024-09-10

## 2024-09-10 RX ORDER — BUMETANIDE 1 MG/1
0.5 TABLET ORAL DAILY
Status: DISCONTINUED | OUTPATIENT
Start: 2024-09-10 | End: 2024-09-10 | Stop reason: HOSPADM

## 2024-09-10 RX ORDER — ENOXAPARIN SODIUM 100 MG/ML
1 INJECTION SUBCUTANEOUS 2 TIMES DAILY
Status: DISCONTINUED | OUTPATIENT
Start: 2024-09-10 | End: 2024-09-10 | Stop reason: HOSPADM

## 2024-09-10 RX ORDER — NALOXONE HYDROCHLORIDE 0.4 MG/ML
INJECTION, SOLUTION INTRAMUSCULAR; INTRAVENOUS; SUBCUTANEOUS PRN
OUTPATIENT
Start: 2024-09-10

## 2024-09-10 RX ORDER — ONDANSETRON 2 MG/ML
4 INJECTION INTRAMUSCULAR; INTRAVENOUS
OUTPATIENT
Start: 2024-09-10 | End: 2024-09-11

## 2024-09-10 RX ADMIN — SODIUM CHLORIDE: 9 INJECTION, SOLUTION INTRAVENOUS at 12:07

## 2024-09-10 RX ADMIN — PROPOFOL 70 MG: 10 INJECTION, EMULSION INTRAVENOUS at 12:18

## 2024-09-10 RX ADMIN — IPRATROPIUM BROMIDE 0.5 MG: 0.5 SOLUTION RESPIRATORY (INHALATION) at 08:46

## 2024-09-10 RX ADMIN — ENOXAPARIN SODIUM 70 MG: 100 INJECTION SUBCUTANEOUS at 11:27

## 2024-09-10 RX ADMIN — AMIODARONE HYDROCHLORIDE 200 MG: 200 TABLET ORAL at 08:53

## 2024-09-10 RX ADMIN — SACUBITRIL AND VALSARTAN 0.5 TABLET: 24; 26 TABLET, FILM COATED ORAL at 08:53

## 2024-09-10 RX ADMIN — BUMETANIDE 0.5 MG: 1 TABLET ORAL at 13:07

## 2024-09-10 RX ADMIN — LEVOTHYROXINE SODIUM 75 MCG: 75 TABLET ORAL at 07:03

## 2024-09-10 RX ADMIN — METOPROLOL SUCCINATE 12.5 MG: 25 TABLET, EXTENDED RELEASE ORAL at 08:54

## 2024-09-10 RX ADMIN — PANTOPRAZOLE SODIUM 40 MG: 40 TABLET, DELAYED RELEASE ORAL at 08:54

## 2024-09-10 RX ADMIN — METOPROLOL SUCCINATE 12.5 MG: 25 TABLET, EXTENDED RELEASE ORAL at 16:12

## 2024-09-10 ASSESSMENT — LIFESTYLE VARIABLES: SMOKING_STATUS: 0

## 2024-09-10 ASSESSMENT — ENCOUNTER SYMPTOMS: SHORTNESS OF BREATH: 1

## 2024-09-11 ENCOUNTER — OFFICE VISIT (OUTPATIENT)
Dept: PRIMARY CARE CLINIC | Age: 84
End: 2024-09-11
Payer: MEDICARE

## 2024-09-11 VITALS
HEIGHT: 68 IN | OXYGEN SATURATION: 98 % | BODY MASS INDEX: 24.4 KG/M2 | WEIGHT: 161 LBS | HEART RATE: 74 BPM | DIASTOLIC BLOOD PRESSURE: 74 MMHG | RESPIRATION RATE: 16 BRPM | SYSTOLIC BLOOD PRESSURE: 123 MMHG | TEMPERATURE: 98.6 F

## 2024-09-11 DIAGNOSIS — I48.0 PAF (PAROXYSMAL ATRIAL FIBRILLATION) (HCC): Primary | ICD-10-CM

## 2024-09-11 LAB
EKG ATRIAL RATE: 260 BPM
EKG ATRIAL RATE: 262 BPM
EKG P AXIS: -115 DEGREES
EKG P AXIS: -117 DEGREES
EKG Q-T INTERVAL: 278 MS
EKG Q-T INTERVAL: 368 MS
EKG QRS DURATION: 64 MS
EKG QRS DURATION: 78 MS
EKG QTC CALCULATION (BAZETT): 382 MS
EKG QTC CALCULATION (BAZETT): 410 MS
EKG R AXIS: 33 DEGREES
EKG R AXIS: 47 DEGREES
EKG T AXIS: 175 DEGREES
EKG T AXIS: 35 DEGREES
EKG VENTRICULAR RATE: 131 BPM
EKG VENTRICULAR RATE: 65 BPM
INTERNATIONAL NORMALIZATION RATIO, POC: 1.7

## 2024-09-11 PROCEDURE — 99213 OFFICE O/P EST LOW 20 MIN: CPT | Performed by: INTERNAL MEDICINE

## 2024-09-11 PROCEDURE — 1111F DSCHRG MED/CURRENT MED MERGE: CPT | Performed by: INTERNAL MEDICINE

## 2024-09-11 PROCEDURE — G8420 CALC BMI NORM PARAMETERS: HCPCS | Performed by: INTERNAL MEDICINE

## 2024-09-11 PROCEDURE — 1036F TOBACCO NON-USER: CPT | Performed by: INTERNAL MEDICINE

## 2024-09-11 PROCEDURE — G8427 DOCREV CUR MEDS BY ELIG CLIN: HCPCS | Performed by: INTERNAL MEDICINE

## 2024-09-11 PROCEDURE — 1090F PRES/ABSN URINE INCON ASSESS: CPT | Performed by: INTERNAL MEDICINE

## 2024-09-11 PROCEDURE — 85610 PROTHROMBIN TIME: CPT | Performed by: INTERNAL MEDICINE

## 2024-09-11 PROCEDURE — 1123F ACP DISCUSS/DSCN MKR DOCD: CPT | Performed by: INTERNAL MEDICINE

## 2024-09-11 RX ORDER — WARFARIN SODIUM 4 MG/1
4 TABLET ORAL DAILY
Qty: 30 TABLET | Refills: 1 | Status: SHIPPED | OUTPATIENT
Start: 2024-09-11

## 2024-09-11 ASSESSMENT — ENCOUNTER SYMPTOMS
DIARRHEA: 0
VOMITING: 0
COUGH: 0
SHORTNESS OF BREATH: 0
NAUSEA: 0
ABDOMINAL PAIN: 0

## 2024-09-11 NOTE — PROGRESS NOTES
RESPONSE TEXT:    Acute Respiratory Failure ruled out after study and Chronic Respiratory   Failure confirmed.    Query created by: Tiesha England on 9/9/2024 3:16 PM      Electronically signed by:  Sherman Pagan MD 9/11/2024 6:03 PM

## 2024-09-12 ENCOUNTER — COMMUNITY CARE MANAGEMENT (OUTPATIENT)
Facility: CLINIC | Age: 84
End: 2024-09-12

## 2024-09-12 ENCOUNTER — HOSPITAL ENCOUNTER (OUTPATIENT)
Age: 84
Discharge: HOME | End: 2024-09-12
Attending: INTERNAL MEDICINE

## 2024-09-13 ENCOUNTER — OFFICE VISIT (OUTPATIENT)
Dept: PRIMARY CARE CLINIC | Age: 84
End: 2024-09-13

## 2024-09-13 VITALS
HEART RATE: 77 BPM | OXYGEN SATURATION: 95 % | SYSTOLIC BLOOD PRESSURE: 138 MMHG | BODY MASS INDEX: 23.64 KG/M2 | RESPIRATION RATE: 20 BRPM | DIASTOLIC BLOOD PRESSURE: 64 MMHG | WEIGHT: 156 LBS | HEIGHT: 68 IN | TEMPERATURE: 97.2 F

## 2024-09-13 DIAGNOSIS — I48.0 PAROXYSMAL ATRIAL FIBRILLATION (HCC): Primary | ICD-10-CM

## 2024-09-13 DIAGNOSIS — I10 ESSENTIAL HYPERTENSION: ICD-10-CM

## 2024-09-13 DIAGNOSIS — J44.9 COPD, VERY SEVERE (HCC): ICD-10-CM

## 2024-09-13 LAB
INTERNATIONAL NORMALIZATION RATIO, POC: 2.2
PROTHROMBIN TIME, POC: 0

## 2024-09-13 ASSESSMENT — ENCOUNTER SYMPTOMS
VOMITING: 0
NAUSEA: 0
SHORTNESS OF BREATH: 0
COUGH: 0
ABDOMINAL PAIN: 0
DIARRHEA: 0

## 2024-09-16 ENCOUNTER — HOSPITAL ENCOUNTER (INPATIENT)
Age: 84
LOS: 4 days | Discharge: HOME | DRG: 308 | End: 2024-09-21
Attending: STUDENT IN AN ORGANIZED HEALTH CARE EDUCATION/TRAINING PROGRAM | Admitting: INTERNAL MEDICINE
Payer: MEDICARE

## 2024-09-16 ENCOUNTER — OFFICE VISIT (OUTPATIENT)
Dept: PRIMARY CARE CLINIC | Age: 84
End: 2024-09-16
Payer: MEDICARE

## 2024-09-16 ENCOUNTER — APPOINTMENT (OUTPATIENT)
Dept: GENERAL RADIOLOGY | Age: 84
DRG: 308 | End: 2024-09-16
Payer: MEDICARE

## 2024-09-16 VITALS
DIASTOLIC BLOOD PRESSURE: 60 MMHG | BODY MASS INDEX: 23.34 KG/M2 | OXYGEN SATURATION: 96 % | HEIGHT: 68 IN | TEMPERATURE: 98.2 F | WEIGHT: 154 LBS | HEART RATE: 67 BPM | SYSTOLIC BLOOD PRESSURE: 122 MMHG

## 2024-09-16 DIAGNOSIS — N17.9 AKI (ACUTE KIDNEY INJURY) (HCC): ICD-10-CM

## 2024-09-16 DIAGNOSIS — R00.1 BRADYCARDIA: Primary | ICD-10-CM

## 2024-09-16 DIAGNOSIS — R06.02 SHORTNESS OF BREATH: ICD-10-CM

## 2024-09-16 DIAGNOSIS — I48.0 PAROXYSMAL ATRIAL FIBRILLATION (HCC): ICD-10-CM

## 2024-09-16 LAB
ALBUMIN SERPL-MCNC: 4.2 G/DL (ref 3.5–5.2)
ALP SERPL-CCNC: 65 U/L (ref 35–104)
ALT SERPL-CCNC: 22 U/L (ref 0–32)
ANION GAP SERPL CALCULATED.3IONS-SCNC: 9 MMOL/L (ref 7–16)
AST SERPL-CCNC: 31 U/L (ref 0–31)
BASOPHILS # BLD: 0.02 K/UL (ref 0–0.2)
BASOPHILS NFR BLD: 0 % (ref 0–2)
BILIRUB SERPL-MCNC: 0.5 MG/DL (ref 0–1.2)
BNP SERPL-MCNC: 589 PG/ML (ref 0–450)
BUN SERPL-MCNC: 24 MG/DL (ref 6–23)
CALCIUM SERPL-MCNC: 9.9 MG/DL (ref 8.6–10.2)
CHLORIDE SERPL-SCNC: 102 MMOL/L (ref 98–107)
CO2 SERPL-SCNC: 31 MMOL/L (ref 22–29)
CREAT SERPL-MCNC: 1.7 MG/DL (ref 0.5–1)
EOSINOPHIL # BLD: 0.09 K/UL (ref 0.05–0.5)
EOSINOPHILS RELATIVE PERCENT: 1 % (ref 0–6)
ERYTHROCYTE [DISTWIDTH] IN BLOOD BY AUTOMATED COUNT: 15.1 % (ref 11.5–15)
GFR, ESTIMATED: 29 ML/MIN/1.73M2
GLUCOSE SERPL-MCNC: 117 MG/DL (ref 74–99)
HCT VFR BLD AUTO: 35.9 % (ref 34–48)
HGB BLD-MCNC: 11.2 G/DL (ref 11.5–15.5)
IMM GRANULOCYTES # BLD AUTO: <0.03 K/UL (ref 0–0.58)
IMM GRANULOCYTES NFR BLD: 0 % (ref 0–5)
INFLUENZA A BY PCR: NOT DETECTED
INFLUENZA B BY PCR: NOT DETECTED
INR PPP: 4.6
INTERNATIONAL NORMALIZATION RATIO, POC: 4
LYMPHOCYTES NFR BLD: 1.88 K/UL (ref 1.5–4)
LYMPHOCYTES RELATIVE PERCENT: 27 % (ref 20–42)
MCH RBC QN AUTO: 30.2 PG (ref 26–35)
MCHC RBC AUTO-ENTMCNC: 31.2 G/DL (ref 32–34.5)
MCV RBC AUTO: 96.8 FL (ref 80–99.9)
MONOCYTES NFR BLD: 0.47 K/UL (ref 0.1–0.95)
MONOCYTES NFR BLD: 7 % (ref 2–12)
NEUTROPHILS NFR BLD: 64 % (ref 43–80)
NEUTS SEG NFR BLD: 4.49 K/UL (ref 1.8–7.3)
PLATELET, FLUORESCENCE: 132 K/UL (ref 130–450)
PMV BLD AUTO: 10.7 FL (ref 7–12)
POTASSIUM SERPL-SCNC: 4.8 MMOL/L (ref 3.5–5)
PROT SERPL-MCNC: 6.7 G/DL (ref 6.4–8.3)
PROTHROMBIN TIME, POC: 0
PROTHROMBIN TIME: 48.9 SEC (ref 9.3–12.4)
RBC # BLD AUTO: 3.71 M/UL (ref 3.5–5.5)
SARS-COV-2 RDRP RESP QL NAA+PROBE: NOT DETECTED
SODIUM SERPL-SCNC: 142 MMOL/L (ref 132–146)
SPECIMEN DESCRIPTION: NORMAL
TROPONIN I SERPL HS-MCNC: 26 NG/L (ref 0–9)
WBC OTHER # BLD: 7 K/UL (ref 4.5–11.5)

## 2024-09-16 PROCEDURE — 84484 ASSAY OF TROPONIN QUANT: CPT

## 2024-09-16 PROCEDURE — 6360000002 HC RX W HCPCS

## 2024-09-16 PROCEDURE — 85610 PROTHROMBIN TIME: CPT

## 2024-09-16 PROCEDURE — 1123F ACP DISCUSS/DSCN MKR DOCD: CPT | Performed by: INTERNAL MEDICINE

## 2024-09-16 PROCEDURE — 1111F DSCHRG MED/CURRENT MED MERGE: CPT | Performed by: INTERNAL MEDICINE

## 2024-09-16 PROCEDURE — 99285 EMERGENCY DEPT VISIT HI MDM: CPT

## 2024-09-16 PROCEDURE — G8420 CALC BMI NORM PARAMETERS: HCPCS | Performed by: INTERNAL MEDICINE

## 2024-09-16 PROCEDURE — 2580000003 HC RX 258

## 2024-09-16 PROCEDURE — 99223 1ST HOSP IP/OBS HIGH 75: CPT | Performed by: INTERNAL MEDICINE

## 2024-09-16 PROCEDURE — 1036F TOBACCO NON-USER: CPT | Performed by: INTERNAL MEDICINE

## 2024-09-16 PROCEDURE — 94664 DEMO&/EVAL PT USE INHALER: CPT

## 2024-09-16 PROCEDURE — 80053 COMPREHEN METABOLIC PANEL: CPT

## 2024-09-16 PROCEDURE — G8427 DOCREV CUR MEDS BY ELIG CLIN: HCPCS | Performed by: INTERNAL MEDICINE

## 2024-09-16 PROCEDURE — 71045 X-RAY EXAM CHEST 1 VIEW: CPT

## 2024-09-16 PROCEDURE — 83880 ASSAY OF NATRIURETIC PEPTIDE: CPT

## 2024-09-16 PROCEDURE — 93005 ELECTROCARDIOGRAM TRACING: CPT

## 2024-09-16 PROCEDURE — 85025 COMPLETE CBC W/AUTO DIFF WBC: CPT

## 2024-09-16 PROCEDURE — APPSS45 APP SPLIT SHARED TIME 31-45 MINUTES

## 2024-09-16 PROCEDURE — 99213 OFFICE O/P EST LOW 20 MIN: CPT | Performed by: INTERNAL MEDICINE

## 2024-09-16 PROCEDURE — 87502 INFLUENZA DNA AMP PROBE: CPT

## 2024-09-16 PROCEDURE — 1090F PRES/ABSN URINE INCON ASSESS: CPT | Performed by: INTERNAL MEDICINE

## 2024-09-16 PROCEDURE — 87635 SARS-COV-2 COVID-19 AMP PRB: CPT

## 2024-09-16 RX ORDER — SODIUM CHLORIDE 9 MG/ML
INJECTION, SOLUTION INTRAVENOUS ONCE
Status: DISCONTINUED | OUTPATIENT
Start: 2024-09-16 | End: 2024-09-16

## 2024-09-16 RX ORDER — WARFARIN SODIUM 3 MG/1
3 TABLET ORAL DAILY
Qty: 30 TABLET | Refills: 1 | Status: ON HOLD
Start: 2024-09-16 | End: 2024-09-21 | Stop reason: HOSPADM

## 2024-09-16 RX ORDER — IPRATROPIUM BROMIDE AND ALBUTEROL SULFATE 2.5; .5 MG/3ML; MG/3ML
1 SOLUTION RESPIRATORY (INHALATION) ONCE
Status: DISCONTINUED | OUTPATIENT
Start: 2024-09-16 | End: 2024-09-19

## 2024-09-16 RX ORDER — SODIUM CHLORIDE 9 MG/ML
INJECTION, SOLUTION INTRAVENOUS ONCE
Status: COMPLETED | OUTPATIENT
Start: 2024-09-16 | End: 2024-09-16

## 2024-09-16 RX ORDER — LEVALBUTEROL INHALATION SOLUTION 0.63 MG/3ML
0.63 SOLUTION RESPIRATORY (INHALATION) ONCE
Status: COMPLETED | OUTPATIENT
Start: 2024-09-16 | End: 2024-09-16

## 2024-09-16 RX ADMIN — SODIUM CHLORIDE: 9 INJECTION, SOLUTION INTRAVENOUS at 23:46

## 2024-09-16 RX ADMIN — LEVALBUTEROL 0.63 MG: 0.63 SOLUTION RESPIRATORY (INHALATION) at 22:33

## 2024-09-16 ASSESSMENT — ENCOUNTER SYMPTOMS
COUGH: 0
VOMITING: 0
ABDOMINAL PAIN: 0
DIARRHEA: 0
SHORTNESS OF BREATH: 0

## 2024-09-16 ASSESSMENT — PAIN - FUNCTIONAL ASSESSMENT: PAIN_FUNCTIONAL_ASSESSMENT: NONE - DENIES PAIN

## 2024-09-17 PROBLEM — R00.1 BRADYCARDIA: Status: ACTIVE | Noted: 2024-09-17

## 2024-09-17 LAB
ALBUMIN SERPL-MCNC: 3.6 G/DL (ref 3.5–5.2)
ALP SERPL-CCNC: 59 U/L (ref 35–104)
ALT SERPL-CCNC: 20 U/L (ref 0–32)
ANION GAP SERPL CALCULATED.3IONS-SCNC: 8 MMOL/L (ref 7–16)
AST SERPL-CCNC: 32 U/L (ref 0–31)
BILIRUB SERPL-MCNC: 0.3 MG/DL (ref 0–1.2)
BUN SERPL-MCNC: 27 MG/DL (ref 6–23)
CALCIUM SERPL-MCNC: 8.9 MG/DL (ref 8.6–10.2)
CHLORIDE SERPL-SCNC: 105 MMOL/L (ref 98–107)
CO2 SERPL-SCNC: 29 MMOL/L (ref 22–29)
CREAT SERPL-MCNC: 1.9 MG/DL (ref 0.5–1)
DIGOXIN SERPL-MCNC: 0.4 NG/ML (ref 0.8–2)
GFR, ESTIMATED: 26 ML/MIN/1.73M2
GLUCOSE SERPL-MCNC: 147 MG/DL (ref 74–99)
INR PPP: 4.2
POTASSIUM SERPL-SCNC: 4.4 MMOL/L (ref 3.5–5)
PROT SERPL-MCNC: 5.6 G/DL (ref 6.4–8.3)
PROTHROMBIN TIME: 45.7 SEC (ref 9.3–12.4)
SODIUM SERPL-SCNC: 142 MMOL/L (ref 132–146)
T4 FREE SERPL-MCNC: 1.1 NG/DL (ref 0.9–1.7)
TROPONIN I SERPL HS-MCNC: 27 NG/L (ref 0–9)
TSH SERPL DL<=0.05 MIU/L-ACNC: 5.52 UIU/ML (ref 0.27–4.2)

## 2024-09-17 PROCEDURE — 82570 ASSAY OF URINE CREATININE: CPT

## 2024-09-17 PROCEDURE — 2580000003 HC RX 258

## 2024-09-17 PROCEDURE — 99232 SBSQ HOSP IP/OBS MODERATE 35: CPT | Performed by: STUDENT IN AN ORGANIZED HEALTH CARE EDUCATION/TRAINING PROGRAM

## 2024-09-17 PROCEDURE — 80053 COMPREHEN METABOLIC PANEL: CPT

## 2024-09-17 PROCEDURE — 2060000000 HC ICU INTERMEDIATE R&B

## 2024-09-17 PROCEDURE — 80162 ASSAY OF DIGOXIN TOTAL: CPT

## 2024-09-17 PROCEDURE — 84300 ASSAY OF URINE SODIUM: CPT

## 2024-09-17 PROCEDURE — 6360000002 HC RX W HCPCS

## 2024-09-17 PROCEDURE — 84443 ASSAY THYROID STIM HORMONE: CPT

## 2024-09-17 PROCEDURE — 94640 AIRWAY INHALATION TREATMENT: CPT

## 2024-09-17 PROCEDURE — 85610 PROTHROMBIN TIME: CPT

## 2024-09-17 PROCEDURE — 84439 ASSAY OF FREE THYROXINE: CPT

## 2024-09-17 PROCEDURE — 83935 ASSAY OF URINE OSMOLALITY: CPT

## 2024-09-17 PROCEDURE — 6370000000 HC RX 637 (ALT 250 FOR IP)

## 2024-09-17 RX ORDER — AMIODARONE HYDROCHLORIDE 200 MG/1
100 TABLET ORAL DAILY
Status: DISCONTINUED | OUTPATIENT
Start: 2024-09-18 | End: 2024-09-21

## 2024-09-17 RX ORDER — BUDESONIDE 0.25 MG/2ML
0.25 INHALANT ORAL
Status: DISCONTINUED | OUTPATIENT
Start: 2024-09-17 | End: 2024-09-21 | Stop reason: HOSPADM

## 2024-09-17 RX ORDER — AMIODARONE HYDROCHLORIDE 200 MG/1
200 TABLET ORAL DAILY
Status: DISCONTINUED | OUTPATIENT
Start: 2024-09-17 | End: 2024-09-17

## 2024-09-17 RX ORDER — LEVOTHYROXINE SODIUM 75 UG/1
75 TABLET ORAL
Status: DISCONTINUED | OUTPATIENT
Start: 2024-09-17 | End: 2024-09-21 | Stop reason: HOSPADM

## 2024-09-17 RX ORDER — DIGOXIN 125 MCG
62.5 TABLET ORAL
Status: DISCONTINUED | OUTPATIENT
Start: 2024-09-17 | End: 2024-09-21 | Stop reason: HOSPADM

## 2024-09-17 RX ORDER — ARFORMOTEROL TARTRATE 15 UG/2ML
15 SOLUTION RESPIRATORY (INHALATION)
Status: DISCONTINUED | OUTPATIENT
Start: 2024-09-17 | End: 2024-09-21 | Stop reason: HOSPADM

## 2024-09-17 RX ORDER — 0.9 % SODIUM CHLORIDE 0.9 %
500 INTRAVENOUS SOLUTION INTRAVENOUS ONCE
Status: COMPLETED | OUTPATIENT
Start: 2024-09-17 | End: 2024-09-17

## 2024-09-17 RX ORDER — ACETAMINOPHEN 325 MG/1
650 TABLET ORAL EVERY 6 HOURS PRN
Status: DISCONTINUED | OUTPATIENT
Start: 2024-09-17 | End: 2024-09-21 | Stop reason: HOSPADM

## 2024-09-17 RX ORDER — SODIUM CHLORIDE 0.9 % (FLUSH) 0.9 %
5-40 SYRINGE (ML) INJECTION PRN
Status: DISCONTINUED | OUTPATIENT
Start: 2024-09-17 | End: 2024-09-21 | Stop reason: HOSPADM

## 2024-09-17 RX ORDER — BUMETANIDE 1 MG/1
0.5 TABLET ORAL DAILY
Status: DISCONTINUED | OUTPATIENT
Start: 2024-09-17 | End: 2024-09-21 | Stop reason: HOSPADM

## 2024-09-17 RX ORDER — PANTOPRAZOLE SODIUM 40 MG/1
40 TABLET, DELAYED RELEASE ORAL
Status: DISCONTINUED | OUTPATIENT
Start: 2024-09-17 | End: 2024-09-21 | Stop reason: HOSPADM

## 2024-09-17 RX ORDER — ACETAMINOPHEN 650 MG/1
650 SUPPOSITORY RECTAL EVERY 6 HOURS PRN
Status: DISCONTINUED | OUTPATIENT
Start: 2024-09-17 | End: 2024-09-21 | Stop reason: HOSPADM

## 2024-09-17 RX ORDER — CHOLECALCIFEROL (VITAMIN D3) 50 MCG
2000 TABLET ORAL DAILY
Status: DISCONTINUED | OUTPATIENT
Start: 2024-09-17 | End: 2024-09-21 | Stop reason: HOSPADM

## 2024-09-17 RX ORDER — ONDANSETRON 2 MG/ML
4 INJECTION INTRAMUSCULAR; INTRAVENOUS EVERY 6 HOURS PRN
Status: DISCONTINUED | OUTPATIENT
Start: 2024-09-17 | End: 2024-09-21 | Stop reason: HOSPADM

## 2024-09-17 RX ORDER — SODIUM CHLORIDE 9 MG/ML
INJECTION, SOLUTION INTRAVENOUS PRN
Status: DISCONTINUED | OUTPATIENT
Start: 2024-09-17 | End: 2024-09-21 | Stop reason: HOSPADM

## 2024-09-17 RX ORDER — ONDANSETRON 4 MG/1
4 TABLET, ORALLY DISINTEGRATING ORAL EVERY 8 HOURS PRN
Status: DISCONTINUED | OUTPATIENT
Start: 2024-09-17 | End: 2024-09-21 | Stop reason: HOSPADM

## 2024-09-17 RX ORDER — ROSUVASTATIN CALCIUM 10 MG/1
10 TABLET, COATED ORAL NIGHTLY
Status: DISCONTINUED | OUTPATIENT
Start: 2024-09-17 | End: 2024-09-21 | Stop reason: HOSPADM

## 2024-09-17 RX ORDER — SODIUM CHLORIDE 0.9 % (FLUSH) 0.9 %
5-40 SYRINGE (ML) INJECTION EVERY 12 HOURS SCHEDULED
Status: DISCONTINUED | OUTPATIENT
Start: 2024-09-17 | End: 2024-09-21 | Stop reason: HOSPADM

## 2024-09-17 RX ORDER — METOPROLOL SUCCINATE 25 MG/1
12.5 TABLET, EXTENDED RELEASE ORAL 2 TIMES DAILY
Status: DISCONTINUED | OUTPATIENT
Start: 2024-09-17 | End: 2024-09-21 | Stop reason: HOSPADM

## 2024-09-17 RX ORDER — LEVALBUTEROL INHALATION SOLUTION 0.63 MG/3ML
0.63 SOLUTION RESPIRATORY (INHALATION) EVERY 6 HOURS PRN
Status: DISCONTINUED | OUTPATIENT
Start: 2024-09-17 | End: 2024-09-21 | Stop reason: HOSPADM

## 2024-09-17 RX ORDER — SODIUM CHLORIDE 9 MG/ML
INJECTION, SOLUTION INTRAVENOUS CONTINUOUS
Status: ACTIVE | OUTPATIENT
Start: 2024-09-17 | End: 2024-09-18

## 2024-09-17 RX ADMIN — BUDESONIDE 250 MCG: 0.25 SUSPENSION RESPIRATORY (INHALATION) at 09:02

## 2024-09-17 RX ADMIN — Medication 2000 UNITS: at 09:33

## 2024-09-17 RX ADMIN — ARFORMOTEROL TARTRATE 15 MCG: 15 SOLUTION RESPIRATORY (INHALATION) at 09:02

## 2024-09-17 RX ADMIN — ROSUVASTATIN CALCIUM 10 MG: 10 TABLET, FILM COATED ORAL at 20:15

## 2024-09-17 RX ADMIN — IPRATROPIUM BROMIDE 0.5 MG: 0.5 SOLUTION RESPIRATORY (INHALATION) at 09:02

## 2024-09-17 RX ADMIN — LEVOTHYROXINE SODIUM 75 MCG: 75 TABLET ORAL at 06:10

## 2024-09-17 RX ADMIN — IPRATROPIUM BROMIDE 0.5 MG: 0.5 SOLUTION RESPIRATORY (INHALATION) at 12:58

## 2024-09-17 RX ADMIN — SODIUM CHLORIDE: 9 INJECTION, SOLUTION INTRAVENOUS at 02:35

## 2024-09-17 RX ADMIN — SODIUM CHLORIDE, PRESERVATIVE FREE 10 ML: 5 INJECTION INTRAVENOUS at 20:16

## 2024-09-17 RX ADMIN — ARFORMOTEROL TARTRATE 15 MCG: 15 SOLUTION RESPIRATORY (INHALATION) at 19:52

## 2024-09-17 RX ADMIN — IPRATROPIUM BROMIDE 0.5 MG: 0.5 SOLUTION RESPIRATORY (INHALATION) at 19:54

## 2024-09-17 RX ADMIN — PANTOPRAZOLE SODIUM 40 MG: 40 TABLET, DELAYED RELEASE ORAL at 06:10

## 2024-09-17 RX ADMIN — SODIUM CHLORIDE, PRESERVATIVE FREE 10 ML: 5 INJECTION INTRAVENOUS at 09:33

## 2024-09-17 RX ADMIN — SODIUM CHLORIDE 500 ML: 9 INJECTION, SOLUTION INTRAVENOUS at 03:58

## 2024-09-17 RX ADMIN — BUDESONIDE 250 MCG: 0.25 SUSPENSION RESPIRATORY (INHALATION) at 19:52

## 2024-09-18 LAB
ALBUMIN SERPL-MCNC: 3.4 G/DL (ref 3.5–5.2)
ALP SERPL-CCNC: 51 U/L (ref 35–104)
ALT SERPL-CCNC: 16 U/L (ref 0–32)
ANION GAP SERPL CALCULATED.3IONS-SCNC: 7 MMOL/L (ref 7–16)
AST SERPL-CCNC: 25 U/L (ref 0–31)
BASOPHILS # BLD: 0.01 K/UL (ref 0–0.2)
BASOPHILS NFR BLD: 0 % (ref 0–2)
BILIRUB SERPL-MCNC: 0.4 MG/DL (ref 0–1.2)
BUN SERPL-MCNC: 28 MG/DL (ref 6–23)
CALCIUM SERPL-MCNC: 8.5 MG/DL (ref 8.6–10.2)
CHLORIDE SERPL-SCNC: 106 MMOL/L (ref 98–107)
CO2 SERPL-SCNC: 28 MMOL/L (ref 22–29)
CREAT SERPL-MCNC: 1.3 MG/DL (ref 0.5–1)
CREAT UR-MCNC: 116.5 MG/DL (ref 29–226)
CREAT UR-MCNC: 118.1 MG/DL (ref 29–226)
EKG ATRIAL RATE: 0 BPM
EKG Q-T INTERVAL: 468 MS
EKG QRS DURATION: 66 MS
EKG QTC CALCULATION (BAZETT): 409 MS
EKG R AXIS: 52 DEGREES
EKG T AXIS: 48 DEGREES
EKG VENTRICULAR RATE: 46 BPM
EOSINOPHIL # BLD: 0.05 K/UL (ref 0.05–0.5)
EOSINOPHILS RELATIVE PERCENT: 2 % (ref 0–6)
ERYTHROCYTE [DISTWIDTH] IN BLOOD BY AUTOMATED COUNT: 15.2 % (ref 11.5–15)
GFR, ESTIMATED: 39 ML/MIN/1.73M2
GLUCOSE SERPL-MCNC: 100 MG/DL (ref 74–99)
HCT VFR BLD AUTO: 29.1 % (ref 34–48)
HGB BLD-MCNC: 9 G/DL (ref 11.5–15.5)
IMM GRANULOCYTES # BLD AUTO: <0.03 K/UL (ref 0–0.58)
IMM GRANULOCYTES NFR BLD: 0 % (ref 0–5)
INR PPP: 3.5
LYMPHOCYTES NFR BLD: 1.08 K/UL (ref 1.5–4)
LYMPHOCYTES RELATIVE PERCENT: 41 % (ref 20–42)
MCH RBC QN AUTO: 29.5 PG (ref 26–35)
MCHC RBC AUTO-ENTMCNC: 30.9 G/DL (ref 32–34.5)
MCV RBC AUTO: 95.4 FL (ref 80–99.9)
MONOCYTES NFR BLD: 0.16 K/UL (ref 0.1–0.95)
MONOCYTES NFR BLD: 6 % (ref 2–12)
NEUTROPHILS NFR BLD: 50 % (ref 43–80)
NEUTS SEG NFR BLD: 1.32 K/UL (ref 1.8–7.3)
OSMOLALITY UR: 456 MOSM/KG (ref 300–900)
PLATELET # BLD AUTO: 90 K/UL (ref 130–450)
PLATELET CONFIRMATION: NORMAL
PMV BLD AUTO: 10.6 FL (ref 7–12)
POTASSIUM SERPL-SCNC: 4.3 MMOL/L (ref 3.5–5)
PROT SERPL-MCNC: 5.3 G/DL (ref 6.4–8.3)
PROTHROMBIN TIME: 38 SEC (ref 9.3–12.4)
RBC # BLD AUTO: 3.05 M/UL (ref 3.5–5.5)
SODIUM SERPL-SCNC: 141 MMOL/L (ref 132–146)
SODIUM UR-SCNC: 46 MMOL/L
TOTAL PROTEIN, URINE: 6 MG/DL (ref 0–12)
URINE TOTAL PROTEIN CREATININE RATIO: 0.05 (ref 0–0.2)
WBC OTHER # BLD: 2.6 K/UL (ref 4.5–11.5)

## 2024-09-18 PROCEDURE — 2700000000 HC OXYGEN THERAPY PER DAY

## 2024-09-18 PROCEDURE — 80053 COMPREHEN METABOLIC PANEL: CPT

## 2024-09-18 PROCEDURE — 6370000000 HC RX 637 (ALT 250 FOR IP): Performed by: INTERNAL MEDICINE

## 2024-09-18 PROCEDURE — 94640 AIRWAY INHALATION TREATMENT: CPT

## 2024-09-18 PROCEDURE — 85025 COMPLETE CBC W/AUTO DIFF WBC: CPT

## 2024-09-18 PROCEDURE — 99232 SBSQ HOSP IP/OBS MODERATE 35: CPT | Performed by: STUDENT IN AN ORGANIZED HEALTH CARE EDUCATION/TRAINING PROGRAM

## 2024-09-18 PROCEDURE — 2060000000 HC ICU INTERMEDIATE R&B

## 2024-09-18 PROCEDURE — 6370000000 HC RX 637 (ALT 250 FOR IP)

## 2024-09-18 PROCEDURE — 6360000002 HC RX W HCPCS

## 2024-09-18 PROCEDURE — 84156 ASSAY OF PROTEIN URINE: CPT

## 2024-09-18 PROCEDURE — 85610 PROTHROMBIN TIME: CPT

## 2024-09-18 PROCEDURE — 36415 COLL VENOUS BLD VENIPUNCTURE: CPT

## 2024-09-18 RX ORDER — WARFARIN SODIUM 1 MG/1
1 TABLET ORAL
Status: COMPLETED | OUTPATIENT
Start: 2024-09-18 | End: 2024-09-18

## 2024-09-18 RX ADMIN — LEVOTHYROXINE SODIUM 75 MCG: 75 TABLET ORAL at 05:43

## 2024-09-18 RX ADMIN — IPRATROPIUM BROMIDE 0.5 MG: 0.5 SOLUTION RESPIRATORY (INHALATION) at 20:26

## 2024-09-18 RX ADMIN — PANTOPRAZOLE SODIUM 40 MG: 40 TABLET, DELAYED RELEASE ORAL at 05:43

## 2024-09-18 RX ADMIN — ARFORMOTEROL TARTRATE 15 MCG: 15 SOLUTION RESPIRATORY (INHALATION) at 09:31

## 2024-09-18 RX ADMIN — WARFARIN SODIUM 1 MG: 1 TABLET ORAL at 18:30

## 2024-09-18 RX ADMIN — ARFORMOTEROL TARTRATE 15 MCG: 15 SOLUTION RESPIRATORY (INHALATION) at 20:26

## 2024-09-18 RX ADMIN — BUDESONIDE 250 MCG: 0.25 SUSPENSION RESPIRATORY (INHALATION) at 09:31

## 2024-09-18 RX ADMIN — BUDESONIDE 250 MCG: 0.25 SUSPENSION RESPIRATORY (INHALATION) at 20:26

## 2024-09-18 RX ADMIN — IPRATROPIUM BROMIDE 0.5 MG: 0.5 SOLUTION RESPIRATORY (INHALATION) at 13:45

## 2024-09-18 RX ADMIN — DIGOXIN 62.5 MCG: 0.12 TABLET ORAL at 08:20

## 2024-09-18 RX ADMIN — Medication 2000 UNITS: at 08:20

## 2024-09-18 RX ADMIN — IPRATROPIUM BROMIDE 0.5 MG: 0.5 SOLUTION RESPIRATORY (INHALATION) at 09:31

## 2024-09-18 RX ADMIN — ROSUVASTATIN CALCIUM 10 MG: 10 TABLET, FILM COATED ORAL at 19:43

## 2024-09-18 ASSESSMENT — PULMONARY FUNCTION TESTS
PEFR_L/MIN: 16
PEFR_L/MIN: 16

## 2024-09-18 ASSESSMENT — PAIN SCALES - GENERAL: PAINLEVEL_OUTOF10: 0

## 2024-09-19 ENCOUNTER — APPOINTMENT (OUTPATIENT)
Dept: GENERAL RADIOLOGY | Age: 84
DRG: 308 | End: 2024-09-19
Payer: MEDICARE

## 2024-09-19 LAB
ALBUMIN SERPL-MCNC: 3.4 G/DL (ref 3.5–5.2)
ALP SERPL-CCNC: 54 U/L (ref 35–104)
ALT SERPL-CCNC: 15 U/L (ref 0–32)
ANION GAP SERPL CALCULATED.3IONS-SCNC: 8 MMOL/L (ref 7–16)
AST SERPL-CCNC: 22 U/L (ref 0–31)
B PARAP IS1001 DNA NPH QL NAA+NON-PROBE: NOT DETECTED
B PERT DNA SPEC QL NAA+PROBE: NOT DETECTED
BASOPHILS # BLD: 0.01 K/UL (ref 0–0.2)
BASOPHILS NFR BLD: 0 % (ref 0–2)
BILIRUB SERPL-MCNC: 0.7 MG/DL (ref 0–1.2)
BUN SERPL-MCNC: 19 MG/DL (ref 6–23)
C PNEUM DNA NPH QL NAA+NON-PROBE: NOT DETECTED
CALCIUM SERPL-MCNC: 8.8 MG/DL (ref 8.6–10.2)
CHLORIDE SERPL-SCNC: 106 MMOL/L (ref 98–107)
CO2 SERPL-SCNC: 27 MMOL/L (ref 22–29)
CREAT SERPL-MCNC: 1.1 MG/DL (ref 0.5–1)
EOSINOPHIL # BLD: 0.05 K/UL (ref 0.05–0.5)
EOSINOPHILS RELATIVE PERCENT: 2 % (ref 0–6)
ERYTHROCYTE [DISTWIDTH] IN BLOOD BY AUTOMATED COUNT: 14.6 % (ref 11.5–15)
FLUAV RNA NPH QL NAA+NON-PROBE: NOT DETECTED
FLUBV RNA NPH QL NAA+NON-PROBE: NOT DETECTED
GFR, ESTIMATED: 47 ML/MIN/1.73M2
GLUCOSE SERPL-MCNC: 90 MG/DL (ref 74–99)
HADV DNA NPH QL NAA+NON-PROBE: NOT DETECTED
HCOV 229E RNA NPH QL NAA+NON-PROBE: NOT DETECTED
HCOV HKU1 RNA NPH QL NAA+NON-PROBE: NOT DETECTED
HCOV NL63 RNA NPH QL NAA+NON-PROBE: NOT DETECTED
HCOV OC43 RNA NPH QL NAA+NON-PROBE: NOT DETECTED
HCT VFR BLD AUTO: 27.8 % (ref 34–48)
HGB BLD-MCNC: 8.8 G/DL (ref 11.5–15.5)
HMPV RNA NPH QL NAA+NON-PROBE: NOT DETECTED
HPIV1 RNA NPH QL NAA+NON-PROBE: NOT DETECTED
HPIV2 RNA NPH QL NAA+NON-PROBE: NOT DETECTED
HPIV3 RNA NPH QL NAA+NON-PROBE: NOT DETECTED
HPIV4 RNA NPH QL NAA+NON-PROBE: NOT DETECTED
IMM GRANULOCYTES # BLD AUTO: <0.03 K/UL (ref 0–0.58)
IMM GRANULOCYTES NFR BLD: 0 % (ref 0–5)
INR PPP: 2.2
LYMPHOCYTES NFR BLD: 0.79 K/UL (ref 1.5–4)
LYMPHOCYTES RELATIVE PERCENT: 29 % (ref 20–42)
M PNEUMO DNA NPH QL NAA+NON-PROBE: NOT DETECTED
MCH RBC QN AUTO: 29.5 PG (ref 26–35)
MCHC RBC AUTO-ENTMCNC: 31.7 G/DL (ref 32–34.5)
MCV RBC AUTO: 93.3 FL (ref 80–99.9)
MONOCYTES NFR BLD: 0.16 K/UL (ref 0.1–0.95)
MONOCYTES NFR BLD: 6 % (ref 2–12)
NEUTROPHILS NFR BLD: 63 % (ref 43–80)
NEUTS SEG NFR BLD: 1.72 K/UL (ref 1.8–7.3)
PLATELET # BLD AUTO: 87 K/UL (ref 130–450)
PLATELET CONFIRMATION: NORMAL
PMV BLD AUTO: 10.3 FL (ref 7–12)
POTASSIUM SERPL-SCNC: 4.4 MMOL/L (ref 3.5–5)
PROT SERPL-MCNC: 5.4 G/DL (ref 6.4–8.3)
PROTHROMBIN TIME: 22.7 SEC (ref 9.3–12.4)
RBC # BLD AUTO: 2.98 M/UL (ref 3.5–5.5)
RSV RNA NPH QL NAA+NON-PROBE: NOT DETECTED
RV+EV RNA NPH QL NAA+NON-PROBE: NOT DETECTED
SARS-COV-2 RNA NPH QL NAA+NON-PROBE: NOT DETECTED
SODIUM SERPL-SCNC: 141 MMOL/L (ref 132–146)
SPECIMEN DESCRIPTION: NORMAL
WBC OTHER # BLD: 2.7 K/UL (ref 4.5–11.5)

## 2024-09-19 PROCEDURE — 99233 SBSQ HOSP IP/OBS HIGH 50: CPT | Performed by: INTERNAL MEDICINE

## 2024-09-19 PROCEDURE — 6370000000 HC RX 637 (ALT 250 FOR IP)

## 2024-09-19 PROCEDURE — 71045 X-RAY EXAM CHEST 1 VIEW: CPT

## 2024-09-19 PROCEDURE — 94669 MECHANICAL CHEST WALL OSCILL: CPT

## 2024-09-19 PROCEDURE — 87205 SMEAR GRAM STAIN: CPT

## 2024-09-19 PROCEDURE — 87070 CULTURE OTHR SPECIMN AEROBIC: CPT

## 2024-09-19 PROCEDURE — 6360000002 HC RX W HCPCS

## 2024-09-19 PROCEDURE — 0202U NFCT DS 22 TRGT SARS-COV-2: CPT

## 2024-09-19 PROCEDURE — 2060000000 HC ICU INTERMEDIATE R&B

## 2024-09-19 PROCEDURE — 6360000002 HC RX W HCPCS: Performed by: INTERNAL MEDICINE

## 2024-09-19 PROCEDURE — 80053 COMPREHEN METABOLIC PANEL: CPT

## 2024-09-19 PROCEDURE — 85025 COMPLETE CBC W/AUTO DIFF WBC: CPT

## 2024-09-19 PROCEDURE — 85610 PROTHROMBIN TIME: CPT

## 2024-09-19 PROCEDURE — 2700000000 HC OXYGEN THERAPY PER DAY

## 2024-09-19 PROCEDURE — 94640 AIRWAY INHALATION TREATMENT: CPT

## 2024-09-19 PROCEDURE — 2580000003 HC RX 258

## 2024-09-19 RX ORDER — LEVALBUTEROL INHALATION SOLUTION 0.63 MG/3ML
0.63 SOLUTION RESPIRATORY (INHALATION)
Status: DISCONTINUED | OUTPATIENT
Start: 2024-09-19 | End: 2024-09-21 | Stop reason: HOSPADM

## 2024-09-19 RX ORDER — WARFARIN SODIUM 3 MG/1
3 TABLET ORAL
Status: COMPLETED | OUTPATIENT
Start: 2024-09-19 | End: 2024-09-19

## 2024-09-19 RX ORDER — METHYLPREDNISOLONE SODIUM SUCCINATE 40 MG/ML
40 INJECTION, POWDER, LYOPHILIZED, FOR SOLUTION INTRAMUSCULAR; INTRAVENOUS EVERY 12 HOURS
Status: DISCONTINUED | OUTPATIENT
Start: 2024-09-19 | End: 2024-09-20

## 2024-09-19 RX ORDER — ACETYLCYSTEINE 100 MG/ML
4 SOLUTION ORAL; RESPIRATORY (INHALATION)
Status: DISCONTINUED | OUTPATIENT
Start: 2024-09-19 | End: 2024-09-21 | Stop reason: HOSPADM

## 2024-09-19 RX ORDER — GUAIFENESIN 400 MG/1
400 TABLET ORAL 3 TIMES DAILY
Status: DISCONTINUED | OUTPATIENT
Start: 2024-09-19 | End: 2024-09-21 | Stop reason: HOSPADM

## 2024-09-19 RX ADMIN — Medication 2000 UNITS: at 08:19

## 2024-09-19 RX ADMIN — GUAIFENESIN 400 MG: 400 TABLET ORAL at 14:31

## 2024-09-19 RX ADMIN — GUAIFENESIN 400 MG: 400 TABLET ORAL at 19:59

## 2024-09-19 RX ADMIN — ROSUVASTATIN CALCIUM 10 MG: 10 TABLET, FILM COATED ORAL at 19:59

## 2024-09-19 RX ADMIN — SODIUM CHLORIDE, PRESERVATIVE FREE 10 ML: 5 INJECTION INTRAVENOUS at 20:00

## 2024-09-19 RX ADMIN — LEVALBUTEROL 0.63 MG: 0.63 SOLUTION RESPIRATORY (INHALATION) at 21:42

## 2024-09-19 RX ADMIN — PANTOPRAZOLE SODIUM 40 MG: 40 TABLET, DELAYED RELEASE ORAL at 05:04

## 2024-09-19 RX ADMIN — ACETYLCYSTEINE 400 MG: 100 SOLUTION ORAL; RESPIRATORY (INHALATION) at 21:42

## 2024-09-19 RX ADMIN — LEVALBUTEROL 0.63 MG: 0.63 SOLUTION RESPIRATORY (INHALATION) at 13:18

## 2024-09-19 RX ADMIN — ACETYLCYSTEINE 400 MG: 100 SOLUTION ORAL; RESPIRATORY (INHALATION) at 13:18

## 2024-09-19 RX ADMIN — LEVALBUTEROL 0.63 MG: 0.63 SOLUTION RESPIRATORY (INHALATION) at 15:49

## 2024-09-19 RX ADMIN — BUDESONIDE 250 MCG: 0.25 SUSPENSION RESPIRATORY (INHALATION) at 21:42

## 2024-09-19 RX ADMIN — IPRATROPIUM BROMIDE 0.5 MG: 0.5 SOLUTION RESPIRATORY (INHALATION) at 08:05

## 2024-09-19 RX ADMIN — WARFARIN SODIUM 3 MG: 3 TABLET ORAL at 17:57

## 2024-09-19 RX ADMIN — ARFORMOTEROL TARTRATE 15 MCG: 15 SOLUTION RESPIRATORY (INHALATION) at 21:42

## 2024-09-19 RX ADMIN — BUDESONIDE 250 MCG: 0.25 SUSPENSION RESPIRATORY (INHALATION) at 08:05

## 2024-09-19 RX ADMIN — METHYLPREDNISOLONE SODIUM SUCCINATE 40 MG: 40 INJECTION INTRAMUSCULAR; INTRAVENOUS at 13:00

## 2024-09-19 RX ADMIN — ARFORMOTEROL TARTRATE 15 MCG: 15 SOLUTION RESPIRATORY (INHALATION) at 08:05

## 2024-09-19 RX ADMIN — IPRATROPIUM BROMIDE 0.5 MG: 0.5 SOLUTION RESPIRATORY (INHALATION) at 04:48

## 2024-09-19 RX ADMIN — LEVOTHYROXINE SODIUM 75 MCG: 75 TABLET ORAL at 05:04

## 2024-09-20 LAB
BASOPHILS # BLD: 0 K/UL (ref 0–0.2)
BASOPHILS NFR BLD: 0 % (ref 0–2)
EOSINOPHIL # BLD: 0 K/UL (ref 0.05–0.5)
EOSINOPHILS RELATIVE PERCENT: 0 % (ref 0–6)
ERYTHROCYTE [DISTWIDTH] IN BLOOD BY AUTOMATED COUNT: 14.5 % (ref 11.5–15)
HCT VFR BLD AUTO: 29 % (ref 34–48)
HGB BLD-MCNC: 9.3 G/DL (ref 11.5–15.5)
IMM GRANULOCYTES # BLD AUTO: <0.03 K/UL (ref 0–0.58)
IMM GRANULOCYTES NFR BLD: 1 % (ref 0–5)
INR PPP: 1.7
LYMPHOCYTES NFR BLD: 0.28 K/UL (ref 1.5–4)
LYMPHOCYTES RELATIVE PERCENT: 14 % (ref 20–42)
MCH RBC QN AUTO: 29.9 PG (ref 26–35)
MCHC RBC AUTO-ENTMCNC: 32.1 G/DL (ref 32–34.5)
MCV RBC AUTO: 93.2 FL (ref 80–99.9)
MONOCYTES NFR BLD: 0.03 K/UL (ref 0.1–0.95)
MONOCYTES NFR BLD: 2 % (ref 2–12)
NEUTROPHILS NFR BLD: 84 % (ref 43–80)
NEUTS SEG NFR BLD: 1.7 K/UL (ref 1.8–7.3)
PLATELET # BLD AUTO: 88 K/UL (ref 130–450)
PLATELET CONFIRMATION: NORMAL
PMV BLD AUTO: 10.4 FL (ref 7–12)
PROTHROMBIN TIME: 18.2 SEC (ref 9.3–12.4)
RBC # BLD AUTO: 3.11 M/UL (ref 3.5–5.5)
RBC # BLD: ABNORMAL 10*6/UL
WBC OTHER # BLD: 2 K/UL (ref 4.5–11.5)

## 2024-09-20 PROCEDURE — 2700000000 HC OXYGEN THERAPY PER DAY

## 2024-09-20 PROCEDURE — 2060000000 HC ICU INTERMEDIATE R&B

## 2024-09-20 PROCEDURE — 6370000000 HC RX 637 (ALT 250 FOR IP): Performed by: INTERNAL MEDICINE

## 2024-09-20 PROCEDURE — 85610 PROTHROMBIN TIME: CPT

## 2024-09-20 PROCEDURE — 6360000002 HC RX W HCPCS: Performed by: INTERNAL MEDICINE

## 2024-09-20 PROCEDURE — 6370000000 HC RX 637 (ALT 250 FOR IP)

## 2024-09-20 PROCEDURE — 94669 MECHANICAL CHEST WALL OSCILL: CPT

## 2024-09-20 PROCEDURE — 6360000002 HC RX W HCPCS

## 2024-09-20 PROCEDURE — 2580000003 HC RX 258

## 2024-09-20 PROCEDURE — 94640 AIRWAY INHALATION TREATMENT: CPT

## 2024-09-20 PROCEDURE — 99233 SBSQ HOSP IP/OBS HIGH 50: CPT | Performed by: INTERNAL MEDICINE

## 2024-09-20 PROCEDURE — 85025 COMPLETE CBC W/AUTO DIFF WBC: CPT

## 2024-09-20 RX ORDER — WARFARIN SODIUM 3 MG/1
3 TABLET ORAL
Status: COMPLETED | OUTPATIENT
Start: 2024-09-20 | End: 2024-09-20

## 2024-09-20 RX ORDER — METHYLPREDNISOLONE SODIUM SUCCINATE 40 MG/ML
40 INJECTION, POWDER, LYOPHILIZED, FOR SOLUTION INTRAMUSCULAR; INTRAVENOUS DAILY
Status: DISCONTINUED | OUTPATIENT
Start: 2024-09-21 | End: 2024-09-21 | Stop reason: HOSPADM

## 2024-09-20 RX ORDER — PREDNISONE 10 MG/1
TABLET ORAL
Qty: 30 TABLET | Refills: 0 | Status: ON HOLD | OUTPATIENT
Start: 2024-09-20

## 2024-09-20 RX ORDER — GUAIFENESIN 400 MG/1
400 TABLET ORAL 3 TIMES DAILY
Qty: 56 TABLET | Refills: 0 | Status: ON HOLD | OUTPATIENT
Start: 2024-09-20

## 2024-09-20 RX ORDER — HEPARIN SODIUM 5000 [USP'U]/ML
5000 INJECTION, SOLUTION INTRAVENOUS; SUBCUTANEOUS EVERY 8 HOURS
Status: DISCONTINUED | OUTPATIENT
Start: 2024-09-20 | End: 2024-09-21

## 2024-09-20 RX ADMIN — GUAIFENESIN 400 MG: 400 TABLET ORAL at 08:14

## 2024-09-20 RX ADMIN — METHYLPREDNISOLONE SODIUM SUCCINATE 40 MG: 40 INJECTION INTRAMUSCULAR; INTRAVENOUS at 00:13

## 2024-09-20 RX ADMIN — BUDESONIDE 250 MCG: 0.25 SUSPENSION RESPIRATORY (INHALATION) at 08:47

## 2024-09-20 RX ADMIN — ACETYLCYSTEINE 400 MG: 100 SOLUTION ORAL; RESPIRATORY (INHALATION) at 20:47

## 2024-09-20 RX ADMIN — LEVALBUTEROL 0.63 MG: 0.63 SOLUTION RESPIRATORY (INHALATION) at 20:48

## 2024-09-20 RX ADMIN — LEVALBUTEROL 0.63 MG: 0.63 SOLUTION RESPIRATORY (INHALATION) at 08:47

## 2024-09-20 RX ADMIN — LEVALBUTEROL 0.63 MG: 0.63 SOLUTION RESPIRATORY (INHALATION) at 15:50

## 2024-09-20 RX ADMIN — METOPROLOL SUCCINATE 12.5 MG: 25 TABLET, EXTENDED RELEASE ORAL at 09:57

## 2024-09-20 RX ADMIN — METOPROLOL SUCCINATE 12.5 MG: 25 TABLET, EXTENDED RELEASE ORAL at 17:10

## 2024-09-20 RX ADMIN — Medication 2000 UNITS: at 08:13

## 2024-09-20 RX ADMIN — ACETYLCYSTEINE 400 MG: 100 SOLUTION ORAL; RESPIRATORY (INHALATION) at 08:47

## 2024-09-20 RX ADMIN — BUDESONIDE 250 MCG: 0.25 SUSPENSION RESPIRATORY (INHALATION) at 20:48

## 2024-09-20 RX ADMIN — HEPARIN SODIUM 5000 UNITS: 5000 INJECTION INTRAVENOUS; SUBCUTANEOUS at 18:25

## 2024-09-20 RX ADMIN — LEVALBUTEROL 0.63 MG: 0.63 SOLUTION RESPIRATORY (INHALATION) at 12:35

## 2024-09-20 RX ADMIN — GUAIFENESIN 400 MG: 400 TABLET ORAL at 14:14

## 2024-09-20 RX ADMIN — SODIUM CHLORIDE, PRESERVATIVE FREE 10 ML: 5 INJECTION INTRAVENOUS at 22:58

## 2024-09-20 RX ADMIN — ACETYLCYSTEINE 400 MG: 100 SOLUTION ORAL; RESPIRATORY (INHALATION) at 12:35

## 2024-09-20 RX ADMIN — BUMETANIDE 0.5 MG: 1 TABLET ORAL at 09:57

## 2024-09-20 RX ADMIN — DIGOXIN 62.5 MCG: 0.12 TABLET ORAL at 08:13

## 2024-09-20 RX ADMIN — WARFARIN SODIUM 3 MG: 3 TABLET ORAL at 18:25

## 2024-09-20 RX ADMIN — GUAIFENESIN 400 MG: 400 TABLET ORAL at 22:58

## 2024-09-20 RX ADMIN — SODIUM CHLORIDE, PRESERVATIVE FREE 10 ML: 5 INJECTION INTRAVENOUS at 08:14

## 2024-09-20 RX ADMIN — ARFORMOTEROL TARTRATE 15 MCG: 15 SOLUTION RESPIRATORY (INHALATION) at 08:47

## 2024-09-20 RX ADMIN — ROSUVASTATIN CALCIUM 10 MG: 10 TABLET, FILM COATED ORAL at 22:58

## 2024-09-20 RX ADMIN — ARFORMOTEROL TARTRATE 15 MCG: 15 SOLUTION RESPIRATORY (INHALATION) at 20:48

## 2024-09-20 RX ADMIN — LEVOTHYROXINE SODIUM 75 MCG: 75 TABLET ORAL at 05:16

## 2024-09-20 RX ADMIN — HEPARIN SODIUM 5000 UNITS: 5000 INJECTION INTRAVENOUS; SUBCUTANEOUS at 14:14

## 2024-09-20 RX ADMIN — PANTOPRAZOLE SODIUM 40 MG: 40 TABLET, DELAYED RELEASE ORAL at 05:16

## 2024-09-21 VITALS
HEART RATE: 78 BPM | HEIGHT: 69 IN | SYSTOLIC BLOOD PRESSURE: 143 MMHG | RESPIRATION RATE: 20 BRPM | DIASTOLIC BLOOD PRESSURE: 71 MMHG | BODY MASS INDEX: 25.1 KG/M2 | OXYGEN SATURATION: 95 % | TEMPERATURE: 98.4 F | WEIGHT: 169.5 LBS

## 2024-09-21 PROBLEM — R00.1 BRADYCARDIA: Status: RESOLVED | Noted: 2024-09-17 | Resolved: 2024-09-21

## 2024-09-21 PROBLEM — N17.9 AKI (ACUTE KIDNEY INJURY) (HCC): Status: RESOLVED | Noted: 2023-12-30 | Resolved: 2024-09-21

## 2024-09-21 LAB
INR PPP: 2.1
MICROORGANISM SPEC CULT: NORMAL
MICROORGANISM/AGENT SPEC: NORMAL
PROTHROMBIN TIME: 22.3 SEC (ref 9.3–12.4)
SPECIMEN DESCRIPTION: NORMAL

## 2024-09-21 PROCEDURE — 2580000003 HC RX 258

## 2024-09-21 PROCEDURE — 94640 AIRWAY INHALATION TREATMENT: CPT

## 2024-09-21 PROCEDURE — 6360000002 HC RX W HCPCS

## 2024-09-21 PROCEDURE — 2700000000 HC OXYGEN THERAPY PER DAY

## 2024-09-21 PROCEDURE — 6360000002 HC RX W HCPCS: Performed by: INTERNAL MEDICINE

## 2024-09-21 PROCEDURE — 6370000000 HC RX 637 (ALT 250 FOR IP)

## 2024-09-21 PROCEDURE — 6370000000 HC RX 637 (ALT 250 FOR IP): Performed by: INTERNAL MEDICINE

## 2024-09-21 PROCEDURE — 99233 SBSQ HOSP IP/OBS HIGH 50: CPT | Performed by: INTERNAL MEDICINE

## 2024-09-21 PROCEDURE — 85610 PROTHROMBIN TIME: CPT

## 2024-09-21 RX ORDER — AMIODARONE HYDROCHLORIDE 200 MG/1
100 TABLET ORAL
Status: DISCONTINUED | OUTPATIENT
Start: 2024-09-23 | End: 2024-09-21 | Stop reason: HOSPADM

## 2024-09-21 RX ORDER — AMIODARONE HYDROCHLORIDE 200 MG/1
100 TABLET ORAL
Qty: 30 TABLET | Refills: 1 | Status: ON HOLD | OUTPATIENT
Start: 2024-09-23

## 2024-09-21 RX ORDER — WARFARIN SODIUM 3 MG/1
3 TABLET ORAL DAILY
Qty: 30 TABLET | Refills: 3 | Status: SHIPPED | OUTPATIENT
Start: 2024-09-21 | End: 2024-09-23

## 2024-09-21 RX ORDER — DIGOXIN 0.06 MG/1
62.5 TABLET ORAL
Qty: 30 TABLET | Refills: 3 | Status: CANCELLED | OUTPATIENT
Start: 2024-09-23

## 2024-09-21 RX ORDER — AMIODARONE HYDROCHLORIDE 100 MG/1
100 TABLET ORAL EVERY OTHER DAY
Qty: 45 TABLET | Refills: 0 | Status: CANCELLED | OUTPATIENT
Start: 2024-09-23 | End: 2025-01-21

## 2024-09-21 RX ORDER — WARFARIN SODIUM 3 MG/1
3 TABLET ORAL
Status: DISCONTINUED | OUTPATIENT
Start: 2024-09-21 | End: 2024-09-21 | Stop reason: HOSPADM

## 2024-09-21 RX ORDER — WARFARIN SODIUM 3 MG/1
3 TABLET ORAL
Qty: 1 TABLET | Refills: 0 | Status: CANCELLED | OUTPATIENT
Start: 2024-09-21 | End: 2024-09-21

## 2024-09-21 RX ADMIN — ACETYLCYSTEINE 400 MG: 100 SOLUTION ORAL; RESPIRATORY (INHALATION) at 09:46

## 2024-09-21 RX ADMIN — LEVALBUTEROL 0.63 MG: 0.63 SOLUTION RESPIRATORY (INHALATION) at 09:46

## 2024-09-21 RX ADMIN — METOPROLOL SUCCINATE 12.5 MG: 25 TABLET, EXTENDED RELEASE ORAL at 08:42

## 2024-09-21 RX ADMIN — PANTOPRAZOLE SODIUM 40 MG: 40 TABLET, DELAYED RELEASE ORAL at 05:37

## 2024-09-21 RX ADMIN — ACETYLCYSTEINE 400 MG: 100 SOLUTION ORAL; RESPIRATORY (INHALATION) at 13:41

## 2024-09-21 RX ADMIN — HEPARIN SODIUM 5000 UNITS: 5000 INJECTION INTRAVENOUS; SUBCUTANEOUS at 05:37

## 2024-09-21 RX ADMIN — GUAIFENESIN 400 MG: 400 TABLET ORAL at 08:42

## 2024-09-21 RX ADMIN — LEVOTHYROXINE SODIUM 75 MCG: 75 TABLET ORAL at 05:37

## 2024-09-21 RX ADMIN — METOPROLOL SUCCINATE 12.5 MG: 25 TABLET, EXTENDED RELEASE ORAL at 15:56

## 2024-09-21 RX ADMIN — GUAIFENESIN 400 MG: 400 TABLET ORAL at 15:56

## 2024-09-21 RX ADMIN — ARFORMOTEROL TARTRATE 15 MCG: 15 SOLUTION RESPIRATORY (INHALATION) at 09:46

## 2024-09-21 RX ADMIN — SODIUM CHLORIDE, PRESERVATIVE FREE 10 ML: 5 INJECTION INTRAVENOUS at 08:43

## 2024-09-21 RX ADMIN — BUMETANIDE 0.5 MG: 1 TABLET ORAL at 08:42

## 2024-09-21 RX ADMIN — Medication 2000 UNITS: at 08:42

## 2024-09-21 RX ADMIN — LEVALBUTEROL 0.63 MG: 0.63 SOLUTION RESPIRATORY (INHALATION) at 13:41

## 2024-09-21 RX ADMIN — METHYLPREDNISOLONE SODIUM SUCCINATE 40 MG: 40 INJECTION INTRAMUSCULAR; INTRAVENOUS at 08:43

## 2024-09-21 RX ADMIN — BUDESONIDE 250 MCG: 0.25 SUSPENSION RESPIRATORY (INHALATION) at 09:46

## 2024-09-23 ENCOUNTER — COMMUNITY CARE MANAGEMENT (OUTPATIENT)
Facility: CLINIC | Age: 84
End: 2024-09-23

## 2024-09-23 ENCOUNTER — OFFICE VISIT (OUTPATIENT)
Dept: PRIMARY CARE CLINIC | Age: 84
End: 2024-09-23
Payer: MEDICARE

## 2024-09-23 VITALS
TEMPERATURE: 98.6 F | DIASTOLIC BLOOD PRESSURE: 90 MMHG | HEART RATE: 62 BPM | OXYGEN SATURATION: 97 % | BODY MASS INDEX: 24.1 KG/M2 | RESPIRATION RATE: 17 BRPM | WEIGHT: 159 LBS | HEIGHT: 68 IN | SYSTOLIC BLOOD PRESSURE: 142 MMHG

## 2024-09-23 DIAGNOSIS — I48.0 PAROXYSMAL ATRIAL FIBRILLATION (HCC): Primary | ICD-10-CM

## 2024-09-23 DIAGNOSIS — J44.9 COPD, VERY SEVERE (HCC): ICD-10-CM

## 2024-09-23 DIAGNOSIS — I10 ESSENTIAL HYPERTENSION: ICD-10-CM

## 2024-09-23 DIAGNOSIS — E03.9 ACQUIRED HYPOTHYROIDISM: ICD-10-CM

## 2024-09-23 PROCEDURE — 99213 OFFICE O/P EST LOW 20 MIN: CPT | Performed by: INTERNAL MEDICINE

## 2024-09-23 PROCEDURE — 1123F ACP DISCUSS/DSCN MKR DOCD: CPT | Performed by: INTERNAL MEDICINE

## 2024-09-23 PROCEDURE — G8420 CALC BMI NORM PARAMETERS: HCPCS | Performed by: INTERNAL MEDICINE

## 2024-09-23 PROCEDURE — 3023F SPIROM DOC REV: CPT | Performed by: INTERNAL MEDICINE

## 2024-09-23 PROCEDURE — 1090F PRES/ABSN URINE INCON ASSESS: CPT | Performed by: INTERNAL MEDICINE

## 2024-09-23 PROCEDURE — 1111F DSCHRG MED/CURRENT MED MERGE: CPT | Performed by: INTERNAL MEDICINE

## 2024-09-23 PROCEDURE — 1036F TOBACCO NON-USER: CPT | Performed by: INTERNAL MEDICINE

## 2024-09-23 PROCEDURE — G8427 DOCREV CUR MEDS BY ELIG CLIN: HCPCS | Performed by: INTERNAL MEDICINE

## 2024-09-23 ASSESSMENT — ENCOUNTER SYMPTOMS
SHORTNESS OF BREATH: 1
ABDOMINAL PAIN: 0
NAUSEA: 0
VOMITING: 0
COUGH: 0
DIARRHEA: 0

## 2024-09-27 ENCOUNTER — OFFICE VISIT (OUTPATIENT)
Dept: PRIMARY CARE CLINIC | Age: 84
End: 2024-09-27

## 2024-09-27 VITALS
HEART RATE: 62 BPM | SYSTOLIC BLOOD PRESSURE: 150 MMHG | RESPIRATION RATE: 16 BRPM | TEMPERATURE: 97.6 F | BODY MASS INDEX: 24.1 KG/M2 | WEIGHT: 159 LBS | OXYGEN SATURATION: 98 % | DIASTOLIC BLOOD PRESSURE: 70 MMHG | HEIGHT: 68 IN

## 2024-09-27 DIAGNOSIS — I10 ESSENTIAL HYPERTENSION: ICD-10-CM

## 2024-09-27 DIAGNOSIS — J44.9 COPD, VERY SEVERE (HCC): ICD-10-CM

## 2024-09-27 DIAGNOSIS — I48.0 PAROXYSMAL ATRIAL FIBRILLATION (HCC): Primary | ICD-10-CM

## 2024-09-27 LAB — INR BLD: 3.1

## 2024-09-27 ASSESSMENT — ENCOUNTER SYMPTOMS
COUGH: 0
SHORTNESS OF BREATH: 0
DIARRHEA: 0
ABDOMINAL PAIN: 0
VOMITING: 0
NAUSEA: 0

## 2024-09-29 ENCOUNTER — APPOINTMENT (OUTPATIENT)
Dept: GENERAL RADIOLOGY | Age: 84
DRG: 813 | End: 2024-09-29
Payer: MEDICARE

## 2024-09-29 ENCOUNTER — HOSPITAL ENCOUNTER (INPATIENT)
Age: 84
LOS: 2 days | Discharge: HOME | DRG: 813 | End: 2024-10-01
Attending: STUDENT IN AN ORGANIZED HEALTH CARE EDUCATION/TRAINING PROGRAM | Admitting: STUDENT IN AN ORGANIZED HEALTH CARE EDUCATION/TRAINING PROGRAM
Payer: MEDICARE

## 2024-09-29 DIAGNOSIS — R79.89 ELEVATED BRAIN NATRIURETIC PEPTIDE (BNP) LEVEL: ICD-10-CM

## 2024-09-29 DIAGNOSIS — R79.1 SUPRATHERAPEUTIC INR: ICD-10-CM

## 2024-09-29 DIAGNOSIS — R06.02 SHORTNESS OF BREATH: Primary | ICD-10-CM

## 2024-09-29 LAB
ALBUMIN SERPL-MCNC: 4.2 G/DL (ref 3.5–5.2)
ALP SERPL-CCNC: 64 U/L (ref 35–104)
ALT SERPL-CCNC: 22 U/L (ref 0–32)
ANION GAP SERPL CALCULATED.3IONS-SCNC: 12 MMOL/L (ref 7–16)
AST SERPL-CCNC: 27 U/L (ref 0–31)
BASOPHILS # BLD: 0.01 K/UL (ref 0–0.2)
BASOPHILS NFR BLD: 0 % (ref 0–2)
BILIRUB SERPL-MCNC: 0.4 MG/DL (ref 0–1.2)
BNP SERPL-MCNC: 1053 PG/ML (ref 0–450)
BUN SERPL-MCNC: 20 MG/DL (ref 6–23)
CALCIUM SERPL-MCNC: 9 MG/DL (ref 8.6–10.2)
CHLORIDE SERPL-SCNC: 99 MMOL/L (ref 98–107)
CO2 SERPL-SCNC: 33 MMOL/L (ref 22–29)
CREAT SERPL-MCNC: 1.2 MG/DL (ref 0.5–1)
EOSINOPHIL # BLD: 0 K/UL (ref 0.05–0.5)
EOSINOPHILS RELATIVE PERCENT: 0 % (ref 0–6)
ERYTHROCYTE [DISTWIDTH] IN BLOOD BY AUTOMATED COUNT: 14.9 % (ref 11.5–15)
GFR, ESTIMATED: 46 ML/MIN/1.73M2
GLUCOSE SERPL-MCNC: 153 MG/DL (ref 74–99)
HCT VFR BLD AUTO: 40.7 % (ref 34–48)
HGB BLD-MCNC: 12.4 G/DL (ref 11.5–15.5)
IMM GRANULOCYTES # BLD AUTO: 0.12 K/UL (ref 0–0.58)
IMM GRANULOCYTES NFR BLD: 2 % (ref 0–5)
INR PPP: >10
LYMPHOCYTES NFR BLD: 0.93 K/UL (ref 1.5–4)
LYMPHOCYTES RELATIVE PERCENT: 13 % (ref 20–42)
MCH RBC QN AUTO: 29.7 PG (ref 26–35)
MCHC RBC AUTO-ENTMCNC: 30.5 G/DL (ref 32–34.5)
MCV RBC AUTO: 97.4 FL (ref 80–99.9)
MONOCYTES NFR BLD: 0.24 K/UL (ref 0.1–0.95)
MONOCYTES NFR BLD: 3 % (ref 2–12)
NEUTROPHILS NFR BLD: 82 % (ref 43–80)
NEUTS SEG NFR BLD: 5.94 K/UL (ref 1.8–7.3)
PLATELET # BLD AUTO: 183 K/UL (ref 130–450)
PMV BLD AUTO: 10.3 FL (ref 7–12)
POTASSIUM SERPL-SCNC: 4 MMOL/L (ref 3.5–5)
PROT SERPL-MCNC: 6.4 G/DL (ref 6.4–8.3)
PROTHROMBIN TIME: >120 SEC (ref 9.3–12.4)
RBC # BLD AUTO: 4.18 M/UL (ref 3.5–5.5)
SODIUM SERPL-SCNC: 144 MMOL/L (ref 132–146)
TROPONIN I SERPL HS-MCNC: 17 NG/L (ref 0–9)
WBC OTHER # BLD: 7.2 K/UL (ref 4.5–11.5)

## 2024-09-29 PROCEDURE — 99221 1ST HOSP IP/OBS SF/LOW 40: CPT | Performed by: STUDENT IN AN ORGANIZED HEALTH CARE EDUCATION/TRAINING PROGRAM

## 2024-09-29 PROCEDURE — 83880 ASSAY OF NATRIURETIC PEPTIDE: CPT

## 2024-09-29 PROCEDURE — 84484 ASSAY OF TROPONIN QUANT: CPT

## 2024-09-29 PROCEDURE — 2700000000 HC OXYGEN THERAPY PER DAY

## 2024-09-29 PROCEDURE — 71045 X-RAY EXAM CHEST 1 VIEW: CPT

## 2024-09-29 PROCEDURE — 85025 COMPLETE CBC W/AUTO DIFF WBC: CPT

## 2024-09-29 PROCEDURE — 80053 COMPREHEN METABOLIC PANEL: CPT

## 2024-09-29 PROCEDURE — 6370000000 HC RX 637 (ALT 250 FOR IP): Performed by: STUDENT IN AN ORGANIZED HEALTH CARE EDUCATION/TRAINING PROGRAM

## 2024-09-29 PROCEDURE — 94640 AIRWAY INHALATION TREATMENT: CPT

## 2024-09-29 PROCEDURE — 85610 PROTHROMBIN TIME: CPT

## 2024-09-29 PROCEDURE — APPSS60 APP SPLIT SHARED TIME 46-60 MINUTES: Performed by: NURSE PRACTITIONER

## 2024-09-29 PROCEDURE — 99285 EMERGENCY DEPT VISIT HI MDM: CPT

## 2024-09-29 PROCEDURE — 1200000000 HC SEMI PRIVATE

## 2024-09-29 PROCEDURE — 93005 ELECTROCARDIOGRAM TRACING: CPT | Performed by: STUDENT IN AN ORGANIZED HEALTH CARE EDUCATION/TRAINING PROGRAM

## 2024-09-29 RX ORDER — SODIUM CHLORIDE 0.9 % (FLUSH) 0.9 %
5-40 SYRINGE (ML) INJECTION EVERY 12 HOURS SCHEDULED
Status: DISCONTINUED | OUTPATIENT
Start: 2024-09-29 | End: 2024-10-01 | Stop reason: HOSPADM

## 2024-09-29 RX ORDER — PHYTONADIONE 5 MG/1
5 TABLET ORAL ONCE
Status: COMPLETED | OUTPATIENT
Start: 2024-09-29 | End: 2024-09-29

## 2024-09-29 RX ORDER — BUDESONIDE 0.25 MG/2ML
0.25 INHALANT ORAL
Status: DISCONTINUED | OUTPATIENT
Start: 2024-09-29 | End: 2024-10-01 | Stop reason: HOSPADM

## 2024-09-29 RX ORDER — GUAIFENESIN 400 MG/1
400 TABLET ORAL 3 TIMES DAILY
Status: DISCONTINUED | OUTPATIENT
Start: 2024-09-29 | End: 2024-10-01 | Stop reason: HOSPADM

## 2024-09-29 RX ORDER — ASCORBIC ACID 500 MG
500 TABLET ORAL DAILY
Status: DISCONTINUED | OUTPATIENT
Start: 2024-09-30 | End: 2024-10-01 | Stop reason: HOSPADM

## 2024-09-29 RX ORDER — DIGOXIN 125 MCG
62.5 TABLET ORAL
Status: DISCONTINUED | OUTPATIENT
Start: 2024-09-30 | End: 2024-10-01 | Stop reason: HOSPADM

## 2024-09-29 RX ORDER — ONDANSETRON 2 MG/ML
4 INJECTION INTRAMUSCULAR; INTRAVENOUS EVERY 6 HOURS PRN
Status: DISCONTINUED | OUTPATIENT
Start: 2024-09-29 | End: 2024-10-01 | Stop reason: HOSPADM

## 2024-09-29 RX ORDER — AMIODARONE HYDROCHLORIDE 200 MG/1
100 TABLET ORAL
Status: DISCONTINUED | OUTPATIENT
Start: 2024-09-30 | End: 2024-10-01 | Stop reason: HOSPADM

## 2024-09-29 RX ORDER — ONDANSETRON 4 MG/1
4 TABLET, ORALLY DISINTEGRATING ORAL EVERY 8 HOURS PRN
Status: DISCONTINUED | OUTPATIENT
Start: 2024-09-29 | End: 2024-10-01 | Stop reason: HOSPADM

## 2024-09-29 RX ORDER — IPRATROPIUM BROMIDE AND ALBUTEROL SULFATE 2.5; .5 MG/3ML; MG/3ML
1 SOLUTION RESPIRATORY (INHALATION) ONCE
Status: COMPLETED | OUTPATIENT
Start: 2024-09-29 | End: 2024-09-29

## 2024-09-29 RX ORDER — SODIUM CHLORIDE 0.9 % (FLUSH) 0.9 %
5-40 SYRINGE (ML) INJECTION PRN
Status: DISCONTINUED | OUTPATIENT
Start: 2024-09-29 | End: 2024-10-01 | Stop reason: HOSPADM

## 2024-09-29 RX ORDER — VITAMIN B COMPLEX
2000 TABLET ORAL DAILY
Status: DISCONTINUED | OUTPATIENT
Start: 2024-09-30 | End: 2024-10-01 | Stop reason: HOSPADM

## 2024-09-29 RX ORDER — ARFORMOTEROL TARTRATE 15 UG/2ML
15 SOLUTION RESPIRATORY (INHALATION)
Status: DISCONTINUED | OUTPATIENT
Start: 2024-09-29 | End: 2024-10-01 | Stop reason: HOSPADM

## 2024-09-29 RX ORDER — METOPROLOL SUCCINATE 25 MG/1
12.5 TABLET, EXTENDED RELEASE ORAL 2 TIMES DAILY
Status: DISCONTINUED | OUTPATIENT
Start: 2024-09-30 | End: 2024-10-01 | Stop reason: HOSPADM

## 2024-09-29 RX ORDER — ROSUVASTATIN CALCIUM 10 MG/1
10 TABLET, COATED ORAL NIGHTLY
Status: DISCONTINUED | OUTPATIENT
Start: 2024-09-29 | End: 2024-10-01 | Stop reason: HOSPADM

## 2024-09-29 RX ORDER — LEVOTHYROXINE SODIUM 75 UG/1
75 TABLET ORAL DAILY
Status: DISCONTINUED | OUTPATIENT
Start: 2024-09-30 | End: 2024-10-01 | Stop reason: HOSPADM

## 2024-09-29 RX ORDER — PANTOPRAZOLE SODIUM 40 MG/1
40 TABLET, DELAYED RELEASE ORAL EVERY MORNING
Status: DISCONTINUED | OUTPATIENT
Start: 2024-09-30 | End: 2024-10-01 | Stop reason: HOSPADM

## 2024-09-29 RX ORDER — ACETAMINOPHEN 650 MG/1
650 SUPPOSITORY RECTAL EVERY 6 HOURS PRN
Status: DISCONTINUED | OUTPATIENT
Start: 2024-09-29 | End: 2024-10-01 | Stop reason: HOSPADM

## 2024-09-29 RX ORDER — POLYETHYLENE GLYCOL 3350 17 G/17G
17 POWDER, FOR SOLUTION ORAL DAILY PRN
Status: DISCONTINUED | OUTPATIENT
Start: 2024-09-29 | End: 2024-10-01 | Stop reason: HOSPADM

## 2024-09-29 RX ORDER — MAGNESIUM SULFATE IN WATER 40 MG/ML
2000 INJECTION, SOLUTION INTRAVENOUS PRN
Status: DISCONTINUED | OUTPATIENT
Start: 2024-09-29 | End: 2024-10-01 | Stop reason: HOSPADM

## 2024-09-29 RX ORDER — FLUTICASONE PROPIONATE 50 MCG
1 SPRAY, SUSPENSION (ML) NASAL 2 TIMES DAILY PRN
Status: DISCONTINUED | OUTPATIENT
Start: 2024-09-29 | End: 2024-10-01 | Stop reason: HOSPADM

## 2024-09-29 RX ORDER — POTASSIUM CHLORIDE 7.45 MG/ML
10 INJECTION INTRAVENOUS PRN
Status: DISCONTINUED | OUTPATIENT
Start: 2024-09-29 | End: 2024-10-01 | Stop reason: HOSPADM

## 2024-09-29 RX ORDER — SODIUM CHLORIDE 9 MG/ML
INJECTION, SOLUTION INTRAVENOUS PRN
Status: DISCONTINUED | OUTPATIENT
Start: 2024-09-29 | End: 2024-10-01 | Stop reason: HOSPADM

## 2024-09-29 RX ORDER — POTASSIUM CHLORIDE 1500 MG/1
40 TABLET, EXTENDED RELEASE ORAL PRN
Status: DISCONTINUED | OUTPATIENT
Start: 2024-09-29 | End: 2024-10-01 | Stop reason: HOSPADM

## 2024-09-29 RX ORDER — ACETAMINOPHEN 325 MG/1
650 TABLET ORAL EVERY 6 HOURS PRN
Status: DISCONTINUED | OUTPATIENT
Start: 2024-09-29 | End: 2024-10-01 | Stop reason: HOSPADM

## 2024-09-29 RX ORDER — LEVALBUTEROL INHALATION SOLUTION 0.63 MG/3ML
0.63 SOLUTION RESPIRATORY (INHALATION) EVERY 6 HOURS PRN
Status: DISCONTINUED | OUTPATIENT
Start: 2024-09-29 | End: 2024-10-01 | Stop reason: HOSPADM

## 2024-09-29 RX ORDER — BUMETANIDE 1 MG/1
0.5 TABLET ORAL DAILY
Status: DISCONTINUED | OUTPATIENT
Start: 2024-09-30 | End: 2024-10-01 | Stop reason: HOSPADM

## 2024-09-29 RX ADMIN — IPRATROPIUM BROMIDE AND ALBUTEROL SULFATE 1 DOSE: 2.5; .5 SOLUTION RESPIRATORY (INHALATION) at 15:56

## 2024-09-29 RX ADMIN — PHYTONADIONE 5 MG: 5 TABLET ORAL at 20:29

## 2024-09-29 ASSESSMENT — PAIN SCALES - GENERAL: PAINLEVEL_OUTOF10: 0

## 2024-09-29 NOTE — ED PROVIDER NOTES
Department of Emergency Medicine   ED  Provider Note  Admit Date/RoomTime: 9/29/2024  2:27 PM  ED Room: /36              [facility]     Krystal Hyatt is a 84 y.o. female with a PMHx significant for  COPD chronically on supplemental oxygen, atrial fibrillation on warfarin, CKD, hypothyroidism, lung CA, HLD  who presents for evaluation of shortness of breath and fatigue, beginning prior to arrival.  The complaint has been persistent, moderate in severity, and worsened by nothing.   The patient states that she recently saw her family physician, Dr. Ga.  Was told her INR was high and was switched to a lower dosage of her Coumadin.  Notes that after they did this she has been feeling little bit more short of breath.  Denies any chest discomfort, nausea, vomiting..     Review of Systems   Constitutional:  Negative for chills and fever.   HENT:  Negative for ear pain, sinus pressure and sore throat.    Eyes:  Negative for pain, discharge and redness.   Respiratory:  Positive for shortness of breath. Negative for cough and wheezing.    Cardiovascular:  Negative for chest pain.   Gastrointestinal:  Negative for abdominal distention, diarrhea, nausea and vomiting.   Genitourinary:  Negative for dysuria and frequency.   Musculoskeletal:  Negative for arthralgias and back pain.   Skin:  Negative for rash and wound.   Neurological:  Negative for weakness and headaches.   Hematological:  Negative for adenopathy.   All other systems reviewed and are negative.       Physical Exam  Vitals and nursing note reviewed.   Constitutional:       General: She is not in acute distress.     Appearance: She is well-developed. She is not ill-appearing.   HENT:      Head: Normocephalic and atraumatic.      Right Ear: External ear normal.      Left Ear: External ear normal.      Nose:      Comments: NC in place  Eyes:      General:         Right eye: No discharge.         Left eye: No discharge.      Extraocular Movements: Extraocular

## 2024-09-30 LAB
ANION GAP SERPL CALCULATED.3IONS-SCNC: 12 MMOL/L (ref 7–16)
BASOPHILS # BLD: 0.01 K/UL (ref 0–0.2)
BASOPHILS NFR BLD: 0 % (ref 0–2)
BUN SERPL-MCNC: 25 MG/DL (ref 6–23)
CALCIUM SERPL-MCNC: 8.7 MG/DL (ref 8.6–10.2)
CHLORIDE SERPL-SCNC: 97 MMOL/L (ref 98–107)
CO2 SERPL-SCNC: 32 MMOL/L (ref 22–29)
CREAT SERPL-MCNC: 0.9 MG/DL (ref 0.5–1)
DIGOXIN SERPL-MCNC: 0.6 NG/ML (ref 0.8–2)
EKG ATRIAL RATE: 80 BPM
EKG P AXIS: 38 DEGREES
EKG P-R INTERVAL: 152 MS
EKG Q-T INTERVAL: 398 MS
EKG QRS DURATION: 66 MS
EKG QTC CALCULATION (BAZETT): 459 MS
EKG R AXIS: 22 DEGREES
EKG T AXIS: 73 DEGREES
EKG VENTRICULAR RATE: 80 BPM
EOSINOPHIL # BLD: 0.02 K/UL (ref 0.05–0.5)
EOSINOPHILS RELATIVE PERCENT: 0 % (ref 0–6)
ERYTHROCYTE [DISTWIDTH] IN BLOOD BY AUTOMATED COUNT: 15 % (ref 11.5–15)
GFR, ESTIMATED: 61 ML/MIN/1.73M2
GLUCOSE SERPL-MCNC: 148 MG/DL (ref 74–99)
HCT VFR BLD AUTO: 34.5 % (ref 34–48)
HGB BLD-MCNC: 10.8 G/DL (ref 11.5–15.5)
IMM GRANULOCYTES # BLD AUTO: 0.05 K/UL (ref 0–0.58)
IMM GRANULOCYTES NFR BLD: 1 % (ref 0–5)
INR PPP: 2.9
LYMPHOCYTES NFR BLD: 1.65 K/UL (ref 1.5–4)
LYMPHOCYTES RELATIVE PERCENT: 30 % (ref 20–42)
MCH RBC QN AUTO: 29.6 PG (ref 26–35)
MCHC RBC AUTO-ENTMCNC: 31.3 G/DL (ref 32–34.5)
MCV RBC AUTO: 94.5 FL (ref 80–99.9)
MONOCYTES NFR BLD: 0.32 K/UL (ref 0.1–0.95)
MONOCYTES NFR BLD: 6 % (ref 2–12)
NEUTROPHILS NFR BLD: 62 % (ref 43–80)
NEUTS SEG NFR BLD: 3.38 K/UL (ref 1.8–7.3)
PLATELET # BLD AUTO: 146 K/UL (ref 130–450)
PMV BLD AUTO: 10.6 FL (ref 7–12)
POTASSIUM SERPL-SCNC: 3.6 MMOL/L (ref 3.5–5)
PROTHROMBIN TIME: 30.9 SEC (ref 9.3–12.4)
RBC # BLD AUTO: 3.65 M/UL (ref 3.5–5.5)
SODIUM SERPL-SCNC: 141 MMOL/L (ref 132–146)
WBC OTHER # BLD: 5.4 K/UL (ref 4.5–11.5)

## 2024-09-30 PROCEDURE — 1200000000 HC SEMI PRIVATE

## 2024-09-30 PROCEDURE — 85610 PROTHROMBIN TIME: CPT

## 2024-09-30 PROCEDURE — 94640 AIRWAY INHALATION TREATMENT: CPT

## 2024-09-30 PROCEDURE — 85025 COMPLETE CBC W/AUTO DIFF WBC: CPT

## 2024-09-30 PROCEDURE — 99232 SBSQ HOSP IP/OBS MODERATE 35: CPT | Performed by: STUDENT IN AN ORGANIZED HEALTH CARE EDUCATION/TRAINING PROGRAM

## 2024-09-30 PROCEDURE — 80048 BASIC METABOLIC PNL TOTAL CA: CPT

## 2024-09-30 PROCEDURE — 80162 ASSAY OF DIGOXIN TOTAL: CPT

## 2024-09-30 PROCEDURE — 6370000000 HC RX 637 (ALT 250 FOR IP): Performed by: NURSE PRACTITIONER

## 2024-09-30 PROCEDURE — 6360000002 HC RX W HCPCS: Performed by: NURSE PRACTITIONER

## 2024-09-30 PROCEDURE — 2580000003 HC RX 258: Performed by: NURSE PRACTITIONER

## 2024-09-30 PROCEDURE — 2700000000 HC OXYGEN THERAPY PER DAY

## 2024-09-30 RX ORDER — WARFARIN SODIUM 2.5 MG/1
2.5 TABLET ORAL
Status: DISCONTINUED | OUTPATIENT
Start: 2024-09-30 | End: 2024-09-30

## 2024-09-30 RX ADMIN — Medication 2000 UNITS: at 08:29

## 2024-09-30 RX ADMIN — BUDESONIDE 250 MCG: 0.25 SUSPENSION RESPIRATORY (INHALATION) at 19:39

## 2024-09-30 RX ADMIN — GUAIFENESIN 400 MG: 400 TABLET ORAL at 08:29

## 2024-09-30 RX ADMIN — BUMETANIDE 0.5 MG: 1 TABLET ORAL at 08:29

## 2024-09-30 RX ADMIN — SACUBITRIL AND VALSARTAN 0.5 TABLET: 24; 26 TABLET, FILM COATED ORAL at 00:50

## 2024-09-30 RX ADMIN — ROSUVASTATIN CALCIUM 10 MG: 10 TABLET, FILM COATED ORAL at 00:50

## 2024-09-30 RX ADMIN — LEVOTHYROXINE SODIUM 75 MCG: 75 TABLET ORAL at 06:10

## 2024-09-30 RX ADMIN — IPRATROPIUM BROMIDE 0.5 MG: 0.5 SOLUTION RESPIRATORY (INHALATION) at 12:48

## 2024-09-30 RX ADMIN — ARFORMOTEROL TARTRATE 15 MCG: 15 SOLUTION RESPIRATORY (INHALATION) at 19:39

## 2024-09-30 RX ADMIN — SACUBITRIL AND VALSARTAN 0.5 TABLET: 24; 26 TABLET, FILM COATED ORAL at 08:29

## 2024-09-30 RX ADMIN — METOPROLOL SUCCINATE 12.5 MG: 25 TABLET, FILM COATED, EXTENDED RELEASE ORAL at 18:19

## 2024-09-30 RX ADMIN — DIGOXIN 62.5 MCG: 0.12 TABLET ORAL at 08:29

## 2024-09-30 RX ADMIN — AMIODARONE HYDROCHLORIDE 100 MG: 200 TABLET ORAL at 18:19

## 2024-09-30 RX ADMIN — SACUBITRIL AND VALSARTAN 0.5 TABLET: 24; 26 TABLET, FILM COATED ORAL at 21:01

## 2024-09-30 RX ADMIN — SODIUM CHLORIDE, PRESERVATIVE FREE 10 ML: 5 INJECTION INTRAVENOUS at 21:03

## 2024-09-30 RX ADMIN — METOPROLOL SUCCINATE 12.5 MG: 25 TABLET, FILM COATED, EXTENDED RELEASE ORAL at 08:30

## 2024-09-30 RX ADMIN — ROSUVASTATIN CALCIUM 10 MG: 10 TABLET, FILM COATED ORAL at 21:02

## 2024-09-30 RX ADMIN — GUAIFENESIN 400 MG: 400 TABLET ORAL at 13:56

## 2024-09-30 RX ADMIN — SODIUM CHLORIDE, PRESERVATIVE FREE 10 ML: 5 INJECTION INTRAVENOUS at 09:44

## 2024-09-30 RX ADMIN — GUAIFENESIN 400 MG: 400 TABLET ORAL at 21:02

## 2024-09-30 RX ADMIN — IPRATROPIUM BROMIDE 0.5 MG: 0.5 SOLUTION RESPIRATORY (INHALATION) at 19:39

## 2024-09-30 RX ADMIN — OXYCODONE HYDROCHLORIDE AND ACETAMINOPHEN 500 MG: 500 TABLET ORAL at 08:29

## 2024-09-30 RX ADMIN — PANTOPRAZOLE SODIUM 40 MG: 40 TABLET, DELAYED RELEASE ORAL at 08:29

## 2024-09-30 RX ADMIN — GUAIFENESIN 400 MG: 400 TABLET ORAL at 00:50

## 2024-09-30 NOTE — CARE COORDINATION
9/30/2024  Social Work Discharge Planning:Cardiology consult.SOB.This worker met with Pt to discuss  role and transition of care/discharge planning. Pt is from home alone (has 3 cats and a bird) in a 2 story home and stays on the first floor. Pt is on her baseline 5l o2 here. Uses 5l at home via [employer]. Pt also has a cane, ww, wc and shower chair. Shower chair is the only DME she uses a this time.  Pt has a supportive son who comes by daily and her daughter is currently visiting from [place]. Pharmacy is [facility] in [place].Electronically signed by KAREN Patel on 9/30/2024 at 10:17 AM

## 2024-09-30 NOTE — CARE COORDINATION
9/30/2024  Social Work Discharge Planning: Cardiology consult.SOB.This worker met with Pt to discuss  role and transition of care/discharge planning. Pt is from home alone (has 3 cats and a bird) in a 2 story home and stays on the first floor. Pt is on her baseline 5l o2 here. Uses 5l at home via [employer]. Pt also has a cane, ww, wc and shower chair. Shower chair is the only DME she uses a this time.  Pt has a supportive son who comes by daily and her daughter is currently visiting from [place]. Pharmacy is [facility] in [place]. Electronically signed by KAREN Patel on 9/30/2024 at 10:17 AM

## 2024-09-30 NOTE — PLAN OF CARE
Problem: Discharge Planning  Goal: Discharge to home or other facility with appropriate resources  9/30/2024 1012 by Rylie Kong RN  Outcome: Progressing  9/30/2024 0024 by Amaya Pichardo RN  Outcome: Progressing     Problem: Safety - Adult  Goal: Free from fall injury  9/30/2024 1012 by Rylie Kong RN  Outcome: Progressing  9/30/2024 0024 by Amaya Pichardo RN  Outcome: Progressing     Problem: ABCDS Injury Assessment  Goal: Absence of physical injury  9/30/2024 0024 by Amaya Pichardo RN  Outcome: Progressing

## 2024-09-30 NOTE — PROGRESS NOTES
Spoke with Dr. Jones, he would like pt on Eliquis at discharge as well. The problem is cost of eliquis. If eliquis is an option now due to cost, Dr. Jones recommends pt not start Eliquis til INR < 2.0. Also for further cost options, Dr. Jones recommends reaching out to [address] [place] [address] in [place].    Dr. Siu updated. He will place script order for discharge on Eliquis so CM/SW team can run benefits and get cost for pt. Additional lower cost options may be to completed through 90 day supply through [employer] [facility] [employer].    Doctor's office

## 2024-09-30 NOTE — PROGRESS NOTES
Attempt to place IV access on patient failed after 2x attempts. Per patient request, would like vascular access team to place IV. Will consult vascular access team for IV placement and AM labs.

## 2024-09-30 NOTE — H&P
[facility] Hospitalist Group History and Physical      CHIEF COMPLAINT:  Shortness of breath    History of Present Illness:  This is a 84 year old female with PMH significant for atrial fibrillation (on Coumadin), CHF, emphysema (on 5 L nasal cannula at home), HLD, HTN, lung CA, MVR, and thyroid disease.  Recently admitted 3 times this month for exacerbation COPD, A-fib RVR, and bradycardia.  Patient to ED due to shortness of breath.  Patient reports waking up this morning and feeling not good.  Patient states, \"I felt funny in my chest.\"  Associated symptoms include increased shortness of breath.  Denies chest pain, abdominal pain, nausea/vomiting, and changes in bowel/bladder habits.  Currently oxygenation saturation % on baseline of 5 L via nasal cannula. Patient reports only taking synthroid this AM.     Labs remarkable for BUNs/creatinine 20/1.2, GFR 46, , BNP 1053, troponin 17, and INR greater than 10 with PT greater than 120.  Patient denies any new changes in diet.  Also denies missing or taking additional doses of Coumadin.  Last INR 3.1 on 9/27/2024.  Patient instructed to take 2.5 mg of Coumadin daily at that time.  Daughter interested in maybe switching patient to Xarelto or Eliquis depending on cost of drug.  Given vitamin K 5 mg and 1 DuoNeb in ED.  Will admit for further evaluation and treatment.      Informant(s) for H&P: Patient and chart review    REVIEW OF SYSTEMS:  A comprehensive review of systems was negative except for: what is in the HPI      PMH:  Past Medical History:   Diagnosis Date    Atrial fibrillation (HCC)     CHF (congestive heart failure) (HCC)     Emphysema, unspecified (HCC)     Hyperlipidemia     Hypertension     Lung cancer (HCC)     Mitral valve regurgitation     Oxygen dependent     Prolonged emergence from general anesthesia     post general anesthesia    Skin cancer     Thyroid disease        Surgical History:  Past Surgical History:   Procedure Laterality  dry  Head: normocephalic and atraumatic  Eyes: pupils equal, round, and reactive to light, conjunctivae normal  Neck: neck supple and non tender without mass   Pulmonary/Chest: clear to auscultation bilaterally- no wheezes, rales or rhonchi, normal air movement, no respiratory distress  Cardiovascular: normal rate, normal S1 and S 2, + murmur  Abdomen: soft, non-tender, non-distended, normal bowel sounds, no masses or organomegaly  Extremities: no cyanosis, no clubbing and no edema  Neurologic: speech normal        LABS:  Recent Labs     09/29/24  1555      K 4.0   CL 99   CO2 33*   BUN 20   CREATININE 1.2*   GLUCOSE 153*   CALCIUM 9.0       Recent Labs     09/29/24  1555   WBC 7.2   RBC 4.18   HGB 12.4   HCT 40.7   MCV 97.4   MCH 29.7   MCHC 30.5*   RDW 14.9      MPV 10.3       No results for input(s): \"POCGLU\" in the last 72 hours.        Radiology:   XR CHEST PORTABLE   Final Result   1. Multiple areas of scarring and subsegmental atelectasis are present   bilaterally.   2. No evidence of pneumonia or pleural effusion.   3. Large hiatal hernia.             EKG:     ASSESSMENT:      Principal Problem:    Supratherapeutic INR  Active Problems:    Supplemental oxygen dependent    Stage 3a chronic kidney disease (HCC)    PAF (paroxysmal atrial fibrillation) (HCC)  Resolved Problems:    * No resolved hospital problems. *      PLAN:    1.  Supratherapeutic INR-cardiology consulted in ED.  Hold Coumadin for now.  Recheck INR in AM.  Received vitamin K 5 mg in ED.  No active bleeding noted.  H&H within normal limits.  2.  PAF-normal sinus rhythm on the monitor at this time.  Continue monitoring patient on telemetry.  Continue amiodarone and dig.  Check dig level in AM.  Cardiology on board.  Holding Coumadin at this time.  3.  COPD-stable.  No wheezing noted.  Received 1 DuoNeb in ED.  Continue pulmonary regimen.  4.  Supplemental oxygen dependent-on 5 L of oxygen via nasal cannula (baseline).  5.  Stage 3a

## 2024-09-30 NOTE — PROGRESS NOTES
Pharmacy Consultation Note  (Warfarin Dosing and Monitoring)    Initial consult date: 9/29/2024  Consulting Provider: Dr. Kumar Davies    Krystal Hyatt is a 84 y.o. female for whom pharmacy has been asked to manage warfarin therapy.     Weight:   Wt Readings from Last 1 Encounters:   09/30/24 73 kg (161 lb)       TSH:    Lab Results   Component Value Date/Time    TSH 5.52 09/17/2024 03:52 AM       Hepatic Function Panel:                          Lab Results   Component Value Date/Time    ALKPHOS 64 09/29/2024 03:55 PM    ALT 22 09/29/2024 03:55 PM    AST 27 09/29/2024 03:55 PM    BILITOT 0.4 09/29/2024 03:55 PM    BILIDIR 0.2 10/15/2017 02:40 AM    IBILI 0.7 10/15/2017 02:40 AM       Current significant warfarin drug-drug interactions include: amiodarone (incr INR)    Recent Labs     09/29/24  1555 09/30/24  0940   HGB 12.4 10.8*    146     Date Warfarin Dose INR Heparin or LMWH Comment   9/29 HOLD >10 -- Vitamin K 5 mg PO x 1 dose                                 Assessment:  Patient is a 84 y.o. female on warfarin for Atrial Fibrillation.  Patient's home warfarin dosing regimen is documented as warfarin 2.5 mg daily.   Goal INR 2 - 3  INR 2.9 today  INR was >10 upon admission on 9/29; received vitamin K 5 mg PO x 1 dose    Plan:  Will give warfarin 2.5 mg tonight  Daily PT/INR until the INR is stable within the therapeutic range  Pharmacist will follow and monitor/adjust dosing as necessary    Joaquin Aldana, PharmD, BCCCP  9/30/2024  11:56 AM    SEB: [phone]  SEY: [phone]  SJW: [phone]

## 2024-09-30 NOTE — PROGRESS NOTES
atraumatic  Eyes: PERRL, EOMI, conjunctivae normal  Neck: neck supple, trachea midline   Pulmonary/Chest: CTAB, no w/r/r, normal air movement, no respiratory distress, 4L NC  Cardiovascular: RRR, no murmurs  Abdomen: soft, non-tender, non-distended, normal bowel sounds, no masses or organomegaly  Extremities: no cyanosis, no clubbing and no edema  Neurologic: no cranial nerve deficit and speech normal      Recent Labs     09/29/24  1555      K 4.0   CL 99   CO2 33*   BUN 20   CREATININE 1.2*   GLUCOSE 153*   CALCIUM 9.0       Recent Labs     09/29/24  1555   WBC 7.2   RBC 4.18   HGB 12.4   HCT 40.7   MCV 97.4   MCH 29.7   MCHC 30.5*   RDW 14.9      MPV 10.3       Radiology:  XR CHEST PORTABLE   Final Result   1. Multiple areas of scarring and subsegmental atelectasis are present   bilaterally.   2. No evidence of pneumonia or pleural effusion.   3. Large hiatal hernia.              Assessment:  Principal Problem:    Supratherapeutic INR  Active Problems:    Supplemental oxygen dependent    Stage 3a chronic kidney disease (HCC)    PAF (paroxysmal atrial fibrillation) ([facility])  Resolved Problems:    * No resolved hospital problems. *      Plan:  Supratherapeutic INR- INR >10 on admit, had been 3.1 on 9/27 (2 days prior). Cards c/s. Hold Coumadin for now, Vit K in ED. Trend INR. No s/s of active bleeding, monitor closely. Hgb wnl, trend  PAF- NSR on monitor. Continue amiodarone and dig, follow dig level. Cards following. Holding coumadin as above  COPD/emphysema, chronic hypoxic resp failure- stable on baseline 5L NC. No wheezing. Cont nebs/inhalers and prn treatments  Stage 3a CKD-BUN/creatinine 20/1.2.  GFR 46.  Monitor for worsening.  CHF- BNP 1053 but does not appear volume overloaded on exam. Continue bumex, BB, ARNI. DWs, IOs  HLD- cont statin  HTN- cont meds as above, now at goal    -INR today down to 2.9 s/p Vit K, appreciate Cards input regarding resumption of warfarin (anticipate would require  bridging given administration of reversal agent) vs beginning DOAC    Dispo: anticipate home    NOTE: Portions of this report may have been transcribed using voice recognition software. Every effort was made to ensure accuracy; however, inadvertent computerized transcription errors may be present.  Electronically signed by Tanesha Siu MD on 9/30/2024 at 8:10 AM

## 2024-09-30 NOTE — ACP (ADVANCE CARE PLANNING)
9/30/2024  Advance Care Planning   Healthcare Decision Maker:    Primary Decision Maker: Olena Krystal Muniz - [phone]    Secondary Decision Maker: OlenaDieter - [phone]    Click here to complete Healthcare Decision Makers including selection of the Healthcare Decision Maker Relationship (ie \"Primary\").

## 2024-09-30 NOTE — PROGRESS NOTES
4 Eyes Skin Assessment     NAME:  Krystal Hyatt  YOB: 1940  MEDICAL RECORD NUMBER:  [id number]    The patient is being assessed for  Admission    I agree that at least one RN has performed a thorough Head to Toe Skin Assessment on the patient. ALL assessment sites listed below have been assessed.      Areas assessed by both nurses:    Head, Face, Ears, Shoulders, Back, Chest, Arms, Elbows, Hands, Sacrum. Buttock, Coccyx, Ischium, Legs. Feet and Heels, and Under Medical Devices         Does the Patient have a Wound? No noted wound(s)       Leandro Prevention initiated by RN: No  Wound Care Orders initiated by RN: No    Pressure Injury (Stage 3,4, Unstageable, DTI, NWPT, and Complex wounds) if present, place Wound referral order by RN under : No    New Ostomies, if present place, Ostomy referral order under : No     Nurse 1 eSignature: Electronically signed by Amaya Pichardo RN on 9/30/24 at 12:09 AM EDT    **SHARE this note so that the co-signing nurse can place an eSignature**    Nurse 2 eSignature: Electronically signed by Afsaneh Garcias RN on 9/30/24 at 12:10 AM EDT

## 2024-10-01 VITALS
DIASTOLIC BLOOD PRESSURE: 55 MMHG | BODY MASS INDEX: 24.42 KG/M2 | WEIGHT: 161.16 LBS | SYSTOLIC BLOOD PRESSURE: 107 MMHG | RESPIRATION RATE: 17 BRPM | OXYGEN SATURATION: 100 % | TEMPERATURE: 97.5 F | HEART RATE: 78 BPM | HEIGHT: 68 IN

## 2024-10-01 LAB
ANION GAP SERPL CALCULATED.3IONS-SCNC: 9 MMOL/L (ref 7–16)
BUN SERPL-MCNC: 22 MG/DL (ref 6–23)
CALCIUM SERPL-MCNC: 8.7 MG/DL (ref 8.6–10.2)
CHLORIDE SERPL-SCNC: 100 MMOL/L (ref 98–107)
CO2 SERPL-SCNC: 31 MMOL/L (ref 22–29)
CREAT SERPL-MCNC: 1 MG/DL (ref 0.5–1)
GFR, ESTIMATED: 58 ML/MIN/1.73M2
GLUCOSE SERPL-MCNC: 117 MG/DL (ref 74–99)
INR PPP: 1.5
POTASSIUM SERPL-SCNC: 4.3 MMOL/L (ref 3.5–5)
PROTHROMBIN TIME: 16.2 SEC (ref 9.3–12.4)
SODIUM SERPL-SCNC: 140 MMOL/L (ref 132–146)

## 2024-10-01 PROCEDURE — 6370000000 HC RX 637 (ALT 250 FOR IP): Performed by: NURSE PRACTITIONER

## 2024-10-01 PROCEDURE — 6360000002 HC RX W HCPCS: Performed by: NURSE PRACTITIONER

## 2024-10-01 PROCEDURE — 85610 PROTHROMBIN TIME: CPT

## 2024-10-01 PROCEDURE — 94640 AIRWAY INHALATION TREATMENT: CPT

## 2024-10-01 PROCEDURE — 80048 BASIC METABOLIC PNL TOTAL CA: CPT

## 2024-10-01 PROCEDURE — 6370000000 HC RX 637 (ALT 250 FOR IP): Performed by: INTERNAL MEDICINE

## 2024-10-01 PROCEDURE — 2700000000 HC OXYGEN THERAPY PER DAY

## 2024-10-01 PROCEDURE — 36415 COLL VENOUS BLD VENIPUNCTURE: CPT

## 2024-10-01 PROCEDURE — 2580000003 HC RX 258: Performed by: NURSE PRACTITIONER

## 2024-10-01 PROCEDURE — 99239 HOSP IP/OBS DSCHRG MGMT >30: CPT | Performed by: STUDENT IN AN ORGANIZED HEALTH CARE EDUCATION/TRAINING PROGRAM

## 2024-10-01 RX ORDER — SACUBITRIL AND VALSARTAN 24; 26 MG/1; MG/1
1 TABLET, FILM COATED ORAL 2 TIMES DAILY
Qty: 30 TABLET | Refills: 0 | Status: SHIPPED | OUTPATIENT
Start: 2024-10-01

## 2024-10-01 RX ADMIN — BUDESONIDE 250 MCG: 0.25 SUSPENSION RESPIRATORY (INHALATION) at 09:55

## 2024-10-01 RX ADMIN — GUAIFENESIN 400 MG: 400 TABLET ORAL at 08:30

## 2024-10-01 RX ADMIN — ARFORMOTEROL TARTRATE 15 MCG: 15 SOLUTION RESPIRATORY (INHALATION) at 09:55

## 2024-10-01 RX ADMIN — SACUBITRIL AND VALSARTAN 0.5 TABLET: 24; 26 TABLET, FILM COATED ORAL at 08:31

## 2024-10-01 RX ADMIN — IPRATROPIUM BROMIDE 0.5 MG: 0.5 SOLUTION RESPIRATORY (INHALATION) at 09:55

## 2024-10-01 RX ADMIN — IPRATROPIUM BROMIDE 0.5 MG: 0.5 SOLUTION RESPIRATORY (INHALATION) at 13:30

## 2024-10-01 RX ADMIN — SODIUM CHLORIDE, PRESERVATIVE FREE 10 ML: 5 INJECTION INTRAVENOUS at 08:34

## 2024-10-01 RX ADMIN — Medication 2000 UNITS: at 08:29

## 2024-10-01 RX ADMIN — PANTOPRAZOLE SODIUM 40 MG: 40 TABLET, DELAYED RELEASE ORAL at 08:33

## 2024-10-01 RX ADMIN — APIXABAN 5 MG: 5 TABLET, FILM COATED ORAL at 14:40

## 2024-10-01 RX ADMIN — METOPROLOL SUCCINATE 12.5 MG: 25 TABLET, FILM COATED, EXTENDED RELEASE ORAL at 14:42

## 2024-10-01 RX ADMIN — GUAIFENESIN 400 MG: 400 TABLET ORAL at 14:38

## 2024-10-01 RX ADMIN — BUMETANIDE 0.5 MG: 1 TABLET ORAL at 08:31

## 2024-10-01 RX ADMIN — LEVOTHYROXINE SODIUM 75 MCG: 75 TABLET ORAL at 05:52

## 2024-10-01 RX ADMIN — METOPROLOL SUCCINATE 12.5 MG: 25 TABLET, FILM COATED, EXTENDED RELEASE ORAL at 08:30

## 2024-10-01 RX ADMIN — OXYCODONE HYDROCHLORIDE AND ACETAMINOPHEN 500 MG: 500 TABLET ORAL at 08:31

## 2024-10-01 NOTE — CONSULTS
CARDIOLOGY CONSULTATION    Patient Name:  Krystal Hyatt    :  1940    Reason for Consultation:   History of CHF; atrial fibrillation; COPD Intolerance to multiple cardiac medications.    History of Present Illness:   Krystal Hyatt returns to [facility], following intolerance of her underlying cardiac arrhythmia and thought she would have cardioversion moved up so that she can feel better.  Her major complaint is that of underlying palpitations.  When she was discharged approximately 1 week ago she was in atrial fibrillation but with a controlled response and had not subsequently converted to sinus rhythm and decided to come back to the hospital to undergo cardioversion somewhat earlier.  She now feels rather well and no different than when she was discharged.  She  has a longstanding history  of carcinoma of the lung and is status post history of radiation therapy and underlying chronic obstructive pulmonary disease. In the recent past, she was found to have drug-induced hyperthyroidism which resulted in atrial fibrillation for which she now for the most part remains in sinus rhythm..  Importantly following admission on this occasion she was in atrial fibrillation and it has been more than 2 months since her dosage of medication has been adjusted.  She has known left ventricular systolic dysfunction with an estimated left ventricular ejection fraction of 38±5%.  She is now readmitted for further observation and adjustment of her medical regimen as needed.  Past Medical History:   has a past medical history of Atrial fibrillation (HCC), CHF (congestive heart failure) (HCC), Emphysema, unspecified (HCC), Hyperlipidemia, Hypertension, Lung cancer (HCC), Mitral valve regurgitation, Oxygen dependent, Prolonged emergence from general anesthesia, Skin cancer, and Thyroid disease.    Surgical History:   has a past surgical history that includes Breast biopsy (Right); Tubal ligation;

## 2024-10-01 NOTE — PROGRESS NOTES
Pharmacy Consultation Note  (Warfarin Dosing and Monitoring)    Initial consult date: 9/29/2024  Consulting Provider: Dr. Kumar Davies    Note patient being transitioned to apixaban. Clinical pharmacy will sign-off. Please re-consult if we can be of further assistance.    Joaquin Aldana, PharmD, [facility]  10/1/2024  2:28 PM

## 2024-10-01 NOTE — PLAN OF CARE
Problem: Discharge Planning  Goal: Discharge to home or other facility with appropriate resources  Outcome: Progressing     Problem: Safety - Adult  Goal: Free from fall injury  Outcome: Progressing     Problem: ABCDS Injury Assessment  Goal: Absence of physical injury  Outcome: Progressing     Problem: Chronic Conditions and Co-morbidities  Goal: Patient's chronic conditions and co-morbidity symptoms are monitored and maintained or improved  Outcome: Progressing     Problem: Discharge Planning  Goal: Discharge to home or other facility with appropriate resources  Outcome: Progressing     Problem: Safety - Adult  Goal: Free from fall injury  Outcome: Progressing     Problem: ABCDS Injury Assessment  Goal: Absence of physical injury  Outcome: Progressing     Problem: Chronic Conditions and Co-morbidities  Goal: Patient's chronic conditions and co-morbidity symptoms are monitored and maintained or improved  Outcome: Progressing

## 2024-10-01 NOTE — DISCHARGE SUMMARY
[facility] Hospitalist Physician Discharge Summary       No follow-up provider specified.    Activity level: as tolerated    Dispo: home      Condition on discharge: stable    Patient ID:  Krystal Hyatt  [id number]  84 y.o.  1940    Admit date: 9/29/2024    Discharge date and time:  10/1/2024  2:26 PM    Admission Diagnoses: Principal Problem:    Supratherapeutic INR  Active Problems:    Supplemental oxygen dependent    Stage 3a chronic kidney disease (HCC)    PAF (paroxysmal atrial fibrillation) (HCC)  Resolved Problems:    * No resolved hospital problems. *      Discharge Diagnoses: Principal Problem:    Supratherapeutic INR  Active Problems:    Supplemental oxygen dependent    Stage 3a chronic kidney disease (HCC)    PAF (paroxysmal atrial fibrillation) (HCC)  Resolved Problems:    * No resolved hospital problems. *      Consults:  IP CONSULT TO CARDIOLOGY  IP CONSULT TO PHARMACY  IP CONSULT TO VASCULAR ACCESS TEAM    Procedures: None    Hospital Course:   Patient Krystal Hyatt is a 84 y.o. presented with Shortness of breath [R06.02]  Elevated brain natriuretic peptide (BNP) level [R79.89]  Supratherapeutic INR [R79.1]    Brief summary:  Patient presented with supratherapeutic INR >10, this resolved with Vit K administration, no evidence of bleeding and Hgb stable during admission. Seen by Cards, will transition to [facility] at time of dispo.    Patient otherwise remained stable during admission. Other issues addressed as below. Patient is being discharged home in stable condition.    **Most recent hospitalist plan**  Plan:  Supratherapeutic INR- INR >10 on admit, had been 3.1 on 9/27 (2 days prior). Cards c/s. Hold Coumadin for now, Vit K in ED. Trend INR. No s/s of active bleeding, monitor closely. Hgb wnl, trend  PAF- NSR on monitor. Continue amiodarone and dig, follow dig level. Cards following. Holding coumadin as above  COPD/emphysema, chronic hypoxic resp failure- stable on baseline 5L  NC. No wheezing. Cont nebs/inhalers and prn treatments  Stage 3a CKD-BUN/creatinine 20/1.2.  GFR 46.  Monitor for worsening.  CHF- BNP 1053 but does not appear volume overloaded on exam. Continue bumex, BB, ARNI. DWs, IOs  HLD- cont statin  HTN- cont meds as above, now at goal  _________________________  **Most recent hospitalist plan**    Discharge Exam:  General Appearance: alert and oriented to person, place and time and in NAD, sitting in bed  Skin: warm and dry  Head: normocephalic and atraumatic  Eyes: PERRL, EOMI, conjunctivae normal  Neck: neck supple, trachea midline   Pulmonary/Chest: CTAB, no w/r/r, normal air movement, no respiratory distress, 4-5L NC  Cardiovascular: RRR, no murmurs  Abdomen: soft, non-tender, non-distended, normal bowel sounds, no masses or organomegaly  Extremities: no cyanosis, no clubbing and no edema  Neurologic: no cranial nerve deficit and speech normal    I/O last 3 completed shifts:  In: 190 [P.O.:180; I.V.:10]  Out: 600 [Urine:600]  I/O this shift:  In: 180 [P.O.:180]  Out: -       LABS:  Recent Labs     09/29/24  1555 09/30/24  0940 10/01/24  0815    141 140   K 4.0 3.6 4.3   CL 99 97* 100   CO2 33* 32* 31*   BUN 20 25* 22   CREATININE 1.2* 0.9 1.0   GLUCOSE 153* 148* 117*   CALCIUM 9.0 8.7 8.7       Recent Labs     09/29/24  1555 09/30/24  0940   WBC 7.2 5.4   RBC 4.18 3.65   HGB 12.4 10.8*   HCT 40.7 34.5   MCV 97.4 94.5   MCH 29.7 29.6   MCHC 30.5* 31.3*   RDW 14.9 15.0    146   MPV 10.3 10.6       No results for input(s): \"POCGLU\" in the last 72 hours.    Imaging:  XR CHEST PORTABLE    Result Date: 9/29/2024  EXAMINATION: ONE XRAY VIEW OF THE CHEST 9/29/2024 3:48 pm COMPARISON: September 19, 2024 HISTORY: ORDERING SYSTEM PROVIDED HISTORY: shortness of breath TECHNOLOGIST PROVIDED HISTORY: Reason for exam:->shortness of breath FINDINGS: There are multiple areas of scarring and subsegmental atelectasis.  No obvious airspace opacity or pleural effusion.  No

## 2024-10-01 NOTE — CARE COORDINATION
10/1/2024  Social Work Discharge Planning:Pt is from home alone (has 3 cats and a bird) in a 2 story home and stays on the first floor. Pt is on her baseline 5l o2 here. Uses 5l at home via [employer]. Pt also has a cane, ww, wc and shower chair. Shower chair is the only DME she uses a this time. Pt has a supportive son who comes by daily and her daughter is currently visiting from [place] .Plan is home.Currently no needs.  Electronically signed by KAREN Patel on 10/1/2024 at 9:08 AM    10/1/2024Pts eliequis cost is $33 a month after first free month of eliquis using the coupon. Coupon card is in Pts soft chart. SW notified Pt.Electronically signed by KAREN Patel on 10/1/2024 at 11:14 AM

## 2024-10-01 NOTE — PLAN OF CARE
Problem: Safety - Adult  Goal: Free from fall injury  10/1/2024 1245 by Lizbeth Garcia RN  Outcome: Progressing  10/1/2024 0331 by Harriet Reyes RN  Outcome: Progressing     Problem: ABCDS Injury Assessment  Goal: Absence of physical injury  10/1/2024 1245 by Lizbeth Garcia RN  Outcome: Progressing  10/1/2024 0331 by Harriet Reyes RN  Outcome: Progressing     Problem: Chronic Conditions and Co-morbidities  Goal: Patient's chronic conditions and co-morbidity symptoms are monitored and maintained or improved  10/1/2024 1245 by Lizbeth Garcia RN  Outcome: Progressing  10/1/2024 0331 by Harriet Reyes RN  Outcome: Progressing

## 2024-10-01 NOTE — PROGRESS NOTES
CLINICAL PHARMACY NOTE: MEDS TO BEDS    Total # of Prescriptions Filled: 1   The following medications were delivered to the patient:  Eliquis 5 mg       Additional Documentation:

## 2024-10-01 NOTE — PROGRESS NOTES
PROGRESS NOTE       PATIENT PROBLEM LIST:  Patient Active Problem List   Diagnosis Code    Atrial fibrillation with RVR ([facility])- Recurrent I48.91    Acute on chronic diastolic congestive heart failure (HCC) I50.33    Cardiomyopathy as manifestation of underlying disease ([facility]) I43    Non-rheumatic mitral regurgitation I34.0    Acute combined systolic and diastolic congestive heart failure (HCC) I50.41    Chronic anticoagulation Z79.01    Essential hypertension I10    COPD exacerbation ([facility]) J44.1    History of atrial fibrillation Z86.79    Dilated idiopathic cardiomyopathy (HCC) I42.0    Severe sinus bradycardia R00.1    Moderate tricuspid regurgitation I07.1    Hypoxia R09.02    Combined forms of age-related cataract of both eyes H25.813    Dyspnea on exertion R06.09    Mass of right lung R91.8    Supplemental oxygen dependent Z99.81    Pneumonia of both lower lobes due to infectious organism J18.9    Stage 3a chronic kidney disease (HCC) N18.31    Malignant neoplasm of right lung ([facility]) C34.91    Atrial flutter with rapid ventricular response ([facility]) I48.92    PAF (paroxysmal atrial fibrillation) ([facility]) I48.0    Centrilobular emphysema ([facility]) J43.2    Thyroid disease E07.9    Acquired hypothyroidism E03.9    Bronchitis J40    Chronic respiratory failure with hypoxia J96.11    Mixed hyperlipidemia E78.2    Gastroesophageal reflux disease K21.9    Primary hypothyroidism E03.9    Hyperlipidemia E78.5    Atrial fibrillation (HCC) I48.91    Atrial flutter (HCC) I48.92    Atrial fibrillation with rapid ventricular response ([facility]) I48.91    COVID-19 virus infection U07.1    Iatrogenic hyperthyroidism E05.80    Thrombocytopenia (HCC) D69.6    High thyroid hormone level R79.89    Chronic hypoxic respiratory failure J96.11    Chronic systolic CHF (congestive heart failure) (HCC) I50.22    Acute respiratory failure with hypoxia J96.01    Acute on chronic combined systolic and diastolic CHF (congestive heart failure) ([facility]) I50.43     COPD with acute exacerbation ([facility]) J44.1    Secondary hypercoagulable state ([facility]) D68.69    Tachycardia R00.0    Acute on chronic respiratory failure J96.20    Hyperkalemia E87.5    A-fib ([facility]) I48.91    Supratherapeutic INR R79.1       SUBJECTIVE:  Krystal Hyatt states she feels better and is breathing better and her INR has decreased to 1.5 units.    REVIEW OF SYSTEMS:  General ROS: positive for - fatigue,  Psychological ROS: negative for - anxiety , depression  Ophthalmic ROS: negative for - decreased vision or visual distortion.  ENT ROS: negative  Allergy and Immunology ROS: negative  Hematological and Lymphatic ROS: negative  Endocrine: no heat or cold intolerance and no polyphagia, polydipsia, or polyuria  Respiratory ROS: positive for - cough and shortness of breath  Cardiovascular ROS: positive for - dyspnea on exertion, irregular heartbeat, and shortness of breath.  Gastrointestinal ROS: no abdominal pain, change in bowel habits, or black or bloody stools  Genito-Urinary ROS: no nocturia, dysuria, trouble voiding, frequency or hematuria  Musculoskeletal ROS: negative for- myalgias, arthralgias, or claudication  Neurological ROS: no TIA or stroke symptoms otherwise no significant change in symptoms or problems since yesterday as documented in previous progress notes.    SCHEDULED MEDICATIONS:   sodium chloride flush  5-40 mL IntraVENous 2 times per day    amiodarone  100 mg Oral Once per day on Monday Wednesday Friday    bumetanide  0.5 mg Oral Daily    digoxin  62.5 mcg Oral Once per day on Monday Wednesday Friday    budesonide  0.25 mg Nebulization BID RT    And    arformoterol tartrate  15 mcg Nebulization BID RT    And    ipratropium  0.5 mg Nebulization Q6H RT    guaiFENesin  400 mg Oral TID    levothyroxine  75 mcg Oral Daily    metoprolol succinate  12.5 mg Oral BID    pantoprazole  40 mg Oral QAM    rosuvastatin  10 mg Oral Nightly    sacubitril-valsartan  0.5 tablet Oral BID    vitamin C  500 mg

## 2024-10-02 ENCOUNTER — TELEPHONE (OUTPATIENT)
Dept: PRIMARY CARE CLINIC | Age: 84
End: 2024-10-02

## 2024-10-02 NOTE — TELEPHONE ENCOUNTER
Krystal called in to let Dr. Jatin Narvaez know tht they started her on Eliquis in the hospital. She does have and appointment 10-7-24 @ 12:30 pm. Thank you.

## 2024-10-03 ENCOUNTER — COMMUNITY CARE MANAGEMENT (OUTPATIENT)
Facility: CLINIC | Age: 84
End: 2024-10-03

## 2024-10-03 ENCOUNTER — TELEPHONE (OUTPATIENT)
Dept: PRIMARY CARE CLINIC | Age: 84
End: 2024-10-03

## 2024-10-03 NOTE — TELEPHONE ENCOUNTER
Received a call from Care Management today and they are concerned with how many re admissions patient has had this year. Wanted to know if  could discuss with her about living alone and possibly needing assistance.   They said to reach out to them if they can be any help and they are going to talk to the patient today and see how she feels about things, etc.

## 2024-10-07 ENCOUNTER — OFFICE VISIT (OUTPATIENT)
Dept: PRIMARY CARE CLINIC | Age: 84
End: 2024-10-07
Payer: MEDICARE

## 2024-10-07 VITALS
BODY MASS INDEX: 23.42 KG/M2 | TEMPERATURE: 98.3 F | SYSTOLIC BLOOD PRESSURE: 120 MMHG | OXYGEN SATURATION: 99 % | DIASTOLIC BLOOD PRESSURE: 60 MMHG | WEIGHT: 154 LBS | HEART RATE: 91 BPM

## 2024-10-07 DIAGNOSIS — Z09 HOSPITAL DISCHARGE FOLLOW-UP: Primary | ICD-10-CM

## 2024-10-07 DIAGNOSIS — J44.9 COPD, VERY SEVERE (HCC): ICD-10-CM

## 2024-10-07 DIAGNOSIS — I10 ESSENTIAL HYPERTENSION: ICD-10-CM

## 2024-10-07 DIAGNOSIS — I48.0 PAROXYSMAL ATRIAL FIBRILLATION (HCC): ICD-10-CM

## 2024-10-07 PROCEDURE — G8420 CALC BMI NORM PARAMETERS: HCPCS | Performed by: INTERNAL MEDICINE

## 2024-10-07 PROCEDURE — 99214 OFFICE O/P EST MOD 30 MIN: CPT | Performed by: INTERNAL MEDICINE

## 2024-10-07 PROCEDURE — 1090F PRES/ABSN URINE INCON ASSESS: CPT | Performed by: INTERNAL MEDICINE

## 2024-10-07 PROCEDURE — 1111F DSCHRG MED/CURRENT MED MERGE: CPT | Performed by: INTERNAL MEDICINE

## 2024-10-07 PROCEDURE — G8484 FLU IMMUNIZE NO ADMIN: HCPCS | Performed by: INTERNAL MEDICINE

## 2024-10-07 PROCEDURE — 3023F SPIROM DOC REV: CPT | Performed by: INTERNAL MEDICINE

## 2024-10-07 PROCEDURE — 1036F TOBACCO NON-USER: CPT | Performed by: INTERNAL MEDICINE

## 2024-10-07 PROCEDURE — G8427 DOCREV CUR MEDS BY ELIG CLIN: HCPCS | Performed by: INTERNAL MEDICINE

## 2024-10-07 PROCEDURE — 1123F ACP DISCUSS/DSCN MKR DOCD: CPT | Performed by: INTERNAL MEDICINE

## 2024-10-07 ASSESSMENT — ENCOUNTER SYMPTOMS
DIARRHEA: 0
ABDOMINAL PAIN: 0
NAUSEA: 0
COUGH: 0
SHORTNESS OF BREATH: 0
VOMITING: 0

## 2024-10-07 NOTE — PROGRESS NOTES
SUBJECTIVE  Krystal Hyatt is a 84 y.o. female established was seen In the office  for evaluation.    HPI/Chief C/O:  Chief Complaint   Patient presents with    Follow-Up from Hospital     Went to hospital for SOB. Blood count was so high that it wouldn't register. Dr Jones kept her overnight and gave her Vitamin K.    Now on Eliquis   Allergies   Allergen Reactions    Pacerone [Amiodarone] Shortness Of Breath     States she becomes SOB when given IV.      Bactrim [Sulfamethoxazole-Trimethoprim] Other (See Comments)     Elevated kidney function    Cat Hair Extract Itching and Other (See Comments)     Patient states \"My son put this. I hope I'm not allergic, I have 4 Cats and 2 Birds, maybe to their dust.\"    Egg-Derived Products Nausea Only    Erythromycin Diarrhea, Nausea And Vomiting and Other (See Comments)     \"STOMACH PAINS\"      Pcn [Penicillins] Itching and Rash     PT STATES \"RASH FROM HEAD TO TOE, W/ SOMETHING CRAWLING UNDER MY SKIN\"    Seasonal Itching, Swelling and Other (See Comments)     RUNNY/ITCHY NOSE, WATERY/ITCHY EYES       ROS:  Review of Systems   Respiratory:  Negative for cough and shortness of breath.         Negative for Hemoptysis   Cardiovascular:  Negative for chest pain.   Gastrointestinal:  Negative for abdominal pain, diarrhea, nausea and vomiting.   Endocrine: Negative for polydipsia, polyphagia and polyuria.   Genitourinary:  Negative for dysuria and hematuria.   Skin:  Negative for rash.   Neurological:  Negative for tremors and seizures.        Past Medical/Surgical Hx;  Reviewed with patient      Diagnosis Date    Atrial fibrillation (HCC)     CHF (congestive heart failure) (HCC)     Emphysema, unspecified (HCC)     Hyperlipidemia     Hypertension     Lung cancer (HCC)     Mitral valve regurgitation     Oxygen dependent     Prolonged emergence from general anesthesia     post general anesthesia    Skin cancer     Thyroid disease      Past Surgical History:   Procedure

## 2024-10-22 ENCOUNTER — COMMUNITY CARE MANAGEMENT (OUTPATIENT)
Facility: CLINIC | Age: 84
End: 2024-10-22

## 2024-10-22 NOTE — PROGRESS NOTES
TCM 2 f/up completed by phone. Disclaimer provided/recorded line. 3 identifiers confirmed with patient.      Spoke with pt who shares she is doing well. She states she has sob often; however, it is not worse than her usual. Pt denies swelling.      Wt: staying the same   BP: up to 130 systolic (typically around 125/70)     Pt continues to take her medication as prescribed. She reports no changes at her hospital f/up appt with PCP.      Pt will see her PCP again on 11/4/24     EDUCATION:  · Nurse line number given to pt for any questions or needs - [phone]  · Educated pt on the s/s of fluid overload- sob, swelling, increase in BP or wt.  Educated patient to continue monitoring daily weight.   · Educated pt to call provider right away if 2-3 pound weight gain in 24 hours or 5 pounds in one week.  · Educated patient to continue monitoring BP/HR  · Educated patient on when to seek medical attention -  sudden or severe  SOB, chest pain, weakness on one side of the body.     Pt is agreeable to TCM f/up.   Next TCM appt: week of 10/28/24     Plan/Tasks:  TCM 3  · Review stop light materials upon receipt  · Chest pain or shortness of breath  · s/s of fluid overload  · Vital signs  · f/up appts  · Interested in Doximity visit     Assessment completed by Agnes RODRIGUEZ RN, MSN  [phone]

## 2024-11-04 ENCOUNTER — OFFICE VISIT (OUTPATIENT)
Dept: PRIMARY CARE CLINIC | Age: 84
End: 2024-11-04

## 2024-11-04 VITALS
BODY MASS INDEX: 23.49 KG/M2 | SYSTOLIC BLOOD PRESSURE: 120 MMHG | WEIGHT: 155 LBS | RESPIRATION RATE: 17 BRPM | OXYGEN SATURATION: 98 % | DIASTOLIC BLOOD PRESSURE: 60 MMHG | TEMPERATURE: 98.6 F | HEIGHT: 68 IN | HEART RATE: 63 BPM

## 2024-11-04 DIAGNOSIS — S80.861A TICK BITE OF RIGHT LOWER LEG, INITIAL ENCOUNTER: Primary | ICD-10-CM
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
## 2024-11-04 DIAGNOSIS — J44.9 COPD, VERY SEVERE (HCC): ICD-10-CM

## 2024-11-04 DIAGNOSIS — I48.0 PAROXYSMAL ATRIAL FIBRILLATION (HCC): ICD-10-CM

## 2024-11-04 DIAGNOSIS — I10 ESSENTIAL HYPERTENSION: ICD-10-CM

## 2024-11-04 LAB — INR BLD: 3.1

## 2024-11-04 RX ORDER — DOXYCYCLINE HYCLATE 100 MG
100 TABLET ORAL 2 TIMES DAILY
Qty: 14 TABLET | Refills: 0 | Status: SHIPPED | OUTPATIENT
Start: 2024-11-04 | End: 2024-11-11

## 2024-11-04 ASSESSMENT — ENCOUNTER SYMPTOMS
COUGH: 0
VOMITING: 0
DIARRHEA: 0
ABDOMINAL PAIN: 0
SHORTNESS OF BREATH: 0
NAUSEA: 0

## 2024-11-04 NOTE — PROGRESS NOTES
SUBJECTIVE  Krystal Hyatt is a 84 y.o. female established was seen In the office  for evaluation.    HPI/Chief C/O:  She removed tic from her right leg   Allergies   Allergen Reactions    Pacerone [Amiodarone] Shortness Of Breath     States she becomes SOB when given IV.      Bactrim [Sulfamethoxazole-Trimethoprim] Other (See Comments)     Elevated kidney function    Cat Hair Extract Itching and Other (See Comments)     Patient states \"My son put this. I hope I'm not allergic, I have 4 Cats and 2 Birds, maybe to their dust.\"    Egg-Derived Products Nausea Only    Erythromycin Diarrhea, Nausea And Vomiting and Other (See Comments)     \"STOMACH PAINS\"      Pcn [Penicillins] Itching and Rash     PT STATES \"RASH FROM HEAD TO TOE, W/ SOMETHING CRAWLING UNDER MY SKIN\"    Seasonal Itching, Swelling and Other (See Comments)     RUNNY/ITCHY NOSE, WATERY/ITCHY EYES       ROS:  Review of Systems   Respiratory:  Negative for cough and shortness of breath.         Negative for Hemoptysis   Cardiovascular:  Negative for chest pain.   Gastrointestinal:  Negative for abdominal pain, diarrhea, nausea and vomiting.   Endocrine: Negative for polydipsia, polyphagia and polyuria.   Genitourinary:  Negative for dysuria and hematuria.   Skin:  Negative for rash.   Neurological:  Negative for tremors and seizures.        Past Medical/Surgical Hx;  Reviewed with patient      Diagnosis Date    Atrial fibrillation (HCC)     CHF (congestive heart failure) (HCC)     Emphysema, unspecified (HCC)     Hyperlipidemia     Hypertension     Lung cancer (HCC)     Mitral valve regurgitation     Oxygen dependent     Prolonged emergence from general anesthesia     post general anesthesia    Skin cancer     Thyroid disease      Past Surgical History:   Procedure Laterality Date    BREAST BIOPSY Right     benign    BRONCHOSCOPY N/A 04/20/2021    BRONCHOSCOPY/TRANSBRONCHIAL NEEDLE BIOPSY performed by Antony Rosario MD at [facility] ENDOSCOPY    CARDIOVERSION

## 2024-11-11 ENCOUNTER — COMMUNITY CARE MANAGEMENT (OUTPATIENT)
Facility: CLINIC | Age: 84
End: 2024-11-11

## 2024-11-11 NOTE — PROGRESS NOTES
TCM 3 f/up completed by phone. Disclaimer provided/recorded line. 3 identifiers confirmed with patient.      Pt states she is doing very well. She states she continues to follow up with her PCP every month. Pt states she had her pneumonia, flu, rsv, and covid vaccinations recently.      BP: 112-120/60's   Wt: 150lbs     Radiology oncology- 11/18/24  PCP- 12/5/24      Pt denies swelling. Pt has sob regularly; she denies worsening sob.      Pt has started back on the Entresto. She is no longer taking Metoprolol since her BP had been low.      Pt states she is taking her medications as prescribed and wants to stay out of the hospital. Pt reports no concerns or needs at this time.      EDUCATION:  · Nurse line number given to pt for any questions or needs - [phone]  · Educated pt on the s/s of fluid overload  Educated patient to continue monitoring daily weight.   · Educated pt to call provider right away if 2-3 pound weight gain in 24 hours or 5 pounds in one week.  · Educated patient on monitoring feet/legs for swelling and elevating to reduce fluid  · Educated patient to continue monitoring BP/HR daily  · Educated patient on when to seek medical attention -  sudden or severe  SOB, chest pain, weakness on one side of the body.  · Educated pt on stop light materials and encouraged her to use this as a reference for when to call her provider and when to seek immediate medical attention.     TCM graduate. Pt is agreeable to [facility] f/up.   [facility] hand off      Assessment completed by Agnes Pedersen TCM RN, MSN  [phone]

## 2024-11-19 RX ORDER — ROSUVASTATIN CALCIUM 10 MG/1
10 TABLET, COATED ORAL NIGHTLY
Qty: 90 TABLET | Refills: 0 | Status: SHIPPED | OUTPATIENT
Start: 2024-11-19

## 2024-11-19 RX ORDER — PANTOPRAZOLE SODIUM 40 MG/1
40 TABLET, DELAYED RELEASE ORAL EVERY MORNING
Qty: 90 TABLET | Refills: 0 | Status: SHIPPED | OUTPATIENT
Start: 2024-11-19

## 2024-11-19 RX ORDER — LEVOTHYROXINE SODIUM 75 UG/1
75 TABLET ORAL DAILY
Qty: 90 TABLET | Refills: 0 | Status: SHIPPED | OUTPATIENT
Start: 2024-11-19

## 2024-11-19 NOTE — TELEPHONE ENCOUNTER
Patient requesting a refill of her      rosuvastatin (CRESTOR) 10 MG tablet        pantoprazole (PROTONIX) 40 MG tablet [       levothyroxine (SYNTHROID) 75 MCG tablet     Please send to   [facility] [address] [place] [address] [address] [place] [address] -   Thank you.

## 2024-11-19 NOTE — TELEPHONE ENCOUNTER
Name of Medication(s) Requested:  Requested Prescriptions     Pending Prescriptions Disp Refills    levothyroxine (SYNTHROID) 75 MCG tablet 90 tablet 0     Sig: Take 1 tablet by mouth daily    pantoprazole (PROTONIX) 40 MG tablet 90 tablet 0     Sig: Take 1 tablet by mouth every morning qd    rosuvastatin (CRESTOR) 10 MG tablet 90 tablet 0     Sig: Take 1 tablet by mouth nightly       Medication is on current medication list Yes    Dosage and directions were verified? Yes    Quantity verified: 90 day supply     Pharmacy Verified?  Yes    Last Appointment:  11/4/2024    Future appts:  Future Appointments   Date Time Provider Department Center   12/9/2024  1:00 PM Rom Mckeon MD [facility]   3/24/2025  9:30 AM [facility]        (If no appt send self scheduling link. .REFILLAPPT)  Scheduling request sent?     [] Yes  [x] No    Does patient need updated?  [] Yes  [x] No

## 2024-12-09 ENCOUNTER — OFFICE VISIT (OUTPATIENT)
Dept: PRIMARY CARE CLINIC | Age: 84
End: 2024-12-09
Payer: MEDICARE

## 2024-12-09 VITALS
OXYGEN SATURATION: 97 % | HEIGHT: 68 IN | WEIGHT: 155 LBS | BODY MASS INDEX: 23.49 KG/M2 | HEART RATE: 80 BPM | TEMPERATURE: 98.7 F | SYSTOLIC BLOOD PRESSURE: 148 MMHG | DIASTOLIC BLOOD PRESSURE: 70 MMHG

## 2024-12-09 DIAGNOSIS — I10 ESSENTIAL HYPERTENSION: ICD-10-CM

## 2024-12-09 DIAGNOSIS — E03.9 ACQUIRED HYPOTHYROIDISM: ICD-10-CM

## 2024-12-09 DIAGNOSIS — I48.0 PAROXYSMAL ATRIAL FIBRILLATION (HCC): Primary | ICD-10-CM

## 2024-12-09 DIAGNOSIS — J44.9 COPD, VERY SEVERE (HCC): ICD-10-CM

## 2024-12-09 PROCEDURE — 1159F MED LIST DOCD IN RCRD: CPT | Performed by: INTERNAL MEDICINE

## 2024-12-09 PROCEDURE — 3023F SPIROM DOC REV: CPT | Performed by: INTERNAL MEDICINE

## 2024-12-09 PROCEDURE — G8484 FLU IMMUNIZE NO ADMIN: HCPCS | Performed by: INTERNAL MEDICINE

## 2024-12-09 PROCEDURE — G8427 DOCREV CUR MEDS BY ELIG CLIN: HCPCS | Performed by: INTERNAL MEDICINE

## 2024-12-09 PROCEDURE — 1090F PRES/ABSN URINE INCON ASSESS: CPT | Performed by: INTERNAL MEDICINE

## 2024-12-09 PROCEDURE — 1036F TOBACCO NON-USER: CPT | Performed by: INTERNAL MEDICINE

## 2024-12-09 PROCEDURE — 1123F ACP DISCUSS/DSCN MKR DOCD: CPT | Performed by: INTERNAL MEDICINE

## 2024-12-09 PROCEDURE — G8420 CALC BMI NORM PARAMETERS: HCPCS | Performed by: INTERNAL MEDICINE

## 2024-12-09 PROCEDURE — 99214 OFFICE O/P EST MOD 30 MIN: CPT | Performed by: INTERNAL MEDICINE

## 2024-12-09 RX ORDER — ASCORBIC ACID 500 MG
500 TABLET ORAL DAILY
Qty: 90 TABLET | Refills: 0 | Status: SHIPPED | OUTPATIENT
Start: 2024-12-09

## 2024-12-09 RX ORDER — PANTOPRAZOLE SODIUM 40 MG/1
40 TABLET, DELAYED RELEASE ORAL EVERY MORNING
Qty: 90 TABLET | Refills: 0 | Status: SHIPPED | OUTPATIENT
Start: 2024-12-09

## 2024-12-09 RX ORDER — CHOLECALCIFEROL (VITAMIN D3) 50 MCG
2000 TABLET ORAL DAILY
Qty: 90 TABLET | Refills: 0 | Status: SHIPPED | OUTPATIENT
Start: 2024-12-09

## 2024-12-09 ASSESSMENT — ENCOUNTER SYMPTOMS
SHORTNESS OF BREATH: 0
VOMITING: 0
ABDOMINAL PAIN: 0
NAUSEA: 0
COUGH: 0
DIARRHEA: 0

## 2024-12-09 NOTE — PROGRESS NOTES
Abdominal:      Palpations: There is no hepatomegaly or splenomegaly.      Tenderness: There is no abdominal tenderness.   Skin:     Coloration: Skin is not cyanotic.      Findings: No bruising or rash.      Nails: There is no clubbing.   Neurological:      General: No focal deficit present.       Extremities: Pedal pulses No Edema     ASSESSMENT/PLAN  Krystal was seen today for 1 month follow-up.    Diagnoses and all orders for this visit:    Paroxysmal atrial fibrillation (HCC) on eliquis like to go back to coumadin due to cost ,told to start coumadin 3 mg qd 3 days before stopping eliquis     COPD, very severe (HCC) stable continue trelegy     Essential hypertension fair continue metoprolol     Acquired hypothyroidism stable continue synthroid     Other orders  -     pantoprazole (PROTONIX) 40 MG tablet; Take 1 tablet by mouth every morning qd  -     vitamin C (ASCORBIC ACID) 500 MG tablet; Take 1 tablet by mouth daily  -     vitamin D (CHOLECALCIFEROL) 50 MCG (2000 UT) TABS tablet; Take 1 tablet by mouth daily        Outpatient Encounter Medications as of 12/9/2024   Medication Sig Dispense Refill    pantoprazole (PROTONIX) 40 MG tablet Take 1 tablet by mouth every morning qd 90 tablet 0    vitamin C (ASCORBIC ACID) 500 MG tablet Take 1 tablet by mouth daily 90 tablet 0    vitamin D (CHOLECALCIFEROL) 50 MCG (2000 UT) TABS tablet Take 1 tablet by mouth daily 90 tablet 0    levothyroxine (SYNTHROID) 75 MCG tablet Take 1 tablet by mouth daily 90 tablet 0    rosuvastatin (CRESTOR) 10 MG tablet Take 1 tablet by mouth nightly 90 tablet 0    sacubitril-valsartan (ENTRESTO) 24-26 MG per tablet Take 1 tablet by mouth in the morning and at bedtime 30 tablet 0    apixaban (ELIQUIS) 5 MG TABS tablet Take 1 tablet by mouth 2 times daily 60 tablet 0    amiodarone (CORDARONE) 200 MG tablet Take 0.5 tablets by mouth Every Monday, Wednesday, and Friday 30 tablet 1    guaiFENesin 400 MG tablet Take 1 tablet by mouth 3 times daily

## 2024-12-22 ENCOUNTER — APPOINTMENT (OUTPATIENT)
Dept: GENERAL RADIOLOGY | Age: 84
End: 2024-12-22
Payer: MEDICARE

## 2024-12-22 ENCOUNTER — HOSPITAL ENCOUNTER (OUTPATIENT)
Age: 84
Setting detail: OBSERVATION
Discharge: HOME | End: 2024-12-24
Attending: STUDENT IN AN ORGANIZED HEALTH CARE EDUCATION/TRAINING PROGRAM | Admitting: STUDENT IN AN ORGANIZED HEALTH CARE EDUCATION/TRAINING PROGRAM
Payer: MEDICARE

## 2024-12-22 DIAGNOSIS — J90 PLEURAL EFFUSION: ICD-10-CM

## 2024-12-22 DIAGNOSIS — R04.2 HEMOPTYSIS: ICD-10-CM

## 2024-12-22 DIAGNOSIS — J44.1 COPD EXACERBATION (HCC): Primary | ICD-10-CM

## 2024-12-22 DIAGNOSIS — R50.9 FEVER, UNSPECIFIED FEVER CAUSE: ICD-10-CM

## 2024-12-22 DIAGNOSIS — I50.43 ACUTE ON CHRONIC COMBINED SYSTOLIC AND DIASTOLIC CHF (CONGESTIVE HEART FAILURE) (HCC): ICD-10-CM

## 2024-12-22 PROBLEM — J43.9 EMPHYSEMA OF LUNG (HCC): Status: ACTIVE | Noted: 2023-06-19

## 2024-12-22 PROBLEM — I50.32 CHRONIC DIASTOLIC HEART FAILURE (HCC): Status: ACTIVE | Noted: 2021-06-16

## 2024-12-22 PROBLEM — R06.00 DYSPNEA: Status: ACTIVE | Noted: 2024-12-22

## 2024-12-22 LAB
ABO + RH BLD: NORMAL
ALBUMIN SERPL-MCNC: 4.1 G/DL (ref 3.5–5.2)
ALP SERPL-CCNC: 65 U/L (ref 35–104)
ALT SERPL-CCNC: 9 U/L (ref 0–32)
ANION GAP SERPL CALCULATED.3IONS-SCNC: 7 MMOL/L (ref 7–16)
ARM BAND NUMBER: NORMAL
AST SERPL-CCNC: 19 U/L (ref 0–31)
B PARAP IS1001 DNA NPH QL NAA+NON-PROBE: NOT DETECTED
B PERT DNA SPEC QL NAA+PROBE: NOT DETECTED
BASOPHILS # BLD: 0.01 K/UL (ref 0–0.2)
BASOPHILS NFR BLD: 0 % (ref 0–2)
BILIRUB SERPL-MCNC: 0.9 MG/DL (ref 0–1.2)
BLOOD BANK SAMPLE EXPIRATION: NORMAL
BLOOD GROUP ANTIBODIES SERPL: NEGATIVE
BNP SERPL-MCNC: 570 PG/ML (ref 0–450)
BUN SERPL-MCNC: 23 MG/DL (ref 6–23)
C PNEUM DNA NPH QL NAA+NON-PROBE: NOT DETECTED
CALCIUM SERPL-MCNC: 9.6 MG/DL (ref 8.6–10.2)
CHLORIDE SERPL-SCNC: 97 MMOL/L (ref 98–107)
CO2 SERPL-SCNC: 34 MMOL/L (ref 22–29)
CREAT SERPL-MCNC: 0.9 MG/DL (ref 0.5–1)
DIGOXIN SERPL-MCNC: 0.4 NG/ML (ref 0.8–2)
EOSINOPHIL # BLD: 0.03 K/UL (ref 0.05–0.5)
EOSINOPHILS RELATIVE PERCENT: 1 % (ref 0–6)
ERYTHROCYTE [DISTWIDTH] IN BLOOD BY AUTOMATED COUNT: 13.1 % (ref 11.5–15)
FLUAV RNA NPH QL NAA+NON-PROBE: NOT DETECTED
FLUBV RNA NPH QL NAA+NON-PROBE: NOT DETECTED
GFR, ESTIMATED: >60 ML/MIN/1.73M2
GLUCOSE SERPL-MCNC: 123 MG/DL (ref 74–99)
HADV DNA NPH QL NAA+NON-PROBE: NOT DETECTED
HCOV 229E RNA NPH QL NAA+NON-PROBE: NOT DETECTED
HCOV HKU1 RNA NPH QL NAA+NON-PROBE: NOT DETECTED
HCOV NL63 RNA NPH QL NAA+NON-PROBE: NOT DETECTED
HCOV OC43 RNA NPH QL NAA+NON-PROBE: NOT DETECTED
HCT VFR BLD AUTO: 32.9 % (ref 34–48)
HGB BLD-MCNC: 10.2 G/DL (ref 11.5–15.5)
HMPV RNA NPH QL NAA+NON-PROBE: NOT DETECTED
HPIV1 RNA NPH QL NAA+NON-PROBE: NOT DETECTED
HPIV2 RNA NPH QL NAA+NON-PROBE: NOT DETECTED
HPIV3 RNA NPH QL NAA+NON-PROBE: NOT DETECTED
HPIV4 RNA NPH QL NAA+NON-PROBE: NOT DETECTED
IMM GRANULOCYTES # BLD AUTO: <0.03 K/UL (ref 0–0.58)
IMM GRANULOCYTES NFR BLD: 0 % (ref 0–5)
INFLUENZA A BY PCR: NOT DETECTED
INFLUENZA B BY PCR: NOT DETECTED
LYMPHOCYTES NFR BLD: 0.81 K/UL (ref 1.5–4)
LYMPHOCYTES RELATIVE PERCENT: 14 % (ref 20–42)
M PNEUMO DNA NPH QL NAA+NON-PROBE: NOT DETECTED
MCH RBC QN AUTO: 29.7 PG (ref 26–35)
MCHC RBC AUTO-ENTMCNC: 31 G/DL (ref 32–34.5)
MCV RBC AUTO: 95.9 FL (ref 80–99.9)
MONOCYTES NFR BLD: 0.37 K/UL (ref 0.1–0.95)
MONOCYTES NFR BLD: 7 % (ref 2–12)
NEUTROPHILS NFR BLD: 78 % (ref 43–80)
NEUTS SEG NFR BLD: 4.47 K/UL (ref 1.8–7.3)
PLATELET # BLD AUTO: 84 K/UL (ref 130–450)
PLATELET CONFIRMATION: NORMAL
PMV BLD AUTO: 11.2 FL (ref 7–12)
POTASSIUM SERPL-SCNC: 4 MMOL/L (ref 3.5–5)
PROT SERPL-MCNC: 6.6 G/DL (ref 6.4–8.3)
RBC # BLD AUTO: 3.43 M/UL (ref 3.5–5.5)
RBC # BLD: ABNORMAL 10*6/UL
RBC # BLD: ABNORMAL 10*6/UL
RSV RNA NPH QL NAA+NON-PROBE: NOT DETECTED
RV+EV RNA NPH QL NAA+NON-PROBE: NOT DETECTED
SARS-COV-2 RDRP RESP QL NAA+PROBE: NOT DETECTED
SARS-COV-2 RNA NPH QL NAA+NON-PROBE: NOT DETECTED
SODIUM SERPL-SCNC: 138 MMOL/L (ref 132–146)
SPECIMEN DESCRIPTION: NORMAL
SPECIMEN DESCRIPTION: NORMAL
TROPONIN I SERPL HS-MCNC: 16 NG/L (ref 0–9)
TROPONIN I SERPL HS-MCNC: 20 NG/L (ref 0–9)
WBC OTHER # BLD: 5.7 K/UL (ref 4.5–11.5)

## 2024-12-22 PROCEDURE — 87635 SARS-COV-2 COVID-19 AMP PRB: CPT

## 2024-12-22 PROCEDURE — 87502 INFLUENZA DNA AMP PROBE: CPT

## 2024-12-22 PROCEDURE — 94640 AIRWAY INHALATION TREATMENT: CPT

## 2024-12-22 PROCEDURE — 6370000000 HC RX 637 (ALT 250 FOR IP): Performed by: STUDENT IN AN ORGANIZED HEALTH CARE EDUCATION/TRAINING PROGRAM

## 2024-12-22 PROCEDURE — G0378 HOSPITAL OBSERVATION PER HR: HCPCS

## 2024-12-22 PROCEDURE — 85025 COMPLETE CBC W/AUTO DIFF WBC: CPT

## 2024-12-22 PROCEDURE — 2500000003 HC RX 250 WO HCPCS: Performed by: STUDENT IN AN ORGANIZED HEALTH CARE EDUCATION/TRAINING PROGRAM

## 2024-12-22 PROCEDURE — 86901 BLOOD TYPING SEROLOGIC RH(D): CPT

## 2024-12-22 PROCEDURE — 86900 BLOOD TYPING SEROLOGIC ABO: CPT

## 2024-12-22 PROCEDURE — 6360000002 HC RX W HCPCS: Performed by: STUDENT IN AN ORGANIZED HEALTH CARE EDUCATION/TRAINING PROGRAM

## 2024-12-22 PROCEDURE — 84484 ASSAY OF TROPONIN QUANT: CPT

## 2024-12-22 PROCEDURE — 80162 ASSAY OF DIGOXIN TOTAL: CPT

## 2024-12-22 PROCEDURE — 99285 EMERGENCY DEPT VISIT HI MDM: CPT

## 2024-12-22 PROCEDURE — 96374 THER/PROPH/DIAG INJ IV PUSH: CPT

## 2024-12-22 PROCEDURE — 83880 ASSAY OF NATRIURETIC PEPTIDE: CPT

## 2024-12-22 PROCEDURE — 71045 X-RAY EXAM CHEST 1 VIEW: CPT

## 2024-12-22 PROCEDURE — 86850 RBC ANTIBODY SCREEN: CPT

## 2024-12-22 PROCEDURE — 0202U NFCT DS 22 TRGT SARS-COV-2: CPT

## 2024-12-22 PROCEDURE — 99221 1ST HOSP IP/OBS SF/LOW 40: CPT | Performed by: STUDENT IN AN ORGANIZED HEALTH CARE EDUCATION/TRAINING PROGRAM

## 2024-12-22 PROCEDURE — 93005 ELECTROCARDIOGRAM TRACING: CPT

## 2024-12-22 PROCEDURE — G0378 HOSPITAL OBSERVATION PER HR: HCPCS | Performed by: STUDENT IN AN ORGANIZED HEALTH CARE EDUCATION/TRAINING PROGRAM

## 2024-12-22 PROCEDURE — 80053 COMPREHEN METABOLIC PANEL: CPT

## 2024-12-22 RX ORDER — ACETAMINOPHEN 650 MG/1
650 SUPPOSITORY RECTAL EVERY 6 HOURS PRN
Status: DISCONTINUED | OUTPATIENT
Start: 2024-12-22 | End: 2024-12-24 | Stop reason: HOSPADM

## 2024-12-22 RX ORDER — SODIUM CHLORIDE 9 MG/ML
INJECTION, SOLUTION INTRAVENOUS PRN
Status: DISCONTINUED | OUTPATIENT
Start: 2024-12-22 | End: 2024-12-24 | Stop reason: HOSPADM

## 2024-12-22 RX ORDER — ROSUVASTATIN CALCIUM 10 MG/1
10 TABLET, COATED ORAL NIGHTLY
Status: DISCONTINUED | OUTPATIENT
Start: 2024-12-22 | End: 2024-12-24 | Stop reason: HOSPADM

## 2024-12-22 RX ORDER — METHYLPREDNISOLONE SODIUM SUCCINATE 125 MG/2ML
125 INJECTION INTRAMUSCULAR; INTRAVENOUS ONCE
Status: COMPLETED | OUTPATIENT
Start: 2024-12-22 | End: 2024-12-22

## 2024-12-22 RX ORDER — BUMETANIDE 1 MG/1
0.5 TABLET ORAL DAILY
Status: DISCONTINUED | OUTPATIENT
Start: 2024-12-23 | End: 2024-12-24 | Stop reason: HOSPADM

## 2024-12-22 RX ORDER — IPRATROPIUM BROMIDE AND ALBUTEROL SULFATE 2.5; .5 MG/3ML; MG/3ML
3 SOLUTION RESPIRATORY (INHALATION) ONCE
Status: DISCONTINUED | OUTPATIENT
Start: 2024-12-22 | End: 2024-12-22

## 2024-12-22 RX ORDER — ONDANSETRON 4 MG/1
4 TABLET, ORALLY DISINTEGRATING ORAL EVERY 8 HOURS PRN
Status: DISCONTINUED | OUTPATIENT
Start: 2024-12-22 | End: 2024-12-24 | Stop reason: HOSPADM

## 2024-12-22 RX ORDER — LEVOTHYROXINE SODIUM 75 UG/1
75 TABLET ORAL DAILY
Status: DISCONTINUED | OUTPATIENT
Start: 2024-12-23 | End: 2024-12-24 | Stop reason: HOSPADM

## 2024-12-22 RX ORDER — DIGOXIN 125 MCG
62.5 TABLET ORAL
Status: DISCONTINUED | OUTPATIENT
Start: 2024-12-23 | End: 2024-12-24 | Stop reason: HOSPADM

## 2024-12-22 RX ORDER — BUDESONIDE 0.5 MG/2ML
0.5 INHALANT ORAL
Status: DISCONTINUED | OUTPATIENT
Start: 2024-12-22 | End: 2024-12-24 | Stop reason: HOSPADM

## 2024-12-22 RX ORDER — ACETAMINOPHEN 325 MG/1
650 TABLET ORAL EVERY 6 HOURS PRN
Status: DISCONTINUED | OUTPATIENT
Start: 2024-12-22 | End: 2024-12-24 | Stop reason: HOSPADM

## 2024-12-22 RX ORDER — ONDANSETRON 2 MG/ML
4 INJECTION INTRAMUSCULAR; INTRAVENOUS EVERY 6 HOURS PRN
Status: DISCONTINUED | OUTPATIENT
Start: 2024-12-22 | End: 2024-12-24 | Stop reason: HOSPADM

## 2024-12-22 RX ORDER — IPRATROPIUM BROMIDE AND ALBUTEROL SULFATE 2.5; .5 MG/3ML; MG/3ML
1 SOLUTION RESPIRATORY (INHALATION)
Status: COMPLETED | OUTPATIENT
Start: 2024-12-22 | End: 2024-12-22

## 2024-12-22 RX ORDER — POLYETHYLENE GLYCOL 3350 17 G/17G
17 POWDER, FOR SOLUTION ORAL DAILY PRN
Status: DISCONTINUED | OUTPATIENT
Start: 2024-12-22 | End: 2024-12-24 | Stop reason: HOSPADM

## 2024-12-22 RX ORDER — LEVALBUTEROL INHALATION SOLUTION 0.63 MG/3ML
0.63 SOLUTION RESPIRATORY (INHALATION) EVERY 6 HOURS PRN
Status: DISCONTINUED | OUTPATIENT
Start: 2024-12-22 | End: 2024-12-24 | Stop reason: HOSPADM

## 2024-12-22 RX ORDER — AMIODARONE HYDROCHLORIDE 200 MG/1
100 TABLET ORAL
Status: DISCONTINUED | OUTPATIENT
Start: 2024-12-23 | End: 2024-12-24 | Stop reason: HOSPADM

## 2024-12-22 RX ORDER — GUAIFENESIN 400 MG/1
400 TABLET ORAL 3 TIMES DAILY
Status: DISCONTINUED | OUTPATIENT
Start: 2024-12-22 | End: 2024-12-24 | Stop reason: HOSPADM

## 2024-12-22 RX ORDER — CHOLECALCIFEROL (VITAMIN D3) 50 MCG
2000 TABLET ORAL DAILY
Status: DISCONTINUED | OUTPATIENT
Start: 2024-12-23 | End: 2024-12-24 | Stop reason: HOSPADM

## 2024-12-22 RX ORDER — ACETAMINOPHEN 325 MG/1
650 TABLET ORAL ONCE
Status: DISCONTINUED | OUTPATIENT
Start: 2024-12-22 | End: 2024-12-22

## 2024-12-22 RX ORDER — POTASSIUM CHLORIDE 1500 MG/1
40 TABLET, EXTENDED RELEASE ORAL PRN
Status: DISCONTINUED | OUTPATIENT
Start: 2024-12-22 | End: 2024-12-24 | Stop reason: HOSPADM

## 2024-12-22 RX ORDER — ARFORMOTEROL TARTRATE 15 UG/2ML
15 SOLUTION RESPIRATORY (INHALATION)
Status: DISCONTINUED | OUTPATIENT
Start: 2024-12-22 | End: 2024-12-24 | Stop reason: HOSPADM

## 2024-12-22 RX ORDER — PANTOPRAZOLE SODIUM 40 MG/1
40 TABLET, DELAYED RELEASE ORAL EVERY MORNING
Status: DISCONTINUED | OUTPATIENT
Start: 2024-12-23 | End: 2024-12-24 | Stop reason: HOSPADM

## 2024-12-22 RX ORDER — MAGNESIUM SULFATE IN WATER 40 MG/ML
2000 INJECTION, SOLUTION INTRAVENOUS PRN
Status: DISCONTINUED | OUTPATIENT
Start: 2024-12-22 | End: 2024-12-24 | Stop reason: HOSPADM

## 2024-12-22 RX ORDER — METOPROLOL SUCCINATE 25 MG/1
12.5 TABLET, EXTENDED RELEASE ORAL NIGHTLY
Status: DISCONTINUED | OUTPATIENT
Start: 2024-12-22 | End: 2024-12-24 | Stop reason: HOSPADM

## 2024-12-22 RX ORDER — SODIUM CHLORIDE 0.9 % (FLUSH) 0.9 %
5-40 SYRINGE (ML) INJECTION EVERY 12 HOURS SCHEDULED
Status: DISCONTINUED | OUTPATIENT
Start: 2024-12-22 | End: 2024-12-24 | Stop reason: HOSPADM

## 2024-12-22 RX ORDER — SODIUM CHLORIDE 0.9 % (FLUSH) 0.9 %
5-40 SYRINGE (ML) INJECTION PRN
Status: DISCONTINUED | OUTPATIENT
Start: 2024-12-22 | End: 2024-12-24 | Stop reason: HOSPADM

## 2024-12-22 RX ADMIN — ROSUVASTATIN CALCIUM 10 MG: 10 TABLET, FILM COATED ORAL at 21:20

## 2024-12-22 RX ADMIN — METHYLPREDNISOLONE SODIUM SUCCINATE 125 MG: 125 INJECTION INTRAMUSCULAR; INTRAVENOUS at 06:26

## 2024-12-22 RX ADMIN — IPRATROPIUM BROMIDE AND ALBUTEROL SULFATE 1 DOSE: .5; 3 SOLUTION RESPIRATORY (INHALATION) at 06:33

## 2024-12-22 RX ADMIN — SACUBITRIL AND VALSARTAN 1 TABLET: 24; 26 TABLET, FILM COATED ORAL at 21:20

## 2024-12-22 RX ADMIN — IPRATROPIUM BROMIDE AND ALBUTEROL SULFATE 1 DOSE: .5; 3 SOLUTION RESPIRATORY (INHALATION) at 06:24

## 2024-12-22 RX ADMIN — GUAIFENESIN 400 MG: 400 TABLET ORAL at 13:58

## 2024-12-22 RX ADMIN — GUAIFENESIN 400 MG: 400 TABLET ORAL at 21:20

## 2024-12-22 RX ADMIN — IPRATROPIUM BROMIDE AND ALBUTEROL SULFATE 1 DOSE: .5; 3 SOLUTION RESPIRATORY (INHALATION) at 07:04

## 2024-12-22 RX ADMIN — METOPROLOL SUCCINATE 12.5 MG: 25 TABLET, EXTENDED RELEASE ORAL at 21:21

## 2024-12-22 RX ADMIN — ARFORMOTEROL TARTRATE 15 MCG: 15 SOLUTION RESPIRATORY (INHALATION) at 19:44

## 2024-12-22 RX ADMIN — BUDESONIDE 500 MCG: 0.5 SUSPENSION RESPIRATORY (INHALATION) at 19:44

## 2024-12-22 RX ADMIN — SODIUM CHLORIDE, PRESERVATIVE FREE 10 ML: 5 INJECTION INTRAVENOUS at 21:22

## 2024-12-22 NOTE — ED NOTES
ED to Inpatient Handoff Report    Notified gagandeep that electronic handoff available and patient ready for transport to room 512.    Safety Risks: Difficulty with daily activities and Risk of falls    Patient in Restraints: no    Constant Observer or Patient : no    Telemetry Monitoring Ordered :Yes           Order to transfer to unit without monitor:YES    Last MEWS: 1 Time completed: 1209    Deterioration Index Score:   Predictive Model Details          29 (Normal)  Factor Value    Calculated 12/22/2024 12:28 47% Age 84 years old    Deterioration Index Model 39% Supplemental oxygen Nasal cannula     8% Respiratory rate 18     2% Platelet count abnormal (84 k/uL)     1% Systolic 117     1% Pulse 86     1% Hematocrit abnormal (32.9 %)     1% Sodium 138 mmol/L     0% Potassium 4.0 mmol/L     0% Pulse oximetry 97 %     0% Temperature 97.8 °F (36.6 °C)     0% WBC count 5.7 k/uL        Vitals:    12/22/24 0445 12/22/24 0658 12/22/24 0915 12/22/24 1209   BP:  (!) 117/52 (!) 122/53 (!) 117/55   Pulse:  85 83 86   Resp:  19 18 18   Temp: 98.2 °F (36.8 °C)   97.8 °F (36.6 °C)   TempSrc:    Oral   SpO2:  98% 95% 97%   Weight:       Height:             Opportunity for questions and clarification was provided.

## 2024-12-22 NOTE — H&P
in bilateral lobes.   Cardiovascular: normal rate, normal S1 and S2 and no carotid bruits  Abdomen: soft, non-tender, non-distended, normal bowel sounds, no masses or organomegaly  Extremities: no cyanosis, no clubbing and no edema  Neurologic: no cranial nerve deficit and speech normal    LABS:  Recent Labs     12/22/24  0645      K 4.0   CL 97*   CO2 34*   BUN 23   CREATININE 0.9   GLUCOSE 123*   CALCIUM 9.6       Recent Labs     12/22/24  0645   WBC 5.7   RBC 3.43*   HGB 10.2*   HCT 32.9*   MCV 95.9   MCH 29.7   MCHC 31.0*   RDW 13.1   PLT 84*   MPV 11.2       No results for input(s): \"POCGLU\" in the last 72 hours.        Radiology: XR CHEST PORTABLE    Result Date: 12/22/2024  EXAMINATION: ONE XRAY VIEW OF THE CHEST 12/22/2024 5:11 am COMPARISON: 09/29/2024 HISTORY: ORDERING SYSTEM PROVIDED HISTORY: Cough,fever TECHNOLOGIST PROVIDED HISTORY: Reason for exam:->Cough,fever FINDINGS: Portable chest reveals heart to be enlarged.  Mild increased perihilar markings bilaterally.  Degenerative changes seen within the spine.  Vascular calcifications seen within the thoracic aorta.  Increased interstitial markings seen within the lung fields suggesting mild interstitial edema or pneumonia.  Small left pleural effusion cannot be completely excluded.     Cardiomegaly with increased interstitial markings seen within the lung fields suggesting mild interstitial edema or pneumonia. Small left pleural effusion cannot be completely excluded.       EKG: none on file. Per ED physician note, NSR, normal axis, no acute elevations or depressions. .     ASSESSMENT:      Principal Problem:    Dyspnea  Active Problems:    CKD stage 3a, GFR 45-59 ml/min (HCC)    Malignant neoplasm of right lung (HCC)    Chronic diastolic heart failure (HCC)    Paroxysmal atrial fibrillation (HCC)    Emphysema of lung (HCC)    Acquired hypothyroidism    Fever    Hemoptysis  Resolved Problems:    * No resolved hospital problems.

## 2024-12-22 NOTE — ED PROVIDER NOTES
lung fields suggesting mild interstitial edema or pneumonia.  Small left pleural effusion cannot be completely excluded.     Cardiomegaly with increased interstitial markings seen within the lung fields suggesting mild interstitial edema or pneumonia. Small left pleural effusion cannot be completely excluded.       No results found.    PROCEDURES   Unless otherwise noted below, none     Procedures    CRITICAL CARE TIME (.cct)   0    PAST MEDICAL HISTORY/Chronic Conditions Affecting Care      has a past medical history of Atrial fibrillation (HCC), CHF (congestive heart failure) (HCC), Emphysema, unspecified (HCC), Hyperlipidemia, Hypertension, Lung cancer (HCC), Mitral valve regurgitation, Oxygen dependent, Prolonged emergence from general anesthesia, Skin cancer, and Thyroid disease.     EMERGENCY DEPARTMENT COURSE    Vitals:    Vitals:    12/22/24 1252 12/22/24 1330 12/22/24 1859 12/23/24 0003   BP: 97/86 (!) 114/59 (!) 113/54    Pulse: 87 87 87    Resp: 19 19 18    Temp: 97.7 °F (36.5 °C) 97.7 °F (36.5 °C) 97.9 °F (36.6 °C)    TempSrc: Oral Oral Oral    SpO2: 97% 97% 98%    Weight:    75.3 kg (166 lb)   Height:           Patient was given the following medications:  Medications   sodium chloride flush 0.9 % injection 5-40 mL (10 mLs IntraVENous Given 12/22/24 2122)   sodium chloride flush 0.9 % injection 5-40 mL (has no administration in time range)   0.9 % sodium chloride infusion (has no administration in time range)   potassium chloride (KLOR-CON M) extended release tablet 40 mEq (has no administration in time range)     Or   potassium bicarb-citric acid (EFFER-K) effervescent tablet 40 mEq (has no administration in time range)   magnesium sulfate 2000 mg in 50 mL IVPB premix (has no administration in time range)   ondansetron (ZOFRAN-ODT) disintegrating tablet 4 mg (has no administration in time range)     Or   ondansetron (ZOFRAN) injection 4 mg (has no administration in time range)   polyethylene glycol 
4 = No assist / stand by assistance

## 2024-12-23 ENCOUNTER — APPOINTMENT (OUTPATIENT)
Dept: CT IMAGING | Age: 84
End: 2024-12-23
Payer: MEDICARE

## 2024-12-23 LAB
ANION GAP SERPL CALCULATED.3IONS-SCNC: 6 MMOL/L (ref 7–16)
BASOPHILS # BLD: 0 K/UL (ref 0–0.2)
BASOPHILS NFR BLD: 0 % (ref 0–2)
BUN SERPL-MCNC: 23 MG/DL (ref 6–23)
CALCIUM SERPL-MCNC: 9.8 MG/DL (ref 8.6–10.2)
CHLORIDE SERPL-SCNC: 102 MMOL/L (ref 98–107)
CO2 SERPL-SCNC: 32 MMOL/L (ref 22–29)
CREAT SERPL-MCNC: 0.7 MG/DL (ref 0.5–1)
CRP SERPL HS-MCNC: 141 MG/L (ref 0–5)
EKG ATRIAL RATE: 77 BPM
EKG P AXIS: 22 DEGREES
EKG P-R INTERVAL: 182 MS
EKG Q-T INTERVAL: 424 MS
EKG QRS DURATION: 88 MS
EKG QTC CALCULATION (BAZETT): 479 MS
EKG R AXIS: 48 DEGREES
EKG T AXIS: 38 DEGREES
EKG VENTRICULAR RATE: 77 BPM
EOSINOPHIL # BLD: 0 K/UL (ref 0.05–0.5)
EOSINOPHILS RELATIVE PERCENT: 0 % (ref 0–6)
ERYTHROCYTE [DISTWIDTH] IN BLOOD BY AUTOMATED COUNT: 13 % (ref 11.5–15)
ERYTHROCYTE [SEDIMENTATION RATE] IN BLOOD BY WESTERGREN METHOD: 38 MM/HR (ref 0–20)
GFR, ESTIMATED: 79 ML/MIN/1.73M2
GLUCOSE SERPL-MCNC: 152 MG/DL (ref 74–99)
HCT VFR BLD AUTO: 29.4 % (ref 34–48)
HGB BLD-MCNC: 9.4 G/DL (ref 11.5–15.5)
IMM GRANULOCYTES # BLD AUTO: 0.03 K/UL (ref 0–0.58)
IMM GRANULOCYTES NFR BLD: 1 % (ref 0–5)
LYMPHOCYTES NFR BLD: 0.55 K/UL (ref 1.5–4)
LYMPHOCYTES RELATIVE PERCENT: 11 % (ref 20–42)
MCH RBC QN AUTO: 29.7 PG (ref 26–35)
MCHC RBC AUTO-ENTMCNC: 32 G/DL (ref 32–34.5)
MCV RBC AUTO: 93 FL (ref 80–99.9)
MONOCYTES NFR BLD: 0.18 K/UL (ref 0.1–0.95)
MONOCYTES NFR BLD: 3 % (ref 2–12)
NEUTROPHILS NFR BLD: 86 % (ref 43–80)
NEUTS SEG NFR BLD: 4.5 K/UL (ref 1.8–7.3)
PLATELET # BLD AUTO: 95 K/UL (ref 130–450)
PLATELET CONFIRMATION: NORMAL
PMV BLD AUTO: 10.7 FL (ref 7–12)
POTASSIUM SERPL-SCNC: 4.1 MMOL/L (ref 3.5–5)
PROCALCITONIN SERPL-MCNC: 0.12 NG/ML (ref 0–0.08)
RBC # BLD AUTO: 3.16 M/UL (ref 3.5–5.5)
RBC # BLD: ABNORMAL 10*6/UL
SODIUM SERPL-SCNC: 140 MMOL/L (ref 132–146)
WBC OTHER # BLD: 5.3 K/UL (ref 4.5–11.5)

## 2024-12-23 PROCEDURE — 6360000002 HC RX W HCPCS: Performed by: STUDENT IN AN ORGANIZED HEALTH CARE EDUCATION/TRAINING PROGRAM

## 2024-12-23 PROCEDURE — 2700000000 HC OXYGEN THERAPY PER DAY

## 2024-12-23 PROCEDURE — 99232 SBSQ HOSP IP/OBS MODERATE 35: CPT | Performed by: STUDENT IN AN ORGANIZED HEALTH CARE EDUCATION/TRAINING PROGRAM

## 2024-12-23 PROCEDURE — 6370000000 HC RX 637 (ALT 250 FOR IP): Performed by: NURSE PRACTITIONER

## 2024-12-23 PROCEDURE — 87070 CULTURE OTHR SPECIMN AEROBIC: CPT

## 2024-12-23 PROCEDURE — 80048 BASIC METABOLIC PNL TOTAL CA: CPT

## 2024-12-23 PROCEDURE — 6370000000 HC RX 637 (ALT 250 FOR IP): Performed by: INTERNAL MEDICINE

## 2024-12-23 PROCEDURE — 36415 COLL VENOUS BLD VENIPUNCTURE: CPT

## 2024-12-23 PROCEDURE — 87899 AGENT NOS ASSAY W/OPTIC: CPT

## 2024-12-23 PROCEDURE — 2500000003 HC RX 250 WO HCPCS: Performed by: STUDENT IN AN ORGANIZED HEALTH CARE EDUCATION/TRAINING PROGRAM

## 2024-12-23 PROCEDURE — 96376 TX/PRO/DX INJ SAME DRUG ADON: CPT

## 2024-12-23 PROCEDURE — G0378 HOSPITAL OBSERVATION PER HR: HCPCS

## 2024-12-23 PROCEDURE — 87205 SMEAR GRAM STAIN: CPT

## 2024-12-23 PROCEDURE — 84145 PROCALCITONIN (PCT): CPT

## 2024-12-23 PROCEDURE — 85025 COMPLETE CBC W/AUTO DIFF WBC: CPT

## 2024-12-23 PROCEDURE — 94640 AIRWAY INHALATION TREATMENT: CPT

## 2024-12-23 PROCEDURE — 6370000000 HC RX 637 (ALT 250 FOR IP): Performed by: STUDENT IN AN ORGANIZED HEALTH CARE EDUCATION/TRAINING PROGRAM

## 2024-12-23 PROCEDURE — 85652 RBC SED RATE AUTOMATED: CPT

## 2024-12-23 PROCEDURE — 71250 CT THORAX DX C-: CPT

## 2024-12-23 PROCEDURE — 86140 C-REACTIVE PROTEIN: CPT

## 2024-12-23 RX ORDER — SODIUM CHLORIDE 9 MG/ML
INJECTION, SOLUTION INTRAVENOUS PRN
Status: DISCONTINUED | OUTPATIENT
Start: 2024-12-23 | End: 2024-12-24 | Stop reason: HOSPADM

## 2024-12-23 RX ORDER — SODIUM CHLORIDE 0.9 % (FLUSH) 0.9 %
5-40 SYRINGE (ML) INJECTION PRN
Status: DISCONTINUED | OUTPATIENT
Start: 2024-12-23 | End: 2024-12-24 | Stop reason: HOSPADM

## 2024-12-23 RX ORDER — SODIUM CHLORIDE 0.9 % (FLUSH) 0.9 %
5-40 SYRINGE (ML) INJECTION EVERY 12 HOURS SCHEDULED
Status: DISCONTINUED | OUTPATIENT
Start: 2024-12-23 | End: 2024-12-24 | Stop reason: HOSPADM

## 2024-12-23 RX ORDER — METHYLPREDNISOLONE SODIUM SUCCINATE 40 MG/ML
40 INJECTION INTRAMUSCULAR; INTRAVENOUS DAILY
Status: DISCONTINUED | OUTPATIENT
Start: 2024-12-23 | End: 2024-12-23

## 2024-12-23 RX ORDER — DOXYCYCLINE 100 MG/1
100 CAPSULE ORAL EVERY 12 HOURS SCHEDULED
Status: DISCONTINUED | OUTPATIENT
Start: 2024-12-23 | End: 2024-12-24 | Stop reason: HOSPADM

## 2024-12-23 RX ORDER — ONDANSETRON 2 MG/ML
4 INJECTION INTRAMUSCULAR; INTRAVENOUS
Status: ACTIVE | OUTPATIENT
Start: 2024-12-23 | End: 2024-12-24

## 2024-12-23 RX ORDER — NALOXONE HYDROCHLORIDE 0.4 MG/ML
INJECTION, SOLUTION INTRAMUSCULAR; INTRAVENOUS; SUBCUTANEOUS PRN
Status: DISCONTINUED | OUTPATIENT
Start: 2024-12-23 | End: 2024-12-24 | Stop reason: HOSPADM

## 2024-12-23 RX ORDER — FENTANYL CITRATE 50 UG/ML
25 INJECTION, SOLUTION INTRAMUSCULAR; INTRAVENOUS EVERY 5 MIN PRN
Status: DISCONTINUED | OUTPATIENT
Start: 2024-12-23 | End: 2024-12-24 | Stop reason: HOSPADM

## 2024-12-23 RX ORDER — WARFARIN SODIUM 1 MG/1
1 TABLET ORAL
Status: COMPLETED | OUTPATIENT
Start: 2024-12-23 | End: 2024-12-23

## 2024-12-23 RX ORDER — PREDNISONE 20 MG/1
40 TABLET ORAL DAILY
Status: DISCONTINUED | OUTPATIENT
Start: 2024-12-24 | End: 2024-12-24 | Stop reason: HOSPADM

## 2024-12-23 RX ADMIN — SODIUM CHLORIDE, PRESERVATIVE FREE 10 ML: 5 INJECTION INTRAVENOUS at 21:34

## 2024-12-23 RX ADMIN — PANTOPRAZOLE SODIUM 40 MG: 40 TABLET, DELAYED RELEASE ORAL at 09:00

## 2024-12-23 RX ADMIN — BUDESONIDE 500 MCG: 0.5 SUSPENSION RESPIRATORY (INHALATION) at 08:26

## 2024-12-23 RX ADMIN — GUAIFENESIN 400 MG: 400 TABLET ORAL at 21:32

## 2024-12-23 RX ADMIN — SACUBITRIL AND VALSARTAN 1 TABLET: 24; 26 TABLET, FILM COATED ORAL at 09:00

## 2024-12-23 RX ADMIN — LEVOTHYROXINE SODIUM 75 MCG: 75 TABLET ORAL at 06:04

## 2024-12-23 RX ADMIN — SODIUM CHLORIDE, PRESERVATIVE FREE 10 ML: 5 INJECTION INTRAVENOUS at 09:01

## 2024-12-23 RX ADMIN — AMIODARONE HYDROCHLORIDE 100 MG: 200 TABLET ORAL at 15:09

## 2024-12-23 RX ADMIN — ARFORMOTEROL TARTRATE 15 MCG: 15 SOLUTION RESPIRATORY (INHALATION) at 08:26

## 2024-12-23 RX ADMIN — BUMETANIDE 0.5 MG: 1 TABLET ORAL at 09:00

## 2024-12-23 RX ADMIN — GUAIFENESIN 400 MG: 400 TABLET ORAL at 12:45

## 2024-12-23 RX ADMIN — DOXYCYCLINE HYCLATE 100 MG: 100 CAPSULE ORAL at 21:32

## 2024-12-23 RX ADMIN — BUDESONIDE 500 MCG: 0.5 SUSPENSION RESPIRATORY (INHALATION) at 21:06

## 2024-12-23 RX ADMIN — GUAIFENESIN 400 MG: 400 TABLET ORAL at 09:00

## 2024-12-23 RX ADMIN — DIGOXIN 62.5 MCG: 0.12 TABLET ORAL at 12:45

## 2024-12-23 RX ADMIN — SACUBITRIL AND VALSARTAN 1 TABLET: 24; 26 TABLET, FILM COATED ORAL at 21:37

## 2024-12-23 RX ADMIN — WARFARIN SODIUM 1 MG: 1 TABLET ORAL at 17:37

## 2024-12-23 RX ADMIN — Medication 2000 UNITS: at 09:00

## 2024-12-23 RX ADMIN — ARFORMOTEROL TARTRATE 15 MCG: 15 SOLUTION RESPIRATORY (INHALATION) at 21:06

## 2024-12-23 RX ADMIN — METHYLPREDNISOLONE SODIUM SUCCINATE 40 MG: 40 INJECTION INTRAMUSCULAR; INTRAVENOUS at 12:05

## 2024-12-23 RX ADMIN — ROSUVASTATIN CALCIUM 10 MG: 10 TABLET, FILM COATED ORAL at 21:33

## 2024-12-23 RX ADMIN — METOPROLOL SUCCINATE 12.5 MG: 25 TABLET, EXTENDED RELEASE ORAL at 21:32

## 2024-12-23 NOTE — PLAN OF CARE
Problem: Chronic Conditions and Co-morbidities  Goal: Patient's chronic conditions and co-morbidity symptoms are monitored and maintained or improved  12/23/2024 0214 by Pascale Henriquez RN  Outcome: Progressing  12/22/2024 1812 by Caroline Villafana RN  Outcome: Progressing     Problem: ABCDS Injury Assessment  Goal: Absence of physical injury  12/23/2024 0214 by Pascale Henriquez RN  Outcome: Progressing  12/22/2024 1812 by Caroline Villafana RN  Outcome: Progressing     Problem: Safety - Adult  Goal: Free from fall injury  12/23/2024 0214 by Pascale Henriquez RN  Outcome: Progressing  12/22/2024 1812 by Caroline Villafana RN  Outcome: Progressing

## 2024-12-23 NOTE — PLAN OF CARE
Problem: Chronic Conditions and Co-morbidities  Goal: Patient's chronic conditions and co-morbidity symptoms are monitored and maintained or improved  12/23/2024 1010 by Vidya Bolden RN  Outcome: Progressing  12/23/2024 0214 by Pascale Henriquez RN  Outcome: Progressing     Problem: ABCDS Injury Assessment  Goal: Absence of physical injury  12/23/2024 1010 by Vidya Bolden RN  Outcome: Progressing  12/23/2024 0214 by Pascale Henriquez RN  Outcome: Progressing     Problem: Safety - Adult  Goal: Free from fall injury  12/23/2024 1010 by Vidya Bolden RN  Outcome: Progressing  12/23/2024 0214 by Pascale Henriquez RN  Outcome: Progressing

## 2024-12-23 NOTE — CONSULTS
Yusuf Castro M.D.,[facility]  Carlin Barrera D.O., RED., [facility]  Anjelica Bruner M.D.  Monica Aponte M.D.   Wild Goss D.O.  Mauro Medel M.D.       Patient:  Krystal Hyatt 84 y.o. female MRN: [id number]           PULMONARY CONSULTATION    Reason for Consultation: dyspnea, chills   Referring Physician: Dr Shauna Sawyer MD    Communication with the referring physician will be sent via the electronic medical record.    Chief Complaint: shortness of breath    CODE STATUS: FULL     SUBJECTIVE:  HPI:  Krystal Hyatt is a 84 y.o. who we are asked to evaluate for dyspnea and chills.  She has a past medical history significant for A-fib, CHF, hypertension, hyperlipidemia, chronic respiratory failure with hypoxia.  She is a well-known patient to our service followed by Dr. Monica Aponte last seen in office September 2024.    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 She has had 8 hospital stays this year.  She was able to complete pulmonary rehab earlier this year.  She underwent cardioversion for A-fib September 2024.  Her home pulmonary regimen includes Trelegy Ellipta, albuterol as needed switch to Xopenex due to heart rate.     Her CT chest noncontrast was repeated today and reviewed compared to prior imaging.  Relatively unchanged mild left lower lobe infiltrate noted.  Patient concerned that she is coughing up mucus with some blood streaks.  Discussed with cardiology anticoagulation regimen, consult placed per patient request.  When last seen in office 9/4/2024 recent CT reviewed from August showed stability therefore outpatient bronchoscopy

## 2024-12-23 NOTE — ACP (ADVANCE CARE PLANNING)
Advance Care Planning   Healthcare Decision Maker:    Primary Decision Maker: Olena Krystal Muniz - [place] - [phone]    Secondary Decision Maker: OlenaDieter - [place] - [phone]    Click here to complete Healthcare Decision Makers including selection of the Healthcare Decision Maker Relationship (ie \"Primary\").

## 2024-12-23 NOTE — PATIENT CARE CONFERENCE
[facility] Quality Flow/Interdisciplinary Rounds Progress Note        Quality Flow Rounds held on December 23, 2024    Disciplines Attending:  Bedside Nurse, , , and Nursing Unit Leadership    Krystal Hyatt was admitted on 12/22/2024  4:41 AM    Anticipated Discharge Date:       Disposition:    Leandro Score:  Leandro Scale Score: 22    BS RISK OF UNPLANNED READMISSION 2.0             0 Total Score        Discussed patient goal for the day, patient clinical progression, and barriers to discharge.  The following Goal(s) of the Day/Commitment(s) have been identified:  Diagnostics - Report Results      Lisette Garcia RN  December 23, 2024

## 2024-12-23 NOTE — CARE COORDINATION
Introduced my self and provided explanation of CM role to patient. Patient is awake, alert, and aware of current diagnosis and discharge planning. Patient is from home alone independently. Patient wears 5L O2 at baseline through [employer]. Patient also has a cane, WW, WC, and shower chair. [facility]X [facility], does not need HHC currently. Patient states her discharge plan will be to return home with no needs. Family to transport at time of discharge.   Electronically signed by Daniela Whipple RN on 12/23/2024 at 11:44 AM

## 2024-12-24 VITALS
RESPIRATION RATE: 18 BRPM | DIASTOLIC BLOOD PRESSURE: 60 MMHG | WEIGHT: 166 LBS | SYSTOLIC BLOOD PRESSURE: 120 MMHG | OXYGEN SATURATION: 95 % | TEMPERATURE: 97.9 F | BODY MASS INDEX: 24.59 KG/M2 | HEIGHT: 69 IN | HEART RATE: 79 BPM

## 2024-12-24 LAB
ANION GAP SERPL CALCULATED.3IONS-SCNC: 11 MMOL/L (ref 7–16)
BASOPHILS # BLD: 0 K/UL (ref 0–0.2)
BASOPHILS NFR BLD: 0 % (ref 0–2)
BUN SERPL-MCNC: 27 MG/DL (ref 6–23)
CALCIUM SERPL-MCNC: 9.5 MG/DL (ref 8.6–10.2)
CHLORIDE SERPL-SCNC: 102 MMOL/L (ref 98–107)
CO2 SERPL-SCNC: 27 MMOL/L (ref 22–29)
CREAT SERPL-MCNC: 0.9 MG/DL (ref 0.5–1)
EOSINOPHIL # BLD: 0 K/UL (ref 0.05–0.5)
EOSINOPHILS RELATIVE PERCENT: 0 % (ref 0–6)
ERYTHROCYTE [DISTWIDTH] IN BLOOD BY AUTOMATED COUNT: 12.9 % (ref 11.5–15)
GFR, ESTIMATED: 59 ML/MIN/1.73M2
GLUCOSE SERPL-MCNC: 200 MG/DL (ref 74–99)
HCT VFR BLD AUTO: 32.9 % (ref 34–48)
HGB BLD-MCNC: 9.8 G/DL (ref 11.5–15.5)
IMM GRANULOCYTES # BLD AUTO: 0.03 K/UL (ref 0–0.58)
IMM GRANULOCYTES NFR BLD: 1 % (ref 0–5)
INR PPP: 1.1
LYMPHOCYTES NFR BLD: 0.75 K/UL (ref 1.5–4)
LYMPHOCYTES RELATIVE PERCENT: 13 % (ref 20–42)
MCH RBC QN AUTO: 29.3 PG (ref 26–35)
MCHC RBC AUTO-ENTMCNC: 29.8 G/DL (ref 32–34.5)
MCV RBC AUTO: 98.5 FL (ref 80–99.9)
MICROORGANISM/AGENT SPEC: ABNORMAL
MONOCYTES NFR BLD: 0.3 K/UL (ref 0.1–0.95)
MONOCYTES NFR BLD: 5 % (ref 2–12)
NEUTROPHILS NFR BLD: 81 % (ref 43–80)
NEUTS SEG NFR BLD: 4.67 K/UL (ref 1.8–7.3)
PLATELET # BLD AUTO: 99 K/UL (ref 130–450)
PLATELET CONFIRMATION: NORMAL
PMV BLD AUTO: 11.2 FL (ref 7–12)
POTASSIUM SERPL-SCNC: 4.1 MMOL/L (ref 3.5–5)
PROTHROMBIN TIME: 11.4 SEC (ref 9.3–12.4)
RBC # BLD AUTO: 3.34 M/UL (ref 3.5–5.5)
S PNEUM AG SPEC QL: NEGATIVE
SERVICE CMNT-IMP: ABNORMAL
SODIUM SERPL-SCNC: 140 MMOL/L (ref 132–146)
SPECIMEN DESCRIPTION: ABNORMAL
SPECIMEN SOURCE: NORMAL
WBC OTHER # BLD: 5.8 K/UL (ref 4.5–11.5)

## 2024-12-24 PROCEDURE — G0378 HOSPITAL OBSERVATION PER HR: HCPCS

## 2024-12-24 PROCEDURE — 80048 BASIC METABOLIC PNL TOTAL CA: CPT

## 2024-12-24 PROCEDURE — 85025 COMPLETE CBC W/AUTO DIFF WBC: CPT

## 2024-12-24 PROCEDURE — 6360000002 HC RX W HCPCS: Performed by: STUDENT IN AN ORGANIZED HEALTH CARE EDUCATION/TRAINING PROGRAM

## 2024-12-24 PROCEDURE — 2700000000 HC OXYGEN THERAPY PER DAY

## 2024-12-24 PROCEDURE — 6370000000 HC RX 637 (ALT 250 FOR IP): Performed by: NURSE PRACTITIONER

## 2024-12-24 PROCEDURE — 85610 PROTHROMBIN TIME: CPT

## 2024-12-24 PROCEDURE — 99239 HOSP IP/OBS DSCHRG MGMT >30: CPT | Performed by: STUDENT IN AN ORGANIZED HEALTH CARE EDUCATION/TRAINING PROGRAM

## 2024-12-24 PROCEDURE — 6370000000 HC RX 637 (ALT 250 FOR IP): Performed by: STUDENT IN AN ORGANIZED HEALTH CARE EDUCATION/TRAINING PROGRAM

## 2024-12-24 PROCEDURE — 36415 COLL VENOUS BLD VENIPUNCTURE: CPT

## 2024-12-24 PROCEDURE — 94640 AIRWAY INHALATION TREATMENT: CPT

## 2024-12-24 RX ORDER — DOXYCYCLINE 100 MG/1
100 CAPSULE ORAL EVERY 12 HOURS SCHEDULED
Qty: 12 CAPSULE | Refills: 0 | Status: SHIPPED | OUTPATIENT
Start: 2024-12-24 | End: 2024-12-30

## 2024-12-24 RX ORDER — PREDNISONE 20 MG/1
40 TABLET ORAL DAILY
Qty: 8 TABLET | Refills: 0 | Status: SHIPPED | OUTPATIENT
Start: 2024-12-25 | End: 2024-12-29

## 2024-12-24 RX ORDER — WARFARIN SODIUM 2.5 MG/1
TABLET ORAL
Qty: 30 TABLET | Refills: 3 | Status: SHIPPED | OUTPATIENT
Start: 2024-12-24

## 2024-12-24 RX ORDER — WARFARIN SODIUM 2.5 MG/1
2.5 TABLET ORAL
Status: DISCONTINUED | OUTPATIENT
Start: 2024-12-24 | End: 2024-12-24 | Stop reason: HOSPADM

## 2024-12-24 RX ORDER — METOPROLOL SUCCINATE 25 MG/1
12.5 TABLET, EXTENDED RELEASE ORAL NIGHTLY
Qty: 30 TABLET | Refills: 3 | Status: SHIPPED | OUTPATIENT
Start: 2024-12-24

## 2024-12-24 RX ADMIN — ARFORMOTEROL TARTRATE 15 MCG: 15 SOLUTION RESPIRATORY (INHALATION) at 06:37

## 2024-12-24 RX ADMIN — BUMETANIDE 0.5 MG: 1 TABLET ORAL at 08:29

## 2024-12-24 RX ADMIN — PREDNISONE 40 MG: 20 TABLET ORAL at 08:29

## 2024-12-24 RX ADMIN — Medication 2000 UNITS: at 08:29

## 2024-12-24 RX ADMIN — PANTOPRAZOLE SODIUM 40 MG: 40 TABLET, DELAYED RELEASE ORAL at 08:29

## 2024-12-24 RX ADMIN — SACUBITRIL AND VALSARTAN 1 TABLET: 24; 26 TABLET, FILM COATED ORAL at 08:29

## 2024-12-24 RX ADMIN — BUDESONIDE 500 MCG: 0.5 SUSPENSION RESPIRATORY (INHALATION) at 06:37

## 2024-12-24 RX ADMIN — GUAIFENESIN 400 MG: 400 TABLET ORAL at 08:29

## 2024-12-24 RX ADMIN — GUAIFENESIN 400 MG: 400 TABLET ORAL at 13:04

## 2024-12-24 RX ADMIN — DOXYCYCLINE HYCLATE 100 MG: 100 CAPSULE ORAL at 08:29

## 2024-12-24 RX ADMIN — LEVOTHYROXINE SODIUM 75 MCG: 75 TABLET ORAL at 05:51

## 2024-12-24 NOTE — DISCHARGE SUMMARY
[facility] Hospitalist Physician Discharge Summary       Dickson Ramon, APRN - [facility]  [address] [place] [address]  [phone]    Follow up        Activity level: As tolerated     Dispo: Home    Condition on discharge: Stable     Patient ID:  Krystal Hyatt  [id number]  84 y.o.  1940    Admit date: 12/22/2024    Discharge date and time:  12/24/2024  1:21 PM    Admission Diagnoses: Principal Problem:    Dyspnea  Active Problems:    CKD stage 3a, GFR 45-59 ml/min (HCC)    Malignant neoplasm of right lung (HCC)    Chronic diastolic heart failure (HCC)    Paroxysmal atrial fibrillation (HCC)    Emphysema of lung (HCC)    Acquired hypothyroidism    Fever    Hemoptysis  Resolved Problems:    * No resolved hospital problems. *      Discharge Diagnoses: Principal Problem:    Dyspnea  Active Problems:    CKD stage 3a, GFR 45-59 ml/min (HCC)    Malignant neoplasm of right lung (HCC)    Chronic diastolic heart failure (HCC)    Paroxysmal atrial fibrillation (HCC)    Emphysema of lung (HCC)    Acquired hypothyroidism    Fever    Hemoptysis  Resolved Problems:    * No resolved hospital problems. *      Consults:  IP CONSULT TO HOSPITALIST  IP CONSULT TO PULMONOLOGY  IP CONSULT TO CARDIOLOGY    Procedures: None    Hospital Course:   Patient Krystal Hyatt is a 84 y.o. presented with Dyspnea [R06.00]  Pleural effusion [J90]  COPD exacerbation (HCC) [J44.1]  Hemoptysis [R04.2]  Acute on chronic combined systolic and diastolic CHF (congestive heart failure) (HCC) [I50.43]  Fever, unspecified fever cause [R50.9]    A 84-year-old female was admitted for evaluation of worsening shortness of breath.  Patient was evaluated by cardiology and Eliquis was switched to warfarin.  Patient was evaluated by pulmonary and she was started on bronchodilators and steroids as well as doxycycline with improvement of her shortness of breath.  Today patient is medically stable to discharge home and she will need to follow-up

## 2024-12-24 NOTE — PLAN OF CARE
Problem: Chronic Conditions and Co-morbidities  Goal: Patient's chronic conditions and co-morbidity symptoms are monitored and maintained or improved  12/23/2024 2246 by Camila Jones RN  Outcome: Progressing     Problem: Safety - Adult  Goal: Free from fall injury  12/23/2024 2246 by Camila Jones, RN  Outcome: Progressing

## 2024-12-24 NOTE — CONSULTS
CARDIOLOGY CONSULTATION     Patient Name:  Krystal Hyatt    :  1940     Reason for Consultation:   Shortness of breath/atrial fibrillation     History of Present Illness:   Krystal Hyatt once again returned to [facility] following a recent history of onset of shortness of breath and chills.  Mrs. Hyatt is well-known to me having a longstanding history of recurrent persistent atrial fibrillation for which she remains in sinus rhythm on her present medical regimen over the past 1 year.  Likewise she has a history of heart failure which also has been well-controlled.  Her major problem at this point seems to be related to her underlying chronic pulmonary disease associated with interstitial lung disease and history of squamous cell carcinoma for which she underwent radiation therapy at the [facility] in the past.  Presently she denies any hemoptysis and there is no significant peripheral edema noted.  She is unaware of any palpitations presently.     Past Medical History:   has a past medical history of Atrial fibrillation (HCC), CHF (congestive heart failure) (HCC), Emphysema, unspecified (HCC), Hyperlipidemia, Hypertension, Lung cancer (HCC), Mitral valve regurgitation, Oxygen dependent, Prolonged emergence from general anesthesia, Skin cancer, and Thyroid disease.     Surgical History:   has a past surgical history that includes Breast biopsy (Right); Tubal ligation; bronchoscopy (N/A, 2021); Intracapsular cataract extraction (Right, 2021); Intracapsular cataract extraction (Left, 2022); and Cardioversion (10/06/2023).      Social History:   reports that she quit smoking about 26 years ago. Her smoking use included cigarettes. She started smoking about 72 years ago. She has a 92 pack-year smoking history. She has been exposed to tobacco smoke. She has never used smokeless tobacco. She reports current alcohol use of about 1.0 standard drink of alcohol  Date of Service: 08/02/2019    The patient returns, last seen on 02/18/2019, nearly 6 months ago, 5 months of relief following bilateral subacromial injections administered on that day.  No new injuries.  Both shoulders are painful again.    Exam demonstrated global/lateral discomfort about each shoulder with satisfactory cuff strength and slightly decreased active motion.  Neurovascular examination was intact.    DIAGNOSIS:  Bilateral shoulder pain/rotator cuff tendinitis.    PLAN:  Options were discussed.  The patient elected to proceed with additional injections, accomplished while seated, each subacromial space injected with 40 mg of triamcinolone and bupivacaine/lidocaine solution, tolerated well.    Follow up as needed.  Expectations discussed.  All questions answered.      Dictated By: Bony Chavez III, MD  Signing Provider: Bony Chavez III, MD AB/shiv ([id number])  DD: 08/02/2019 13:23:28 TD: 08/04/2019 20:09:09    Copy Sent To:

## 2024-12-24 NOTE — PLAN OF CARE
Problem: Chronic Conditions and Co-morbidities  Goal: Patient's chronic conditions and co-morbidity symptoms are monitored and maintained or improved  12/24/2024 1014 by Joanie Garcia RN  Outcome: Progressing     Problem: ABCDS Injury Assessment  Goal: Absence of physical injury  Outcome: Progressing     Problem: Safety - Adult  Goal: Free from fall injury  12/24/2024 1014 by Joanie Garcia RN  Outcome: Progressing

## 2024-12-24 NOTE — PROGRESS NOTES
[facility] Hospitalist Progress Note    Admitting Date and Time: 12/22/2024  4:41 AM  Admit Dx: Dyspnea [R06.00]  Pleural effusion [J90]  COPD exacerbation (HCC) [J44.1]  Hemoptysis [R04.2]  Acute on chronic combined systolic and diastolic CHF (congestive heart failure) (HCC) [I50.43]  Fever, unspecified fever cause [R50.9]    Subjective:  Patient is being followed for Dyspnea [R06.00]  Pleural effusion [J90]  COPD exacerbation (HCC) [J44.1]  Hemoptysis [R04.2]  Acute on chronic combined systolic and diastolic CHF (congestive heart failure) (HCC) [I50.43]  Fever, unspecified fever cause [R50.9]     No acute events overnight.  Patient is breathing on 5 L nasal cannula with mild shortness of breath.    ROS: denies fever, chills, cp, sob, n/v, HA unless stated above.      methylPREDNISolone  40 mg IntraVENous Daily    sodium chloride flush  5-40 mL IntraVENous 2 times per day    amiodarone  100 mg Oral Once per day on Monday Wednesday Friday    apixaban  5 mg Oral BID    bumetanide  0.5 mg Oral Daily    digoxin  62.5 mcg Oral Q MWF    arformoterol tartrate  15 mcg Nebulization BID RT    budesonide  0.5 mg Nebulization BID RT    guaiFENesin  400 mg Oral TID    levothyroxine  75 mcg Oral Daily    metoprolol succinate  12.5 mg Oral Nightly    pantoprazole  40 mg Oral QAM    rosuvastatin  10 mg Oral Nightly    sacubitril-valsartan  1 tablet Oral BID    vitamin D  2,000 Units Oral Daily     sodium chloride flush, 5-40 mL, PRN  sodium chloride, , PRN  potassium chloride, 40 mEq, PRN   Or  potassium alternative oral replacement, 40 mEq, PRN  magnesium sulfate, 2,000 mg, PRN  ondansetron, 4 mg, Q8H PRN   Or  ondansetron, 4 mg, Q6H PRN  polyethylene glycol, 17 g, Daily PRN  acetaminophen, 650 mg, Q6H PRN   Or  acetaminophen, 650 mg, Q6H PRN  levalbuterol, 0.63 mg, Q6H PRN         Objective:    BP (!) 112/55   Pulse 79   Temp 97.7 °F (36.5 °C) (Oral)   Resp 18   Ht 1.74 m (5' 8.5\")   Wt 75.3 kg (166 lb)   SpO2 98% 
4 Eyes Skin Assessment     NAME:  Krystal Hyatt  YOB: 1940  MEDICAL RECORD NUMBER:  [id number]    The patient is being assessed for  Admission    I agree that at least one RN has performed a thorough Head to Toe Skin Assessment on the patient. ALL assessment sites listed below have been assessed.      Areas assessed by both nurses:    Head, Face, Ears, Shoulders, Back, Chest, Arms, Elbows, Hands, Sacrum. Buttock, Coccyx, Ischium, and Legs. Feet and Heels        Does the Patient have a Wound? No noted wound(s)       Leandro Prevention initiated by RN: No  Wound Care Orders initiated by RN: No    Pressure Injury (Stage 3,4, Unstageable, DTI, NWPT, and Complex wounds) if present, place Wound referral order by RN under : No    New Ostomies, if present place, Ostomy referral order under : No     Nurse 1 eSignature: Electronically signed by Caroline Villafana RN on 12/22/24 at 3:33 PM EST    **SHARE this note so that the co-signing nurse can place an eSignature**    Nurse 2 eSignature: Electronically signed by Stacey Patel RN on 12/22/24 at 6:04 PM EST    
CLINICAL PHARMACY NOTE: MEDS TO BEDS    Total # of Prescriptions Filled: 4   The following medications were delivered to the patient:  Metoprolol succ er 25mg  Warfarin sod 2.5mg tabs  Prednisone 20mg  Doxy hyc 100mg tabs    Additional Documentation:  To patient  
OK to discharge home per Dr. Jones and Dr. Bruner.  Electronically signed by Joanie Garcia RN on 12/24/2024 at 1:08 PM    
discharged but deferred to pulmonary.  Likewise she has been placed on warfarin at upon her request and most feet careful and dosing warfarin due to the fact that she is on amiodarone as well.  As previously mentioned her INR will need to be closely followed by her primary internist Dr.Mounir Jatin Narvaez.  I will see her for her already scheduled outpatient follow-up appointment.    I have spent more than 25 minutes face to face with Krystal Hyatt and reviewing notes and laboratory data, with greater than 50% of this time instructing and counseling the patient face to face regarding my findings and recommendations and I have answered all questions as posed to me by MsAna Paula Olena.    Dieter Jones, DO FACP,FACC,[facility] [place] [place]      NOTE:  This report was transcribed using voice recognition software.  Every effort was made to ensure accuracy; however, inadvertent computerized transcription errors may be present        
lung disease  COPD/centrilobular emphysema  Squamous cell lung cancer August 2021 to be status post radiation August 2021 at [facility]  Chronic collapse right middle lobe  Chronic heart failure, repeat echo with improved EF 60%, mild to moderate aortic regurgitation, estimated RVSP 52 mmHg likely WHO group 2  A-fib RVR with history of cardioversion 9/4/2024  Large hiatal hernia  Former nicotine dependence in remission    Plan:  Patient is stable to be discharged from a pulmonary standpoint.  Will discharge her on prednisone 40 mg for 5 days and also doxycycline for 6 more days.  Will follow her final respiratory cultures I informed patient if anything turns positive and her respiratory culture we will notify her.    Follow-up in our office with NP in 2 to 3 weeks.    Thank you for allowing me to participate in the care of Krystal Hyatt.   Please feel free to call with questions.       Electronically signed by Anjelica Bruner MD on 12/24/2024 at 1:29 PM

## 2024-12-26 ENCOUNTER — CARE COORDINATION (OUTPATIENT)
Dept: CARE COORDINATION | Age: 84
End: 2024-12-26

## 2024-12-26 DIAGNOSIS — R06.02 SHORTNESS OF BREATH: Primary | ICD-10-CM

## 2024-12-26 PROCEDURE — 1111F DSCHRG MED/CURRENT MED MERGE: CPT | Performed by: INTERNAL MEDICINE

## 2024-12-26 NOTE — CARE COORDINATION
trending sats 88% or less unresponsive to oxygen, rest, nebulizer.  RPM Program reviewed and pt declined.    Care Summary Note: CTN spoke w/ Krystal.    Patient wears 5L O2 at baseline through [employer]. States she sats in the 90's. Has rare cough producing little phlegm. States no blood streaking in mucus. Denies any SOB or congestion concerns. States no fevers, chills, or flu-like symptoms. Is up and active. Denies any HHC needs. States she is feeling much better. Advised to complete full course of Doxy, v/u.     Care Transition Nurse reviewed discharge instructions with patient. The patient was given an opportunity to ask questions; all questions answered at this time.. The patient verbalized understanding.   Were discharge instructions available to patient? Yes.   Reviewed appropriate site of care based on symptoms and resources available to patient including: PCP  Specialist  Urgent care clinics  [facility]  When to call 911  Condition related references. The patient agrees to contact the primary care provider and/or specialist office for questions related to their healthcare.      Advance Care Planning:   Does patient have an Advance Directive: Olena Krystal Muniz P: [phone]     Medication Reconciliation:  Medication reconciliation was performed with patient,1111F entered: yes.     Remote Patient Monitoring:  Offered patient enrollment in the Remote Patient Monitoring (RPM) program for in-home monitoring: Pt declined.    Assessments:  Care Transitions 24 Hour Call    Do you have a copy of your discharge instructions?: Yes  Do you have all of your prescriptions and are they filled?: Yes  Have you scheduled your follow up appointment?: No  Do you have support at home?: Alone  Do you feel like you have everything you need to keep you well at home?: Yes  Care Transitions Interventions     Other Services: Declined (Comment: RPM)     Registered Dietician: Declined              Follow Up Appointment: [place]

## 2025-01-03 ENCOUNTER — CARE COORDINATION (OUTPATIENT)
Dept: CARE COORDINATION | Age: 85
End: 2025-01-03

## 2025-01-03 NOTE — CARE COORDINATION
Care Transitions Note    Follow Up Call     CTN left HIPAA VM, purpose of outreach, and my contact info for subsequent attempt.    Reason for Admission: 12/22/2024 - 12/24/2024 [facility] Obs. Dyspnea.     Follow Up Appointment:     Future Appointments         Provider Specialty Dept Phone    1/6/2025 2:30 PM Rom Mckeon MD Primary Care [phone]    1/13/2025 1:20 PM Dickson Ramon APRN - CNS Pulmonology [phone]    3/24/2025 9:30 AM Rom Mckeon MD Primary Care [phone]            Linsey Luke RN

## 2025-01-06 ENCOUNTER — OFFICE VISIT (OUTPATIENT)
Dept: PRIMARY CARE CLINIC | Age: 85
End: 2025-01-06

## 2025-01-06 VITALS
HEART RATE: 87 BPM | WEIGHT: 160 LBS | DIASTOLIC BLOOD PRESSURE: 70 MMHG | TEMPERATURE: 98.3 F | OXYGEN SATURATION: 99 % | BODY MASS INDEX: 23.7 KG/M2 | HEIGHT: 69 IN | SYSTOLIC BLOOD PRESSURE: 122 MMHG

## 2025-01-06 DIAGNOSIS — N18.31 CKD STAGE 3A, GFR 45-59 ML/MIN (HCC): ICD-10-CM

## 2025-01-06 DIAGNOSIS — B36.9 FUNGAL DERMATITIS: ICD-10-CM

## 2025-01-06 DIAGNOSIS — C34.91 MALIGNANT NEOPLASM OF UNSPECIFIED PART OF RIGHT BRONCHUS OR LUNG (HCC): ICD-10-CM

## 2025-01-06 DIAGNOSIS — J96.11 CHRONIC RESPIRATORY FAILURE WITH HYPOXIA: ICD-10-CM

## 2025-01-06 DIAGNOSIS — I48.0 PAF (PAROXYSMAL ATRIAL FIBRILLATION) (HCC): Primary | ICD-10-CM

## 2025-01-06 LAB
INTERNATIONAL NORMALIZATION RATIO, POC: 2.6
PROTHROMBIN TIME, POC: 0

## 2025-01-06 RX ORDER — FLUCONAZOLE 100 MG/1
100 TABLET ORAL DAILY
Qty: 7 TABLET | Refills: 0 | Status: SHIPPED | OUTPATIENT
Start: 2025-01-06 | End: 2025-01-13

## 2025-01-06 ASSESSMENT — ENCOUNTER SYMPTOMS
COUGH: 0
NAUSEA: 0
ABDOMINAL PAIN: 0
DIARRHEA: 0
VOMITING: 0
SHORTNESS OF BREATH: 0

## 2025-01-06 ASSESSMENT — PATIENT HEALTH QUESTIONNAIRE - PHQ9
1. LITTLE INTEREST OR PLEASURE IN DOING THINGS: NOT AT ALL
2. FEELING DOWN, DEPRESSED OR HOPELESS: NOT AT ALL
SUM OF ALL RESPONSES TO PHQ QUESTIONS 1-9: 0
SUM OF ALL RESPONSES TO PHQ9 QUESTIONS 1 & 2: 0
SUM OF ALL RESPONSES TO PHQ QUESTIONS 1-9: 0

## 2025-01-06 NOTE — PROGRESS NOTES
SUBJECTIVE  Krystal Hyatt is a 84 y.o. female established was seen In the office  for evaluation.    HPI/Chief C/O:  Chief Complaint   Patient presents with    Follow-Up from Hospital     Was coughing up blood.   Has itchy rash groin and buttock   On )2 NC   Allergies   Allergen Reactions    Pacerone [Amiodarone] Shortness Of Breath     States she becomes SOB when given IV.      Bactrim [Sulfamethoxazole-Trimethoprim] Other (See Comments)     Elevated kidney function    Cat Dander Itching and Other (See Comments)     Patient states \"My son put this. I hope I'm not allergic, I have 4 Cats and 2 Birds, maybe to their dust.\"    Egg-Derived Products Nausea Only    Erythromycin Diarrhea, Nausea And Vomiting and Other (See Comments)     \"STOMACH PAINS\"      Pcn [Penicillins] Itching and Rash     PT STATES \"RASH FROM HEAD TO TOE, W/ SOMETHING CRAWLING UNDER MY SKIN\"    Seasonal Itching, Swelling and Other (See Comments)     RUNNY/ITCHY NOSE, WATERY/ITCHY EYES       ROS:  Review of Systems   Respiratory:  Negative for cough and shortness of breath.         Negative for Hemoptysis   Cardiovascular:  Negative for chest pain.   Gastrointestinal:  Negative for abdominal pain, diarrhea, nausea and vomiting.   Endocrine: Negative for polydipsia, polyphagia and polyuria.   Genitourinary:  Negative for dysuria and hematuria.   Skin:  Negative for rash.   Neurological:  Negative for tremors and seizures.        Past Medical/Surgical Hx;  Reviewed with patient      Diagnosis Date    Atrial fibrillation (HCC)     CHF (congestive heart failure) (HCC)     Emphysema, unspecified (HCC)     Hyperlipidemia     Hypertension     Lung cancer (HCC)     Mitral valve regurgitation     Oxygen dependent     Prolonged emergence from general anesthesia     post general anesthesia    Skin cancer     Thyroid disease      Past Surgical History:   Procedure Laterality Date    BREAST BIOPSY Right     benign    BRONCHOSCOPY N/A 04/20/2021

## 2025-01-08 ENCOUNTER — CARE COORDINATION (OUTPATIENT)
Dept: CARE COORDINATION | Age: 85
End: 2025-01-08

## 2025-01-08 NOTE — CARE COORDINATION
Rom Mckeon MD Primary Care [phone]            Care Transition Nurse provided contact information.  Plan for follow-up call in 11-14 days based on severity of symptoms and risk factors.  Plan for next call: CT FU, Symptom and sat check, Pending repeat chest CT.    Linsey Luke RN

## 2025-01-11 ENCOUNTER — APPOINTMENT (OUTPATIENT)
Dept: GENERAL RADIOLOGY | Age: 85
End: 2025-01-11
Payer: MEDICARE

## 2025-01-11 ENCOUNTER — HOSPITAL ENCOUNTER (EMERGENCY)
Age: 85
Discharge: HOME | End: 2025-01-12
Attending: EMERGENCY MEDICINE
Payer: MEDICARE

## 2025-01-11 DIAGNOSIS — J96.11 CHRONIC HYPOXIC RESPIRATORY FAILURE: Primary | ICD-10-CM

## 2025-01-11 DIAGNOSIS — R79.1 SUPRATHERAPEUTIC INR: ICD-10-CM

## 2025-01-11 DIAGNOSIS — E87.5 HYPERKALEMIA: ICD-10-CM

## 2025-01-11 DIAGNOSIS — J44.1 COPD EXACERBATION (HCC): ICD-10-CM

## 2025-01-11 LAB
ALBUMIN SERPL-MCNC: 3.9 G/DL (ref 3.5–5.2)
ALP SERPL-CCNC: 74 U/L (ref 35–104)
ALT SERPL-CCNC: 14 U/L (ref 0–32)
ANION GAP SERPL CALCULATED.3IONS-SCNC: 9 MMOL/L (ref 7–16)
AST SERPL-CCNC: 23 U/L (ref 0–31)
B PARAP IS1001 DNA NPH QL NAA+NON-PROBE: NOT DETECTED
B PERT DNA SPEC QL NAA+PROBE: NOT DETECTED
BASOPHILS # BLD: 0.02 K/UL (ref 0–0.2)
BASOPHILS NFR BLD: 0 % (ref 0–2)
BILIRUB SERPL-MCNC: 0.4 MG/DL (ref 0–1.2)
BNP SERPL-MCNC: 1823 PG/ML (ref 0–450)
BUN SERPL-MCNC: 24 MG/DL (ref 6–23)
C PNEUM DNA NPH QL NAA+NON-PROBE: NOT DETECTED
CALCIUM SERPL-MCNC: 9.6 MG/DL (ref 8.6–10.2)
CHLORIDE SERPL-SCNC: 99 MMOL/L (ref 98–107)
CO2 SERPL-SCNC: 29 MMOL/L (ref 22–29)
CREAT SERPL-MCNC: 1.3 MG/DL (ref 0.5–1)
EOSINOPHIL # BLD: 0.04 K/UL (ref 0.05–0.5)
EOSINOPHILS RELATIVE PERCENT: 1 % (ref 0–6)
ERYTHROCYTE [DISTWIDTH] IN BLOOD BY AUTOMATED COUNT: 13.2 % (ref 11.5–15)
FLUAV RNA NPH QL NAA+NON-PROBE: NOT DETECTED
FLUBV RNA NPH QL NAA+NON-PROBE: NOT DETECTED
GFR, ESTIMATED: 39 ML/MIN/1.73M2
GLUCOSE SERPL-MCNC: 128 MG/DL (ref 74–99)
HADV DNA NPH QL NAA+NON-PROBE: NOT DETECTED
HCOV 229E RNA NPH QL NAA+NON-PROBE: NOT DETECTED
HCOV HKU1 RNA NPH QL NAA+NON-PROBE: NOT DETECTED
HCOV NL63 RNA NPH QL NAA+NON-PROBE: NOT DETECTED
HCOV OC43 RNA NPH QL NAA+NON-PROBE: NOT DETECTED
HCT VFR BLD AUTO: 37.4 % (ref 34–48)
HGB BLD-MCNC: 11.6 G/DL (ref 11.5–15.5)
HMPV RNA NPH QL NAA+NON-PROBE: NOT DETECTED
HPIV1 RNA NPH QL NAA+NON-PROBE: NOT DETECTED
HPIV2 RNA NPH QL NAA+NON-PROBE: NOT DETECTED
HPIV3 RNA NPH QL NAA+NON-PROBE: NOT DETECTED
HPIV4 RNA NPH QL NAA+NON-PROBE: NOT DETECTED
IMM GRANULOCYTES # BLD AUTO: <0.03 K/UL (ref 0–0.58)
IMM GRANULOCYTES NFR BLD: 0 % (ref 0–5)
INR PPP: 4.9
LYMPHOCYTES NFR BLD: 1.17 K/UL (ref 1.5–4)
LYMPHOCYTES RELATIVE PERCENT: 24 % (ref 20–42)
M PNEUMO DNA NPH QL NAA+NON-PROBE: NOT DETECTED
MAGNESIUM SERPL-MCNC: 1.8 MG/DL (ref 1.6–2.6)
MCH RBC QN AUTO: 29.4 PG (ref 26–35)
MCHC RBC AUTO-ENTMCNC: 31 G/DL (ref 32–34.5)
MCV RBC AUTO: 94.7 FL (ref 80–99.9)
MONOCYTES NFR BLD: 0.28 K/UL (ref 0.1–0.95)
MONOCYTES NFR BLD: 6 % (ref 2–12)
NEUTROPHILS NFR BLD: 69 % (ref 43–80)
NEUTS SEG NFR BLD: 3.41 K/UL (ref 1.8–7.3)
PLATELET # BLD AUTO: 151 K/UL (ref 130–450)
PMV BLD AUTO: 11 FL (ref 7–12)
POTASSIUM SERPL-SCNC: 5.1 MMOL/L (ref 3.5–5)
PROT SERPL-MCNC: 7 G/DL (ref 6.4–8.3)
PROTHROMBIN TIME: 52.3 SEC (ref 9.3–12.4)
RBC # BLD AUTO: 3.95 M/UL (ref 3.5–5.5)
RSV RNA NPH QL NAA+NON-PROBE: NOT DETECTED
RV+EV RNA NPH QL NAA+NON-PROBE: NOT DETECTED
SARS-COV-2 RNA NPH QL NAA+NON-PROBE: NOT DETECTED
SODIUM SERPL-SCNC: 137 MMOL/L (ref 132–146)
SPECIMEN DESCRIPTION: NORMAL
TROPONIN I SERPL HS-MCNC: 26 NG/L (ref 0–9)
TROPONIN I SERPL HS-MCNC: 27 NG/L (ref 0–9)
WBC OTHER # BLD: 4.9 K/UL (ref 4.5–11.5)

## 2025-01-11 PROCEDURE — 6360000002 HC RX W HCPCS

## 2025-01-11 PROCEDURE — 71046 X-RAY EXAM CHEST 2 VIEWS: CPT

## 2025-01-11 PROCEDURE — 0202U NFCT DS 22 TRGT SARS-COV-2: CPT

## 2025-01-11 PROCEDURE — 99285 EMERGENCY DEPT VISIT HI MDM: CPT

## 2025-01-11 PROCEDURE — 85610 PROTHROMBIN TIME: CPT

## 2025-01-11 PROCEDURE — 80162 ASSAY OF DIGOXIN TOTAL: CPT

## 2025-01-11 PROCEDURE — 80053 COMPREHEN METABOLIC PANEL: CPT

## 2025-01-11 PROCEDURE — 84484 ASSAY OF TROPONIN QUANT: CPT

## 2025-01-11 PROCEDURE — 94640 AIRWAY INHALATION TREATMENT: CPT

## 2025-01-11 PROCEDURE — 85025 COMPLETE CBC W/AUTO DIFF WBC: CPT

## 2025-01-11 PROCEDURE — 6370000000 HC RX 637 (ALT 250 FOR IP)

## 2025-01-11 PROCEDURE — 83735 ASSAY OF MAGNESIUM: CPT

## 2025-01-11 PROCEDURE — 96374 THER/PROPH/DIAG INJ IV PUSH: CPT

## 2025-01-11 PROCEDURE — 83880 ASSAY OF NATRIURETIC PEPTIDE: CPT

## 2025-01-11 RX ORDER — METHYLPREDNISOLONE SODIUM SUCCINATE 125 MG/2ML
125 INJECTION INTRAMUSCULAR; INTRAVENOUS ONCE
Status: COMPLETED | OUTPATIENT
Start: 2025-01-11 | End: 2025-01-11

## 2025-01-11 RX ORDER — IPRATROPIUM BROMIDE AND ALBUTEROL SULFATE 2.5; .5 MG/3ML; MG/3ML
3 SOLUTION RESPIRATORY (INHALATION) ONCE
Status: COMPLETED | OUTPATIENT
Start: 2025-01-11 | End: 2025-01-11

## 2025-01-11 RX ADMIN — METHYLPREDNISOLONE SODIUM SUCCINATE 125 MG: 125 INJECTION INTRAMUSCULAR; INTRAVENOUS at 18:56

## 2025-01-11 RX ADMIN — IPRATROPIUM BROMIDE AND ALBUTEROL SULFATE 3 DOSE: .5; 2.5 SOLUTION RESPIRATORY (INHALATION) at 19:30

## 2025-01-11 ASSESSMENT — LIFESTYLE VARIABLES
HOW OFTEN DO YOU HAVE A DRINK CONTAINING ALCOHOL: NEVER
HOW MANY STANDARD DRINKS CONTAINING ALCOHOL DO YOU HAVE ON A TYPICAL DAY: PATIENT DOES NOT DRINK
HOW OFTEN DO YOU HAVE A DRINK CONTAINING ALCOHOL: NEVER

## 2025-01-11 NOTE — ED PROVIDER NOTES
Department of Emergency Medicine     Written by: Matt Dupont DO  Patient Name: Krystal Hyatt  Admit Date: 2025  5:45 PM  MRN: [id number]                   : 1940      ------------------------- CC-------------------------  Chief Complaint   Patient presents with    Shortness of Breath     since 1200, 5L NC baseline, 90% on 6L NC at pivot, HR 65     ------------------------- HPI -------------------------    Krystal Hyatt is a 84 y.o. female who presents to the emergency department today complaining of shortness of breath.  Patient wears 5 L nasal cannula at baseline secondary to history of COPD.  This morning, she was making breakfast for her family when she felt that she had to go sit down because she got very short of breath and thought she may pass out.  When she sat down, she checked her pulse ox which she states was down in the 40%.  She then bumped her oxygen up to 6 L and she slowly came back to a normal pulse ox level.  Patient was on Eliquis in the past for her A-fib however on recent hospital admission, she was changed over to Coumadin.  She sees Dr. Jones for cardiology.  Patient denies any lightheadedness or dizziness, fever or chills, nausea or vomiting, chest pain, abdominal pain, hematuria or dysuria, constipation or diarrhea.    Nursing notes were all reviewed and agreed with or any disagreements were addressed in the HPI.    REVIEW OF SYSTEMS:    Pertinent positives and negatives mentioned in the HPI/MDM.     ------------------------- PAST HISTORY -------------------------    I personally reviewed the following history:    Past Medical History:  has a past medical history of Atrial fibrillation (HCC), CHF (congestive heart failure) (HCC), Emphysema, unspecified (HCC), Hyperlipidemia, Hypertension, Lung cancer (HCC), Mitral valve regurgitation, Oxygen dependent, Prolonged emergence from general anesthesia, Skin cancer, and Thyroid disease.    Past Surgical History:  has a

## 2025-01-12 VITALS
HEART RATE: 90 BPM | HEIGHT: 68 IN | OXYGEN SATURATION: 98 % | DIASTOLIC BLOOD PRESSURE: 58 MMHG | SYSTOLIC BLOOD PRESSURE: 105 MMHG | TEMPERATURE: 97.5 F | WEIGHT: 162 LBS | RESPIRATION RATE: 24 BRPM | BODY MASS INDEX: 24.55 KG/M2

## 2025-01-12 LAB — DIGOXIN SERPL-MCNC: 0.7 NG/ML (ref 0.8–2)

## 2025-01-12 NOTE — DISCHARGE INSTR - COC
requires {Admit to Appropriate Level of Care:55077} for {GREATER/LESS:559969722} 30 days.     Update Admission H&P: {CHP DME Changes in HandP:789948044}    PHYSICIAN SIGNATURE:  {Esignature:159016487}

## 2025-01-13 ENCOUNTER — NURSE ONLY (OUTPATIENT)
Dept: PRIMARY CARE CLINIC | Age: 85
End: 2025-01-13
Payer: MEDICARE

## 2025-01-13 ENCOUNTER — CARE COORDINATION (OUTPATIENT)
Dept: CARE COORDINATION | Age: 85
End: 2025-01-13

## 2025-01-13 DIAGNOSIS — I48.0 PAF (PAROXYSMAL ATRIAL FIBRILLATION) (HCC): ICD-10-CM

## 2025-01-13 LAB
INTERNATIONAL NORMALIZATION RATIO, POC: 5.3
PROTHROMBIN TIME, POC: 0

## 2025-01-13 PROCEDURE — 85610 PROTHROMBIN TIME: CPT | Performed by: INTERNAL MEDICINE

## 2025-01-13 RX ORDER — WARFARIN SODIUM 2 MG/1
2 TABLET ORAL DAILY
Qty: 30 TABLET | Refills: 0 | Status: SHIPPED | OUTPATIENT
Start: 2025-01-13

## 2025-01-13 RX ORDER — WARFARIN SODIUM 2 MG/1
2 TABLET ORAL DAILY
Qty: 30 TABLET | Refills: 0 | Status: CANCELLED | OUTPATIENT
Start: 2025-01-13

## 2025-01-13 NOTE — CARE COORDINATION
Care Transitions Note    ED Follow Up Call     2025 - 2025 [facility] ED. COPD.  Recent admission: 2024 - 2024 [facility] Obs. Dyspnea.     MA visit/labs/PT INR  1:00  Pulm appt  3/5 1:40    No ED medication changes.     Patient Current Location:  Home: [address] [place] [address]    Care Transition Nurse contacted the patient by telephone. Verified name and  as identifiers.    Additional needs identified to be addressed with provider   No needs identified                 Method of communication with provider: none.    Care Summary Note: CTN spoke w/ Krystal.    Pt went to ED after attempting to make breakfast, felt faint, and noted sats 40%.     Currently wearing 5L NC con't (no nocturnal sleep devices; O2 via [employer]). Current sats at rest are 97-98%. Uses Xopenex Q 6 hr PRN. Enc pt to use TID routinely while symptomatic, v/u. Advised early morning, mid day, and before bed, v/u.     Reports 1-3 pk/day previous smoking habit. She stopped smoking around . No current smoking.    Sleeps on 2 pillows. Feels she is resting well at night.    On Bumex, Trelegy ellipta, Mucinex, and PRN Xopenex nebs.    HR today in the 80's. Held warfarin Sat and Sun and this morning. Pending PT/INR today. INR 25 was 4.9.    Declines RPM. Declined CTN to schedule ED FU stating she saw PCP last wk. Strongly enc to move pulm appt closer and schedule chest CT, v/u.     Plan of care updates since last contact:  Avoid dehydration  Resent when tired. Avoid strenuous activities known to cause desaturation (lifting, excessive stairs, sweeping)  Attend routine visits w/ your pulmonologist. Contact office to move your March appt closer, as possible.  Schedule your Chest CT  Return to ED for respiratory distress and trending desats 88% unresponsive to oxygen, rest, and nebs.     Remote Patient Monitoring:  Offered patient enrollment in the Remote Patient

## 2025-01-16 LAB
EKG ATRIAL RATE: 50 BPM
EKG Q-T INTERVAL: 466 MS
EKG QRS DURATION: 80 MS
EKG QTC CALCULATION (BAZETT): 453 MS
EKG R AXIS: 68 DEGREES
EKG T AXIS: 49 DEGREES
EKG VENTRICULAR RATE: 57 BPM

## 2025-01-20 ENCOUNTER — NURSE ONLY (OUTPATIENT)
Dept: PRIMARY CARE CLINIC | Age: 85
End: 2025-01-20
Payer: MEDICARE

## 2025-01-20 DIAGNOSIS — I48.0 PAF (PAROXYSMAL ATRIAL FIBRILLATION) (HCC): Primary | ICD-10-CM

## 2025-01-20 LAB — INTERNATIONAL NORMALIZATION RATIO, POC: 2.4

## 2025-01-20 PROCEDURE — 93793 ANTICOAG MGMT PT WARFARIN: CPT | Performed by: INTERNAL MEDICINE

## 2025-01-20 NOTE — PROGRESS NOTES
Patient came in for INR check    Patient is on 2 mg of warfarin and INR today is 2.4 spoke with Dr. Jatin Narvaez he said to stay the same and come to her next appt

## 2025-01-21 ENCOUNTER — CARE COORDINATION (OUTPATIENT)
Dept: CARE COORDINATION | Age: 85
End: 2025-01-21

## 2025-01-21 NOTE — CARE COORDINATION
Care Transitions Note    Follow Up Call     2025 - 2025 [facility] ED. COPD.  Recent admission: 2024 - 2024 [facility] Obs. Dyspnea.     Patient Current Location:  Home: [address] [place] [id number]    Care Transition Nurse contacted the patient by telephone. Verified name and  as identifiers.    Additional needs identified to be addressed with provider   No needs identified                 Method of communication with provider: none.    Care Summary Note: CTN spoke w/ Krystal.    Pt reports her breathing is doing \"really good\". Denies any current respiratory symptom concerns and feels at baseline.  Currently wearing 5L NC con't w/ current sats of 97-98%. Uses Xopenex Q 6 hr PRN.   Has/taking meds as directed.  Declines need for further telephonic follow up. CTN s/o.    Follow Up Appointment:     Future Appointments         Provider Specialty Dept Phone    2/3/2025 12:30 PM Rom Mckeno MD Primary Care [phone]    3/4/2025 1:30 PM (Arrive by 1:00 PM) SEB CT2 BD Radiology [phone]    3/5/2025 1:40 PM Dickson Ramon APRN - CNS Pulmonology [phone]    3/24/2025 9:30 AM Rom Mckeon MD Primary Care [phone]          Linsey Luke RN

## 2025-02-04 ENCOUNTER — OFFICE VISIT (OUTPATIENT)
Dept: PRIMARY CARE CLINIC | Age: 85
End: 2025-02-04

## 2025-02-04 VITALS
DIASTOLIC BLOOD PRESSURE: 78 MMHG | RESPIRATION RATE: 16 BRPM | TEMPERATURE: 97 F | HEIGHT: 68 IN | HEART RATE: 78 BPM | OXYGEN SATURATION: 98 % | BODY MASS INDEX: 24.86 KG/M2 | WEIGHT: 164 LBS | SYSTOLIC BLOOD PRESSURE: 120 MMHG

## 2025-02-04 DIAGNOSIS — N18.31 CKD STAGE 3A, GFR 45-59 ML/MIN (HCC): ICD-10-CM

## 2025-02-04 DIAGNOSIS — I48.0 PAF (PAROXYSMAL ATRIAL FIBRILLATION) (HCC): Primary | ICD-10-CM

## 2025-02-04 DIAGNOSIS — E03.9 ACQUIRED HYPOTHYROIDISM: ICD-10-CM

## 2025-02-04 DIAGNOSIS — C34.91 MALIGNANT NEOPLASM OF UNSPECIFIED PART OF RIGHT BRONCHUS OR LUNG (HCC): ICD-10-CM

## 2025-02-04 DIAGNOSIS — J96.11 CHRONIC RESPIRATORY FAILURE WITH HYPOXIA: ICD-10-CM

## 2025-02-04 DIAGNOSIS — I50.43 ACUTE ON CHRONIC COMBINED SYSTOLIC AND DIASTOLIC CHF (CONGESTIVE HEART FAILURE) (HCC): ICD-10-CM

## 2025-02-04 LAB — INR BLD: 1.7

## 2025-02-04 RX ORDER — DOXYCYCLINE HYCLATE 100 MG
100 TABLET ORAL 2 TIMES DAILY WITH MEALS
Qty: 20 TABLET | Refills: 0 | Status: SHIPPED | OUTPATIENT
Start: 2025-02-04 | End: 2025-02-14

## 2025-02-04 RX ORDER — ROSUVASTATIN CALCIUM 10 MG/1
10 TABLET, COATED ORAL NIGHTLY
Qty: 90 TABLET | Refills: 0 | Status: SHIPPED | OUTPATIENT
Start: 2025-02-04

## 2025-02-04 RX ORDER — WARFARIN SODIUM 2.5 MG/1
TABLET ORAL
Qty: 30 TABLET | Refills: 1 | Status: SHIPPED | OUTPATIENT
Start: 2025-02-04

## 2025-02-04 RX ORDER — LEVOTHYROXINE SODIUM 75 UG/1
75 TABLET ORAL DAILY
Qty: 90 TABLET | Refills: 0 | Status: SHIPPED | OUTPATIENT
Start: 2025-02-04

## 2025-02-04 RX ORDER — BUMETANIDE 0.5 MG/1
0.5 TABLET ORAL DAILY
Qty: 90 TABLET | Refills: 0 | Status: SHIPPED | OUTPATIENT
Start: 2025-02-04

## 2025-02-04 ASSESSMENT — ENCOUNTER SYMPTOMS
VOMITING: 0
SHORTNESS OF BREATH: 0
DIARRHEA: 0
NAUSEA: 0
ABDOMINAL PAIN: 0
COUGH: 0

## 2025-02-04 NOTE — PROGRESS NOTES
Respiratory Syncytial Virus (RSV) Pregnant or age 60 yrs+     Hepatitis A vaccine     Hepatitis B vaccine     Hib vaccine     Polio vaccine     Meningococcal (ACWY) vaccine

## 2025-02-19 NOTE — CARE COORDINATION
Care Transitions Follow Up Call    Patient: [address]  Patient : 1940   MRN: [id number]  Reason for Admission: Aflutter with RVR  Discharge Date: 23 RARS: Readmission Risk Score: 12.3    Attempted to contact patient today 10/3/23 for TCM/hospital discharge (low risk) follow up sub call. Left message requesting a return call back to CTN and provided contact information.     Niecy Pace, APRN
No

## 2025-02-20 ENCOUNTER — APPOINTMENT (OUTPATIENT)
Dept: GENERAL RADIOLOGY | Age: 85
End: 2025-02-20
Payer: MEDICARE

## 2025-02-20 ENCOUNTER — HOSPITAL ENCOUNTER (EMERGENCY)
Age: 85
Discharge: HOME | End: 2025-02-20
Attending: EMERGENCY MEDICINE
Payer: MEDICARE

## 2025-02-20 VITALS
OXYGEN SATURATION: 92 % | BODY MASS INDEX: 25.09 KG/M2 | RESPIRATION RATE: 20 BRPM | SYSTOLIC BLOOD PRESSURE: 120 MMHG | TEMPERATURE: 97.9 F | HEART RATE: 68 BPM | DIASTOLIC BLOOD PRESSURE: 60 MMHG | WEIGHT: 165 LBS

## 2025-02-20 DIAGNOSIS — R06.00 DYSPNEA, UNSPECIFIED TYPE: ICD-10-CM

## 2025-02-20 DIAGNOSIS — J96.11 CHRONIC RESPIRATORY FAILURE WITH HYPOXIA (HCC): ICD-10-CM

## 2025-02-20 DIAGNOSIS — J44.1 ACUTE EXACERBATION OF CHRONIC OBSTRUCTIVE PULMONARY DISEASE (COPD) (HCC): Primary | ICD-10-CM

## 2025-02-20 LAB
ALBUMIN SERPL-MCNC: 4.4 G/DL (ref 3.5–5.2)
ALP SERPL-CCNC: 73 U/L (ref 35–104)
ALT SERPL-CCNC: 12 U/L (ref 0–32)
ANION GAP SERPL CALCULATED.3IONS-SCNC: 13 MMOL/L (ref 7–16)
AST SERPL-CCNC: 26 U/L (ref 0–31)
BASOPHILS # BLD: 0.02 K/UL (ref 0–0.2)
BASOPHILS NFR BLD: 0 % (ref 0–2)
BILIRUB SERPL-MCNC: 0.5 MG/DL (ref 0–1.2)
BUN SERPL-MCNC: 35 MG/DL (ref 6–23)
CALCIUM SERPL-MCNC: 9.4 MG/DL (ref 8.6–10.2)
CHLORIDE SERPL-SCNC: 100 MMOL/L (ref 98–107)
CO2 SERPL-SCNC: 28 MMOL/L (ref 22–29)
CREAT SERPL-MCNC: 1.5 MG/DL (ref 0.5–1)
EOSINOPHIL # BLD: 0.07 K/UL (ref 0.05–0.5)
EOSINOPHILS RELATIVE PERCENT: 2 % (ref 0–6)
ERYTHROCYTE [DISTWIDTH] IN BLOOD BY AUTOMATED COUNT: 14.2 % (ref 11.5–15)
GFR, ESTIMATED: 35 ML/MIN/1.73M2
GLUCOSE SERPL-MCNC: 125 MG/DL (ref 74–99)
HCT VFR BLD AUTO: 38.1 % (ref 34–48)
HGB BLD-MCNC: 11.6 G/DL (ref 11.5–15.5)
IMM GRANULOCYTES # BLD AUTO: <0.03 K/UL (ref 0–0.58)
IMM GRANULOCYTES NFR BLD: 0 % (ref 0–5)
INFLUENZA A BY PCR: NOT DETECTED
INFLUENZA B BY PCR: NOT DETECTED
INR PPP: 1.9
LACTATE BLDV-SCNC: 1.7 MMOL/L (ref 0.5–1.9)
LYMPHOCYTES NFR BLD: 1.9 K/UL (ref 1.5–4)
LYMPHOCYTES RELATIVE PERCENT: 41 % (ref 20–42)
MCH RBC QN AUTO: 29.1 PG (ref 26–35)
MCHC RBC AUTO-ENTMCNC: 30.4 G/DL (ref 32–34.5)
MCV RBC AUTO: 95.7 FL (ref 80–99.9)
MONOCYTES NFR BLD: 0.28 K/UL (ref 0.1–0.95)
MONOCYTES NFR BLD: 6 % (ref 2–12)
NEUTROPHILS NFR BLD: 51 % (ref 43–80)
NEUTS SEG NFR BLD: 2.37 K/UL (ref 1.8–7.3)
PLATELET, FLUORESCENCE: 130 K/UL (ref 130–450)
PMV BLD AUTO: 11.6 FL (ref 7–12)
POTASSIUM SERPL-SCNC: 4.6 MMOL/L (ref 3.5–5)
PROT SERPL-MCNC: 7.2 G/DL (ref 6.4–8.3)
PROTHROMBIN TIME: 20.3 SEC (ref 9.3–12.4)
RBC # BLD AUTO: 3.98 M/UL (ref 3.5–5.5)
SARS-COV-2 RDRP RESP QL NAA+PROBE: NOT DETECTED
SODIUM SERPL-SCNC: 141 MMOL/L (ref 132–146)
SPECIMEN DESCRIPTION: NORMAL
TROPONIN I SERPL HS-MCNC: 23 NG/L (ref 0–9)
TROPONIN I SERPL HS-MCNC: 24 NG/L (ref 0–9)
WBC OTHER # BLD: 4.7 K/UL (ref 4.5–11.5)

## 2025-02-20 PROCEDURE — 6360000002 HC RX W HCPCS

## 2025-02-20 PROCEDURE — 71046 X-RAY EXAM CHEST 2 VIEWS: CPT

## 2025-02-20 PROCEDURE — 87635 SARS-COV-2 COVID-19 AMP PRB: CPT

## 2025-02-20 PROCEDURE — 6370000000 HC RX 637 (ALT 250 FOR IP)

## 2025-02-20 PROCEDURE — 80053 COMPREHEN METABOLIC PANEL: CPT

## 2025-02-20 PROCEDURE — 85610 PROTHROMBIN TIME: CPT

## 2025-02-20 PROCEDURE — 83605 ASSAY OF LACTIC ACID: CPT

## 2025-02-20 PROCEDURE — 94664 DEMO&/EVAL PT USE INHALER: CPT

## 2025-02-20 PROCEDURE — 99285 EMERGENCY DEPT VISIT HI MDM: CPT

## 2025-02-20 PROCEDURE — 85025 COMPLETE CBC W/AUTO DIFF WBC: CPT

## 2025-02-20 PROCEDURE — 87502 INFLUENZA DNA AMP PROBE: CPT

## 2025-02-20 PROCEDURE — 94640 AIRWAY INHALATION TREATMENT: CPT

## 2025-02-20 PROCEDURE — 96374 THER/PROPH/DIAG INJ IV PUSH: CPT

## 2025-02-20 PROCEDURE — 84484 ASSAY OF TROPONIN QUANT: CPT

## 2025-02-20 PROCEDURE — 93005 ELECTROCARDIOGRAM TRACING: CPT

## 2025-02-20 PROCEDURE — 2580000003 HC RX 258

## 2025-02-20 RX ORDER — METHYLPREDNISOLONE SODIUM SUCCINATE 125 MG/2ML
125 INJECTION INTRAMUSCULAR; INTRAVENOUS ONCE
Status: COMPLETED | OUTPATIENT
Start: 2025-02-20 | End: 2025-02-20

## 2025-02-20 RX ORDER — PREDNISONE 50 MG/1
50 TABLET ORAL DAILY
Qty: 5 TABLET | Refills: 0 | Status: SHIPPED | OUTPATIENT
Start: 2025-02-20 | End: 2025-02-25

## 2025-02-20 RX ORDER — DOXYCYCLINE HYCLATE 100 MG
100 TABLET ORAL 2 TIMES DAILY
Qty: 14 TABLET | Refills: 0 | Status: SHIPPED | OUTPATIENT
Start: 2025-02-20 | End: 2025-02-27

## 2025-02-20 RX ORDER — IPRATROPIUM BROMIDE AND ALBUTEROL SULFATE 2.5; .5 MG/3ML; MG/3ML
3 SOLUTION RESPIRATORY (INHALATION) ONCE
Status: COMPLETED | OUTPATIENT
Start: 2025-02-20 | End: 2025-02-20

## 2025-02-20 RX ORDER — 0.9 % SODIUM CHLORIDE 0.9 %
500 INTRAVENOUS SOLUTION INTRAVENOUS ONCE
Status: COMPLETED | OUTPATIENT
Start: 2025-02-20 | End: 2025-02-20

## 2025-02-20 RX ADMIN — SODIUM CHLORIDE 500 ML: 9 INJECTION, SOLUTION INTRAVENOUS at 20:33

## 2025-02-20 RX ADMIN — IPRATROPIUM BROMIDE AND ALBUTEROL SULFATE 3 DOSE: .5; 2.5 SOLUTION RESPIRATORY (INHALATION) at 22:00

## 2025-02-20 RX ADMIN — METHYLPREDNISOLONE SODIUM SUCCINATE 125 MG: 125 INJECTION INTRAMUSCULAR; INTRAVENOUS at 21:56

## 2025-02-20 NOTE — ED PROVIDER NOTES
[facility] EMERGENCY DEPARTMENT  EMERGENCY DEPARTMENT ENCOUNTER        Pt Name: Krystal Hyatt  MRN: [id number]  Birthdate 1940  Date of evaluation: 2/20/2025  Provider: Audrey Sampson MD  Attending Provider: Verona Sen MD  PCP: Rom Mckeon MD  Note Started: 6:40 PM EST 2/20/25    CHIEF COMPLAINT       Chief Complaint   Patient presents with    Shortness of Breath    Dizziness     Reports low hr at home. Increased sob and dizziness.          HISTORY OF PRESENT ILLNESS: 1 or more Elements   History From: The patient        Krystal Hyatt is a 84 y.o. female with a past medical history of atrial fibrillation on Coumadin, congestive heart failure, chronic respiratory failure on 5 L nasal cannula oxygen limitation, lung cancer, chronic kidney disease, COPD presenting with low heart rate and shortness of breath.  Patient states that earlier today, she had sensation of palpitations and checked her heart rate and noted that it was a low.  She states that it was as low as in the 30s and 40s.  She did not come in for further evaluation at that time.  Her symptoms did resolve and her heart rate did improve.  She does report some mild shortness of breath on review of systems.  No fevers or cough nasal congestion or sore throat.  No chest pain.  No nausea or vomiting or abdominal pain.  No black or bloody stool or diarrhea or constipation.  No urinary symptoms.  No recent leg pain or leg swelling.  She has been compliant with her medication and her Coumadin.    Nursing Notes were all reviewed and agreed with or any disagreements were addressed in the HPI.      REVIEW OF EXTERNAL NOTE :           PDMP Monitoring:    Last PDMP Shawn as Reviewed:  Review User Review Instant Review Result            Urine Drug Screenings (1 yr)    No resulted procedures found.       Medication Contract and Consent for Opioid Use Documents Filed        No documents found                      REVIEW OF  findings:    Chest x-ray shows no obvious pneumothorax or acute fracture    Interpretation per the Radiologist below, if available at the time of this note:    XR CHEST (2 VW)   Final Result   No acute process.      Hiatal hernia.           No results found.    No results found.    PROCEDURES   Unless otherwise noted below, none          CRITICAL CARE TIME (.cct)   N/A    PAST MEDICAL HISTORY/Chronic Conditions Affecting Care      has a past medical history of Atrial fibrillation (HCC), CHF (congestive heart failure) (HCC), Emphysema, unspecified (HCC), Hyperlipidemia, Hypertension, Lung cancer (HCC), Mitral valve regurgitation, Oxygen dependent, Prolonged emergence from general anesthesia, Skin cancer, and Thyroid disease.     EMERGENCY DEPARTMENT COURSE    Vitals:    Vitals:    02/20/25 1822 02/20/25 2250   BP: 120/60    Pulse: 68    Resp: 26 20   Temp: 97.9 °F (36.6 °C)    SpO2: 92%    Weight: 74.8 kg (165 lb)        Patient was given the following medications:  Medications   sodium chloride 0.9 % bolus 500 mL (0 mLs IntraVENous Stopped 2/20/25 2151)   ipratropium 0.5 mg-albuterol 2.5 mg (DUONEB) nebulizer solution 3 Dose (3 Doses Inhalation Given 2/20/25 2200)   methylPREDNISolone sodium succ (SOLU-MEDROL) injection 125 mg (125 mg IntraVENous Given 2/20/25 2156)           Is this patient to be included in the SEP-1 core measure? No   Exclusion criteria - the patient is NOT to be included for SEP-1 Core Measure due to:  Infection is not suspected        Medical Decision Making/Differential Diagnosis:    CC/HPI Summary, Social Determinants of health, Records Reviewed, DDx, testing done/not done, ED Course, Reassessment, disposition considerations/shared decision making with patient, consults, disposition:      ED Course as of 02/20/25 2258   Thu Feb 20, 2025 2213 EKG is atrial fibrillation 68 bpm no signs any ST changes no ST elevation scattered PVCs no change from prior EKG QTc 467 EKG inter by myself [KK]

## 2025-02-21 ENCOUNTER — TELEPHONE (OUTPATIENT)
Dept: PRIMARY CARE CLINIC | Age: 85
End: 2025-02-21

## 2025-02-21 LAB
EKG ATRIAL RATE: 57 BPM
EKG Q-T INTERVAL: 440 MS
EKG QRS DURATION: 82 MS
EKG QTC CALCULATION (BAZETT): 467 MS
EKG R AXIS: 64 DEGREES
EKG T AXIS: 22 DEGREES
EKG VENTRICULAR RATE: 68 BPM

## 2025-02-21 PROCEDURE — 93010 ELECTROCARDIOGRAM REPORT: CPT | Performed by: INTERNAL MEDICINE

## 2025-02-21 RX ORDER — WARFARIN SODIUM 1 MG/1
TABLET ORAL
Qty: 30 TABLET | Refills: 3 | Status: SHIPPED | OUTPATIENT
Start: 2025-02-21

## 2025-02-21 NOTE — DISCHARGE INSTRUCTIONS
Take your steroids and antibiotics as are prescribed.  For the next 2 days, use your inhaler or nebulizer every 4 hours while awake.  We encourage good hydration.  Follow-up with your primary care physician for further evaluation regarding your symptoms.  If your symptoms worsen or change or you develop any worsening shortness of breath, severe chest pain, severe nausea and vomiting or development of uncontrolled fevers, return to the emergency room for further evaluation.

## 2025-02-21 NOTE — TELEPHONE ENCOUNTER
Patient called back in and is requesting a prescription for the 1 mg Coumadin to [facility] in [facility] so she can pick it up when she goes to  her antibiotic. Thank you.

## 2025-02-21 NOTE — TELEPHONE ENCOUNTER
Patient states she was in the ER yesterday for a low heart rate. States it was 31 at home and is in the high sixties and low seventies this morning. States the ER put her on an antibiotic and when she is and antibiotic she normally has her Coumadin reduced to 1 mg. Please give her a call back and let her know what dose she is to take at [phone]. Thank you.

## 2025-03-03 RX ORDER — PANTOPRAZOLE SODIUM 40 MG/1
40 TABLET, DELAYED RELEASE ORAL EVERY MORNING
Qty: 90 TABLET | Refills: 0 | Status: SHIPPED | OUTPATIENT
Start: 2025-03-03

## 2025-03-03 NOTE — TELEPHONE ENCOUNTER
Name of Medication(s) Requested:  Requested Prescriptions     Pending Prescriptions Disp Refills    pantoprazole (PROTONIX) 40 MG tablet 90 tablet 0     Sig: Take 1 tablet by mouth every morning qd       Medication is on current medication list Yes    Dosage and directions were verified? Yes    Quantity verified: 90 day supply     Pharmacy Verified?  Yes    Last Appointment:  2/4/2025    Future appts:  Future Appointments   Date Time Provider Department Center   3/4/2025  1:30 PM SEB CT2 BD SEBZ CT SEB Radiolog   3/5/2025 11:15 AM Rom Mckeon MD [facility] DEP   3/5/2025  1:40 PM Dickson Ramon APRN - CNS [facility] PULMONAR   3/24/2025  9:30 AM [facility]        (If no appt send self scheduling link. .REFILLAPPT)  Scheduling request sent?     [] Yes  [x] No    Does patient need updated?  [] Yes  [x] No

## 2025-03-03 NOTE — TELEPHONE ENCOUNTER
Patient called in requesting refill on    pantoprazole (PROTONIX) 40 MG tablet     [facility] [address] [place] [id number] [facility]

## 2025-03-04 ENCOUNTER — HOSPITAL ENCOUNTER (OUTPATIENT)
Dept: CT IMAGING | Age: 85
Discharge: HOME | End: 2025-03-06
Payer: MEDICARE

## 2025-03-04 DIAGNOSIS — J18.9 PNEUMONIA DUE TO INFECTIOUS ORGANISM, UNSPECIFIED LATERALITY, UNSPECIFIED PART OF LUNG: ICD-10-CM

## 2025-03-04 PROCEDURE — 71250 CT THORAX DX C-: CPT

## 2025-03-05 ENCOUNTER — OFFICE VISIT (OUTPATIENT)
Dept: PRIMARY CARE CLINIC | Age: 85
End: 2025-03-05
Payer: MEDICARE

## 2025-03-05 VITALS
WEIGHT: 166 LBS | HEART RATE: 78 BPM | BODY MASS INDEX: 25.24 KG/M2 | OXYGEN SATURATION: 96 % | RESPIRATION RATE: 16 BRPM | DIASTOLIC BLOOD PRESSURE: 82 MMHG | SYSTOLIC BLOOD PRESSURE: 112 MMHG | TEMPERATURE: 97 F

## 2025-03-05 DIAGNOSIS — N18.31 CKD STAGE 3A, GFR 45-59 ML/MIN (HCC): ICD-10-CM

## 2025-03-05 DIAGNOSIS — J96.11 CHRONIC RESPIRATORY FAILURE WITH HYPOXIA (HCC): ICD-10-CM

## 2025-03-05 DIAGNOSIS — I48.0 PAF (PAROXYSMAL ATRIAL FIBRILLATION) (HCC): Primary | ICD-10-CM

## 2025-03-05 DIAGNOSIS — E03.9 ACQUIRED HYPOTHYROIDISM: ICD-10-CM

## 2025-03-05 DIAGNOSIS — J44.9 COPD, VERY SEVERE (HCC): ICD-10-CM

## 2025-03-05 DIAGNOSIS — C34.91 MALIGNANT NEOPLASM OF UNSPECIFIED PART OF RIGHT BRONCHUS OR LUNG (HCC): ICD-10-CM

## 2025-03-05 PROCEDURE — 3023F SPIROM DOC REV: CPT | Performed by: INTERNAL MEDICINE

## 2025-03-05 PROCEDURE — 1090F PRES/ABSN URINE INCON ASSESS: CPT | Performed by: INTERNAL MEDICINE

## 2025-03-05 PROCEDURE — 1123F ACP DISCUSS/DSCN MKR DOCD: CPT | Performed by: INTERNAL MEDICINE

## 2025-03-05 PROCEDURE — 99214 OFFICE O/P EST MOD 30 MIN: CPT | Performed by: INTERNAL MEDICINE

## 2025-03-05 PROCEDURE — G8427 DOCREV CUR MEDS BY ELIG CLIN: HCPCS | Performed by: INTERNAL MEDICINE

## 2025-03-05 PROCEDURE — 1159F MED LIST DOCD IN RCRD: CPT | Performed by: INTERNAL MEDICINE

## 2025-03-05 PROCEDURE — 1036F TOBACCO NON-USER: CPT | Performed by: INTERNAL MEDICINE

## 2025-03-05 PROCEDURE — G8419 CALC BMI OUT NRM PARAM NOF/U: HCPCS | Performed by: INTERNAL MEDICINE

## 2025-03-05 ASSESSMENT — ENCOUNTER SYMPTOMS
COUGH: 0
ABDOMINAL PAIN: 0
NAUSEA: 0
SHORTNESS OF BREATH: 0
DIARRHEA: 0
VOMITING: 0

## 2025-03-05 NOTE — PROGRESS NOTES
SUBJECTIVE  Krystal Hyatt is a 84 y.o. female established was seen In the office  for evaluation.    HPI/Chief C/O:  Chief Complaint   Patient presents with    Coagulation Disorder     INR check up    On O2 NC , had Ct scan chest yesterday from pulmonology   Was on coumadin 1 mg qd started on 2.5 day before   Allergies   Allergen Reactions    Pacerone [Amiodarone] Shortness Of Breath     States she becomes SOB when given IV.      Bactrim [Sulfamethoxazole-Trimethoprim] Other (See Comments)     Elevated kidney function    Cat Dander Itching and Other (See Comments)     Patient states \"My son put this. I hope I'm not allergic, I have 4 Cats and 2 Birds, maybe to their dust.\"    Egg-Derived Products Nausea Only    Erythromycin Diarrhea, Nausea And Vomiting and Other (See Comments)     \"STOMACH PAINS\"      Pcn [Penicillins] Itching and Rash     PT STATES \"RASH FROM HEAD TO TOE, W/ SOMETHING CRAWLING UNDER MY SKIN\"    Seasonal Itching, Swelling and Other (See Comments)     RUNNY/ITCHY NOSE, WATERY/ITCHY EYES       ROS:  Review of Systems   Respiratory:  Negative for cough and shortness of breath.         Negative for Hemoptysis   Cardiovascular:  Negative for chest pain.   Gastrointestinal:  Negative for abdominal pain, diarrhea, nausea and vomiting.   Endocrine: Negative for polydipsia, polyphagia and polyuria.   Genitourinary:  Negative for dysuria and hematuria.   Skin:  Negative for rash.   Neurological:  Negative for tremors and seizures.        Past Medical/Surgical Hx;  Reviewed with patient      Diagnosis Date    Atrial fibrillation (HCC)     CHF (congestive heart failure) (HCC)     Emphysema, unspecified (HCC)     Hyperlipidemia     Hypertension     Lung cancer (HCC)     Mitral valve regurgitation     Oxygen dependent     Prolonged emergence from general anesthesia     post general anesthesia    Skin cancer     Thyroid disease      Past Surgical History:   Procedure Laterality Date    BREAST BIOPSY Right

## 2025-03-21 RX ORDER — CETIRIZINE HYDROCHLORIDE 10 MG/1
10 TABLET ORAL DAILY
Qty: 30 TABLET | Refills: 0 | Status: SHIPPED | OUTPATIENT
Start: 2025-03-21

## 2025-03-21 NOTE — TELEPHONE ENCOUNTER
Patient requesting a prescription for Zyrtec be sent into [facility] in [place]. States her nose is running non stop and she has to keep taking her oxygen off to wipe her nose. Thank you.

## 2025-03-24 ENCOUNTER — OFFICE VISIT (OUTPATIENT)
Dept: PRIMARY CARE CLINIC | Age: 85
End: 2025-03-24
Payer: MEDICARE

## 2025-03-24 VITALS
RESPIRATION RATE: 16 BRPM | OXYGEN SATURATION: 98 % | HEIGHT: 68 IN | DIASTOLIC BLOOD PRESSURE: 80 MMHG | HEART RATE: 74 BPM | SYSTOLIC BLOOD PRESSURE: 130 MMHG | TEMPERATURE: 97.6 F | WEIGHT: 175 LBS | BODY MASS INDEX: 26.52 KG/M2

## 2025-03-24 DIAGNOSIS — Z00.00 MEDICARE ANNUAL WELLNESS VISIT, SUBSEQUENT: Primary | ICD-10-CM

## 2025-03-24 PROCEDURE — 1159F MED LIST DOCD IN RCRD: CPT | Performed by: INTERNAL MEDICINE

## 2025-03-24 PROCEDURE — G0439 PPPS, SUBSEQ VISIT: HCPCS | Performed by: INTERNAL MEDICINE

## 2025-03-24 PROCEDURE — 1123F ACP DISCUSS/DSCN MKR DOCD: CPT | Performed by: INTERNAL MEDICINE

## 2025-03-24 RX ORDER — WARFARIN SODIUM 2.5 MG/1
TABLET ORAL
Qty: 30 TABLET | Refills: 1 | Status: SHIPPED | OUTPATIENT
Start: 2025-03-24

## 2025-03-24 ASSESSMENT — PATIENT HEALTH QUESTIONNAIRE - PHQ9
SUM OF ALL RESPONSES TO PHQ QUESTIONS 1-9: 0
SUM OF ALL RESPONSES TO PHQ QUESTIONS 1-9: 0
2. FEELING DOWN, DEPRESSED OR HOPELESS: NOT AT ALL
SUM OF ALL RESPONSES TO PHQ QUESTIONS 1-9: 0
SUM OF ALL RESPONSES TO PHQ QUESTIONS 1-9: 0
1. LITTLE INTEREST OR PLEASURE IN DOING THINGS: NOT AT ALL

## 2025-03-24 NOTE — PROGRESS NOTES
Medicare Annual Wellness Visit    Krystal Hyatt is here for Medicare AWV (AWV)    Assessment & Plan   Medicare annual wellness visit, subsequent       Return in 4 weeks (on 4/21/2025) for Medicare Annual Wellness Visit in 1 year.     Subjective       Patient's complete Health Risk Assessment and screening values have been reviewed and are found in Flowsheets. The following problems were reviewed today and where indicated follow up appointments were made and/or referrals ordered.    Positive Risk Factor Screenings with Interventions:              Inactivity:  On average, how many days per week do you engage in moderate to strenuous exercise (like a brisk walk)?: 0 days (!) Abnormal  On average, how many minutes do you engage in exercise at this level?: 0 min  Interventions:  Limited due to advanced COPD                          Objective   Vitals:    03/24/25 0855   BP: 130/80   Pulse: 74   Resp: 16   Temp: 97.6 °F (36.4 °C)   TempSrc: Temporal   SpO2: 98%   Weight: 79.4 kg (175 lb)   Height: 1.727 m (5' 8\")      Body mass index is 26.61 kg/m².                  Allergies   Allergen Reactions    Pacerone [Amiodarone] Shortness Of Breath     States she becomes SOB when given IV.      Bactrim [Sulfamethoxazole-Trimethoprim] Other (See Comments)     Elevated kidney function    Cat Dander Itching and Other (See Comments)     Patient states \"My son put this. I hope I'm not allergic, I have 4 Cats and 2 Birds, maybe to their dust.\"    Egg-Derived Products Nausea Only    Erythromycin Diarrhea, Nausea And Vomiting and Other (See Comments)     \"STOMACH PAINS\"      Pcn [Penicillins] Itching and Rash     PT STATES \"RASH FROM HEAD TO TOE, W/ SOMETHING CRAWLING UNDER MY SKIN\"    Seasonal Itching, Swelling and Other (See Comments)     RUNNY/ITCHY NOSE, WATERY/ITCHY EYES     Prior to Visit Medications    Medication Sig Taking? Authorizing Provider   cetirizine (ZYRTEC) 10 MG tablet Take 1 tablet by mouth daily Yes Jatin Narvaez

## 2025-03-24 NOTE — PATIENT INSTRUCTIONS
Learning About Being Active as an Older Adult  Why is being active important as you get older?     Being active is one of the best things you can do for your health. And it's never too late to start. Being active--or getting active, if you aren't already--has definite benefits. It can:  Give you more energy,  Keep your mind sharp.  Improve balance to reduce your risk of falls.  Help you manage chronic illness with fewer medicines.  No matter how old you are, how fit you are, or what health problems you have, there is a form of activity that will work for you. And the more physical activity you can do, the better your overall health will be.  What kinds of activity can help you stay healthy?  Being more active will make your daily activities easier. Physical activity includes planned exercise and things you do in daily life. There are four types of activity:  Aerobic.  Doing aerobic activity makes your heart and lungs strong.  Includes walking, dancing, and gardening.  Aim for at least 2½ hours spread throughout the week.  It improves your energy and can help you sleep better.  Muscle-strengthening.  This type of activity can help maintain muscle and strengthen bones.  Includes climbing stairs, using resistance bands, and lifting or carrying heavy loads.  Aim for at least twice a week.  It can help protect the knees and other joints.  Stretching.  Stretching gives you better range of motion in joints and muscles.  Includes upper arm stretches, calf stretches, and gentle yoga.  Aim for at least twice a week, preferably after your muscles are warmed up from other activities.  It can help you function better in daily life.  Balancing.  This helps you stay coordinated and have good posture.  Includes heel-to-toe walking, artemio chi, and certain types of yoga.  Aim for at least 3 days a week.  It can reduce your risk of falling.  Even if you have a hard time meeting the recommendations, it's better to be more active

## 2025-04-18 ENCOUNTER — OFFICE VISIT (OUTPATIENT)
Dept: PRIMARY CARE CLINIC | Age: 85
End: 2025-04-18

## 2025-04-18 VITALS
RESPIRATION RATE: 16 BRPM | SYSTOLIC BLOOD PRESSURE: 120 MMHG | TEMPERATURE: 97 F | DIASTOLIC BLOOD PRESSURE: 70 MMHG | BODY MASS INDEX: 24.25 KG/M2 | HEIGHT: 68 IN | HEART RATE: 63 BPM | OXYGEN SATURATION: 97 % | WEIGHT: 160 LBS

## 2025-04-18 DIAGNOSIS — J96.11 CHRONIC RESPIRATORY FAILURE WITH HYPOXIA: ICD-10-CM

## 2025-04-18 DIAGNOSIS — J40 BRONCHITIS: Primary | ICD-10-CM

## 2025-04-18 DIAGNOSIS — J44.9 COPD, VERY SEVERE (HCC): ICD-10-CM

## 2025-04-18 RX ORDER — DOXYCYCLINE HYCLATE 100 MG
100 TABLET ORAL 2 TIMES DAILY WITH MEALS
Qty: 20 TABLET | Refills: 0 | Status: SHIPPED | OUTPATIENT
Start: 2025-04-18 | End: 2025-04-28

## 2025-04-18 RX ORDER — METHYLPREDNISOLONE 4 MG/1
TABLET ORAL
Qty: 1 KIT | Refills: 0 | Status: SHIPPED | OUTPATIENT
Start: 2025-04-18 | End: 2025-04-24

## 2025-04-18 ASSESSMENT — ENCOUNTER SYMPTOMS
VOMITING: 0
ABDOMINAL PAIN: 0
NAUSEA: 0
DIARRHEA: 0
SHORTNESS OF BREATH: 0
COUGH: 0

## 2025-04-18 NOTE — PROGRESS NOTES
SUBJECTIVE  Krystal Hyatt is a 84 y.o. female established was seen In the office  for evaluation.    HPI/Chief C/O:  Chief Complaint   Patient presents with    Breathing Problem     Breathing having a hard time    Yellowish cough ,wheezing   Allergies   Allergen Reactions    Pacerone [Amiodarone] Shortness Of Breath     States she becomes SOB when given IV.      Bactrim [Sulfamethoxazole-Trimethoprim] Other (See Comments)     Elevated kidney function    Cat Dander Itching and Other (See Comments)     Patient states \"My son put this. I hope I'm not allergic, I have 4 Cats and 2 Birds, maybe to their dust.\"    Egg-Derived Products Nausea Only    Erythromycin Diarrhea, Nausea And Vomiting and Other (See Comments)     \"STOMACH PAINS\"      Pcn [Penicillins] Itching and Rash     PT STATES \"RASH FROM HEAD TO TOE, W/ SOMETHING CRAWLING UNDER MY SKIN\"    Seasonal Itching, Swelling and Other (See Comments)     RUNNY/ITCHY NOSE, WATERY/ITCHY EYES       ROS:  Review of Systems   Respiratory:  Negative for cough and shortness of breath.         Negative for Hemoptysis   Cardiovascular:  Negative for chest pain.   Gastrointestinal:  Negative for abdominal pain, diarrhea, nausea and vomiting.   Endocrine: Negative for polydipsia, polyphagia and polyuria.   Genitourinary:  Negative for dysuria and hematuria.   Skin:  Negative for rash.   Neurological:  Negative for tremors and seizures.        Past Medical/Surgical Hx;  Reviewed with patient      Diagnosis Date    Atrial fibrillation (HCC)     CHF (congestive heart failure) (HCC)     Emphysema, unspecified     Hyperlipidemia     Hypertension     Lung cancer (HCC)     Mitral valve regurgitation     Oxygen dependent     Prolonged emergence from general anesthesia     post general anesthesia    Skin cancer     Thyroid disease      Past Surgical History:   Procedure Laterality Date    BREAST BIOPSY Right     benign    BRONCHOSCOPY N/A 04/20/2021    BRONCHOSCOPY/TRANSBRONCHIAL NEEDLE

## 2025-04-22 ENCOUNTER — OFFICE VISIT (OUTPATIENT)
Dept: PRIMARY CARE CLINIC | Age: 85
End: 2025-04-22

## 2025-04-22 VITALS
HEIGHT: 66 IN | OXYGEN SATURATION: 97 % | HEART RATE: 69 BPM | BODY MASS INDEX: 27.97 KG/M2 | TEMPERATURE: 97 F | DIASTOLIC BLOOD PRESSURE: 90 MMHG | WEIGHT: 174 LBS | RESPIRATION RATE: 16 BRPM | SYSTOLIC BLOOD PRESSURE: 130 MMHG

## 2025-04-22 DIAGNOSIS — C34.91 MALIGNANT NEOPLASM OF UNSPECIFIED PART OF RIGHT BRONCHUS OR LUNG (HCC): ICD-10-CM

## 2025-04-22 DIAGNOSIS — I48.0 PAF (PAROXYSMAL ATRIAL FIBRILLATION) (HCC): Primary | ICD-10-CM

## 2025-04-22 DIAGNOSIS — J96.11 CHRONIC RESPIRATORY FAILURE WITH HYPOXIA (HCC): ICD-10-CM

## 2025-04-22 DIAGNOSIS — J40 BRONCHITIS: ICD-10-CM

## 2025-04-22 LAB — INR BLD: 1.6

## 2025-04-22 RX ORDER — LEVOFLOXACIN 500 MG/1
500 TABLET, FILM COATED ORAL DAILY
Qty: 7 TABLET | Refills: 0 | Status: SHIPPED | OUTPATIENT
Start: 2025-04-22 | End: 2025-04-29

## 2025-04-22 ASSESSMENT — ENCOUNTER SYMPTOMS
DIARRHEA: 0
NAUSEA: 0
ABDOMINAL PAIN: 0
COUGH: 0
SHORTNESS OF BREATH: 0
VOMITING: 0

## 2025-04-22 NOTE — PROGRESS NOTES
SUBJECTIVE  Krystal Hyatt is a 84 y.o. female established was seen In the office  for evaluation.    HPI/Chief C/O:  Chief Complaint   Patient presents with    Coagulation Disorder     INR check up   On O2 NC , still with greenish cough SOB   Allergies   Allergen Reactions    Pacerone [Amiodarone] Shortness Of Breath     States she becomes SOB when given IV.      Bactrim [Sulfamethoxazole-Trimethoprim] Other (See Comments)     Elevated kidney function    Cat Dander Itching and Other (See Comments)     Patient states \"My son put this. I hope I'm not allergic, I have 4 Cats and 2 Birds, maybe to their dust.\"    Egg-Derived Products Nausea Only    Erythromycin Diarrhea, Nausea And Vomiting and Other (See Comments)     \"STOMACH PAINS\"      Pcn [Penicillins] Itching and Rash     PT STATES \"RASH FROM HEAD TO TOE, W/ SOMETHING CRAWLING UNDER MY SKIN\"    Seasonal Itching, Swelling and Other (See Comments)     RUNNY/ITCHY NOSE, WATERY/ITCHY EYES       ROS:  Review of Systems   Respiratory:  Negative for cough and shortness of breath.         Negative for Hemoptysis   Cardiovascular:  Negative for chest pain.   Gastrointestinal:  Negative for abdominal pain, diarrhea, nausea and vomiting.   Endocrine: Negative for polydipsia, polyphagia and polyuria.   Genitourinary:  Negative for dysuria and hematuria.   Skin:  Negative for rash.   Neurological:  Negative for tremors and seizures.        Past Medical/Surgical Hx;  Reviewed with patient      Diagnosis Date    Atrial fibrillation (HCC)     CHF (congestive heart failure) (HCC)     Emphysema, unspecified     Hyperlipidemia     Hypertension     Lung cancer (HCC)     Mitral valve regurgitation     Oxygen dependent     Prolonged emergence from general anesthesia     post general anesthesia    Skin cancer     Thyroid disease      Past Surgical History:   Procedure Laterality Date    BREAST BIOPSY Right     benign    BRONCHOSCOPY N/A 04/20/2021    BRONCHOSCOPY/TRANSBRONCHIAL

## 2025-04-26 ENCOUNTER — APPOINTMENT (OUTPATIENT)
Dept: CT IMAGING | Age: 85
End: 2025-04-26
Payer: MEDICARE

## 2025-04-26 ENCOUNTER — HOSPITAL ENCOUNTER (EMERGENCY)
Age: 85
Discharge: HOME | End: 2025-04-26
Attending: STUDENT IN AN ORGANIZED HEALTH CARE EDUCATION/TRAINING PROGRAM
Payer: MEDICARE

## 2025-04-26 ENCOUNTER — APPOINTMENT (OUTPATIENT)
Dept: GENERAL RADIOLOGY | Age: 85
End: 2025-04-26
Payer: MEDICARE

## 2025-04-26 VITALS
BODY MASS INDEX: 26.52 KG/M2 | RESPIRATION RATE: 18 BRPM | DIASTOLIC BLOOD PRESSURE: 64 MMHG | TEMPERATURE: 98.1 F | HEIGHT: 68 IN | WEIGHT: 175 LBS | HEART RATE: 82 BPM | SYSTOLIC BLOOD PRESSURE: 100 MMHG | OXYGEN SATURATION: 96 %

## 2025-04-26 DIAGNOSIS — E83.42 HYPOMAGNESEMIA: ICD-10-CM

## 2025-04-26 DIAGNOSIS — J44.1 COPD EXACERBATION (HCC): Primary | ICD-10-CM

## 2025-04-26 LAB
ALBUMIN SERPL-MCNC: 4 G/DL (ref 3.5–5.2)
ALP SERPL-CCNC: 66 U/L (ref 35–104)
ALT SERPL-CCNC: 11 U/L (ref 0–32)
ANION GAP SERPL CALCULATED.3IONS-SCNC: 13 MMOL/L (ref 7–16)
AST SERPL-CCNC: 26 U/L (ref 0–31)
BASOPHILS # BLD: 0.02 K/UL (ref 0–0.2)
BASOPHILS NFR BLD: 0 % (ref 0–2)
BILIRUB SERPL-MCNC: 0.5 MG/DL (ref 0–1.2)
BNP SERPL-MCNC: 145 PG/ML (ref 0–450)
BUN SERPL-MCNC: 34 MG/DL (ref 6–23)
CALCIUM SERPL-MCNC: 9.9 MG/DL (ref 8.6–10.2)
CHLORIDE SERPL-SCNC: 99 MMOL/L (ref 98–107)
CO2 SERPL-SCNC: 30 MMOL/L (ref 22–29)
CREAT SERPL-MCNC: 1.6 MG/DL (ref 0.5–1)
EKG ATRIAL RATE: 93 BPM
EKG P AXIS: 45 DEGREES
EKG P-R INTERVAL: 164 MS
EKG Q-T INTERVAL: 370 MS
EKG QRS DURATION: 82 MS
EKG QTC CALCULATION (BAZETT): 460 MS
EKG R AXIS: 68 DEGREES
EKG T AXIS: 44 DEGREES
EKG VENTRICULAR RATE: 93 BPM
EOSINOPHIL # BLD: 0.06 K/UL (ref 0.05–0.5)
EOSINOPHILS RELATIVE PERCENT: 1 % (ref 0–6)
ERYTHROCYTE [DISTWIDTH] IN BLOOD BY AUTOMATED COUNT: 13.4 % (ref 11.5–15)
GFR, ESTIMATED: 31 ML/MIN/1.73M2
GLUCOSE SERPL-MCNC: 118 MG/DL (ref 74–99)
HCT VFR BLD AUTO: 37.7 % (ref 34–48)
HGB BLD-MCNC: 11.8 G/DL (ref 11.5–15.5)
IMM GRANULOCYTES # BLD AUTO: 0.04 K/UL (ref 0–0.58)
IMM GRANULOCYTES NFR BLD: 1 % (ref 0–5)
INFLUENZA A BY PCR: NOT DETECTED
INFLUENZA B BY PCR: NOT DETECTED
INR PPP: 1.4
LYMPHOCYTES NFR BLD: 2.36 K/UL (ref 1.5–4)
LYMPHOCYTES RELATIVE PERCENT: 37 % (ref 20–42)
MAGNESIUM SERPL-MCNC: 1.4 MG/DL (ref 1.6–2.6)
MCH RBC QN AUTO: 29.9 PG (ref 26–35)
MCHC RBC AUTO-ENTMCNC: 31.3 G/DL (ref 32–34.5)
MCV RBC AUTO: 95.4 FL (ref 80–99.9)
MONOCYTES NFR BLD: 0.48 K/UL (ref 0.1–0.95)
MONOCYTES NFR BLD: 8 % (ref 2–12)
NEUTROPHILS NFR BLD: 53 % (ref 43–80)
NEUTS SEG NFR BLD: 3.38 K/UL (ref 1.8–7.3)
PLATELET CONFIRMATION: NORMAL
PLATELET, FLUORESCENCE: 99 K/UL (ref 130–450)
PMV BLD AUTO: 10.7 FL (ref 7–12)
POTASSIUM SERPL-SCNC: 4.3 MMOL/L (ref 3.5–5)
PROT SERPL-MCNC: 6.2 G/DL (ref 6.4–8.3)
PROTHROMBIN TIME: 14.4 SEC (ref 9.3–12.4)
RBC # BLD AUTO: 3.95 M/UL (ref 3.5–5.5)
SARS-COV-2 RDRP RESP QL NAA+PROBE: NOT DETECTED
SODIUM SERPL-SCNC: 142 MMOL/L (ref 132–146)
SPECIMEN DESCRIPTION: NORMAL
TROPONIN I SERPL HS-MCNC: 16 NG/L (ref 0–14)
TROPONIN I SERPL HS-MCNC: 17 NG/L (ref 0–14)
WBC OTHER # BLD: 6.3 K/UL (ref 4.5–11.5)

## 2025-04-26 PROCEDURE — 83735 ASSAY OF MAGNESIUM: CPT

## 2025-04-26 PROCEDURE — 94664 DEMO&/EVAL PT USE INHALER: CPT

## 2025-04-26 PROCEDURE — 6360000002 HC RX W HCPCS

## 2025-04-26 PROCEDURE — 80053 COMPREHEN METABOLIC PANEL: CPT

## 2025-04-26 PROCEDURE — 87635 SARS-COV-2 COVID-19 AMP PRB: CPT

## 2025-04-26 PROCEDURE — 6370000000 HC RX 637 (ALT 250 FOR IP): Performed by: STUDENT IN AN ORGANIZED HEALTH CARE EDUCATION/TRAINING PROGRAM

## 2025-04-26 PROCEDURE — 85610 PROTHROMBIN TIME: CPT

## 2025-04-26 PROCEDURE — 84484 ASSAY OF TROPONIN QUANT: CPT

## 2025-04-26 PROCEDURE — 71250 CT THORAX DX C-: CPT

## 2025-04-26 PROCEDURE — 85025 COMPLETE CBC W/AUTO DIFF WBC: CPT

## 2025-04-26 PROCEDURE — 93005 ELECTROCARDIOGRAM TRACING: CPT | Performed by: NURSE PRACTITIONER

## 2025-04-26 PROCEDURE — 83880 ASSAY OF NATRIURETIC PEPTIDE: CPT

## 2025-04-26 PROCEDURE — 96375 TX/PRO/DX INJ NEW DRUG ADDON: CPT

## 2025-04-26 PROCEDURE — 99285 EMERGENCY DEPT VISIT HI MDM: CPT

## 2025-04-26 PROCEDURE — 6370000000 HC RX 637 (ALT 250 FOR IP)

## 2025-04-26 PROCEDURE — 71046 X-RAY EXAM CHEST 2 VIEWS: CPT

## 2025-04-26 PROCEDURE — 87502 INFLUENZA DNA AMP PROBE: CPT

## 2025-04-26 PROCEDURE — 94640 AIRWAY INHALATION TREATMENT: CPT

## 2025-04-26 PROCEDURE — 96374 THER/PROPH/DIAG INJ IV PUSH: CPT

## 2025-04-26 PROCEDURE — 6360000002 HC RX W HCPCS: Performed by: STUDENT IN AN ORGANIZED HEALTH CARE EDUCATION/TRAINING PROGRAM

## 2025-04-26 RX ORDER — METHYLPREDNISOLONE SODIUM SUCCINATE 125 MG/2ML
125 INJECTION INTRAMUSCULAR; INTRAVENOUS ONCE
Status: COMPLETED | OUTPATIENT
Start: 2025-04-26 | End: 2025-04-26

## 2025-04-26 RX ORDER — MAGNESIUM SULFATE IN WATER 40 MG/ML
2000 INJECTION, SOLUTION INTRAVENOUS ONCE
Status: COMPLETED | OUTPATIENT
Start: 2025-04-26 | End: 2025-04-26

## 2025-04-26 RX ORDER — IPRATROPIUM BROMIDE AND ALBUTEROL SULFATE 2.5; .5 MG/3ML; MG/3ML
3 SOLUTION RESPIRATORY (INHALATION) ONCE
Status: COMPLETED | OUTPATIENT
Start: 2025-04-26 | End: 2025-04-26

## 2025-04-26 RX ORDER — LANOLIN ALCOHOL/MO/W.PET/CERES
400 CREAM (GRAM) TOPICAL ONCE
Status: COMPLETED | OUTPATIENT
Start: 2025-04-26 | End: 2025-04-26

## 2025-04-26 RX ADMIN — MAGNESIUM OXIDE 400 MG (241.3 MG MAGNESIUM) TABLET 400 MG: TABLET at 17:34

## 2025-04-26 RX ADMIN — IPRATROPIUM BROMIDE AND ALBUTEROL SULFATE 3 DOSE: 2.5; .5 SOLUTION RESPIRATORY (INHALATION) at 11:07

## 2025-04-26 RX ADMIN — METHYLPREDNISOLONE SODIUM SUCCINATE 125 MG: 125 INJECTION INTRAMUSCULAR; INTRAVENOUS at 12:47

## 2025-04-26 RX ADMIN — MAGNESIUM SULFATE HEPTAHYDRATE 2000 MG: 40 INJECTION, SOLUTION INTRAVENOUS at 16:12

## 2025-04-26 ASSESSMENT — PAIN - FUNCTIONAL ASSESSMENT
PAIN_FUNCTIONAL_ASSESSMENT: NONE - DENIES PAIN
PAIN_FUNCTIONAL_ASSESSMENT: NONE - DENIES PAIN

## 2025-04-26 NOTE — ED TRIAGE NOTES
Department of Emergency Medicine  FIRST PROVIDER TRIAGE NOTE             Independent MLP           4/26/25  10:31 AM EDT    Date of Encounter: 4/26/25   MRN: [id number]      HPI: Krystal Hyatt is a 84 y.o. female who presents to the ED for No chief complaint on file.     Patient has increased shortness of breath for several days.  She seen her PCP on April 22, 2025 and was diagnosed with bronchitis.  She was placed on Levaquin.  She also had an INR checked at that time for being on Coumadin and her INR was 1.6.  She was oxygen via nasal cannula 5 L at all times.  She is at 100% on 5 L in triage.  Denies any leg swelling.    Vitals:    04/26/25 1031   BP: 137/69   Pulse: 93   Resp: 20   Temp: 98.1 °F (36.7 °C)   SpO2: 100%         ROS: Negative for cp or fevers.    PE: Gen Appearance/Constitutional: alert  Musculoskeletal: moves all extremities x 4     Initial Plan of Care:  Plan to order/Initiate the following while awaiting opening in ED: EKG and imaging studies.   Instructed patient and/or family member with patient if any worsening condition to notify staff. Notified nursing patient needs a room.    Provider-Patient relationship only established for Provider In Triage (PIT) secondary to high volume, low staffing, and/or boarding- patient to await bed availability..  Full assessment, HPI and examination not performed.  Discussed with patient that the provider in triage evaluation is not a comprehensive medical screening exam and this is not yet possible at the end of the provider in triage encounter, to state whether or not an emergency medical condition exist  This ends my PIT-Patient relationship.    Electronically signed by OUMOU Chowdhury CNP   DD: 4/26/25

## 2025-04-26 NOTE — DISCHARGE INSTRUCTIONS
Continue taking your regular medications  Continue taking your breathing treatments  Follow-up with your primary care provider  Return back for any worsening symptoms    INR is 1.4 please call your primary care doctor on Monday to discuss adjustment of your medication

## 2025-04-26 NOTE — ED PROVIDER NOTES
[facility] EMERGENCY DEPARTMENT  EMERGENCY DEPARTMENT ENCOUNTER        Pt Name: Krystal Hyatt  MRN: [id number]  Birthdate 1940  Date of evaluation: 4/26/2025  Provider: Jamarcus Lambert MD  PCP: Rom Mckeon MD  Note Started: 10:57 AM EDT 4/26/25    CHIEF COMPLAINT       No chief complaint on file.      HISTORY OF PRESENT ILLNESS: 1 or more Elements   History From: Patient    Limitations to history : None  Social Determinants : None    Krystal Hyatt is a 84 y.o. female with a history of atrial fibrillation, congestive heart failure, hypertension, COPD on home oxygen at 5 L via nasal cannula, on anticoagulation with warfarin who presents to the emergency room with complaints of increased amount of shortness of breath and cough.  She has yellow sputum.  Patient mentions that she has been seen by her primary care repeatedly and did complete a course of steroids, was started on doxycycline and was also on started on Levaquin on 4/22 with concerns of bronchitis.    Patient mentions that she is taking her nebulizers at home however feels that it is not getting better.    Denies any fever, chills, nausea, vomiting, headache, dizziness, vision changes, neck tenderness or stiffness, weakness, chest pain, palpitations, leg swelling/tenderness, abdominal pain, dysuria, hematuria, diarrhea, constipation, bloody stools.    Nursing Notes were all reviewed and agreed with or any disagreements were addressed in the HPI.    ROS:   Pertinent positives and negatives are stated within HPI, all other systems reviewed and are negative.      --------------------------------------------- PAST HISTORY ---------------------------------------------  Past Medical History:       Diagnosis Date    Atrial fibrillation (HCC)     CHF (congestive heart failure) (HCC)     Emphysema, unspecified (HCC)     Hyperlipidemia     Hypertension     Lung cancer (HCC)     Mitral valve regurgitation     Oxygen dependent      embolism, effusions, pneumonia, dissection or acute bony fractures.BNP to evaluate for heart failure and/or as a marker for heart strain.     Patient administered .  Medications   ipratropium 0.5 mg-albuterol 2.5 mg (DUONEB) nebulizer solution 3 Dose (3 Doses Inhalation Given 4/26/25 1107)   methylPREDNISolone sodium succ (SOLU-MEDROL) injection 125 mg (125 mg IntraVENous Given 4/26/25 1247)   magnesium sulfate 2000 mg in 50 mL IVPB premix (0 mg IntraVENous Stopped 4/26/25 1619)   magnesium oxide (MAG-OX) tablet 400 mg (400 mg Oral Given 4/26/25 0064)     84-year-old female presents for concerns of increased amount of shortness of breath.  She is on her baseline oxygen requirements at 5 L via nasal cannula.  Patient has been followed by her primary care provider and recently completed regimen of steroids, doxycycline.  Patient is currently on Levaquin for her bronchitis.  On arrival afebrile and hemodynamically stable.  On examination she does have bilateral wheezing present.  She was given breathing treatments and a dose of steroids.  EKG without any ischemic changes.  No leukocytes, hemoglobin stable.  Normal electrolytes.  Renal function at her baseline.  INR 1.4.  Mg orally replaced. No leucocytosis, Hb stable. Renal function at her baseline. Troponin with a negative delta. BNP not elevated. CT chest obtained, no evidence of pneumonia. On reassessment. She feels better. On her baseline oxygen. Return instruction provided, she will be discharged.     On reevaluation patient had significant symptomatic relief. Patient would like to go home.     Please see ED course for more details:    ED Course as of 04/26/25 2100   Sat Apr 26, 2025   1544 EKG interpreted by me  Sinus rhythm  PVCs  Heart rate 93  DE interval 164  QTc 460  Normal axis  No ST elevation [SD]   1552 I reassessed the patient  Patient looks comfortable.  She is on her baseline oxygen.  Not in distress.  Findings explained to the patient and the spouse.

## 2025-04-26 NOTE — ED NOTES
Magnesium never infused. Iv site infiltrated.dr notified. Pt states that she is not going to get another iv in. States that she wants to go to the drug store and  a bottle of magnesium

## 2025-04-29 LAB
EKG ATRIAL RATE: 93 BPM
EKG P AXIS: 45 DEGREES
EKG P-R INTERVAL: 164 MS
EKG Q-T INTERVAL: 370 MS
EKG QRS DURATION: 82 MS
EKG QTC CALCULATION (BAZETT): 460 MS
EKG R AXIS: 68 DEGREES
EKG T AXIS: 44 DEGREES
EKG VENTRICULAR RATE: 93 BPM

## 2025-04-29 PROCEDURE — 93010 ELECTROCARDIOGRAM REPORT: CPT | Performed by: INTERNAL MEDICINE

## 2025-04-30 ENCOUNTER — OFFICE VISIT (OUTPATIENT)
Dept: PRIMARY CARE CLINIC | Age: 85
End: 2025-04-30

## 2025-04-30 VITALS
OXYGEN SATURATION: 98 % | WEIGHT: 170 LBS | HEART RATE: 80 BPM | SYSTOLIC BLOOD PRESSURE: 120 MMHG | HEIGHT: 68 IN | DIASTOLIC BLOOD PRESSURE: 80 MMHG | RESPIRATION RATE: 17 BRPM | BODY MASS INDEX: 25.76 KG/M2 | TEMPERATURE: 97 F

## 2025-04-30 DIAGNOSIS — J96.11 CHRONIC RESPIRATORY FAILURE WITH HYPOXIA (HCC): Primary | ICD-10-CM

## 2025-04-30 DIAGNOSIS — I48.0 PAF (PAROXYSMAL ATRIAL FIBRILLATION) (HCC): ICD-10-CM

## 2025-04-30 DIAGNOSIS — J40 BRONCHITIS: ICD-10-CM

## 2025-04-30 DIAGNOSIS — J44.9 COPD, VERY SEVERE (HCC): ICD-10-CM

## 2025-04-30 ASSESSMENT — ENCOUNTER SYMPTOMS
NAUSEA: 0
COUGH: 0
VOMITING: 0
ABDOMINAL PAIN: 0
DIARRHEA: 0
SHORTNESS OF BREATH: 0

## 2025-04-30 NOTE — PROGRESS NOTES
by mouth Every Monday, Wednesday, and Friday 30 tablet 1    guaiFENesin 400 MG tablet Take 1 tablet by mouth 3 times daily 56 tablet 0    digoxin 62.5 MCG TABS Take 62.5 mcg by mouth three times a week 30 tablet 3    levalbuterol (XOPENEX) 0.63 MG/3ML nebulization Take 3 mLs by nebulization every 6 hours as needed for Wheezing 120 each 1    fluticasone (FLONASE) 50 MCG/ACT nasal spray 1 spray by Each Nostril route 2 times daily as needed for Rhinitis      OXYGEN Inhale into the lungs continuous Indications: 5 Lpm      warfarin (COUMADIN) 1 MG tablet 1qd (Patient not taking: Reported on 4/30/2025) 30 tablet 3    warfarin (COUMADIN) 2 MG tablet Take 1 tablet by mouth daily (Patient not taking: Reported on 4/30/2025) 30 tablet 0    [DISCONTINUED] Apoaequorin (PREVAGEN EXTRA STRENGTH) 20 MG CAPS Take 20 mg by mouth every morning        No facility-administered encounter medications on file as of 4/30/2025.       Return in about 4 weeks (around 5/28/2025).        Reviewed recent labs related to Krystal's current problems      Discussed importance of regular Health Maintenance follow up  Health Maintenance   Topic    DTaP/Tdap/Td vaccine (1 - Tdap)    Shingles vaccine (1 of 2)    COVID-19 Vaccine (6 - 2024-25 season)    Flu vaccine (Season Ended)    Lipids     Depression Screen     Annual Wellness Visit (Medicare)     Pneumococcal 50+ years Vaccine     Respiratory Syncytial Virus (RSV) Pregnant or age 60 yrs+     Hepatitis A vaccine     Hepatitis B vaccine     Hib vaccine     Polio vaccine     Meningococcal (ACWY) vaccine     Meningococcal B vaccine

## 2025-05-09 ENCOUNTER — HOSPITAL ENCOUNTER (EMERGENCY)
Age: 85
Discharge: HOME | End: 2025-05-09
Attending: EMERGENCY MEDICINE
Payer: MEDICARE

## 2025-05-09 ENCOUNTER — APPOINTMENT (OUTPATIENT)
Dept: GENERAL RADIOLOGY | Age: 85
End: 2025-05-09
Payer: MEDICARE

## 2025-05-09 VITALS
WEIGHT: 170 LBS | OXYGEN SATURATION: 100 % | HEART RATE: 93 BPM | RESPIRATION RATE: 18 BRPM | HEIGHT: 68 IN | DIASTOLIC BLOOD PRESSURE: 80 MMHG | TEMPERATURE: 98.1 F | BODY MASS INDEX: 25.76 KG/M2 | SYSTOLIC BLOOD PRESSURE: 140 MMHG

## 2025-05-09 DIAGNOSIS — R06.00 DYSPNEA, UNSPECIFIED TYPE: Primary | ICD-10-CM

## 2025-05-09 DIAGNOSIS — J44.1 COPD EXACERBATION (HCC): ICD-10-CM

## 2025-05-09 LAB
ANION GAP SERPL CALCULATED.3IONS-SCNC: 11 MMOL/L (ref 7–16)
BASOPHILS # BLD: 0.02 K/UL (ref 0–0.2)
BASOPHILS NFR BLD: 0 % (ref 0–2)
BNP SERPL-MCNC: 265 PG/ML (ref 0–450)
BUN SERPL-MCNC: 22 MG/DL (ref 6–23)
CALCIUM SERPL-MCNC: 9.4 MG/DL (ref 8.6–10.2)
CHLORIDE SERPL-SCNC: 99 MMOL/L (ref 98–107)
CO2 SERPL-SCNC: 31 MMOL/L (ref 22–29)
CREAT SERPL-MCNC: 1.2 MG/DL (ref 0.5–1)
EKG ATRIAL RATE: 93 BPM
EKG P AXIS: 36 DEGREES
EKG P-R INTERVAL: 170 MS
EKG Q-T INTERVAL: 376 MS
EKG QRS DURATION: 80 MS
EKG QTC CALCULATION (BAZETT): 467 MS
EKG R AXIS: 47 DEGREES
EKG T AXIS: 25 DEGREES
EKG VENTRICULAR RATE: 93 BPM
EOSINOPHIL # BLD: 0.09 K/UL (ref 0.05–0.5)
EOSINOPHILS RELATIVE PERCENT: 2 % (ref 0–6)
ERYTHROCYTE [DISTWIDTH] IN BLOOD BY AUTOMATED COUNT: 13.5 % (ref 11.5–15)
GFR, ESTIMATED: 46 ML/MIN/1.73M2
GLUCOSE SERPL-MCNC: 116 MG/DL (ref 74–99)
HCT VFR BLD AUTO: 34.2 % (ref 34–48)
HGB BLD-MCNC: 11 G/DL (ref 11.5–15.5)
IMM GRANULOCYTES # BLD AUTO: <0.03 K/UL (ref 0–0.58)
IMM GRANULOCYTES NFR BLD: 0 % (ref 0–5)
LYMPHOCYTES NFR BLD: 1.36 K/UL (ref 1.5–4)
LYMPHOCYTES RELATIVE PERCENT: 26 % (ref 20–42)
MCH RBC QN AUTO: 29.9 PG (ref 26–35)
MCHC RBC AUTO-ENTMCNC: 32.2 G/DL (ref 32–34.5)
MCV RBC AUTO: 92.9 FL (ref 80–99.9)
MONOCYTES NFR BLD: 0.3 K/UL (ref 0.1–0.95)
MONOCYTES NFR BLD: 6 % (ref 2–12)
NEUTROPHILS NFR BLD: 65 % (ref 43–80)
NEUTS SEG NFR BLD: 3.37 K/UL (ref 1.8–7.3)
PLATELET, FLUORESCENCE: 115 K/UL (ref 130–450)
PMV BLD AUTO: 10.6 FL (ref 7–12)
POTASSIUM SERPL-SCNC: 4.6 MMOL/L (ref 3.5–5)
RBC # BLD AUTO: 3.68 M/UL (ref 3.5–5.5)
SARS-COV-2 RDRP RESP QL NAA+PROBE: NOT DETECTED
SODIUM SERPL-SCNC: 141 MMOL/L (ref 132–146)
SPECIMEN DESCRIPTION: NORMAL
TROPONIN I SERPL HS-MCNC: 20 NG/L (ref 0–14)
TROPONIN I SERPL HS-MCNC: 22 NG/L (ref 0–14)
WBC OTHER # BLD: 5.2 K/UL (ref 4.5–11.5)

## 2025-05-09 PROCEDURE — 80048 BASIC METABOLIC PNL TOTAL CA: CPT

## 2025-05-09 PROCEDURE — 87635 SARS-COV-2 COVID-19 AMP PRB: CPT

## 2025-05-09 PROCEDURE — 6370000000 HC RX 637 (ALT 250 FOR IP)

## 2025-05-09 PROCEDURE — 99285 EMERGENCY DEPT VISIT HI MDM: CPT

## 2025-05-09 PROCEDURE — 71045 X-RAY EXAM CHEST 1 VIEW: CPT

## 2025-05-09 PROCEDURE — 85025 COMPLETE CBC W/AUTO DIFF WBC: CPT

## 2025-05-09 PROCEDURE — 93005 ELECTROCARDIOGRAM TRACING: CPT

## 2025-05-09 PROCEDURE — 84484 ASSAY OF TROPONIN QUANT: CPT

## 2025-05-09 PROCEDURE — 83880 ASSAY OF NATRIURETIC PEPTIDE: CPT

## 2025-05-09 RX ORDER — IPRATROPIUM BROMIDE AND ALBUTEROL SULFATE 2.5; .5 MG/3ML; MG/3ML
3 SOLUTION RESPIRATORY (INHALATION) ONCE
Status: COMPLETED | OUTPATIENT
Start: 2025-05-09 | End: 2025-05-09

## 2025-05-09 RX ADMIN — IPRATROPIUM BROMIDE AND ALBUTEROL SULFATE 3 DOSE: .5; 2.5 SOLUTION RESPIRATORY (INHALATION) at 18:20

## 2025-05-09 ASSESSMENT — PAIN - FUNCTIONAL ASSESSMENT: PAIN_FUNCTIONAL_ASSESSMENT: NONE - DENIES PAIN

## 2025-05-09 NOTE — ED PROVIDER NOTES
Department of Emergency Medicine     Written by: Owen Prescott MD  Patient Name: Krystal Hyatt  Admit Date: 2025  5:27 PM  MRN: [id number]                   : 1940    HPI     Chief Complaint   Patient presents with    Shortness of Breath     Went to urgent care ot be seen and was told to come here for SOB. Hx of COPD and emphysema, lung ca. Always on 5L O2 via nc. 97% 5L at pivot.     Leg Swelling     Bilateral leg swelling, noticed increase in swelling about 2 days ago, states does take bumex as prescribed.        Krystal Hyatt is a 84 y.o. female that presents to the ED due to shortness of breath for 2 days and leg swelling for 4 days with pitting edema.  Chronically on 5 L nasal cannula oxygen.  History of COPD, emphysema, lung cancer post radiation therapy supposed be in remission, CHF.  On 1 mg Bumex daily, yesterday she took 2 doses of Bumex however her swelling returns her breath did not improve.  She is not a current smoker.  No fevers chills or sweats. She does mention coughing up yellow sputum. No known sick contacts.    Review of systems   Pertinent positives and negatives mentioned in the HPI/MDM.      Physical   Physical Exam  Constitutional:       General: She is not in acute distress.     Appearance: Normal appearance.   Eyes:      Extraocular Movements: Extraocular movements intact.      Pupils: Pupils are equal, round, and reactive to light.   Cardiovascular:      Rate and Rhythm: Normal rate and regular rhythm.   Pulmonary:      Effort: Pulmonary effort is normal. No respiratory distress.      Breath sounds: Examination of the right-middle field reveals rales. Rales present.      Comments: On 5 L nasal cannula oxygen  Chest:      Chest wall: No mass or tenderness.   Abdominal:      Palpations: Abdomen is soft.      Tenderness: There is no abdominal tenderness.   Musculoskeletal:      Right lower leg: Edema present.      Left lower leg: Edema present.   Skin:     General: Skin  CO2 31 (H) 22 - 29 mmol/L    Anion Gap 11 7 - 16 mmol/L    Glucose 116 (H) 74 - 99 mg/dL    BUN 22 6 - 23 mg/dL    Creatinine 1.2 (H) 0.50 - 1.00 mg/dL    Est, Glom Filt Rate 46 (L) >60 mL/min/1.73m2    Calcium 9.4 8.6 - 10.2 mg/dL   Brain Natriuretic Peptide   Result Value Ref Range    NT Pro- 0 - 450 pg/mL   Troponin   Result Value Ref Range    Troponin, High Sensitivity 22 (H) 0 - 14 ng/L   Troponin   Result Value Ref Range    Troponin, High Sensitivity 20 (H) 0 - 14 ng/L   EKG 12 Lead   Result Value Ref Range    Ventricular Rate 93 BPM    Atrial Rate 93 BPM    P-R Interval 170 ms    QRS Duration 80 ms    Q-T Interval 376 ms    QTc Calculation (Bazett) 467 ms    P Axis 36 degrees    R Axis 47 degrees    T Axis 25 degrees       Radiology reviewed and interpreted by me:  XR CHEST PORTABLE   Final Result   1.  Background lung hyperinflation with coarsened interstitial markings and   fibrotic changes.  Known right hilar and infrahilar mass.      2.  There are known peripheral masslike areas in the left mid and lower lung   however the density along the periphery of the left mid to lower lung appears   more prominent than on previous exam.      (Superimposed infection could be considered.)      3.  Atherosclerotic disease and enlargement of the cardiac silhouette.           Procedures     None    MDM and ED course   History is obtained from patient and family for patient's acute on chronic onset of moderate shortness of breath    Differential diagnoses: Pneumonia, COPD, CHF     ED Course as of 05/09/25 2305   Fri May 09, 2025   1754 I agree with the EKG findings as dictated by the resident.  ATTENDING PROVIDER ATTESTATION:     I have personally performed and/or participated in the history, exam, medical decision making, and procedures and agree with all pertinent clinical information.      I have also reviewed and agree with the past medical, family and social history unless otherwise noted.    I have discussed

## 2025-05-12 LAB
EKG ATRIAL RATE: 93 BPM
EKG P AXIS: 36 DEGREES
EKG P-R INTERVAL: 170 MS
EKG Q-T INTERVAL: 376 MS
EKG QRS DURATION: 80 MS
EKG QTC CALCULATION (BAZETT): 467 MS
EKG R AXIS: 47 DEGREES
EKG T AXIS: 25 DEGREES
EKG VENTRICULAR RATE: 93 BPM

## 2025-05-12 PROCEDURE — 93010 ELECTROCARDIOGRAM REPORT: CPT | Performed by: INTERNAL MEDICINE

## 2025-05-12 NOTE — TELEPHONE ENCOUNTER
Patient called in asking if she can get a refill of the cetirizine (ZYRTEC) 10 MG tablet because her nose is running really bad. She believes it is her allergies. She stated the pharmacy only gave her a few of the tablets.     [id number] - [place], [place] - [address]

## 2025-05-13 RX ORDER — CETIRIZINE HYDROCHLORIDE 10 MG/1
10 TABLET ORAL DAILY
Qty: 90 TABLET | Refills: 0 | Status: SHIPPED | OUTPATIENT
Start: 2025-05-13

## 2025-05-20 ENCOUNTER — OFFICE VISIT (OUTPATIENT)
Dept: PRIMARY CARE CLINIC | Age: 85
End: 2025-05-20
Payer: MEDICARE

## 2025-05-20 VITALS
WEIGHT: 176 LBS | HEIGHT: 68 IN | RESPIRATION RATE: 16 BRPM | TEMPERATURE: 97 F | SYSTOLIC BLOOD PRESSURE: 110 MMHG | BODY MASS INDEX: 26.67 KG/M2 | DIASTOLIC BLOOD PRESSURE: 68 MMHG | OXYGEN SATURATION: 98 % | HEART RATE: 77 BPM

## 2025-05-20 DIAGNOSIS — J44.9 COPD, VERY SEVERE (HCC): ICD-10-CM

## 2025-05-20 DIAGNOSIS — C34.91 MALIGNANT NEOPLASM OF UNSPECIFIED PART OF RIGHT BRONCHUS OR LUNG (HCC): ICD-10-CM

## 2025-05-20 DIAGNOSIS — J96.11 CHRONIC RESPIRATORY FAILURE WITH HYPOXIA (HCC): ICD-10-CM

## 2025-05-20 DIAGNOSIS — I48.0 PAF (PAROXYSMAL ATRIAL FIBRILLATION) (HCC): Primary | ICD-10-CM

## 2025-05-20 LAB — INR BLD: 2.8

## 2025-05-20 PROCEDURE — G8427 DOCREV CUR MEDS BY ELIG CLIN: HCPCS | Performed by: INTERNAL MEDICINE

## 2025-05-20 PROCEDURE — 1090F PRES/ABSN URINE INCON ASSESS: CPT | Performed by: INTERNAL MEDICINE

## 2025-05-20 PROCEDURE — 1036F TOBACCO NON-USER: CPT | Performed by: INTERNAL MEDICINE

## 2025-05-20 PROCEDURE — 99213 OFFICE O/P EST LOW 20 MIN: CPT | Performed by: INTERNAL MEDICINE

## 2025-05-20 PROCEDURE — 3023F SPIROM DOC REV: CPT | Performed by: INTERNAL MEDICINE

## 2025-05-20 PROCEDURE — 1123F ACP DISCUSS/DSCN MKR DOCD: CPT | Performed by: INTERNAL MEDICINE

## 2025-05-20 PROCEDURE — 1159F MED LIST DOCD IN RCRD: CPT | Performed by: INTERNAL MEDICINE

## 2025-05-20 PROCEDURE — G8419 CALC BMI OUT NRM PARAM NOF/U: HCPCS | Performed by: INTERNAL MEDICINE

## 2025-05-20 RX ORDER — LEVOTHYROXINE SODIUM 75 UG/1
75 TABLET ORAL DAILY
Qty: 90 TABLET | Refills: 0 | Status: SHIPPED | OUTPATIENT
Start: 2025-05-20

## 2025-05-20 RX ORDER — WARFARIN SODIUM 2.5 MG/1
TABLET ORAL
Qty: 30 TABLET | Refills: 1 | Status: SHIPPED | OUTPATIENT
Start: 2025-05-20

## 2025-05-20 RX ORDER — PANTOPRAZOLE SODIUM 40 MG/1
40 TABLET, DELAYED RELEASE ORAL EVERY MORNING
Qty: 90 TABLET | Refills: 0 | Status: SHIPPED | OUTPATIENT
Start: 2025-05-20

## 2025-05-20 RX ORDER — CHOLECALCIFEROL (VITAMIN D3) 50 MCG
2000 TABLET ORAL DAILY
Qty: 90 TABLET | Refills: 0 | Status: SHIPPED | OUTPATIENT
Start: 2025-05-20

## 2025-05-20 RX ORDER — ASCORBIC ACID 500 MG
500 TABLET ORAL DAILY
Qty: 90 TABLET | Refills: 0 | Status: SHIPPED | OUTPATIENT
Start: 2025-05-20

## 2025-05-20 RX ORDER — ROSUVASTATIN CALCIUM 10 MG/1
10 TABLET, COATED ORAL NIGHTLY
Qty: 90 TABLET | Refills: 0 | Status: SHIPPED | OUTPATIENT
Start: 2025-05-20

## 2025-05-20 RX ORDER — BUMETANIDE 0.5 MG/1
0.5 TABLET ORAL DAILY
Qty: 90 TABLET | Refills: 0 | Status: SHIPPED | OUTPATIENT
Start: 2025-05-20

## 2025-05-20 ASSESSMENT — ENCOUNTER SYMPTOMS
SHORTNESS OF BREATH: 0
VOMITING: 0
NAUSEA: 0
DIARRHEA: 0
COUGH: 0
ABDOMINAL PAIN: 0

## 2025-05-20 NOTE — PROGRESS NOTES
performed by Antony Rosario MD at [facility] ENDOSCOPY    CARDIOVERSION  10/06/2023    Dr Jones    INTRACAPSULAR CATARACT EXTRACTION Right 2021    RIGHT EYE CATARACT EXTRACTION IOL IMPLANT performed by Kay Pimentel MD at [facility] OR    INTRACAPSULAR CATARACT EXTRACTION Left 2022    LEFT EYE CATARACT EXTRACTION IOL IMPLANT performed by Kay Pimentel MD at [facility] OR    TUBAL LIGATION         Past Family Hx:  Reviewed with patient      Problem Relation Age of Onset    Cancer Mother     Other Mother         APLASTIC ANEMIA    Peripheral Vascular Disease Mother     Emphysema Father     Lung Disease Father        Social Hx:  Reviewed with patient  Social History     Tobacco Use    Smoking status: Former     Current packs/day: 0.00     Average packs/day: 2.0 packs/day for 46.0 years (92.0 ttl pk-yrs)     Types: Cigarettes     Start date: 1952     Quit date: 1998     Years since quittin.8     Passive exposure: Past    Smokeless tobacco: Never    Tobacco comments:     Patient quit smoking 24 yrs. ago.   Substance Use Topics    Alcohol use: Yes     Alcohol/week: 1.0 standard drink of alcohol     Types: 1 Glasses of wine per week     Comment: nightly       OBJECTIVE  /68   Pulse 77   Temp 97 °F (36.1 °C) (Temporal)   Resp 16   Ht 1.727 m (5' 8\")   Wt 79.8 kg (176 lb)   SpO2 98%   BMI 26.76 kg/m²     Problem List:  Krystal does not have any pertinent problems on file.    PHYS EX:  Physical Exam  Eyes:      Extraocular Movements: Extraocular movements intact.      Pupils: Pupils are equal, round, and reactive to light.   Neck:      Thyroid: No thyromegaly.      Vascular: No carotid bruit or JVD.   Cardiovascular:      Heart sounds: No murmur heard.     No gallop.   Pulmonary:      Effort: Pulmonary effort is normal.      Breath sounds: Normal breath sounds.   Abdominal:      Palpations: There is no hepatomegaly or splenomegaly.      Tenderness: There is no abdominal tenderness.

## 2025-05-30 ENCOUNTER — TELEPHONE (OUTPATIENT)
Dept: PRIMARY CARE CLINIC | Age: 85
End: 2025-05-30

## 2025-05-30 RX ORDER — DOXYCYCLINE HYCLATE 100 MG
100 TABLET ORAL 2 TIMES DAILY WITH MEALS
Qty: 20 TABLET | Refills: 0 | Status: SHIPPED | OUTPATIENT
Start: 2025-05-30 | End: 2025-06-09

## 2025-05-30 NOTE — TELEPHONE ENCOUNTER
Patient requesting an antibiotic be sent into the [facility] in [place]. States she is coughing up green and yellow mucus. Thank you.

## 2025-06-25 ENCOUNTER — OFFICE VISIT (OUTPATIENT)
Dept: PRIMARY CARE CLINIC | Age: 85
End: 2025-06-25

## 2025-06-25 VITALS
DIASTOLIC BLOOD PRESSURE: 77 MMHG | SYSTOLIC BLOOD PRESSURE: 131 MMHG | HEART RATE: 84 BPM | BODY MASS INDEX: 25.77 KG/M2 | TEMPERATURE: 97.6 F | RESPIRATION RATE: 24 BRPM | OXYGEN SATURATION: 99 % | WEIGHT: 174 LBS | HEIGHT: 69 IN

## 2025-06-25 DIAGNOSIS — M25.551 RIGHT HIP PAIN: ICD-10-CM

## 2025-06-25 DIAGNOSIS — Z79.01 CHRONIC ANTICOAGULATION: ICD-10-CM

## 2025-06-25 DIAGNOSIS — E03.9 ACQUIRED HYPOTHYROIDISM: ICD-10-CM

## 2025-06-25 DIAGNOSIS — I48.0 PAF (PAROXYSMAL ATRIAL FIBRILLATION) (HCC): Primary | ICD-10-CM

## 2025-06-25 DIAGNOSIS — E78.2 MIXED HYPERLIPIDEMIA: ICD-10-CM

## 2025-06-25 LAB
INTERNATIONAL NORMALIZATION RATIO, POC: 1.8
PROTHROMBIN TIME, POC: ABNORMAL

## 2025-06-25 NOTE — PROGRESS NOTES
Coumadin 2.5 mg daily, but has been on ABX therapy, so she has been taking half of that dose. She said that is what she has been instructed. Myra White LPN

## 2025-06-25 NOTE — PROGRESS NOTES
[facility]      SUBJECTIVE:  Krystal Hyatt (:  1940) is a 84 y.o. female here for evaluation of the following chief complaint(s):  Establish Care    Assessment & Plan  1. Right hip pain.  - Mobility is significantly compromised due to the hip condition.  - Pain may be originating from the lower back. Despite being on steroids, there has been no noticeable improvement.  - X-rays of the back, pelvis, and femur will be ordered. If the x-rays reveal any abnormalities, a referral to orthopedics will be made. If the x-rays do not show any significant findings, a referral to Dr. Benson, a sports medicine specialist, will be considered for further evaluation.  - Advised to continue with Tylenol for pain management.    2. Atrial fibrillation.  - Reports occasional palpitations, especially after consuming too much coffee.  - Currently on amiodarone as well as warfarin for atrial fibrillation.  - No changes to the current medication regimen are necessary at this time.  - Follow-up with cardiologist Dr. Jones was last in 2025, no changes were made.    3. Chronic obstructive pulmonary disease (COPD).  - Recently completed a 7-day course of doxycycline for a COPD flare-up.  - Currently on prednisone and uses 5 L of oxygen chronically.  - Advised not to take herself off oxygen without medical consultation.  - Follow-up with the lung doctor is scheduled in 4 weeks.    4. Hypothyroidism.  - Has been on thyroid medication for a long time.  - No changes to the current medication regimen are necessary at this time.  - Continues to manage symptoms effectively with current medication.    5. Heartburn.  - On pantoprazole for heartburn, which effectively manages her symptoms.  - Advised to continue with the current dosage.    6. Hyperlipidemia.  - On rosuvastatin for cholesterol management.  - No changes to the current medication regimen are necessary at this

## 2025-06-26 ENCOUNTER — HOSPITAL ENCOUNTER (OUTPATIENT)
Dept: GENERAL RADIOLOGY | Age: 85
Discharge: HOME | End: 2025-06-28
Payer: MEDICARE

## 2025-06-26 DIAGNOSIS — M25.551 RIGHT HIP PAIN: ICD-10-CM

## 2025-06-26 PROCEDURE — 73502 X-RAY EXAM HIP UNI 2-3 VIEWS: CPT

## 2025-06-26 PROCEDURE — 72110 X-RAY EXAM L-2 SPINE 4/>VWS: CPT

## 2025-06-27 ENCOUNTER — RESULTS FOLLOW-UP (OUTPATIENT)
Dept: PRIMARY CARE CLINIC | Age: 85
End: 2025-06-27

## 2025-07-03 PROBLEM — I48.91 ATRIAL FIBRILLATION (HCC): Status: RESOLVED | Noted: 2023-09-21 | Resolved: 2025-07-03

## 2025-07-03 PROBLEM — J96.11 CHRONIC RESPIRATORY FAILURE WITH HYPOXIA (HCC): Status: RESOLVED | Noted: 2023-08-21 | Resolved: 2025-07-03

## 2025-07-03 PROBLEM — I48.91 ATRIAL FIBRILLATION WITH RAPID VENTRICULAR RESPONSE (HCC): Status: RESOLVED | Noted: 2023-12-21 | Resolved: 2025-07-03

## 2025-07-03 PROBLEM — I50.41 ACUTE COMBINED SYSTOLIC AND DIASTOLIC CONGESTIVE HEART FAILURE (HCC): Status: RESOLVED | Noted: 2017-09-25 | Resolved: 2025-07-03

## 2025-07-03 PROBLEM — I50.43 ACUTE ON CHRONIC COMBINED SYSTOLIC AND DIASTOLIC CHF (CONGESTIVE HEART FAILURE) (HCC): Status: RESOLVED | Noted: 2024-02-24 | Resolved: 2025-07-03

## 2025-07-03 PROBLEM — R50.9 FEVER: Status: RESOLVED | Noted: 2024-12-22 | Resolved: 2025-07-03

## 2025-07-03 PROBLEM — I48.91 ATRIAL FIBRILLATION WITH RVR (HCC): Status: RESOLVED | Noted: 2021-06-16 | Resolved: 2025-07-03

## 2025-07-03 PROBLEM — R06.00 DYSPNEA: Status: RESOLVED | Noted: 2024-12-22 | Resolved: 2025-07-03

## 2025-07-03 PROBLEM — J96.01 ACUTE RESPIRATORY FAILURE WITH HYPOXIA (HCC): Status: RESOLVED | Noted: 2024-02-16 | Resolved: 2025-07-03

## 2025-07-03 PROBLEM — R79.1 SUPRATHERAPEUTIC INR: Status: RESOLVED | Noted: 2024-09-29 | Resolved: 2025-07-03

## 2025-07-03 PROBLEM — J40 BRONCHITIS: Status: RESOLVED | Noted: 2023-07-17 | Resolved: 2025-07-03

## 2025-07-03 PROBLEM — J18.9 PNEUMONIA OF BOTH LOWER LOBES DUE TO INFECTIOUS ORGANISM: Status: RESOLVED | Noted: 2022-07-13 | Resolved: 2025-07-03

## 2025-07-03 PROBLEM — I48.91 A-FIB (HCC): Status: RESOLVED | Noted: 2024-09-09 | Resolved: 2025-07-03

## 2025-07-03 PROBLEM — J96.20 ACUTE ON CHRONIC RESPIRATORY FAILURE (HCC): Status: RESOLVED | Noted: 2024-04-14 | Resolved: 2025-07-03

## 2025-07-03 PROBLEM — E05.80 IATROGENIC HYPERTHYROIDISM: Status: RESOLVED | Noted: 2023-12-24 | Resolved: 2025-07-03

## 2025-07-03 PROBLEM — R04.2 HEMOPTYSIS: Status: RESOLVED | Noted: 2024-12-22 | Resolved: 2025-07-03

## 2025-07-03 PROBLEM — U07.1 COVID-19 VIRUS INFECTION: Status: RESOLVED | Noted: 2023-12-21 | Resolved: 2025-07-03

## 2025-07-03 PROBLEM — I48.92 ATRIAL FLUTTER (HCC): Status: RESOLVED | Noted: 2023-09-23 | Resolved: 2025-07-03

## 2025-07-03 PROBLEM — R91.8 MASS OF RIGHT LUNG: Status: RESOLVED | Noted: 2021-06-16 | Resolved: 2025-07-03

## 2025-07-03 PROBLEM — R79.89 HIGH THYROID HORMONE LEVEL: Status: RESOLVED | Noted: 2024-01-02 | Resolved: 2025-07-03

## 2025-07-03 PROBLEM — I48.92 ATRIAL FLUTTER WITH RAPID VENTRICULAR RESPONSE (HCC): Status: RESOLVED | Noted: 2023-04-06 | Resolved: 2025-07-03

## 2025-07-09 ENCOUNTER — CLINICAL SUPPORT (OUTPATIENT)
Dept: PRIMARY CARE CLINIC | Age: 85
End: 2025-07-09

## 2025-07-09 DIAGNOSIS — Z79.01 CHRONIC ANTICOAGULATION: Primary | ICD-10-CM

## 2025-07-09 DIAGNOSIS — I48.91 ATRIAL FIBRILLATION WITH RVR (HCC): ICD-10-CM

## 2025-07-09 LAB — INTERNATIONAL NORMALIZATION RATIO, POC: 3.5

## 2025-07-10 ENCOUNTER — TELEPHONE (OUTPATIENT)
Dept: PRIMARY CARE CLINIC | Age: 85
End: 2025-07-10

## 2025-07-10 NOTE — TELEPHONE ENCOUNTER
Patient requesting a call back at [phone] to let her know what dose of coumadin she is to take. States she did not take any coumadin last night. Thank you.

## 2025-07-15 ENCOUNTER — HOSPITAL ENCOUNTER (EMERGENCY)
Age: 85
Discharge: HOME | End: 2025-07-16
Attending: STUDENT IN AN ORGANIZED HEALTH CARE EDUCATION/TRAINING PROGRAM
Payer: MEDICARE

## 2025-07-15 ENCOUNTER — OFFICE VISIT (OUTPATIENT)
Dept: ORTHOPEDIC SURGERY | Age: 85
End: 2025-07-15

## 2025-07-15 ENCOUNTER — APPOINTMENT (OUTPATIENT)
Dept: GENERAL RADIOLOGY | Age: 85
End: 2025-07-15
Payer: MEDICARE

## 2025-07-15 VITALS
BODY MASS INDEX: 25.77 KG/M2 | HEIGHT: 69 IN | HEART RATE: 131 BPM | DIASTOLIC BLOOD PRESSURE: 89 MMHG | SYSTOLIC BLOOD PRESSURE: 119 MMHG | OXYGEN SATURATION: 93 % | WEIGHT: 174 LBS

## 2025-07-15 DIAGNOSIS — M16.11 PRIMARY OSTEOARTHRITIS OF RIGHT HIP: ICD-10-CM

## 2025-07-15 DIAGNOSIS — M25.551 PAIN OF RIGHT HIP: Primary | ICD-10-CM

## 2025-07-15 DIAGNOSIS — I50.9 ACUTE ON CHRONIC CONGESTIVE HEART FAILURE, UNSPECIFIED HEART FAILURE TYPE (HCC): ICD-10-CM

## 2025-07-15 DIAGNOSIS — R06.02 SHORTNESS OF BREATH: Primary | ICD-10-CM

## 2025-07-15 LAB
ALBUMIN SERPL-MCNC: 4 G/DL (ref 3.5–5.2)
ALP SERPL-CCNC: 83 U/L (ref 35–104)
ALT SERPL-CCNC: 12 U/L (ref 0–35)
ANION GAP SERPL CALCULATED.3IONS-SCNC: 13 MMOL/L (ref 7–16)
AST SERPL-CCNC: 24 U/L (ref 0–35)
BASOPHILS # BLD: 0 K/UL (ref 0–0.2)
BASOPHILS NFR BLD: 0 % (ref 0–2)
BILIRUB SERPL-MCNC: 0.4 MG/DL (ref 0–1.2)
BNP SERPL-MCNC: 3914 PG/ML (ref 0–450)
BUN SERPL-MCNC: 26 MG/DL (ref 8–23)
CALCIUM SERPL-MCNC: 9.7 MG/DL (ref 8.8–10.2)
CHLORIDE SERPL-SCNC: 101 MMOL/L (ref 98–107)
CO2 SERPL-SCNC: 28 MMOL/L (ref 22–29)
CREAT SERPL-MCNC: 0.9 MG/DL (ref 0.5–1)
EOSINOPHIL # BLD: 0.02 K/UL (ref 0.05–0.5)
EOSINOPHILS RELATIVE PERCENT: 1 % (ref 0–6)
ERYTHROCYTE [DISTWIDTH] IN BLOOD BY AUTOMATED COUNT: 13.8 % (ref 11.5–15)
GFR, ESTIMATED: 64 ML/MIN/1.73M2
GLUCOSE SERPL-MCNC: 141 MG/DL (ref 74–99)
HCT VFR BLD AUTO: 33.5 % (ref 34–48)
HGB BLD-MCNC: 10.5 G/DL (ref 11.5–15.5)
INR PPP: 2.3
LYMPHOCYTES NFR BLD: 0.5 K/UL (ref 1.5–4)
LYMPHOCYTES RELATIVE PERCENT: 19 % (ref 20–42)
MCH RBC QN AUTO: 29.9 PG (ref 26–35)
MCHC RBC AUTO-ENTMCNC: 31.3 G/DL (ref 32–34.5)
MCV RBC AUTO: 95.4 FL (ref 80–99.9)
MONOCYTES NFR BLD: 0.05 K/UL (ref 0.1–0.95)
MONOCYTES NFR BLD: 2 % (ref 2–12)
NEUTROPHILS NFR BLD: 78 % (ref 43–80)
NEUTS SEG NFR BLD: 2.03 K/UL (ref 1.8–7.3)
PLATELET # BLD AUTO: 154 K/UL (ref 130–450)
PMV BLD AUTO: 10.6 FL (ref 7–12)
POTASSIUM SERPL-SCNC: 3.8 MMOL/L (ref 3.5–5.1)
PROT SERPL-MCNC: 6.6 G/DL (ref 6.4–8.3)
PROTHROMBIN TIME: 25.2 SEC (ref 9.3–12.4)
RBC # BLD AUTO: 3.51 M/UL (ref 3.5–5.5)
RBC # BLD: ABNORMAL 10*6/UL
SODIUM SERPL-SCNC: 142 MMOL/L (ref 136–145)
TROPONIN I SERPL HS-MCNC: 18 NG/L (ref 0–14)
TROPONIN I SERPL HS-MCNC: 21 NG/L (ref 0–14)
WBC OTHER # BLD: 2.6 K/UL (ref 4.5–11.5)

## 2025-07-15 PROCEDURE — 84484 ASSAY OF TROPONIN QUANT: CPT

## 2025-07-15 PROCEDURE — 6370000000 HC RX 637 (ALT 250 FOR IP): Performed by: STUDENT IN AN ORGANIZED HEALTH CARE EDUCATION/TRAINING PROGRAM

## 2025-07-15 PROCEDURE — 80053 COMPREHEN METABOLIC PANEL: CPT

## 2025-07-15 PROCEDURE — 71045 X-RAY EXAM CHEST 1 VIEW: CPT

## 2025-07-15 PROCEDURE — 96374 THER/PROPH/DIAG INJ IV PUSH: CPT

## 2025-07-15 PROCEDURE — 85025 COMPLETE CBC W/AUTO DIFF WBC: CPT

## 2025-07-15 PROCEDURE — 96375 TX/PRO/DX INJ NEW DRUG ADDON: CPT

## 2025-07-15 PROCEDURE — 6360000002 HC RX W HCPCS: Performed by: STUDENT IN AN ORGANIZED HEALTH CARE EDUCATION/TRAINING PROGRAM

## 2025-07-15 PROCEDURE — 94664 DEMO&/EVAL PT USE INHALER: CPT

## 2025-07-15 PROCEDURE — 99285 EMERGENCY DEPT VISIT HI MDM: CPT

## 2025-07-15 PROCEDURE — 85610 PROTHROMBIN TIME: CPT

## 2025-07-15 PROCEDURE — 93005 ELECTROCARDIOGRAM TRACING: CPT | Performed by: STUDENT IN AN ORGANIZED HEALTH CARE EDUCATION/TRAINING PROGRAM

## 2025-07-15 PROCEDURE — 83880 ASSAY OF NATRIURETIC PEPTIDE: CPT

## 2025-07-15 RX ORDER — IPRATROPIUM BROMIDE AND ALBUTEROL SULFATE 2.5; .5 MG/3ML; MG/3ML
1 SOLUTION RESPIRATORY (INHALATION) ONCE
Status: COMPLETED | OUTPATIENT
Start: 2025-07-15 | End: 2025-07-15

## 2025-07-15 RX ORDER — TRIAMCINOLONE ACETONIDE 40 MG/ML
40 INJECTION, SUSPENSION INTRA-ARTICULAR; INTRAMUSCULAR ONCE
Status: COMPLETED | OUTPATIENT
Start: 2025-07-15 | End: 2025-07-15

## 2025-07-15 RX ORDER — METHYLPREDNISOLONE SODIUM SUCCINATE 125 MG/2ML
60 INJECTION INTRAMUSCULAR; INTRAVENOUS ONCE
Status: COMPLETED | OUTPATIENT
Start: 2025-07-15 | End: 2025-07-15

## 2025-07-15 RX ORDER — BUMETANIDE 0.25 MG/ML
1 INJECTION, SOLUTION INTRAMUSCULAR; INTRAVENOUS ONCE
Status: COMPLETED | OUTPATIENT
Start: 2025-07-15 | End: 2025-07-15

## 2025-07-15 RX ORDER — BUPIVACAINE HYDROCHLORIDE 2.5 MG/ML
5 INJECTION, SOLUTION INFILTRATION; PERINEURAL ONCE
Status: COMPLETED | OUTPATIENT
Start: 2025-07-15 | End: 2025-07-15

## 2025-07-15 RX ADMIN — METHYLPREDNISOLONE SODIUM SUCCINATE 60 MG: 125 INJECTION INTRAMUSCULAR; INTRAVENOUS at 19:42

## 2025-07-15 RX ADMIN — TRIAMCINOLONE ACETONIDE 40 MG: 40 INJECTION, SUSPENSION INTRA-ARTICULAR; INTRAMUSCULAR at 13:28

## 2025-07-15 RX ADMIN — IPRATROPIUM BROMIDE AND ALBUTEROL SULFATE 1 DOSE: .5; 2.5 SOLUTION RESPIRATORY (INHALATION) at 19:26

## 2025-07-15 RX ADMIN — BUPIVACAINE HYDROCHLORIDE 12.5 MG: 2.5 INJECTION, SOLUTION INFILTRATION; PERINEURAL at 13:28

## 2025-07-15 RX ADMIN — BUMETANIDE 1 MG: 0.25 INJECTION INTRAMUSCULAR; INTRAVENOUS at 23:58

## 2025-07-15 ASSESSMENT — PAIN - FUNCTIONAL ASSESSMENT: PAIN_FUNCTIONAL_ASSESSMENT: NONE - DENIES PAIN

## 2025-07-15 NOTE — PROGRESS NOTES
[facility]  PRIMARY CARE SPORTS MEDICINE  DATE OF VISIT : 7/15/2025    Patient : Krystal Hyatt  Age : 84 y.o.   : 1940  MRN : [id number]   ______________________________________________________________________    Chief Complaint :   Chief Complaint   Patient presents with    Hip Pain     Pt presents this AM with c/o pain in her R hip. States that she fell a few months ago. Pain is posterior and anterior. Has taken Tylenol to manage her symptoms.        HPI : Krystal Hyatt is 84 y.o. female who presented to the clinic today for evaluation of right hip pain. Onset of the symptoms was a few months ago, after a fall.  Current symptoms include right anterior and lateral hip pain.  Patient denies numbness and tingling.  Pain is aggravated by any weight bearing activity. Evaluation to date: XRs of the right hip which demonstrate no acute fractures or dislocations. Treatment to date: avoidance of offending activity and OTC analgesics which are not very effective.     Past Medical History :  Past Medical History:   Diagnosis Date    Atrial fibrillation (HCC)     CHF (congestive heart failure) (HCC)     Emphysema, unspecified (HCC)     Hyperlipidemia     Hypertension     Lung cancer (HCC)     Mitral valve regurgitation     Oxygen dependent     Prolonged emergence from general anesthesia     post general anesthesia    Skin cancer     Thyroid disease      Past Surgical History:   Procedure Laterality Date    BREAST BIOPSY Right     benign    BRONCHOSCOPY N/A 2021    BRONCHOSCOPY/TRANSBRONCHIAL NEEDLE BIOPSY performed by Antony Rosario MD at [facility] ENDOSCOPY    CARDIOVERSION  10/06/2023    Dr Jones    INTRACAPSULAR CATARACT EXTRACTION Right 2021    RIGHT EYE CATARACT EXTRACTION IOL IMPLANT performed by Kay Pimentel MD at [facility] OR    INTRACAPSULAR CATARACT EXTRACTION Left 2022    LEFT EYE CATARACT EXTRACTION IOL IMPLANT performed by Kay iPmentel MD at [facility] OR    TUBAL LIGATION

## 2025-07-16 VITALS
DIASTOLIC BLOOD PRESSURE: 76 MMHG | SYSTOLIC BLOOD PRESSURE: 117 MMHG | HEIGHT: 68 IN | RESPIRATION RATE: 26 BRPM | HEART RATE: 95 BPM | TEMPERATURE: 98.2 F | BODY MASS INDEX: 26.37 KG/M2 | OXYGEN SATURATION: 94 % | WEIGHT: 174 LBS

## 2025-07-16 NOTE — ED PROVIDER NOTES
Protime 25.2 (*)     All other components within normal limits   TROPONIN - Abnormal; Notable for the following components:    Troponin, High Sensitivity 18 (*)     All other components within normal limits   PROTIME-INR       As interpreted by me, the above displayed labs are abnormal. All other labs obtained during this visit were within normal range or not returned as of this dictation.    EKG Interpretation:  Interpreted by emergency department physician, Kenney Scanlon, DO  Rate: 99  Rhythm: Sinus  Interpretation: EKG obtained demonstrates sinus rhythm with PVCs, rate 99, normal axis, QTc 469, no acute ST segment changes.  Stable compared to previous EKG  Comparison: stable as compared to patient's most recent EKG    RADIOLOGY:   Non-plain film images such as CT, Ultrasound and MRI are read by the radiologist. Plain radiographic images are visualized and preliminarily interpreted by the ED Provider with the below findings:    Chest x-ray revealed Normal heart size, Normal lungs, Normal mediastinum    Interpretation per the Radiologist below, if available at the time of this note:    XR CHEST PORTABLE   Final Result   No acute process.           XR CHEST PORTABLE  Result Date: 7/15/2025  EXAMINATION: ONE XRAY VIEW OF THE CHEST 7/15/2025 7:21 pm COMPARISON: 05/09/2025 HISTORY: ORDERING SYSTEM PROVIDED HISTORY: sob TECHNOLOGIST PROVIDED HISTORY: Reason for exam:->sob FINDINGS: The lungs are without acute focal process.  There is no effusion or pneumothorax.  Stable cardiomegaly.  The osseous structures are without acute process.     No acute process.     US GUIDED NEEDLE PLACEMENT  Result Date: 7/15/2025  Ultrasound guided procedure images. Please see procedure note for further details.      No results found.    PROCEDURES   Unless otherwise noted below, none     CRITICAL CARE TIME (.cct)   Per attending attestation    EMERGENCY DEPARTMENT COURSE    Vitals:    Vitals:    07/15/25 2010 07/15/25 2110 07/15/25 2210

## 2025-07-16 NOTE — DISCHARGE INSTRUCTIONS
Follow-up with Dr. Jones in the morning  If you notice any new worrisome symptoms please return to emergency department for evaluation

## 2025-07-17 ENCOUNTER — CLINICAL SUPPORT (OUTPATIENT)
Dept: PRIMARY CARE CLINIC | Age: 85
End: 2025-07-17
Payer: MEDICARE

## 2025-07-17 DIAGNOSIS — Z79.01 CHRONIC ANTICOAGULATION: ICD-10-CM

## 2025-07-17 DIAGNOSIS — I48.91 ATRIAL FIBRILLATION WITH RVR (HCC): Primary | ICD-10-CM

## 2025-07-17 LAB
INTERNATIONAL NORMALIZATION RATIO, POC: 2.5
PROTHROMBIN TIME, POC: NORMAL

## 2025-07-17 PROCEDURE — 36415 COLL VENOUS BLD VENIPUNCTURE: CPT | Performed by: INTERNAL MEDICINE

## 2025-07-17 PROCEDURE — 85610 PROTHROMBIN TIME: CPT | Performed by: INTERNAL MEDICINE

## 2025-07-17 NOTE — PROGRESS NOTES
Call placed to Dr. Monica Aponte to discuss Krystal's complaints of dark mucus.  At this time, Krystal will be observed.  If she has any coughing up of bright red blood that is larger than a half dollar, she should contact the pulmonary office.  If this should occur at night or over the weekend, she should go directly to the emergency room.  Krystal reports that she no longer has seen this dark sputum. Has an appointment with pulmonary on 7/24/2025.     Lety Oh MD  7/18/2025

## 2025-07-18 LAB
EKG ATRIAL RATE: 99 BPM
EKG P AXIS: 41 DEGREES
EKG P-R INTERVAL: 170 MS
EKG Q-T INTERVAL: 366 MS
EKG QRS DURATION: 82 MS
EKG QTC CALCULATION (BAZETT): 469 MS
EKG R AXIS: 48 DEGREES
EKG T AXIS: 37 DEGREES
EKG VENTRICULAR RATE: 99 BPM

## 2025-07-18 PROCEDURE — 93010 ELECTROCARDIOGRAM REPORT: CPT | Performed by: INTERNAL MEDICINE

## 2025-07-24 PROBLEM — R06.00 DYSPNEA: Status: ACTIVE | Noted: 2022-05-03

## 2025-07-25 ENCOUNTER — CARE COORDINATION (OUTPATIENT)
Dept: CARE COORDINATION | Age: 85
End: 2025-07-25

## 2025-07-25 SDOH — HEALTH STABILITY: MENTAL HEALTH: HOW MANY DRINKS CONTAINING ALCOHOL DO YOU HAVE ON A TYPICAL DAY WHEN YOU ARE DRINKING?: PATIENT DOES NOT DRINK

## 2025-07-25 SDOH — HEALTH STABILITY: PHYSICAL HEALTH: ON AVERAGE, HOW MANY DAYS PER WEEK DO YOU ENGAGE IN MODERATE TO STRENUOUS EXERCISE (LIKE A BRISK WALK)?: 3 DAYS

## 2025-07-25 SDOH — HEALTH STABILITY: PHYSICAL HEALTH: ON AVERAGE, HOW MANY MINUTES DO YOU ENGAGE IN EXERCISE AT THIS LEVEL?: 30 MIN

## 2025-07-25 SDOH — ECONOMIC STABILITY: FOOD INSECURITY: HOW HARD IS IT FOR YOU TO PAY FOR THE VERY BASICS LIKE FOOD, HOUSING, MEDICAL CARE, AND HEATING?: NOT HARD AT ALL

## 2025-07-25 SDOH — HEALTH STABILITY: MENTAL HEALTH: HOW OFTEN DO YOU HAVE A DRINK CONTAINING ALCOHOL?: NEVER

## 2025-07-25 ASSESSMENT — SOCIAL DETERMINANTS OF HEALTH (SDOH)
HOW OFTEN DO YOU ATTENT MEETINGS OF THE CLUB OR ORGANIZATION YOU BELONG TO?: NEVER
HOW OFTEN DO YOU ATTEND CHURCH OR RELIGIOUS SERVICES?: NEVER
DO YOU BELONG TO ANY CLUBS OR ORGANIZATIONS SUCH AS CHURCH GROUPS UNIONS, FRATERNAL OR ATHLETIC GROUPS, OR SCHOOL GROUPS?: NO
HOW OFTEN DO YOU GET TOGETHER WITH FRIENDS OR RELATIVES?: ONCE A WEEK
IN A TYPICAL WEEK, HOW MANY TIMES DO YOU TALK ON THE PHONE WITH FAMILY, FRIENDS, OR NEIGHBORS?: MORE THAN THREE TIMES A WEEK

## 2025-07-25 ASSESSMENT — ENCOUNTER SYMPTOMS: DYSPNEA ASSOCIATED WITH: MINIMAL EXERTION

## 2025-07-25 NOTE — CARE COORDINATION
Ambulatory Care Coordination Note     2025 2:42 PM     Patient Current Location:  Home: [address] [place] [address]  [facility] [address]     This patient was received as a referral from [facility] .    ACM contacted the patient by telephone. Verified name and  with patient as identifiers. Provided introduction to self, and explanation of the ACM role.   Patient accepted care management services at this time.          ACM: Mariah Gamez RN     Challenges to be reviewed by the provider   Additional needs identified to be addressed with provider Yes  Review plan of care               Method of communication with provider: chart routing.    Utilization: Initial Call - N/A    Care Summary Note:   ACM contacted Krystal to enroll in care coordination. She reports she is independent with her ADL's. She states she is currently using a walker with ambulation. She states she is able to drive but her children have been providing transportation to appointments.  She report she is active with pulmonology and wears continuous oxygen 5 lpm. She states she is currently on a steroid taper and antibiotics. She states she may have to be permanently on antibiotics.  She reports she takes coumadin and is currently having her INR checked biweekly.  Medications were reviewed and she states she currently added OTC vitamin E 600 mg to her regiment.  Future appointments were reviewed.    PLAN  Send CHF/COPD zones    Offered patient enrollment in the Remote Patient Monitoring (RPM) program for in-home monitoring: Deferred at this time because  ; will discuss at next outreach.     Assessments Completed:   Ambulatory Care Coordination Assessment    Care Coordination Protocol  Referral from Primary Care Provider: No  Week 1 - Initial Assessment     Do you have all of your prescriptions and are they filled?: Yes (Comment: weekly pill keeper)  Barriers to medication adherence: None  Are you able to afford your medications?: Yes  How

## 2025-07-28 ENCOUNTER — TELEPHONE (OUTPATIENT)
Dept: PRIMARY CARE CLINIC | Age: 85
End: 2025-07-28

## 2025-07-28 ENCOUNTER — CLINICAL SUPPORT (OUTPATIENT)
Dept: PRIMARY CARE CLINIC | Age: 85
End: 2025-07-28
Payer: MEDICARE

## 2025-07-28 DIAGNOSIS — I48.91 ATRIAL FIBRILLATION WITH RVR (HCC): ICD-10-CM

## 2025-07-28 DIAGNOSIS — Z79.01 CHRONIC ANTICOAGULATION: Primary | ICD-10-CM

## 2025-07-28 LAB
INTERNATIONAL NORMALIZATION RATIO, POC: 2.5
PROTHROMBIN TIME, POC: NORMAL

## 2025-07-28 PROCEDURE — 85610 PROTHROMBIN TIME: CPT | Performed by: INTERNAL MEDICINE

## 2025-07-28 PROCEDURE — 36415 COLL VENOUS BLD VENIPUNCTURE: CPT | Performed by: INTERNAL MEDICINE

## 2025-07-28 RX ORDER — WARFARIN SODIUM 2.5 MG/1
TABLET ORAL
Qty: 30 TABLET | Refills: 1 | Status: SHIPPED | OUTPATIENT
Start: 2025-07-28

## 2025-07-28 RX ORDER — WARFARIN SODIUM 2 MG/1
2 TABLET ORAL DAILY
Qty: 30 TABLET | Refills: 1 | Status: SHIPPED | OUTPATIENT
Start: 2025-07-28

## 2025-07-28 NOTE — TELEPHONE ENCOUNTER
Patient requesting a call back at [phone] to let her know if any changes need to be made to her   warfarin (COUMADIN) 2 MG tablet states she went to see Dr. Aponte and was told he wanted her to remain on the   doxycycline hyclate (VIBRA-TABS) 100 MG tablet   Thank you

## 2025-08-04 ENCOUNTER — OFFICE VISIT (OUTPATIENT)
Dept: PRIMARY CARE CLINIC | Age: 85
End: 2025-08-04

## 2025-08-04 VITALS
WEIGHT: 174 LBS | TEMPERATURE: 97 F | RESPIRATION RATE: 20 BRPM | HEART RATE: 70 BPM | OXYGEN SATURATION: 98 % | HEIGHT: 68 IN | DIASTOLIC BLOOD PRESSURE: 67 MMHG | SYSTOLIC BLOOD PRESSURE: 108 MMHG | BODY MASS INDEX: 26.37 KG/M2

## 2025-08-04 DIAGNOSIS — J96.11 CHRONIC HYPOXIC RESPIRATORY FAILURE (HCC): ICD-10-CM

## 2025-08-04 DIAGNOSIS — I10 ESSENTIAL HYPERTENSION: ICD-10-CM

## 2025-08-04 DIAGNOSIS — J43.9 PULMONARY EMPHYSEMA, UNSPECIFIED EMPHYSEMA TYPE (HCC): Primary | ICD-10-CM

## 2025-08-04 RX ORDER — ROSUVASTATIN CALCIUM 10 MG/1
10 TABLET, COATED ORAL NIGHTLY
Qty: 90 TABLET | Refills: 1 | Status: ON HOLD | OUTPATIENT
Start: 2025-08-04

## 2025-08-04 RX ORDER — CETIRIZINE HYDROCHLORIDE 10 MG/1
10 TABLET ORAL DAILY
Qty: 90 TABLET | Refills: 1 | Status: ON HOLD | OUTPATIENT
Start: 2025-08-04

## 2025-08-04 RX ORDER — LEVOTHYROXINE SODIUM 75 UG/1
75 TABLET ORAL DAILY
Qty: 90 TABLET | Refills: 1 | Status: ON HOLD | OUTPATIENT
Start: 2025-08-04

## 2025-08-04 RX ORDER — BUMETANIDE 0.5 MG/1
0.5 TABLET ORAL DAILY
Qty: 90 TABLET | Refills: 1 | Status: ON HOLD | OUTPATIENT
Start: 2025-08-04

## 2025-08-04 RX ORDER — WARFARIN SODIUM 2 MG/1
2 TABLET ORAL DAILY
Qty: 30 TABLET | Refills: 1 | Status: ON HOLD | OUTPATIENT
Start: 2025-08-04

## 2025-08-05 ENCOUNTER — OFFICE VISIT (OUTPATIENT)
Dept: ORTHOPEDIC SURGERY | Age: 85
End: 2025-08-05
Payer: MEDICARE

## 2025-08-05 VITALS — HEIGHT: 68 IN | WEIGHT: 163 LBS | OXYGEN SATURATION: 100 % | HEART RATE: 94 BPM | BODY MASS INDEX: 24.71 KG/M2

## 2025-08-05 DIAGNOSIS — M16.11 PRIMARY OSTEOARTHRITIS OF RIGHT HIP: ICD-10-CM

## 2025-08-05 DIAGNOSIS — M25.551 PAIN OF RIGHT HIP: Primary | ICD-10-CM

## 2025-08-05 PROBLEM — E87.5 HYPERKALEMIA: Status: RESOLVED | Noted: 2024-07-28 | Resolved: 2025-08-05

## 2025-08-05 PROBLEM — R06.00 DYSPNEA: Status: RESOLVED | Noted: 2022-05-03 | Resolved: 2025-08-05

## 2025-08-05 PROBLEM — D68.69 SECONDARY HYPERCOAGULABLE STATE: Status: RESOLVED | Noted: 2024-02-28 | Resolved: 2025-08-05

## 2025-08-05 PROBLEM — R09.02 HYPOXIA: Status: RESOLVED | Noted: 2020-07-28 | Resolved: 2025-08-05

## 2025-08-05 PROBLEM — R00.1 SEVERE SINUS BRADYCARDIA: Status: RESOLVED | Noted: 2017-10-27 | Resolved: 2025-08-05

## 2025-08-05 PROBLEM — J44.1 COPD WITH ACUTE EXACERBATION (HCC): Status: RESOLVED | Noted: 2024-02-24 | Resolved: 2025-08-05

## 2025-08-05 PROBLEM — J44.1 COPD EXACERBATION (HCC): Status: RESOLVED | Noted: 2017-10-15 | Resolved: 2025-08-05

## 2025-08-05 PROBLEM — Z86.79 HISTORY OF ATRIAL FIBRILLATION: Status: RESOLVED | Noted: 2017-10-28 | Resolved: 2025-08-05

## 2025-08-05 PROCEDURE — 1123F ACP DISCUSS/DSCN MKR DOCD: CPT | Performed by: FAMILY MEDICINE

## 2025-08-05 PROCEDURE — G8427 DOCREV CUR MEDS BY ELIG CLIN: HCPCS | Performed by: FAMILY MEDICINE

## 2025-08-05 PROCEDURE — G8420 CALC BMI NORM PARAMETERS: HCPCS | Performed by: FAMILY MEDICINE

## 2025-08-05 PROCEDURE — 99213 OFFICE O/P EST LOW 20 MIN: CPT | Performed by: FAMILY MEDICINE

## 2025-08-05 PROCEDURE — 1090F PRES/ABSN URINE INCON ASSESS: CPT | Performed by: FAMILY MEDICINE

## 2025-08-05 PROCEDURE — 1036F TOBACCO NON-USER: CPT | Performed by: FAMILY MEDICINE

## 2025-08-08 ENCOUNTER — HOSPITAL ENCOUNTER (INPATIENT)
Age: 85
LOS: 4 days | Discharge: NURSING, REHAB OR LONG-TERM CARE | DRG: 206 | End: 2025-08-15
Attending: EMERGENCY MEDICINE | Admitting: STUDENT IN AN ORGANIZED HEALTH CARE EDUCATION/TRAINING PROGRAM
Payer: MEDICARE

## 2025-08-08 ENCOUNTER — TELEPHONE (OUTPATIENT)
Dept: PRIMARY CARE CLINIC | Age: 85
End: 2025-08-08

## 2025-08-08 ENCOUNTER — APPOINTMENT (OUTPATIENT)
Dept: CT IMAGING | Age: 85
DRG: 206 | End: 2025-08-08
Payer: MEDICARE

## 2025-08-08 ENCOUNTER — CARE COORDINATION (OUTPATIENT)
Dept: CARE COORDINATION | Age: 85
End: 2025-08-08

## 2025-08-08 ENCOUNTER — APPOINTMENT (OUTPATIENT)
Dept: GENERAL RADIOLOGY | Age: 85
DRG: 206 | End: 2025-08-08
Payer: MEDICARE

## 2025-08-08 DIAGNOSIS — I48.19 PERSISTENT ATRIAL FIBRILLATION (HCC): ICD-10-CM

## 2025-08-08 DIAGNOSIS — S22.32XA CLOSED FRACTURE OF ONE RIB OF LEFT SIDE, INITIAL ENCOUNTER: Primary | ICD-10-CM

## 2025-08-08 LAB
ALBUMIN SERPL-MCNC: 3.7 G/DL (ref 3.5–5.2)
ALP SERPL-CCNC: 60 U/L (ref 35–104)
ALT SERPL-CCNC: 19 U/L (ref 0–35)
ANION GAP SERPL CALCULATED.3IONS-SCNC: 14 MMOL/L (ref 7–16)
AST SERPL-CCNC: 26 U/L (ref 0–35)
BASOPHILS # BLD: 0.01 K/UL (ref 0–0.2)
BASOPHILS NFR BLD: 0 % (ref 0–2)
BILIRUB SERPL-MCNC: 0.6 MG/DL (ref 0–1.2)
BUN SERPL-MCNC: 25 MG/DL (ref 8–23)
CALCIUM SERPL-MCNC: 9.5 MG/DL (ref 8.8–10.2)
CHLORIDE SERPL-SCNC: 104 MMOL/L (ref 98–107)
CO2 SERPL-SCNC: 26 MMOL/L (ref 22–29)
CREAT SERPL-MCNC: 0.8 MG/DL (ref 0.5–1)
EOSINOPHIL # BLD: 0.04 K/UL (ref 0.05–0.5)
EOSINOPHILS RELATIVE PERCENT: 1 % (ref 0–6)
ERYTHROCYTE [DISTWIDTH] IN BLOOD BY AUTOMATED COUNT: 14.5 % (ref 11.5–15)
GFR, ESTIMATED: 72 ML/MIN/1.73M2
GLUCOSE SERPL-MCNC: 136 MG/DL (ref 74–99)
HCT VFR BLD AUTO: 35.5 % (ref 34–48)
HGB BLD-MCNC: 10.7 G/DL (ref 11.5–15.5)
IMM GRANULOCYTES # BLD AUTO: 0.04 K/UL (ref 0–0.58)
IMM GRANULOCYTES NFR BLD: 1 % (ref 0–5)
INR PPP: 2.8
INR PPP: 3.1
LYMPHOCYTES NFR BLD: 1.44 K/UL (ref 1.5–4)
LYMPHOCYTES RELATIVE PERCENT: 17 % (ref 20–42)
MCH RBC QN AUTO: 29.9 PG (ref 26–35)
MCHC RBC AUTO-ENTMCNC: 30.1 G/DL (ref 32–34.5)
MCV RBC AUTO: 99.2 FL (ref 80–99.9)
MONOCYTES NFR BLD: 0.42 K/UL (ref 0.1–0.95)
MONOCYTES NFR BLD: 5 % (ref 2–12)
NEUTROPHILS NFR BLD: 77 % (ref 43–80)
NEUTS SEG NFR BLD: 6.62 K/UL (ref 1.8–7.3)
PLATELET # BLD AUTO: 111 K/UL (ref 130–450)
PMV BLD AUTO: 11 FL (ref 7–12)
POTASSIUM SERPL-SCNC: 4 MMOL/L (ref 3.5–5.1)
PROT SERPL-MCNC: 6 G/DL (ref 6.4–8.3)
PROTHROMBIN TIME: 30.3 SEC (ref 9.3–12.4)
PROTHROMBIN TIME: 33.5 SEC (ref 9.3–12.4)
RBC # BLD AUTO: 3.58 M/UL (ref 3.5–5.5)
SODIUM SERPL-SCNC: 144 MMOL/L (ref 136–145)
WBC OTHER # BLD: 8.6 K/UL (ref 4.5–11.5)

## 2025-08-08 PROCEDURE — 72125 CT NECK SPINE W/O DYE: CPT

## 2025-08-08 PROCEDURE — 72170 X-RAY EXAM OF PELVIS: CPT

## 2025-08-08 PROCEDURE — 2500000003 HC RX 250 WO HCPCS: Performed by: INTERNAL MEDICINE

## 2025-08-08 PROCEDURE — G0378 HOSPITAL OBSERVATION PER HR: HCPCS

## 2025-08-08 PROCEDURE — 80053 COMPREHEN METABOLIC PANEL: CPT

## 2025-08-08 PROCEDURE — 99223 1ST HOSP IP/OBS HIGH 75: CPT | Performed by: INTERNAL MEDICINE

## 2025-08-08 PROCEDURE — 6370000000 HC RX 637 (ALT 250 FOR IP)

## 2025-08-08 PROCEDURE — 70450 CT HEAD/BRAIN W/O DYE: CPT

## 2025-08-08 PROCEDURE — 6360000002 HC RX W HCPCS: Performed by: EMERGENCY MEDICINE

## 2025-08-08 PROCEDURE — 94664 DEMO&/EVAL PT USE INHALER: CPT

## 2025-08-08 PROCEDURE — 6370000000 HC RX 637 (ALT 250 FOR IP): Performed by: INTERNAL MEDICINE

## 2025-08-08 PROCEDURE — 71045 X-RAY EXAM CHEST 1 VIEW: CPT

## 2025-08-08 PROCEDURE — 85610 PROTHROMBIN TIME: CPT

## 2025-08-08 PROCEDURE — 85025 COMPLETE CBC W/AUTO DIFF WBC: CPT

## 2025-08-08 PROCEDURE — 70486 CT MAXILLOFACIAL W/O DYE: CPT

## 2025-08-08 PROCEDURE — 96374 THER/PROPH/DIAG INJ IV PUSH: CPT

## 2025-08-08 PROCEDURE — 99285 EMERGENCY DEPT VISIT HI MDM: CPT

## 2025-08-08 RX ORDER — PANTOPRAZOLE SODIUM 40 MG/1
40 TABLET, DELAYED RELEASE ORAL EVERY MORNING
Status: DISCONTINUED | OUTPATIENT
Start: 2025-08-09 | End: 2025-08-15 | Stop reason: HOSPADM

## 2025-08-08 RX ORDER — SODIUM CHLORIDE 0.9 % (FLUSH) 0.9 %
5-40 SYRINGE (ML) INJECTION PRN
Status: DISCONTINUED | OUTPATIENT
Start: 2025-08-08 | End: 2025-08-15 | Stop reason: HOSPADM

## 2025-08-08 RX ORDER — DIGOXIN 125 MCG
62.5 TABLET ORAL
Status: DISCONTINUED | OUTPATIENT
Start: 2025-08-08 | End: 2025-08-15 | Stop reason: HOSPADM

## 2025-08-08 RX ORDER — SODIUM CHLORIDE 9 MG/ML
INJECTION, SOLUTION INTRAVENOUS PRN
Status: DISCONTINUED | OUTPATIENT
Start: 2025-08-08 | End: 2025-08-15 | Stop reason: HOSPADM

## 2025-08-08 RX ORDER — HYDROCODONE BITARTRATE AND ACETAMINOPHEN 5; 325 MG/1; MG/1
1 TABLET ORAL ONCE
Status: COMPLETED | OUTPATIENT
Start: 2025-08-08 | End: 2025-08-08

## 2025-08-08 RX ORDER — ACETAMINOPHEN 325 MG/1
650 TABLET ORAL EVERY 6 HOURS PRN
Status: DISCONTINUED | OUTPATIENT
Start: 2025-08-08 | End: 2025-08-10

## 2025-08-08 RX ORDER — IPRATROPIUM BROMIDE AND ALBUTEROL SULFATE 2.5; .5 MG/3ML; MG/3ML
1 SOLUTION RESPIRATORY (INHALATION) ONCE
Status: COMPLETED | OUTPATIENT
Start: 2025-08-08 | End: 2025-08-08

## 2025-08-08 RX ORDER — POTASSIUM CHLORIDE 7.45 MG/ML
10 INJECTION INTRAVENOUS PRN
Status: DISCONTINUED | OUTPATIENT
Start: 2025-08-08 | End: 2025-08-15 | Stop reason: HOSPADM

## 2025-08-08 RX ORDER — BUMETANIDE 1 MG/1
0.5 TABLET ORAL DAILY
Status: DISCONTINUED | OUTPATIENT
Start: 2025-08-08 | End: 2025-08-15 | Stop reason: HOSPADM

## 2025-08-08 RX ORDER — LEVOTHYROXINE SODIUM 75 UG/1
75 TABLET ORAL DAILY
Status: DISCONTINUED | OUTPATIENT
Start: 2025-08-08 | End: 2025-08-12

## 2025-08-08 RX ORDER — SACUBITRIL AND VALSARTAN 24; 26 MG/1; MG/1
1 TABLET ORAL 2 TIMES DAILY
Status: DISCONTINUED | OUTPATIENT
Start: 2025-08-08 | End: 2025-08-15 | Stop reason: HOSPADM

## 2025-08-08 RX ORDER — ACETAMINOPHEN 650 MG/1
650 SUPPOSITORY RECTAL EVERY 6 HOURS PRN
Status: DISCONTINUED | OUTPATIENT
Start: 2025-08-08 | End: 2025-08-10

## 2025-08-08 RX ORDER — CHOLECALCIFEROL (VITAMIN D3) 50 MCG
2000 TABLET ORAL DAILY
Status: DISCONTINUED | OUTPATIENT
Start: 2025-08-08 | End: 2025-08-15 | Stop reason: HOSPADM

## 2025-08-08 RX ORDER — AMIODARONE HYDROCHLORIDE 200 MG/1
100 TABLET ORAL
Status: DISCONTINUED | OUTPATIENT
Start: 2025-08-08 | End: 2025-08-11

## 2025-08-08 RX ORDER — METHOCARBAMOL 500 MG/1
500 TABLET, FILM COATED ORAL ONCE
Status: COMPLETED | OUTPATIENT
Start: 2025-08-08 | End: 2025-08-08

## 2025-08-08 RX ORDER — PREDNISONE 5 MG/1
5 TABLET ORAL DAILY
Status: DISCONTINUED | OUTPATIENT
Start: 2025-08-08 | End: 2025-08-14

## 2025-08-08 RX ORDER — MAGNESIUM SULFATE IN WATER 40 MG/ML
2000 INJECTION, SOLUTION INTRAVENOUS PRN
Status: DISCONTINUED | OUTPATIENT
Start: 2025-08-08 | End: 2025-08-15 | Stop reason: HOSPADM

## 2025-08-08 RX ORDER — ONDANSETRON 4 MG/1
4 TABLET, ORALLY DISINTEGRATING ORAL EVERY 8 HOURS PRN
Status: DISCONTINUED | OUTPATIENT
Start: 2025-08-08 | End: 2025-08-15 | Stop reason: HOSPADM

## 2025-08-08 RX ORDER — POLYETHYLENE GLYCOL 3350 17 G/17G
17 POWDER, FOR SOLUTION ORAL DAILY PRN
Status: DISCONTINUED | OUTPATIENT
Start: 2025-08-08 | End: 2025-08-15 | Stop reason: HOSPADM

## 2025-08-08 RX ORDER — SODIUM CHLORIDE 0.9 % (FLUSH) 0.9 %
5-40 SYRINGE (ML) INJECTION EVERY 12 HOURS SCHEDULED
Status: DISCONTINUED | OUTPATIENT
Start: 2025-08-08 | End: 2025-08-15 | Stop reason: HOSPADM

## 2025-08-08 RX ORDER — ROSUVASTATIN CALCIUM 10 MG/1
10 TABLET, COATED ORAL NIGHTLY
Status: DISCONTINUED | OUTPATIENT
Start: 2025-08-08 | End: 2025-08-15 | Stop reason: HOSPADM

## 2025-08-08 RX ORDER — MORPHINE SULFATE 2 MG/ML
2 INJECTION, SOLUTION INTRAMUSCULAR; INTRAVENOUS ONCE
Refills: 0 | Status: COMPLETED | OUTPATIENT
Start: 2025-08-08 | End: 2025-08-08

## 2025-08-08 RX ORDER — LEVALBUTEROL INHALATION SOLUTION 0.63 MG/3ML
0.63 SOLUTION RESPIRATORY (INHALATION) EVERY 6 HOURS PRN
Status: DISCONTINUED | OUTPATIENT
Start: 2025-08-08 | End: 2025-08-15 | Stop reason: HOSPADM

## 2025-08-08 RX ORDER — POTASSIUM CHLORIDE 1500 MG/1
40 TABLET, EXTENDED RELEASE ORAL PRN
Status: DISCONTINUED | OUTPATIENT
Start: 2025-08-08 | End: 2025-08-15 | Stop reason: HOSPADM

## 2025-08-08 RX ORDER — ONDANSETRON 2 MG/ML
4 INJECTION INTRAMUSCULAR; INTRAVENOUS EVERY 6 HOURS PRN
Status: DISCONTINUED | OUTPATIENT
Start: 2025-08-08 | End: 2025-08-15 | Stop reason: HOSPADM

## 2025-08-08 RX ORDER — GUAIFENESIN 400 MG/1
400 TABLET ORAL 3 TIMES DAILY
Status: DISCONTINUED | OUTPATIENT
Start: 2025-08-08 | End: 2025-08-15 | Stop reason: HOSPADM

## 2025-08-08 RX ADMIN — BUMETANIDE 0.5 MG: 1 TABLET ORAL at 18:24

## 2025-08-08 RX ADMIN — ACETAMINOPHEN 650 MG: 325 TABLET ORAL at 18:26

## 2025-08-08 RX ADMIN — IPRATROPIUM BROMIDE AND ALBUTEROL SULFATE 1 DOSE: .5; 2.5 SOLUTION RESPIRATORY (INHALATION) at 15:30

## 2025-08-08 RX ADMIN — ROSUVASTATIN CALCIUM 10 MG: 10 TABLET, FILM COATED ORAL at 21:22

## 2025-08-08 RX ADMIN — GUAIFENESIN 400 MG: 400 TABLET ORAL at 21:22

## 2025-08-08 RX ADMIN — DIGOXIN 62.5 MCG: 0.12 TABLET ORAL at 18:24

## 2025-08-08 RX ADMIN — Medication 2000 UNITS: at 18:24

## 2025-08-08 RX ADMIN — METHOCARBAMOL TABLETS 500 MG: 500 TABLET, COATED ORAL at 13:50

## 2025-08-08 RX ADMIN — GUAIFENESIN 400 MG: 400 TABLET ORAL at 18:24

## 2025-08-08 RX ADMIN — SACUBITRIL AND VALSARTAN 1 TABLET: 24; 26 TABLET, FILM COATED ORAL at 21:22

## 2025-08-08 RX ADMIN — SODIUM CHLORIDE, PRESERVATIVE FREE 10 ML: 5 INJECTION INTRAVENOUS at 21:22

## 2025-08-08 RX ADMIN — MORPHINE SULFATE 2 MG: 2 INJECTION, SOLUTION INTRAMUSCULAR; INTRAVENOUS at 12:22

## 2025-08-08 RX ADMIN — HYDROCODONE BITARTRATE AND ACETAMINOPHEN 1 TABLET: 5; 325 TABLET ORAL at 13:50

## 2025-08-08 ASSESSMENT — PAIN DESCRIPTION - LOCATION
LOCATION: RIB CAGE

## 2025-08-08 ASSESSMENT — PAIN - FUNCTIONAL ASSESSMENT
PAIN_FUNCTIONAL_ASSESSMENT: PREVENTS OR INTERFERES SOME ACTIVE ACTIVITIES AND ADLS
PAIN_FUNCTIONAL_ASSESSMENT: WONG-BAKER FACES
PAIN_FUNCTIONAL_ASSESSMENT: PREVENTS OR INTERFERES WITH MANY ACTIVE NOT PASSIVE ACTIVITIES
PAIN_FUNCTIONAL_ASSESSMENT: PREVENTS OR INTERFERES SOME ACTIVE ACTIVITIES AND ADLS

## 2025-08-08 ASSESSMENT — PAIN SCALES - GENERAL
PAINLEVEL_OUTOF10: 6
PAINLEVEL_OUTOF10: 6
PAINLEVEL_OUTOF10: 10

## 2025-08-08 ASSESSMENT — PAIN DESCRIPTION - DESCRIPTORS
DESCRIPTORS: ACHING;DISCOMFORT

## 2025-08-08 ASSESSMENT — PAIN SCALES - WONG BAKER: WONGBAKER_NUMERICALRESPONSE: HURTS WHOLE LOT

## 2025-08-08 ASSESSMENT — PAIN DESCRIPTION - ORIENTATION
ORIENTATION: LEFT

## 2025-08-09 LAB
DIGOXIN SERPL-MCNC: 1.5 NG/ML (ref 0.8–2)
INR PPP: 2.7
MICROORGANISM/AGENT SPEC: ABNORMAL
PROTHROMBIN TIME: 29.3 SEC (ref 9.3–12.4)
SPECIMEN DESCRIPTION: ABNORMAL

## 2025-08-09 PROCEDURE — 99232 SBSQ HOSP IP/OBS MODERATE 35: CPT | Performed by: INTERNAL MEDICINE

## 2025-08-09 PROCEDURE — 94640 AIRWAY INHALATION TREATMENT: CPT

## 2025-08-09 PROCEDURE — 93005 ELECTROCARDIOGRAM TRACING: CPT | Performed by: NURSE PRACTITIONER

## 2025-08-09 PROCEDURE — 94669 MECHANICAL CHEST WALL OSCILL: CPT

## 2025-08-09 PROCEDURE — 6370000000 HC RX 637 (ALT 250 FOR IP)

## 2025-08-09 PROCEDURE — 6360000002 HC RX W HCPCS

## 2025-08-09 PROCEDURE — 87070 CULTURE OTHR SPECIMN AEROBIC: CPT

## 2025-08-09 PROCEDURE — 6360000002 HC RX W HCPCS: Performed by: NURSE PRACTITIONER

## 2025-08-09 PROCEDURE — G0378 HOSPITAL OBSERVATION PER HR: HCPCS

## 2025-08-09 PROCEDURE — 85610 PROTHROMBIN TIME: CPT

## 2025-08-09 PROCEDURE — 96376 TX/PRO/DX INJ SAME DRUG ADON: CPT

## 2025-08-09 PROCEDURE — 2500000003 HC RX 250 WO HCPCS: Performed by: INTERNAL MEDICINE

## 2025-08-09 PROCEDURE — 96375 TX/PRO/DX INJ NEW DRUG ADDON: CPT

## 2025-08-09 PROCEDURE — 94667 MNPJ CHEST WALL 1ST: CPT

## 2025-08-09 PROCEDURE — 36415 COLL VENOUS BLD VENIPUNCTURE: CPT

## 2025-08-09 PROCEDURE — 87205 SMEAR GRAM STAIN: CPT

## 2025-08-09 PROCEDURE — 2700000000 HC OXYGEN THERAPY PER DAY

## 2025-08-09 PROCEDURE — 6370000000 HC RX 637 (ALT 250 FOR IP): Performed by: NURSE PRACTITIONER

## 2025-08-09 PROCEDURE — 6370000000 HC RX 637 (ALT 250 FOR IP): Performed by: INTERNAL MEDICINE

## 2025-08-09 PROCEDURE — G0378 HOSPITAL OBSERVATION PER HR: HCPCS | Performed by: STUDENT IN AN ORGANIZED HEALTH CARE EDUCATION/TRAINING PROGRAM

## 2025-08-09 PROCEDURE — 80162 ASSAY OF DIGOXIN TOTAL: CPT

## 2025-08-09 RX ORDER — BUDESONIDE 0.5 MG/2ML
0.5 INHALANT ORAL
Status: DISCONTINUED | OUTPATIENT
Start: 2025-08-09 | End: 2025-08-15 | Stop reason: HOSPADM

## 2025-08-09 RX ORDER — WARFARIN SODIUM 2 MG/1
2 TABLET ORAL
Status: COMPLETED | OUTPATIENT
Start: 2025-08-09 | End: 2025-08-09

## 2025-08-09 RX ORDER — AMIODARONE HYDROCHLORIDE 200 MG/1
100 TABLET ORAL ONCE
Status: COMPLETED | OUTPATIENT
Start: 2025-08-09 | End: 2025-08-09

## 2025-08-09 RX ORDER — LIDOCAINE 4 G/G
1 PATCH TOPICAL DAILY
Status: DISCONTINUED | OUTPATIENT
Start: 2025-08-09 | End: 2025-08-15 | Stop reason: HOSPADM

## 2025-08-09 RX ORDER — ARFORMOTEROL TARTRATE 15 UG/2ML
15 SOLUTION RESPIRATORY (INHALATION)
Status: DISCONTINUED | OUTPATIENT
Start: 2025-08-09 | End: 2025-08-15 | Stop reason: HOSPADM

## 2025-08-09 RX ORDER — DIGOXIN 125 MCG
62.5 TABLET ORAL ONCE
Status: DISCONTINUED | OUTPATIENT
Start: 2025-08-09 | End: 2025-08-15 | Stop reason: HOSPADM

## 2025-08-09 RX ORDER — DIGOXIN 0.25 MG/ML
125 INJECTION INTRAMUSCULAR; INTRAVENOUS ONCE
Status: COMPLETED | OUTPATIENT
Start: 2025-08-09 | End: 2025-08-09

## 2025-08-09 RX ORDER — METHYLPREDNISOLONE SODIUM SUCCINATE 40 MG/ML
40 INJECTION INTRAMUSCULAR; INTRAVENOUS EVERY 8 HOURS
Status: DISCONTINUED | OUTPATIENT
Start: 2025-08-09 | End: 2025-08-09

## 2025-08-09 RX ORDER — LEVALBUTEROL INHALATION SOLUTION 0.63 MG/3ML
0.63 SOLUTION RESPIRATORY (INHALATION)
Status: DISCONTINUED | OUTPATIENT
Start: 2025-08-09 | End: 2025-08-15 | Stop reason: HOSPADM

## 2025-08-09 RX ORDER — AZITHROMYCIN 250 MG/1
250 TABLET, FILM COATED ORAL
Status: DISCONTINUED | OUTPATIENT
Start: 2025-08-11 | End: 2025-08-09

## 2025-08-09 RX ORDER — METHYLPREDNISOLONE SODIUM SUCCINATE 40 MG/ML
40 INJECTION INTRAMUSCULAR; INTRAVENOUS EVERY 8 HOURS
Status: COMPLETED | OUTPATIENT
Start: 2025-08-09 | End: 2025-08-09

## 2025-08-09 RX ORDER — METHYLPREDNISOLONE SODIUM SUCCINATE 40 MG/ML
40 INJECTION INTRAMUSCULAR; INTRAVENOUS EVERY 12 HOURS
Status: DISCONTINUED | OUTPATIENT
Start: 2025-08-10 | End: 2025-08-11

## 2025-08-09 RX ADMIN — ARFORMOTEROL TARTRATE 15 MCG: 15 SOLUTION RESPIRATORY (INHALATION) at 19:46

## 2025-08-09 RX ADMIN — BUDESONIDE 500 MCG: 0.5 SUSPENSION RESPIRATORY (INHALATION) at 19:46

## 2025-08-09 RX ADMIN — SACUBITRIL AND VALSARTAN 1 TABLET: 24; 26 TABLET, FILM COATED ORAL at 08:13

## 2025-08-09 RX ADMIN — SODIUM CHLORIDE, PRESERVATIVE FREE 10 ML: 5 INJECTION INTRAVENOUS at 20:50

## 2025-08-09 RX ADMIN — SACUBITRIL AND VALSARTAN 1 TABLET: 24; 26 TABLET, FILM COATED ORAL at 20:50

## 2025-08-09 RX ADMIN — GUAIFENESIN 400 MG: 400 TABLET ORAL at 08:13

## 2025-08-09 RX ADMIN — AMIODARONE HYDROCHLORIDE 100 MG: 200 TABLET ORAL at 05:03

## 2025-08-09 RX ADMIN — LEVOTHYROXINE SODIUM 75 MCG: 0.07 TABLET ORAL at 08:13

## 2025-08-09 RX ADMIN — PREDNISONE 5 MG: 5 TABLET ORAL at 08:13

## 2025-08-09 RX ADMIN — Medication 2000 UNITS: at 08:13

## 2025-08-09 RX ADMIN — METHYLPREDNISOLONE SODIUM SUCCINATE 40 MG: 40 INJECTION INTRAMUSCULAR; INTRAVENOUS at 11:36

## 2025-08-09 RX ADMIN — DIGOXIN 125 MCG: 0.25 INJECTION INTRAMUSCULAR; INTRAVENOUS at 04:08

## 2025-08-09 RX ADMIN — POLYETHYLENE GLYCOL 3350 17 G: 17 POWDER, FOR SOLUTION ORAL at 20:49

## 2025-08-09 RX ADMIN — METHYLPREDNISOLONE SODIUM SUCCINATE 40 MG: 40 INJECTION INTRAMUSCULAR; INTRAVENOUS at 18:09

## 2025-08-09 RX ADMIN — WARFARIN SODIUM 2 MG: 2 TABLET ORAL at 18:09

## 2025-08-09 RX ADMIN — IPRATROPIUM BROMIDE 0.5 MG: 0.5 SOLUTION RESPIRATORY (INHALATION) at 19:46

## 2025-08-09 RX ADMIN — IPRATROPIUM BROMIDE 0.5 MG: 0.5 SOLUTION RESPIRATORY (INHALATION) at 13:10

## 2025-08-09 RX ADMIN — LEVALBUTEROL 0.63 MG: 0.63 SOLUTION RESPIRATORY (INHALATION) at 13:10

## 2025-08-09 RX ADMIN — BUMETANIDE 0.5 MG: 1 TABLET ORAL at 08:13

## 2025-08-09 RX ADMIN — ROSUVASTATIN CALCIUM 10 MG: 10 TABLET, FILM COATED ORAL at 20:49

## 2025-08-09 RX ADMIN — SODIUM CHLORIDE, PRESERVATIVE FREE 10 ML: 5 INJECTION INTRAVENOUS at 08:14

## 2025-08-09 RX ADMIN — BUDESONIDE 500 MCG: 0.5 SUSPENSION RESPIRATORY (INHALATION) at 09:05

## 2025-08-09 RX ADMIN — LEVALBUTEROL 0.63 MG: 0.63 SOLUTION RESPIRATORY (INHALATION) at 09:05

## 2025-08-09 RX ADMIN — LEVALBUTEROL 0.63 MG: 0.63 SOLUTION RESPIRATORY (INHALATION) at 19:46

## 2025-08-09 RX ADMIN — ARFORMOTEROL TARTRATE 15 MCG: 15 SOLUTION RESPIRATORY (INHALATION) at 09:05

## 2025-08-09 RX ADMIN — GUAIFENESIN 400 MG: 400 TABLET ORAL at 15:29

## 2025-08-09 RX ADMIN — ACETAMINOPHEN 650 MG: 325 TABLET ORAL at 15:30

## 2025-08-09 RX ADMIN — ACETAMINOPHEN 650 MG: 325 TABLET ORAL at 00:32

## 2025-08-09 RX ADMIN — GUAIFENESIN 400 MG: 400 TABLET ORAL at 20:49

## 2025-08-09 RX ADMIN — ACETAMINOPHEN 650 MG: 325 TABLET ORAL at 20:49

## 2025-08-09 RX ADMIN — ACETAMINOPHEN 650 MG: 325 TABLET ORAL at 08:18

## 2025-08-09 RX ADMIN — PANTOPRAZOLE SODIUM 40 MG: 40 TABLET, DELAYED RELEASE ORAL at 08:13

## 2025-08-09 ASSESSMENT — PAIN DESCRIPTION - ORIENTATION
ORIENTATION: LOWER
ORIENTATION: LEFT

## 2025-08-09 ASSESSMENT — PAIN DESCRIPTION - LOCATION
LOCATION: BACK
LOCATION: FLANK
LOCATION: BACK
LOCATION: BACK;FLANK

## 2025-08-09 ASSESSMENT — PAIN - FUNCTIONAL ASSESSMENT
PAIN_FUNCTIONAL_ASSESSMENT: 0-10
PAIN_FUNCTIONAL_ASSESSMENT: PREVENTS OR INTERFERES SOME ACTIVE ACTIVITIES AND ADLS
PAIN_FUNCTIONAL_ASSESSMENT: PREVENTS OR INTERFERES SOME ACTIVE ACTIVITIES AND ADLS
PAIN_FUNCTIONAL_ASSESSMENT: 0-10
PAIN_FUNCTIONAL_ASSESSMENT: 0-10
PAIN_FUNCTIONAL_ASSESSMENT: PREVENTS OR INTERFERES SOME ACTIVE ACTIVITIES AND ADLS

## 2025-08-09 ASSESSMENT — PAIN DESCRIPTION - DESCRIPTORS
DESCRIPTORS: ACHING;DISCOMFORT;SORE
DESCRIPTORS: ACHING
DESCRIPTORS: ACHING;DISCOMFORT
DESCRIPTORS: ACHING

## 2025-08-09 ASSESSMENT — PAIN DESCRIPTION - ONSET: ONSET: GRADUAL

## 2025-08-09 ASSESSMENT — PAIN SCALES - GENERAL
PAINLEVEL_OUTOF10: 6
PAINLEVEL_OUTOF10: 5
PAINLEVEL_OUTOF10: 3
PAINLEVEL_OUTOF10: 4
PAINLEVEL_OUTOF10: 4
PAINLEVEL_OUTOF10: 6

## 2025-08-09 ASSESSMENT — PAIN DESCRIPTION - PAIN TYPE: TYPE: CHRONIC PAIN

## 2025-08-09 ASSESSMENT — PAIN DESCRIPTION - FREQUENCY: FREQUENCY: INTERMITTENT

## 2025-08-10 LAB
ANION GAP SERPL CALCULATED.3IONS-SCNC: 12 MMOL/L (ref 7–16)
BUN SERPL-MCNC: 22 MG/DL (ref 8–23)
CALCIUM SERPL-MCNC: 8.6 MG/DL (ref 8.8–10.2)
CHLORIDE SERPL-SCNC: 98 MMOL/L (ref 98–107)
CO2 SERPL-SCNC: 26 MMOL/L (ref 22–29)
CREAT SERPL-MCNC: 0.8 MG/DL (ref 0.5–1)
ERYTHROCYTE [DISTWIDTH] IN BLOOD BY AUTOMATED COUNT: 14.3 % (ref 11.5–15)
GFR, ESTIMATED: 73 ML/MIN/1.73M2
GLUCOSE SERPL-MCNC: 182 MG/DL (ref 74–99)
HCT VFR BLD AUTO: 31.1 % (ref 34–48)
HGB BLD-MCNC: 9.8 G/DL (ref 11.5–15.5)
INR PPP: 2.1
MCH RBC QN AUTO: 29.7 PG (ref 26–35)
MCHC RBC AUTO-ENTMCNC: 31.5 G/DL (ref 32–34.5)
MCV RBC AUTO: 94.2 FL (ref 80–99.9)
PLATELET CONFIRMATION: NORMAL
PLATELET, FLUORESCENCE: 94 K/UL (ref 130–450)
PMV BLD AUTO: 11.6 FL (ref 7–12)
POTASSIUM SERPL-SCNC: 4.1 MMOL/L (ref 3.5–5.1)
PROTHROMBIN TIME: 22.5 SEC (ref 9.3–12.4)
RBC # BLD AUTO: 3.3 M/UL (ref 3.5–5.5)
SODIUM SERPL-SCNC: 137 MMOL/L (ref 136–145)
T3FREE SERPL-MCNC: 1.23 PG/ML (ref 2–4.4)
TSH SERPL DL<=0.05 MIU/L-ACNC: 0.37 UIU/ML (ref 0.27–4.2)
WBC OTHER # BLD: 5.8 K/UL (ref 4.5–11.5)

## 2025-08-10 PROCEDURE — 6360000002 HC RX W HCPCS

## 2025-08-10 PROCEDURE — 85027 COMPLETE CBC AUTOMATED: CPT

## 2025-08-10 PROCEDURE — G0378 HOSPITAL OBSERVATION PER HR: HCPCS

## 2025-08-10 PROCEDURE — 6370000000 HC RX 637 (ALT 250 FOR IP): Performed by: INTERNAL MEDICINE

## 2025-08-10 PROCEDURE — 99232 SBSQ HOSP IP/OBS MODERATE 35: CPT | Performed by: INTERNAL MEDICINE

## 2025-08-10 PROCEDURE — 2700000000 HC OXYGEN THERAPY PER DAY

## 2025-08-10 PROCEDURE — 96376 TX/PRO/DX INJ SAME DRUG ADON: CPT

## 2025-08-10 PROCEDURE — 94669 MECHANICAL CHEST WALL OSCILL: CPT

## 2025-08-10 PROCEDURE — 2500000003 HC RX 250 WO HCPCS: Performed by: INTERNAL MEDICINE

## 2025-08-10 PROCEDURE — 84481 FREE ASSAY (FT-3): CPT

## 2025-08-10 PROCEDURE — 94640 AIRWAY INHALATION TREATMENT: CPT

## 2025-08-10 PROCEDURE — 6370000000 HC RX 637 (ALT 250 FOR IP)

## 2025-08-10 PROCEDURE — 97530 THERAPEUTIC ACTIVITIES: CPT

## 2025-08-10 PROCEDURE — 84443 ASSAY THYROID STIM HORMONE: CPT

## 2025-08-10 PROCEDURE — 80048 BASIC METABOLIC PNL TOTAL CA: CPT

## 2025-08-10 PROCEDURE — 85610 PROTHROMBIN TIME: CPT

## 2025-08-10 PROCEDURE — 97161 PT EVAL LOW COMPLEX 20 MIN: CPT

## 2025-08-10 RX ORDER — WARFARIN SODIUM 2 MG/1
2 TABLET ORAL
Status: COMPLETED | OUTPATIENT
Start: 2025-08-10 | End: 2025-08-10

## 2025-08-10 RX ORDER — ACETAMINOPHEN 650 MG/1
650 SUPPOSITORY RECTAL EVERY 6 HOURS PRN
Status: DISCONTINUED | OUTPATIENT
Start: 2025-08-10 | End: 2025-08-15 | Stop reason: HOSPADM

## 2025-08-10 RX ORDER — ACETAMINOPHEN 325 MG/1
650 TABLET ORAL EVERY 4 HOURS PRN
Status: DISCONTINUED | OUTPATIENT
Start: 2025-08-10 | End: 2025-08-15 | Stop reason: HOSPADM

## 2025-08-10 RX ADMIN — ACETAMINOPHEN 650 MG: 325 TABLET ORAL at 23:13

## 2025-08-10 RX ADMIN — BUDESONIDE 500 MCG: 0.5 SUSPENSION RESPIRATORY (INHALATION) at 19:24

## 2025-08-10 RX ADMIN — IPRATROPIUM BROMIDE 0.5 MG: 0.5 SOLUTION RESPIRATORY (INHALATION) at 08:41

## 2025-08-10 RX ADMIN — ACETAMINOPHEN 650 MG: 325 TABLET ORAL at 19:00

## 2025-08-10 RX ADMIN — SODIUM CHLORIDE, PRESERVATIVE FREE 10 ML: 5 INJECTION INTRAVENOUS at 07:57

## 2025-08-10 RX ADMIN — LEVALBUTEROL 0.63 MG: 0.63 SOLUTION RESPIRATORY (INHALATION) at 19:24

## 2025-08-10 RX ADMIN — ACETAMINOPHEN 650 MG: 325 TABLET ORAL at 02:27

## 2025-08-10 RX ADMIN — ROSUVASTATIN CALCIUM 10 MG: 10 TABLET, FILM COATED ORAL at 20:49

## 2025-08-10 RX ADMIN — GUAIFENESIN 400 MG: 400 TABLET ORAL at 14:10

## 2025-08-10 RX ADMIN — IPRATROPIUM BROMIDE 0.5 MG: 0.5 SOLUTION RESPIRATORY (INHALATION) at 19:24

## 2025-08-10 RX ADMIN — Medication 2000 UNITS: at 07:56

## 2025-08-10 RX ADMIN — METHYLPREDNISOLONE SODIUM SUCCINATE 40 MG: 40 INJECTION INTRAMUSCULAR; INTRAVENOUS at 07:56

## 2025-08-10 RX ADMIN — ARFORMOTEROL TARTRATE 15 MCG: 15 SOLUTION RESPIRATORY (INHALATION) at 19:24

## 2025-08-10 RX ADMIN — LEVALBUTEROL 0.63 MG: 0.63 SOLUTION RESPIRATORY (INHALATION) at 12:44

## 2025-08-10 RX ADMIN — ARFORMOTEROL TARTRATE 15 MCG: 15 SOLUTION RESPIRATORY (INHALATION) at 08:40

## 2025-08-10 RX ADMIN — BUMETANIDE 0.5 MG: 1 TABLET ORAL at 07:53

## 2025-08-10 RX ADMIN — SACUBITRIL AND VALSARTAN 1 TABLET: 24; 26 TABLET, FILM COATED ORAL at 20:49

## 2025-08-10 RX ADMIN — GUAIFENESIN 400 MG: 400 TABLET ORAL at 20:49

## 2025-08-10 RX ADMIN — GUAIFENESIN 400 MG: 400 TABLET ORAL at 07:53

## 2025-08-10 RX ADMIN — LEVALBUTEROL 0.63 MG: 0.63 SOLUTION RESPIRATORY (INHALATION) at 08:40

## 2025-08-10 RX ADMIN — ACETAMINOPHEN 650 MG: 325 TABLET ORAL at 14:30

## 2025-08-10 RX ADMIN — LEVOTHYROXINE SODIUM 75 MCG: 0.07 TABLET ORAL at 07:56

## 2025-08-10 RX ADMIN — IPRATROPIUM BROMIDE 0.5 MG: 0.5 SOLUTION RESPIRATORY (INHALATION) at 12:44

## 2025-08-10 RX ADMIN — SODIUM CHLORIDE, PRESERVATIVE FREE 10 ML: 5 INJECTION INTRAVENOUS at 20:49

## 2025-08-10 RX ADMIN — ACETAMINOPHEN 650 MG: 325 TABLET ORAL at 09:20

## 2025-08-10 RX ADMIN — BUDESONIDE 500 MCG: 0.5 SUSPENSION RESPIRATORY (INHALATION) at 08:40

## 2025-08-10 RX ADMIN — WARFARIN SODIUM 2 MG: 2 TABLET ORAL at 18:09

## 2025-08-10 RX ADMIN — PANTOPRAZOLE SODIUM 40 MG: 40 TABLET, DELAYED RELEASE ORAL at 07:56

## 2025-08-10 RX ADMIN — SACUBITRIL AND VALSARTAN 1 TABLET: 24; 26 TABLET, FILM COATED ORAL at 07:56

## 2025-08-10 RX ADMIN — METHYLPREDNISOLONE SODIUM SUCCINATE 40 MG: 40 INJECTION INTRAMUSCULAR; INTRAVENOUS at 20:49

## 2025-08-10 ASSESSMENT — PAIN SCALES - GENERAL
PAINLEVEL_OUTOF10: 2
PAINLEVEL_OUTOF10: 6
PAINLEVEL_OUTOF10: 9
PAINLEVEL_OUTOF10: 6

## 2025-08-10 ASSESSMENT — PAIN - FUNCTIONAL ASSESSMENT
PAIN_FUNCTIONAL_ASSESSMENT: 0-10
PAIN_FUNCTIONAL_ASSESSMENT: PREVENTS OR INTERFERES SOME ACTIVE ACTIVITIES AND ADLS
PAIN_FUNCTIONAL_ASSESSMENT: 0-10

## 2025-08-10 ASSESSMENT — PAIN DESCRIPTION - FREQUENCY: FREQUENCY: INTERMITTENT

## 2025-08-10 ASSESSMENT — PAIN DESCRIPTION - LOCATION: LOCATION: BACK;RIB CAGE

## 2025-08-10 ASSESSMENT — PAIN DESCRIPTION - ORIENTATION: ORIENTATION: LEFT

## 2025-08-10 ASSESSMENT — PAIN DESCRIPTION - PAIN TYPE: TYPE: ACUTE PAIN

## 2025-08-10 ASSESSMENT — PAIN DESCRIPTION - DESCRIPTORS: DESCRIPTORS: SORE;TENDER;ACHING

## 2025-08-10 ASSESSMENT — PAIN DESCRIPTION - ONSET: ONSET: GRADUAL

## 2025-08-11 PROBLEM — Z51.5 PALLIATIVE CARE ENCOUNTER: Status: ACTIVE | Noted: 2025-08-11

## 2025-08-11 PROBLEM — Z71.89 GOALS OF CARE, COUNSELING/DISCUSSION: Status: ACTIVE | Noted: 2025-08-11

## 2025-08-11 PROBLEM — R26.2 AMBULATORY DYSFUNCTION: Status: ACTIVE | Noted: 2025-08-11

## 2025-08-11 LAB
ANION GAP SERPL CALCULATED.3IONS-SCNC: 12 MMOL/L (ref 7–16)
BUN SERPL-MCNC: 24 MG/DL (ref 8–23)
CALCIUM SERPL-MCNC: 9 MG/DL (ref 8.8–10.2)
CHLORIDE SERPL-SCNC: 98 MMOL/L (ref 98–107)
CO2 SERPL-SCNC: 28 MMOL/L (ref 22–29)
CREAT SERPL-MCNC: 0.8 MG/DL (ref 0.5–1)
EKG ATRIAL RATE: 147 BPM
EKG Q-T INTERVAL: 322 MS
EKG QRS DURATION: 72 MS
EKG QTC CALCULATION (BAZETT): 501 MS
EKG R AXIS: 46 DEGREES
EKG T AXIS: 215 DEGREES
EKG VENTRICULAR RATE: 146 BPM
ERYTHROCYTE [DISTWIDTH] IN BLOOD BY AUTOMATED COUNT: 14.7 % (ref 11.5–15)
GFR, ESTIMATED: 68 ML/MIN/1.73M2
GLUCOSE SERPL-MCNC: 190 MG/DL (ref 74–99)
HCT VFR BLD AUTO: 30.9 % (ref 34–48)
HGB BLD-MCNC: 10 G/DL (ref 11.5–15.5)
INR PPP: 2.6
MCH RBC QN AUTO: 30.5 PG (ref 26–35)
MCHC RBC AUTO-ENTMCNC: 32.4 G/DL (ref 32–34.5)
MCV RBC AUTO: 94.2 FL (ref 80–99.9)
PLATELET # BLD AUTO: 124 K/UL (ref 130–450)
PMV BLD AUTO: 10.8 FL (ref 7–12)
POTASSIUM SERPL-SCNC: 4.2 MMOL/L (ref 3.5–5.1)
PROTHROMBIN TIME: 27.7 SEC (ref 9.3–12.4)
RBC # BLD AUTO: 3.28 M/UL (ref 3.5–5.5)
SODIUM SERPL-SCNC: 138 MMOL/L (ref 136–145)
WBC OTHER # BLD: 8.1 K/UL (ref 4.5–11.5)

## 2025-08-11 PROCEDURE — 97535 SELF CARE MNGMENT TRAINING: CPT

## 2025-08-11 PROCEDURE — 6370000000 HC RX 637 (ALT 250 FOR IP): Performed by: STUDENT IN AN ORGANIZED HEALTH CARE EDUCATION/TRAINING PROGRAM

## 2025-08-11 PROCEDURE — 6360000002 HC RX W HCPCS: Performed by: STUDENT IN AN ORGANIZED HEALTH CARE EDUCATION/TRAINING PROGRAM

## 2025-08-11 PROCEDURE — 99222 1ST HOSP IP/OBS MODERATE 55: CPT | Performed by: PHYSICIAN ASSISTANT

## 2025-08-11 PROCEDURE — 2700000000 HC OXYGEN THERAPY PER DAY

## 2025-08-11 PROCEDURE — 6360000002 HC RX W HCPCS

## 2025-08-11 PROCEDURE — 2500000003 HC RX 250 WO HCPCS: Performed by: INTERNAL MEDICINE

## 2025-08-11 PROCEDURE — 97165 OT EVAL LOW COMPLEX 30 MIN: CPT

## 2025-08-11 PROCEDURE — 36415 COLL VENOUS BLD VENIPUNCTURE: CPT

## 2025-08-11 PROCEDURE — G0378 HOSPITAL OBSERVATION PER HR: HCPCS

## 2025-08-11 PROCEDURE — 94669 MECHANICAL CHEST WALL OSCILL: CPT

## 2025-08-11 PROCEDURE — 6360000002 HC RX W HCPCS: Performed by: INTERNAL MEDICINE

## 2025-08-11 PROCEDURE — 80048 BASIC METABOLIC PNL TOTAL CA: CPT

## 2025-08-11 PROCEDURE — 96376 TX/PRO/DX INJ SAME DRUG ADON: CPT

## 2025-08-11 PROCEDURE — 6370000000 HC RX 637 (ALT 250 FOR IP)

## 2025-08-11 PROCEDURE — 6370000000 HC RX 637 (ALT 250 FOR IP): Performed by: INTERNAL MEDICINE

## 2025-08-11 PROCEDURE — 85610 PROTHROMBIN TIME: CPT

## 2025-08-11 PROCEDURE — 94640 AIRWAY INHALATION TREATMENT: CPT

## 2025-08-11 PROCEDURE — 99232 SBSQ HOSP IP/OBS MODERATE 35: CPT | Performed by: STUDENT IN AN ORGANIZED HEALTH CARE EDUCATION/TRAINING PROGRAM

## 2025-08-11 PROCEDURE — 85027 COMPLETE CBC AUTOMATED: CPT

## 2025-08-11 PROCEDURE — 1200000000 HC SEMI PRIVATE

## 2025-08-11 RX ORDER — WARFARIN SODIUM 2 MG/1
2 TABLET ORAL
Status: COMPLETED | OUTPATIENT
Start: 2025-08-11 | End: 2025-08-11

## 2025-08-11 RX ORDER — METHYLPREDNISOLONE SODIUM SUCCINATE 40 MG/ML
40 INJECTION INTRAMUSCULAR; INTRAVENOUS EVERY 8 HOURS SCHEDULED
Status: DISCONTINUED | OUTPATIENT
Start: 2025-08-11 | End: 2025-08-14

## 2025-08-11 RX ORDER — AMIODARONE HYDROCHLORIDE 200 MG/1
200 TABLET ORAL
Status: DISCONTINUED | OUTPATIENT
Start: 2025-08-11 | End: 2025-08-13

## 2025-08-11 RX ORDER — PREDNISONE 10 MG/1
TABLET ORAL
Qty: 30 TABLET | Status: ON HOLD | DISCHARGE
Start: 2025-08-11

## 2025-08-11 RX ORDER — DILTIAZEM HYDROCHLORIDE 5 MG/ML
5 INJECTION INTRAVENOUS ONCE
Status: COMPLETED | OUTPATIENT
Start: 2025-08-11 | End: 2025-08-11

## 2025-08-11 RX ADMIN — ACETAMINOPHEN 650 MG: 325 TABLET ORAL at 17:57

## 2025-08-11 RX ADMIN — GUAIFENESIN 400 MG: 400 TABLET ORAL at 20:45

## 2025-08-11 RX ADMIN — METHYLPREDNISOLONE SODIUM SUCCINATE 40 MG: 40 INJECTION INTRAMUSCULAR; INTRAVENOUS at 20:45

## 2025-08-11 RX ADMIN — DIGOXIN 62.5 MCG: 0.12 TABLET ORAL at 09:13

## 2025-08-11 RX ADMIN — LEVALBUTEROL 0.63 MG: 0.63 SOLUTION RESPIRATORY (INHALATION) at 12:28

## 2025-08-11 RX ADMIN — GUAIFENESIN 400 MG: 400 TABLET ORAL at 09:11

## 2025-08-11 RX ADMIN — PANTOPRAZOLE SODIUM 40 MG: 40 TABLET, DELAYED RELEASE ORAL at 06:06

## 2025-08-11 RX ADMIN — SACUBITRIL AND VALSARTAN 1 TABLET: 24; 26 TABLET, FILM COATED ORAL at 21:10

## 2025-08-11 RX ADMIN — BUMETANIDE 0.5 MG: 1 TABLET ORAL at 09:13

## 2025-08-11 RX ADMIN — ARFORMOTEROL TARTRATE 15 MCG: 15 SOLUTION RESPIRATORY (INHALATION) at 19:50

## 2025-08-11 RX ADMIN — WARFARIN SODIUM 2 MG: 2 TABLET ORAL at 17:57

## 2025-08-11 RX ADMIN — ARFORMOTEROL TARTRATE 15 MCG: 15 SOLUTION RESPIRATORY (INHALATION) at 07:34

## 2025-08-11 RX ADMIN — ACETAMINOPHEN 650 MG: 325 TABLET ORAL at 13:13

## 2025-08-11 RX ADMIN — IPRATROPIUM BROMIDE 0.5 MG: 0.5 SOLUTION RESPIRATORY (INHALATION) at 19:50

## 2025-08-11 RX ADMIN — METHYLPREDNISOLONE SODIUM SUCCINATE 40 MG: 40 INJECTION INTRAMUSCULAR; INTRAVENOUS at 09:12

## 2025-08-11 RX ADMIN — SODIUM CHLORIDE, PRESERVATIVE FREE 10 ML: 5 INJECTION INTRAVENOUS at 21:10

## 2025-08-11 RX ADMIN — AMIODARONE HYDROCHLORIDE 200 MG: 200 TABLET ORAL at 17:57

## 2025-08-11 RX ADMIN — LEVALBUTEROL 0.63 MG: 0.63 SOLUTION RESPIRATORY (INHALATION) at 07:34

## 2025-08-11 RX ADMIN — IPRATROPIUM BROMIDE 0.5 MG: 0.5 SOLUTION RESPIRATORY (INHALATION) at 07:34

## 2025-08-11 RX ADMIN — ACETAMINOPHEN 650 MG: 325 TABLET ORAL at 09:11

## 2025-08-11 RX ADMIN — BUDESONIDE 500 MCG: 0.5 SUSPENSION RESPIRATORY (INHALATION) at 07:34

## 2025-08-11 RX ADMIN — ACETAMINOPHEN 650 MG: 325 TABLET ORAL at 03:23

## 2025-08-11 RX ADMIN — ACETAMINOPHEN 650 MG: 325 TABLET ORAL at 22:00

## 2025-08-11 RX ADMIN — GUAIFENESIN 400 MG: 400 TABLET ORAL at 13:14

## 2025-08-11 RX ADMIN — METHYLPREDNISOLONE SODIUM SUCCINATE 40 MG: 40 INJECTION INTRAMUSCULAR; INTRAVENOUS at 15:16

## 2025-08-11 RX ADMIN — SACUBITRIL AND VALSARTAN 1 TABLET: 24; 26 TABLET, FILM COATED ORAL at 09:13

## 2025-08-11 RX ADMIN — SODIUM CHLORIDE, PRESERVATIVE FREE 10 ML: 5 INJECTION INTRAVENOUS at 09:13

## 2025-08-11 RX ADMIN — LEVOTHYROXINE SODIUM 75 MCG: 0.07 TABLET ORAL at 06:07

## 2025-08-11 RX ADMIN — BUDESONIDE 500 MCG: 0.5 SUSPENSION RESPIRATORY (INHALATION) at 19:50

## 2025-08-11 RX ADMIN — DILTIAZEM HYDROCHLORIDE 5 MG: 5 INJECTION, SOLUTION INTRAVENOUS at 20:38

## 2025-08-11 RX ADMIN — LEVALBUTEROL 0.63 MG: 0.63 SOLUTION RESPIRATORY (INHALATION) at 19:50

## 2025-08-11 RX ADMIN — Medication 2000 UNITS: at 09:11

## 2025-08-11 RX ADMIN — ROSUVASTATIN CALCIUM 10 MG: 10 TABLET, FILM COATED ORAL at 20:45

## 2025-08-11 RX ADMIN — IPRATROPIUM BROMIDE 0.5 MG: 0.5 SOLUTION RESPIRATORY (INHALATION) at 12:29

## 2025-08-11 ASSESSMENT — PAIN SCALES - GENERAL
PAINLEVEL_OUTOF10: 3
PAINLEVEL_OUTOF10: 6
PAINLEVEL_OUTOF10: 0

## 2025-08-11 ASSESSMENT — PAIN DESCRIPTION - DESCRIPTORS
DESCRIPTORS: ACHING;DISCOMFORT;DULL;SORE
DESCRIPTORS: ACHING;CRAMPING;DISCOMFORT

## 2025-08-11 ASSESSMENT — PAIN - FUNCTIONAL ASSESSMENT
PAIN_FUNCTIONAL_ASSESSMENT: 0-10
PAIN_FUNCTIONAL_ASSESSMENT: 0-10
PAIN_FUNCTIONAL_ASSESSMENT: ACTIVITIES ARE NOT PREVENTED
PAIN_FUNCTIONAL_ASSESSMENT: 0-10

## 2025-08-11 ASSESSMENT — PAIN DESCRIPTION - LOCATION: LOCATION: GENERALIZED

## 2025-08-11 ASSESSMENT — PAIN DESCRIPTION - ORIENTATION: ORIENTATION: RIGHT;LEFT;POSTERIOR

## 2025-08-12 LAB
ANION GAP SERPL CALCULATED.3IONS-SCNC: 11 MMOL/L (ref 7–16)
BASOPHILS # BLD: 0 K/UL (ref 0–0.2)
BASOPHILS NFR BLD: 0 % (ref 0–2)
BUN SERPL-MCNC: 29 MG/DL (ref 8–23)
CALCIUM SERPL-MCNC: 9.3 MG/DL (ref 8.8–10.2)
CHLORIDE SERPL-SCNC: 97 MMOL/L (ref 98–107)
CO2 SERPL-SCNC: 29 MMOL/L (ref 22–29)
CREAT SERPL-MCNC: 0.9 MG/DL (ref 0.5–1)
EOSINOPHIL # BLD: 0 K/UL (ref 0.05–0.5)
EOSINOPHILS RELATIVE PERCENT: 0 % (ref 0–6)
ERYTHROCYTE [DISTWIDTH] IN BLOOD BY AUTOMATED COUNT: 14.8 % (ref 11.5–15)
GFR, ESTIMATED: 60 ML/MIN/1.73M2
GLUCOSE SERPL-MCNC: 166 MG/DL (ref 74–99)
HCT VFR BLD AUTO: 30.6 % (ref 34–48)
HGB BLD-MCNC: 9.9 G/DL (ref 11.5–15.5)
IMM GRANULOCYTES # BLD AUTO: 0.04 K/UL (ref 0–0.58)
IMM GRANULOCYTES NFR BLD: 1 % (ref 0–5)
INR PPP: 3.1
LYMPHOCYTES NFR BLD: 0.42 K/UL (ref 1.5–4)
LYMPHOCYTES RELATIVE PERCENT: 6 % (ref 20–42)
MCH RBC QN AUTO: 30.2 PG (ref 26–35)
MCHC RBC AUTO-ENTMCNC: 32.4 G/DL (ref 32–34.5)
MCV RBC AUTO: 93.3 FL (ref 80–99.9)
MONOCYTES NFR BLD: 0.22 K/UL (ref 0.1–0.95)
MONOCYTES NFR BLD: 3 % (ref 2–12)
NEUTROPHILS NFR BLD: 91 % (ref 43–80)
NEUTS SEG NFR BLD: 6.63 K/UL (ref 1.8–7.3)
PLATELET # BLD AUTO: 144 K/UL (ref 130–450)
PMV BLD AUTO: 10.9 FL (ref 7–12)
POTASSIUM SERPL-SCNC: 4.3 MMOL/L (ref 3.5–5.1)
PROTHROMBIN TIME: 33.5 SEC (ref 9.3–12.4)
RBC # BLD AUTO: 3.28 M/UL (ref 3.5–5.5)
RBC # BLD: ABNORMAL 10*6/UL
SODIUM SERPL-SCNC: 138 MMOL/L (ref 136–145)
WBC OTHER # BLD: 7.3 K/UL (ref 4.5–11.5)

## 2025-08-12 PROCEDURE — 94640 AIRWAY INHALATION TREATMENT: CPT

## 2025-08-12 PROCEDURE — 94669 MECHANICAL CHEST WALL OSCILL: CPT

## 2025-08-12 PROCEDURE — 85025 COMPLETE CBC W/AUTO DIFF WBC: CPT

## 2025-08-12 PROCEDURE — 6370000000 HC RX 637 (ALT 250 FOR IP): Performed by: INTERNAL MEDICINE

## 2025-08-12 PROCEDURE — 1200000000 HC SEMI PRIVATE

## 2025-08-12 PROCEDURE — 85610 PROTHROMBIN TIME: CPT

## 2025-08-12 PROCEDURE — 6360000002 HC RX W HCPCS: Performed by: INTERNAL MEDICINE

## 2025-08-12 PROCEDURE — 80048 BASIC METABOLIC PNL TOTAL CA: CPT

## 2025-08-12 PROCEDURE — 6360000002 HC RX W HCPCS

## 2025-08-12 PROCEDURE — 2700000000 HC OXYGEN THERAPY PER DAY

## 2025-08-12 PROCEDURE — 99233 SBSQ HOSP IP/OBS HIGH 50: CPT | Performed by: STUDENT IN AN ORGANIZED HEALTH CARE EDUCATION/TRAINING PROGRAM

## 2025-08-12 PROCEDURE — 6370000000 HC RX 637 (ALT 250 FOR IP)

## 2025-08-12 PROCEDURE — 2500000003 HC RX 250 WO HCPCS: Performed by: INTERNAL MEDICINE

## 2025-08-12 PROCEDURE — 6360000002 HC RX W HCPCS: Performed by: STUDENT IN AN ORGANIZED HEALTH CARE EDUCATION/TRAINING PROGRAM

## 2025-08-12 RX ORDER — AMIODARONE HYDROCHLORIDE 200 MG/1
100 TABLET ORAL ONCE
Status: COMPLETED | OUTPATIENT
Start: 2025-08-12 | End: 2025-08-12

## 2025-08-12 RX ORDER — WARFARIN SODIUM 2 MG/1
2 TABLET ORAL
Status: CANCELLED | OUTPATIENT
Start: 2025-08-12 | End: 2025-08-12

## 2025-08-12 RX ORDER — LEVOTHYROXINE SODIUM 50 UG/1
50 TABLET ORAL DAILY
Status: DISCONTINUED | OUTPATIENT
Start: 2025-08-13 | End: 2025-08-15 | Stop reason: HOSPADM

## 2025-08-12 RX ADMIN — SODIUM CHLORIDE, PRESERVATIVE FREE 10 ML: 5 INJECTION INTRAVENOUS at 08:14

## 2025-08-12 RX ADMIN — ACETAMINOPHEN 650 MG: 325 TABLET ORAL at 08:13

## 2025-08-12 RX ADMIN — LEVALBUTEROL 0.63 MG: 0.63 SOLUTION RESPIRATORY (INHALATION) at 08:51

## 2025-08-12 RX ADMIN — SACUBITRIL AND VALSARTAN 1 TABLET: 24; 26 TABLET, FILM COATED ORAL at 08:14

## 2025-08-12 RX ADMIN — LEVALBUTEROL 0.63 MG: 0.63 SOLUTION RESPIRATORY (INHALATION) at 19:45

## 2025-08-12 RX ADMIN — GUAIFENESIN 400 MG: 400 TABLET ORAL at 08:14

## 2025-08-12 RX ADMIN — BUMETANIDE 0.5 MG: 1 TABLET ORAL at 08:13

## 2025-08-12 RX ADMIN — ACETAMINOPHEN 650 MG: 325 TABLET ORAL at 17:11

## 2025-08-12 RX ADMIN — SODIUM CHLORIDE, PRESERVATIVE FREE 10 ML: 5 INJECTION INTRAVENOUS at 21:36

## 2025-08-12 RX ADMIN — ACETAMINOPHEN 650 MG: 325 TABLET ORAL at 12:23

## 2025-08-12 RX ADMIN — METHYLPREDNISOLONE SODIUM SUCCINATE 40 MG: 40 INJECTION INTRAMUSCULAR; INTRAVENOUS at 21:36

## 2025-08-12 RX ADMIN — METHYLPREDNISOLONE SODIUM SUCCINATE 40 MG: 40 INJECTION INTRAMUSCULAR; INTRAVENOUS at 13:46

## 2025-08-12 RX ADMIN — LEVOTHYROXINE SODIUM 75 MCG: 0.07 TABLET ORAL at 08:14

## 2025-08-12 RX ADMIN — IPRATROPIUM BROMIDE 0.5 MG: 0.5 SOLUTION RESPIRATORY (INHALATION) at 19:44

## 2025-08-12 RX ADMIN — ACETAMINOPHEN 650 MG: 325 TABLET ORAL at 01:52

## 2025-08-12 RX ADMIN — ARFORMOTEROL TARTRATE 15 MCG: 15 SOLUTION RESPIRATORY (INHALATION) at 19:45

## 2025-08-12 RX ADMIN — ROSUVASTATIN CALCIUM 10 MG: 10 TABLET, FILM COATED ORAL at 21:36

## 2025-08-12 RX ADMIN — ACETAMINOPHEN 650 MG: 325 TABLET ORAL at 21:36

## 2025-08-12 RX ADMIN — IPRATROPIUM BROMIDE 0.5 MG: 0.5 SOLUTION RESPIRATORY (INHALATION) at 12:48

## 2025-08-12 RX ADMIN — BUDESONIDE 500 MCG: 0.5 SUSPENSION RESPIRATORY (INHALATION) at 19:44

## 2025-08-12 RX ADMIN — PANTOPRAZOLE SODIUM 40 MG: 40 TABLET, DELAYED RELEASE ORAL at 08:14

## 2025-08-12 RX ADMIN — GUAIFENESIN 400 MG: 400 TABLET ORAL at 21:38

## 2025-08-12 RX ADMIN — LEVALBUTEROL 0.63 MG: 0.63 SOLUTION RESPIRATORY (INHALATION) at 15:53

## 2025-08-12 RX ADMIN — BUDESONIDE 500 MCG: 0.5 SUSPENSION RESPIRATORY (INHALATION) at 08:51

## 2025-08-12 RX ADMIN — LEVALBUTEROL 0.63 MG: 0.63 SOLUTION RESPIRATORY (INHALATION) at 12:48

## 2025-08-12 RX ADMIN — ARFORMOTEROL TARTRATE 15 MCG: 15 SOLUTION RESPIRATORY (INHALATION) at 08:51

## 2025-08-12 RX ADMIN — SACUBITRIL AND VALSARTAN 1 TABLET: 24; 26 TABLET, FILM COATED ORAL at 21:54

## 2025-08-12 RX ADMIN — Medication 2000 UNITS: at 08:14

## 2025-08-12 RX ADMIN — GUAIFENESIN 400 MG: 400 TABLET ORAL at 13:46

## 2025-08-12 RX ADMIN — METHYLPREDNISOLONE SODIUM SUCCINATE 40 MG: 40 INJECTION INTRAMUSCULAR; INTRAVENOUS at 06:17

## 2025-08-12 RX ADMIN — AMIODARONE HYDROCHLORIDE 100 MG: 200 TABLET ORAL at 13:46

## 2025-08-12 ASSESSMENT — PAIN DESCRIPTION - ORIENTATION
ORIENTATION: LEFT
ORIENTATION: LEFT

## 2025-08-12 ASSESSMENT — PAIN SCALES - GENERAL
PAINLEVEL_OUTOF10: 2
PAINLEVEL_OUTOF10: 3
PAINLEVEL_OUTOF10: 0
PAINLEVEL_OUTOF10: 6
PAINLEVEL_OUTOF10: 0

## 2025-08-12 ASSESSMENT — PAIN DESCRIPTION - DESCRIPTORS
DESCRIPTORS: TENDER;SORE
DESCRIPTORS: TENDER;ACHING;SORE

## 2025-08-12 ASSESSMENT — PAIN - FUNCTIONAL ASSESSMENT
PAIN_FUNCTIONAL_ASSESSMENT: 0-10

## 2025-08-12 ASSESSMENT — PAIN DESCRIPTION - LOCATION
LOCATION: RIB CAGE
LOCATION: RIB CAGE

## 2025-08-13 ENCOUNTER — APPOINTMENT (OUTPATIENT)
Dept: GENERAL RADIOLOGY | Age: 85
DRG: 206 | End: 2025-08-13
Payer: MEDICARE

## 2025-08-13 LAB
ANION GAP SERPL CALCULATED.3IONS-SCNC: 12 MMOL/L (ref 7–16)
BASOPHILS # BLD: 0 K/UL (ref 0–0.2)
BASOPHILS NFR BLD: 0 % (ref 0–2)
BUN SERPL-MCNC: 30 MG/DL (ref 8–23)
CALCIUM SERPL-MCNC: 9 MG/DL (ref 8.8–10.2)
CHLORIDE SERPL-SCNC: 97 MMOL/L (ref 98–107)
CO2 SERPL-SCNC: 28 MMOL/L (ref 22–29)
CREAT SERPL-MCNC: 0.9 MG/DL (ref 0.5–1)
EOSINOPHIL # BLD: 0 K/UL (ref 0.05–0.5)
EOSINOPHILS RELATIVE PERCENT: 0 % (ref 0–6)
ERYTHROCYTE [DISTWIDTH] IN BLOOD BY AUTOMATED COUNT: 14.9 % (ref 11.5–15)
GFR, ESTIMATED: 66 ML/MIN/1.73M2
GLUCOSE SERPL-MCNC: 182 MG/DL (ref 74–99)
HCT VFR BLD AUTO: 30.6 % (ref 34–48)
HGB BLD-MCNC: 10 G/DL (ref 11.5–15.5)
IMM GRANULOCYTES # BLD AUTO: 0.07 K/UL (ref 0–0.58)
IMM GRANULOCYTES NFR BLD: 1 % (ref 0–5)
INR PPP: 3.3
LYMPHOCYTES NFR BLD: 0.39 K/UL (ref 1.5–4)
LYMPHOCYTES RELATIVE PERCENT: 6 % (ref 20–42)
MCH RBC QN AUTO: 30.9 PG (ref 26–35)
MCHC RBC AUTO-ENTMCNC: 32.7 G/DL (ref 32–34.5)
MCV RBC AUTO: 94.4 FL (ref 80–99.9)
MONOCYTES NFR BLD: 0.22 K/UL (ref 0.1–0.95)
MONOCYTES NFR BLD: 3 % (ref 2–12)
NEUTROPHILS NFR BLD: 90 % (ref 43–80)
NEUTS SEG NFR BLD: 6.24 K/UL (ref 1.8–7.3)
PLATELET # BLD AUTO: 126 K/UL (ref 130–450)
PMV BLD AUTO: 10.3 FL (ref 7–12)
POTASSIUM SERPL-SCNC: 4.5 MMOL/L (ref 3.5–5.1)
PROTHROMBIN TIME: 34.9 SEC (ref 9.3–12.4)
RBC # BLD AUTO: 3.24 M/UL (ref 3.5–5.5)
RBC # BLD: ABNORMAL 10*6/UL
SODIUM SERPL-SCNC: 136 MMOL/L (ref 136–145)
WBC OTHER # BLD: 6.9 K/UL (ref 4.5–11.5)

## 2025-08-13 PROCEDURE — 99497 ADVNCD CARE PLAN 30 MIN: CPT | Performed by: NURSE PRACTITIONER

## 2025-08-13 PROCEDURE — 85610 PROTHROMBIN TIME: CPT

## 2025-08-13 PROCEDURE — 6360000002 HC RX W HCPCS: Performed by: NURSE PRACTITIONER

## 2025-08-13 PROCEDURE — 87205 SMEAR GRAM STAIN: CPT

## 2025-08-13 PROCEDURE — 6370000000 HC RX 637 (ALT 250 FOR IP): Performed by: INTERNAL MEDICINE

## 2025-08-13 PROCEDURE — 6360000002 HC RX W HCPCS: Performed by: STUDENT IN AN ORGANIZED HEALTH CARE EDUCATION/TRAINING PROGRAM

## 2025-08-13 PROCEDURE — 94640 AIRWAY INHALATION TREATMENT: CPT

## 2025-08-13 PROCEDURE — 71045 X-RAY EXAM CHEST 1 VIEW: CPT

## 2025-08-13 PROCEDURE — 1200000000 HC SEMI PRIVATE

## 2025-08-13 PROCEDURE — 80048 BASIC METABOLIC PNL TOTAL CA: CPT

## 2025-08-13 PROCEDURE — 97530 THERAPEUTIC ACTIVITIES: CPT

## 2025-08-13 PROCEDURE — 99233 SBSQ HOSP IP/OBS HIGH 50: CPT | Performed by: STUDENT IN AN ORGANIZED HEALTH CARE EDUCATION/TRAINING PROGRAM

## 2025-08-13 PROCEDURE — 87077 CULTURE AEROBIC IDENTIFY: CPT

## 2025-08-13 PROCEDURE — 85025 COMPLETE CBC W/AUTO DIFF WBC: CPT

## 2025-08-13 PROCEDURE — 97535 SELF CARE MNGMENT TRAINING: CPT

## 2025-08-13 PROCEDURE — 87070 CULTURE OTHR SPECIMN AEROBIC: CPT

## 2025-08-13 PROCEDURE — 94669 MECHANICAL CHEST WALL OSCILL: CPT

## 2025-08-13 PROCEDURE — 6370000000 HC RX 637 (ALT 250 FOR IP)

## 2025-08-13 PROCEDURE — 2700000000 HC OXYGEN THERAPY PER DAY

## 2025-08-13 PROCEDURE — 6360000002 HC RX W HCPCS

## 2025-08-13 PROCEDURE — 2500000003 HC RX 250 WO HCPCS: Performed by: INTERNAL MEDICINE

## 2025-08-13 RX ORDER — SODIUM CHLORIDE FOR INHALATION 3 %
4 VIAL, NEBULIZER (ML) INHALATION EVERY 12 HOURS PRN
Status: DISCONTINUED | OUTPATIENT
Start: 2025-08-13 | End: 2025-08-15 | Stop reason: HOSPADM

## 2025-08-13 RX ORDER — MAGNESIUM SULFATE IN WATER 40 MG/ML
2000 INJECTION, SOLUTION INTRAVENOUS ONCE
Status: COMPLETED | OUTPATIENT
Start: 2025-08-13 | End: 2025-08-13

## 2025-08-13 RX ORDER — AMIODARONE HYDROCHLORIDE 200 MG/1
200 TABLET ORAL
Status: DISCONTINUED | OUTPATIENT
Start: 2025-08-13 | End: 2025-08-13

## 2025-08-13 RX ORDER — AMIODARONE HYDROCHLORIDE 200 MG/1
200 TABLET ORAL
Status: DISCONTINUED | OUTPATIENT
Start: 2025-08-13 | End: 2025-08-15 | Stop reason: HOSPADM

## 2025-08-13 RX ADMIN — SACUBITRIL AND VALSARTAN 1 TABLET: 24; 26 TABLET, FILM COATED ORAL at 21:11

## 2025-08-13 RX ADMIN — AMIODARONE HYDROCHLORIDE 200 MG: 200 TABLET ORAL at 10:28

## 2025-08-13 RX ADMIN — LEVALBUTEROL 0.63 MG: 0.63 SOLUTION RESPIRATORY (INHALATION) at 19:55

## 2025-08-13 RX ADMIN — PANTOPRAZOLE SODIUM 40 MG: 40 TABLET, DELAYED RELEASE ORAL at 08:16

## 2025-08-13 RX ADMIN — ACETAMINOPHEN 650 MG: 325 TABLET ORAL at 17:47

## 2025-08-13 RX ADMIN — BUDESONIDE 500 MCG: 0.5 SUSPENSION RESPIRATORY (INHALATION) at 08:43

## 2025-08-13 RX ADMIN — ACETAMINOPHEN 650 MG: 325 TABLET ORAL at 21:47

## 2025-08-13 RX ADMIN — METHYLPREDNISOLONE SODIUM SUCCINATE 40 MG: 40 INJECTION INTRAMUSCULAR; INTRAVENOUS at 05:19

## 2025-08-13 RX ADMIN — GUAIFENESIN 400 MG: 400 TABLET ORAL at 08:16

## 2025-08-13 RX ADMIN — IPRATROPIUM BROMIDE 0.5 MG: 0.5 SOLUTION RESPIRATORY (INHALATION) at 19:55

## 2025-08-13 RX ADMIN — ARFORMOTEROL TARTRATE 15 MCG: 15 SOLUTION RESPIRATORY (INHALATION) at 08:43

## 2025-08-13 RX ADMIN — LEVALBUTEROL 0.63 MG: 0.63 SOLUTION RESPIRATORY (INHALATION) at 13:56

## 2025-08-13 RX ADMIN — SACUBITRIL AND VALSARTAN 1 TABLET: 24; 26 TABLET, FILM COATED ORAL at 08:16

## 2025-08-13 RX ADMIN — SODIUM CHLORIDE, PRESERVATIVE FREE 10 ML: 5 INJECTION INTRAVENOUS at 21:12

## 2025-08-13 RX ADMIN — ARFORMOTEROL TARTRATE 15 MCG: 15 SOLUTION RESPIRATORY (INHALATION) at 19:55

## 2025-08-13 RX ADMIN — MAGNESIUM SULFATE HEPTAHYDRATE 2000 MG: 40 INJECTION, SOLUTION INTRAVENOUS at 16:16

## 2025-08-13 RX ADMIN — BUMETANIDE 0.5 MG: 1 TABLET ORAL at 08:16

## 2025-08-13 RX ADMIN — IPRATROPIUM BROMIDE 0.5 MG: 0.5 SOLUTION RESPIRATORY (INHALATION) at 08:43

## 2025-08-13 RX ADMIN — ACETAMINOPHEN 650 MG: 325 TABLET ORAL at 08:24

## 2025-08-13 RX ADMIN — BUDESONIDE 500 MCG: 0.5 SUSPENSION RESPIRATORY (INHALATION) at 19:55

## 2025-08-13 RX ADMIN — METHYLPREDNISOLONE SODIUM SUCCINATE 40 MG: 40 INJECTION INTRAMUSCULAR; INTRAVENOUS at 21:12

## 2025-08-13 RX ADMIN — IPRATROPIUM BROMIDE 0.5 MG: 0.5 SOLUTION RESPIRATORY (INHALATION) at 13:56

## 2025-08-13 RX ADMIN — Medication 2000 UNITS: at 08:16

## 2025-08-13 RX ADMIN — ROSUVASTATIN CALCIUM 10 MG: 10 TABLET, FILM COATED ORAL at 21:12

## 2025-08-13 RX ADMIN — ACETAMINOPHEN 650 MG: 325 TABLET ORAL at 03:07

## 2025-08-13 RX ADMIN — LEVOTHYROXINE SODIUM 50 MCG: 0.05 TABLET ORAL at 08:16

## 2025-08-13 RX ADMIN — LEVALBUTEROL 0.63 MG: 0.63 SOLUTION RESPIRATORY (INHALATION) at 08:43

## 2025-08-13 RX ADMIN — GUAIFENESIN 400 MG: 400 TABLET ORAL at 13:18

## 2025-08-13 RX ADMIN — GUAIFENESIN 400 MG: 400 TABLET ORAL at 21:12

## 2025-08-13 RX ADMIN — ACETAMINOPHEN 650 MG: 325 TABLET ORAL at 13:18

## 2025-08-13 RX ADMIN — SODIUM CHLORIDE, PRESERVATIVE FREE 10 ML: 5 INJECTION INTRAVENOUS at 08:24

## 2025-08-13 RX ADMIN — METHYLPREDNISOLONE SODIUM SUCCINATE 40 MG: 40 INJECTION INTRAMUSCULAR; INTRAVENOUS at 13:19

## 2025-08-13 RX ADMIN — DIGOXIN 62.5 MCG: 0.12 TABLET ORAL at 08:16

## 2025-08-13 ASSESSMENT — PAIN SCALES - GENERAL
PAINLEVEL_OUTOF10: 0
PAINLEVEL_OUTOF10: 0
PAINLEVEL_OUTOF10: 2
PAINLEVEL_OUTOF10: 6
PAINLEVEL_OUTOF10: 2
PAINLEVEL_OUTOF10: 3
PAINLEVEL_OUTOF10: 0
PAINLEVEL_OUTOF10: 0

## 2025-08-13 ASSESSMENT — PAIN - FUNCTIONAL ASSESSMENT
PAIN_FUNCTIONAL_ASSESSMENT: PREVENTS OR INTERFERES SOME ACTIVE ACTIVITIES AND ADLS
PAIN_FUNCTIONAL_ASSESSMENT: ACTIVITIES ARE NOT PREVENTED
PAIN_FUNCTIONAL_ASSESSMENT: 0-10
PAIN_FUNCTIONAL_ASSESSMENT: 0-10
PAIN_FUNCTIONAL_ASSESSMENT: PREVENTS OR INTERFERES SOME ACTIVE ACTIVITIES AND ADLS
PAIN_FUNCTIONAL_ASSESSMENT: 0-10
PAIN_FUNCTIONAL_ASSESSMENT: ACTIVITIES ARE NOT PREVENTED
PAIN_FUNCTIONAL_ASSESSMENT: 0-10
PAIN_FUNCTIONAL_ASSESSMENT: 0-10
PAIN_FUNCTIONAL_ASSESSMENT: ACTIVITIES ARE NOT PREVENTED
PAIN_FUNCTIONAL_ASSESSMENT: 0-10

## 2025-08-13 ASSESSMENT — PAIN DESCRIPTION - DESCRIPTORS
DESCRIPTORS: ACHING;DISCOMFORT;DULL;SORE
DESCRIPTORS: ACHING;DISCOMFORT
DESCRIPTORS: ACHING;DULL;SORE
DESCRIPTORS: ACHING;DISCOMFORT
DESCRIPTORS: ACHING;DISCOMFORT

## 2025-08-13 ASSESSMENT — PAIN DESCRIPTION - LOCATION
LOCATION: FLANK
LOCATION: FLANK
LOCATION: GENERALIZED
LOCATION: GENERALIZED
LOCATION: FLANK

## 2025-08-13 ASSESSMENT — PAIN DESCRIPTION - ORIENTATION
ORIENTATION: LEFT
ORIENTATION: LEFT

## 2025-08-14 LAB
ANION GAP SERPL CALCULATED.3IONS-SCNC: 14 MMOL/L (ref 7–16)
BASOPHILS # BLD: 0 K/UL (ref 0–0.2)
BASOPHILS NFR BLD: 0 % (ref 0–2)
BUN SERPL-MCNC: 37 MG/DL (ref 8–23)
CALCIUM SERPL-MCNC: 8.9 MG/DL (ref 8.8–10.2)
CHLORIDE SERPL-SCNC: 98 MMOL/L (ref 98–107)
CO2 SERPL-SCNC: 29 MMOL/L (ref 22–29)
CREAT SERPL-MCNC: 1 MG/DL (ref 0.5–1)
EOSINOPHIL # BLD: 0 K/UL (ref 0.05–0.5)
EOSINOPHILS RELATIVE PERCENT: 0 % (ref 0–6)
ERYTHROCYTE [DISTWIDTH] IN BLOOD BY AUTOMATED COUNT: 14.9 % (ref 11.5–15)
GFR, ESTIMATED: 52 ML/MIN/1.73M2
GLUCOSE SERPL-MCNC: 220 MG/DL (ref 74–99)
HCT VFR BLD AUTO: 33.1 % (ref 34–48)
HGB BLD-MCNC: 10.5 G/DL (ref 11.5–15.5)
IMM GRANULOCYTES # BLD AUTO: 0.05 K/UL (ref 0–0.58)
IMM GRANULOCYTES NFR BLD: 1 % (ref 0–5)
INR PPP: 2.5
LYMPHOCYTES NFR BLD: 0.32 K/UL (ref 1.5–4)
LYMPHOCYTES RELATIVE PERCENT: 4 % (ref 20–42)
MAGNESIUM SERPL-MCNC: 2.3 MG/DL (ref 1.6–2.4)
MCH RBC QN AUTO: 30 PG (ref 26–35)
MCHC RBC AUTO-ENTMCNC: 31.7 G/DL (ref 32–34.5)
MCV RBC AUTO: 94.6 FL (ref 80–99.9)
MONOCYTES NFR BLD: 0.25 K/UL (ref 0.1–0.95)
MONOCYTES NFR BLD: 3 % (ref 2–12)
NEUTROPHILS NFR BLD: 92 % (ref 43–80)
NEUTS SEG NFR BLD: 7.02 K/UL (ref 1.8–7.3)
PLATELET # BLD AUTO: 148 K/UL (ref 130–450)
PMV BLD AUTO: 10.1 FL (ref 7–12)
POTASSIUM SERPL-SCNC: 4.4 MMOL/L (ref 3.5–5.1)
PROTHROMBIN TIME: 27.2 SEC (ref 9.3–12.4)
RBC # BLD AUTO: 3.5 M/UL (ref 3.5–5.5)
RBC # BLD: ABNORMAL 10*6/UL
RBC # BLD: ABNORMAL 10*6/UL
SODIUM SERPL-SCNC: 140 MMOL/L (ref 136–145)
WBC OTHER # BLD: 7.6 K/UL (ref 4.5–11.5)

## 2025-08-14 PROCEDURE — 6360000002 HC RX W HCPCS

## 2025-08-14 PROCEDURE — 6370000000 HC RX 637 (ALT 250 FOR IP): Performed by: INTERNAL MEDICINE

## 2025-08-14 PROCEDURE — 85610 PROTHROMBIN TIME: CPT

## 2025-08-14 PROCEDURE — 2500000003 HC RX 250 WO HCPCS: Performed by: INTERNAL MEDICINE

## 2025-08-14 PROCEDURE — 83735 ASSAY OF MAGNESIUM: CPT

## 2025-08-14 PROCEDURE — 6360000002 HC RX W HCPCS: Performed by: STUDENT IN AN ORGANIZED HEALTH CARE EDUCATION/TRAINING PROGRAM

## 2025-08-14 PROCEDURE — 1200000000 HC SEMI PRIVATE

## 2025-08-14 PROCEDURE — 80048 BASIC METABOLIC PNL TOTAL CA: CPT

## 2025-08-14 PROCEDURE — 6360000002 HC RX W HCPCS: Performed by: INTERNAL MEDICINE

## 2025-08-14 PROCEDURE — 97535 SELF CARE MNGMENT TRAINING: CPT

## 2025-08-14 PROCEDURE — 94640 AIRWAY INHALATION TREATMENT: CPT

## 2025-08-14 PROCEDURE — 6370000000 HC RX 637 (ALT 250 FOR IP)

## 2025-08-14 PROCEDURE — 6360000002 HC RX W HCPCS: Performed by: NURSE PRACTITIONER

## 2025-08-14 PROCEDURE — 94669 MECHANICAL CHEST WALL OSCILL: CPT

## 2025-08-14 PROCEDURE — 97110 THERAPEUTIC EXERCISES: CPT

## 2025-08-14 PROCEDURE — 85025 COMPLETE CBC W/AUTO DIFF WBC: CPT

## 2025-08-14 PROCEDURE — 99233 SBSQ HOSP IP/OBS HIGH 50: CPT | Performed by: STUDENT IN AN ORGANIZED HEALTH CARE EDUCATION/TRAINING PROGRAM

## 2025-08-14 PROCEDURE — 2700000000 HC OXYGEN THERAPY PER DAY

## 2025-08-14 RX ORDER — WARFARIN SODIUM 2 MG/1
2 TABLET ORAL
Status: COMPLETED | OUTPATIENT
Start: 2025-08-14 | End: 2025-08-14

## 2025-08-14 RX ORDER — PREDNISONE 20 MG/1
40 TABLET ORAL DAILY
Status: DISCONTINUED | OUTPATIENT
Start: 2025-08-15 | End: 2025-08-15 | Stop reason: HOSPADM

## 2025-08-14 RX ORDER — METHYLPREDNISOLONE SODIUM SUCCINATE 40 MG/ML
40 INJECTION INTRAMUSCULAR; INTRAVENOUS EVERY 12 HOURS
Status: COMPLETED | OUTPATIENT
Start: 2025-08-14 | End: 2025-08-14

## 2025-08-14 RX ADMIN — LEVALBUTEROL 0.63 MG: 0.63 SOLUTION RESPIRATORY (INHALATION) at 16:56

## 2025-08-14 RX ADMIN — GUAIFENESIN 400 MG: 400 TABLET ORAL at 14:03

## 2025-08-14 RX ADMIN — SODIUM CHLORIDE, PRESERVATIVE FREE 10 ML: 5 INJECTION INTRAVENOUS at 20:08

## 2025-08-14 RX ADMIN — ROSUVASTATIN CALCIUM 10 MG: 10 TABLET, FILM COATED ORAL at 20:07

## 2025-08-14 RX ADMIN — GUAIFENESIN 400 MG: 400 TABLET ORAL at 08:26

## 2025-08-14 RX ADMIN — PANTOPRAZOLE SODIUM 40 MG: 40 TABLET, DELAYED RELEASE ORAL at 08:27

## 2025-08-14 RX ADMIN — BUMETANIDE 0.5 MG: 1 TABLET ORAL at 08:26

## 2025-08-14 RX ADMIN — ACETAMINOPHEN 650 MG: 325 TABLET ORAL at 17:33

## 2025-08-14 RX ADMIN — METHYLPREDNISOLONE SODIUM SUCCINATE 40 MG: 40 INJECTION INTRAMUSCULAR; INTRAVENOUS at 17:34

## 2025-08-14 RX ADMIN — SACUBITRIL AND VALSARTAN 1 TABLET: 24; 26 TABLET, FILM COATED ORAL at 08:26

## 2025-08-14 RX ADMIN — LEVALBUTEROL 0.63 MG: 0.63 SOLUTION RESPIRATORY (INHALATION) at 20:17

## 2025-08-14 RX ADMIN — IPRATROPIUM BROMIDE 0.5 MG: 0.5 SOLUTION RESPIRATORY (INHALATION) at 12:07

## 2025-08-14 RX ADMIN — ACETAMINOPHEN 650 MG: 325 TABLET ORAL at 05:40

## 2025-08-14 RX ADMIN — ARFORMOTEROL TARTRATE 15 MCG: 15 SOLUTION RESPIRATORY (INHALATION) at 20:16

## 2025-08-14 RX ADMIN — LEVALBUTEROL 0.63 MG: 0.63 SOLUTION RESPIRATORY (INHALATION) at 12:07

## 2025-08-14 RX ADMIN — ACETAMINOPHEN 650 MG: 325 TABLET ORAL at 21:56

## 2025-08-14 RX ADMIN — WARFARIN SODIUM 2 MG: 2 TABLET ORAL at 17:33

## 2025-08-14 RX ADMIN — IPRATROPIUM BROMIDE 0.5 MG: 0.5 SOLUTION RESPIRATORY (INHALATION) at 07:25

## 2025-08-14 RX ADMIN — ACETAMINOPHEN 650 MG: 325 TABLET ORAL at 01:38

## 2025-08-14 RX ADMIN — Medication 2000 UNITS: at 08:27

## 2025-08-14 RX ADMIN — SODIUM CHLORIDE, PRESERVATIVE FREE 10 ML: 5 INJECTION INTRAVENOUS at 08:27

## 2025-08-14 RX ADMIN — METHYLPREDNISOLONE SODIUM SUCCINATE 40 MG: 40 INJECTION INTRAMUSCULAR; INTRAVENOUS at 05:40

## 2025-08-14 RX ADMIN — ARFORMOTEROL TARTRATE 15 MCG: 15 SOLUTION RESPIRATORY (INHALATION) at 07:25

## 2025-08-14 RX ADMIN — LEVOTHYROXINE SODIUM 50 MCG: 0.05 TABLET ORAL at 08:27

## 2025-08-14 RX ADMIN — BUDESONIDE 500 MCG: 0.5 SUSPENSION RESPIRATORY (INHALATION) at 07:25

## 2025-08-14 RX ADMIN — LEVALBUTEROL 0.63 MG: 0.63 SOLUTION RESPIRATORY (INHALATION) at 07:25

## 2025-08-14 RX ADMIN — BUDESONIDE 500 MCG: 0.5 SUSPENSION RESPIRATORY (INHALATION) at 20:17

## 2025-08-14 RX ADMIN — SACUBITRIL AND VALSARTAN 1 TABLET: 24; 26 TABLET, FILM COATED ORAL at 20:08

## 2025-08-14 RX ADMIN — GUAIFENESIN 400 MG: 400 TABLET ORAL at 20:07

## 2025-08-14 RX ADMIN — ACETAMINOPHEN 650 MG: 325 TABLET ORAL at 13:20

## 2025-08-14 RX ADMIN — IPRATROPIUM BROMIDE 0.5 MG: 0.5 SOLUTION RESPIRATORY (INHALATION) at 20:17

## 2025-08-14 ASSESSMENT — PAIN - FUNCTIONAL ASSESSMENT
PAIN_FUNCTIONAL_ASSESSMENT: ACTIVITIES ARE NOT PREVENTED
PAIN_FUNCTIONAL_ASSESSMENT: 0-10
PAIN_FUNCTIONAL_ASSESSMENT: ACTIVITIES ARE NOT PREVENTED
PAIN_FUNCTIONAL_ASSESSMENT: 0-10

## 2025-08-14 ASSESSMENT — PAIN DESCRIPTION - ORIENTATION
ORIENTATION: LEFT

## 2025-08-14 ASSESSMENT — PAIN SCALES - GENERAL
PAINLEVEL_OUTOF10: 0
PAINLEVEL_OUTOF10: 4
PAINLEVEL_OUTOF10: 0
PAINLEVEL_OUTOF10: 0
PAINLEVEL_OUTOF10: 3
PAINLEVEL_OUTOF10: 0
PAINLEVEL_OUTOF10: 7
PAINLEVEL_OUTOF10: 2
PAINLEVEL_OUTOF10: 3

## 2025-08-14 ASSESSMENT — PAIN DESCRIPTION - LOCATION
LOCATION: BACK
LOCATION: RIB CAGE
LOCATION: RIB CAGE
LOCATION: BACK;ARM

## 2025-08-14 ASSESSMENT — PAIN DESCRIPTION - DESCRIPTORS
DESCRIPTORS: ACHING;DISCOMFORT;DULL;SORE
DESCRIPTORS: DISCOMFORT
DESCRIPTORS: ACHING;DISCOMFORT;DULL;SORE

## 2025-08-15 ENCOUNTER — HOSPITAL ENCOUNTER (OUTPATIENT)
Dept: INPATIENT UNIT | Age: 85
Setting detail: OUTPATIENT SURGERY
Discharge: HOME | End: 2025-08-15

## 2025-08-15 ENCOUNTER — CARE COORDINATION (OUTPATIENT)
Dept: CARE COORDINATION | Age: 85
End: 2025-08-15

## 2025-08-15 VITALS
WEIGHT: 178.57 LBS | BODY MASS INDEX: 27.06 KG/M2 | HEART RATE: 67 BPM | RESPIRATION RATE: 18 BRPM | TEMPERATURE: 97.3 F | SYSTOLIC BLOOD PRESSURE: 129 MMHG | DIASTOLIC BLOOD PRESSURE: 65 MMHG | HEIGHT: 68 IN | OXYGEN SATURATION: 98 %

## 2025-08-15 LAB
ANION GAP SERPL CALCULATED.3IONS-SCNC: 13 MMOL/L (ref 7–16)
BASOPHILS # BLD: 0 K/UL (ref 0–0.2)
BASOPHILS NFR BLD: 0 % (ref 0–2)
BUN SERPL-MCNC: 35 MG/DL (ref 8–23)
CALCIUM SERPL-MCNC: 8.7 MG/DL (ref 8.8–10.2)
CHLORIDE SERPL-SCNC: 96 MMOL/L (ref 98–107)
CO2 SERPL-SCNC: 28 MMOL/L (ref 22–29)
CREAT SERPL-MCNC: 0.8 MG/DL (ref 0.5–1)
EKG ATRIAL RATE: 69 BPM
EKG P AXIS: 38 DEGREES
EKG P-R INTERVAL: 174 MS
EKG Q-T INTERVAL: 400 MS
EKG QRS DURATION: 82 MS
EKG QTC CALCULATION (BAZETT): 428 MS
EKG R AXIS: 40 DEGREES
EKG T AXIS: 86 DEGREES
EKG VENTRICULAR RATE: 69 BPM
EOSINOPHIL # BLD: 0 K/UL (ref 0.05–0.5)
EOSINOPHILS RELATIVE PERCENT: 0 % (ref 0–6)
ERYTHROCYTE [DISTWIDTH] IN BLOOD BY AUTOMATED COUNT: 14.8 % (ref 11.5–15)
GFR, ESTIMATED: 70 ML/MIN/1.73M2
GLUCOSE SERPL-MCNC: 154 MG/DL (ref 74–99)
HCT VFR BLD AUTO: 33.3 % (ref 34–48)
HGB BLD-MCNC: 10.4 G/DL (ref 11.5–15.5)
INR PPP: 2.4
LYMPHOCYTES NFR BLD: 0.44 K/UL (ref 1.5–4)
LYMPHOCYTES RELATIVE PERCENT: 8 % (ref 20–42)
MCH RBC QN AUTO: 30.8 PG (ref 26–35)
MCHC RBC AUTO-ENTMCNC: 31.2 G/DL (ref 32–34.5)
MCV RBC AUTO: 98.5 FL (ref 80–99.9)
MONOCYTES NFR BLD: 0.11 K/UL (ref 0.1–0.95)
MONOCYTES NFR BLD: 2 % (ref 2–12)
NEUTROPHILS NFR BLD: 90 % (ref 43–80)
NEUTS SEG NFR BLD: 4.95 K/UL (ref 1.8–7.3)
NUCLEATED RED BLOOD CELLS: 1 PER 100 WBC
PLATELET # BLD AUTO: 120 K/UL (ref 130–450)
PMV BLD AUTO: 10.6 FL (ref 7–12)
POTASSIUM SERPL-SCNC: 4.5 MMOL/L (ref 3.5–5.1)
PROTHROMBIN TIME: 26.3 SEC (ref 9.3–12.4)
RBC # BLD AUTO: 3.38 M/UL (ref 3.5–5.5)
RBC # BLD: ABNORMAL 10*6/UL
RBC # BLD: ABNORMAL 10*6/UL
SODIUM SERPL-SCNC: 137 MMOL/L (ref 136–145)
WBC OTHER # BLD: 5.5 K/UL (ref 4.5–11.5)

## 2025-08-15 PROCEDURE — 6370000000 HC RX 637 (ALT 250 FOR IP): Performed by: INTERNAL MEDICINE

## 2025-08-15 PROCEDURE — 2500000003 HC RX 250 WO HCPCS: Performed by: INTERNAL MEDICINE

## 2025-08-15 PROCEDURE — 94669 MECHANICAL CHEST WALL OSCILL: CPT

## 2025-08-15 PROCEDURE — 85610 PROTHROMBIN TIME: CPT

## 2025-08-15 PROCEDURE — 80048 BASIC METABOLIC PNL TOTAL CA: CPT

## 2025-08-15 PROCEDURE — 85025 COMPLETE CBC W/AUTO DIFF WBC: CPT

## 2025-08-15 PROCEDURE — 99239 HOSP IP/OBS DSCHRG MGMT >30: CPT | Performed by: STUDENT IN AN ORGANIZED HEALTH CARE EDUCATION/TRAINING PROGRAM

## 2025-08-15 PROCEDURE — 6370000000 HC RX 637 (ALT 250 FOR IP)

## 2025-08-15 PROCEDURE — 6370000000 HC RX 637 (ALT 250 FOR IP): Performed by: NURSE PRACTITIONER

## 2025-08-15 PROCEDURE — 36415 COLL VENOUS BLD VENIPUNCTURE: CPT

## 2025-08-15 PROCEDURE — 6360000002 HC RX W HCPCS

## 2025-08-15 PROCEDURE — 94640 AIRWAY INHALATION TREATMENT: CPT

## 2025-08-15 PROCEDURE — 2700000000 HC OXYGEN THERAPY PER DAY

## 2025-08-15 RX ORDER — WARFARIN SODIUM 2 MG/1
2 TABLET ORAL
Status: COMPLETED | OUTPATIENT
Start: 2025-08-15 | End: 2025-08-15

## 2025-08-15 RX ORDER — SODIUM CHLORIDE FOR INHALATION 3 %
4 VIAL, NEBULIZER (ML) INHALATION EVERY 12 HOURS PRN
Status: ON HOLD | DISCHARGE
Start: 2025-08-15

## 2025-08-15 RX ADMIN — PANTOPRAZOLE SODIUM 40 MG: 40 TABLET, DELAYED RELEASE ORAL at 08:20

## 2025-08-15 RX ADMIN — DIGOXIN 62.5 MCG: 0.12 TABLET ORAL at 08:19

## 2025-08-15 RX ADMIN — ACETAMINOPHEN 650 MG: 325 TABLET ORAL at 11:28

## 2025-08-15 RX ADMIN — Medication 2000 UNITS: at 08:18

## 2025-08-15 RX ADMIN — LEVOTHYROXINE SODIUM 50 MCG: 0.05 TABLET ORAL at 08:19

## 2025-08-15 RX ADMIN — SACUBITRIL AND VALSARTAN 1 TABLET: 24; 26 TABLET, FILM COATED ORAL at 08:18

## 2025-08-15 RX ADMIN — GUAIFENESIN 400 MG: 400 TABLET ORAL at 08:19

## 2025-08-15 RX ADMIN — SODIUM CHLORIDE, PRESERVATIVE FREE 10 ML: 5 INJECTION INTRAVENOUS at 08:20

## 2025-08-15 RX ADMIN — AMIODARONE HYDROCHLORIDE 200 MG: 200 TABLET ORAL at 08:20

## 2025-08-15 RX ADMIN — BUDESONIDE 500 MCG: 0.5 SUSPENSION RESPIRATORY (INHALATION) at 08:58

## 2025-08-15 RX ADMIN — PREDNISONE 40 MG: 20 TABLET ORAL at 08:18

## 2025-08-15 RX ADMIN — ARFORMOTEROL TARTRATE 15 MCG: 15 SOLUTION RESPIRATORY (INHALATION) at 08:58

## 2025-08-15 RX ADMIN — GUAIFENESIN 400 MG: 400 TABLET ORAL at 14:26

## 2025-08-15 RX ADMIN — LEVALBUTEROL 0.63 MG: 0.63 SOLUTION RESPIRATORY (INHALATION) at 08:58

## 2025-08-15 RX ADMIN — WARFARIN SODIUM 2 MG: 2 TABLET ORAL at 16:23

## 2025-08-15 RX ADMIN — ACETAMINOPHEN 650 MG: 325 TABLET ORAL at 16:47

## 2025-08-15 RX ADMIN — IPRATROPIUM BROMIDE 0.5 MG: 0.5 SOLUTION RESPIRATORY (INHALATION) at 08:58

## 2025-08-15 RX ADMIN — ACETAMINOPHEN 650 MG: 325 TABLET ORAL at 01:26

## 2025-08-15 RX ADMIN — ACETAMINOPHEN 650 MG: 325 TABLET ORAL at 07:11

## 2025-08-15 ASSESSMENT — PAIN DESCRIPTION - LOCATION
LOCATION: BACK;ARM
LOCATION: BACK;ARM
LOCATION: RIB CAGE
LOCATION: BACK;CHEST;RIB CAGE

## 2025-08-15 ASSESSMENT — PAIN SCALES - GENERAL
PAINLEVEL_OUTOF10: 4
PAINLEVEL_OUTOF10: 5
PAINLEVEL_OUTOF10: 3
PAINLEVEL_OUTOF10: 0
PAINLEVEL_OUTOF10: 0
PAINLEVEL_OUTOF10: 3

## 2025-08-15 ASSESSMENT — PAIN - FUNCTIONAL ASSESSMENT
PAIN_FUNCTIONAL_ASSESSMENT: 0-10
PAIN_FUNCTIONAL_ASSESSMENT: ACTIVITIES ARE NOT PREVENTED
PAIN_FUNCTIONAL_ASSESSMENT: 0-10

## 2025-08-15 ASSESSMENT — PAIN DESCRIPTION - ORIENTATION
ORIENTATION: LEFT
ORIENTATION: ANTERIOR

## 2025-08-15 ASSESSMENT — PAIN DESCRIPTION - DESCRIPTORS
DESCRIPTORS: ACHING
DESCRIPTORS: DISCOMFORT

## 2025-08-15 ASSESSMENT — PAIN SCALES - WONG BAKER: WONGBAKER_NUMERICALRESPONSE: NO HURT

## 2025-08-16 LAB
MICROORGANISM SPEC CULT: ABNORMAL
MICROORGANISM SPEC CULT: ABNORMAL
MICROORGANISM/AGENT SPEC: ABNORMAL
SPECIMEN DESCRIPTION: ABNORMAL

## 2025-08-18 ENCOUNTER — CARE COORDINATION (OUTPATIENT)
Dept: CARE COORDINATION | Age: 85
End: 2025-08-18

## 2025-08-19 LAB
EKG ATRIAL RATE: 69 BPM
EKG P AXIS: 38 DEGREES
EKG P-R INTERVAL: 174 MS
EKG Q-T INTERVAL: 400 MS
EKG QRS DURATION: 82 MS
EKG QTC CALCULATION (BAZETT): 428 MS
EKG R AXIS: 40 DEGREES
EKG T AXIS: 86 DEGREES
EKG VENTRICULAR RATE: 69 BPM

## 2025-08-21 ENCOUNTER — HOSPITAL ENCOUNTER (INPATIENT)
Age: 85
LOS: 7 days | Discharge: NURSING, REHAB OR LONG-TERM CARE | DRG: 309 | End: 2025-08-28
Attending: EMERGENCY MEDICINE | Admitting: STUDENT IN AN ORGANIZED HEALTH CARE EDUCATION/TRAINING PROGRAM
Payer: MEDICARE

## 2025-08-21 ENCOUNTER — APPOINTMENT (OUTPATIENT)
Dept: CT IMAGING | Age: 85
DRG: 309 | End: 2025-08-21
Payer: MEDICARE

## 2025-08-21 DIAGNOSIS — R04.2 HEMOPTYSIS: Primary | ICD-10-CM

## 2025-08-21 DIAGNOSIS — I48.91 ATRIAL FIBRILLATION WITH RVR (HCC): ICD-10-CM

## 2025-08-21 LAB
ALBUMIN SERPL-MCNC: 3.6 G/DL (ref 3.5–5.2)
ALP SERPL-CCNC: 66 U/L (ref 35–104)
ALT SERPL-CCNC: 37 U/L (ref 0–35)
ANION GAP SERPL CALCULATED.3IONS-SCNC: 14 MMOL/L (ref 7–16)
AST SERPL-CCNC: 23 U/L (ref 0–35)
BASOPHILS # BLD: 0.01 K/UL (ref 0–0.2)
BASOPHILS NFR BLD: 0 % (ref 0–2)
BILIRUB SERPL-MCNC: 0.7 MG/DL (ref 0–1.2)
BUN SERPL-MCNC: 25 MG/DL (ref 8–23)
CALCIUM SERPL-MCNC: 9.2 MG/DL (ref 8.8–10.2)
CHLORIDE SERPL-SCNC: 93 MMOL/L (ref 98–107)
CO2 SERPL-SCNC: 30 MMOL/L (ref 22–29)
CREAT SERPL-MCNC: 1 MG/DL (ref 0.5–1)
DIGOXIN SERPL-MCNC: 0.4 NG/ML (ref 0.8–2)
EOSINOPHIL # BLD: 0 K/UL (ref 0.05–0.5)
EOSINOPHILS RELATIVE PERCENT: 0 % (ref 0–6)
ERYTHROCYTE [DISTWIDTH] IN BLOOD BY AUTOMATED COUNT: 15.2 % (ref 11.5–15)
GFR, ESTIMATED: 52 ML/MIN/1.73M2
GLUCOSE SERPL-MCNC: 294 MG/DL (ref 74–99)
HCT VFR BLD AUTO: 34.1 % (ref 34–48)
HGB BLD-MCNC: 10.9 G/DL (ref 11.5–15.5)
IMM GRANULOCYTES # BLD AUTO: 0.21 K/UL (ref 0–0.58)
IMM GRANULOCYTES NFR BLD: 1 % (ref 0–5)
INR PPP: 2.5
LYMPHOCYTES NFR BLD: 0.32 K/UL (ref 1.5–4)
LYMPHOCYTES RELATIVE PERCENT: 2 % (ref 20–42)
MAGNESIUM SERPL-MCNC: 1.5 MG/DL (ref 1.6–2.4)
MCH RBC QN AUTO: 30.6 PG (ref 26–35)
MCHC RBC AUTO-ENTMCNC: 32 G/DL (ref 32–34.5)
MCV RBC AUTO: 95.8 FL (ref 80–99.9)
MONOCYTES NFR BLD: 0.54 K/UL (ref 0.1–0.95)
MONOCYTES NFR BLD: 4 % (ref 2–12)
NEUTROPHILS NFR BLD: 93 % (ref 43–80)
NEUTS SEG NFR BLD: 14.49 K/UL (ref 1.8–7.3)
PLATELET # BLD AUTO: 172 K/UL (ref 130–450)
PMV BLD AUTO: 9.5 FL (ref 7–12)
POTASSIUM SERPL-SCNC: 4.3 MMOL/L (ref 3.5–5.1)
PROT SERPL-MCNC: 5.7 G/DL (ref 6.4–8.3)
PROTHROMBIN TIME: 26.1 SEC (ref 9.3–12.4)
RBC # BLD AUTO: 3.56 M/UL (ref 3.5–5.5)
RBC # BLD: ABNORMAL 10*6/UL
SODIUM SERPL-SCNC: 137 MMOL/L (ref 136–145)
TROPONIN I SERPL HS-MCNC: 28 NG/L (ref 0–14)
TROPONIN I SERPL HS-MCNC: 29 NG/L (ref 0–14)
WBC OTHER # BLD: 15.6 K/UL (ref 4.5–11.5)

## 2025-08-21 PROCEDURE — 6360000004 HC RX CONTRAST MEDICATION: Performed by: RADIOLOGY

## 2025-08-21 PROCEDURE — 2060000000 HC ICU INTERMEDIATE R&B

## 2025-08-21 PROCEDURE — 93005 ELECTROCARDIOGRAM TRACING: CPT

## 2025-08-21 PROCEDURE — 85025 COMPLETE CBC W/AUTO DIFF WBC: CPT

## 2025-08-21 PROCEDURE — 85610 PROTHROMBIN TIME: CPT

## 2025-08-21 PROCEDURE — 84484 ASSAY OF TROPONIN QUANT: CPT

## 2025-08-21 PROCEDURE — 71275 CT ANGIOGRAPHY CHEST: CPT

## 2025-08-21 PROCEDURE — 80053 COMPREHEN METABOLIC PANEL: CPT

## 2025-08-21 PROCEDURE — 6360000002 HC RX W HCPCS: Performed by: EMERGENCY MEDICINE

## 2025-08-21 PROCEDURE — 99223 1ST HOSP IP/OBS HIGH 75: CPT | Performed by: STUDENT IN AN ORGANIZED HEALTH CARE EDUCATION/TRAINING PROGRAM

## 2025-08-21 PROCEDURE — 96375 TX/PRO/DX INJ NEW DRUG ADDON: CPT

## 2025-08-21 PROCEDURE — 99285 EMERGENCY DEPT VISIT HI MDM: CPT

## 2025-08-21 PROCEDURE — 96374 THER/PROPH/DIAG INJ IV PUSH: CPT

## 2025-08-21 PROCEDURE — 96376 TX/PRO/DX INJ SAME DRUG ADON: CPT

## 2025-08-21 PROCEDURE — 2580000003 HC RX 258: Performed by: EMERGENCY MEDICINE

## 2025-08-21 PROCEDURE — 5A0935A ASSISTANCE WITH RESPIRATORY VENTILATION, LESS THAN 24 CONSECUTIVE HOURS, HIGH NASAL FLOW/VELOCITY: ICD-10-PCS | Performed by: STUDENT IN AN ORGANIZED HEALTH CARE EDUCATION/TRAINING PROGRAM

## 2025-08-21 PROCEDURE — 6370000000 HC RX 637 (ALT 250 FOR IP): Performed by: EMERGENCY MEDICINE

## 2025-08-21 PROCEDURE — 83735 ASSAY OF MAGNESIUM: CPT

## 2025-08-21 PROCEDURE — 80162 ASSAY OF DIGOXIN TOTAL: CPT

## 2025-08-21 RX ORDER — PANTOPRAZOLE SODIUM 40 MG/1
40 TABLET, DELAYED RELEASE ORAL EVERY MORNING
Status: DISCONTINUED | OUTPATIENT
Start: 2025-08-22 | End: 2025-08-28 | Stop reason: HOSPADM

## 2025-08-21 RX ORDER — POTASSIUM CHLORIDE 7.45 MG/ML
10 INJECTION INTRAVENOUS PRN
Status: DISCONTINUED | OUTPATIENT
Start: 2025-08-21 | End: 2025-08-28 | Stop reason: HOSPADM

## 2025-08-21 RX ORDER — ACETAMINOPHEN 650 MG/1
650 SUPPOSITORY RECTAL EVERY 6 HOURS PRN
Status: DISCONTINUED | OUTPATIENT
Start: 2025-08-21 | End: 2025-08-28 | Stop reason: HOSPADM

## 2025-08-21 RX ORDER — DIGOXIN 0.25 MG/ML
125 INJECTION INTRAMUSCULAR; INTRAVENOUS ONCE
Status: COMPLETED | OUTPATIENT
Start: 2025-08-21 | End: 2025-08-21

## 2025-08-21 RX ORDER — IOPAMIDOL 755 MG/ML
75 INJECTION, SOLUTION INTRAVASCULAR
Status: COMPLETED | OUTPATIENT
Start: 2025-08-21 | End: 2025-08-21

## 2025-08-21 RX ORDER — LEVOTHYROXINE SODIUM 75 UG/1
75 TABLET ORAL DAILY
Status: DISCONTINUED | OUTPATIENT
Start: 2025-08-21 | End: 2025-08-28 | Stop reason: HOSPADM

## 2025-08-21 RX ORDER — ONDANSETRON 4 MG/1
4 TABLET, ORALLY DISINTEGRATING ORAL EVERY 8 HOURS PRN
Status: DISCONTINUED | OUTPATIENT
Start: 2025-08-21 | End: 2025-08-28 | Stop reason: HOSPADM

## 2025-08-21 RX ORDER — POTASSIUM CHLORIDE 1500 MG/1
40 TABLET, EXTENDED RELEASE ORAL PRN
Status: DISCONTINUED | OUTPATIENT
Start: 2025-08-21 | End: 2025-08-28 | Stop reason: HOSPADM

## 2025-08-21 RX ORDER — SACUBITRIL AND VALSARTAN 24; 26 MG/1; MG/1
1 TABLET ORAL 2 TIMES DAILY
Status: DISCONTINUED | OUTPATIENT
Start: 2025-08-21 | End: 2025-08-28 | Stop reason: HOSPADM

## 2025-08-21 RX ORDER — LEVALBUTEROL INHALATION SOLUTION 0.63 MG/3ML
0.63 SOLUTION RESPIRATORY (INHALATION) EVERY 6 HOURS PRN
Status: DISCONTINUED | OUTPATIENT
Start: 2025-08-21 | End: 2025-08-28 | Stop reason: HOSPADM

## 2025-08-21 RX ORDER — ROSUVASTATIN CALCIUM 10 MG/1
10 TABLET, COATED ORAL NIGHTLY
Status: DISCONTINUED | OUTPATIENT
Start: 2025-08-21 | End: 2025-08-28 | Stop reason: HOSPADM

## 2025-08-21 RX ORDER — DILTIAZEM HYDROCHLORIDE 240 MG/1
240 CAPSULE, COATED, EXTENDED RELEASE ORAL ONCE
Status: COMPLETED | OUTPATIENT
Start: 2025-08-21 | End: 2025-08-21

## 2025-08-21 RX ORDER — WARFARIN SODIUM 2 MG/1
2 TABLET ORAL DAILY
Status: DISCONTINUED | OUTPATIENT
Start: 2025-08-21 | End: 2025-08-28 | Stop reason: HOSPADM

## 2025-08-21 RX ORDER — SODIUM CHLORIDE 0.9 % (FLUSH) 0.9 %
5-40 SYRINGE (ML) INJECTION EVERY 12 HOURS SCHEDULED
Status: DISCONTINUED | OUTPATIENT
Start: 2025-08-21 | End: 2025-08-28 | Stop reason: HOSPADM

## 2025-08-21 RX ORDER — ACETAMINOPHEN 325 MG/1
650 TABLET ORAL EVERY 6 HOURS PRN
Status: DISCONTINUED | OUTPATIENT
Start: 2025-08-21 | End: 2025-08-28 | Stop reason: HOSPADM

## 2025-08-21 RX ORDER — SODIUM CHLORIDE 9 MG/ML
INJECTION, SOLUTION INTRAVENOUS PRN
Status: DISCONTINUED | OUTPATIENT
Start: 2025-08-21 | End: 2025-08-28 | Stop reason: HOSPADM

## 2025-08-21 RX ORDER — DIGOXIN 125 MCG
62.5 TABLET ORAL
Status: DISCONTINUED | OUTPATIENT
Start: 2025-08-22 | End: 2025-08-28 | Stop reason: HOSPADM

## 2025-08-21 RX ORDER — ONDANSETRON 2 MG/ML
4 INJECTION INTRAMUSCULAR; INTRAVENOUS EVERY 6 HOURS PRN
Status: DISCONTINUED | OUTPATIENT
Start: 2025-08-21 | End: 2025-08-28 | Stop reason: HOSPADM

## 2025-08-21 RX ORDER — AMIODARONE HYDROCHLORIDE 200 MG/1
100 TABLET ORAL
Status: DISCONTINUED | OUTPATIENT
Start: 2025-08-22 | End: 2025-08-23

## 2025-08-21 RX ORDER — DILTIAZEM HYDROCHLORIDE 5 MG/ML
10 INJECTION INTRAVENOUS ONCE
Status: COMPLETED | OUTPATIENT
Start: 2025-08-21 | End: 2025-08-21

## 2025-08-21 RX ORDER — POLYETHYLENE GLYCOL 3350 17 G/17G
17 POWDER, FOR SOLUTION ORAL DAILY PRN
Status: DISCONTINUED | OUTPATIENT
Start: 2025-08-21 | End: 2025-08-28 | Stop reason: HOSPADM

## 2025-08-21 RX ORDER — SODIUM CHLORIDE 0.9 % (FLUSH) 0.9 %
5-40 SYRINGE (ML) INJECTION PRN
Status: DISCONTINUED | OUTPATIENT
Start: 2025-08-21 | End: 2025-08-28 | Stop reason: HOSPADM

## 2025-08-21 RX ADMIN — DIGOXIN 125 MCG: 0.25 INJECTION INTRAMUSCULAR; INTRAVENOUS at 19:01

## 2025-08-21 RX ADMIN — DILTIAZEM HYDROCHLORIDE 240 MG: 240 CAPSULE, EXTENDED RELEASE ORAL at 18:02

## 2025-08-21 RX ADMIN — DILTIAZEM HYDROCHLORIDE 10 MG: 5 INJECTION, SOLUTION INTRAVENOUS at 17:30

## 2025-08-21 RX ADMIN — DILTIAZEM HYDROCHLORIDE 2.5 MG/HR: 5 INJECTION, SOLUTION INTRAVENOUS at 20:09

## 2025-08-21 RX ADMIN — IOPAMIDOL 75 ML: 755 INJECTION, SOLUTION INTRAVENOUS at 15:51

## 2025-08-21 ASSESSMENT — PAIN DESCRIPTION - PAIN TYPE
TYPE: ACUTE PAIN
TYPE: ACUTE PAIN

## 2025-08-21 ASSESSMENT — PAIN DESCRIPTION - LOCATION
LOCATION: RIB CAGE
LOCATION: RIB CAGE

## 2025-08-21 ASSESSMENT — PAIN - FUNCTIONAL ASSESSMENT
PAIN_FUNCTIONAL_ASSESSMENT: ACTIVITIES ARE NOT PREVENTED
PAIN_FUNCTIONAL_ASSESSMENT: ACTIVITIES ARE NOT PREVENTED
PAIN_FUNCTIONAL_ASSESSMENT: 0-10

## 2025-08-21 ASSESSMENT — PAIN DESCRIPTION - ORIENTATION
ORIENTATION: LEFT
ORIENTATION: LEFT

## 2025-08-21 ASSESSMENT — PAIN SCALES - GENERAL
PAINLEVEL_OUTOF10: 4
PAINLEVEL_OUTOF10: 5

## 2025-08-21 ASSESSMENT — PAIN DESCRIPTION - FREQUENCY: FREQUENCY: CONTINUOUS

## 2025-08-21 ASSESSMENT — PAIN DESCRIPTION - DESCRIPTORS
DESCRIPTORS: ACHING
DESCRIPTORS: ACHING

## 2025-08-21 ASSESSMENT — PAIN DESCRIPTION - ONSET: ONSET: ON-GOING

## 2025-08-22 PROBLEM — D72.829 LEUKOCYTOSIS: Status: ACTIVE | Noted: 2025-08-22

## 2025-08-22 LAB
ALBUMIN SERPL-MCNC: 3.2 G/DL (ref 3.5–5.2)
ALP SERPL-CCNC: 64 U/L (ref 35–104)
ALT SERPL-CCNC: 36 U/L (ref 0–35)
ANION GAP SERPL CALCULATED.3IONS-SCNC: 10 MMOL/L (ref 7–16)
AST SERPL-CCNC: 21 U/L (ref 0–35)
B PARAP IS1001 DNA NPH QL NAA+NON-PROBE: NOT DETECTED
B PERT DNA SPEC QL NAA+PROBE: NOT DETECTED
BASOPHILS # BLD: 0.02 K/UL (ref 0–0.2)
BASOPHILS NFR BLD: 0 % (ref 0–2)
BILIRUB SERPL-MCNC: 0.8 MG/DL (ref 0–1.2)
BUN SERPL-MCNC: 22 MG/DL (ref 8–23)
C PNEUM DNA NPH QL NAA+NON-PROBE: NOT DETECTED
CALCIUM SERPL-MCNC: 9 MG/DL (ref 8.8–10.2)
CHLORIDE SERPL-SCNC: 96 MMOL/L (ref 98–107)
CO2 SERPL-SCNC: 32 MMOL/L (ref 22–29)
CREAT SERPL-MCNC: 0.9 MG/DL (ref 0.5–1)
EOSINOPHIL # BLD: 0.01 K/UL (ref 0.05–0.5)
EOSINOPHILS RELATIVE PERCENT: 0 % (ref 0–6)
ERYTHROCYTE [DISTWIDTH] IN BLOOD BY AUTOMATED COUNT: 15.3 % (ref 11.5–15)
FLUAV RNA NPH QL NAA+NON-PROBE: NOT DETECTED
FLUBV RNA NPH QL NAA+NON-PROBE: NOT DETECTED
GFR, ESTIMATED: 62 ML/MIN/1.73M2
GLUCOSE SERPL-MCNC: 125 MG/DL (ref 74–99)
HADV DNA NPH QL NAA+NON-PROBE: NOT DETECTED
HCOV 229E RNA NPH QL NAA+NON-PROBE: NOT DETECTED
HCOV HKU1 RNA NPH QL NAA+NON-PROBE: NOT DETECTED
HCOV NL63 RNA NPH QL NAA+NON-PROBE: NOT DETECTED
HCOV OC43 RNA NPH QL NAA+NON-PROBE: NOT DETECTED
HCT VFR BLD AUTO: 33.1 % (ref 34–48)
HGB BLD-MCNC: 10.2 G/DL (ref 11.5–15.5)
HMPV RNA NPH QL NAA+NON-PROBE: NOT DETECTED
HPIV1 RNA NPH QL NAA+NON-PROBE: NOT DETECTED
HPIV2 RNA NPH QL NAA+NON-PROBE: NOT DETECTED
HPIV3 RNA NPH QL NAA+NON-PROBE: NOT DETECTED
HPIV4 RNA NPH QL NAA+NON-PROBE: NOT DETECTED
IMM GRANULOCYTES # BLD AUTO: 0.07 K/UL (ref 0–0.58)
IMM GRANULOCYTES NFR BLD: 1 % (ref 0–5)
LYMPHOCYTES NFR BLD: 1.03 K/UL (ref 1.5–4)
LYMPHOCYTES RELATIVE PERCENT: 9 % (ref 20–42)
M PNEUMO DNA NPH QL NAA+NON-PROBE: NOT DETECTED
MCH RBC QN AUTO: 30.1 PG (ref 26–35)
MCHC RBC AUTO-ENTMCNC: 30.8 G/DL (ref 32–34.5)
MCV RBC AUTO: 97.6 FL (ref 80–99.9)
MONOCYTES NFR BLD: 0.59 K/UL (ref 0.1–0.95)
MONOCYTES NFR BLD: 5 % (ref 2–12)
NEUTROPHILS NFR BLD: 86 % (ref 43–80)
NEUTS SEG NFR BLD: 10.28 K/UL (ref 1.8–7.3)
PLATELET # BLD AUTO: 160 K/UL (ref 130–450)
PMV BLD AUTO: 9.6 FL (ref 7–12)
POTASSIUM SERPL-SCNC: 4.5 MMOL/L (ref 3.5–5.1)
PROCALCITONIN SERPL-MCNC: 0.07 NG/ML (ref 0–0.08)
PROT SERPL-MCNC: 5.3 G/DL (ref 6.4–8.3)
RBC # BLD AUTO: 3.39 M/UL (ref 3.5–5.5)
RSV RNA NPH QL NAA+NON-PROBE: NOT DETECTED
RV+EV RNA NPH QL NAA+NON-PROBE: NOT DETECTED
SARS-COV-2 RNA NPH QL NAA+NON-PROBE: NOT DETECTED
SODIUM SERPL-SCNC: 138 MMOL/L (ref 136–145)
SPECIMEN DESCRIPTION: NORMAL
WBC OTHER # BLD: 12 K/UL (ref 4.5–11.5)

## 2025-08-22 PROCEDURE — 85025 COMPLETE CBC W/AUTO DIFF WBC: CPT

## 2025-08-22 PROCEDURE — 2060000000 HC ICU INTERMEDIATE R&B

## 2025-08-22 PROCEDURE — 2700000000 HC OXYGEN THERAPY PER DAY

## 2025-08-22 PROCEDURE — 99232 SBSQ HOSP IP/OBS MODERATE 35: CPT | Performed by: STUDENT IN AN ORGANIZED HEALTH CARE EDUCATION/TRAINING PROGRAM

## 2025-08-22 PROCEDURE — 94640 AIRWAY INHALATION TREATMENT: CPT

## 2025-08-22 PROCEDURE — 0202U NFCT DS 22 TRGT SARS-COV-2: CPT

## 2025-08-22 PROCEDURE — 97530 THERAPEUTIC ACTIVITIES: CPT

## 2025-08-22 PROCEDURE — 97165 OT EVAL LOW COMPLEX 30 MIN: CPT

## 2025-08-22 PROCEDURE — 6370000000 HC RX 637 (ALT 250 FOR IP): Performed by: STUDENT IN AN ORGANIZED HEALTH CARE EDUCATION/TRAINING PROGRAM

## 2025-08-22 PROCEDURE — 2500000003 HC RX 250 WO HCPCS: Performed by: STUDENT IN AN ORGANIZED HEALTH CARE EDUCATION/TRAINING PROGRAM

## 2025-08-22 PROCEDURE — 80053 COMPREHEN METABOLIC PANEL: CPT

## 2025-08-22 PROCEDURE — 84145 PROCALCITONIN (PCT): CPT

## 2025-08-22 PROCEDURE — 6360000002 HC RX W HCPCS: Performed by: STUDENT IN AN ORGANIZED HEALTH CARE EDUCATION/TRAINING PROGRAM

## 2025-08-22 RX ADMIN — SACUBITRIL AND VALSARTAN 1 TABLET: 24; 26 TABLET, FILM COATED ORAL at 08:13

## 2025-08-22 RX ADMIN — LEVOTHYROXINE SODIUM 75 MCG: 0.07 TABLET ORAL at 08:13

## 2025-08-22 RX ADMIN — ROSUVASTATIN CALCIUM 10 MG: 10 TABLET, FILM COATED ORAL at 19:50

## 2025-08-22 RX ADMIN — ROSUVASTATIN CALCIUM 10 MG: 10 TABLET, FILM COATED ORAL at 00:16

## 2025-08-22 RX ADMIN — WARFARIN SODIUM 2 MG: 2 TABLET ORAL at 00:17

## 2025-08-22 RX ADMIN — WARFARIN SODIUM 2 MG: 2 TABLET ORAL at 17:22

## 2025-08-22 RX ADMIN — LEVALBUTEROL 0.63 MG: 0.63 SOLUTION RESPIRATORY (INHALATION) at 00:25

## 2025-08-22 RX ADMIN — SODIUM CHLORIDE, PRESERVATIVE FREE 10 ML: 5 INJECTION INTRAVENOUS at 08:13

## 2025-08-22 RX ADMIN — DIGOXIN 62.5 MCG: 0.12 TABLET ORAL at 08:13

## 2025-08-22 RX ADMIN — LEVOTHYROXINE SODIUM 75 MCG: 0.07 TABLET ORAL at 00:17

## 2025-08-22 RX ADMIN — SODIUM CHLORIDE, PRESERVATIVE FREE 10 ML: 5 INJECTION INTRAVENOUS at 19:50

## 2025-08-22 RX ADMIN — ACETAMINOPHEN 650 MG: 325 TABLET ORAL at 23:46

## 2025-08-22 RX ADMIN — PANTOPRAZOLE SODIUM 40 MG: 40 TABLET, DELAYED RELEASE ORAL at 08:13

## 2025-08-22 RX ADMIN — LEVALBUTEROL 0.63 MG: 0.63 SOLUTION RESPIRATORY (INHALATION) at 12:15

## 2025-08-22 RX ADMIN — ACETAMINOPHEN 650 MG: 325 TABLET ORAL at 00:16

## 2025-08-22 RX ADMIN — LEVALBUTEROL 0.63 MG: 0.63 SOLUTION RESPIRATORY (INHALATION) at 21:32

## 2025-08-22 RX ADMIN — SACUBITRIL AND VALSARTAN 1 TABLET: 24; 26 TABLET, FILM COATED ORAL at 00:16

## 2025-08-22 RX ADMIN — ACETAMINOPHEN 650 MG: 325 TABLET ORAL at 08:13

## 2025-08-22 RX ADMIN — SACUBITRIL AND VALSARTAN 1 TABLET: 24; 26 TABLET, FILM COATED ORAL at 19:50

## 2025-08-22 ASSESSMENT — PAIN SCALES - GENERAL
PAINLEVEL_OUTOF10: 4
PAINLEVEL_OUTOF10: 7
PAINLEVEL_OUTOF10: 5
PAINLEVEL_OUTOF10: 0
PAINLEVEL_OUTOF10: 3

## 2025-08-22 ASSESSMENT — PAIN DESCRIPTION - DESCRIPTORS
DESCRIPTORS: ACHING;CRAMPING;DISCOMFORT
DESCRIPTORS: ACHING;STABBING

## 2025-08-22 ASSESSMENT — PAIN DESCRIPTION - LOCATION
LOCATION: BACK
LOCATION: RIB CAGE
LOCATION: RIB CAGE

## 2025-08-22 ASSESSMENT — PAIN DESCRIPTION - ORIENTATION
ORIENTATION: LEFT

## 2025-08-22 ASSESSMENT — PAIN - FUNCTIONAL ASSESSMENT
PAIN_FUNCTIONAL_ASSESSMENT: ACTIVITIES ARE NOT PREVENTED
PAIN_FUNCTIONAL_ASSESSMENT: 0-10
PAIN_FUNCTIONAL_ASSESSMENT: 0-10
PAIN_FUNCTIONAL_ASSESSMENT: ACTIVITIES ARE NOT PREVENTED
PAIN_FUNCTIONAL_ASSESSMENT: 0-10

## 2025-08-23 LAB
ALBUMIN SERPL-MCNC: 3 G/DL (ref 3.5–5.2)
ALP SERPL-CCNC: 61 U/L (ref 35–104)
ALT SERPL-CCNC: 68 U/L (ref 0–35)
ANION GAP SERPL CALCULATED.3IONS-SCNC: 12 MMOL/L (ref 7–16)
AST SERPL-CCNC: 41 U/L (ref 0–35)
BASOPHILS # BLD: 0.01 K/UL (ref 0–0.2)
BASOPHILS NFR BLD: 0 % (ref 0–2)
BILIRUB SERPL-MCNC: 0.9 MG/DL (ref 0–1.2)
BUN SERPL-MCNC: 27 MG/DL (ref 8–23)
CALCIUM SERPL-MCNC: 9.2 MG/DL (ref 8.8–10.2)
CHLORIDE SERPL-SCNC: 96 MMOL/L (ref 98–107)
CO2 SERPL-SCNC: 31 MMOL/L (ref 22–29)
CREAT SERPL-MCNC: 0.9 MG/DL (ref 0.5–1)
EOSINOPHIL # BLD: 0.05 K/UL (ref 0.05–0.5)
EOSINOPHILS RELATIVE PERCENT: 1 % (ref 0–6)
ERYTHROCYTE [DISTWIDTH] IN BLOOD BY AUTOMATED COUNT: 15.4 % (ref 11.5–15)
GFR, ESTIMATED: 58 ML/MIN/1.73M2
GLUCOSE SERPL-MCNC: 105 MG/DL (ref 74–99)
HCT VFR BLD AUTO: 32.4 % (ref 34–48)
HGB BLD-MCNC: 10.2 G/DL (ref 11.5–15.5)
IMM GRANULOCYTES # BLD AUTO: 0.07 K/UL (ref 0–0.58)
IMM GRANULOCYTES NFR BLD: 1 % (ref 0–5)
INR PPP: 2.1
LYMPHOCYTES NFR BLD: 1.47 K/UL (ref 1.5–4)
LYMPHOCYTES RELATIVE PERCENT: 17 % (ref 20–42)
MCH RBC QN AUTO: 30.4 PG (ref 26–35)
MCHC RBC AUTO-ENTMCNC: 31.5 G/DL (ref 32–34.5)
MCV RBC AUTO: 96.4 FL (ref 80–99.9)
MONOCYTES NFR BLD: 0.45 K/UL (ref 0.1–0.95)
MONOCYTES NFR BLD: 5 % (ref 2–12)
NEUTROPHILS NFR BLD: 76 % (ref 43–80)
NEUTS SEG NFR BLD: 6.6 K/UL (ref 1.8–7.3)
PLATELET # BLD AUTO: 145 K/UL (ref 130–450)
PMV BLD AUTO: 9.8 FL (ref 7–12)
POTASSIUM SERPL-SCNC: 3.9 MMOL/L (ref 3.5–5.1)
PROT SERPL-MCNC: 5.2 G/DL (ref 6.4–8.3)
PROTHROMBIN TIME: 22.7 SEC (ref 9.3–12.4)
RBC # BLD AUTO: 3.36 M/UL (ref 3.5–5.5)
SODIUM SERPL-SCNC: 138 MMOL/L (ref 136–145)
WBC OTHER # BLD: 8.7 K/UL (ref 4.5–11.5)

## 2025-08-23 PROCEDURE — 2700000000 HC OXYGEN THERAPY PER DAY

## 2025-08-23 PROCEDURE — 6360000002 HC RX W HCPCS: Performed by: STUDENT IN AN ORGANIZED HEALTH CARE EDUCATION/TRAINING PROGRAM

## 2025-08-23 PROCEDURE — 6370000000 HC RX 637 (ALT 250 FOR IP): Performed by: CLINICAL NURSE SPECIALIST

## 2025-08-23 PROCEDURE — 6370000000 HC RX 637 (ALT 250 FOR IP): Performed by: STUDENT IN AN ORGANIZED HEALTH CARE EDUCATION/TRAINING PROGRAM

## 2025-08-23 PROCEDURE — 6360000002 HC RX W HCPCS: Performed by: CLINICAL NURSE SPECIALIST

## 2025-08-23 PROCEDURE — 2500000003 HC RX 250 WO HCPCS: Performed by: STUDENT IN AN ORGANIZED HEALTH CARE EDUCATION/TRAINING PROGRAM

## 2025-08-23 PROCEDURE — 85610 PROTHROMBIN TIME: CPT

## 2025-08-23 PROCEDURE — 2060000000 HC ICU INTERMEDIATE R&B

## 2025-08-23 PROCEDURE — 80053 COMPREHEN METABOLIC PANEL: CPT

## 2025-08-23 PROCEDURE — 6370000000 HC RX 637 (ALT 250 FOR IP): Performed by: INTERNAL MEDICINE

## 2025-08-23 PROCEDURE — 85025 COMPLETE CBC W/AUTO DIFF WBC: CPT

## 2025-08-23 PROCEDURE — 94640 AIRWAY INHALATION TREATMENT: CPT

## 2025-08-23 PROCEDURE — 99233 SBSQ HOSP IP/OBS HIGH 50: CPT | Performed by: STUDENT IN AN ORGANIZED HEALTH CARE EDUCATION/TRAINING PROGRAM

## 2025-08-23 RX ORDER — ARFORMOTEROL TARTRATE 15 UG/2ML
15 SOLUTION RESPIRATORY (INHALATION)
Status: DISCONTINUED | OUTPATIENT
Start: 2025-08-23 | End: 2025-08-28 | Stop reason: HOSPADM

## 2025-08-23 RX ORDER — BUDESONIDE 0.5 MG/2ML
0.5 INHALANT ORAL
Status: DISCONTINUED | OUTPATIENT
Start: 2025-08-23 | End: 2025-08-28 | Stop reason: HOSPADM

## 2025-08-23 RX ORDER — LEVALBUTEROL INHALATION SOLUTION 0.63 MG/3ML
0.63 SOLUTION RESPIRATORY (INHALATION) EVERY 8 HOURS PRN
Status: DISCONTINUED | OUTPATIENT
Start: 2025-08-23 | End: 2025-08-25

## 2025-08-23 RX ORDER — AMIODARONE HYDROCHLORIDE 200 MG/1
100 TABLET ORAL ONCE
Status: COMPLETED | OUTPATIENT
Start: 2025-08-23 | End: 2025-08-23

## 2025-08-23 RX ORDER — METHYLPREDNISOLONE SODIUM SUCCINATE 40 MG/ML
40 INJECTION INTRAMUSCULAR; INTRAVENOUS EVERY 12 HOURS
Status: DISCONTINUED | OUTPATIENT
Start: 2025-08-23 | End: 2025-08-25

## 2025-08-23 RX ORDER — GUAIFENESIN 400 MG/1
400 TABLET ORAL 3 TIMES DAILY
Status: DISCONTINUED | OUTPATIENT
Start: 2025-08-23 | End: 2025-08-28 | Stop reason: HOSPADM

## 2025-08-23 RX ORDER — GUAIFENESIN 200 MG/10ML
200 LIQUID ORAL EVERY 4 HOURS PRN
Status: DISCONTINUED | OUTPATIENT
Start: 2025-08-23 | End: 2025-08-28 | Stop reason: HOSPADM

## 2025-08-23 RX ORDER — AMIODARONE HYDROCHLORIDE 200 MG/1
100 TABLET ORAL DAILY
Status: DISCONTINUED | OUTPATIENT
Start: 2025-08-24 | End: 2025-08-28 | Stop reason: HOSPADM

## 2025-08-23 RX ADMIN — LEVALBUTEROL 0.63 MG: 0.63 SOLUTION RESPIRATORY (INHALATION) at 07:54

## 2025-08-23 RX ADMIN — SODIUM CHLORIDE, PRESERVATIVE FREE 10 ML: 5 INJECTION INTRAVENOUS at 20:06

## 2025-08-23 RX ADMIN — BUDESONIDE 500 MCG: 0.5 SUSPENSION RESPIRATORY (INHALATION) at 20:18

## 2025-08-23 RX ADMIN — PANTOPRAZOLE SODIUM 40 MG: 40 TABLET, DELAYED RELEASE ORAL at 07:46

## 2025-08-23 RX ADMIN — ACETAMINOPHEN 650 MG: 325 TABLET ORAL at 18:03

## 2025-08-23 RX ADMIN — ARFORMOTEROL TARTRATE 15 MCG: 15 SOLUTION RESPIRATORY (INHALATION) at 20:18

## 2025-08-23 RX ADMIN — ACETAMINOPHEN 650 MG: 325 TABLET ORAL at 07:46

## 2025-08-23 RX ADMIN — GUAIFENESIN 400 MG: 400 TABLET ORAL at 13:34

## 2025-08-23 RX ADMIN — ROSUVASTATIN CALCIUM 10 MG: 10 TABLET, FILM COATED ORAL at 20:06

## 2025-08-23 RX ADMIN — METHYLPREDNISOLONE SODIUM SUCCINATE 40 MG: 40 INJECTION INTRAMUSCULAR; INTRAVENOUS at 13:34

## 2025-08-23 RX ADMIN — SODIUM CHLORIDE, PRESERVATIVE FREE 10 ML: 5 INJECTION INTRAVENOUS at 07:48

## 2025-08-23 RX ADMIN — GUAIFENESIN 400 MG: 400 TABLET ORAL at 20:06

## 2025-08-23 RX ADMIN — AMIODARONE HYDROCHLORIDE 100 MG: 200 TABLET ORAL at 10:17

## 2025-08-23 RX ADMIN — SACUBITRIL AND VALSARTAN 1 TABLET: 24; 26 TABLET, FILM COATED ORAL at 20:06

## 2025-08-23 RX ADMIN — WARFARIN SODIUM 2 MG: 2 TABLET ORAL at 18:02

## 2025-08-23 RX ADMIN — LEVOTHYROXINE SODIUM 75 MCG: 0.07 TABLET ORAL at 07:45

## 2025-08-23 ASSESSMENT — PAIN - FUNCTIONAL ASSESSMENT
PAIN_FUNCTIONAL_ASSESSMENT: 0-10
PAIN_FUNCTIONAL_ASSESSMENT: 0-10
PAIN_FUNCTIONAL_ASSESSMENT: PREVENTS OR INTERFERES WITH ALL ACTIVE AND SOME PASSIVE ACTIVITIES

## 2025-08-23 ASSESSMENT — PAIN SCALES - GENERAL
PAINLEVEL_OUTOF10: 6
PAINLEVEL_OUTOF10: 6
PAINLEVEL_OUTOF10: 0

## 2025-08-23 ASSESSMENT — PAIN DESCRIPTION - DESCRIPTORS: DESCRIPTORS: PATIENT UNABLE TO DESCRIBE

## 2025-08-23 ASSESSMENT — PAIN DESCRIPTION - LOCATION: LOCATION: BACK

## 2025-08-24 LAB
ALBUMIN SERPL-MCNC: 3.1 G/DL (ref 3.5–5.2)
ALP SERPL-CCNC: 66 U/L (ref 35–104)
ALT SERPL-CCNC: 58 U/L (ref 0–35)
ANION GAP SERPL CALCULATED.3IONS-SCNC: 13 MMOL/L (ref 7–16)
AST SERPL-CCNC: 32 U/L (ref 0–35)
BASOPHILS # BLD: 0 K/UL (ref 0–0.2)
BASOPHILS NFR BLD: 0 % (ref 0–2)
BILIRUB SERPL-MCNC: 0.7 MG/DL (ref 0–1.2)
BUN SERPL-MCNC: 25 MG/DL (ref 8–23)
CALCIUM SERPL-MCNC: 9.2 MG/DL (ref 8.8–10.2)
CHLORIDE SERPL-SCNC: 98 MMOL/L (ref 98–107)
CO2 SERPL-SCNC: 30 MMOL/L (ref 22–29)
CREAT SERPL-MCNC: 0.9 MG/DL (ref 0.5–1)
EOSINOPHIL # BLD: 0.01 K/UL (ref 0.05–0.5)
EOSINOPHILS RELATIVE PERCENT: 0 % (ref 0–6)
ERYTHROCYTE [DISTWIDTH] IN BLOOD BY AUTOMATED COUNT: 15.3 % (ref 11.5–15)
GFR, ESTIMATED: 66 ML/MIN/1.73M2
GLUCOSE SERPL-MCNC: 161 MG/DL (ref 74–99)
HCT VFR BLD AUTO: 32.3 % (ref 34–48)
HGB BLD-MCNC: 10 G/DL (ref 11.5–15.5)
IMM GRANULOCYTES # BLD AUTO: 0.04 K/UL (ref 0–0.58)
IMM GRANULOCYTES NFR BLD: 1 % (ref 0–5)
INR PPP: 2.2
LYMPHOCYTES NFR BLD: 0.5 K/UL (ref 1.5–4)
LYMPHOCYTES RELATIVE PERCENT: 8 % (ref 20–42)
MCH RBC QN AUTO: 30.2 PG (ref 26–35)
MCHC RBC AUTO-ENTMCNC: 31 G/DL (ref 32–34.5)
MCV RBC AUTO: 97.6 FL (ref 80–99.9)
MONOCYTES NFR BLD: 0.07 K/UL (ref 0.1–0.95)
MONOCYTES NFR BLD: 1 % (ref 2–12)
NEUTROPHILS NFR BLD: 91 % (ref 43–80)
NEUTS SEG NFR BLD: 6.06 K/UL (ref 1.8–7.3)
PLATELET # BLD AUTO: 158 K/UL (ref 130–450)
PMV BLD AUTO: 10.5 FL (ref 7–12)
POTASSIUM SERPL-SCNC: 4.3 MMOL/L (ref 3.5–5.1)
PROT SERPL-MCNC: 5.5 G/DL (ref 6.4–8.3)
PROTHROMBIN TIME: 24.1 SEC (ref 9.3–12.4)
RBC # BLD AUTO: 3.31 M/UL (ref 3.5–5.5)
SODIUM SERPL-SCNC: 140 MMOL/L (ref 136–145)
WBC OTHER # BLD: 6.7 K/UL (ref 4.5–11.5)

## 2025-08-24 PROCEDURE — 6360000002 HC RX W HCPCS: Performed by: CLINICAL NURSE SPECIALIST

## 2025-08-24 PROCEDURE — 80053 COMPREHEN METABOLIC PANEL: CPT

## 2025-08-24 PROCEDURE — 85610 PROTHROMBIN TIME: CPT

## 2025-08-24 PROCEDURE — 99232 SBSQ HOSP IP/OBS MODERATE 35: CPT | Performed by: STUDENT IN AN ORGANIZED HEALTH CARE EDUCATION/TRAINING PROGRAM

## 2025-08-24 PROCEDURE — 6370000000 HC RX 637 (ALT 250 FOR IP): Performed by: CLINICAL NURSE SPECIALIST

## 2025-08-24 PROCEDURE — 2060000000 HC ICU INTERMEDIATE R&B

## 2025-08-24 PROCEDURE — 85025 COMPLETE CBC W/AUTO DIFF WBC: CPT

## 2025-08-24 PROCEDURE — 94640 AIRWAY INHALATION TREATMENT: CPT

## 2025-08-24 PROCEDURE — 6370000000 HC RX 637 (ALT 250 FOR IP): Performed by: INTERNAL MEDICINE

## 2025-08-24 PROCEDURE — 6360000002 HC RX W HCPCS: Performed by: INTERNAL MEDICINE

## 2025-08-24 PROCEDURE — 6370000000 HC RX 637 (ALT 250 FOR IP): Performed by: STUDENT IN AN ORGANIZED HEALTH CARE EDUCATION/TRAINING PROGRAM

## 2025-08-24 PROCEDURE — 2500000003 HC RX 250 WO HCPCS: Performed by: STUDENT IN AN ORGANIZED HEALTH CARE EDUCATION/TRAINING PROGRAM

## 2025-08-24 PROCEDURE — 2580000003 HC RX 258: Performed by: INTERNAL MEDICINE

## 2025-08-24 RX ORDER — AMIODARONE HYDROCHLORIDE 200 MG/1
100 TABLET ORAL ONCE
Status: COMPLETED | OUTPATIENT
Start: 2025-08-24 | End: 2025-08-24

## 2025-08-24 RX ADMIN — SACUBITRIL AND VALSARTAN 1 TABLET: 24; 26 TABLET, FILM COATED ORAL at 21:12

## 2025-08-24 RX ADMIN — PANTOPRAZOLE SODIUM 40 MG: 40 TABLET, DELAYED RELEASE ORAL at 07:43

## 2025-08-24 RX ADMIN — GUAIFENESIN 400 MG: 400 TABLET ORAL at 13:07

## 2025-08-24 RX ADMIN — ARFORMOTEROL TARTRATE 15 MCG: 15 SOLUTION RESPIRATORY (INHALATION) at 20:25

## 2025-08-24 RX ADMIN — SODIUM CHLORIDE, PRESERVATIVE FREE 10 ML: 5 INJECTION INTRAVENOUS at 21:13

## 2025-08-24 RX ADMIN — BUDESONIDE 500 MCG: 0.5 SUSPENSION RESPIRATORY (INHALATION) at 20:25

## 2025-08-24 RX ADMIN — METHYLPREDNISOLONE SODIUM SUCCINATE 40 MG: 40 INJECTION INTRAMUSCULAR; INTRAVENOUS at 02:28

## 2025-08-24 RX ADMIN — ARFORMOTEROL TARTRATE 15 MCG: 15 SOLUTION RESPIRATORY (INHALATION) at 08:21

## 2025-08-24 RX ADMIN — ACETAMINOPHEN 650 MG: 325 TABLET ORAL at 16:36

## 2025-08-24 RX ADMIN — GUAIFENESIN 400 MG: 400 TABLET ORAL at 21:13

## 2025-08-24 RX ADMIN — AMIODARONE HYDROCHLORIDE 100 MG: 200 TABLET ORAL at 13:07

## 2025-08-24 RX ADMIN — SODIUM CHLORIDE, PRESERVATIVE FREE 10 ML: 5 INJECTION INTRAVENOUS at 07:42

## 2025-08-24 RX ADMIN — ROSUVASTATIN CALCIUM 10 MG: 10 TABLET, FILM COATED ORAL at 21:13

## 2025-08-24 RX ADMIN — SACUBITRIL AND VALSARTAN 1 TABLET: 24; 26 TABLET, FILM COATED ORAL at 07:43

## 2025-08-24 RX ADMIN — WARFARIN SODIUM 2 MG: 2 TABLET ORAL at 16:36

## 2025-08-24 RX ADMIN — BUDESONIDE 500 MCG: 0.5 SUSPENSION RESPIRATORY (INHALATION) at 08:21

## 2025-08-24 RX ADMIN — AMIODARONE HYDROCHLORIDE 100 MG: 200 TABLET ORAL at 07:43

## 2025-08-24 RX ADMIN — GUAIFENESIN 400 MG: 400 TABLET ORAL at 07:43

## 2025-08-24 RX ADMIN — LEVOTHYROXINE SODIUM 75 MCG: 0.07 TABLET ORAL at 07:43

## 2025-08-24 RX ADMIN — METHYLPREDNISOLONE SODIUM SUCCINATE 40 MG: 40 INJECTION INTRAMUSCULAR; INTRAVENOUS at 13:07

## 2025-08-24 RX ADMIN — CEFEPIME 1000 MG: 1 INJECTION, POWDER, FOR SOLUTION INTRAMUSCULAR; INTRAVENOUS at 21:07

## 2025-08-24 ASSESSMENT — PAIN DESCRIPTION - LOCATION: LOCATION: GENERALIZED

## 2025-08-24 ASSESSMENT — PAIN SCALES - WONG BAKER
WONGBAKER_NUMERICALRESPONSE: HURTS A LITTLE BIT
WONGBAKER_NUMERICALRESPONSE: HURTS A LITTLE BIT

## 2025-08-24 ASSESSMENT — PAIN - FUNCTIONAL ASSESSMENT
PAIN_FUNCTIONAL_ASSESSMENT: 0-10
PAIN_FUNCTIONAL_ASSESSMENT: 0-10

## 2025-08-24 ASSESSMENT — PAIN SCALES - GENERAL
PAINLEVEL_OUTOF10: 2
PAINLEVEL_OUTOF10: 1
PAINLEVEL_OUTOF10: 5

## 2025-08-24 ASSESSMENT — PAIN DESCRIPTION - DESCRIPTORS: DESCRIPTORS: ACHING;DISCOMFORT

## 2025-08-25 ENCOUNTER — APPOINTMENT (OUTPATIENT)
Dept: GENERAL RADIOLOGY | Age: 85
DRG: 309 | End: 2025-08-25
Payer: MEDICARE

## 2025-08-25 ENCOUNTER — ANTI-COAG VISIT (OUTPATIENT)
Dept: PRIMARY CARE CLINIC | Age: 85
End: 2025-08-25

## 2025-08-25 LAB
ALBUMIN SERPL-MCNC: 3.2 G/DL (ref 3.5–5.2)
ALP SERPL-CCNC: 71 U/L (ref 35–104)
ALT SERPL-CCNC: 53 U/L (ref 0–35)
ANION GAP SERPL CALCULATED.3IONS-SCNC: 11 MMOL/L (ref 7–16)
AST SERPL-CCNC: 25 U/L (ref 0–35)
BILIRUB SERPL-MCNC: 0.4 MG/DL (ref 0–1.2)
BUN SERPL-MCNC: 29 MG/DL (ref 8–23)
CALCIUM SERPL-MCNC: 8.9 MG/DL (ref 8.8–10.2)
CHLORIDE SERPL-SCNC: 98 MMOL/L (ref 98–107)
CO2 SERPL-SCNC: 27 MMOL/L (ref 22–29)
CREAT SERPL-MCNC: 0.7 MG/DL (ref 0.5–1)
EKG ATRIAL RATE: 131 BPM
EKG ATRIAL RATE: 240 BPM
EKG Q-T INTERVAL: 296 MS
EKG Q-T INTERVAL: 320 MS
EKG QRS DURATION: 72 MS
EKG QRS DURATION: 78 MS
EKG QTC CALCULATION (BAZETT): 410 MS
EKG QTC CALCULATION (BAZETT): 418 MS
EKG R AXIS: 17 DEGREES
EKG R AXIS: 44 DEGREES
EKG T AXIS: 235 DEGREES
EKG T AXIS: 92 DEGREES
EKG VENTRICULAR RATE: 120 BPM
EKG VENTRICULAR RATE: 99 BPM
ERYTHROCYTE [DISTWIDTH] IN BLOOD BY AUTOMATED COUNT: 15.4 % (ref 11.5–15)
GFR, ESTIMATED: 78 ML/MIN/1.73M2
GLUCOSE BLD-MCNC: 161 MG/DL (ref 74–99)
GLUCOSE SERPL-MCNC: 225 MG/DL (ref 74–99)
HCT VFR BLD AUTO: 31.4 % (ref 34–48)
HGB BLD-MCNC: 9.8 G/DL (ref 11.5–15.5)
INR PPP: 3.5
MCH RBC QN AUTO: 30.7 PG (ref 26–35)
MCHC RBC AUTO-ENTMCNC: 31.2 G/DL (ref 32–34.5)
MCV RBC AUTO: 98.4 FL (ref 80–99.9)
PLATELET # BLD AUTO: 168 K/UL (ref 130–450)
PMV BLD AUTO: 10.2 FL (ref 7–12)
POTASSIUM SERPL-SCNC: 4.4 MMOL/L (ref 3.5–5.1)
PROT SERPL-MCNC: 5.6 G/DL (ref 6.4–8.3)
PROTHROMBIN TIME: 38.4 SEC (ref 9.3–12.4)
RBC # BLD AUTO: 3.19 M/UL (ref 3.5–5.5)
SODIUM SERPL-SCNC: 136 MMOL/L (ref 136–145)
WBC OTHER # BLD: 9.4 K/UL (ref 4.5–11.5)

## 2025-08-25 PROCEDURE — 36415 COLL VENOUS BLD VENIPUNCTURE: CPT

## 2025-08-25 PROCEDURE — 85027 COMPLETE CBC AUTOMATED: CPT

## 2025-08-25 PROCEDURE — 6370000000 HC RX 637 (ALT 250 FOR IP): Performed by: CLINICAL NURSE SPECIALIST

## 2025-08-25 PROCEDURE — 6360000002 HC RX W HCPCS: Performed by: CLINICAL NURSE SPECIALIST

## 2025-08-25 PROCEDURE — 85610 PROTHROMBIN TIME: CPT

## 2025-08-25 PROCEDURE — 71045 X-RAY EXAM CHEST 1 VIEW: CPT

## 2025-08-25 PROCEDURE — 99232 SBSQ HOSP IP/OBS MODERATE 35: CPT | Performed by: INTERNAL MEDICINE

## 2025-08-25 PROCEDURE — 6370000000 HC RX 637 (ALT 250 FOR IP): Performed by: STUDENT IN AN ORGANIZED HEALTH CARE EDUCATION/TRAINING PROGRAM

## 2025-08-25 PROCEDURE — 80053 COMPREHEN METABOLIC PANEL: CPT

## 2025-08-25 PROCEDURE — 2580000003 HC RX 258: Performed by: INTERNAL MEDICINE

## 2025-08-25 PROCEDURE — 2500000003 HC RX 250 WO HCPCS: Performed by: STUDENT IN AN ORGANIZED HEALTH CARE EDUCATION/TRAINING PROGRAM

## 2025-08-25 PROCEDURE — 94640 AIRWAY INHALATION TREATMENT: CPT

## 2025-08-25 PROCEDURE — 82962 GLUCOSE BLOOD TEST: CPT

## 2025-08-25 PROCEDURE — 97530 THERAPEUTIC ACTIVITIES: CPT

## 2025-08-25 PROCEDURE — 6370000000 HC RX 637 (ALT 250 FOR IP): Performed by: INTERNAL MEDICINE

## 2025-08-25 PROCEDURE — 6360000002 HC RX W HCPCS: Performed by: INTERNAL MEDICINE

## 2025-08-25 PROCEDURE — 97535 SELF CARE MNGMENT TRAINING: CPT

## 2025-08-25 PROCEDURE — 2060000000 HC ICU INTERMEDIATE R&B

## 2025-08-25 RX ORDER — PREDNISONE 20 MG/1
40 TABLET ORAL DAILY
Status: DISCONTINUED | OUTPATIENT
Start: 2025-08-26 | End: 2025-08-28 | Stop reason: HOSPADM

## 2025-08-25 RX ADMIN — CEFEPIME 1000 MG: 1 INJECTION, POWDER, FOR SOLUTION INTRAMUSCULAR; INTRAVENOUS at 20:53

## 2025-08-25 RX ADMIN — PANTOPRAZOLE SODIUM 40 MG: 40 TABLET, DELAYED RELEASE ORAL at 08:30

## 2025-08-25 RX ADMIN — SACUBITRIL AND VALSARTAN 1 TABLET: 24; 26 TABLET, FILM COATED ORAL at 20:51

## 2025-08-25 RX ADMIN — GUAIFENESIN 400 MG: 400 TABLET ORAL at 20:51

## 2025-08-25 RX ADMIN — AMIODARONE HYDROCHLORIDE 100 MG: 200 TABLET ORAL at 08:30

## 2025-08-25 RX ADMIN — ACETAMINOPHEN 650 MG: 325 TABLET ORAL at 18:47

## 2025-08-25 RX ADMIN — GUAIFENESIN 400 MG: 400 TABLET ORAL at 08:30

## 2025-08-25 RX ADMIN — SODIUM CHLORIDE, PRESERVATIVE FREE 10 ML: 5 INJECTION INTRAVENOUS at 08:30

## 2025-08-25 RX ADMIN — ARFORMOTEROL TARTRATE 15 MCG: 15 SOLUTION RESPIRATORY (INHALATION) at 19:41

## 2025-08-25 RX ADMIN — METHYLPREDNISOLONE SODIUM SUCCINATE 40 MG: 40 INJECTION INTRAMUSCULAR; INTRAVENOUS at 13:39

## 2025-08-25 RX ADMIN — BUDESONIDE 500 MCG: 0.5 SUSPENSION RESPIRATORY (INHALATION) at 07:52

## 2025-08-25 RX ADMIN — SACUBITRIL AND VALSARTAN 1 TABLET: 24; 26 TABLET, FILM COATED ORAL at 08:30

## 2025-08-25 RX ADMIN — BUDESONIDE 500 MCG: 0.5 SUSPENSION RESPIRATORY (INHALATION) at 19:41

## 2025-08-25 RX ADMIN — DIGOXIN 62.5 MCG: 0.12 TABLET ORAL at 08:28

## 2025-08-25 RX ADMIN — LEVOTHYROXINE SODIUM 75 MCG: 0.07 TABLET ORAL at 08:30

## 2025-08-25 RX ADMIN — GUAIFENESIN 400 MG: 400 TABLET ORAL at 13:39

## 2025-08-25 RX ADMIN — METHYLPREDNISOLONE SODIUM SUCCINATE 40 MG: 40 INJECTION INTRAMUSCULAR; INTRAVENOUS at 01:07

## 2025-08-25 RX ADMIN — SODIUM CHLORIDE, PRESERVATIVE FREE 10 ML: 5 INJECTION INTRAVENOUS at 20:51

## 2025-08-25 RX ADMIN — ROSUVASTATIN CALCIUM 10 MG: 10 TABLET, FILM COATED ORAL at 20:51

## 2025-08-25 RX ADMIN — ARFORMOTEROL TARTRATE 15 MCG: 15 SOLUTION RESPIRATORY (INHALATION) at 07:52

## 2025-08-25 RX ADMIN — CEFEPIME 1000 MG: 1 INJECTION, POWDER, FOR SOLUTION INTRAMUSCULAR; INTRAVENOUS at 08:32

## 2025-08-25 ASSESSMENT — PAIN DESCRIPTION - ORIENTATION: ORIENTATION: POSTERIOR

## 2025-08-25 ASSESSMENT — PAIN DESCRIPTION - LOCATION
LOCATION: BACK
LOCATION: BACK

## 2025-08-25 ASSESSMENT — PAIN DESCRIPTION - DESCRIPTORS
DESCRIPTORS: ACHING
DESCRIPTORS: ACHING

## 2025-08-25 ASSESSMENT — PAIN - FUNCTIONAL ASSESSMENT
PAIN_FUNCTIONAL_ASSESSMENT: 0-10
PAIN_FUNCTIONAL_ASSESSMENT: ACTIVITIES ARE NOT PREVENTED

## 2025-08-25 ASSESSMENT — PAIN SCALES - GENERAL
PAINLEVEL_OUTOF10: 7
PAINLEVEL_OUTOF10: 2
PAINLEVEL_OUTOF10: 0

## 2025-08-26 LAB
ALBUMIN SERPL-MCNC: 3 G/DL (ref 3.5–5.2)
ALP SERPL-CCNC: 65 U/L (ref 35–104)
ALT SERPL-CCNC: 55 U/L (ref 0–35)
ANION GAP SERPL CALCULATED.3IONS-SCNC: 10 MMOL/L (ref 7–16)
AST SERPL-CCNC: 29 U/L (ref 0–35)
BILIRUB SERPL-MCNC: 0.4 MG/DL (ref 0–1.2)
BUN SERPL-MCNC: 31 MG/DL (ref 8–23)
CALCIUM SERPL-MCNC: 8.8 MG/DL (ref 8.8–10.2)
CHLORIDE SERPL-SCNC: 102 MMOL/L (ref 98–107)
CO2 SERPL-SCNC: 27 MMOL/L (ref 22–29)
CREAT SERPL-MCNC: 0.9 MG/DL (ref 0.5–1)
ERYTHROCYTE [DISTWIDTH] IN BLOOD BY AUTOMATED COUNT: 15.6 % (ref 11.5–15)
GFR, ESTIMATED: 63 ML/MIN/1.73M2
GLUCOSE SERPL-MCNC: 158 MG/DL (ref 74–99)
HCT VFR BLD AUTO: 29.9 % (ref 34–48)
HGB BLD-MCNC: 9.3 G/DL (ref 11.5–15.5)
INR PPP: 4.2
MAGNESIUM SERPL-MCNC: 1.8 MG/DL (ref 1.6–2.4)
MCH RBC QN AUTO: 30.6 PG (ref 26–35)
MCHC RBC AUTO-ENTMCNC: 31.1 G/DL (ref 32–34.5)
MCV RBC AUTO: 98.4 FL (ref 80–99.9)
MICROORGANISM/AGENT SPEC: ABNORMAL
PLATELET # BLD AUTO: 146 K/UL (ref 130–450)
PMV BLD AUTO: 9.9 FL (ref 7–12)
POTASSIUM SERPL-SCNC: 4.7 MMOL/L (ref 3.5–5.1)
PROT SERPL-MCNC: 5.1 G/DL (ref 6.4–8.3)
PROTHROMBIN TIME: 45.8 SEC (ref 9.3–12.4)
RBC # BLD AUTO: 3.04 M/UL (ref 3.5–5.5)
SERVICE CMNT-IMP: ABNORMAL
SODIUM SERPL-SCNC: 139 MMOL/L (ref 136–145)
SPECIMEN DESCRIPTION: ABNORMAL
WBC OTHER # BLD: 8.4 K/UL (ref 4.5–11.5)

## 2025-08-26 PROCEDURE — 99232 SBSQ HOSP IP/OBS MODERATE 35: CPT | Performed by: INTERNAL MEDICINE

## 2025-08-26 PROCEDURE — 6370000000 HC RX 637 (ALT 250 FOR IP): Performed by: CLINICAL NURSE SPECIALIST

## 2025-08-26 PROCEDURE — 36415 COLL VENOUS BLD VENIPUNCTURE: CPT

## 2025-08-26 PROCEDURE — 87205 SMEAR GRAM STAIN: CPT

## 2025-08-26 PROCEDURE — 6360000002 HC RX W HCPCS: Performed by: INTERNAL MEDICINE

## 2025-08-26 PROCEDURE — 94640 AIRWAY INHALATION TREATMENT: CPT

## 2025-08-26 PROCEDURE — 6360000002 HC RX W HCPCS: Performed by: CLINICAL NURSE SPECIALIST

## 2025-08-26 PROCEDURE — 6370000000 HC RX 637 (ALT 250 FOR IP): Performed by: STUDENT IN AN ORGANIZED HEALTH CARE EDUCATION/TRAINING PROGRAM

## 2025-08-26 PROCEDURE — 85610 PROTHROMBIN TIME: CPT

## 2025-08-26 PROCEDURE — 6370000000 HC RX 637 (ALT 250 FOR IP): Performed by: INTERNAL MEDICINE

## 2025-08-26 PROCEDURE — 2700000000 HC OXYGEN THERAPY PER DAY

## 2025-08-26 PROCEDURE — 2580000003 HC RX 258: Performed by: INTERNAL MEDICINE

## 2025-08-26 PROCEDURE — 80053 COMPREHEN METABOLIC PANEL: CPT

## 2025-08-26 PROCEDURE — 85027 COMPLETE CBC AUTOMATED: CPT

## 2025-08-26 PROCEDURE — 2500000003 HC RX 250 WO HCPCS: Performed by: STUDENT IN AN ORGANIZED HEALTH CARE EDUCATION/TRAINING PROGRAM

## 2025-08-26 PROCEDURE — 83735 ASSAY OF MAGNESIUM: CPT

## 2025-08-26 PROCEDURE — 2060000000 HC ICU INTERMEDIATE R&B

## 2025-08-26 PROCEDURE — 87070 CULTURE OTHR SPECIMN AEROBIC: CPT

## 2025-08-26 RX ORDER — BUMETANIDE 0.25 MG/ML
0.5 INJECTION, SOLUTION INTRAMUSCULAR; INTRAVENOUS 2 TIMES DAILY
Status: COMPLETED | OUTPATIENT
Start: 2025-08-26 | End: 2025-08-26

## 2025-08-26 RX ADMIN — AMIODARONE HYDROCHLORIDE 100 MG: 200 TABLET ORAL at 07:53

## 2025-08-26 RX ADMIN — CEFEPIME 1000 MG: 1 INJECTION, POWDER, FOR SOLUTION INTRAMUSCULAR; INTRAVENOUS at 07:55

## 2025-08-26 RX ADMIN — BUDESONIDE 500 MCG: 0.5 SUSPENSION RESPIRATORY (INHALATION) at 08:44

## 2025-08-26 RX ADMIN — ACETAMINOPHEN 650 MG: 325 TABLET ORAL at 07:52

## 2025-08-26 RX ADMIN — ACETAMINOPHEN 650 MG: 325 TABLET ORAL at 14:46

## 2025-08-26 RX ADMIN — ARFORMOTEROL TARTRATE 15 MCG: 15 SOLUTION RESPIRATORY (INHALATION) at 08:44

## 2025-08-26 RX ADMIN — CEFEPIME 1000 MG: 1 INJECTION, POWDER, FOR SOLUTION INTRAMUSCULAR; INTRAVENOUS at 21:15

## 2025-08-26 RX ADMIN — SODIUM CHLORIDE, PRESERVATIVE FREE 10 ML: 5 INJECTION INTRAVENOUS at 21:10

## 2025-08-26 RX ADMIN — SACUBITRIL AND VALSARTAN 1 TABLET: 24; 26 TABLET, FILM COATED ORAL at 07:53

## 2025-08-26 RX ADMIN — ARFORMOTEROL TARTRATE 15 MCG: 15 SOLUTION RESPIRATORY (INHALATION) at 18:40

## 2025-08-26 RX ADMIN — GUAIFENESIN 400 MG: 400 TABLET ORAL at 15:34

## 2025-08-26 RX ADMIN — BUMETANIDE 0.5 MG: 0.25 INJECTION INTRAMUSCULAR; INTRAVENOUS at 12:07

## 2025-08-26 RX ADMIN — SODIUM CHLORIDE, PRESERVATIVE FREE 10 ML: 5 INJECTION INTRAVENOUS at 12:07

## 2025-08-26 RX ADMIN — GUAIFENESIN 400 MG: 400 TABLET ORAL at 21:10

## 2025-08-26 RX ADMIN — BUMETANIDE 0.5 MG: 0.25 INJECTION INTRAMUSCULAR; INTRAVENOUS at 21:10

## 2025-08-26 RX ADMIN — PANTOPRAZOLE SODIUM 40 MG: 40 TABLET, DELAYED RELEASE ORAL at 07:53

## 2025-08-26 RX ADMIN — BUDESONIDE 500 MCG: 0.5 SUSPENSION RESPIRATORY (INHALATION) at 18:40

## 2025-08-26 RX ADMIN — ROSUVASTATIN CALCIUM 10 MG: 10 TABLET, FILM COATED ORAL at 21:10

## 2025-08-26 RX ADMIN — SACUBITRIL AND VALSARTAN 1 TABLET: 24; 26 TABLET, FILM COATED ORAL at 21:10

## 2025-08-26 RX ADMIN — GUAIFENESIN 400 MG: 400 TABLET ORAL at 07:53

## 2025-08-26 RX ADMIN — PREDNISONE 40 MG: 20 TABLET ORAL at 07:52

## 2025-08-26 RX ADMIN — LEVOTHYROXINE SODIUM 75 MCG: 0.07 TABLET ORAL at 07:53

## 2025-08-26 ASSESSMENT — PAIN DESCRIPTION - DESCRIPTORS
DESCRIPTORS: ACHING
DESCRIPTORS: ACHING

## 2025-08-26 ASSESSMENT — PAIN SCALES - GENERAL
PAINLEVEL_OUTOF10: 4
PAINLEVEL_OUTOF10: 6
PAINLEVEL_OUTOF10: 0

## 2025-08-26 ASSESSMENT — PAIN DESCRIPTION - LOCATION
LOCATION: FLANK;RIB CAGE
LOCATION: BACK

## 2025-08-26 ASSESSMENT — PAIN - FUNCTIONAL ASSESSMENT
PAIN_FUNCTIONAL_ASSESSMENT: PREVENTS OR INTERFERES SOME ACTIVE ACTIVITIES AND ADLS
PAIN_FUNCTIONAL_ASSESSMENT: 0-10

## 2025-08-26 ASSESSMENT — PAIN DESCRIPTION - ORIENTATION: ORIENTATION: LEFT;ANTERIOR;POSTERIOR

## 2025-08-27 LAB
ALBUMIN SERPL-MCNC: 3.1 G/DL (ref 3.5–5.2)
ALP SERPL-CCNC: 72 U/L (ref 35–104)
ALT SERPL-CCNC: 67 U/L (ref 0–35)
ANION GAP SERPL CALCULATED.3IONS-SCNC: 11 MMOL/L (ref 7–16)
AST SERPL-CCNC: 35 U/L (ref 0–35)
BILIRUB SERPL-MCNC: 0.4 MG/DL (ref 0–1.2)
BUN SERPL-MCNC: 24 MG/DL (ref 8–23)
CALCIUM SERPL-MCNC: 8.6 MG/DL (ref 8.8–10.2)
CHLORIDE SERPL-SCNC: 100 MMOL/L (ref 98–107)
CO2 SERPL-SCNC: 29 MMOL/L (ref 22–29)
CREAT SERPL-MCNC: 0.8 MG/DL (ref 0.5–1)
ERYTHROCYTE [DISTWIDTH] IN BLOOD BY AUTOMATED COUNT: 15.7 % (ref 11.5–15)
GFR, ESTIMATED: 74 ML/MIN/1.73M2
GLUCOSE SERPL-MCNC: 106 MG/DL (ref 74–99)
HCT VFR BLD AUTO: 30.5 % (ref 34–48)
HGB BLD-MCNC: 9.3 G/DL (ref 11.5–15.5)
INR PPP: 3
MCH RBC QN AUTO: 30.3 PG (ref 26–35)
MCHC RBC AUTO-ENTMCNC: 30.5 G/DL (ref 32–34.5)
MCV RBC AUTO: 99.3 FL (ref 80–99.9)
PLATELET # BLD AUTO: 135 K/UL (ref 130–450)
PMV BLD AUTO: 9.9 FL (ref 7–12)
POTASSIUM SERPL-SCNC: 4 MMOL/L (ref 3.5–5.1)
PROT SERPL-MCNC: 5.1 G/DL (ref 6.4–8.3)
PROTHROMBIN TIME: 32.3 SEC (ref 9.3–12.4)
RBC # BLD AUTO: 3.07 M/UL (ref 3.5–5.5)
SODIUM SERPL-SCNC: 139 MMOL/L (ref 136–145)
WBC OTHER # BLD: 5.9 K/UL (ref 4.5–11.5)

## 2025-08-27 PROCEDURE — 85610 PROTHROMBIN TIME: CPT

## 2025-08-27 PROCEDURE — 36415 COLL VENOUS BLD VENIPUNCTURE: CPT

## 2025-08-27 PROCEDURE — 6370000000 HC RX 637 (ALT 250 FOR IP): Performed by: CLINICAL NURSE SPECIALIST

## 2025-08-27 PROCEDURE — 94669 MECHANICAL CHEST WALL OSCILL: CPT

## 2025-08-27 PROCEDURE — 99232 SBSQ HOSP IP/OBS MODERATE 35: CPT | Performed by: INTERNAL MEDICINE

## 2025-08-27 PROCEDURE — 6370000000 HC RX 637 (ALT 250 FOR IP): Performed by: INTERNAL MEDICINE

## 2025-08-27 PROCEDURE — 94640 AIRWAY INHALATION TREATMENT: CPT

## 2025-08-27 PROCEDURE — 6360000002 HC RX W HCPCS: Performed by: CLINICAL NURSE SPECIALIST

## 2025-08-27 PROCEDURE — 6360000002 HC RX W HCPCS: Performed by: INTERNAL MEDICINE

## 2025-08-27 PROCEDURE — 2500000003 HC RX 250 WO HCPCS: Performed by: STUDENT IN AN ORGANIZED HEALTH CARE EDUCATION/TRAINING PROGRAM

## 2025-08-27 PROCEDURE — 2060000000 HC ICU INTERMEDIATE R&B

## 2025-08-27 PROCEDURE — 6370000000 HC RX 637 (ALT 250 FOR IP): Performed by: STUDENT IN AN ORGANIZED HEALTH CARE EDUCATION/TRAINING PROGRAM

## 2025-08-27 PROCEDURE — 2580000003 HC RX 258: Performed by: INTERNAL MEDICINE

## 2025-08-27 PROCEDURE — 80053 COMPREHEN METABOLIC PANEL: CPT

## 2025-08-27 PROCEDURE — 85027 COMPLETE CBC AUTOMATED: CPT

## 2025-08-27 PROCEDURE — 2700000000 HC OXYGEN THERAPY PER DAY

## 2025-08-27 PROCEDURE — 97161 PT EVAL LOW COMPLEX 20 MIN: CPT

## 2025-08-27 RX ORDER — BUMETANIDE 1 MG/1
0.5 TABLET ORAL 2 TIMES DAILY
Status: DISCONTINUED | OUTPATIENT
Start: 2025-08-27 | End: 2025-08-28 | Stop reason: HOSPADM

## 2025-08-27 RX ORDER — LEVOFLOXACIN 750 MG/1
750 TABLET, FILM COATED ORAL DAILY
DISCHARGE
Start: 2025-08-28 | End: 2025-08-28

## 2025-08-27 RX ORDER — AMIODARONE HYDROCHLORIDE 100 MG/1
100 TABLET ORAL DAILY
DISCHARGE
Start: 2025-08-27

## 2025-08-27 RX ORDER — BUMETANIDE 0.25 MG/ML
0.5 INJECTION, SOLUTION INTRAMUSCULAR; INTRAVENOUS 2 TIMES DAILY
Status: DISCONTINUED | OUTPATIENT
Start: 2025-08-27 | End: 2025-08-27

## 2025-08-27 RX ORDER — LEVOFLOXACIN 750 MG/1
750 TABLET, FILM COATED ORAL DAILY
DISCHARGE
Start: 2025-08-27 | End: 2025-08-27

## 2025-08-27 RX ORDER — PREDNISONE 20 MG/1
40 TABLET ORAL DAILY
DISCHARGE
Start: 2025-08-28 | End: 2025-08-31

## 2025-08-27 RX ADMIN — WARFARIN SODIUM 2 MG: 2 TABLET ORAL at 17:38

## 2025-08-27 RX ADMIN — SODIUM CHLORIDE, PRESERVATIVE FREE 10 ML: 5 INJECTION INTRAVENOUS at 08:27

## 2025-08-27 RX ADMIN — PANTOPRAZOLE SODIUM 40 MG: 40 TABLET, DELAYED RELEASE ORAL at 08:24

## 2025-08-27 RX ADMIN — GUAIFENESIN 400 MG: 400 TABLET ORAL at 20:21

## 2025-08-27 RX ADMIN — CEFEPIME 1000 MG: 1 INJECTION, POWDER, FOR SOLUTION INTRAMUSCULAR; INTRAVENOUS at 08:27

## 2025-08-27 RX ADMIN — LEVOTHYROXINE SODIUM 75 MCG: 0.07 TABLET ORAL at 08:24

## 2025-08-27 RX ADMIN — SODIUM CHLORIDE, PRESERVATIVE FREE 10 ML: 5 INJECTION INTRAVENOUS at 20:21

## 2025-08-27 RX ADMIN — GUAIFENESIN 400 MG: 400 TABLET ORAL at 08:23

## 2025-08-27 RX ADMIN — SACUBITRIL AND VALSARTAN 1 TABLET: 24; 26 TABLET, FILM COATED ORAL at 08:23

## 2025-08-27 RX ADMIN — CEFEPIME 1000 MG: 1 INJECTION, POWDER, FOR SOLUTION INTRAMUSCULAR; INTRAVENOUS at 20:22

## 2025-08-27 RX ADMIN — ACETAMINOPHEN 650 MG: 325 TABLET ORAL at 11:57

## 2025-08-27 RX ADMIN — BUMETANIDE 0.5 MG: 1 TABLET ORAL at 11:56

## 2025-08-27 RX ADMIN — ARFORMOTEROL TARTRATE 15 MCG: 15 SOLUTION RESPIRATORY (INHALATION) at 08:09

## 2025-08-27 RX ADMIN — PREDNISONE 40 MG: 20 TABLET ORAL at 08:24

## 2025-08-27 RX ADMIN — BUMETANIDE 0.5 MG: 1 TABLET ORAL at 20:20

## 2025-08-27 RX ADMIN — AMIODARONE HYDROCHLORIDE 100 MG: 200 TABLET ORAL at 08:23

## 2025-08-27 RX ADMIN — BUDESONIDE 500 MCG: 0.5 SUSPENSION RESPIRATORY (INHALATION) at 08:09

## 2025-08-27 RX ADMIN — SACUBITRIL AND VALSARTAN 1 TABLET: 24; 26 TABLET, FILM COATED ORAL at 20:21

## 2025-08-27 RX ADMIN — ROSUVASTATIN CALCIUM 10 MG: 10 TABLET, FILM COATED ORAL at 20:21

## 2025-08-27 RX ADMIN — GUAIFENESIN 400 MG: 400 TABLET ORAL at 15:48

## 2025-08-27 RX ADMIN — ARFORMOTEROL TARTRATE 15 MCG: 15 SOLUTION RESPIRATORY (INHALATION) at 19:19

## 2025-08-27 RX ADMIN — DIGOXIN 62.5 MCG: 0.12 TABLET ORAL at 08:23

## 2025-08-27 RX ADMIN — BUDESONIDE 500 MCG: 0.5 SUSPENSION RESPIRATORY (INHALATION) at 19:19

## 2025-08-27 ASSESSMENT — PAIN SCALES - GENERAL
PAINLEVEL_OUTOF10: 7
PAINLEVEL_OUTOF10: 0

## 2025-08-27 ASSESSMENT — PAIN DESCRIPTION - LOCATION: LOCATION: RIB CAGE;BACK

## 2025-08-27 ASSESSMENT — PAIN DESCRIPTION - DESCRIPTORS: DESCRIPTORS: ACHING

## 2025-08-27 ASSESSMENT — PAIN DESCRIPTION - ORIENTATION: ORIENTATION: LEFT;ANTERIOR;POSTERIOR

## 2025-08-27 ASSESSMENT — PAIN - FUNCTIONAL ASSESSMENT
PAIN_FUNCTIONAL_ASSESSMENT: 0-10
PAIN_FUNCTIONAL_ASSESSMENT: PREVENTS OR INTERFERES SOME ACTIVE ACTIVITIES AND ADLS

## 2025-08-28 ENCOUNTER — CARE COORDINATION (OUTPATIENT)
Dept: CARE COORDINATION | Age: 85
End: 2025-08-28

## 2025-08-28 VITALS
DIASTOLIC BLOOD PRESSURE: 54 MMHG | RESPIRATION RATE: 18 BRPM | HEIGHT: 67 IN | WEIGHT: 167.55 LBS | SYSTOLIC BLOOD PRESSURE: 92 MMHG | HEART RATE: 98 BPM | TEMPERATURE: 97.6 F | OXYGEN SATURATION: 98 % | BODY MASS INDEX: 26.3 KG/M2

## 2025-08-28 LAB
ALBUMIN SERPL-MCNC: 3.4 G/DL (ref 3.5–5.2)
ALP SERPL-CCNC: 74 U/L (ref 35–104)
ALT SERPL-CCNC: 61 U/L (ref 0–35)
ANION GAP SERPL CALCULATED.3IONS-SCNC: 13 MMOL/L (ref 7–16)
AST SERPL-CCNC: 25 U/L (ref 0–35)
BILIRUB SERPL-MCNC: 0.5 MG/DL (ref 0–1.2)
BUN SERPL-MCNC: 26 MG/DL (ref 8–23)
CALCIUM SERPL-MCNC: 8.9 MG/DL (ref 8.8–10.2)
CHLORIDE SERPL-SCNC: 96 MMOL/L (ref 98–107)
CO2 SERPL-SCNC: 31 MMOL/L (ref 22–29)
CREAT SERPL-MCNC: 0.9 MG/DL (ref 0.5–1)
ERYTHROCYTE [DISTWIDTH] IN BLOOD BY AUTOMATED COUNT: 15.5 % (ref 11.5–15)
GFR, ESTIMATED: 63 ML/MIN/1.73M2
GLUCOSE SERPL-MCNC: 115 MG/DL (ref 74–99)
HCT VFR BLD AUTO: 31.9 % (ref 34–48)
HGB BLD-MCNC: 10.1 G/DL (ref 11.5–15.5)
INR PPP: 2.2
MCH RBC QN AUTO: 30.1 PG (ref 26–35)
MCHC RBC AUTO-ENTMCNC: 31.7 G/DL (ref 32–34.5)
MCV RBC AUTO: 95.2 FL (ref 80–99.9)
PLATELET # BLD AUTO: 130 K/UL (ref 130–450)
PMV BLD AUTO: 10.5 FL (ref 7–12)
POTASSIUM SERPL-SCNC: 3.8 MMOL/L (ref 3.5–5.1)
PROT SERPL-MCNC: 5.4 G/DL (ref 6.4–8.3)
PROTHROMBIN TIME: 23.6 SEC (ref 9.3–12.4)
RBC # BLD AUTO: 3.35 M/UL (ref 3.5–5.5)
SODIUM SERPL-SCNC: 140 MMOL/L (ref 136–145)
WBC OTHER # BLD: 5.4 K/UL (ref 4.5–11.5)

## 2025-08-28 PROCEDURE — 94640 AIRWAY INHALATION TREATMENT: CPT

## 2025-08-28 PROCEDURE — 80053 COMPREHEN METABOLIC PANEL: CPT

## 2025-08-28 PROCEDURE — 6370000000 HC RX 637 (ALT 250 FOR IP): Performed by: INTERNAL MEDICINE

## 2025-08-28 PROCEDURE — 6370000000 HC RX 637 (ALT 250 FOR IP): Performed by: CLINICAL NURSE SPECIALIST

## 2025-08-28 PROCEDURE — 6360000002 HC RX W HCPCS: Performed by: INTERNAL MEDICINE

## 2025-08-28 PROCEDURE — 2580000003 HC RX 258: Performed by: INTERNAL MEDICINE

## 2025-08-28 PROCEDURE — 6360000002 HC RX W HCPCS: Performed by: CLINICAL NURSE SPECIALIST

## 2025-08-28 PROCEDURE — 6370000000 HC RX 637 (ALT 250 FOR IP): Performed by: STUDENT IN AN ORGANIZED HEALTH CARE EDUCATION/TRAINING PROGRAM

## 2025-08-28 PROCEDURE — 99239 HOSP IP/OBS DSCHRG MGMT >30: CPT | Performed by: INTERNAL MEDICINE

## 2025-08-28 PROCEDURE — 2500000003 HC RX 250 WO HCPCS: Performed by: STUDENT IN AN ORGANIZED HEALTH CARE EDUCATION/TRAINING PROGRAM

## 2025-08-28 PROCEDURE — 85610 PROTHROMBIN TIME: CPT

## 2025-08-28 PROCEDURE — 85027 COMPLETE CBC AUTOMATED: CPT

## 2025-08-28 PROCEDURE — 36415 COLL VENOUS BLD VENIPUNCTURE: CPT

## 2025-08-28 PROCEDURE — 2700000000 HC OXYGEN THERAPY PER DAY

## 2025-08-28 RX ORDER — LEVOFLOXACIN 750 MG/1
750 TABLET, FILM COATED ORAL DAILY
DISCHARGE
Start: 2025-08-29 | End: 2025-08-31

## 2025-08-28 RX ORDER — POTASSIUM CHLORIDE 1500 MG/1
20 TABLET, EXTENDED RELEASE ORAL ONCE
Status: COMPLETED | OUTPATIENT
Start: 2025-08-28 | End: 2025-08-28

## 2025-08-28 RX ADMIN — ACETAMINOPHEN 650 MG: 325 TABLET ORAL at 08:07

## 2025-08-28 RX ADMIN — CEFEPIME 1000 MG: 1 INJECTION, POWDER, FOR SOLUTION INTRAMUSCULAR; INTRAVENOUS at 08:10

## 2025-08-28 RX ADMIN — SACUBITRIL AND VALSARTAN 1 TABLET: 24; 26 TABLET, FILM COATED ORAL at 08:09

## 2025-08-28 RX ADMIN — POTASSIUM CHLORIDE 20 MEQ: 1500 TABLET, EXTENDED RELEASE ORAL at 09:25

## 2025-08-28 RX ADMIN — PANTOPRAZOLE SODIUM 40 MG: 40 TABLET, DELAYED RELEASE ORAL at 08:07

## 2025-08-28 RX ADMIN — AMIODARONE HYDROCHLORIDE 100 MG: 200 TABLET ORAL at 08:08

## 2025-08-28 RX ADMIN — PREDNISONE 40 MG: 20 TABLET ORAL at 08:06

## 2025-08-28 RX ADMIN — SODIUM CHLORIDE, PRESERVATIVE FREE 10 ML: 5 INJECTION INTRAVENOUS at 08:07

## 2025-08-28 RX ADMIN — ACETAMINOPHEN 650 MG: 325 TABLET ORAL at 02:31

## 2025-08-28 RX ADMIN — BUMETANIDE 0.5 MG: 1 TABLET ORAL at 08:09

## 2025-08-28 RX ADMIN — LEVOTHYROXINE SODIUM 75 MCG: 0.07 TABLET ORAL at 08:06

## 2025-08-28 RX ADMIN — GUAIFENESIN 400 MG: 400 TABLET ORAL at 14:22

## 2025-08-28 RX ADMIN — BUDESONIDE 500 MCG: 0.5 SUSPENSION RESPIRATORY (INHALATION) at 08:12

## 2025-08-28 RX ADMIN — ARFORMOTEROL TARTRATE 15 MCG: 15 SOLUTION RESPIRATORY (INHALATION) at 08:12

## 2025-08-28 RX ADMIN — GUAIFENESIN 400 MG: 400 TABLET ORAL at 08:06

## 2025-08-28 ASSESSMENT — PAIN DESCRIPTION - LOCATION
LOCATION: BACK
LOCATION: BACK;FLANK

## 2025-08-28 ASSESSMENT — PAIN SCALES - GENERAL
PAINLEVEL_OUTOF10: 0
PAINLEVEL_OUTOF10: 0
PAINLEVEL_OUTOF10: 2
PAINLEVEL_OUTOF10: 5

## 2025-08-28 ASSESSMENT — PAIN DESCRIPTION - DESCRIPTORS
DESCRIPTORS: ACHING
DESCRIPTORS: ACHING;DISCOMFORT;DULL

## 2025-08-28 ASSESSMENT — PAIN DESCRIPTION - ORIENTATION
ORIENTATION: POSTERIOR;MID;LOWER
ORIENTATION: LEFT

## 2025-08-28 ASSESSMENT — PAIN DESCRIPTION - PAIN TYPE: TYPE: ACUTE PAIN

## (undated) DEVICE — PROBE HEMSTAT 18GA ERAS BVL TIP STR BPLR WET-FIELD

## (undated) DEVICE — SINGLE USE MEDICAL DEVICE FOR OPHTHALMIC SURGERY: Brand: SIL. COATED I/A 45 MIL 12/B

## (undated) DEVICE — CANNULA OPHTH 25GA 7 8IN ORNG 45DEG ANG 4MM FR END DOME SHP

## (undated) DEVICE — 1060 S-DRAPE SPCL INCISE 10/BX 4BX/CS: Brand: STERI-DRAPE™

## (undated) DEVICE — BLADE OPHTH GRN ROUNDED TIP 1 SIDE SHRP GRINDLESS MINI-BLDE

## (undated) DEVICE — 1 ML TUBERCULIN SYRINGE,DETACHABLE NEEDLE: Brand: MONOJECT

## (undated) DEVICE — GLASSES SAFETY PROTCT GRN

## (undated) DEVICE — PACK PROCEDURE SURG SURG CATARACT CUSTOM

## (undated) DEVICE — SOLUTION IRRIGATION BAL SALT SOLUTION 15 ML STRL BSS

## (undated) DEVICE — SOLUTION IV IRRIG POUR BRL 0.9% SODIUM CHL 2F7124

## (undated) DEVICE — NEEDLE FLTR 18GA L1.5IN MEM THK5UM BLNT DISP

## (undated) DEVICE — CLEARCUT® SLIT KNIFE INTREPID MICRO-COAXIAL SYSTEM 2.4 SB: Brand: CLEARCUT®; INTREPID

## (undated) DEVICE — NEEDLE HYPO 25GA L0.625IN BLU POLYPR HUB S STL REG BVL STR

## (undated) DEVICE — SYRINGE MED 50ML LUERLOCK TIP

## (undated) DEVICE — SYRINGE, LUER LOCK, 10ML: Brand: MEDLINE

## (undated) DEVICE — Device

## (undated) DEVICE — PACK PROC FLD MGMT SYS CENTURION CUST

## (undated) DEVICE — SOLUTION IV IRRIG WATER 1000ML POUR BRL 2F7114

## (undated) DEVICE — SOLUTION IRRIG BSS ST 500ML

## (undated) DEVICE — COVER MICROSCOPE KNOB LG

## (undated) DEVICE — PAD PREP L 2 PLY 70% ISO ALC NONWOVEN SFT ABSRB TOP ANTISEP

## (undated) DEVICE — TOWEL,OR,DSP,ST,BLUE,STD,6/PK,12PK/CS: Brand: MEDLINE

## (undated) DEVICE — 40436 HEAD REST OCULAR: Brand: 40436 HEAD REST OCULAR

## (undated) DEVICE — THE MONARCH® "D" CARTRIDGE IS A SINGLE-USE POLYPROPYLENE CARTRIDGE FOR POSTERIOR CHAMBER IOL DELIVERY: Brand: MONARCH® III

## (undated) DEVICE — MARKER,SKIN,WI/RULER AND LABELS: Brand: MEDLINE

## (undated) DEVICE — SATINCRESCENT® KNIFE ANGLED BEVEL UP: Brand: SATINCRESCENT®

## (undated) DEVICE — SCALPEL 15 DEG NO 715

## (undated) DEVICE — GLOVE SURG SZ 65 CRM LTX FREE POLYISOPRENE POLYMER BEAD ANTI

## (undated) DEVICE — MASK RESP UNIV N95 4 PANEL HD STRP INDIVIDUALLY WRP LF

## (undated) DEVICE — Z CONVERTED USE 2273263 SWABSTICK CLN SZ 1S 7.5% PVP IOD SCRB DUO-SWAB

## (undated) DEVICE — FORCEPS BX L100CM DIA1.8MM WRK CHN 2MM PULM S STL RAD JAW 4

## (undated) DEVICE — SURGICAL PROCEDURE PACK BRONCH

## (undated) DEVICE — SHIELD EYE W3XL2.5IN UNIV CLR PLAS LTWT

## (undated) DEVICE — TRAY PHACO REUSABLE

## (undated) DEVICE — TRAY EYE EXTRAS REUSABLE

## (undated) DEVICE — SOLUTION IV IRRIG 500ML 0.9% SODIUM CHL 2F7123

## (undated) DEVICE — Device: Brand: MEDEX

## (undated) DEVICE — Z DISCONTINUED NO SUB IDED NEEDLE BX REPL AUTOCLV FOR NA2C1